# Patient Record
Sex: MALE | Race: WHITE | NOT HISPANIC OR LATINO | Employment: OTHER | URBAN - METROPOLITAN AREA
[De-identification: names, ages, dates, MRNs, and addresses within clinical notes are randomized per-mention and may not be internally consistent; named-entity substitution may affect disease eponyms.]

---

## 2017-01-02 ENCOUNTER — TELEPHONE (OUTPATIENT)
Dept: INTERNAL MEDICINE | Facility: CLINIC | Age: 62
End: 2017-01-02

## 2017-01-02 DIAGNOSIS — E78.5 HYPERLIPIDEMIA LDL GOAL <130: ICD-10-CM

## 2017-01-02 DIAGNOSIS — I10 BENIGN ESSENTIAL HYPERTENSION: Primary | ICD-10-CM

## 2017-01-02 DIAGNOSIS — R73.03 PREDIABETES: ICD-10-CM

## 2017-01-02 NOTE — TELEPHONE ENCOUNTER
Reason for Call: Request for an order or referral:    Order or referral being requested: labs    Date needed: as soon as possible    Has the patient been seen by the PCP for this problem? YES    Additional comments: Pt has an appt for a 3 mon follow up with Dr Payne 1/5/17.  Per office notes from Sept 2016, pt was to have labs done. There are no lab orders in pt's chart. Pt made an appt for labs 1/4/16.  Please order labs.    Phone number Patient can be reached at:  Home number on file [phone] (home)    Best Time:  anytime    Can we leave a detailed message on this number?      Call taken on 1/2/2017 at 12:17 PM by ALE RICE

## 2017-01-04 DIAGNOSIS — R73.03 PREDIABETES: ICD-10-CM

## 2017-01-04 DIAGNOSIS — E78.5 HYPERLIPIDEMIA LDL GOAL <130: ICD-10-CM

## 2017-01-04 DIAGNOSIS — I10 BENIGN ESSENTIAL HYPERTENSION: ICD-10-CM

## 2017-01-04 LAB
ALBUMIN SERPL-MCNC: 3.8 G/DL (ref 3.4–5)
ALP SERPL-CCNC: 84 U/L (ref 40–150)
ALT SERPL W P-5'-P-CCNC: 21 U/L (ref 0–70)
ANION GAP SERPL CALCULATED.3IONS-SCNC: 7 MMOL/L (ref 3–14)
AST SERPL W P-5'-P-CCNC: 22 U/L (ref 0–45)
BASOPHILS # BLD AUTO: 0 10E9/L (ref 0–0.2)
BASOPHILS NFR BLD AUTO: 0.4 %
BILIRUB SERPL-MCNC: 0.3 MG/DL (ref 0.2–1.3)
BUN SERPL-MCNC: 17 MG/DL (ref 7–30)
CALCIUM SERPL-MCNC: 9 MG/DL (ref 8.5–10.1)
CHLORIDE SERPL-SCNC: 100 MMOL/L (ref 94–109)
CHOLEST SERPL-MCNC: 190 MG/DL
CO2 SERPL-SCNC: 32 MMOL/L (ref 20–32)
CREAT SERPL-MCNC: 0.98 MG/DL (ref 0.66–1.25)
DIFFERENTIAL METHOD BLD: NORMAL
EOSINOPHIL # BLD AUTO: 0.4 10E9/L (ref 0–0.7)
EOSINOPHIL NFR BLD AUTO: 4 %
ERYTHROCYTE [DISTWIDTH] IN BLOOD BY AUTOMATED COUNT: 13.3 % (ref 10–15)
GFR SERPL CREATININE-BSD FRML MDRD: 78 ML/MIN/1.7M2
GLUCOSE SERPL-MCNC: 105 MG/DL (ref 70–99)
HCT VFR BLD AUTO: 48.8 % (ref 40–53)
HDLC SERPL-MCNC: 30 MG/DL
HGB BLD-MCNC: 16.1 G/DL (ref 13.3–17.7)
LDLC SERPL CALC-MCNC: 91 MG/DL
LYMPHOCYTES # BLD AUTO: 2.2 10E9/L (ref 0.8–5.3)
LYMPHOCYTES NFR BLD AUTO: 22.5 %
MCH RBC QN AUTO: 31.4 PG (ref 26.5–33)
MCHC RBC AUTO-ENTMCNC: 33 G/DL (ref 31.5–36.5)
MCV RBC AUTO: 95 FL (ref 78–100)
MONOCYTES # BLD AUTO: 0.9 10E9/L (ref 0–1.3)
MONOCYTES NFR BLD AUTO: 9.5 %
NEUTROPHILS # BLD AUTO: 6.3 10E9/L (ref 1.6–8.3)
NEUTROPHILS NFR BLD AUTO: 63.6 %
NONHDLC SERPL-MCNC: 160 MG/DL
PLATELET # BLD AUTO: 207 10E9/L (ref 150–450)
POTASSIUM SERPL-SCNC: 3.8 MMOL/L (ref 3.4–5.3)
PROT SERPL-MCNC: 7.3 G/DL (ref 6.8–8.8)
RBC # BLD AUTO: 5.13 10E12/L (ref 4.4–5.9)
SODIUM SERPL-SCNC: 139 MMOL/L (ref 133–144)
TRIGL SERPL-MCNC: 344 MG/DL
WBC # BLD AUTO: 9.9 10E9/L (ref 4–11)

## 2017-01-04 PROCEDURE — 80061 LIPID PANEL: CPT | Performed by: INTERNAL MEDICINE

## 2017-01-04 PROCEDURE — 80053 COMPREHEN METABOLIC PANEL: CPT | Performed by: INTERNAL MEDICINE

## 2017-01-04 PROCEDURE — 36415 COLL VENOUS BLD VENIPUNCTURE: CPT | Performed by: INTERNAL MEDICINE

## 2017-01-04 PROCEDURE — 85025 COMPLETE CBC W/AUTO DIFF WBC: CPT | Performed by: INTERNAL MEDICINE

## 2017-01-05 ENCOUNTER — OFFICE VISIT (OUTPATIENT)
Dept: INTERNAL MEDICINE | Facility: CLINIC | Age: 62
End: 2017-01-05
Payer: COMMERCIAL

## 2017-01-05 VITALS
DIASTOLIC BLOOD PRESSURE: 80 MMHG | HEART RATE: 86 BPM | TEMPERATURE: 98.1 F | BODY MASS INDEX: 28.58 KG/M2 | OXYGEN SATURATION: 91 % | WEIGHT: 199.6 LBS | SYSTOLIC BLOOD PRESSURE: 134 MMHG | HEIGHT: 70 IN

## 2017-01-05 DIAGNOSIS — J43.1 PANLOBULAR EMPHYSEMA (H): Primary | ICD-10-CM

## 2017-01-05 DIAGNOSIS — Z23 NEED FOR PROPHYLACTIC VACCINATION AGAINST STREPTOCOCCUS PNEUMONIAE (PNEUMOCOCCUS): ICD-10-CM

## 2017-01-05 DIAGNOSIS — E78.5 HYPERLIPIDEMIA LDL GOAL <130: ICD-10-CM

## 2017-01-05 DIAGNOSIS — I10 BENIGN ESSENTIAL HYPERTENSION: ICD-10-CM

## 2017-01-05 DIAGNOSIS — F17.200 TOBACCO USE DISORDER: ICD-10-CM

## 2017-01-05 PROCEDURE — 90471 IMMUNIZATION ADMIN: CPT | Performed by: INTERNAL MEDICINE

## 2017-01-05 PROCEDURE — 90670 PCV13 VACCINE IM: CPT | Performed by: INTERNAL MEDICINE

## 2017-01-05 PROCEDURE — 99214 OFFICE O/P EST MOD 30 MIN: CPT | Mod: 25 | Performed by: INTERNAL MEDICINE

## 2017-01-05 RX ORDER — ALBUTEROL SULFATE 90 UG/1
2 AEROSOL, METERED RESPIRATORY (INHALATION) EVERY 6 HOURS
Qty: 1 INHALER | Refills: 10 | Status: SHIPPED | OUTPATIENT
Start: 2017-01-05 | End: 2017-01-05

## 2017-01-05 RX ORDER — AMLODIPINE BESYLATE 10 MG/1
10 TABLET ORAL DAILY
Qty: 30 TABLET | Refills: 5 | Status: SHIPPED | OUTPATIENT
Start: 2017-01-05 | End: 2017-07-13

## 2017-01-05 RX ORDER — ALBUTEROL SULFATE 90 UG/1
2 AEROSOL, METERED RESPIRATORY (INHALATION) EVERY 6 HOURS
Qty: 1 INHALER | Refills: 10 | Status: SHIPPED | OUTPATIENT
Start: 2017-01-05 | End: 2018-01-04

## 2017-01-05 RX ORDER — LISINOPRIL AND HYDROCHLOROTHIAZIDE 12.5; 2 MG/1; MG/1
2 TABLET ORAL EVERY MORNING
Qty: 60 TABLET | Refills: 5 | Status: SHIPPED | OUTPATIENT
Start: 2017-01-05 | End: 2017-07-13

## 2017-01-05 RX ORDER — PRAVASTATIN SODIUM 40 MG
40 TABLET ORAL AT BEDTIME
Qty: 30 TABLET | Refills: 5 | Status: SHIPPED | OUTPATIENT
Start: 2017-01-05 | End: 2017-07-13

## 2017-01-05 NOTE — MR AVS SNAPSHOT
"              After Visit Summary   1/5/2017    Gabe Smith    MRN: [id number]           Patient Information     Date Of Birth          1955        Visit Information        Provider Department      1/5/2017 8:20 AM Donny Payne MD [facility]        Today's Diagnoses     COPD (HCC)    -  1     Benign essential hypertension         Hyperlipidemia LDL goal <130         Essential hypertension         Tobacco use disorder         Need for prophylactic vaccination against Streptococcus pneumoniae (pneumococcus)           Care Instructions    *  Continue all medications at the same doses.  Contact your usual pharmacy if you need refills.     *  Return to see me in approximately 6 months, sooner if needed.        *    5 GOALS TO PREVENT VASCULAR DISEASE:     1.  Aggressive blood pressure control, under 130/80 ideally.  Using medications if needed.    Your blood pressure is under good control    BP Readings from Last 4 Encounters:   01/05/17 134/80   09/22/16 152/90   04/07/16 134/84   11/12/15 120/80       2.  Aggressive LDL cholesterol (\"bad cholesterol\") lowering as indicated.    Your goal is an LDL under 130 for sure, preferably under 100.  (If you have diabetes or previous vascular disease, the the LDL goals would be under 100 for sure, preferably under 70.)    New guidelines identify four high-risk groups who could benefit from statins:   *people with pre-existing heart disease, such as those who have had a heart attack;   *people ages 40 to 75 who have diabetes of any type  *patients ages 40 to 75 with at least a 7.5% risk of developing cardiovascular disease over the next decade, according to a formula described in the guidelines  *patients with the sort of super-high cholesterol that sometimes runs in families, as evidenced by an LDL of 190 milligrams per deciliter or higher    Your cholesterol levels are well controlled.    Recent Labs   Lab Test  01/04/17   0814  " "09/20/16   0747   08/04/15   0757  02/03/15   0748   CHOL  190  203*   < >  210*  192   HDL  30*  26*   < >  27*  33*   LDL  91  Cannot estimate LDL when triglyceride exceeds 400 mg/dL  117*   < >  Cannot estimate LDL when triglyceride exceeds 400 mg/dL  134*  107   TRIG  344*  441*   < >  422*  262*   CHOLHDLRATIO   --    --    --   7.8*  5.8*    < > = values in this interval not displayed.       3.  Aggressive diabetic prevention, screening and/or management.      You do not have diabetes as of the most recent blood tests.     4.  No smoking    5.  Consider taking low dose aspirin (81 mg) tablet once per day over the age of 50, every day unless there is a specific reason that you cannot take aspirin (such as side effect, allergy, or you are on another \"blood thinner\").        --Based on your current cardiac risk factors, you should take Aspirin 81 mg once per day if you are over 50 years of age.         CHANTIX:     *  Start with the starting pack where you ramp up the dose for the first week, then you continue on the 1 mg twice daily dose for up to 9 months.      *  Stop smoking during the first couple of weeks while taking Chantix.      We hope for at least 3 months, but studies have shown that there is increased success in reminaing smoke free with use out as far as 9 months.  I personally think you probably only need to use it for between 3-6 months, but the choice is yours.     Main side effects of Chantix include (but are not limited to) nausea, constipation, gas, and changes in dreaming.  There is no reported seizure risk or insomnia as with Wellbutrin/Zyban.    *  You do not need to take nicotine replacement (i.e. Nicotine patches, nicotine gum/lozenges, etc.) when taking Chantix due to the way the medication works.     *  Be sure to take advantage of the \"Get Quit\" support plan from the Chantix  which can provide additional support.      *  Visit the Chantix web site for further details. " (www.chantix.[employer]) about the medication and side effects.      *  If you encounter side effects with Chantix,stop the medication immediately and be sure to let us know via the [facility] Nurse Line  [phone].    *  Consider visiting [facility] for more assistance and counseling for smoking cessation.      *  Remember to have somehting for your hands to play with (rubber band,  strength ball, etc.) and also something for your mouth as well (hard candy, gum, lozenges suckers, carrots, etc).    * Contact me via phone message or UNITY Mobilet after the first month of Chantix regardless of how you are feeling.  I need to know that the medication is both helping and not causing any side effects.  If everything is going as planned without side effects, then I will continue the medication.                    Follow-ups after your visit        Who to contact     If you have questions or need follow up information about today's clinic visit or your schedule please contact [facility] directly at [phone].  Normal or non-critical lab and imaging results will be communicated to you by Vettrohart, letter or phone within 4 business days after the clinic has received the results. If you do not hear from us within 7 days, please contact the clinic through UNITY Mobilet or phone. If you have a critical or abnormal lab result, we will notify you by phone as soon as possible.  Submit refill requests through [employer] or call your pharmacy and they will forward the refill request to us. Please allow 3 business days for your refill to be completed.          Additional Information About Your Visit        Vettrohart Information     [employer] gives you secure access to your electronic health record. If you see a primary care provider, you can also send messages to your care team and make appointments. If you have questions, please call your primary care clinic.  If you do not have a primary care provider,  "please call [phone] and they will assist you.        Care EveryWhere ID     This is your Care EveryWhere ID. This could be used by other organizations to access your [facility] medical records  [id number]        Your Vitals Were     Pulse Temperature Height BMI (Body Mass Index) Pulse Oximetry       86 98.1  F (36.7  C) (Oral) 5' 10\" (1.778 m) 28.64 kg/m2 91%        Blood Pressure from Last 3 Encounters:   01/05/17 134/80   09/22/16 152/90   04/07/16 134/84    Weight from Last 3 Encounters:   01/05/17 199 lb 9.6 oz (90.538 kg)   09/22/16 197 lb 1.6 oz (89.404 kg)   04/07/16 197 lb 3.2 oz (89.449 kg)              We Performed the Following     PNEUMOCOCCAL CONJ VACCINE 13 VALENT IM (PREVNAR 13)          Today's Medication Changes          These changes are accurate as of: 1/5/17  9:06 AM.  If you have any questions, ask your nurse or doctor.               Start taking these medicines.        Dose/Directions    varenicline 0.5 MG X 11 & 1 MG X 42 tablet   Commonly known as:  CHANTIX STARTING MONTH JUSTIN   Used for:  Tobacco use disorder   Started by:  Donny Payne MD        Take 0.5 mg tab daily for 3 days, then 0.5 mg tab twice daily for 4 days, then 1 mg twice daily.   Quantity:  53 tablet   Refills:  0         These medicines have changed or have updated prescriptions.        Dose/Directions    * albuterol 108 (90 BASE) MCG/ACT Inhaler   Commonly known as:  PROAIR HFA/PROVENTIL HFA/VENTOLIN HFA   This may have changed:  Another medication with the same name was added. Make sure you understand how and when to take each.   Used for:  COPD (chronic obstructive pulmonary disease) (H)   Changed by:  Donny Payne MD        Dose:  2 puff   Inhale 2 puffs into the lungs every 4 hours as needed for shortness of breath / dyspnea or wheezing   Quantity:  3 Inhaler   Refills:  5       * albuterol (2.5 MG/3ML) 0.083% neb solution   This may have changed:  Another medication with the same name was " added. Make sure you understand how and when to take each.   Used for:  Wheezing, Obstructive chronic bronchitis with exacerbation (H)   Changed by:  Elysia Pedroza PA-C        Dose:  1 vial   Take 1 vial (2.5 mg) by nebulization every 6 hours as needed for shortness of breath / dyspnea or wheezing   Quantity:  30 vial   Refills:  0       * albuterol 108 (90 BASE) MCG/ACT Inhaler   Commonly known as:  albuterol   This may have changed:  You were already taking a medication with the same name, and this prescription was added. Make sure you understand how and when to take each.   Used for:  Panlobular emphysema (H)   Changed by:  Donny Payne MD        Dose:  2 puff   Inhale 2 puffs into the lungs every 6 hours   Quantity:  1 Inhaler   Refills:  10       * Notice:  This list has 3 medication(s) that are the same as other medications prescribed for you. Read the directions carefully, and ask your doctor or other care provider to review them with you.         Where to get your medicines      These medications were sent to [facility] [address] [address]     Phone:  [phone]    - albuterol 108 (90 BASE) MCG/ACT Inhaler  - amLODIPine 10 MG tablet  - lisinopril-hydrochlorothiazide 20-12.5 MG per tablet  - mometasone-formoterol 200-5 MCG/ACT oral inhaler  - pravastatin 40 MG tablet  - varenicline 0.5 MG X 11 & 1 MG X 42 tablet             Primary Care Provider Office Phone # Fax #    Donny Payne [phone]       [address] [address]        Thank you!     Thank you for choosing [facility]  for your care. Our goal is always to provide you with excellent care. Hearing back from our patients is one way we can continue to improve our services. Please take a few minutes to complete the written survey that you may receive in the mail after  your visit with us. Thank you!             Your Updated Medication List - Protect others around you: Learn how to safely use, store and throw away your medicines at www.disposemymeds.org.          This list is accurate as of: 1/5/17  9:06 AM.  Always use your most recent med list.                   Brand Name Dispense Instructions for use    * albuterol 108 (90 BASE) MCG/ACT Inhaler    PROAIR HFA/PROVENTIL HFA/VENTOLIN HFA    3 Inhaler    Inhale 2 puffs into the lungs every 4 hours as needed for shortness of breath / dyspnea or wheezing       * albuterol (2.5 MG/3ML) 0.083% neb solution     30 vial    Take 1 vial (2.5 mg) by nebulization every 6 hours as needed for shortness of breath / dyspnea or wheezing       * albuterol 108 (90 BASE) MCG/ACT Inhaler    albuterol    1 Inhaler    Inhale 2 puffs into the lungs every 6 hours       amLODIPine 10 MG tablet    NORVASC    30 tablet    Take 1 tablet (10 mg) by mouth daily       aspirin 81 MG tablet      Take 1 tablet (81 mg) by mouth daily       lisinopril-hydrochlorothiazide 20-12.5 MG per tablet    PRINZIDE/ZESTORETIC    60 tablet    Take 2 tablets by mouth every morning       mometasone-formoterol 200-5 MCG/ACT oral inhaler    DULERA    1 Inhaler    Inhale 2 puffs into the lungs 2 times daily       pravastatin 40 MG tablet    PRAVACHOL    30 tablet    Take 1 tablet (40 mg) by mouth At Bedtime       varenicline 0.5 MG X 11 & 1 MG X 42 tablet    CHANTIX STARTING MONTH JUSTIN    53 tablet    Take 0.5 mg tab daily for 3 days, then 0.5 mg tab twice daily for 4 days, then 1 mg twice daily.       * Notice:  This list has 3 medication(s) that are the same as other medications prescribed for you. Read the directions carefully, and ask your doctor or other care provider to review them with you.

## 2017-01-05 NOTE — PROGRESS NOTES
SUBJECTIVE:                                                    Gabe Smith is a 61 year old male who presents to clinic today for the following health issues:      COPD remains stable.  Has not had any recent breathing troubles beyond usual baseline.  Has not any acute respiratory events.  Remains with intermittent cough, mild shortness of breath with overexertion as per usual.  Using medication as directed with reported side effects.       Hyperlipidemia Follow-Up      Rate your low fat/cholesterol diet?: good    Taking statin?  Yes, no muscle aches from statin    Other lipid medications/supplements?:  None    Has history of hyperlipidemia.  On statin for this, denies any significant side effects of this medication.      Latest labs reviewed:    Recent Labs   Lab Test  01/04/17   0814  09/20/16   0747   08/04/15   0757  02/03/15   0748   CHOL  190  203*   < >  210*  192   HDL  30*  26*   < >  27*  33*   LDL  91  Cannot estimate LDL when triglyceride exceeds 400 mg/dL  117*   < >  Cannot estimate LDL when triglyceride exceeds 400 mg/dL  134*  107   TRIG  344*  441*   < >  422*  262*   CHOLHDLRATIO   --    --    --   7.8*  5.8*    < > = values in this interval not displayed.        AST       22   1/4/2017       Hypertension Follow-up      Outpatient blood pressures are not being checked.    Low Salt Diet: no added salt    Blood presure remains well controlled at home  Readings outside clinic are within normal limits.  Reviewed last 6 BP readings in chart:  BP Readings from Last 6 Encounters:   01/05/17 134/80   09/22/16 152/90   04/07/16 134/84   11/12/15 120/80   10/19/15 122/89   09/02/15 122/78     He has not experienced any significant side effects from medicaiotns for hypertension.    NO active cardiac complaints or symptoms with exercise.        Amount of exercise or physical activity: None    Problems taking medications regularly: No    Medication side effects: none    Diet: regular (no  restrictions)        Problem list and histories reviewed & adjusted, as indicated.  Additional history: as documented        Past Medical History:  ---------------------------  Past Medical History   Diagnosis Date     Hypertension      Hyperlipidemia      Osteoarthrosis      Tobacco use disorder      Prediabetes 10/9/14     A1C 6.1     Dysmetabolic syndrome X      COPD (chronic obstructive pulmonary disease) (H)        Past Surgical History:  ---------------------------  Past Surgical History   Procedure Laterality Date     Surgical history of -   2000     Left MOISES     Surgical history of -        Unspecified knee surgeries as a child     Surgical history of -   10/2010     Right MOISES, with left knee arthroscopy, meniscectomy     Colonoscopy N/A 10/19/2015     Procedure: COMBINED COLONOSCOPY, SINGLE OR MULTIPLE BIOPSY/POLYPECTOMY BY BIOPSY;  Surgeon: Gume Vidal MD;  Location: [facility]       Current Medications:  ---------------------------  Current Outpatient Prescriptions   Medication Sig Dispense Refill     mometasone-formoterol (DULERA) 200-5 MCG/ACT oral inhaler Inhale 2 puffs into the lungs 2 times daily 1 Inhaler 5     lisinopril-hydrochlorothiazide (PRINZIDE/ZESTORETIC) 20-12.5 MG per tablet Take 2 tablets by mouth every morning 60 tablet 5     pravastatin (PRAVACHOL) 40 MG tablet Take 1 tablet (40 mg) by mouth At Bedtime 30 tablet 5     amLODIPine (NORVASC) 10 MG tablet Take 1 tablet (10 mg) by mouth daily 30 tablet 5     varenicline (CHANTIX STARTING MONTH PAK) 0.5 MG X 11 & 1 MG X 42 tablet Take 0.5 mg tab daily for 3 days, then 0.5 mg tab twice daily for 4 days, then 1 mg twice daily. 53 tablet 0     albuterol (ALBUTEROL) 108 (90 BASE) MCG/ACT Inhaler Inhale 2 puffs into the lungs every 6 hours 1 Inhaler 10     albuterol (PROAIR HFA, PROVENTIL HFA, VENTOLIN HFA) 108 (90 BASE) MCG/ACT inhaler Inhale 2 puffs into the lungs every 4 hours as needed for shortness of breath / dyspnea or wheezing 3 Inhaler 5  "    aspirin 81 MG tablet Take 1 tablet (81 mg) by mouth daily       albuterol (2.5 MG/3ML) 0.083% nebulizer solution Take 1 vial (2.5 mg) by nebulization every 6 hours as needed for shortness of breath / dyspnea or wheezing 30 vial 0       Allergies:  -------------  No Known Allergies    Social History:  -------------------  Social History     Social History     Marital Status:      Spouse Name: N/A     Number of Children: 2     Years of Education: N/A     Occupational History           Social History Main Topics     Smoking status: Current Every Day Smoker -- 1.50 packs/day     Types: Cigarettes     Smokeless tobacco: Never Used      Comment: 1 1/2 PPD     Alcohol Use: 0.0 oz/week     0 Standard drinks or equivalent per week      Comment: 8/7/14:  3-4 per day; 9/22/16:  3-6 per day most days     Drug Use: No     Sexual Activity: Not Currently     Other Topics Concern     Not on file     Social History Narrative       Family Medical History:  ------------------------------  Family History   Problem Relation Age of Onset     Family History Negative Mother      Family History Negative Brother      Family History Negative Sister          ROS:  REVIEW OF SYSTEMS:    RESP: positive for cough, dyspnea, wheezing; negative for hemoptysis   CV: negative for chest pain, palpitations, PND, YANCEY, orthopnea; reports no changes in their ability to perform physical activity (from cardiovascular standpoint)  GI: negative for dysphagia, N/V, pain, melena, diarrhea and constipation  NEURO: negative for numbness/tingling, paralysis, incoordination, or focal weakness     OBJECTIVE:                                                    /80 mmHg  Pulse 86  Temp(Src) 98.1  F (36.7  C) (Oral)  Ht 5' 10\" (1.778 m)  Wt 199 lb 9.6 oz (90.538 kg)  BMI 28.64 kg/m2  SpO2 91%     GENERAL alert and no distress  EYES:  Normal sclera,conjunctiva, EOMI  HENT: oral and posterior pharynx without lesions or erythema, facies " symmetric  NECK: Neck supple. No LAD, without thyroidmegaly or JVD., Carotids without bruits.  RESP: Clear to ausculation bilaterally without wheezes or crackles. Normal BS in all fields.  CV: RRR normal S1S2 without murmurs, rubs or gallops. PMI normal  LYMPH: no cervical lymph adenopathy appreciated  MS: extremities- no gross deformities of the visible extremities noted, no edema  PSYCH: Alert and oriented times 3; speech- coherent  SKIN:  No obvious significant skin lesions on visible portions of face          ASSESSMENT/PLAN:                                                      (J43.1) COPD (HCC)  (primary encounter diagnosis)  Comment: This condition is currently controlled on the current medical regimen.  Continue current therapy.   I really tried to conveince him that he really needs to quit smoking, but he feels that he is not ready at this time.   Discussed general issues of COPD, including pathophysiology, ways it will affect the pt., when to seek help, reviewed the typical medications (how they are taken, how they help)   Plan: mometasone-formoterol (DULERA) 200-5 MCG/ACT         oral inhaler, albuterol (ALBUTEROL) 108 (90         BASE) MCG/ACT Inhaler, DISCONTINUED: albuterol         (ALBUTEROL) 108 (90 BASE) MCG/ACT Inhaler            (I10) Benign essential hypertension  Comment: This condition is currently controlled on the current medical regimen.  Continue current therapy.   Discussed hypertension in detail including JNC VIII guidelines for blood pressure goals.  Discussed indication for treatment and treatment options.  Discussed the importance for aggressive management of HTN to prevent vascular complications later.  Recommended lower fat, lower carbohydrate, and lower sodium (<2000 mg)diet.  Discussed required intervals for follow up on HTN, lab studies, and the need to aggresive management of other cardiac disease risk factors.  Recommened pt. follow their blood pressures outside the clinic to  ensure that BPs are remaining within guidelines, and to contact me if the readings are not within guidelines so we can adjust treatment as needed.   Plan: lisinopril-hydrochlorothiazide         (PRINZIDE/ZESTORETIC) 20-12.5 MG per tablet            (E78.5) Hyperlipidemia LDL goal <130  Comment: Discussed current lipid results, previous results (if available) current guidelines (NCEP) for treatment and goals for lipids.  Discussed lifestyle modification, dietary changes (low fat, low simple carb) and regular aerobic exercise.  Discussed the link between dysmetabolic syndrome and impaired glucose tolerance seen in certain patterns of lipids.  Briefly discussed medication used for lipid lowering, including the statins are their possible side effects of myalgias, rhabdomyolysis, and liver toxicity.   Plan: pravastatin (PRAVACHOL) 40 MG tablet            (I10) Essential hypertension  Comment:   Plan: amLODIPine (NORVASC) 10 MG tablet            (F17.200) Tobacco use disorder  Comment: After further discussion of medication aids to help stop smoking, the patient has decided to take Chantix.  We discussed the medication in detail, including suspected mechanism of action, duration of treatment (minimum preferred 3 months up to max of 6-9 months).  We also discussed some of the potential side effects of the medication including, but not limited to, GI upset, nausea, headaches, nightmares, strange dreams, and possible effects on the mood, inclduing worsening of depression and/or anxiety.  Also mentioned about isolated incidences of suicide possibly related to Chantix use.  I told the patient to immediately stop the Chantix if they suspect any changes in the mood and to contact us immediately.    I also instructed them not to take Chantix until they had a chance to visit the product official web site to further educate themselves about the medication.   Plan: varenicline (CHANTIX STARTING MONTH JUSTIN) 0.5 MG        X 11 & 1 MG X  42 tablet            (Z23) Need for prophylactic vaccination against Streptococcus pneumoniae (pneumococcus)  Comment:   Plan: PNEUMOCOCCAL CONJ VACCINE 13 VALENT IM (PREVNAR        13)              See Patient Instructions    HUEY SHAW M.D., MD  [facility]

## 2017-01-05 NOTE — PATIENT INSTRUCTIONS
"*  Continue all medications at the same doses.  Contact your usual pharmacy if you need refills.     *  Return to see me in approximately 6 months, sooner if needed.        *    5 GOALS TO PREVENT VASCULAR DISEASE:     1.  Aggressive blood pressure control, under 130/80 ideally.  Using medications if needed.    Your blood pressure is under good control    BP Readings from Last 4 Encounters:   01/05/17 134/80   09/22/16 152/90   04/07/16 134/84   11/12/15 120/80       2.  Aggressive LDL cholesterol (\"bad cholesterol\") lowering as indicated.    Your goal is an LDL under 130 for sure, preferably under 100.  (If you have diabetes or previous vascular disease, the the LDL goals would be under 100 for sure, preferably under 70.)    New guidelines identify four high-risk groups who could benefit from statins:   *people with pre-existing heart disease, such as those who have had a heart attack;   *people ages 40 to 75 who have diabetes of any type  *patients ages 40 to 75 with at least a 7.5% risk of developing cardiovascular disease over the next decade, according to a formula described in the guidelines  *patients with the sort of super-high cholesterol that sometimes runs in families, as evidenced by an LDL of 190 milligrams per deciliter or higher    Your cholesterol levels are well controlled.    Recent Labs   Lab Test  01/04/17   0814  09/20/16   0747   08/04/15   0757  02/03/15   0748   CHOL  190  203*   < >  210*  192   HDL  30*  26*   < >  27*  33*   LDL  91  Cannot estimate LDL when triglyceride exceeds 400 mg/dL  117*   < >  Cannot estimate LDL when triglyceride exceeds 400 mg/dL  134*  107   TRIG  344*  441*   < >  422*  262*   CHOLHDLRATIO   --    --    --   7.8*  5.8*    < > = values in this interval not displayed.       3.  Aggressive diabetic prevention, screening and/or management.      You do not have diabetes as of the most recent blood tests.     4.  No smoking    5.  Consider taking low dose aspirin " "(81 mg) tablet once per day over the age of 50, every day unless there is a specific reason that you cannot take aspirin (such as side effect, allergy, or you are on another \"blood thinner\").        --Based on your current cardiac risk factors, you should take Aspirin 81 mg once per day if you are over 50 years of age.         CHANTIX:     *  Start with the starting pack where you ramp up the dose for the first week, then you continue on the 1 mg twice daily dose for up to 9 months.      *  Stop smoking during the first couple of weeks while taking Chantix.      We hope for at least 3 months, but studies have shown that there is increased success in reminaing smoke free with use out as far as 9 months.  I personally think you probably only need to use it for between 3-6 months, but the choice is yours.     Main side effects of Chantix include (but are not limited to) nausea, constipation, gas, and changes in dreaming.  There is no reported seizure risk or insomnia as with Wellbutrin/Zyban.    *  You do not need to take nicotine replacement (i.e. Nicotine patches, nicotine gum/lozenges, etc.) when taking Chantix due to the way the medication works.     *  Be sure to take advantage of the \"Get Quit\" support plan from the Chantix  which can provide additional support.      *  Visit the Chantix web site for further details. ([email or web address]) about the medication and side effects.      *  If you encounter side effects with Chantix,stop the medication immediately and be sure to let us know via the [facility] Internal Medicine Nurse Line  [phone].    *  Consider visiting [employer] for more assistance and counseling for smoking cessation.      *  Remember to have somehting for your hands to play with (rubber band,  strength ball, etc.) and also something for your mouth as well (hard candy, gum, lozenges suckers, carrots, etc).    * Contact me via phone message or [employer] after the first month of Chantix " regardless of how you are feeling.  I need to know that the medication is both helping and not causing any side effects.  If everything is going as planned without side effects, then I will continue the medication.

## 2017-01-05 NOTE — NURSING NOTE
"Chief Complaint   Patient presents with     Hypertension     review recent results     Lipids     review recent results       Initial /86 mmHg  Pulse 86  Temp(Src) 98.1  F (36.7  C) (Oral)  Ht 5' 10\" (1.778 m)  Wt 199 lb 9.6 oz (90.538 kg)  BMI 28.64 kg/m2  SpO2 91% Estimated body mass index is 28.64 kg/(m^2) as calculated from the following:    Height as of this encounter: 5' 10\" (1.778 m).    Weight as of this encounter: 199 lb 9.6 oz (90.538 kg).  BP completed using cuff size: ramakrishna Crain CMA      "

## 2017-03-31 ENCOUNTER — MYC MEDICAL ADVICE (OUTPATIENT)
Dept: INTERNAL MEDICINE | Facility: CLINIC | Age: 62
End: 2017-03-31

## 2017-03-31 DIAGNOSIS — F17.200 TOBACCO USE DISORDER: Primary | ICD-10-CM

## 2017-04-03 RX ORDER — VARENICLINE TARTRATE 1 MG/1
1 TABLET, FILM COATED ORAL 2 TIMES DAILY
Qty: 60 TABLET | Refills: 5 | Status: SHIPPED | OUTPATIENT
Start: 2017-04-03 | End: 2017-07-13

## 2017-04-12 ENCOUNTER — OFFICE VISIT (OUTPATIENT)
Dept: URGENT CARE | Facility: URGENT CARE | Age: 62
End: 2017-04-12

## 2017-04-12 ENCOUNTER — RADIANT APPOINTMENT (OUTPATIENT)
Dept: GENERAL RADIOLOGY | Facility: CLINIC | Age: 62
End: 2017-04-12
Attending: FAMILY MEDICINE
Payer: COMMERCIAL

## 2017-04-12 VITALS
OXYGEN SATURATION: 96 % | BODY MASS INDEX: 29.13 KG/M2 | HEART RATE: 81 BPM | TEMPERATURE: 98.3 F | WEIGHT: 203 LBS | DIASTOLIC BLOOD PRESSURE: 76 MMHG | SYSTOLIC BLOOD PRESSURE: 110 MMHG

## 2017-04-12 DIAGNOSIS — S09.90XA HEAD INJURY, INITIAL ENCOUNTER: ICD-10-CM

## 2017-04-12 DIAGNOSIS — M25.461 EFFUSION OF RIGHT KNEE: ICD-10-CM

## 2017-04-12 DIAGNOSIS — S89.91XA KNEE INJURY, RIGHT, INITIAL ENCOUNTER: Primary | ICD-10-CM

## 2017-04-12 DIAGNOSIS — S01.01XA LACERATION OF SCALP, INITIAL ENCOUNTER: ICD-10-CM

## 2017-04-12 DIAGNOSIS — S89.91XA KNEE INJURY, RIGHT, INITIAL ENCOUNTER: ICD-10-CM

## 2017-04-12 PROCEDURE — 73562 X-RAY EXAM OF KNEE 3: CPT | Mod: RT

## 2017-04-12 PROCEDURE — 99213 OFFICE O/P EST LOW 20 MIN: CPT | Mod: 25 | Performed by: FAMILY MEDICINE

## 2017-04-12 PROCEDURE — 12002 RPR S/N/AX/GEN/TRNK2.6-7.5CM: CPT | Performed by: FAMILY MEDICINE

## 2017-04-12 NOTE — MR AVS SNAPSHOT
After Visit Summary   4/12/2017    Gabe Smith    MRN: [id number]           Patient Information     Date Of Birth          1955        Visit Information        Provider Department      4/12/2017 1:15 PM Jose Johnson DO [facility]        Today's Diagnoses     Knee injury, right, initial encounter    -  1    Head injury, initial encounter        Laceration of scalp, initial encounter        Effusion of right knee           Follow-ups after your visit        Your next 10 appointments already scheduled     Jul 13, 2017  9:00 AM CDT   [facility]YS[facility] with Donny [facility] MD   [facility]    [address] [address] [phone]              Who to contact     If you have questions or need follow up information about today's clinic visit or your schedule please contact [facility] directly at [phone].  Normal or non-critical lab and imaging results will be communicated to you by MyChart, letter or phone within 4 business days after the clinic has received the results. If you do not hear from us within 7 days, please contact the clinic through [email or web address] or phone. If you have a critical or abnormal lab result, we will notify you by phone as soon as possible.  Submit refill requests through [employer] or call your pharmacy and they will forward the refill request to us. Please allow 3 business days for your refill to be completed.          Additional Information About Your Visit        MyChart Information     [employer] gives you secure access to your electronic health record. If you see a primary care provider, you can also send messages to your care team and make appointments. If you have questions, please call your primary care clinic.  If you do not have a primary care provider, please call [phone] and they will assist you.        Care EveryWhere ID      This is your Care EveryWhere ID. This could be used by other organizations to access your [place] medical records  [id number]        Your Vitals Were     Pulse Temperature Pulse Oximetry BMI (Body Mass Index)          81 98.3  F (36.8  C) (Oral) 96% 29.13 kg/m2         Blood Pressure from Last 3 Encounters:   04/12/17 110/76   01/05/17 134/80   09/22/16 152/90    Weight from Last 3 Encounters:   04/12/17 203 lb (92.1 kg)   01/05/17 199 lb 9.6 oz (90.5 kg)   09/22/16 197 lb 1.6 oz (89.4 kg)              We Performed the Following     REPAIR SUPERFICIAL, WOUND BODY 2.6-7.5 CM        Primary Care Provider Office Phone # Fax #    Donny Payne [phone]       [facility] [address] [facility] [address]        Thank you!     Thank you for choosing [facility]  for your care. Our goal is always to provide you with excellent care. Hearing back from our patients is one way we can continue to improve our services. Please take a few minutes to complete the written survey that you may receive in the mail after your visit with us. Thank you!             Your Updated Medication List - Protect others around you: Learn how to safely use, store and throw away your medicines at www.disposemymeds.org.          This list is accurate as of: 4/12/17 11:59 PM.  Always use your most recent med list.                   Brand Name Dispense Instructions for use    * albuterol 108 (90 BASE) MCG/ACT Inhaler    PROAIR HFA/PROVENTIL HFA/VENTOLIN HFA    3 Inhaler    Inhale 2 puffs into the lungs every 4 hours as needed for shortness of breath / dyspnea or wheezing       * albuterol (2.5 MG/3ML) 0.083% neb solution     30 vial    Take 1 vial (2.5 mg) by nebulization every 6 hours as needed for shortness of breath / dyspnea or wheezing       * albuterol 108 (90 BASE) MCG/ACT Inhaler    albuterol    1 Inhaler    Inhale 2 puffs into the lungs every 6 hours       amLODIPine  10 MG tablet    NORVASC    30 tablet    Take 1 tablet (10 mg) by mouth daily       aspirin 81 MG tablet      Take 1 tablet (81 mg) by mouth daily       lisinopril-hydrochlorothiazide 20-12.5 MG per tablet    PRINZIDE/ZESTORETIC    60 tablet    Take 2 tablets by mouth every morning       mometasone-formoterol 200-5 MCG/ACT oral inhaler    DULERA    1 Inhaler    Inhale 2 puffs into the lungs 2 times daily       pravastatin 40 MG tablet    PRAVACHOL    30 tablet    Take 1 tablet (40 mg) by mouth At Bedtime       varenicline 1 MG tablet    CHANTIX    60 tablet    Take 1 tablet (1 mg) by mouth 2 times daily       * Notice:  This list has 3 medication(s) that are the same as other medications prescribed for you. Read the directions carefully, and ask your doctor or other care provider to review them with you.

## 2017-04-12 NOTE — PROGRESS NOTES
"SUBJECTIVE:  Chief Complaint   Patient presents with     Knee Injury     rt knee injury yesterday at work. Last night at aprox 9pm states \"leg gave out\" and fell and hit head on a radiatior,has laceration posterior scalp. Denies LOC,denies headache or nausea.     Head Injury     Work Comp   .ident presents with a chief complaint of right knee.  And then later bc of knee pain fell and hit head, no loc, no ha  The injury occurred 1 day ago.   The injury happened while at work.   How: trauma: hit knee into metal object immediate pain  The patient complained of moderate pain and has had decreased ROM.    Pain exacerbated by weight-bearing and movement    He treated it initially with cructhes.   This is the first time this type of injury has occurred to this patient.     Past Medical History:   Diagnosis Date     COPD (chronic obstructive pulmonary disease) (H)      Dysmetabolic syndrome X      Hyperlipidemia      Hypertension      Osteoarthrosis      Prediabetes 10/9/14    A1C 6.1     Tobacco use disorder      No Known Allergies  Social History   Substance Use Topics     Smoking status: Current Every Day Smoker     Packs/day: 1.50     Types: Cigarettes     Smokeless tobacco: Never Used      Comment: 1 1/2 PPD     Alcohol use 0.0 oz/week     0 Standard drinks or equivalent per week      Comment: 8/7/14:  3-4 per day; 9/22/16:  3-6 per day most days       ROSINTEGUMENTARY/SKIN: NEGATIVE for open wound/bleeding and NEGATIVE for bruising  MUSCULOSKELETAL: NEGATIVE for joint swelling, paresthesias, radicular pain  NEURO: NEGATIVE for numbness, paresthesias, weakness    EXAM: /76 (BP Location: Right arm, Patient Position: Chair, Cuff Size: Adult Regular)  Pulse 81  Temp 98.3  F (36.8  C) (Oral)  Wt 203 lb (92.1 kg)  SpO2 96%  BMI 29.13 kg/m2  Gen: healthy,alert,no distress  Extremity: knee and scalp has pain with palpation  And rom.   There is not compromise to the distal circulation.  Pulses are +2 and CRT is " brisk.GENERAL APPEARANCE: healthy, alert and no distress  EXTREMITIES: peripheral pulses normal  SKIN: scalp laceration 4cm, cleaned, 1% lido 2cc and 5 staples applied  NEURO: Normal strength and tone, sensory exam grossly normal, mentation intact and speech normal    Xray without acute findings, read by Jose Johnson D.O.      ICD-10-CM    1. Knee injury, right, initial encounter S89.91XA XR Knee Right 3 Views   2. Head injury, initial encounter S09.90XA    3. Laceration of scalp, initial encounter S01.01XA REPAIR SUPERFICIAL, WOUND BODY 2.6-7.5 CM   4. Effusion of right knee M25.461      Pt has crutches  RICE  F/U Work Comp if cont

## 2017-04-12 NOTE — LETTER
[address] [address]  [phone]        2017    REPORT OF WORK ABILITY    PATIENT DATA  Employee Name: Gabe Smith        : 1955   [id number]  Work related injury: YES  Today's date: 2017  Date of injury: 2017     PROVIDER EVALUATION: Please fill in as needed.  Please give copy to employee for employer.  1. Diagnosis: Contusion/effusion knee and scalp laceration  2. Treatment: Pt has crutches and staples scalp  3. Medication: OTC  NOTE: When ordering a medication, [place] Rules require Work Comp or WC on prescriptions.  4. Return to work date: 17 with the following: * Other: activity as tolerated  DURATION OF LIMITATIONS: as needed      RESTRICTIONS: Unlimited unless listed.  Restrictions apply to home and leisure also.  If work within restrictions is not available, the employee is totally disabled.  Provider comments: none  Medical Examiner: Jose Johnson      License or registration: [id number]    Next appointment: As needed    CC: Employer, Managed Care Plan/Payor, Patient

## 2017-04-12 NOTE — NURSING NOTE
"Chief Complaint   Patient presents with     Knee Injury     rt knee injury yesterday at work. Last night at aprox 9pm states \"leg gave out\" and fell and hit head on a radiatior,has laceration posterior scalp. Denies LOC,denies headache or nausea.     Head Injury     Work Comp       Initial /76 (BP Location: Right arm, Patient Position: Chair, Cuff Size: Adult Regular)  Pulse 81  Temp 98.3  F (36.8  C) (Oral)  Wt 203 lb (92.1 kg)  SpO2 96%  BMI 29.13 kg/m2 Estimated body mass index is 29.13 kg/(m^2) as calculated from the following:    Height as of 1/5/17: 5' 10\" (1.778 m).    Weight as of this encounter: 203 lb (92.1 kg).  Medication Reconciliation: complete   Rao OSPINA      "

## 2017-04-21 ENCOUNTER — OFFICE VISIT (OUTPATIENT)
Dept: URGENT CARE | Facility: URGENT CARE | Age: 62
End: 2017-04-21
Payer: COMMERCIAL

## 2017-04-21 VITALS — TEMPERATURE: 98.8 F

## 2017-04-21 DIAGNOSIS — Z53.9 DIAGNOSIS NOT YET DEFINED: Primary | ICD-10-CM

## 2017-04-21 PROCEDURE — 99024 POSTOP FOLLOW-UP VISIT: CPT

## 2017-04-21 NOTE — NURSING NOTE
Gabe presents to the clinic today for  removal of sutures and staples.  The patient has had the sutures and staples in place for 9 days.    There has been no history of infection or drainage.    O: 5 sutures and staples are seen located on the back .  The wound is healing well with no signs of infection.    Tetanus status is up to date.    A: Suture and Staple removal.    P:  All sutures and staples were easily removed today.  Routine wound care discussed.  The patient will follow up as needed.

## 2017-04-21 NOTE — NURSING NOTE
"Chief Complaint   Patient presents with     Suture Removal     head suture removal, x 9 days        Initial Temp 98.8  F (37.1  C) (Oral) Estimated body mass index is 29.13 kg/(m^2) as calculated from the following:    Height as of 1/5/17: 5' 10\" (1.778 m).    Weight as of 4/12/17: 203 lb (92.1 kg).  Medication Reconciliation: complete    "

## 2017-04-21 NOTE — MR AVS SNAPSHOT
After Visit Summary   4/21/2017    Gabe Smith    MRN: [id number]           Patient Information     Date Of Birth          1955        Visit Information        Provider Department      4/21/2017 4:45 PM Provider, Mainor Stone MD [facility]        Today's Diagnoses     DIAGNOSIS NOT YET DEFINED    -  1       Follow-ups after your visit        Your next 10 appointments already scheduled     Jul 13, 2017  9:00 AM CDT   PHYSICAL with Donny Payne MD   [facility]    [address] [id number] [phone]              Who to contact     If you have questions or need follow up information about today's clinic visit or your schedule please contact [facility] directly at [phone].  Normal or non-critical lab and imaging results will be communicated to you by MyChart, letter or phone within 4 business days after the clinic has received the results. If you do not hear from us within 7 days, please contact the clinic through MyChart or phone. If you have a critical or abnormal lab result, we will notify you by phone as soon as possible.  Submit refill requests through [employer] or call your pharmacy and they will forward the refill request to us. Please allow 3 business days for your refill to be completed.          Additional Information About Your Visit        MyChart Information     [employer] gives you secure access to your electronic health record. If you see a primary care provider, you can also send messages to your care team and make appointments. If you have questions, please call your primary care clinic.  If you do not have a primary care provider, please call [phone] and they will assist you.        Care EveryWhere ID     This is your Care EveryWhere ID. This could be used by other organizations to access your [facility]  records  [id number]        Your Vitals Were     Temperature                   98.8  F (37.1  C) (Oral)            Blood Pressure from Last 3 Encounters:   04/12/17 110/76   01/05/17 134/80   09/22/16 152/90    Weight from Last 3 Encounters:   04/12/17 203 lb (92.1 kg)   01/05/17 199 lb 9.6 oz (90.5 kg)   09/22/16 197 lb 1.6 oz (89.4 kg)              Today, you had the following     No orders found for display       Primary Care Provider Office Phone # Fax #    Donny Albino Payne [phone]       [facility] [address]  [facility] [address]        Thank you!     Thank you for choosing [facility]  for your care. Our goal is always to provide you with excellent care. Hearing back from our patients is one way we can continue to improve our services. Please take a few minutes to complete the written survey that you may receive in the mail after your visit with us. Thank you!             Your Updated Medication List - Protect others around you: Learn how to safely use, store and throw away your medicines at www.disposemymeds.org.          This list is accurate as of: 4/21/17 11:59 PM.  Always use your most recent med list.                   Brand Name Dispense Instructions for use    * albuterol 108 (90 BASE) MCG/ACT Inhaler    PROAIR HFA/PROVENTIL HFA/VENTOLIN HFA    3 Inhaler    Inhale 2 puffs into the lungs every 4 hours as needed for shortness of breath / dyspnea or wheezing       * albuterol (2.5 MG/3ML) 0.083% neb solution     30 vial    Take 1 vial (2.5 mg) by nebulization every 6 hours as needed for shortness of breath / dyspnea or wheezing       * albuterol 108 (90 BASE) MCG/ACT Inhaler    albuterol    1 Inhaler    Inhale 2 puffs into the lungs every 6 hours       amLODIPine 10 MG tablet    NORVASC    30 tablet    Take 1 tablet (10 mg) by mouth daily       aspirin 81 MG tablet      Take 1 tablet (81 mg) by mouth daily        lisinopril-hydrochlorothiazide 20-12.5 MG per tablet    PRINZIDE/ZESTORETIC    60 tablet    Take 2 tablets by mouth every morning       mometasone-formoterol 200-5 MCG/ACT oral inhaler    DULERA    1 Inhaler    Inhale 2 puffs into the lungs 2 times daily       pravastatin 40 MG tablet    PRAVACHOL    30 tablet    Take 1 tablet (40 mg) by mouth At Bedtime       varenicline 1 MG tablet    CHANTIX    60 tablet    Take 1 tablet (1 mg) by mouth 2 times daily       * Notice:  This list has 3 medication(s) that are the same as other medications prescribed for you. Read the directions carefully, and ask your doctor or other care provider to review them with you.

## 2017-07-06 ENCOUNTER — MYC MEDICAL ADVICE (OUTPATIENT)
Dept: INTERNAL MEDICINE | Facility: CLINIC | Age: 62
End: 2017-07-06

## 2017-07-06 DIAGNOSIS — I10 BENIGN ESSENTIAL HYPERTENSION: ICD-10-CM

## 2017-07-06 DIAGNOSIS — Z12.5 SPECIAL SCREENING FOR MALIGNANT NEOPLASM OF PROSTATE: Primary | ICD-10-CM

## 2017-07-06 DIAGNOSIS — E78.5 HYPERLIPIDEMIA LDL GOAL <130: ICD-10-CM

## 2017-07-11 DIAGNOSIS — I10 BENIGN ESSENTIAL HYPERTENSION: ICD-10-CM

## 2017-07-11 DIAGNOSIS — E78.5 HYPERLIPIDEMIA LDL GOAL <130: ICD-10-CM

## 2017-07-11 DIAGNOSIS — Z12.5 SPECIAL SCREENING FOR MALIGNANT NEOPLASM OF PROSTATE: ICD-10-CM

## 2017-07-11 LAB
ANION GAP SERPL CALCULATED.3IONS-SCNC: 3 MMOL/L (ref 3–14)
BUN SERPL-MCNC: 18 MG/DL (ref 7–30)
CALCIUM SERPL-MCNC: 8.9 MG/DL (ref 8.5–10.1)
CHLORIDE SERPL-SCNC: 104 MMOL/L (ref 94–109)
CHOLEST SERPL-MCNC: 175 MG/DL
CO2 SERPL-SCNC: 32 MMOL/L (ref 20–32)
CREAT SERPL-MCNC: 0.85 MG/DL (ref 0.66–1.25)
GFR SERPL CREATININE-BSD FRML MDRD: ABNORMAL ML/MIN/1.7M2
GLUCOSE SERPL-MCNC: 107 MG/DL (ref 70–99)
HDLC SERPL-MCNC: 33 MG/DL
LDLC SERPL CALC-MCNC: 105 MG/DL
NONHDLC SERPL-MCNC: 142 MG/DL
POTASSIUM SERPL-SCNC: 4.5 MMOL/L (ref 3.4–5.3)
PSA SERPL-ACNC: 0.76 UG/L (ref 0–4)
SODIUM SERPL-SCNC: 139 MMOL/L (ref 133–144)
TRIGL SERPL-MCNC: 184 MG/DL

## 2017-07-11 PROCEDURE — 36415 COLL VENOUS BLD VENIPUNCTURE: CPT | Performed by: INTERNAL MEDICINE

## 2017-07-11 PROCEDURE — G0103 PSA SCREENING: HCPCS | Performed by: INTERNAL MEDICINE

## 2017-07-11 PROCEDURE — 80061 LIPID PANEL: CPT | Performed by: INTERNAL MEDICINE

## 2017-07-11 PROCEDURE — 80048 BASIC METABOLIC PNL TOTAL CA: CPT | Performed by: INTERNAL MEDICINE

## 2017-07-13 ENCOUNTER — OFFICE VISIT (OUTPATIENT)
Dept: INTERNAL MEDICINE | Facility: CLINIC | Age: 62
End: 2017-07-13
Payer: COMMERCIAL

## 2017-07-13 VITALS
OXYGEN SATURATION: 95 % | DIASTOLIC BLOOD PRESSURE: 80 MMHG | TEMPERATURE: 98.2 F | WEIGHT: 203.7 LBS | BODY MASS INDEX: 29.16 KG/M2 | HEART RATE: 73 BPM | SYSTOLIC BLOOD PRESSURE: 120 MMHG | HEIGHT: 70 IN

## 2017-07-13 DIAGNOSIS — I10 BENIGN ESSENTIAL HYPERTENSION: ICD-10-CM

## 2017-07-13 DIAGNOSIS — F17.200 TOBACCO USE DISORDER: ICD-10-CM

## 2017-07-13 DIAGNOSIS — Z00.00 ROUTINE GENERAL MEDICAL EXAMINATION AT A HEALTH CARE FACILITY: Primary | ICD-10-CM

## 2017-07-13 DIAGNOSIS — F10.20 ALCOHOL DEPENDENCE, EPISODIC DRINKING BEHAVIOR (H): ICD-10-CM

## 2017-07-13 DIAGNOSIS — E78.5 HYPERLIPIDEMIA LDL GOAL <130: ICD-10-CM

## 2017-07-13 DIAGNOSIS — J43.1 PANLOBULAR EMPHYSEMA (H): ICD-10-CM

## 2017-07-13 PROCEDURE — 99396 PREV VISIT EST AGE 40-64: CPT | Performed by: INTERNAL MEDICINE

## 2017-07-13 RX ORDER — AMLODIPINE BESYLATE 10 MG/1
10 TABLET ORAL DAILY
Qty: 30 TABLET | Refills: 5 | Status: SHIPPED | OUTPATIENT
Start: 2017-07-13 | End: 2018-01-04

## 2017-07-13 RX ORDER — VARENICLINE TARTRATE 1 MG/1
1 TABLET, FILM COATED ORAL 2 TIMES DAILY
Qty: 60 TABLET | Refills: 5 | Status: SHIPPED | OUTPATIENT
Start: 2017-07-13 | End: 2018-01-04

## 2017-07-13 RX ORDER — LISINOPRIL AND HYDROCHLOROTHIAZIDE 12.5; 2 MG/1; MG/1
2 TABLET ORAL EVERY MORNING
Qty: 60 TABLET | Refills: 5 | Status: SHIPPED | OUTPATIENT
Start: 2017-07-13 | End: 2018-01-04

## 2017-07-13 RX ORDER — PRAVASTATIN SODIUM 40 MG
40 TABLET ORAL AT BEDTIME
Qty: 30 TABLET | Refills: 5 | Status: SHIPPED | OUTPATIENT
Start: 2017-07-13 | End: 2018-01-04

## 2017-07-13 NOTE — MR AVS SNAPSHOT
"              After Visit Summary   7/13/2017    Gabe Smith    MRN: [id number]           Patient Information     Date Of Birth          1955        Visit Information        Provider Department      7/13/2017 9:00 AM Donny Payne MD [facility]        Today's Diagnoses     Routine general medical examination at a health care facility    -  1    COPD (HCC)        Alcohol dependence, episodic drinking behavior (H)        Benign essential hypertension        Hyperlipidemia LDL goal <130        Tobacco use disorder          Care Instructions      *  Continue all medications at the same doses.  Contact your usual pharmacy if you need refills.     *    5 GOALS TO PREVENT VASCULAR DISEASE:     1.  Aggressive blood pressure control, under 130/80 ideally.  Using medications if needed.    Your blood pressure is under good control    BP Readings from Last 4 Encounters:   07/13/17 120/80   04/12/17 110/76   01/05/17 134/80   09/22/16 152/90       2.  Aggressive LDL cholesterol (\"bad cholesterol\") lowering as indicated.    Your goal is an LDL under 130 for sure, preferably under 100.  (If you have diabetes or previous vascular disease, the the LDL goals would be under 100 for sure, preferably under 70.)    New guidelines identify four high-risk groups who could benefit from statins:   *people with pre-existing heart disease, such as those who have had a heart attack;   *people ages 40 to 75 who have diabetes of any type  *patients ages 40 to 75 with at least a 7.5% risk of developing cardiovascular disease over the next decade, according to a formula described in the guidelines  *patients with the sort of super-high cholesterol that sometimes runs in families, as evidenced by an LDL of 190 milligrams per deciliter or higher    Your cholesterol levels are well controlled.    Recent Labs   Lab Test  07/11/17   1012  01/04/17   0814   08/04/15   0757  02/03/15   0748   CHOL  175  190   < " ">  210*  192   HDL  33*  30*   < >  27*  33*   LDL  105*  91   < >  Cannot estimate LDL when triglyceride exceeds 400 mg/dL  134*  107   TRIG  184*  344*   < >  422*  262*   CHOLHDLRATIO   --    --    --   7.8*  5.8*    < > = values in this interval not displayed.       3.  Aggressive diabetic prevention, screening and/or management.      You do not have diabetes as of the most recent blood tests.     BUT YOU STILL NEED TO WATCH YOUR DIET!!!!  Reduce the intake of \"simple carbohydrates\" (e.g. White bread, white rice, pasta, noodles, potatoes, snack foods, regular soda, juices (except fresh squeezed), cakes, cookies, candy, etc.) as much as possible./     4.  No smoking    5.  Consider taking low dose aspirin (81 mg) tablet once per day over the age of 50, every day unless there is a specific reason that you cannot take aspirin (such as side effect, allergy, or you are on another \"blood thinner\").        --Based on your current cardiac risk factors, you should take Aspirin 81 mg once per day if you are over 50 years of age.           Preventive Health Recommendations  Male Ages 50 - 64    Yearly exam:             See your health care provider every year in order to  o   Review health changes.   o   Discuss preventive care.    o   Review your medicines if your doctor has prescribed any.     Have a cholesterol test every 5 years, or more frequently if you are at risk for high cholesterol/heart disease.     Have a diabetes test (fasting glucose) every three years. If you are at risk for diabetes, you should have this test more often.     Have a colonoscopy at age 50, or have a yearly FIT test (stool test). These exams will check for colon cancer.      Talk with your health care provider about whether or not a prostate cancer screening test (PSA) is right for you.    You should be tested each year for STDs (sexually transmitted diseases), if you re at risk.     Shots: Get a flu shot each year. Get a tetanus shot every " "10 years.     Nutrition:    Eat at least 5 servings of fruits and vegetables daily.     Eat whole-grain bread, whole-wheat pasta and brown rice instead of white grains and rice.     Talk to your provider about Calcium and Vitamin D.        --Good Grains:  Oats, brown rice, Quinoa (these do not raise the blood sugar as much)     --Bad grains:  Anything made from wheat or white rice     (because these raise the blood sugars significantly, and the possible gluten issue from wheat for some people).      --Proteins:  Aim for \"lean proteins\" including chicken, fish, seafood, pork, turkey, and eggs (in moderation); Eat red meat only occasionally        Lifestyle    Exercise for at least 150 minutes a week (30 minutes a day, 5 days a week). This will help you control your weight and prevent disease.     Limit alcohol to one drink per day.     No smoking.     Wear sunscreen to prevent skin cancer.     See your dentist every six months for an exam and cleaning.     See your eye doctor every 1 to 2 years.            Follow-ups after your visit        Who to contact     If you have questions or need follow up information about today's clinic visit or your schedule please contact [facility] directly at [phone].  Normal or non-critical lab and imaging results will be communicated to you by [employer]hart, letter or phone within 4 business days after the clinic has received the results. If you do not hear from us within 7 days, please contact the clinic through [employer] or phone. If you have a critical or abnormal lab result, we will notify you by phone as soon as possible.  Submit refill requests through [employer] or call your pharmacy and they will forward the refill request to us. Please allow 3 business days for your refill to be completed.          Additional Information About Your Visit        [employer] Information     [employer] gives you secure access to your electronic health record. If you see a primary " "care provider, you can also send messages to your care team and make appointments. If you have questions, please call your primary care clinic.  If you do not have a primary care provider, please call [phone] and they will assist you.        Care EveryWhere ID     This is your Care EveryWhere ID. This could be used by other organizations to access your [place] medical records  [id number]        Your Vitals Were     Pulse Temperature Height Pulse Oximetry BMI (Body Mass Index)       73 98.2  F (36.8  C) (Oral) 5' 10\" (1.778 m) 95% 29.23 kg/m2        Blood Pressure from Last 3 Encounters:   07/13/17 120/80   04/12/17 110/76   01/05/17 134/80    Weight from Last 3 Encounters:   07/13/17 203 lb 11.2 oz (92.4 kg)   04/12/17 203 lb (92.1 kg)   01/05/17 199 lb 9.6 oz (90.5 kg)              Today, you had the following     No orders found for display         Where to get your medicines      These medications were sent to [facility] [place] [address]     Phone:  [phone]     amLODIPine 10 MG tablet    lisinopril-hydrochlorothiazide 20-12.5 MG per tablet    mometasone-formoterol 200-5 MCG/ACT oral inhaler    pravastatin 40 MG tablet    varenicline 1 MG tablet          Primary Care Provider Office Phone # Fax #    Donny Payne [phone]       [address] [address]        Equal Access to Services     CECY ZHU AH: Haderika Guthrie, waaxda luqjorge, qaybta kaallaney mello. So [facility] [phone].    ATENCIÓN: Si habla español, tiene a spring disposición servicios gratuitos de asistencia lingüística. Llame al [phone].    We comply with applicable federal civil rights laws and [place] laws. We do not discriminate on the basis of race, color, national origin, age, disability sex, sexual orientation or gender " identity.            Thank you!     Thank you for choosing [facility]  for your care. Our goal is always to provide you with excellent care. Hearing back from our patients is one way we can continue to improve our services. Please take a few minutes to complete the written survey that you may receive in the mail after your visit with us. Thank you!             Your Updated Medication List - Protect others around you: Learn how to safely use, store and throw away your medicines at www.disposemymeds.org.          This list is accurate as of: 7/13/17 10:22 AM.  Always use your most recent med list.                   Brand Name Dispense Instructions for use Diagnosis    * albuterol 108 (90 BASE) MCG/ACT Inhaler    PROAIR HFA/PROVENTIL HFA/VENTOLIN HFA    3 Inhaler    Inhale 2 puffs into the lungs every 4 hours as needed for shortness of breath / dyspnea or wheezing    COPD (chronic obstructive pulmonary disease) (H)       * albuterol (2.5 MG/3ML) 0.083% neb solution     30 vial    Take 1 vial (2.5 mg) by nebulization every 6 hours as needed for shortness of breath / dyspnea or wheezing    Wheezing, Obstructive chronic bronchitis with exacerbation (H)       * albuterol 108 (90 BASE) MCG/ACT Inhaler    albuterol    1 Inhaler    Inhale 2 puffs into the lungs every 6 hours    Panlobular emphysema (H)       amLODIPine 10 MG tablet    NORVASC    30 tablet    Take 1 tablet (10 mg) by mouth daily    Benign essential hypertension       aspirin 81 MG tablet      Take 1 tablet (81 mg) by mouth daily        lisinopril-hydrochlorothiazide 20-12.5 MG per tablet    PRINZIDE/ZESTORETIC    60 tablet    Take 2 tablets by mouth every morning    Benign essential hypertension       mometasone-formoterol 200-5 MCG/ACT oral inhaler    DULERA    1 Inhaler    Inhale 2 puffs into the lungs 2 times daily    Panlobular emphysema (H)       pravastatin 40 MG tablet    PRAVACHOL    30 tablet    Take 1 tablet (40 mg) by mouth  At Bedtime    Hyperlipidemia LDL goal <130       varenicline 1 MG tablet    CHANTIX    60 tablet    Take 1 tablet (1 mg) by mouth 2 times daily    Tobacco use disorder       * Notice:  This list has 3 medication(s) that are the same as other medications prescribed for you. Read the directions carefully, and ask your doctor or other care provider to review them with you.

## 2017-07-13 NOTE — PATIENT INSTRUCTIONS
"  *  Continue all medications at the same doses.  Contact your usual pharmacy if you need refills.     *    5 GOALS TO PREVENT VASCULAR DISEASE:     1.  Aggressive blood pressure control, under 130/80 ideally.  Using medications if needed.    Your blood pressure is under good control    BP Readings from Last 4 Encounters:   07/13/17 120/80   04/12/17 110/76   01/05/17 134/80   09/22/16 152/90       2.  Aggressive LDL cholesterol (\"bad cholesterol\") lowering as indicated.    Your goal is an LDL under 130 for sure, preferably under 100.  (If you have diabetes or previous vascular disease, the the LDL goals would be under 100 for sure, preferably under 70.)    New guidelines identify four high-risk groups who could benefit from statins:   *people with pre-existing heart disease, such as those who have had a heart attack;   *people ages 40 to 75 who have diabetes of any type  *patients ages 40 to 75 with at least a 7.5% risk of developing cardiovascular disease over the next decade, according to a formula described in the guidelines  *patients with the sort of super-high cholesterol that sometimes runs in families, as evidenced by an LDL of 190 milligrams per deciliter or higher    Your cholesterol levels are well controlled.    Recent Labs   Lab Test  07/11/17   1012  01/04/17   0814   08/04/15   0757  02/03/15   0748   CHOL  175  190   < >  210*  192   HDL  33*  30*   < >  27*  33*   LDL  105*  91   < >  Cannot estimate LDL when triglyceride exceeds 400 mg/dL  134*  107   TRIG  184*  344*   < >  422*  262*   CHOLHDLRATIO   --    --    --   7.8*  5.8*    < > = values in this interval not displayed.       3.  Aggressive diabetic prevention, screening and/or management.      You do not have diabetes as of the most recent blood tests.     BUT YOU STILL NEED TO WATCH YOUR DIET!!!!  Reduce the intake of \"simple carbohydrates\" (e.g. White bread, white rice, pasta, noodles, potatoes, snack foods, regular soda, juices (except " "fresh squeezed), cakes, cookies, candy, etc.) as much as possible./     4.  No smoking    5.  Consider taking low dose aspirin (81 mg) tablet once per day over the age of 50, every day unless there is a specific reason that you cannot take aspirin (such as side effect, allergy, or you are on another \"blood thinner\").        --Based on your current cardiac risk factors, you should take Aspirin 81 mg once per day if you are over 50 years of age.           Preventive Health Recommendations  Male Ages 50 - 64    Yearly exam:             See your health care provider every year in order to  o   Review health changes.   o   Discuss preventive care.    o   Review your medicines if your doctor has prescribed any.     Have a cholesterol test every 5 years, or more frequently if you are at risk for high cholesterol/heart disease.     Have a diabetes test (fasting glucose) every three years. If you are at risk for diabetes, you should have this test more often.     Have a colonoscopy at age 50, or have a yearly FIT test (stool test). These exams will check for colon cancer.      Talk with your health care provider about whether or not a prostate cancer screening test (PSA) is right for you.    You should be tested each year for STDs (sexually transmitted diseases), if you re at risk.     Shots: Get a flu shot each year. Get a tetanus shot every 10 years.     Nutrition:    Eat at least 5 servings of fruits and vegetables daily.     Eat whole-grain bread, whole-wheat pasta and brown rice instead of white grains and rice.     Talk to your provider about Calcium and Vitamin D.        --Good Grains:  Oats, brown rice, Quinoa (these do not raise the blood sugar as much)     --Bad grains:  Anything made from wheat or white rice     (because these raise the blood sugars significantly, and the possible gluten issue from wheat for some people).      --Proteins:  Aim for \"lean proteins\" including chicken, fish, seafood, pork, turkey, and " eggs (in moderation); Eat red meat only occasionally        Lifestyle    Exercise for at least 150 minutes a week (30 minutes a day, 5 days a week). This will help you control your weight and prevent disease.     Limit alcohol to one drink per day.     No smoking.     Wear sunscreen to prevent skin cancer.     See your dentist every six months for an exam and cleaning.     See your eye doctor every 1 to 2 years.

## 2017-07-13 NOTE — NURSING NOTE
"Chief Complaint   Patient presents with     Physical     pt fasting       Initial /80  Pulse 73  Temp 98.2  F (36.8  C) (Oral)  Ht 5' 10\" (1.778 m)  Wt 203 lb 11.2 oz (92.4 kg)  SpO2 95%  BMI 29.23 kg/m2 Estimated body mass index is 29.23 kg/(m^2) as calculated from the following:    Height as of this encounter: 5' 10\" (1.778 m).    Weight as of this encounter: 203 lb 11.2 oz (92.4 kg).  Medication Reconciliation: complete   Diann Mi, KYLE      "

## 2017-07-13 NOTE — PROGRESS NOTES
"SUBJECTIVE:   CC: Gabe Smith is an 62 year old male who presents for preventative health visit.     Physical   Annual:     Getting at least 3 servings of Calcium per day::  NO    Bi-annual eye exam::  NO    Dental care twice a year::  NO    Sleep apnea or symptoms of sleep apnea::  None    Diet::  Low salt and Carbohydrate counting    Frequency of exercise::  None    Taking medications regularly::  Yes    Medication side effects::  None    Additional concerns today::  No      1.    Blood presure remains well controlled at home  Readings outside clinic are within normal limits.  Reviewed last 6 BP readings in chart:  BP Readings from Last 6 Encounters:   07/13/17 120/80   04/12/17 110/76   01/05/17 134/80   09/22/16 152/90   04/07/16 134/84   11/12/15 120/80     He has not experienced any significant side effects from medicaiotns for hypertension.    NO active cardiac complaints or symptoms with exercise.     2.  conitnues to work on his alcohol intake.   He has \"cut down\", drinkes 3-4 drinkes few times per week. (down from 5-6 drinks)    3.  Has history of hyperlipidemia.  On statin for this, denies any significant side effects of this medication.      Latest labs reviewed:    Recent Labs   Lab Test  07/11/17   1012  01/04/17   0814   08/04/15   0757  02/03/15   0748   CHOL  175  190   < >  210*  192   HDL  33*  30*   < >  27*  33*   LDL  105*  91   < >  Cannot estimate LDL when triglyceride exceeds 400 mg/dL  134*  107   TRIG  184*  344*   < >  422*  262*   CHOLHDLRATIO   --    --    --   7.8*  5.8*    < > = values in this interval not displayed.        Lab Results   Component Value Date    AST 22 01/04/2017               Today's PHQ-2 Score:   PHQ-2 ( 1999 [employer]) 7/6/2017   Q1: Little interest or pleasure in doing things 0   Q2: Feeling down, depressed or hopeless 0   PHQ-2 Score 0   Q1: Little interest or pleasure in doing things Not at all   Q2: Feeling down, depressed or hopeless Not at all   PHQ-2 Score " 0       Abuse: Current or Past(Physical, Sexual or Emotional)- No  Do you feel safe in your environment - Yes    Social History   Substance Use Topics     Smoking status: Current Every Day Smoker     Packs/day: 0.50     Types: Cigarettes     Smokeless tobacco: Never Used      Comment: 1 1/2 PPD     Alcohol use 0.0 oz/week     0 Standard drinks or equivalent per week      Comment: 8/7/14:  3-4 per day; 9/22/16:  3-6 per day most days          Standardized Alcohol Screening Questionnaire  AUDIT   Questions 0 1 2 3 4 Score   1. How often do you have a drink  containing alcohol? Never Monthly or less 2 to 4  times a  month 2 to 3  times a  week 4 or more  times a  week  4   2. How many drinks containing alcohol  do you have on a typical day when you are drinking? 1 or 2 3 or 4 5 or 6 7 to 9 10 or more  1   3. How often do you have more than five  or more drinks on one occasion? Never Less  than  monthly Monthly Weekly Daily or  almost  daily  0   4. How often during the last year have  you found that you were not able to stop drinking once you had started? Never Less  than  monthly Monthly Weekly Daily or  almost  daily  0   5. How often during the last year have  you failed to do what was normally expected of you because of drinking? Never Less  than  monthly Monthly Weekly Daily or  almost  daily  0   6. How often during the last year have  you needed a first drink in the morning to get yourself going after a heavy drinking session? Never Less  than  monthly Monthly Weekly Daily or  almost  daily  0   7. How often during the last year have you had a feeling of guilt or remorse after drinking? Never Less  than  monthly Monthly Weekly Daily or  almost  daily  0   8. How often during the last year have  you been unable to remember what happened the night before because of your drinking? Never Less  than  monthly Monthly Weekly Daily or  almost  daily  0   9. Have you or someone else been  injured because of your  drinking? No  Yes, but not in the last year  Yes,  during the  last year  0   10. Has a relative, friend, doctor or other health care worker been concerned about your drinking or suggested you cut down? No  Yes, but not in the last year  Yes,  during the  last year  0   Total  5   Scoring: A score of 7 for adult men is an indication of hazardous drinking (risk for physical or physiological harm); a score of 8 or more is an indication of an alcohol use disorder. A score of 5 or more for adult women  is an indication of hazardous drinking or an alcohol use disorder.       Last PSA:   PSA   Date Value Ref Range Status   07/11/2017 0.76 0 - 4 ug/L Final     Comment:     Assay Method:  Chemiluminescence using Siemens Vista analyzer       Reviewed orders with patient. Reviewed health maintenance and updated orders accordingly - Yes      Reviewed and updated as needed this visit by clinical staff  Tobacco  Allergies  Meds  Problems  Soc Hx        Reviewed and updated as needed this visit by Provider  Allergies  Meds  Problems        Past Medical History:  ---------------------------  Past Medical History:   Diagnosis Date     COPD (chronic obstructive pulmonary disease) (H)      Dysmetabolic syndrome X      Hyperlipidemia      Hypertension      Osteoarthrosis      Prediabetes 10/9/14    A1C 6.1     Tobacco use disorder        Past Surgical History:  ---------------------------  Past Surgical History:   Procedure Laterality Date     COLONOSCOPY N/A 10/19/2015    Procedure: COMBINED COLONOSCOPY, SINGLE OR MULTIPLE BIOPSY/POLYPECTOMY BY BIOPSY;  Surgeon: Gume Vidal MD;  Location:  GI     SURGICAL HISTORY OF -   2000    Left MOISES     SURGICAL HISTORY OF -       Unspecified knee surgeries as a child     SURGICAL HISTORY OF -   10/2010    Right MOISES, with left knee arthroscopy, meniscectomy       Current Medications:  ---------------------------  Current Outpatient Prescriptions   Medication Sig Dispense Refill      varenicline (CHANTIX) 1 MG tablet Take 1 tablet (1 mg) by mouth 2 times daily 60 tablet 5     mometasone-formoterol (DULERA) 200-5 MCG/ACT oral inhaler Inhale 2 puffs into the lungs 2 times daily 1 Inhaler 5     lisinopril-hydrochlorothiazide (PRINZIDE/ZESTORETIC) 20-12.5 MG per tablet Take 2 tablets by mouth every morning 60 tablet 5     pravastatin (PRAVACHOL) 40 MG tablet Take 1 tablet (40 mg) by mouth At Bedtime 30 tablet 5     amLODIPine (NORVASC) 10 MG tablet Take 1 tablet (10 mg) by mouth daily 30 tablet 5     albuterol (ALBUTEROL) 108 (90 BASE) MCG/ACT Inhaler Inhale 2 puffs into the lungs every 6 hours 1 Inhaler 10     albuterol (2.5 MG/3ML) 0.083% nebulizer solution Take 1 vial (2.5 mg) by nebulization every 6 hours as needed for shortness of breath / dyspnea or wheezing 30 vial 0     albuterol (PROAIR HFA, PROVENTIL HFA, VENTOLIN HFA) 108 (90 BASE) MCG/ACT inhaler Inhale 2 puffs into the lungs every 4 hours as needed for shortness of breath / dyspnea or wheezing 3 Inhaler 5     aspirin 81 MG tablet Take 1 tablet (81 mg) by mouth daily         Allergies:  -------------  No Known Allergies    Social History:  -------------------  Social History     Social History     Marital status:      Spouse name: N/A     Number of children: 2     Years of education: N/A     Occupational History      [employer]     Social History Main Topics     Smoking status: Current Every Day Smoker     Packs/day: 0.50     Types: Cigarettes     Smokeless tobacco: Never Used      Comment: 1 1/2 PPD     Alcohol use 0.0 oz/week     0 Standard drinks or equivalent per week      Comment: 8/7/14:  3-4 per day; 9/22/16:  3-6 per day most days     Drug use: No     Sexual activity: Not Currently     Other Topics Concern     Not on file     Social History Narrative       Family Medical History:  ------------------------------  Family History   Problem Relation Age of Onset     Family History Negative Mother      Family History Negative  "Brother      Family History Negative Sister         ROS:  C: NEGATIVE for fever, chills, change in weight  I: NEGATIVE for worrisome rashes, moles or lesions  E: NEGATIVE for vision changes or irritation  ENT: NEGATIVE for ear, mouth and throat problems  R: NEGATIVE for significant cough or SOB  CV: NEGATIVE for chest pain, palpitations or peripheral edema  GI: NEGATIVE for nausea, abdominal pain, heartburn, or change in bowel habits   male: negative for dysuria, hematuria, decreased urinary stream, erectile dysfunction, urethral discharge  M: NEGATIVE for significant arthralgias or myalgia  N: NEGATIVE for weakness, dizziness or paresthesias  P: NEGATIVE for changes in mood or affect    OBJECTIVE:   /80  Pulse 73  Temp 98.2  F (36.8  C) (Oral)  Ht 5' 10\" (1.778 m)  Wt 203 lb 11.2 oz (92.4 kg)  SpO2 95%  BMI 29.23 kg/m2    EXAM:  GENERAL alert and no distress.  EYES conjunctivae/corneas clear. PERRL, EOM's intact  HENT: NCAT,oral and posterior pharynx without lesions or erythema, facies symmetric  NECK: Neck supple. No LAD, without thyroidmegaly or JVD.  RESP: Clear to ausculation bilaterally without wheezes or crackles. Normal BS in all fields.  CV: RRR normal S1S2 without  murmurs, rubs or gallops. PMI normal  LYMPH: no cervical lymph adenopathy appreciated  GI: NTND, no organomegaly, normal BS in all quadrants, without rebound or guarding  MS: No cyanosis, clubbing or edema noted bilaterally in Upper and/or Lower Extremities  SKIN: no significant ulcers, lesions or rashes on the visualized portions of the skin  NEURO: Alert and Oriented x 3, Gait normal. Reflexes normal and symmetric. Sensation grossly WNL..  PSYCH: Alert and oriented times 3; speech- coherent , normal rate and volume; able to articulate logical thoughts, able to abstract reason, no tangential thoughts, no hallucinations or delusions, affect- normal     ASSESSMENT/PLAN:     (Z00.00) Routine general medical examination at [facility]" "care facility  (primary encounter diagnosis)  Comment: Discussed cardiac disease risk factor modification including screening for and treating HTN, lipids, DM, and smoking cessation.  Also discussed age appropriate cancer screening recommendations including testicular, prostate, colon and lung cancer as dictated by age group.  Recommended low fat, low salt diet and moderation in any alcohol intake.  Recommended always using seatbelts when in a car.  Recommended never driving after drinking or riding with someone who has been drinking as well.       Plan:     (J43.1) COPD (HCC)  Comment: This condition is currently controlled on the current medical regimen.  Continue current therapy.  Discussed general issues of COPD, including pathophysiology, ways it will affect the pt., when to seek help, reviewed the typical medications (how they are taken, how they help)   Plan: mometasone-formoterol (DULERA) 200-5 MCG/ACT         oral inhaler            (F10.20) Alcohol dependence, episodic drinking behavior (H)  Comment: workignon drinking a lot less.   He says that he has \"made progress\"  Discussed the many different ways excessive alcohol damages the body.    Discussed the other damaging non-medical side effects of excessive alcohol use as well.    Discussed the observed increases in all-cause mortality and morbidity when drinking above 2 drinks per day (defined as 12 oz beer, or 4 oz wine, or 1.5 oz spirits).  Offered help and support in finding help in quitting alcohol.  Will offer CD assessment at [facility] if pt desires.   Plan:     (I10) Benign essential hypertension  Comment: This condition is currently controlled on the current medical regimen.  Continue current therapy.   Discussed hypertension in detail including JNC VIII guidelines for blood pressure goals.  Discussed indication for treatment and treatment options.  Discussed the importance for aggressive management of HTN to prevent vascular complications " later.  Recommended lower fat, lower carbohydrate, and lower sodium (<2000 mg)diet.  Discussed required intervals for follow up on HTN, lab studies, and the need to aggresive management of other cardiac disease risk factors.  Recommened pt. follow their blood pressures outside the clinic to ensure that BPs are remaining within guidelines, and to contact me if the readings are not within guidelines so we can adjust treatment as needed.   Plan: lisinopril-hydrochlorothiazide         (PRINZIDE/ZESTORETIC) 20-12.5 MG per tablet,         amLODIPine (NORVASC) 10 MG tablet            (E78.5) Hyperlipidemia LDL goal <130  Comment: Discussed current lipid results, previous results (if available) current guidelines (NCEP) for treatment and goals for lipids.  Discussed lifestyle modification, dietary changes (low fat, low simple carb) and regular aerobic exercise.  Discussed the link between dysmetabolic syndrome and impaired glucose tolerance seen in certain patterns of lipids.  Briefly discussed medication used for lipid lowering, including the statins are their possible side effects of myalgias, rhabdomyolysis, and liver toxicity.   Plan: pravastatin (PRAVACHOL) 40 MG tablet            (F17.200) Tobacco use disorder  Comment: After further discussion of medication aids to help stop smoking, the patient has decided to take Chantix.  We discussed the medication in detail, including suspected mechanism of action, duration of treatment (minimum preferred 3 months up to max of 6-9 months).  We also discussed some of the potential side effects of the medication including, but not limited to, GI upset, nausea, headaches, nightmares, strange dreams, and possible effects on the mood, inclduing worsening of depression and/or anxiety.  Also mentioned about isolated incidences of suicide possibly related to Chantix use.  I told the patient to immediately stop the Chantix if they suspect any changes in the mood and to contact us  "immediately.    I also instructed them not to take Chantix until they had a chance to visit the product official web site to further educate themselves about the medication.   Plan: varenicline (CHANTIX) 1 MG tablet               COUNSELING:   Reviewed preventive health counseling, as reflected in patient instructions       Regular exercise       Healthy diet/nutrition       Vision screening       Hearing screening       Aspirin ProphylaxsisAlcohol Use       Colon cancer screening       Prostate cancer screening                    reports that he has been smoking Cigarettes.  He has been smoking about 0.50 packs per day. He has never used smokeless tobacco.      Estimated body mass index is 29.23 kg/(m^2) as calculated from the following:    Height as of this encounter: 5' 10\" (1.778 m).    Weight as of this encounter: 203 lb 11.2 oz (92.4 kg).         Counseling Resources:  ATP IV Guidelines  Pooled Cohorts Equation Calculator  FRAX Risk Assessment  ICSI Preventive Guidelines  Dietary Guidelines for Americans, 2010  USDA's MyPlate  ASA Prophylaxis  Lung CA Screening    Donny Payne MD  [facility] for HPI/ROS submitted by the patient on 7/6/2017   PHQ-2 Score: 0    "

## 2017-09-27 ENCOUNTER — TELEPHONE (OUTPATIENT)
Dept: INTERNAL MEDICINE | Facility: CLINIC | Age: 62
End: 2017-09-27

## 2017-09-27 NOTE — TELEPHONE ENCOUNTER
-PRIOR AUTHORIZATION REQUIRED-  This medication requires a Prior Authorization in order for the Insurance to cover.  Please contact the insurance to start the PA for this medication.  Please inform the pharmacy if the PA gets approved or denied.  Chantix  ID:[id number]  Insurance Company:[employer]  Phone #:[phone]    Patients current insurance is only allowing 168 tablets in a 365 day span. Insurance is going to require how many more tablets are going to be needed and the time-line of usage (I believe is what the insurance made it sound like when I spoke with them earlier this morning)     Thank you!  Iseal Vann,[facility]

## 2017-09-27 NOTE — TELEPHONE ENCOUNTER
Prior authorization    Medication name chantix  Insurance [facility]  Insurance ID number [id number]  Prior authorization faxed through cover my meds

## 2017-10-02 NOTE — TELEPHONE ENCOUNTER
"Please contact the patient.   His insurance does not want to pay for Chantix, despite our attempted PA.     The good news is that he can still take it, he just has to pay cash for it and it is a similar price per month as smoking 1 ppd.    Check the chantix web site for any discount coupons.      There is another medication indicated to help quit smoking:  Bupropion ( generic Wellbutrin or Zyban).    Start Wellbutrin  mg once per day  In the morning for 10 days, then increase to 300 mg ( 2 x 150 mg tablet) once per day in the morning.   Possible side effects of Bupropion (e.g. Wellbutrin, Zyban) including but not limited to insomnia (the reason for taking it in the morning), intestinal upset, nausea, decreased appetite.  There is also a rare seizure (1 out of 1000 patients on Zyban may experience a seizure).  If you experience side effects while taking Bupropion/Wellbutrin, then stop it and contact us immediately.  With bupropion, you need to take nicotine replacement starting the first day that you are not smoking.  (Chantix does not require nicotine replacement)  I usually recommend selecting a \"quit date\" 1-2 weeks after starting wellbutrin and then starting nicotine patches, 21 mg ocne per day for 2 weeks, then 14 mg per day for 2 weeks, then 7 mg once per day for 2 weeks, then stop nicotine patches.     Contact us after his first month of the medication with an update.  If it helps, then continue it for usually 4-6 months, then stop it.      Also be sure to contact [email or web address] for more information and assistance.     Let me know what he wants to do.      "

## 2017-10-02 NOTE — TELEPHONE ENCOUNTER
Pt notified. He's going to call his insurance company and find out why they stopped covering it for him. Said he's been taking it since April and he's been able to cut down from 2 ppd to ~ 1 ppd.   Then he'll go on-line and see if he qualifies for the Chantix savings card for up to $75.00 off per month.  He'll ask how much the Wellbutrin costs.  After all of that, he'll let Dr Payne know what he decides to do.  JASON Guillen LPN

## 2018-01-04 ENCOUNTER — OFFICE VISIT (OUTPATIENT)
Dept: INTERNAL MEDICINE | Facility: CLINIC | Age: 63
End: 2018-01-04
Payer: COMMERCIAL

## 2018-01-04 VITALS
HEART RATE: 87 BPM | DIASTOLIC BLOOD PRESSURE: 84 MMHG | SYSTOLIC BLOOD PRESSURE: 136 MMHG | BODY MASS INDEX: 29.78 KG/M2 | OXYGEN SATURATION: 93 % | HEIGHT: 70 IN | WEIGHT: 208 LBS | TEMPERATURE: 97.9 F

## 2018-01-04 DIAGNOSIS — E78.5 HYPERLIPIDEMIA LDL GOAL <130: ICD-10-CM

## 2018-01-04 DIAGNOSIS — Z23 NEED FOR PROPHYLACTIC VACCINATION AND INOCULATION AGAINST INFLUENZA: ICD-10-CM

## 2018-01-04 DIAGNOSIS — Z23 NEED FOR PROPHYLACTIC VACCINATION AGAINST STREPTOCOCCUS PNEUMONIAE (PNEUMOCOCCUS): ICD-10-CM

## 2018-01-04 DIAGNOSIS — I10 BENIGN ESSENTIAL HYPERTENSION: ICD-10-CM

## 2018-01-04 DIAGNOSIS — F17.200 TOBACCO USE DISORDER: ICD-10-CM

## 2018-01-04 DIAGNOSIS — J43.1 PANLOBULAR EMPHYSEMA (H): Primary | ICD-10-CM

## 2018-01-04 PROCEDURE — 90472 IMMUNIZATION ADMIN EACH ADD: CPT | Performed by: INTERNAL MEDICINE

## 2018-01-04 PROCEDURE — 90686 IIV4 VACC NO PRSV 0.5 ML IM: CPT | Performed by: INTERNAL MEDICINE

## 2018-01-04 PROCEDURE — 90732 PPSV23 VACC 2 YRS+ SUBQ/IM: CPT | Performed by: INTERNAL MEDICINE

## 2018-01-04 PROCEDURE — 90471 IMMUNIZATION ADMIN: CPT | Performed by: INTERNAL MEDICINE

## 2018-01-04 PROCEDURE — 99214 OFFICE O/P EST MOD 30 MIN: CPT | Mod: 25 | Performed by: INTERNAL MEDICINE

## 2018-01-04 RX ORDER — LISINOPRIL AND HYDROCHLOROTHIAZIDE 12.5; 2 MG/1; MG/1
2 TABLET ORAL EVERY MORNING
Qty: 60 TABLET | Refills: 5 | Status: SHIPPED | OUTPATIENT
Start: 2018-01-04 | End: 2018-07-13

## 2018-01-04 RX ORDER — VARENICLINE TARTRATE 1 MG/1
1 TABLET, FILM COATED ORAL 2 TIMES DAILY
Qty: 60 TABLET | Refills: 5 | Status: SHIPPED | OUTPATIENT
Start: 2018-01-04 | End: 2018-08-10

## 2018-01-04 RX ORDER — PRAVASTATIN SODIUM 40 MG
40 TABLET ORAL AT BEDTIME
Qty: 30 TABLET | Refills: 5 | Status: SHIPPED | OUTPATIENT
Start: 2018-01-04 | End: 2018-07-13

## 2018-01-04 RX ORDER — AMLODIPINE BESYLATE 10 MG/1
10 TABLET ORAL DAILY
Qty: 30 TABLET | Refills: 5 | Status: SHIPPED | OUTPATIENT
Start: 2018-01-04 | End: 2018-07-13

## 2018-01-04 RX ORDER — ALBUTEROL SULFATE 90 UG/1
2 AEROSOL, METERED RESPIRATORY (INHALATION) EVERY 6 HOURS
Qty: 1 INHALER | Refills: 10 | Status: SHIPPED | OUTPATIENT
Start: 2018-01-04 | End: 2019-07-19

## 2018-01-04 NOTE — PROGRESS NOTES
SUBJECTIVE:   Gabe Smith is a 62 year old male who presents to clinic today for the following health issues:      Hyperlipidemia Follow-Up      Rate your low fat/cholesterol diet?: fair    Taking statin?  Yes, no muscle aches from statin    Other lipid medications/supplements?:  none    Hypertension Follow-up      Outpatient blood pressures are not being checked.    Low Salt Diet: no added salt    COPD Follow-Up    Symptoms are currently: stable    Current fatigue or dyspnea with ambulation: none    Shortness of breath: stable    Increased or change in Cough/Sputum: No    Fever(s): No    Baseline ambulation without stopping to rest:  3 blocks. Able to walk up 3 flights of stairs without stopping to rest.    Any ER/UC or hospital admissions since your last visit? No     History   Smoking Status     Current Every Day Smoker     Packs/day: 0.50     Types: Cigarettes   Smokeless Tobacco     Never Used     Comment: 1 1/2 PPD     No results found for: FEV1, XPN7DMS  Amount of exercise or physical activity: active at work    Problems taking medications regularly: No    Medication side effects: none  Diet: low salt, low fat/cholesterol and diabetic          Problem list and histories reviewed & adjusted, as indicated.  Additional history: as documented        Reviewed and updated as needed this visit by clinical staffTobacco  Allergies       Reviewed and updated as needed this visit by Provider           Past Medical History:  ---------------------------  Past Medical History:   Diagnosis Date     COPD (chronic obstructive pulmonary disease) (H)      Dysmetabolic syndrome X      Hyperlipidemia      Hypertension      Osteoarthrosis      Prediabetes 10/9/14    A1C 6.1     Tobacco use disorder        Past Surgical History:  ---------------------------  Past Surgical History:   Procedure Laterality Date     COLONOSCOPY N/A 10/19/2015    Procedure: COMBINED COLONOSCOPY, SINGLE OR MULTIPLE BIOPSY/POLYPECTOMY BY BIOPSY;   Surgeon: Gume Vidal MD;  Location: [facility]     SURGICAL HISTORY OF -   2000    Left MOISES     SURGICAL HISTORY OF -       Unspecified knee surgeries as a child     SURGICAL HISTORY OF -   10/2010    Right MOISES, with left knee arthroscopy, meniscectomy       Current Medications:  ---------------------------  Current Outpatient Prescriptions   Medication Sig Dispense Refill     mometasone-formoterol (DULERA) 200-5 MCG/ACT oral inhaler Inhale 2 puffs into the lungs 2 times daily 1 Inhaler 5     lisinopril-hydrochlorothiazide (PRINZIDE/ZESTORETIC) 20-12.5 MG per tablet Take 2 tablets by mouth every morning 60 tablet 5     pravastatin (PRAVACHOL) 40 MG tablet Take 1 tablet (40 mg) by mouth At Bedtime 30 tablet 5     amLODIPine (NORVASC) 10 MG tablet Take 1 tablet (10 mg) by mouth daily 30 tablet 5     albuterol (ALBUTEROL) 108 (90 BASE) MCG/ACT Inhaler Inhale 2 puffs into the lungs every 6 hours 1 Inhaler 10     aspirin 81 MG tablet Take 1 tablet (81 mg) by mouth daily       varenicline (CHANTIX) 1 MG tablet Take 1 tablet (1 mg) by mouth 2 times daily (Patient not taking: Reported on 1/4/2018) 60 tablet 5     [DISCONTINUED] albuterol (2.5 MG/3ML) 0.083% nebulizer solution Take 1 vial (2.5 mg) by nebulization every 6 hours as needed for shortness of breath / dyspnea or wheezing 30 vial 0     [DISCONTINUED] albuterol (PROAIR HFA, PROVENTIL HFA, VENTOLIN HFA) 108 (90 BASE) MCG/ACT inhaler Inhale 2 puffs into the lungs every 4 hours as needed for shortness of breath / dyspnea or wheezing 3 Inhaler 5       Allergies:  -------------  No Known Allergies    Social History:  -------------------  Social History     Social History     Marital status:      Spouse name: N/A     Number of children: 2     Years of education: N/A     Occupational History      [employer]     Social History Main Topics     Smoking status: Current Every Day Smoker     Packs/day: 0.50     Types: Cigarettes     Smokeless tobacco: Never Used       "Comment: 1 1/2 PPD     Alcohol use 0.0 oz/week     0 Standard drinks or equivalent per week      Comment: 8/7/14:  3-4 per day; 9/22/16:  3-6 per day most days     Drug use: No     Sexual activity: Not Currently     Other Topics Concern     Not on file     Social History Narrative       Family Medical History:  ------------------------------  Family History   Problem Relation Age of Onset     Family History Negative Mother      Family History Negative Brother      Family History Negative Sister          ROS:  REVIEW OF SYSTEMS:    RESP: negative for cough, dyspnea, wheezing, hemoptysis  CV: negative for chest pain, palpitations, PND, YANCEY, orthopnea; reports no changes in their ability to perform physical activity (from cardiovascular standpoint)  GI: negative for dysphagia, N/V, pain, melena, diarrhea and constipation  NEURO: negative for numbness/tingling, paralysis, incoordination, or focal weakness     OBJECTIVE:                                                    /84  Pulse 87  Temp 97.9  F (36.6  C) (Oral)  Ht 5' 10\" (1.778 m)  Wt 208 lb (94.3 kg)  SpO2 93%  BMI 29.84 kg/m2     GENERAL alert and no distress  EYES:  Normal sclera,conjunctiva, EOMI  HENT: oral and posterior pharynx without lesions or erythema, facies symmetric  NECK: Neck supple. No LAD, without thyroidmegaly or JVD., Carotids without bruits.  RESP: Clear to ausculation bilaterally without wheezes or crackles. Normal BS in all fields.  CV: RRR normal S1S2 without murmurs, rubs or gallops. PMI normal  LYMPH: no cervical lymph adenopathy appreciated  MS: extremities- no gross deformities of the visible extremities noted, no edema  PSYCH: Alert and oriented times 3; speech- coherent  SKIN:  No obvious significant skin lesions on visible portions of face          ASSESSMENT/PLAN:                                                      (J43.1) COPD (HCC)  (primary encounter diagnosis)  Comment: This condition is currently controlled on the " current medical regimen.  Continue current therapy.   Discussed general issues of COPD, including pathophysiology, ways it will affect the pt., when to seek help, reviewed the typical medications (how they are taken, how they help)   Plan: mometasone-formoterol (DULERA) 200-5 MCG/ACT         oral inhaler, albuterol (PROAIR HFA) 108 (90         BASE) MCG/ACT Inhaler            (I10) Benign essential hypertension  Comment: This condition is currently controlled on the current medical regimen.  Continue current therapy.   Discussed hypertension in detail including JNC VIII guidelines for blood pressure goals.  Discussed indication for treatment and treatment options.  Discussed the importance for aggressive management of HTN to prevent vascular complications later.  Recommended lower fat, lower carbohydrate, and lower sodium (<2000 mg)diet.  Discussed required intervals for follow up on HTN, lab studies, and the need to aggresive management of other cardiac disease risk factors.  Recommened pt. follow their blood pressures outside the clinic to ensure that BPs are remaining within guidelines, and to contact me if the readings are not within guidelines so we can adjust treatment as needed.   Plan: lisinopril-hydrochlorothiazide         (PRINZIDE/ZESTORETIC) 20-12.5 MG per tablet,         amLODIPine (NORVASC) 10 MG tablet, **CBC with         platelets FUTURE 6mo, **Basic metabolic panel         FUTURE 6mo            (E78.5) Hyperlipidemia LDL goal <130  Comment: Discussed current lipid results, previous results (if available) current guidelines (NCEP) for treatment and goals for lipids.  Discussed lifestyle modification, dietary changes (low fat, low simple carb) and regular aerobic exercise.  Discussed the link between dysmetabolic syndrome and impaired glucose tolerance seen in certain patterns of lipids.  Briefly discussed medication used for lipid lowering, including the statins are their possible side effects of  myalgias, rhabdomyolysis, and liver toxicity.   Plan: pravastatin (PRAVACHOL) 40 MG tablet, **Basic         metabolic panel FUTURE 6mo, Lipid panel reflex         to direct LDL Fasting, AST, ALT            (Z23) Need for prophylactic vaccination and inoculation against influenza  Comment:   Plan: FLU VAC, SPLIT VIRUS IM > 3 YO (QUADRIVALENT)         [09464], Vaccine Administration, Initial         [25369]            (Z23) Need for prophylactic vaccination against Streptococcus pneumoniae (pneumococcus)  Comment:   Plan: Pneumococcal vaccine 23 valent PPSV23          (Pneumovax) [66184], ADMIN: Vaccine, Initial         (75049)            (F17.200) Tobacco use disorder  Comment: still trying to quit  After further discussion of medication aids to help stop smoking, the patient has decided to take Chantix.  We discussed the medication in detail, including suspected mechanism of action, duration of treatment (minimum preferred 3 months up to max of 6-9 months).  We also discussed some of the potential side effects of the medication including, but not limited to, GI upset, nausea, headaches, nightmares, strange dreams, and possible effects on the mood, inclduing worsening of depression and/or anxiety.  Also mentioned about isolated incidences of suicide possibly related to Chantix use.  I told the patient to immediately stop the Chantix if they suspect any changes in the mood and to contact us immediately.    I also instructed them not to take Chantix until they had a chance to visit the product official web site to further educate themselves about the medication.   Plan: varenicline (CHANTIX) 1 MG tablet               *  Continue all medications at the same doses.  Contact your usual pharmacy if you need refills.     * OK to return to Chantix, with the idea of ultimately getting completely off the cigarettes ONE AND FOR ALL!    *  Keep an eye out for changes in the formulary coverage for inhalers.      *  You may have  "to change from the Dulera to the Air Duo (\"generic Advair\")    *  Annual influenza vaccine today    *  Updated pneumonia vaccine ( you have now had both available pneumonia vaccines)    *  Return to see me in 6 months, sooner if needed.  Call [phone] to schedule this appointment.       See Patient Instructions    HUEY SHAW M.D., MD  [facility]       Injectable Influenza Immunization Documentation    1.  Is the person to be vaccinated sick today?   No    2. Does the person to be vaccinated have an allergy to a component   of the vaccine?   No  Egg Allergy Algorithm Link    3. Has the person to be vaccinated ever had a serious reaction   to influenza vaccine in the past?   No    4. Has the person to be vaccinated ever had Guillain-Barré syndrome?   No    Form completed by Naya Crain CMA           "

## 2018-01-04 NOTE — PATIENT INSTRUCTIONS
"*  Continue all medications at the same doses.  Contact your usual pharmacy if you need refills.     * OK to return to [facility], with the idea of ultimately getting completely off the cigarettes ONE AND FOR ALL!    *  Keep an eye out for changes in the formulary coverage for inhalers.      *  You may have to change from the Dulera to the Air Duo (\"generic Advair\")    *  Annual influenza vaccine today    *  Updated pneumonia vaccine ( you have now had both available pneumonia vaccines)    *  Return to see me in 6 months, sooner if needed.  Call [phone] to schedule this appointment.   "

## 2018-01-04 NOTE — MR AVS SNAPSHOT
"              After Visit Summary   1/4/2018    Gabe Smith    MRN: [id number]           Patient Information     Date Of Birth          1955        Visit Information        Provider Department      1/4/2018 8:00 AM Donny Payne MD [facility]        Today's Diagnoses     COPD (HCC)    -  1    Benign essential hypertension        Hyperlipidemia LDL goal <130        Need for prophylactic vaccination and inoculation against influenza        Need for prophylactic vaccination against Streptococcus pneumoniae (pneumococcus)        Tobacco use disorder          Care Instructions    *  Continue all medications at the same doses.  Contact your usual pharmacy if you need refills.     * OK to return to [facility], with the idea of ultimately getting completely off the cigarettes ONE AND FOR ALL!    *  Keep an eye out for changes in the formulary coverage for inhalers.      *  You may have to change from the Dulera to the Air Duo (\"generic Advair\")    *  Annual influenza vaccine today    *  Updated pneumonia vaccine ( you have now had both available pneumonia vaccines)    *  Return to see me in 6 months, sooner if needed.  Call [phone] to schedule this appointment.           Follow-ups after your visit        Who to contact     If you have questions or need follow up information about today's clinic visit or your schedule please contact [facility] directly at [phone].  Normal or non-critical lab and imaging results will be communicated to you by MyChart, letter or phone within 4 business days after the clinic has received the results. If you do not hear from us within 7 days, please contact the clinic through MyChart or phone. If you have a critical or abnormal lab result, we will notify you by phone as soon as possible.  Submit refill requests through [place] [facility] [place] or call your pharmacy and they will forward the refill request to us. Please allow 3 business " "days for your refill to be completed.          Additional Information About Your Visit        MyChart Information     [facility] gives you secure access to your electronic health record. If you see a primary care provider, you can also send messages to your care team and make appointments. If you have questions, please call your primary care clinic.  If you do not have a primary care provider, please call [phone] and they will assist you.        Care EveryWhere ID     This is your Care EveryWhere ID. This could be used by other organizations to access your [facility] medical records  [id number]        Your Vitals Were     Pulse Temperature Height Pulse Oximetry BMI (Body Mass Index)       87 97.9  F (36.6  C) (Oral) 5' 10\" (1.778 m) 93% 29.84 kg/m2        Blood Pressure from Last 3 Encounters:   01/04/18 136/84   07/13/17 120/80   04/12/17 110/76    Weight from Last 3 Encounters:   01/04/18 208 lb (94.3 kg)   07/13/17 203 lb 11.2 oz (92.4 kg)   04/12/17 203 lb (92.1 kg)              We Performed the Following     ADMIN: Vaccine, Initial (72108)     FLU VAC, SPLIT VIRUS IM > 3 YO (QUADRIVALENT) [83115]     Pneumococcal vaccine 23 valent PPSV23  (Pneumovax) [03996]     Vaccine Administration, Initial [64443]          Where to get your medicines      These medications were sent to [facility] [address] [address]     Phone:  [phone]     albuterol 108 (90 BASE) MCG/ACT Inhaler    amLODIPine 10 MG tablet    lisinopril-hydrochlorothiazide 20-12.5 MG per tablet    mometasone-formoterol 200-5 MCG/ACT oral inhaler    pravastatin 40 MG tablet    varenicline 1 MG tablet          Primary Care Provider Office Phone # Fax #    Donny Payne [phone]       [address] [address]        Equal Access to Services     CECY ZHU AH: Tr Guthrie, martin osuna, qayblaney hernandez" prisca askewaniket aguila'aan ah. So [facility] [phone].    ATENCIÓN: Si bibianala helen, tiene a spring disposición servicios gratuitos de asistencia lingüística. Devante morrow [phone].    We comply with applicable federal civil rights laws and [place] laws. We do not discriminate on the basis of race, color, national origin, age, disability, sex, sexual orientation, or gender identity.            Thank you!     Thank you for choosing [facility]  for your care. Our goal is always to provide you with excellent care. Hearing back from our patients is one way we can continue to improve our services. Please take a few minutes to complete the written survey that you may receive in the mail after your visit with us. Thank you!             Your Updated Medication List - Protect others around you: Learn how to safely use, store and throw away your medicines at www.disposemymeds.org.          This list is accurate as of: 1/4/18  8:36 AM.  Always use your most recent med list.                   Brand Name Dispense Instructions for use Diagnosis    albuterol 108 (90 BASE) MCG/ACT Inhaler    PROAIR HFA    1 Inhaler    Inhale 2 puffs into the lungs every 6 hours    Panlobular emphysema (H)       amLODIPine 10 MG tablet    NORVASC    30 tablet    Take 1 tablet (10 mg) by mouth daily    Benign essential hypertension       aspirin 81 MG tablet      Take 1 tablet (81 mg) by mouth daily        lisinopril-hydrochlorothiazide 20-12.5 MG per tablet    PRINZIDE/ZESTORETIC    60 tablet    Take 2 tablets by mouth every morning    Benign essential hypertension       mometasone-formoterol 200-5 MCG/ACT oral inhaler    DULERA    1 Inhaler    Inhale 2 puffs into the lungs 2 times daily    Panlobular emphysema (H)       pravastatin 40 MG tablet    PRAVACHOL    30 tablet    Take 1 tablet (40 mg) by mouth At Bedtime    Hyperlipidemia LDL goal <130       varenicline 1 MG tablet    CHANTIX    60 tablet    Take 1 tablet (1  mg) by mouth 2 times daily    Tobacco use disorder

## 2018-07-12 DIAGNOSIS — E78.5 HYPERLIPIDEMIA LDL GOAL <130: ICD-10-CM

## 2018-07-12 DIAGNOSIS — I10 BENIGN ESSENTIAL HYPERTENSION: ICD-10-CM

## 2018-07-12 LAB
ALT SERPL W P-5'-P-CCNC: 18 U/L (ref 0–70)
ANION GAP SERPL CALCULATED.3IONS-SCNC: 7 MMOL/L (ref 3–14)
AST SERPL W P-5'-P-CCNC: 18 U/L (ref 0–45)
BUN SERPL-MCNC: 28 MG/DL (ref 7–30)
CALCIUM SERPL-MCNC: 8.5 MG/DL (ref 8.5–10.1)
CHLORIDE SERPL-SCNC: 103 MMOL/L (ref 94–109)
CHOLEST SERPL-MCNC: 167 MG/DL
CO2 SERPL-SCNC: 25 MMOL/L (ref 20–32)
CREAT SERPL-MCNC: 1 MG/DL (ref 0.66–1.25)
ERYTHROCYTE [DISTWIDTH] IN BLOOD BY AUTOMATED COUNT: 13.3 % (ref 10–15)
GFR SERPL CREATININE-BSD FRML MDRD: 76 ML/MIN/1.7M2
GLUCOSE SERPL-MCNC: 112 MG/DL (ref 70–99)
HCT VFR BLD AUTO: 48.9 % (ref 40–53)
HDLC SERPL-MCNC: 27 MG/DL
HGB BLD-MCNC: 16.4 G/DL (ref 13.3–17.7)
LDLC SERPL CALC-MCNC: 91 MG/DL
MCH RBC QN AUTO: 31.3 PG (ref 26.5–33)
MCHC RBC AUTO-ENTMCNC: 33.5 G/DL (ref 31.5–36.5)
MCV RBC AUTO: 93 FL (ref 78–100)
NONHDLC SERPL-MCNC: 140 MG/DL
PLATELET # BLD AUTO: 197 10E9/L (ref 150–450)
POTASSIUM SERPL-SCNC: 3.9 MMOL/L (ref 3.4–5.3)
RBC # BLD AUTO: 5.24 10E12/L (ref 4.4–5.9)
SODIUM SERPL-SCNC: 135 MMOL/L (ref 133–144)
TRIGL SERPL-MCNC: 246 MG/DL
WBC # BLD AUTO: 9.9 10E9/L (ref 4–11)

## 2018-07-12 PROCEDURE — 80048 BASIC METABOLIC PNL TOTAL CA: CPT | Performed by: INTERNAL MEDICINE

## 2018-07-12 PROCEDURE — 36415 COLL VENOUS BLD VENIPUNCTURE: CPT | Performed by: INTERNAL MEDICINE

## 2018-07-12 PROCEDURE — 85027 COMPLETE CBC AUTOMATED: CPT | Performed by: INTERNAL MEDICINE

## 2018-07-12 PROCEDURE — 80061 LIPID PANEL: CPT | Performed by: INTERNAL MEDICINE

## 2018-07-12 PROCEDURE — 84460 ALANINE AMINO (ALT) (SGPT): CPT | Performed by: INTERNAL MEDICINE

## 2018-07-12 PROCEDURE — 84450 TRANSFERASE (AST) (SGOT): CPT | Performed by: INTERNAL MEDICINE

## 2018-07-13 ENCOUNTER — OFFICE VISIT (OUTPATIENT)
Dept: INTERNAL MEDICINE | Facility: CLINIC | Age: 63
End: 2018-07-13
Payer: COMMERCIAL

## 2018-07-13 VITALS
TEMPERATURE: 98.1 F | HEIGHT: 71 IN | DIASTOLIC BLOOD PRESSURE: 76 MMHG | RESPIRATION RATE: 14 BRPM | OXYGEN SATURATION: 93 % | BODY MASS INDEX: 27.15 KG/M2 | WEIGHT: 193.9 LBS | HEART RATE: 77 BPM | SYSTOLIC BLOOD PRESSURE: 134 MMHG

## 2018-07-13 DIAGNOSIS — J43.1 PANLOBULAR EMPHYSEMA (H): ICD-10-CM

## 2018-07-13 DIAGNOSIS — F10.20 ALCOHOL DEPENDENCE, EPISODIC DRINKING BEHAVIOR (H): ICD-10-CM

## 2018-07-13 DIAGNOSIS — I10 BENIGN ESSENTIAL HYPERTENSION: ICD-10-CM

## 2018-07-13 DIAGNOSIS — Z11.59 NEED FOR HEPATITIS C SCREENING TEST: ICD-10-CM

## 2018-07-13 DIAGNOSIS — R73.03 PREDIABETES: ICD-10-CM

## 2018-07-13 DIAGNOSIS — E78.5 HYPERLIPIDEMIA LDL GOAL <130: ICD-10-CM

## 2018-07-13 DIAGNOSIS — Z72.0 TOBACCO ABUSE: ICD-10-CM

## 2018-07-13 DIAGNOSIS — Z00.00 ROUTINE GENERAL MEDICAL EXAMINATION AT A HEALTH CARE FACILITY: Primary | ICD-10-CM

## 2018-07-13 DIAGNOSIS — M10.9 ACUTE GOUT OF FOOT, UNSPECIFIED CAUSE, UNSPECIFIED LATERALITY: ICD-10-CM

## 2018-07-13 DIAGNOSIS — Z23 NEED FOR SHINGLES VACCINE: ICD-10-CM

## 2018-07-13 DIAGNOSIS — F17.200 TOBACCO USE DISORDER: ICD-10-CM

## 2018-07-13 DIAGNOSIS — Z11.4 SCREENING FOR HIV (HUMAN IMMUNODEFICIENCY VIRUS): ICD-10-CM

## 2018-07-13 PROCEDURE — 99396 PREV VISIT EST AGE 40-64: CPT | Performed by: INTERNAL MEDICINE

## 2018-07-13 RX ORDER — ALLOPURINOL 100 MG/1
TABLET ORAL
Qty: 60 TABLET | Refills: 11 | Status: SHIPPED | OUTPATIENT
Start: 2018-07-13 | End: 2018-12-28

## 2018-07-13 RX ORDER — PRAVASTATIN SODIUM 40 MG
40 TABLET ORAL AT BEDTIME
Qty: 30 TABLET | Refills: 5 | Status: SHIPPED | OUTPATIENT
Start: 2018-07-13 | End: 2018-12-28

## 2018-07-13 RX ORDER — ALBUTEROL SULFATE 0.83 MG/ML
2.5 SOLUTION RESPIRATORY (INHALATION) EVERY 6 HOURS PRN
Qty: 1 BOX | Refills: 5 | Status: SHIPPED | OUTPATIENT
Start: 2018-07-13 | End: 2019-07-19

## 2018-07-13 RX ORDER — LISINOPRIL AND HYDROCHLOROTHIAZIDE 12.5; 2 MG/1; MG/1
2 TABLET ORAL EVERY MORNING
Qty: 60 TABLET | Refills: 5 | Status: SHIPPED | OUTPATIENT
Start: 2018-07-13 | End: 2018-12-28

## 2018-07-13 RX ORDER — AMLODIPINE BESYLATE 10 MG/1
10 TABLET ORAL DAILY
Qty: 30 TABLET | Refills: 5 | Status: SHIPPED | OUTPATIENT
Start: 2018-07-13 | End: 2018-12-28

## 2018-07-13 NOTE — MR AVS SNAPSHOT
After Visit Summary   7/13/2018    Gabe Smith    MRN: [id number]           Patient Information     Date Of Birth          1955        Visit Information        Provider Department      7/13/2018 10:00 AM Donny Payne MD [facility]        Today's Diagnoses     Routine general medical examination at a health care facility    -  1    COPD (HCC)        Benign essential hypertension        Alcohol dependence, episodic drinking behavior (H)        Prediabetes        Tobacco use disorder        Hyperlipidemia LDL goal <130        Tobacco abuse        Need for hepatitis C screening test        Screening for HIV (human immunodeficiency virus)        Acute gout of foot, unspecified cause, unspecified laterality        Need for shingles vaccine          Care Instructions    *  COPD:     --Add another inhaler to help prevent breathing issues:  Spiriva Respimat, 2 puffs once per day, every day, regardless of breathing    Spiriva Respimat inhaler,  2 puffs once per day, every day.  This medication helps control the inflammation and secretions from COPD (emphysema, chronic bronchitis)           --Use the Advair, 1 inhalaltion twice per day, every day     --Use the Albuterol inhaler as needed for coughing, wheezing, before activity.      --Use albuterol nebulizer for more serious coughing, wheezing.  Use 1 vial 4 times per day as needed for coughing, wheezing, etc.      --STOP SMOKING!!!!    *  GOUT:      --checking uric acdi levels.      --We will start a daily medicaiton to help prevent future attacks by lowering uric acid levels, but we cannot start this until one mothn after the most recent attack.      --In one month, start Allopurinol 100 mg once per day for one month, then go to 200 mg once per day.     *  Hypertension (Blood pressure)     --Continue all medications at the same doses.      --Your blood pressure will go down a lot once you stop drinking as much  "alcohol as you are drinking.       5 GOALS TO PREVENT VASCULAR DISEASE:     1.  Aggressive blood pressure control, under 130/80 ideally.  Using medications if needed.    Your blood pressure is under good control    BP Readings from Last 4 Encounters:   07/13/18 134/76   01/04/18 136/84   07/13/17 120/80   04/12/17 110/76       2.  Aggressive LDL cholesterol (\"bad cholesterol\") lowering as indicated.    Your goal is an LDL under 130 for sure, preferably under 100.  (If you have diabetes or previous vascular disease, the the LDL goals would be under 100 for sure, preferably under 70.)    New guidelines identify four high-risk groups who could benefit from statins:   *people with pre-existing heart disease, such as those who have had a heart attack;   *people ages 40 to 75 who have diabetes of any type  *patients ages 40 to 75 with at least a 7.5% risk of developing cardiovascular disease over the next decade, according to a formula described in the guidelines  *patients with the sort of super-high cholesterol that sometimes runs in families, as evidenced by an LDL of 190 milligrams per deciliter or higher    Your cholesterol levels are well controlled.    Recent Labs   Lab Test  07/12/18   0805  07/11/17   1012   08/04/15   0757  02/03/15   0748   CHOL  167  175   < >  210*  192   HDL  27*  33*   < >  27*  33*   LDL  91  105*   < >  Cannot estimate LDL when triglyceride exceeds 400 mg/dL  134*  107   TRIG  246*  184*   < >  422*  262*   CHOLHDLRATIO   --    --    --   7.8*  5.8*    < > = values in this interval not displayed.       3.  Aggressive diabetic prevention, screening and/or management.      You have pre-diabetes as of the most recent blood tests.   Work hard on lowering the intake of \"simple carbohydrates\" (e.g. White bread, white rice, pasta, noodles, potatoes, snack foods, regular soda, juices (except fresh squeezed), cakes, cookies, candy, etc.) as much as posisble to prevent this from getting worse. " "    4.  No smoking    5.  Consider taking low dose aspirin (81 mg) tablet once per day over the age of 50, every day unless there is a specific reason that you cannot take aspirin (such as side effect, allergy, or you are on another \"blood thinner\").        --Based on your current cardiac risk factors, you should take Aspirin 81 mg once per day if you are over 50 years of age.         Alcohol Problems  Most adults who drink alcohol drink in moderation (not a lot) are at low risk for developing problems related to their drinking. However, all drinkers, including low-risk drinkers, should know about the health risks connected with drinking alcohol.  RECOMMENDATIONS FOR LOW-RISK DRINKING:  Drink in moderation. Moderate drinking is defined as follows:     Men - no more than 2 drinks per day.    Nonpregnant women - no more than 1 drink per day.    Over age 65 - no more than 1 drink per day.  A standard drink is 12 grams of pure alcohol, which is equal to a 12 ounce bottle of beer or wine cooler, a 5 ounce glass of wine, or 1.5 ounces of distilled spirits (such as whiskey, ila, vodka, or rum).   ABSTAIN FROM (DO NOT DRINK) ALCOHOL:    When pregnant or considering pregnancy.    When taking a medication that interacts with alcohol.    If you are alcohol dependent.    A medical condition that prohibits drinking alcohol (such as ulcer, liver disease, or heart disease).         Preventive Health Recommendations  Male Ages 50 - 64    Yearly exam:             See your health care provider every year in order to  o   Review health changes.   o   Discuss preventive care.    o   Review your medicines if your doctor has prescribed any.     Have a cholesterol test every 5 years, or more frequently if you are at risk for high cholesterol/heart disease.     Have a diabetes test (fasting glucose) every three years. If you are at risk for diabetes, you should have this test more often.     Have a colonoscopy at age 50, or have a " "yearly FIT test (stool test). These exams will check for colon cancer.      Talk with your health care provider about whether or not a prostate cancer screening test (PSA) is right for you.    You should be tested each year for STDs (sexually transmitted diseases), if you re at risk.     Shots: Get a flu shot each year. Get a tetanus shot every 10 years.     Nutrition:    Eat at least 5 servings of fruits and vegetables daily.     Eat whole-grain bread, whole-wheat pasta and brown rice instead of white grains and rice.     Get adequate Calcium and Vitamin D.      --Good Grains:  Oats, brown rice, Quinoa (these do not raise the blood sugar as much)     --Bad grains:  Anything made from wheat or white rice     (because these raise the blood sugars significantly, and the possible gluten issue from wheat for some people).      --Proteins:  Aim for \"lean proteins\" including chicken, fish, seafood, pork, turkey, and eggs (in moderation); Eat red meat only occasionally        Lifestyle    Exercise for at least 150 minutes a week (30 minutes a day, 5 days a week). This will help you control your weight and prevent disease.     Limit alcohol to one drink per day.     No smoking.     Wear sunscreen to prevent skin cancer.     See your dentist every six months for an exam and cleaning.     See your eye doctor every 1 to 2 years.        SHINGLES VACCINE:     I would recommend that you consider getting a \"shingles vaccine\".  The shingles vaccine does not stop you from getting shingles, but it decreases the intensity of the event, the duration of the event, and decreases the painful nerve condition that results     There are two options available:     --Shingrix (available starting early 2018), 2 shots, 2-6 months apart  **recommended**   OR   --Zostavax, one shot    --Based on the available data, the Shingrix vaccine has superior benefit and should be considered even if you have had the Zostavax vaccine before.      --Contact " your insurance provider and ask them if either shingles vaccine is covered and is so, how much it will cost you.  Usually this will be cheaper to get in a pharmacy given by the pharmacist.    --Regardless of the coverage, I would recommend that you consider the vaccine since shingles is very painful, just ask anyone who has ever had it.                       Follow-ups after your visit        Future tests that were ordered for you today     Open Future Orders        Priority Expected Expires Ordered    Hepatitis C Screen Reflex to HCV RNA Quant and Genotype Routine 7/13/2018 8/13/2018 7/13/2018    HIV Screening Routine 7/13/2018 8/13/2018 7/13/2018    Hemoglobin A1c Routine 7/13/2018 8/13/2018 7/13/2018    Uric acid Routine 7/13/2018 8/13/2018 7/13/2018            Who to contact     If you have questions or need follow up information about today's clinic visit or your schedule please contact [facility] directly at [phone].  Normal or non-critical lab and imaging results will be communicated to you by [employer]hart, letter or phone within 4 business days after the clinic has received the results. If you do not hear from us within 7 days, please contact the clinic through [employer] or phone. If you have a critical or abnormal lab result, we will notify you by phone as soon as possible.  Submit refill requests through [employer] or call your pharmacy and they will forward the refill request to us. Please allow 3 business days for your refill to be completed.          Additional Information About Your Visit        [employer] Information     [employer] gives you secure access to your electronic health record. If you see a primary care provider, you can also send messages to your care team and make appointments. If you have questions, please call your primary care clinic.  If you do not have a primary care provider, please call [phone] and they will assist you.        Care EveryWhere ID     This is your  "Care EveryWhere ID. This could be used by other organizations to access your [facility] medical records  [id number]        Your Vitals Were     Pulse Temperature Respirations Height Pulse Oximetry BMI (Body Mass Index)    77 98.1  F (36.7  C) (Oral) 14 5' 10.5\" (1.791 m) 93% 27.43 kg/m2       Blood Pressure from Last 3 Encounters:   07/13/18 134/76   01/04/18 136/84   07/13/17 120/80    Weight from Last 3 Encounters:   07/13/18 193 lb 14.4 oz (88 kg)   01/04/18 208 lb (94.3 kg)   07/13/17 203 lb 11.2 oz (92.4 kg)                 Today's Medication Changes          These changes are accurate as of 7/13/18 10:50 AM.  If you have any questions, ask your nurse or doctor.               Start taking these medicines.        Dose/Directions    allopurinol 100 MG tablet   Commonly known as:  ZYLOPRIM   Used for:  Acute gout of foot, unspecified cause, unspecified laterality   Started by:  Donny Payne MD        1 tablet daily for one month, then 2 tablets daily   Quantity:  60 tablet   Refills:  11       tiotropium 2.5 MCG/ACT inhalation aerosol   Commonly known as:  SPIRIVA RESPIMAT   Used for:  Panlobular emphysema (H)   Started by:  Donny Payne MD        Dose:  2 puff   Inhale 2 puffs into the lungs daily   Quantity:  4 g   Refills:  1       zoster vaccine recombinant adjuvanted injection   Commonly known as:  SHINGRIX   Used for:  Need for shingles vaccine   Started by:  Donny Payne MD        Dose:  1 each   Inject 0.5 mLs into the muscle once for 1 dose REPEAT SECOND DOSE IN 2-6 MONTHS   Quantity:  0.5 mL   Refills:  1         These medicines have changed or have updated prescriptions.        Dose/Directions    * albuterol 108 (90 Base) MCG/ACT Inhaler   Commonly known as:  PROAIR HFA   This may have changed:  Another medication with the same name was added. Make sure you understand how and when to take each.   Used for:  Panlobular emphysema (H)   Changed by:  Donny Payne" MD [place]        Dose:  2 puff   Inhale 2 puffs into the lungs every 6 hours   Quantity:  1 Inhaler   Refills:  10       * albuterol (2.5 MG/3ML) 0.083% neb solution   This may have changed:  You were already taking a medication with the same name, and this prescription was added. Make sure you understand how and when to take each.   Used for:  Panlobular emphysema (H)   Changed by:  Donny Payne MD        Dose:  2.5 mg   Take 1 vial (2.5 mg) by nebulization every 6 hours as needed for shortness of breath / dyspnea or wheezing   Quantity:  1 Box   Refills:  5       * Notice:  This list has 2 medication(s) that are the same as other medications prescribed for you. Read the directions carefully, and ask your doctor or other care provider to review them with you.         Where to get your medicines      These medications were sent to [facility] [place] [address]     Phone:  [phone]     albuterol (2.5 MG/3ML) 0.083% neb solution    allopurinol 100 MG tablet    amLODIPine 10 MG tablet    lisinopril-hydrochlorothiazide 20-12.5 MG per tablet    pravastatin 40 MG tablet    tiotropium 2.5 MCG/ACT inhalation aerosol    zoster vaccine recombinant adjuvanted injection                Primary Care Provider Office Phone # Fax #    Donny Payne [phone]       [address] [place] [id number]        Equal Access to Services     [facility] : Hadii aad ku hadasho Soomaali, waaxda luqadaha, qaybta kaalmada adeegyada, waxay prisca kelly . So [facility] [phone].    ATENCIÓN: Si habla español, tiene a spring disposición servicios gratuitos de asistencia lingüística. Devante al [phone].    We comply with applicable federal civil rights laws and [place] laws. We do not discriminate on the basis of race, color, national origin, age, disability, sex, sexual orientation, or gender identity.             Thank you!     Thank you for choosing [facility]  for your care. Our goal is always to provide you with excellent care. Hearing back from our patients is one way we can continue to improve our services. Please take a few minutes to complete the written survey that you may receive in the mail after your visit with us. Thank you!             Your Updated Medication List - Protect others around you: Learn how to safely use, store and throw away your medicines at www.disposemymeds.org.          This list is accurate as of 7/13/18 10:50 AM.  Always use your most recent med list.                   Brand Name Dispense Instructions for use Diagnosis    * albuterol 108 (90 Base) MCG/ACT Inhaler    PROAIR HFA    1 Inhaler    Inhale 2 puffs into the lungs every 6 hours    Panlobular emphysema (H)       * albuterol (2.5 MG/3ML) 0.083% neb solution     1 Box    Take 1 vial (2.5 mg) by nebulization every 6 hours as needed for shortness of breath / dyspnea or wheezing    Panlobular emphysema (H)       allopurinol 100 MG tablet    ZYLOPRIM    60 tablet    1 tablet daily for one month, then 2 tablets daily    Acute gout of foot, unspecified cause, unspecified laterality       amLODIPine 10 MG tablet    NORVASC    30 tablet    Take 1 tablet (10 mg) by mouth daily    Benign essential hypertension       aspirin 81 MG tablet      Take 1 tablet (81 mg) by mouth daily        fluticasone-salmeterol 250-50 MCG/DOSE diskus inhaler    ADVAIR DISKUS    1 Inhaler    Inhale 1 puff into the lungs every 12 hours    Panlobular emphysema (H)       lisinopril-hydrochlorothiazide 20-12.5 MG per tablet    PRINZIDE/ZESTORETIC    60 tablet    Take 2 tablets by mouth every morning    Benign essential hypertension       pravastatin 40 MG tablet    PRAVACHOL    30 tablet    Take 1 tablet (40 mg) by mouth At Bedtime    Hyperlipidemia LDL goal <130       tiotropium 2.5 MCG/ACT inhalation aerosol    SPIRIVA RESPIMAT    4 g     Inhale 2 puffs into the lungs daily    Panlobular emphysema (H)       varenicline 1 MG tablet    CHANTIX    60 tablet    Take 1 tablet (1 mg) by mouth 2 times daily    Tobacco use disorder       zoster vaccine recombinant adjuvanted injection    SHINGRIX    0.5 mL    Inject 0.5 mLs into the muscle once for 1 dose REPEAT SECOND DOSE IN 2-6 MONTHS    Need for shingles vaccine       * Notice:  This list has 2 medication(s) that are the same as other medications prescribed for you. Read the directions carefully, and ask your doctor or other care provider to review them with you.

## 2018-07-13 NOTE — LETTER
[address] [address]  [phone]        August 18, 2018    Gabe Smith  [address] [facility] [address]              Dear Gabe Smith    This is to remind you that your non-fasting labs is due.    You may call our office at [phone] to schedule an appointment.    Please disregard this notice if you have already had your labs drawn or made an appointment.        Sincerely,        Donny Payne MD

## 2018-07-13 NOTE — PATIENT INSTRUCTIONS
"*  COPD:     --Add another inhaler to help prevent breathing issues:  Spiriva Respimat, 2 puffs once per day, every day, regardless of breathing    Spiriva Respimat inhaler,  2 puffs once per day, every day.  This medication helps control the inflammation and secretions from COPD (emphysema, chronic bronchitis)           --Use the Advair, 1 inhalaltion twice per day, every day     --Use the Albuterol inhaler as needed for coughing, wheezing, before activity.      --Use albuterol nebulizer for more serious coughing, wheezing.  Use 1 vial 4 times per day as needed for coughing, wheezing, etc.      --STOP SMOKING!!!!    *  GOUT:      --checking uric acdi levels.      --We will start a daily medicaiton to help prevent future attacks by lowering uric acid levels, but we cannot start this until one mothn after the most recent attack.      --In one month, start Allopurinol 100 mg once per day for one month, then go to 200 mg once per day.     *  Hypertension (Blood pressure)     --Continue all medications at the same doses.      --Your blood pressure will go down a lot once you stop drinking as much alcohol as you are drinking.       5 GOALS TO PREVENT VASCULAR DISEASE:     1.  Aggressive blood pressure control, under 130/80 ideally.  Using medications if needed.    Your blood pressure is under good control    BP Readings from Last 4 Encounters:   07/13/18 134/76   01/04/18 136/84   07/13/17 120/80   04/12/17 110/76       2.  Aggressive LDL cholesterol (\"bad cholesterol\") lowering as indicated.    Your goal is an LDL under 130 for sure, preferably under 100.  (If you have diabetes or previous vascular disease, the the LDL goals would be under 100 for sure, preferably under 70.)    New guidelines identify four high-risk groups who could benefit from statins:   *people with pre-existing heart disease, such as those who have had a heart attack;   *people ages 40 to 75 who have diabetes of any type  *patients ages 40 to 75 " "with at least a 7.5% risk of developing cardiovascular disease over the next decade, according to a formula described in the guidelines  *patients with the sort of super-high cholesterol that sometimes runs in families, as evidenced by an LDL of 190 milligrams per deciliter or higher    Your cholesterol levels are well controlled.    Recent Labs   Lab Test  07/12/18   0805  07/11/17   1012   08/04/15   0757  02/03/15   0748   CHOL  167  175   < >  210*  192   HDL  27*  33*   < >  27*  33*   LDL  91  105*   < >  Cannot estimate LDL when triglyceride exceeds 400 mg/dL  134*  107   TRIG  246*  184*   < >  422*  262*   CHOLHDLRATIO   --    --    --   7.8*  5.8*    < > = values in this interval not displayed.       3.  Aggressive diabetic prevention, screening and/or management.      You have pre-diabetes as of the most recent blood tests.   Work hard on lowering the intake of \"simple carbohydrates\" (e.g. White bread, white rice, pasta, noodles, potatoes, snack foods, regular soda, juices (except fresh squeezed), cakes, cookies, candy, etc.) as much as posisble to prevent this from getting worse.     4.  No smoking    5.  Consider taking low dose aspirin (81 mg) tablet once per day over the age of 50, every day unless there is a specific reason that you cannot take aspirin (such as side effect, allergy, or you are on another \"blood thinner\").        --Based on your current cardiac risk factors, you should take Aspirin 81 mg once per day if you are over 50 years of age.         Alcohol Problems  Most adults who drink alcohol drink in moderation (not a lot) are at low risk for developing problems related to their drinking. However, all drinkers, including low-risk drinkers, should know about the health risks connected with drinking alcohol.  RECOMMENDATIONS FOR LOW-RISK DRINKING:  Drink in moderation. Moderate drinking is defined as follows:     Men - no more than 2 drinks per day.    Nonpregnant women - no more than 1 " drink per day.    Over age 65 - no more than 1 drink per day.  A standard drink is 12 grams of pure alcohol, which is equal to a 12 ounce bottle of beer or wine cooler, a 5 ounce glass of wine, or 1.5 ounces of distilled spirits (such as whiskey, ila, vodka, or rum).   ABSTAIN FROM (DO NOT DRINK) ALCOHOL:    When pregnant or considering pregnancy.    When taking a medication that interacts with alcohol.    If you are alcohol dependent.    A medical condition that prohibits drinking alcohol (such as ulcer, liver disease, or heart disease).         Preventive Health Recommendations  Male Ages 50   64    Yearly exam:             See your health care provider every year in order to  o   Review health changes.   o   Discuss preventive care.    o   Review your medicines if your doctor has prescribed any.     Have a cholesterol test every 5 years, or more frequently if you are at risk for high cholesterol/heart disease.     Have a diabetes test (fasting glucose) every three years. If you are at risk for diabetes, you should have this test more often.     Have a colonoscopy at age 50, or have a yearly FIT test (stool test). These exams will check for colon cancer.      Talk with your health care provider about whether or not a prostate cancer screening test (PSA) is right for you.    You should be tested each year for STDs (sexually transmitted diseases), if you re at risk.     Shots: Get a flu shot each year. Get a tetanus shot every 10 years.     Nutrition:    Eat at least 5 servings of fruits and vegetables daily.     Eat whole-grain bread, whole-wheat pasta and brown rice instead of white grains and rice.     Get adequate Calcium and Vitamin D.      --Good Grains:  Oats, brown rice, Quinoa (these do not raise the blood sugar as much)     --Bad grains:  Anything made from wheat or white rice     (because these raise the blood sugars significantly, and the possible gluten issue from wheat for some people).  "     --Proteins:  Aim for \"lean proteins\" including chicken, fish, seafood, pork, turkey, and eggs (in moderation); Eat red meat only occasionally        Lifestyle    Exercise for at least 150 minutes a week (30 minutes a day, 5 days a week). This will help you control your weight and prevent disease.     Limit alcohol to one drink per day.     No smoking.     Wear sunscreen to prevent skin cancer.     See your dentist every six months for an exam and cleaning.     See your eye doctor every 1 to 2 years.        SHINGLES VACCINE:     I would recommend that you consider getting a \"shingles vaccine\".  The shingles vaccine does not stop you from getting shingles, but it decreases the intensity of the event, the duration of the event, and decreases the painful nerve condition that results     There are two options available:     --Shingrix (available starting early 2018), 2 shots, 2-6 months apart  **recommended**   OR   --Zostavax, one shot    --Based on the available data, the Shingrix vaccine has superior benefit and should be considered even if you have had the Zostavax vaccine before.      --Contact your insurance provider and ask them if either shingles vaccine is covered and is so, how much it will cost you.  Usually this will be cheaper to get in a pharmacy given by the pharmacist.    --Regardless of the coverage, I would recommend that you consider the vaccine since shingles is very painful, just ask anyone who has ever had it.               "

## 2018-07-13 NOTE — PROGRESS NOTES
SUBJECTIVE:   CC: Gabe Smith is an 63 year old male who presents for preventative health visit.     Physical   Annual:     Getting at least 3 servings of Calcium per day:  Yes    Bi-annual eye exam:  NO    Dental care twice a year:  Yes    Sleep apnea or symptoms of sleep apnea:  None    Diet:  Regular (no restrictions)    Frequency of exercise:  None    Taking medications regularly:  Yes    Medication side effects:  None    Additional concerns today:  YES (c/o gout sx in L big toe. Red, swollen, very painful to touch. states he has had this problem for 15-20 yrs. most recent episodes were 1 wk ago and 1 month ago, both lasting for 3-4 days. also lump on R arm, not painful. )    Today's PHQ-2 Score:   PHQ-2 ( 1999 [employer]) 7/13/2018   Q1: Little interest or pleasure in doing things 0   Q2: Feeling down, depressed or hopeless 0   PHQ-2 Score 0   Q1: Little interest or pleasure in doing things Not at all   Q2: Feeling down, depressed or hopeless Not at all   PHQ-2 Score 0       Abuse: Current or Past(Physical, Sexual or Emotional)- No  Do you feel safe in your environment - Yes    Social History   Substance Use Topics     Smoking status: Current Every Day Smoker     Packs/day: 0.50     Types: Cigarettes     Smokeless tobacco: Never Used      Comment: 1 1/2 PPD     Alcohol use 0.0 oz/week     0 Standard drinks or equivalent per week      Comment: 8/7/14:  3-4 per day; 9/22/16:  3-6 per day most days     Alcohol Use 7/13/2018   If you drink alcohol do you typically have greater than 3 drinks per day OR greater than 7 drinks per week? Yes   AUDIT SCORE  5     Last PSA:   PSA   Date Value Ref Range Status   07/11/2017 0.76 0 - 4 ug/L Final     Comment:     Assay Method:  Chemiluminescence using Siemens Vista analyzer       Reviewed orders with patient. Reviewed health maintenance and updated orders accordingly - Yes      Reviewed and updated as needed this visit by clinical staff  Tobacco  Allergies  Meds          Reviewed and updated as needed this visit by Provider  Tobacco          Past Medical History:  ---------------------------  Past Medical History:   Diagnosis Date     COPD (chronic obstructive pulmonary disease) (H)      Dysmetabolic syndrome X      Hyperlipidemia      Hypertension      Osteoarthrosis      Prediabetes 10/9/14    A1C 6.1     Tobacco use disorder        Past Surgical History:  ---------------------------  Past Surgical History:   Procedure Laterality Date     COLONOSCOPY N/A 10/19/2015    Procedure: COMBINED COLONOSCOPY, SINGLE OR MULTIPLE BIOPSY/POLYPECTOMY BY BIOPSY;  Surgeon: Gume Vidal MD;  Location:  GI     SURGICAL HISTORY OF -   2000    Left MOISES     SURGICAL HISTORY OF -       Unspecified knee surgeries as a child     SURGICAL HISTORY OF -   10/2010    Right MOISES, with left knee arthroscopy, meniscectomy       Current Medications:  ---------------------------  Current Outpatient Prescriptions   Medication Sig Dispense Refill     albuterol (2.5 MG/3ML) 0.083% neb solution Take 1 vial (2.5 mg) by nebulization every 6 hours as needed for shortness of breath / dyspnea or wheezing 1 Box 5     albuterol (PROAIR HFA) 108 (90 BASE) MCG/ACT Inhaler Inhale 2 puffs into the lungs every 6 hours 1 Inhaler 10     amLODIPine (NORVASC) 10 MG tablet Take 1 tablet (10 mg) by mouth daily 30 tablet 5     aspirin 81 MG tablet Take 1 tablet (81 mg) by mouth daily       fluticasone-salmeterol (ADVAIR DISKUS) 250-50 MCG/DOSE diskus inhaler Inhale 1 puff into the lungs every 12 hours 1 Inhaler 11     lisinopril-hydrochlorothiazide (PRINZIDE/ZESTORETIC) 20-12.5 MG per tablet Take 2 tablets by mouth every morning 60 tablet 5     pravastatin (PRAVACHOL) 40 MG tablet Take 1 tablet (40 mg) by mouth At Bedtime 30 tablet 5     tiotropium (SPIRIVA RESPIMAT) 2.5 MCG/ACT inhalation aerosol Inhale 2 puffs into the lungs daily 4 g 1     varenicline (CHANTIX) 1 MG tablet Take 1 tablet (1 mg) by mouth 2 times daily 60  tablet 5     zoster vaccine recombinant adjuvanted (SHINGRIX) injection Inject 0.5 mLs into the muscle once for 1 dose REPEAT SECOND DOSE IN 2-6 MONTHS 0.5 mL 1     [DISCONTINUED] amLODIPine (NORVASC) 10 MG tablet Take 1 tablet (10 mg) by mouth daily 30 tablet 5     [DISCONTINUED] lisinopril-hydrochlorothiazide (PRINZIDE/ZESTORETIC) 20-12.5 MG per tablet Take 2 tablets by mouth every morning 60 tablet 5     [DISCONTINUED] pravastatin (PRAVACHOL) 40 MG tablet Take 1 tablet (40 mg) by mouth At Bedtime 30 tablet 5       Allergies:  -------------  No Known Allergies    Social History:  -------------------  Social History     Social History     Marital status:      Spouse name: N/A     Number of children: 2     Years of education: N/A     Occupational History      [employer]     Social History Main Topics     Smoking status: Current Every Day Smoker     Packs/day: 0.50     Types: Cigarettes     Smokeless tobacco: Never Used      Comment: 1 1/2 PPD     Alcohol use 0.0 oz/week     0 Standard drinks or equivalent per week      Comment: 8/7/14:  3-4 per day; 9/22/16:  3-6 per day most days     Drug use: No     Sexual activity: Not Currently     Other Topics Concern     Not on file     Social History Narrative       Family Medical History:  ------------------------------  Family History   Problem Relation Age of Onset     Family History Negative Mother      Family History Negative Brother      Family History Negative Sister         Review of Systems  CONSTITUTIONAL: NEGATIVE for fever, chills, change in weight  INTEGUMENTARY/SKIN: NEGATIVE for worrisome rashes, moles or lesions  EYES: NEGATIVE for vision changes or irritation  ENT: NEGATIVE for ear, mouth and throat problems  RESP: NEGATIVE for significant cough or SOB  CV: NEGATIVE for chest pain, palpitations or peripheral edema  GI: NEGATIVE for nausea, abdominal pain, heartburn, or change in bowel habits   male: negative for dysuria, hematuria, decreased  "urinary stream, erectile dysfunction, urethral discharge  MUSCULOSKELETAL: NEGATIVE for significant arthralgias or myalgia  NEURO: NEGATIVE for weakness, dizziness or paresthesias  PSYCHIATRIC: NEGATIVE for changes in mood or affect    OBJECTIVE:   /76  Pulse 77  Temp 98.1  F (36.7  C) (Oral)  Resp 14  Ht 5' 10.5\" (1.791 m)  Wt 193 lb 14.4 oz (88 kg)  SpO2 93%  BMI 27.43 kg/m2    Physical Exam  GENERAL alert and no distress  EYES:  Normal sclera,conjunctiva, EOMI  HENT: oral and posterior pharynx without lesions or erythema, facies symmetric  NECK: Neck supple. No LAD, without thyroidmegaly or JVD., Carotids without bruits.  RESP: Clear to ausculation bilaterally without wheezes or crackles. Normal BS in all fields.  CV: RRR normal S1S2 without murmurs, rubs or gallops. PMI normal  LYMPH: no cervical lymph adenopathy appreciated  MS: extremities- no gross deformities of the visible extremities noted, no edema  PSYCH: Alert and oriented times 3; speech- coherent  SKIN:  No obvious significant skin lesions on visible portions of face         ASSESSMENT/PLAN:     (Z00.00) Routine general medical examination at a health care facility  (primary encounter diagnosis)  Comment: Discussed cardiac disease risk factor modification including screening for and treating HTN, lipids, DM, and smoking cessation.  Also discussed age appropriate cancer screening recommendations including testicular, prostate, colon and lung cancer as dictated by age group.  Recommended low fat, low salt diet and moderation in any alcohol intake.  Recommended always using seatbelts when in a car.  Recommended never driving after drinking or riding with someone who has been drinking as well.       Plan:     (J43.1) COPD (HCC)  Comment: This condition is currently controlled on the current medical regimen.  Continue current therapy.  Discussed general issues of COPD, including pathophysiology, ways it will affect the pt., when to seek help, " "reviewed the typical medications (how they are taken, how they help)   Plan: tiotropium (SPIRIVA RESPIMAT) 2.5 MCG/ACT         inhalation aerosol, albuterol (2.5 MG/3ML)         0.083% neb solution            (I10) Benign essential hypertension  Comment: This condition is currently controlled on the current medical regimen.  Continue current therapy.   BP would be much better if he did not drink as much.   Plan: lisinopril-hydrochlorothiazide         (PRINZIDE/ZESTORETIC) 20-12.5 MG per tablet,         amLODIPine (NORVASC) 10 MG tablet,         **Comprehensive metabolic panel FUTURE 6mo            (F10.20) Alcohol dependence, episodic drinking behavior (H)  Comment: still admist to drinking large amount (more than 4 drinks per day) on regular basis (almost daily)  He has cutr down soime from before, but cannot take the last few steps.   Declines referral fo CD evaluation and help, stating he would like to continue to try on his own    Discussed the many different ways excessive alcohol damages the body.    Discussed the other damaging non-medical side effects of excessive alcohol use as well.    Discussed the observed increases in all-cause mortality and morbidity when drinking above 2 drinks per day (defined as 12 oz beer, or 4 oz wine, or 1.5 oz spirits).  Offered help and support in finding help in quitting alcohol, as much as he will let me.     Plan:     (R73.03) Prediabetes  Comment: Reviewed the labs showing elevated glucose levels.    Discussed \"pre-diabetes\", impaired glucose tolerance, and its part in the dysmetabolic syndrome.    Discussed the inevitable progression of impaired glucose tolerance toward worsening diabetes mellitus and the need for agressive interventions now to delay and prevent this inevitable progression.  Discussed the overall risks that dysmetabolic syndromes/impaired glucose tolerance/syndrome X pose toward increased risks of vascular disease as the main reason for agressive " "intervention now.  Will add medications for glucose control (i.e. metformin, glitazones, etc), lipid (e.g. statins), and for blood pressure (preferably ARBs and ACE) as indicated.  Will start these as early as needed based other proven ability to delay and modify these risk factors.   Discussed the need for aggressive diet control as the cornerstone of pre-diabetes and diabetes management, emphasizing the reduction of \"simple carbohydrates\" (e.g. Any kind of wheat products (e.g. any bread, any pasta), white rice, noodles, potatoes, snack foods, regular soda, juices (except fresh squeezed), cakes, cookies, candy, etc.) along with regular exercise.       Plan: Hemoglobin A1c, **Comprehensive metabolic panel        FUTURE 6mo, **A1C FUTURE 6mo            (F17.200) Tobacco use disorder  Comment: Discussed the physical, psychological, and pharmacological aspects of nicotine addiction and smoking cessation.    Discussed 2 possible regimens.  Option #1:  Chantix alone (no nicotine replacement needed), starting month pack for first month, thencontinuing month pack for 3-6 additional months.  Reviewed the main side effects of the medication and directed him to the company web site for further information and gave pt information handouts (if available)  Option #2:  Recommended nicotine replacement with either gum or patches in a descending manner starting the first day of not smoking.  Also discussed the medication Zyban in the use use smoking cessation.  Recommended starting with 150 mg first thing in the morning for 4 days, then adding a second dose late in the afternoon or early evening.  Discussed the potential side effects including but not limited to seizure, GI upset, insomnia, headache, weight loss.    The patient will decide what they want and will let me know what they desire me to prescribe.    Patient prefers not to try and quit at this time.   Plan:     (E78.5) Hyperlipidemia LDL goal <130  Comment: Discussed " "current lipid results, previous results (if available) current guidelines (NCEP) for treatment and goals for lipids.  Discussed lifestyle modification, dietary changes (low fat, low simple carb) and regular aerobic exercise.  Discussed the link between dysmetabolic syndrome and impaired glucose tolerance seen in certain patterns of lipids.  Briefly discussed medication used for lipid lowering, including the statins are their possible side effects of myalgias, rhabdomyolysis, and liver toxicity.   Plan: pravastatin (PRAVACHOL) 40 MG tablet            (Z72.0) Tobacco abuse  Comment:   Plan:     (Z11.59) Need for hepatitis C screening test  Comment:   Plan: Hepatitis C Screen Reflex to HCV RNA Quant and         Genotype            (Z11.4) Screening for HIV (human immunodeficiency virus)  Comment:   Plan: HIV Screening            (M10.9) Acute gout of foot, unspecified cause, unspecified laterality  Comment: most liekly exacerbated/created by alcohgol  Plan: Uric acid, allopurinol (ZYLOPRIM) 100 MG         tablet, **Uric acid FUTURE 2mo, **Comprehensive        metabolic panel FUTURE 6mo            (Z23) Need for shingles vaccine  Comment:   Plan: zoster vaccine recombinant adjuvanted         (SHINGRIX) injection               COUNSELING:   Reviewed preventive health counseling, as reflected in patient instructions       Regular exercise       Healthy diet/nutrition       Vision screening       Hearing screening       Aspirin Prophylaxsis       Alcohol Use       Consider Hep C screening for patients born between 1945 and 1965       HIV screeninx in teen years, 1x in adult years, and at intervals if high risk       Colon cancer screening       Prostate cancer screening    BP Readings from Last 1 Encounters:   18 134/76     Estimated body mass index is 27.43 kg/(m^2) as calculated from the following:    Height as of this encounter: 5' 10.5\" (1.791 m).    Weight as of this encounter: 193 lb 14.4 oz (88 " kg).      Weight management plan: diet and exercise     reports that he has been smoking Cigarettes.  He has been smoking about 0.50 packs per day. He has never used smokeless tobacco.  Tobacco Cessation Action Plan: Information offered: Patient not interested at this time    Counseling Resources:  ATP IV Guidelines  Pooled Cohorts Equation Calculator  FRAX Risk Assessment  ICSI Preventive Guidelines  Dietary Guidelines for Americans, 2010  [employer]'s MyPlate  ASA Prophylaxis  Lung CA Screening    Donny Payne MD  [facility]  Answers for HPI/ROS submitted by the patient on 7/13/2018   PHQ-2 Score: 0

## 2018-08-10 DIAGNOSIS — F17.200 TOBACCO USE DISORDER: ICD-10-CM

## 2018-08-10 NOTE — TELEPHONE ENCOUNTER
"Requested Prescriptions   Pending Prescriptions Disp Refills     CHANTIX 1 MG tablet [Pharmacy Med Name: CHANTIX 1MG TABS] 56 tablet 5    Last Written Prescription Date:  1/4/2018  Last Fill Quantity: 60,  # refills: 5   Last office visit: 7/13/2018 with prescribing provider:  7/13/2018   Future Office Visit:     Sig: TAKE ONE TABLET BY MOUTH TWICE A DAY    Partial Cholinergic Nicotinic Agonist Agents Passed    8/10/2018 10:54 AM       Passed - Blood pressure under 140/90 in past 12 months    BP Readings from Last 3 Encounters:   07/13/18 134/76   01/04/18 136/84   07/13/17 120/80                Passed - Recent (12 mo) or future (30 days) visit within the authorizing provider's specialty    Patient had office visit in the last 12 months or has a visit in the next 30 days with authorizing provider or within the authorizing provider's specialty.  See \"Patient Info\" tab in inbasket, or \"Choose Columns\" in Meds & Orders section of the refill encounter.           Passed - Patient is 18 years of age or older          "

## 2018-08-14 RX ORDER — VARENICLINE TARTRATE 1 MG/1
TABLET, FILM COATED ORAL
Qty: 56 TABLET | Refills: 5 | Status: SHIPPED | OUTPATIENT
Start: 2018-08-14 | End: 2018-12-28

## 2018-08-14 NOTE — TELEPHONE ENCOUNTER
Routing refill request to provider for review/approval because:  Pt has had 6 months of refills per protocol

## 2018-12-26 DIAGNOSIS — R73.03 PREDIABETES: ICD-10-CM

## 2018-12-26 DIAGNOSIS — M10.9 ACUTE GOUT OF FOOT, UNSPECIFIED CAUSE, UNSPECIFIED LATERALITY: ICD-10-CM

## 2018-12-26 DIAGNOSIS — I10 BENIGN ESSENTIAL HYPERTENSION: ICD-10-CM

## 2018-12-26 LAB
ALBUMIN SERPL-MCNC: 3.3 G/DL (ref 3.4–5)
ALP SERPL-CCNC: 86 U/L (ref 40–150)
ALT SERPL W P-5'-P-CCNC: 18 U/L (ref 0–70)
ANION GAP SERPL CALCULATED.3IONS-SCNC: 7 MMOL/L (ref 3–14)
AST SERPL W P-5'-P-CCNC: 18 U/L (ref 0–45)
BILIRUB SERPL-MCNC: 0.4 MG/DL (ref 0.2–1.3)
BUN SERPL-MCNC: 18 MG/DL (ref 7–30)
CALCIUM SERPL-MCNC: 8.8 MG/DL (ref 8.5–10.1)
CHLORIDE SERPL-SCNC: 105 MMOL/L (ref 94–109)
CO2 SERPL-SCNC: 26 MMOL/L (ref 20–32)
CREAT SERPL-MCNC: 0.81 MG/DL (ref 0.66–1.25)
GFR SERPL CREATININE-BSD FRML MDRD: >90 ML/MIN/{1.73_M2}
GLUCOSE SERPL-MCNC: 145 MG/DL (ref 70–99)
HBA1C MFR BLD: 5.9 % (ref 0–5.6)
POTASSIUM SERPL-SCNC: 3.9 MMOL/L (ref 3.4–5.3)
PROT SERPL-MCNC: 6.8 G/DL (ref 6.8–8.8)
SODIUM SERPL-SCNC: 138 MMOL/L (ref 133–144)
URATE SERPL-MCNC: 5.7 MG/DL (ref 3.5–7.2)

## 2018-12-26 PROCEDURE — 36415 COLL VENOUS BLD VENIPUNCTURE: CPT | Performed by: INTERNAL MEDICINE

## 2018-12-26 PROCEDURE — 80053 COMPREHEN METABOLIC PANEL: CPT | Performed by: INTERNAL MEDICINE

## 2018-12-26 PROCEDURE — 84550 ASSAY OF BLOOD/URIC ACID: CPT | Performed by: INTERNAL MEDICINE

## 2018-12-26 PROCEDURE — 83036 HEMOGLOBIN GLYCOSYLATED A1C: CPT | Performed by: INTERNAL MEDICINE

## 2018-12-28 ENCOUNTER — OFFICE VISIT (OUTPATIENT)
Dept: INTERNAL MEDICINE | Facility: CLINIC | Age: 63
End: 2018-12-28
Payer: COMMERCIAL

## 2018-12-28 VITALS
HEART RATE: 80 BPM | TEMPERATURE: 97.7 F | SYSTOLIC BLOOD PRESSURE: 116 MMHG | OXYGEN SATURATION: 93 % | DIASTOLIC BLOOD PRESSURE: 80 MMHG | BODY MASS INDEX: 28.29 KG/M2 | WEIGHT: 200 LBS

## 2018-12-28 DIAGNOSIS — I10 BENIGN ESSENTIAL HYPERTENSION: ICD-10-CM

## 2018-12-28 DIAGNOSIS — J43.1 PANLOBULAR EMPHYSEMA (H): Primary | ICD-10-CM

## 2018-12-28 DIAGNOSIS — Z11.4 SCREENING FOR HIV (HUMAN IMMUNODEFICIENCY VIRUS): ICD-10-CM

## 2018-12-28 DIAGNOSIS — F10.20 ALCOHOL DEPENDENCE, EPISODIC DRINKING BEHAVIOR (H): ICD-10-CM

## 2018-12-28 DIAGNOSIS — R73.03 PREDIABETES: ICD-10-CM

## 2018-12-28 DIAGNOSIS — F17.200 TOBACCO USE DISORDER: ICD-10-CM

## 2018-12-28 DIAGNOSIS — E78.5 HYPERLIPIDEMIA LDL GOAL <130: ICD-10-CM

## 2018-12-28 DIAGNOSIS — M10.9 ACUTE GOUT OF FOOT, UNSPECIFIED CAUSE, UNSPECIFIED LATERALITY: ICD-10-CM

## 2018-12-28 DIAGNOSIS — Z72.0 TOBACCO ABUSE: ICD-10-CM

## 2018-12-28 DIAGNOSIS — Z11.59 NEED FOR HEPATITIS C SCREENING TEST: ICD-10-CM

## 2018-12-28 DIAGNOSIS — Z12.5 SCREENING FOR PROSTATE CANCER: ICD-10-CM

## 2018-12-28 PROCEDURE — 99214 OFFICE O/P EST MOD 30 MIN: CPT | Performed by: INTERNAL MEDICINE

## 2018-12-28 RX ORDER — LISINOPRIL AND HYDROCHLOROTHIAZIDE 12.5; 2 MG/1; MG/1
2 TABLET ORAL EVERY MORNING
Qty: 60 TABLET | Refills: 5 | Status: SHIPPED | OUTPATIENT
Start: 2018-12-28 | End: 2019-07-19

## 2018-12-28 RX ORDER — ALLOPURINOL 100 MG/1
200 TABLET ORAL DAILY
Qty: 60 TABLET | Refills: 5
Start: 2018-12-28 | End: 2019-07-19

## 2018-12-28 RX ORDER — VARENICLINE TARTRATE 1 MG/1
1 TABLET, FILM COATED ORAL 2 TIMES DAILY
Qty: 60 TABLET | Refills: 5 | Status: SHIPPED | OUTPATIENT
Start: 2018-12-28 | End: 2019-07-19

## 2018-12-28 RX ORDER — AMLODIPINE BESYLATE 10 MG/1
10 TABLET ORAL DAILY
Qty: 30 TABLET | Refills: 5 | Status: SHIPPED | OUTPATIENT
Start: 2018-12-28 | End: 2019-07-19

## 2018-12-28 RX ORDER — PRAVASTATIN SODIUM 40 MG
40 TABLET ORAL AT BEDTIME
Qty: 30 TABLET | Refills: 5 | Status: SHIPPED | OUTPATIENT
Start: 2018-12-28 | End: 2019-07-19

## 2019-07-15 DIAGNOSIS — E78.5 HYPERLIPIDEMIA LDL GOAL <130: ICD-10-CM

## 2019-07-15 DIAGNOSIS — Z12.5 SCREENING FOR PROSTATE CANCER: ICD-10-CM

## 2019-07-15 DIAGNOSIS — M10.9 ACUTE GOUT OF FOOT, UNSPECIFIED CAUSE, UNSPECIFIED LATERALITY: ICD-10-CM

## 2019-07-15 DIAGNOSIS — R73.03 PREDIABETES: ICD-10-CM

## 2019-07-15 DIAGNOSIS — Z11.59 NEED FOR HEPATITIS C SCREENING TEST: ICD-10-CM

## 2019-07-15 DIAGNOSIS — Z11.4 SCREENING FOR HIV (HUMAN IMMUNODEFICIENCY VIRUS): ICD-10-CM

## 2019-07-15 LAB
ALBUMIN SERPL-MCNC: 3.7 G/DL (ref 3.4–5)
ALP SERPL-CCNC: 92 U/L (ref 40–150)
ALT SERPL W P-5'-P-CCNC: 18 U/L (ref 0–70)
ANION GAP SERPL CALCULATED.3IONS-SCNC: 5 MMOL/L (ref 3–14)
AST SERPL W P-5'-P-CCNC: 18 U/L (ref 0–45)
BILIRUB SERPL-MCNC: 0.5 MG/DL (ref 0.2–1.3)
BUN SERPL-MCNC: 26 MG/DL (ref 7–30)
CALCIUM SERPL-MCNC: 8.8 MG/DL (ref 8.5–10.1)
CHLORIDE SERPL-SCNC: 104 MMOL/L (ref 94–109)
CHOLEST SERPL-MCNC: 154 MG/DL
CO2 SERPL-SCNC: 26 MMOL/L (ref 20–32)
CREAT SERPL-MCNC: 0.96 MG/DL (ref 0.66–1.25)
GFR SERPL CREATININE-BSD FRML MDRD: 84 ML/MIN/{1.73_M2}
GLUCOSE SERPL-MCNC: 127 MG/DL (ref 70–99)
HBA1C MFR BLD: 5.8 % (ref 0–5.6)
HDLC SERPL-MCNC: 29 MG/DL
LDLC SERPL CALC-MCNC: 89 MG/DL
NONHDLC SERPL-MCNC: 125 MG/DL
POTASSIUM SERPL-SCNC: 4.2 MMOL/L (ref 3.4–5.3)
PROT SERPL-MCNC: 7.1 G/DL (ref 6.8–8.8)
PSA SERPL-ACNC: 0.91 UG/L (ref 0–4)
SODIUM SERPL-SCNC: 135 MMOL/L (ref 133–144)
TRIGL SERPL-MCNC: 178 MG/DL
URATE SERPL-MCNC: 7 MG/DL (ref 3.5–7.2)

## 2019-07-15 PROCEDURE — 80053 COMPREHEN METABOLIC PANEL: CPT | Performed by: INTERNAL MEDICINE

## 2019-07-15 PROCEDURE — 87389 HIV-1 AG W/HIV-1&-2 AB AG IA: CPT | Performed by: INTERNAL MEDICINE

## 2019-07-15 PROCEDURE — 80061 LIPID PANEL: CPT | Performed by: INTERNAL MEDICINE

## 2019-07-15 PROCEDURE — 83036 HEMOGLOBIN GLYCOSYLATED A1C: CPT | Performed by: INTERNAL MEDICINE

## 2019-07-15 PROCEDURE — 84550 ASSAY OF BLOOD/URIC ACID: CPT | Performed by: INTERNAL MEDICINE

## 2019-07-15 PROCEDURE — 86803 HEPATITIS C AB TEST: CPT | Performed by: INTERNAL MEDICINE

## 2019-07-15 PROCEDURE — G0103 PSA SCREENING: HCPCS | Performed by: INTERNAL MEDICINE

## 2019-07-15 PROCEDURE — 36415 COLL VENOUS BLD VENIPUNCTURE: CPT | Performed by: INTERNAL MEDICINE

## 2019-07-16 LAB
HCV AB SERPL QL IA: NONREACTIVE
HIV 1+2 AB+HIV1 P24 AG SERPL QL IA: NONREACTIVE

## 2019-07-18 NOTE — PROGRESS NOTES
SUBJECTIVE:   CC: Gabe Smith is an 64 year old male who presents for preventative health visit.     Healthy Habits:     Getting at least 3 servings of Calcium per day:  Yes    Bi-annual eye exam:  NO    Dental care twice a year:  NO    Sleep apnea or symptoms of sleep apnea:  None    Diet:  Regular (no restrictions)    Frequency of exercise:  None    Taking medications regularly:  Yes    Barriers to taking medications:  None    Medication side effects:  None    PHQ-2 Total Score: 0    Additional concerns today:  No      1.    Blood presure remains well controlled at home  Readings outside clinic are within normal limits.  Reviewed last 6 BP readings in chart:  BP Readings from Last 6 Encounters:   07/19/19 124/76   12/28/18 116/80   07/13/18 134/76   01/04/18 136/84   07/13/17 120/80   04/12/17 110/76     He has not experienced any significant side effects from medicaiotns for hypertension.    NO active cardiac complaints or symptoms with exercise.     2.  COPD remains stable.  Has not had any recent breathing troubles beyond usual baseline.  Has not any acute respiratory events.  Remains with intermittent cough, mild shortness of breath with overexertion as per usual.  Using medication as directed with reported side effects.     3.  The patient has a history of impaired glucose tolerance with regularly elevated blood sugars.    They have not been diagnosed with type II DM or placed on medications for diabetes before.   They deny polyuria, polydipsia.     The patient is not obese with a BMI of Body mass index is 26.88 kg/m ..    Review of current labs show:    Lab Results   Component Value Date    A1C 5.8 07/15/2019    A1C 5.9 12/26/2018    A1C 5.7 09/20/2016    A1C 5.5 08/04/2015    A1C 5.9 02/03/2015    A1C 6.1 10/07/2014     @bgl@     4.  Has history of hyperlipidemia.  On statin for this, denies any significant side effects of this medication.      Latest labs reviewed:    Recent Labs   Lab Test  07/15/19  0751 07/12/18  0805  08/04/15  0757 02/03/15  0748   CHOL 154 167   < > 210* 192   HDL 29* 27*   < > 27* 33*   LDL 89 91   < > Cannot estimate LDL when triglyceride exceeds 400 mg/dL  134* 107   TRIG 178* 246*   < > 422* 262*   CHOLHDLRATIO  --   --   --  7.8* 5.8*    < > = values in this interval not displayed.        Lab Results   Component Value Date    AST 18 07/15/2019         5.  Still drinks alcohol on a regular basis at least 2-3 drinks every night.  This does represent a decrease from what he had been drinking before.    6.  History of gout, has not had repeat events since taking allopurinol.  I spoken at length with him about the role of increased alcohol intake and gout.        Today's PHQ-2 Score:   PHQ-2 ( 1999 [employer]) 7/16/2019   Q1: Little interest or pleasure in doing things 0   Q2: Feeling down, depressed or hopeless 0   PHQ-2 Score 0   Q1: Little interest or pleasure in doing things Not at all   Q2: Feeling down, depressed or hopeless Not at all   PHQ-2 Score 0       Abuse: Current or Past(Physical, Sexual or Emotional)- No  Do you feel safe in your environment? Yes    Social History     Tobacco Use     Smoking status: Current Every Day Smoker     Packs/day: 0.50     Types: Cigarettes     Smokeless tobacco: Never Used     Tobacco comment: 1.5 PPD-now down to .5 PPD   Substance Use Topics     Alcohol use: Yes     Alcohol/week: 0.0 oz     Comment: 2-3 drinks per night     If you drink alcohol do you typically have >3 drinks per day or >7 drinks per week? Yes        AUDIT - Alcohol Use Disorders Identification Test - Reproduced from the World Health Organization Audit 2001 (Second Edition) 7/16/2019   1.  How often do you have a drink containing alcohol? 4 or more times a week   2.  How many drinks containing alcohol do you have on a typical day when you are drinking? 3 or 4   3.  How often do you have five or more drinks on one occasion? Never   4.  How often during the last year have you  found that you were not able to stop drinking once you had started? Never   5.  How often during the last year have you failed to do what was normally expected of you because of drinking? Never   6.  How often during the last year have you needed a first drink in the morning to get yourself going after a heavy drinking session? Never   7.  How often during the last year have you had a feeling of guilt or remorse after drinking? Never   8.  How often during the last year have you been unable to remember what happened the night before because of your drinking? Never   9.  Have you or someone else been injured because of your drinking? No   10. Has a relative, friend, doctor or other health care worker been concerned about your drinking or suggested you cut down? No   TOTAL SCORE 5       Last PSA:   PSA   Date Value Ref Range Status   07/15/2019 0.91 0 - 4 ug/L Final     Comment:     Assay Method:  Chemiluminescence using Siemens Vista analyzer       Reviewed orders with patient. Reviewed health maintenance and updated orders accordingly - Yes      Reviewed and updated as needed this visit by clinical staff  Tobacco  Allergies  Meds  Med Hx  Surg Hx  Fam Hx  Soc Hx        Reviewed and updated as needed this visit by Provider          Past Medical History:  ---------------------------  Past Medical History:   Diagnosis Date     COPD (chronic obstructive pulmonary disease) (H)      Dysmetabolic syndrome X      Hyperlipidemia      Hypertension      Osteoarthrosis      Prediabetes 10/9/14    A1C 6.1     Tobacco use disorder        Past Surgical History:  ---------------------------  Past Surgical History:   Procedure Laterality Date     COLONOSCOPY N/A 10/19/2015    Procedure: COMBINED COLONOSCOPY, SINGLE OR MULTIPLE BIOPSY/POLYPECTOMY BY BIOPSY;  Surgeon: Gume Vidal MD;  Location:  GI     SURGICAL HISTORY OF -   2000    Left MOISES     SURGICAL HISTORY OF -       Unspecified knee surgeries as a child      SURGICAL HISTORY OF -   10/2010    Right MOISES, with left knee arthroscopy, meniscectomy       Current Medications:  ---------------------------  Current Outpatient Medications   Medication Sig Dispense Refill     albuterol (PROAIR HFA) 108 (90 Base) MCG/ACT inhaler Inhale 2 puffs into the lungs every 6 hours 1 Inhaler 10     albuterol (PROVENTIL) (2.5 MG/3ML) 0.083% neb solution Take 1 vial (2.5 mg) by nebulization every 6 hours as needed for shortness of breath / dyspnea or wheezing 1 Box 5     allopurinol (ZYLOPRIM) 100 MG tablet Take 2 tablets (200 mg) by mouth daily 1 tablet daily for one month, then 2 tablets daily 180 tablet 3     amLODIPine (NORVASC) 10 MG tablet Take 1 tablet (10 mg) by mouth daily 90 tablet 3     aspirin 81 MG tablet Take 1 tablet (81 mg) by mouth daily       fluticasone-salmeterol (ADVAIR DISKUS) 250-50 MCG/DOSE inhaler Inhale 1 puff into the lungs every 12 hours GENERIC ADVAIR IF POSSIBLE 3 Inhaler 3     lisinopril-hydrochlorothiazide (PRINZIDE/ZESTORETIC) 20-12.5 MG tablet Take 2 tablets by mouth every morning 180 tablet 3     pravastatin (PRAVACHOL) 40 MG tablet Take 1 tablet (40 mg) by mouth At Bedtime 90 tablet 3     tiotropium (SPIRIVA RESPIMAT) 2.5 MCG/ACT inhaler Inhale 2 puffs into the lungs daily 12 g 3     varenicline (CHANTIX) 1 MG tablet Take 1 tablet (1 mg) by mouth 2 times daily 60 tablet 5       Allergies:  -------------  No Known Allergies    Social History:  -------------------  Social History     Socioeconomic History     Marital status:      Spouse name: Not on file     Number of children: 2     Years of education: Not on file     Highest education level: Not on file   Occupational History     Employer: [employer]   Social Needs     Financial resource strain: Not on file     Food insecurity:     Worry: Not on file     Inability: Not on file     Transportation needs:     Medical: Not on file     Non-medical: Not on file   Tobacco Use     Smoking status: Current  Every Day Smoker     Packs/day: 0.50     Types: Cigarettes     Smokeless tobacco: Never Used     Tobacco comment: 1.5 PPD-now down to .5 PPD   Substance and Sexual Activity     Alcohol use: Yes     Alcohol/week: 0.0 oz     Comment: 2-3 drinks per night     Drug use: No     Sexual activity: Not Currently   Lifestyle     Physical activity:     Days per week: Not on file     Minutes per session: Not on file     Stress: Not on file   Relationships     Social connections:     Talks on phone: Not on file     Gets together: Not on file     Attends Holiness service: Not on file     Active member of club or organization: Not on file     Attends meetings of clubs or organizations: Not on file     Relationship status: Not on file     Intimate partner violence:     Fear of current or ex partner: Not on file     Emotionally abused: Not on file     Physically abused: Not on file     Forced sexual activity: Not on file   Other Topics Concern     Parent/sibling w/ CABG, MI or angioplasty before 65F 55M? Not Asked   Social History Narrative     Not on file       Family Medical History:  ------------------------------  Family History   Problem Relation Age of Onset     Family History Negative Mother      Family History Negative Brother      Family History Negative Sister         Review of Systems  CONSTITUTIONAL: NEGATIVE for fever, chills, change in weight  INTEGUMENTARY/SKIN: NEGATIVE for worrisome rashes, moles or lesions  EYES: NEGATIVE for vision changes or irritation  ENT: NEGATIVE for ear, mouth and throat problems  RESP: NEGATIVE for significant cough or SOB  CV: NEGATIVE for chest pain, palpitations or peripheral edema  GI: NEGATIVE for nausea, abdominal pain, heartburn, or change in bowel habits   male: negative for dysuria, hematuria, decreased urinary stream, erectile dysfunction, urethral discharge  MUSCULOSKELETAL: NEGATIVE for significant arthralgias or myalgia  NEURO: NEGATIVE for weakness, dizziness or  "paresthesias  PSYCHIATRIC: NEGATIVE for changes in mood or affect    OBJECTIVE:   /76   Pulse 68   Temp 98.2  F (36.8  C) (Oral)   Resp 16   Ht 1.791 m (5' 10.5\")   Wt 86.2 kg (190 lb)   SpO2 95%   BMI 26.88 kg/m      Physical Exam  GENERAL alert and no distress  EYES:  Normal sclera,conjunctiva, EOMI  HENT: oral and posterior pharynx without lesions or erythema, facies symmetric  NECK: Neck supple. No LAD, without thyroidmegaly.  RESP: Clear to ausculation bilaterally without wheezes or crackles. Normal BS in all fields.  CV: RRR normal S1S2 without murmurs, rubs or gallops.  LYMPH: no cervical lymph adenopathy appreciated  MS: extremities- no gross deformities of the visible extremities noted,   EXT:  no lower extremity edema  PSYCH: Alert and oriented times 3; speech- coherent  SKIN:  No obvious significant skin lesions on visible portions of face         ASSESSMENT/PLAN:     (Z00.00) Routine general medical examination at a health care facility  (primary encounter diagnosis)  Comment: Discussed cardiac disease risk factor modification including screening for and treating HTN, lipids, DM, and smoking cessation.  Also discussed age appropriate cancer screening recommendations including testicular, prostate, colon and lung cancer as dictated by age group.  Recommended low fat, low salt diet and moderation in any alcohol intake.  Recommended always using seatbelts when in a car.  Recommended never driving after drinking or riding with someone who has been drinking as well.       Plan:     (J43.1) COPD (HCC)  Comment: This condition is currently controlled on the current medical regimen.  Continue current therapy.   Discussed general issues of COPD, including pathophysiology, ways it will affect the pt., when to seek help, reviewed the typical medications (how they are taken, how they help)     Told him as strongly as I could possibly tell him about the harmful effects of smoking on his lungs and " cardiovascular system.  He still is not ready to quit at this time.  Plan: albuterol (PROAIR HFA) 108 (90 Base) MCG/ACT         inhaler, albuterol (PROVENTIL) (2.5 MG/3ML)         0.083% neb solution, tiotropium (SPIRIVA         RESPIMAT) 2.5 MCG/ACT inhaler,         fluticasone-salmeterol (ADVAIR DISKUS) 250-50         MCG/DOSE inhaler            (F10.20) Alcohol dependence, episodic drinking behavior (H)  Comment: Discussed the many different ways excessive alcohol damages the body.    Discussed the other damaging non-medical side effects of excessive alcohol use as well.    Discussed the observed increases in all-cause mortality and morbidity when drinking above 2 drinks per day (defined as 12 oz beer, or 4 oz wine, or 1.5 oz spirits).  Offered help and support in finding help in quitting alcohol.  Will offer [facility] assessment at [facility] if pt desires.   Plan:     (M10.9) Acute gout of foot, unspecified cause, unspecified laterality  Comment: This condition is currently controlled on the current medical regimen.  Continue current therapy.   This will likely improve tremendously A1c gets to the alcohol.  Plan: allopurinol (ZYLOPRIM) 100 MG tablet            (I10) Benign essential hypertension  Comment: This condition is currently controlled on the current medical regimen.  Continue current therapy.   Blood pressure will also get much better once he gets rid of regular alcohol abuse  Plan: amLODIPine (NORVASC) 10 MG tablet,         lisinopril-hydrochlorothiazide         (PRINZIDE/ZESTORETIC) 20-12.5 MG tablet            (E78.5) Hyperlipidemia LDL goal <130  Comment: This condition is currently controlled on the current medical regimen.  Continue current therapy.   Plan: pravastatin (PRAVACHOL) 40 MG tablet            (F17.200) Tobacco use disorder  Comment: Discussed the physical, psychological, and pharmacological aspects of nicotine addiction and smoking cessation.    Discussed 2 possible regimens.  Option #1:   "Chantix alone (no nicotine replacement needed), starting month pack for first month, thencontinuing month pack for 3-6 additional months.  Reviewed the main side effects of the medication and directed him to the company web site for further information and gave pt information handouts (if available)  Option #2:  Recommended nicotine replacement with either gum or patches in a descending manner starting the first day of not smoking.  Also discussed the medication Zyban in the use use smoking cessation.  Recommended starting with 150 mg first thing in the morning for 4 days, then adding a second dose late in the afternoon or early evening.  Discussed the potential side effects including but not limited to seizure, GI upset, insomnia, headache, weight loss.      After further discussion of medication aids to help stop smoking, the patient has decided to take Chantix.  We discussed the medication in detail, including suspected mechanism of action, duration of treatment (minimum preferred 3 months up to max of 6-9 months).  We also discussed some of the potential side effects of the medication including, but not limited to, GI upset, nausea, headaches, nightmares, strange dreams, and possible effects on the mood, inclduing worsening of depression and/or anxiety.  Also mentioned about isolated incidences of suicide possibly related to Chantix use.  I told the patient to immediately stop the Chantix if they suspect any changes in the mood and to contact us immediately.    I also instructed them not to take Chantix until they had a chance to visit the product official web site to further educate themselves about the medication.   Plan: varenicline (CHANTIX) 1 MG tablet            (R73.03) Prediabetes  Comment: Reviewed the labs showing elevated glucose levels.    Discussed \"pre-diabetes\", impaired glucose tolerance, and its part in the dysmetabolic syndrome.    Discussed the inevitable progression of impaired glucose " "tolerance toward worsening diabetes mellitus and the need for agressive interventions now to delay and prevent this inevitable progression.  Discussed the overall risks that dysmetabolic syndromes/impaired glucose tolerance/syndrome X pose toward increased risks of vascular disease as the main reason for agressive intervention now.  Will add medications for glucose control (i.e. metformin, glitazones, etc), lipid (e.g. statins), and for blood pressure (preferably ARBs and ACE) as indicated.  Will start these as early as needed based other proven ability to delay and modify these risk factors.   Discussed the need for aggressive diet control as the cornerstone of pre-diabetes and diabetes management, emphasizing the reduction of \"simple carbohydrates\" (e.g. Any kind of wheat products (e.g. any bread, any pasta), white rice, noodles, potatoes, snack foods, regular soda, juices (except fresh squeezed), cakes, cookies, candy, etc.) along with regular exercise.       Plan: Basic metabolic panel, Hemoglobin A1c               COUNSELING:   Reviewed preventive health counseling, as reflected in patient instructions       Regular exercise       Healthy diet/nutrition       Vision screening       Hearing screening    Estimated body mass index is 26.88 kg/m  as calculated from the following:    Height as of this encounter: 1.791 m (5' 10.5\").    Weight as of this encounter: 86.2 kg (190 lb).          reports that he has been smoking cigarettes.  He has been smoking about 0.50 packs per day. He has never used smokeless tobacco.      Counseling Resources:  ATP IV Guidelines  Pooled Cohorts Equation Calculator  FRAX Risk Assessment  ICSI Preventive Guidelines  Dietary Guidelines for Americans, 2010  USDA's MyPlate  ASA Prophylaxis  Lung CA Screening    Donny Payne MD  [facility]  "

## 2019-07-18 NOTE — PATIENT INSTRUCTIONS
"  *  Continue all medications at the same doses.  Contact your usual pharmacy if you need refills.     *  Return to see me in approximately 6 months, sooner if needed.  Please get nonfasting labs done in the [facility] or at any other [facility] lab 1-2 days before this appointment.  If you get the labs done at another clinic, make arrangements with that clinic directly.  The orders will be in place.  Use [employer] or Call [phone] to schedule the appointment with me and lab appointment.            5 GOALS TO PREVENT VASCULAR DISEASE:     1.  Aggressive blood pressure control, under 130/80 ideally.  Using medications if needed.    Your blood pressure is under good control    BP Readings from Last 4 Encounters:   07/19/19 124/76   12/28/18 116/80   07/13/18 134/76   01/04/18 136/84       2.  Aggressive LDL cholesterol (\"bad cholesterol\") lowering as indicated.    Your goal is an LDL under 130 for sure, preferably under 100.  (If you have diabetes or previous vascular disease, the the LDL goals would be under 100 for sure, preferably under 70.)    New guidelines identify four high-risk groups who could benefit from statins:   *people with pre-existing heart disease, such as those who have had a heart attack;   *people ages 40 to 75 who have diabetes of any type  *patients ages 40 to 75 with at least a 7.5% risk of developing cardiovascular disease over the next decade, according to a formula described in the guidelines  *patients with the sort of super-high cholesterol that sometimes runs in families, as evidenced by an LDL of 190 milligrams per deciliter or higher    Your cholesterol levels are well controlled.    Recent Labs   Lab Test 07/15/19  0751 07/12/18  0805  08/04/15  0757 02/03/15  0748   CHOL 154 167   < > 210* 192   HDL 29* 27*   < > 27* 33*   LDL 89 91   < > Cannot estimate LDL when triglyceride exceeds 400 mg/dL  134* 107   TRIG 178* 246*   < > 422* 262*   CHOLHDLRATIO  --   --   --  7.8* " "5.8*    < > = values in this interval not displayed.       3.  Aggressive diabetic prevention, screening and/or management.      You do not have diabetes as of the most recent blood tests.     4.  No smoking    5.  Consider Daily aspirin: Have a discussion about the relative benefits and risks of taking daily low dose aspirin (81 mg) tablet once per day over the age of 50, unless there is a specific reason that you cannot take aspirin (such as side effect, allergy, or you are on another \"blood thinner\").        --Based on your current cardiac risk factors, you should take regular daily Aspirin 81 mg once per day, unless you have any reasons (side effects, intolerance, etc.) that you cannot take aspirin.           Preventive Health Recommendations  Male Ages 50 - 64    Yearly exam:             See your health care provider every year in order to  o   Review health changes.   o   Discuss preventive care.    o   Review your medicines if your doctor has prescribed any.     Have a cholesterol test every 5 years, or more frequently if you are at risk for high cholesterol/heart disease.     Have a diabetes test (fasting glucose) every three years. If you are at risk for diabetes, you should have this test more often.     Have a colonoscopy at age 50, or have a yearly FIT test (stool test). These exams will check for colon cancer.      Talk with your health care provider about whether or not a prostate cancer screening test (PSA) is right for you.    You should be tested each year for STDs (sexually transmitted diseases), if you re at risk.     Shots: Get a flu shot each year. Get a tetanus shot every 10 years.     Nutrition:    Eat at least 5 servings of fruits and vegetables daily.     Eat whole-grain bread, whole-wheat pasta and brown rice instead of white grains and rice.     Get adequate Calcium and Vitamin D.        --Good Grains:  Oats, brown rice, Quinoa (these do not raise the blood sugar as much)     --Bad grains: " " Anything made from wheat or white rice     (because these raise the blood sugars significantly, and the possible gluten issue from wheat for some people).      --Proteins:  Aim for \"lean proteins\" including chicken, fish, seafood, pork, turkey, and eggs (in moderation); Eat red meat only occasionally        Lifestyle    Exercise for at least 150 minutes a week (30 minutes a day, 5 days a week). This will help you control your weight and prevent disease.     Limit alcohol to one drink per day.     No smoking.     Wear sunscreen to prevent skin cancer.     See your dentist every six months for an exam and cleaning.     See your eye doctor every 1 to 2 years.    "

## 2019-07-19 ENCOUNTER — OFFICE VISIT (OUTPATIENT)
Dept: INTERNAL MEDICINE | Facility: CLINIC | Age: 64
End: 2019-07-19
Payer: COMMERCIAL

## 2019-07-19 VITALS
DIASTOLIC BLOOD PRESSURE: 76 MMHG | OXYGEN SATURATION: 95 % | WEIGHT: 190 LBS | RESPIRATION RATE: 16 BRPM | HEIGHT: 71 IN | BODY MASS INDEX: 26.6 KG/M2 | TEMPERATURE: 98.2 F | SYSTOLIC BLOOD PRESSURE: 124 MMHG | HEART RATE: 68 BPM

## 2019-07-19 DIAGNOSIS — E78.5 HYPERLIPIDEMIA LDL GOAL <130: ICD-10-CM

## 2019-07-19 DIAGNOSIS — J43.1 PANLOBULAR EMPHYSEMA (H): ICD-10-CM

## 2019-07-19 DIAGNOSIS — Z00.00 ROUTINE GENERAL MEDICAL EXAMINATION AT A HEALTH CARE FACILITY: Primary | ICD-10-CM

## 2019-07-19 DIAGNOSIS — R73.03 PREDIABETES: ICD-10-CM

## 2019-07-19 DIAGNOSIS — F17.200 TOBACCO USE DISORDER: ICD-10-CM

## 2019-07-19 DIAGNOSIS — I10 BENIGN ESSENTIAL HYPERTENSION: ICD-10-CM

## 2019-07-19 DIAGNOSIS — F10.20 ALCOHOL DEPENDENCE, EPISODIC DRINKING BEHAVIOR (H): ICD-10-CM

## 2019-07-19 DIAGNOSIS — M10.9 ACUTE GOUT OF FOOT, UNSPECIFIED CAUSE, UNSPECIFIED LATERALITY: ICD-10-CM

## 2019-07-19 PROCEDURE — 99213 OFFICE O/P EST LOW 20 MIN: CPT | Mod: 25 | Performed by: INTERNAL MEDICINE

## 2019-07-19 PROCEDURE — 99396 PREV VISIT EST AGE 40-64: CPT | Performed by: INTERNAL MEDICINE

## 2019-07-19 RX ORDER — PRAVASTATIN SODIUM 40 MG
40 TABLET ORAL AT BEDTIME
Qty: 90 TABLET | Refills: 3 | Status: SHIPPED | OUTPATIENT
Start: 2019-07-19 | End: 2020-10-19

## 2019-07-19 RX ORDER — ALBUTEROL SULFATE 0.83 MG/ML
2.5 SOLUTION RESPIRATORY (INHALATION) EVERY 6 HOURS PRN
Qty: 1 BOX | Refills: 5 | Status: SHIPPED | OUTPATIENT
Start: 2019-07-19 | End: 2020-08-05

## 2019-07-19 RX ORDER — LISINOPRIL AND HYDROCHLOROTHIAZIDE 12.5; 2 MG/1; MG/1
2 TABLET ORAL EVERY MORNING
Qty: 180 TABLET | Refills: 3 | Status: SHIPPED | OUTPATIENT
Start: 2019-07-19 | End: 2020-08-05

## 2019-07-19 RX ORDER — VARENICLINE TARTRATE 1 MG/1
1 TABLET, FILM COATED ORAL 2 TIMES DAILY
Qty: 60 TABLET | Refills: 5 | Status: SHIPPED | OUTPATIENT
Start: 2019-07-19 | End: 2021-09-03

## 2019-07-19 RX ORDER — AMLODIPINE BESYLATE 10 MG/1
10 TABLET ORAL DAILY
Qty: 90 TABLET | Refills: 3 | Status: SHIPPED | OUTPATIENT
Start: 2019-07-19 | End: 2020-08-05

## 2019-07-19 RX ORDER — ALBUTEROL SULFATE 90 UG/1
2 AEROSOL, METERED RESPIRATORY (INHALATION) EVERY 6 HOURS
Qty: 1 INHALER | Refills: 10 | Status: SHIPPED | OUTPATIENT
Start: 2019-07-19 | End: 2020-01-23

## 2019-07-19 RX ORDER — ALLOPURINOL 100 MG/1
200 TABLET ORAL DAILY
Qty: 180 TABLET | Refills: 3 | Status: SHIPPED | OUTPATIENT
Start: 2019-07-19 | End: 2020-08-05

## 2019-07-19 ASSESSMENT — MIFFLIN-ST. JEOR: SCORE: 1666.02

## 2019-10-02 ENCOUNTER — HEALTH MAINTENANCE LETTER (OUTPATIENT)
Age: 64
End: 2019-10-02

## 2020-01-06 ENCOUNTER — OFFICE VISIT (OUTPATIENT)
Dept: URGENT CARE | Facility: URGENT CARE | Age: 65
End: 2020-01-06
Payer: COMMERCIAL

## 2020-01-06 VITALS
DIASTOLIC BLOOD PRESSURE: 70 MMHG | SYSTOLIC BLOOD PRESSURE: 113 MMHG | HEART RATE: 85 BPM | BODY MASS INDEX: 26.88 KG/M2 | WEIGHT: 190 LBS | RESPIRATION RATE: 18 BRPM | OXYGEN SATURATION: 93 % | TEMPERATURE: 97.9 F

## 2020-01-06 DIAGNOSIS — Z72.0 TOBACCO ABUSE: ICD-10-CM

## 2020-01-06 DIAGNOSIS — R06.2 WHEEZE: ICD-10-CM

## 2020-01-06 DIAGNOSIS — R05.8 PRODUCTIVE COUGH: Primary | ICD-10-CM

## 2020-01-06 DIAGNOSIS — H66.002 ACUTE SUPPURATIVE OTITIS MEDIA OF LEFT EAR WITHOUT SPONTANEOUS RUPTURE OF TYMPANIC MEMBRANE, RECURRENCE NOT SPECIFIED: ICD-10-CM

## 2020-01-06 PROCEDURE — 99214 OFFICE O/P EST MOD 30 MIN: CPT | Performed by: FAMILY MEDICINE

## 2020-01-06 RX ORDER — BENZONATATE 100 MG/1
100 CAPSULE ORAL 3 TIMES DAILY PRN
Qty: 21 CAPSULE | Refills: 0 | Status: SHIPPED | OUTPATIENT
Start: 2020-01-06 | End: 2020-01-23

## 2020-01-06 RX ORDER — AZITHROMYCIN 250 MG/1
TABLET, FILM COATED ORAL
Qty: 6 TABLET | Refills: 0 | Status: SHIPPED | OUTPATIENT
Start: 2020-01-06 | End: 2020-01-23

## 2020-01-06 RX ORDER — ALBUTEROL SULFATE 90 UG/1
2 AEROSOL, METERED RESPIRATORY (INHALATION) EVERY 6 HOURS
Qty: 1 INHALER | Refills: 0 | Status: SHIPPED | OUTPATIENT
Start: 2020-01-06 | End: 2020-08-05 | Stop reason: ALTCHOICE

## 2020-01-06 RX ORDER — FLUTICASONE PROPIONATE 50 MCG
2 SPRAY, SUSPENSION (ML) NASAL DAILY
Qty: 15.8 ML | Refills: 0 | Status: SHIPPED | OUTPATIENT
Start: 2020-01-06 | End: 2020-02-05

## 2020-01-06 NOTE — PROGRESS NOTES
SUBJECTIVE: Gabe Smith is a 64 year old male presenting with a chief complaint of nasal congestion, cough  and ear pain left.  Onset of symptoms was 5 day(s) ago.  Course of illness is worsening.    Severity moderate  Current and Associated symptoms: runny nose, stuffy nose and cough - non-productive  Treatment measures tried include Tylenol/Ibuprofen.  Predisposing factors include None.    Past Medical History:   Diagnosis Date     COPD (chronic obstructive pulmonary disease) (H)      Dysmetabolic syndrome X      Hyperlipidemia      Hypertension      Osteoarthrosis      Prediabetes 10/9/14    A1C 6.1     Tobacco use disorder      No Known Allergies  Social History     Tobacco Use     Smoking status: Current Every Day Smoker     Packs/day: 0.50     Types: Cigarettes     Smokeless tobacco: Never Used     Tobacco comment: 1.5 PPD-now down to .5 PPD   Substance Use Topics     Alcohol use: Yes     Alcohol/week: 0.0 standard drinks     Comment: 2-3 drinks per night       ROS:  SKIN: no rash  GI: no vomiting    OBJECTIVE:  /70   Pulse 85   Temp 97.9  F (36.6  C) (Oral)   Resp 18   Wt 86.2 kg (190 lb)   SpO2 93%   BMI 26.88 kg/m  GENERAL APPEARANCE: healthy, alert and no distress  EYES: EOMI,  PERRL, conjunctiva clear  HENT: TM erythematous left, rhinorrhea yellow and oral mucous membranes moist, no erythema noted  NECK: supple, nontender, no lymphadenopathy  RESP: lungs clear to auscultation - no rales, rhonchi BUT A SLIGHT DIFFUSE WHEEZE  SKIN: no suspicious lesions or rashes      ICD-10-CM    1. Productive cough R05 azithromycin (ZITHROMAX) 250 MG tablet     benzonatate (TESSALON) 100 MG capsule   2. Tobacco abuse Z72.0    3. Acute suppurative otitis media of left ear without spontaneous rupture of tympanic membrane, recurrence not specified H66.002 azithromycin (ZITHROMAX) 250 MG tablet     fluticasone (FLONASE) 50 MCG/ACT nasal spray   4. Wheeze R06.2 albuterol (PROAIR HFA) 108 (90 Base) MCG/ACT  inhaler     QUIT TOBACCO  Fluids/Rest, f/u if worse/not any better

## 2020-01-16 DIAGNOSIS — R73.03 PREDIABETES: ICD-10-CM

## 2020-01-16 LAB
ANION GAP SERPL CALCULATED.3IONS-SCNC: 5 MMOL/L (ref 3–14)
BUN SERPL-MCNC: 17 MG/DL (ref 7–30)
CALCIUM SERPL-MCNC: 8.8 MG/DL (ref 8.5–10.1)
CHLORIDE SERPL-SCNC: 103 MMOL/L (ref 94–109)
CO2 SERPL-SCNC: 30 MMOL/L (ref 20–32)
CREAT SERPL-MCNC: 0.86 MG/DL (ref 0.66–1.25)
GFR SERPL CREATININE-BSD FRML MDRD: >90 ML/MIN/{1.73_M2}
GLUCOSE SERPL-MCNC: 154 MG/DL (ref 70–99)
HBA1C MFR BLD: 5.7 % (ref 0–5.6)
POTASSIUM SERPL-SCNC: 3.8 MMOL/L (ref 3.4–5.3)
SODIUM SERPL-SCNC: 138 MMOL/L (ref 133–144)

## 2020-01-16 PROCEDURE — 36415 COLL VENOUS BLD VENIPUNCTURE: CPT | Performed by: INTERNAL MEDICINE

## 2020-01-16 PROCEDURE — 80048 BASIC METABOLIC PNL TOTAL CA: CPT | Performed by: INTERNAL MEDICINE

## 2020-01-16 PROCEDURE — 83036 HEMOGLOBIN GLYCOSYLATED A1C: CPT | Performed by: INTERNAL MEDICINE

## 2020-01-20 ENCOUNTER — HOSPITAL ENCOUNTER (EMERGENCY)
Facility: CLINIC | Age: 65
Discharge: HOME | End: 2020-01-20
Attending: EMERGENCY MEDICINE | Admitting: EMERGENCY MEDICINE
Payer: COMMERCIAL

## 2020-01-20 VITALS
DIASTOLIC BLOOD PRESSURE: 83 MMHG | SYSTOLIC BLOOD PRESSURE: 151 MMHG | HEIGHT: 70 IN | RESPIRATION RATE: 16 BRPM | TEMPERATURE: 97.6 F | BODY MASS INDEX: 27.2 KG/M2 | WEIGHT: 190 LBS | OXYGEN SATURATION: 96 %

## 2020-01-20 DIAGNOSIS — R42 VERTIGO: ICD-10-CM

## 2020-01-20 LAB — INTERPRETATION ECG - MUSE: NORMAL

## 2020-01-20 PROCEDURE — 93005 ELECTROCARDIOGRAM TRACING: CPT

## 2020-01-20 PROCEDURE — 99283 EMERGENCY DEPT VISIT LOW MDM: CPT

## 2020-01-20 RX ORDER — MECLIZINE HYDROCHLORIDE 25 MG/1
25 TABLET ORAL 3 TIMES DAILY PRN
Qty: 15 TABLET | Refills: 0 | Status: SHIPPED | OUTPATIENT
Start: 2020-01-20 | End: 2020-08-05

## 2020-01-20 ASSESSMENT — ENCOUNTER SYMPTOMS
VOMITING: 0
SPEECH DIFFICULTY: 0
NUMBNESS: 0
DIZZINESS: 1
HEADACHES: 1
WEAKNESS: 0
NAUSEA: 0

## 2020-01-20 ASSESSMENT — MIFFLIN-ST. JEOR: SCORE: 1658.08

## 2020-01-20 NOTE — ED TRIAGE NOTES
Pt was at work today when turned his head and felt like his head moved but took his eyes awhile to catch up. Pt reports that almost fell on his face from the extreme dizziness. Pt continues to experience dizziness when turning his head only. Pt reports he had his first pedicure yesterday and everything has been great.

## 2020-01-20 NOTE — DISCHARGE INSTRUCTIONS
I advise you quit smoking.     Discharge Instructions  Vertigo  You have been diagnosed with vertigo.  This is a dizzy feeling often described as spinning or that the room is moving around you. You will often have nausea (sick to your stomach), vomiting (throwing up), and balance problems with it.  Vertigo is usually caused by a problem in the inner ear which helps control your balance.  Many things can cause vertigo, including calcium collections in the inner ear, a virus infection of the inner ear, concussion, migraine, and some medicines.  Luckily, these causes are not life threatening and will eventually go away.  However, sometimes there is a serious problem that does not show up right away.  Generally, every Emergency Department visit should have a follow-up clinic visit with either a primary or a specialty clinic/provider. Please follow-up as instructed by your emergency provider today.  Return to the Emergency Department if you have:  New or severe headache.  Double vision (seeing two of things).  Trouble speaking or hearing.  Weakness or trouble moving/using one side of your body.  Passing out.  Numbness or tingling.  Chest pain.  Vomiting that will not stop.    Treatment:  There are several commonly prescribed medications:  Antihistamines such as meclizine (Antivert ), dimenhydrinate (Dramamine ), or diphenhydramine (Benadryl ).  Prescription anti-nausea medicines, such as promethazine (Phenergan ), metoclopramide (Reglan ), or ondansetron (Zofran ).  Prescription sedative medicines, such as diazepam (Valium ), lorazepam (Ativan ), or clonazepam (Klonopin ).  Most of these medicines make you sleepy, and you should not take them before you work or drive. You should only take prescription medicines to treat severe vertigo symptoms, and you should stop the medicine when your symptoms improve.    Follow Up:  If you have vertigo longer than three days, it is important that you follow up either with your primary  provider or an Ear, Nose, and Throat (ENT) specialist.  You may need further testing to evaluate your vertigo and you may also need  vestibular  therapy which is a special form of physical therapy to make the vertigo go away.    If you were given a prescription for medicine here today, be sure to read all of the information (including the package insert) that comes with your prescription.  This will include important information about the medicine, its side effects, and any warnings that you need to know about.  The pharmacist who fills the prescription can provide more information and answer questions you may have about the medicine.  If you have questions or concerns that the pharmacist cannot address, please call or return to the Emergency Department.     Remember that you can always come back to the Emergency Department if you are not able to see your regular provider in the amount of time listed above, if you get any new symptoms, or if there is anything that worries you.

## 2020-01-20 NOTE — ED NOTES
Bed: ED28  Expected date:   Expected time:   Means of arrival:   Comments:  naldo - 511 - 64 M near syncope eta 1023

## 2020-01-20 NOTE — ED PROVIDER NOTES
"  History     Chief Complaint:  Dizziness    HPI   Gabe Smith is a 64 year old male smoker with a history of COPD, hypertension, hyperlipidemia, and gout, who presents via EMS for evaluation of dizziness. The patient reports experiencing blurred vision and dizziness while turning his head when he was sitting in a chair at work today around 0900.  He notes he almost fell out of the chair as a result of his symptoms. The patient endorses eventual resolution of his symptoms, but then developed the blurred vision again when he tried to stand up. He called the nurses line, who suggested he visit the emergency department for evaluation. The patient describes his dizziness as a \"fogginess\" rather than \"room-spinning dizziness.\" Here, he is not having the symptoms. He denies headache, diplopia, numbness, weakness, speech difficulties, ear pain, hearing loss, nausea, or vomiting.  He does mention that about 2-3 years ago, he had an episode of blurred vision and unsteadiness. About 10 days ago, he was on a course of azithromycin for ear fullness, which has improved. No history of diabetes.     Allergies:  NKDA     Medications:    Albuterol   Lisinopril   Pravachol   Amlodipine   Spiriva respimat  Chantix  Aspirin 81  Allopurinol      Past Medical History:    COPD  Dysmetabolic syndrome  Hyperlipidemia  Hypertension  Osteoarthritis  Prediabetes  Gout  Tobacco use disorder  Alcohol dependence    Past Surgical History:    Colonoscopy  Bilateral MOISES  Left knee arthroscopy and meniscectomy     Family History:    No past pertinent family history.     Social History:  The patient was accompanied to the ED by his wife.  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    Eyes: Positive for visual disturbance (blurred vision).        Denies diplopia   Gastrointestinal: Negative for nausea and vomiting.   Neurological: Positive for dizziness and headaches. Negative for speech difficulty, weakness and numbness.   All other systems reviewed and are negative.        Physical Exam     Patient Vitals for the past 24 hrs:   BP Temp Temp src Heart Rate Resp SpO2 Height Weight   01/20/20 1034 (!) 151/83 97.6  F (36.4  C) Oral 70 16 96 % 1.778 m (5' 10\") 86.2 kg (190 lb)        Physical Exam  Constitutional:  Oriented to person, place, and time.      Appears well-developed and well-nourished.   HENT:   Head:    Normocephalic and atraumatic.   Right Ear:   Tympanic membrane and external ear normal.   Left Ear:   Left ear TM retracted, thickened. Red in superior quadrant.  Mouth/Throat:   Oropharynx is clear and moist and mucous membranes are normal.   Eyes:    Conjunctivae normal and EOM are normal.      Pupils are equal, round, and reactive to light.   Neck:    Normal range of motion. Neck supple.   Cardiovascular:  Normal rate, S1 normal, S2 normal and normal heart sounds.      No gallop. No murmur heard.  Pulmonary/Chest:  Decreased breath sounds bilaterally      No wheezes. No rhonchi. No rales.   Abdominal:   Soft. Normal appearance. No hepatosplenomegaly.      No tenderness. No rigidity, no rebound, no guarding.      No CVA tenderness.   Musculoskeletal:  Normal range of motion.   Neurological:   Alert and oriented to person, place, and time.      Normal strength and normal reflexes.      No cranial nerve deficit or sensory deficit.      GCS eye subscore is 4. GCS verbal subscore is 5.      GCS motor subscore is 6. Finger to nose intact bilaterally.    Emergency Department Course   ECG:  Time: 1030  Vent. Rate 65 bpm. AZ interval 182. QRS duration 84. QT/QTc 384/399. P-R-T axis 65 67 75.  Normal sinus rhythm   Normal EKG   No " significant change compared to EKG dated 10/26/10.   Read time: 1030    Emergency Department Course:   Past medical records, nursing notes, and vitals reviewed.  1130: I performed an exam of the patient and obtained history, as documented above.    EKG obtained in the ED, see results above.      1214 I rechecked and updated the patient. Patient is ambulating and turning head well independently. He feels 90% improved.     Findings and plan explained to the Patient. Patient discharged home with instructions regarding supportive care, medications, and reasons to return. The importance of close follow-up was reviewed. The patient was prescribed meclizine.     Impression & Plan      Medical Decision Making:  The patient is a 64-year-old male who turned his head suddenly and had a feeling of imbalance. He denies headache and or any neurologic symptoms.  He has had recent URI and has had some left ear pressure.  Here he feels significantly improved.  He did have one episode similar to this in the past.  He does have risk factors of hypertension high cholesterol and tobacco abuse for having a central event.  However he has no other neurologic symptoms and his symptoms are  largely resolved and came on after moving his head.  I did discuss with him I thought it was unlikely to be central cause but that we could consider an MRI.  We discussed the risks and benefits and he has decided at this point to watch conservatively.  He does feel 90% improved at time of discharge and is ambulating and turning well.  His left TM is somewhat thickened and red superiorly but will give this some time to improve.  He can see his doctor if this does not improve.  We did discuss quitting smoking.  I also strongly advised that he return immediately if he had any recurrent symptoms with any neurologic symptoms.      Diagnosis:    ICD-10-CM    1. Vertigo R42        Disposition:  discharged to home    Discharge Medications:  New Prescriptions     MECLIZINE (ANTIVERT) 25 MG TABLET    Take 1 tablet (25 mg) by mouth 3 times daily as needed for dizziness     I, Kathleen Evans, am serving as a scribe on 1/20/2020 at 12:01 PM to personally document services performed by Milli Sarkar MD based on my observations and the provider's statements to me.      Kathleen Evans  1/20/2020    EMERGENCY DEPARTMENT       Milli Sarkar MD  01/20/20 [id number]

## 2020-01-23 ENCOUNTER — OFFICE VISIT (OUTPATIENT)
Dept: INTERNAL MEDICINE | Facility: CLINIC | Age: 65
End: 2020-01-23
Payer: COMMERCIAL

## 2020-01-23 VITALS
TEMPERATURE: 97 F | OXYGEN SATURATION: 95 % | BODY MASS INDEX: 28.12 KG/M2 | DIASTOLIC BLOOD PRESSURE: 80 MMHG | HEIGHT: 70 IN | SYSTOLIC BLOOD PRESSURE: 120 MMHG | WEIGHT: 196.4 LBS | HEART RATE: 70 BPM

## 2020-01-23 DIAGNOSIS — I10 BENIGN ESSENTIAL HYPERTENSION: ICD-10-CM

## 2020-01-23 DIAGNOSIS — M10.9 ACUTE GOUT OF FOOT, UNSPECIFIED CAUSE, UNSPECIFIED LATERALITY: ICD-10-CM

## 2020-01-23 DIAGNOSIS — F10.20 ALCOHOL DEPENDENCE, EPISODIC DRINKING BEHAVIOR (H): ICD-10-CM

## 2020-01-23 DIAGNOSIS — E78.5 HYPERLIPIDEMIA LDL GOAL <130: ICD-10-CM

## 2020-01-23 DIAGNOSIS — Z12.5 SCREENING FOR PROSTATE CANCER: ICD-10-CM

## 2020-01-23 DIAGNOSIS — R73.03 PREDIABETES: ICD-10-CM

## 2020-01-23 DIAGNOSIS — J43.1 PANLOBULAR EMPHYSEMA (H): Primary | ICD-10-CM

## 2020-01-23 PROCEDURE — 99214 OFFICE O/P EST MOD 30 MIN: CPT | Performed by: INTERNAL MEDICINE

## 2020-01-23 ASSESSMENT — MIFFLIN-ST. JEOR: SCORE: 1687.11

## 2020-01-23 NOTE — NURSING NOTE
"Chief Complaint   Patient presents with     Hyperlipidemia     Hypertension     COPD     ER F/U     /80   Pulse 70   Temp 97  F (36.1  C) (Temporal)   Ht 1.778 m (5' 10\")   Wt 89.1 kg (196 lb 6.4 oz)   SpO2 95%   BMI 28.18 kg/m   Estimated body mass index is 28.18 kg/m  as calculated from the following:    Height as of this encounter: 1.778 m (5' 10\").    Weight as of this encounter: 89.1 kg (196 lb 6.4 oz).        Health Maintenance that is potentially due pending provider review:  NONE    n/a    SERG Posada  "

## 2020-01-23 NOTE — PROGRESS NOTES
Subjective     Gabe Smith is a 64 year old male who presents to clinic today for the following health issues:    HPI   Hyperlipidemia Follow-Up      Are you regularly taking any medication or supplement to lower your cholesterol?   Yes- Pravachol 40mg    Are you having muscle aches or other side effects that you think could be caused by your cholesterol lowering medication?  No    Has history of hyperlipidemia.  On statin for this, denies any significant side effects of this medication.      Latest labs reviewed:    Recent Labs   Lab Test 07/15/19  0751 07/12/18  0805  08/04/15  0757 02/03/15  0748   CHOL 154 167   < > 210* 192   HDL 29* 27*   < > 27* 33*   LDL 89 91   < > Cannot estimate LDL when triglyceride exceeds 400 mg/dL  134* 107   TRIG 178* 246*   < > 422* 262*   CHOLHDLRATIO  --   --   --  7.8* 5.8*    < > = values in this interval not displayed.        Lab Results   Component Value Date    AST 18 07/15/2019         Hypertension Follow-up      Do you check your blood pressure regularly outside of the clinic? No     Are you following a low salt diet? Yes, no added salt     Are your blood pressures ever more than 140 on the top number (systolic) OR more   than 90 on the bottom number (diastolic), for example 140/90? n/a    Blood presure remains well controlled at home  Readings outside clinic are within normal limits.  Reviewed last 6 BP readings in chart:  BP Readings from Last 6 Encounters:   01/23/20 120/80   01/20/20 (!) 151/83   01/06/20 113/70   07/19/19 124/76   12/28/18 116/80   07/13/18 134/76     He has not experienced any significant side effects from medicaiotns for hypertension.    NO active cardiac complaints or symptoms with exercise.     COPD Follow-Up    Overall, how are your COPD symptoms since your last clinic visit?  No change    How much fatigue or shortness of breath do you have when you are walking?  Same as usual    How much shortness of breath do you have when you are resting?   "None    How often do you cough? Sometimes    Have you noticed any change in your sputum/phlegm?  No    Have you experienced a recent fever? No    Please describe how far you can walk without stopping to rest:  2-5 blocks    How many flights of stairs are you able to walk up without stopping?  3 or more    Have you had any Emergency Room Visits, Urgent Care Visits, or Hospital Admissions because of your COPD since your last office visit?  No    History   Smoking Status     Current Every Day Smoker     Packs/day: 0.50     Types: Cigarettes   Smokeless Tobacco     Never Used     Comment: 1.5 PPD-now down to .5 PPD     No results found for: FEV1, DXN5HTT      How many servings of fruits and vegetables do you eat daily?  2-3    On average, how many sweetened beverages do you drink each day (Examples: soda, juice, sweet tea, etc.  Do NOT count diet or artificially sweetened beverages)?   0    How many days per week do you miss taking your medication? 0    ED/UC Followup:    Facility:  [facility] [place] [facility]  Date of visit: 1/20/2020  Reason for visit: Vertigo   Current Status: Improved     Dizziness and room spining for a day  Has URI 1-2 weeks ago.   No focal waekness.   Sx now completely gone.          The patient has a history of impaired glucose tolerance with regularly elevated blood sugars.    They have not been diagnosed with type II DM or placed on medications for diabetes before.   They deny polyuria, polydipsia.     The patient is obese with a BMI of Body mass index is 28.18 kg/m ..    Review of current labs show:    Lab Results   Component Value Date    A1C 5.7 01/16/2020    A1C 5.8 07/15/2019    A1C 5.9 12/26/2018    A1C 5.7 09/20/2016    A1C 5.5 08/04/2015    A1C 5.9 02/03/2015    A1C 6.1 10/07/2014     @bgl@         Reviewed and updated as needed this visit by Provider         Review of Systems         Objective    /80   Pulse 70   Temp 97  F (36.1  C) (Temporal)   Ht 1.778 m (5' 10\")   Wt 89.1 kg " (196 lb 6.4 oz)   SpO2 95%   BMI 28.18 kg/m    Body mass index is 28.18 kg/m .  Physical Exam                 Past Medical History:  ---------------------------  Past Medical History:   Diagnosis Date     COPD (chronic obstructive pulmonary disease) (H)      Dysmetabolic syndrome X      Hyperlipidemia      Hypertension      Osteoarthrosis      Prediabetes 10/9/14    A1C 6.1     Tobacco use disorder        Past Surgical History:  ---------------------------  Past Surgical History:   Procedure Laterality Date     COLONOSCOPY N/A 10/19/2015    Procedure: COMBINED COLONOSCOPY, SINGLE OR MULTIPLE BIOPSY/POLYPECTOMY BY BIOPSY;  Surgeon: Gume Vidal MD;  Location:  GI     SURGICAL HISTORY OF -   2000    Left MOISES     SURGICAL HISTORY OF -       Unspecified knee surgeries as a child     SURGICAL HISTORY OF -   10/2010    Right MOISES, with left knee arthroscopy, meniscectomy       Current Medications:  ---------------------------  Current Outpatient Medications   Medication Sig Dispense Refill     albuterol (PROAIR HFA) 108 (90 Base) MCG/ACT inhaler Inhale 2 puffs into the lungs every 6 hours 1 Inhaler 0     albuterol (PROVENTIL) (2.5 MG/3ML) 0.083% neb solution Take 1 vial (2.5 mg) by nebulization every 6 hours as needed for shortness of breath / dyspnea or wheezing 1 Box 5     allopurinol (ZYLOPRIM) 100 MG tablet Take 2 tablets (200 mg) by mouth daily 1 tablet daily for one month, then 2 tablets daily 180 tablet 3     amLODIPine (NORVASC) 10 MG tablet Take 1 tablet (10 mg) by mouth daily 90 tablet 3     aspirin 81 MG tablet Take 1 tablet (81 mg) by mouth daily       fluticasone-salmeterol (ADVAIR DISKUS) 250-50 MCG/DOSE inhaler Inhale 1 puff into the lungs every 12 hours GENERIC ADVAIR IF POSSIBLE 3 Inhaler 3     lisinopril-hydrochlorothiazide (PRINZIDE/ZESTORETIC) 20-12.5 MG tablet Take 2 tablets by mouth every morning 180 tablet 3     meclizine (ANTIVERT) 25 MG tablet Take 1 tablet (25 mg) by mouth 3 times daily as  needed for dizziness 15 tablet 0     pravastatin (PRAVACHOL) 40 MG tablet Take 1 tablet (40 mg) by mouth At Bedtime 90 tablet 3     tiotropium (SPIRIVA RESPIMAT) 2.5 MCG/ACT inhaler Inhale 2 puffs into the lungs daily 12 g 3     varenicline (CHANTIX) 1 MG tablet Take 1 tablet (1 mg) by mouth 2 times daily 60 tablet 5     fluticasone (FLONASE) 50 MCG/ACT nasal spray Spray 2 sprays into both nostrils daily 15.8 mL 0       Allergies:  -------------  No Known Allergies    Social History:  -------------------  Social History     Socioeconomic History     Marital status:      Spouse name: Not on file     Number of children: 2     Years of education: Not on file     Highest education level: Not on file   Occupational History     Employer: [employer]   Social Needs     Financial resource strain: Not on file     Food insecurity:     Worry: Not on file     Inability: Not on file     Transportation needs:     Medical: Not on file     Non-medical: Not on file   Tobacco Use     Smoking status: Current Every Day Smoker     Packs/day: 0.50     Types: Cigarettes     Smokeless tobacco: Never Used     Tobacco comment: 1.5 PPD-now down to .5 PPD   Substance and Sexual Activity     Alcohol use: Yes     Alcohol/week: 0.0 standard drinks     Comment: 2-3 drinks per night     Drug use: No     Sexual activity: Not Currently   Lifestyle     Physical activity:     Days per week: Not on file     Minutes per session: Not on file     Stress: Not on file   Relationships     Social connections:     Talks on phone: Not on file     Gets together: Not on file     Attends [employer] service: Not on file     Active member of club or organization: Not on file     Attends meetings of clubs or organizations: Not on file     Relationship status: Not on file     Intimate partner violence:     Fear of current or ex partner: Not on file     Emotionally abused: Not on file     Physically abused: Not on file     Forced sexual activity: Not on file  "  Other Topics Concern     Parent/sibling w/ CABG, MI or angioplasty before 65F 55M? Not Asked   Social History Narrative     Not on file       Family Medical History:  ------------------------------  Family History   Problem Relation Age of Onset     Family History Negative Mother      Family History Negative Brother      Family History Negative Sister          ROS:  REVIEW OF SYSTEMS:    RESP: negative for cough, dyspnea, wheezing, hemoptysis  CV: negative for chest pain, palpitations, PND, YANCEY, orthopnea; reports no significant changes in their ability to perform physical activity (from cardiovascular standpoint)  GI: negative for dysphagia, N/V, pain, melena, diarrhea and constipation  NEURO: negative for new numbness/tingling, paralysis, incoordination, or focal weakness     OBJECTIVE:                                                    /80   Pulse 70   Temp 97  F (36.1  C) (Temporal)   Ht 1.778 m (5' 10\")   Wt 89.1 kg (196 lb 6.4 oz)   SpO2 95%   BMI 28.18 kg/m       GENERAL alert and no distress  EYES:  Normal sclera,conjunctiva, EOMI  HENT: oral and posterior pharynx without lesions or erythema, facies symmetric  NECK: Neck supple. No LAD, without thyroidmegaly.  RESP: Clear to ausculation bilaterally without wheezes or crackles. Normal BS in all fields.  CV: RRR normal S1S2 without murmurs, rubs or gallops.  LYMPH: no cervical lymph adenopathy appreciated  MS: extremities- no gross deformities of the visible extremities noted,   EXT:  no lower extremity edema  PSYCH: Alert and oriented times 3; speech- coherent  SKIN:  No obvious significant skin lesions on visible portions of face          ASSESSMENT/PLAN:                                                      (J43.1) COPD (HCC)  (primary encounter diagnosis)  Comment: This condition is currently controlled on the current medical regimen.  Continue current therapy.   Discussed general issues of COPD, including pathophysiology, ways it will affect " "the pt., when to seek help, reviewed the typical medications (how they are taken, how they help)     Told him he needs to quit any and all smoking. He was not ready to commit at this time.   Plan:     (I10) Benign essential hypertension  Comment: This condition is currently controlled on the current medical regimen.  Continue current therapy.   Discussed current hypertension treatment guidelines, including indications for treatment and treatment options.  Discussed the importance for aggressive management of HTN to prevent vascular complications later.  Recommended lower fat, lower carbohydrate, and lower sodium (<2000 mg)diet.  Discussed required intervals for follow up on HTN, lab studies.  Recommened pt. follow their blood pressures outside the clinic to ensure that BPs are remaining within guidelines, and to contact me if the readings are not within guidelines on a regular basis so we can adjust treatment as needed.   Plan: Comprehensive metabolic panel, CBC with         platelets            (E78.5) Hyperlipidemia LDL goal <130  Comment: Discussed current lipid results, previous results (if available) current guidelines (NCEP) for treatment and goals for lipids.  Discussed lifestyle modification, dietary changes (low fat, low simple carb) and regular aerobic exercise.  Discussed the link between dysmetabolic syndrome and impaired glucose tolerance seen in certain patterns of lipids.  Briefly discussed medication used for lipid lowering, including the statins are their possible side effects of myalgias, rhabdomyolysis, and liver toxicity.   Plan: Lipid panel reflex to direct LDL Fasting,         Comprehensive metabolic panel            (R73.03) Prediabetes  Comment: Reviewed the labs showing elevated glucose levels.    Discussed \"pre-diabetes\", impaired glucose tolerance, and its part in the dysmetabolic syndrome.    Discussed the inevitable progression of impaired glucose tolerance toward worsening diabetes " "mellitus and the need for agressive interventions now to delay and prevent this inevitable progression.  Discussed the overall risks that dysmetabolic syndromes/impaired glucose tolerance/syndrome X pose toward increased risks of vascular disease as the main reason for agressive intervention now.  Will add medications for glucose control (i.e. metformin, glitazones, etc), lipid (e.g. statins), and for blood pressure (preferably ARBs and ACE) as indicated.  Will start these as early as needed based other proven ability to delay and modify these risk factors.   Discussed the need for aggressive diet control as the cornerstone of pre-diabetes and diabetes management, emphasizing the reduction of \"simple carbohydrates\" (e.g. Any kind of wheat products (e.g. any bread, any pasta), white rice, noodles, potatoes, snack foods, regular soda, juices (except fresh squeezed), cakes, cookies, candy, etc.) along with regular exercise.       Plan: Hemoglobin A1c            (M10.9) Acute gout of foot, unspecified cause, unspecified laterality  Comment: This condition is currently controlled on the current medical regimen.  Continue current therapy.   Told him this would get markedly better isf he stopped drinking.   Plan: Uric acid            (Z12.5) Screening for prostate cancer  Comment: Discussed prostate cancer screening and PSA blood test.  Discussed that an elevated PSA blood test can be caused by things other than prostate cancer, including infection/inflammation, BPH, and recent sexual activity; and that elevated PSA blood test may require further investigation with a urologist   Plan: Prostate spec antigen screen            (F10.20) Alcohol dependence, episodic drinking behavior (H)  Comment: states he has piter drinking \"less\", but still more than 2 drinks per day.   Discussed the many different ways excessive alcohol damages the body.    Discussed the other damaging non-medical side effects of excessive alcohol use as " well.    Discussed the observed increases in all-cause mortality and morbidity when drinking above 2 drinks per day (defined as 12 oz beer, or 4 oz wine, or 1.5 oz spirits).  Offered help and support in finding help in quitting alcohol.  He declined CD referral  Plan:              See Patient Instructions    HUEY SHAW M.D., MD  [facility]    (Chart documentation may have been completed, in part, with [employer] voice-recognition software. Even though reviewed, some grammatical, spelling, and word errors may remain.)

## 2020-01-23 NOTE — PATIENT INSTRUCTIONS
"*  Continue all medications at the same doses.  Contact your usual pharmacy if you need refills.     *  Continue to work on quitting smoking.     *  Return to see me in 6 months, sooner if needed.  Please get fasting labs done at the [facility] or any other [facility] lab 1-2 days before this appointment (schedule a \"lab appointment\").  If you get the labs done at another clinic, make arrangements with them directly.  The orders will be in place.  Eat nothing for at least 8 hours prior to having these labs drawn.  Use [employer] or Call [phone] to schedule the appointment with me and lab appointment.           5 GOALS IN MANAGING DIABETES (i.e. to give the best chance to prevent diabetic complications and vascular disease):     1.  Aggressive diabetic management.  The target for A1C (3 months average blood sugar) is ideally below 6.5, preferably below 7.5    Your diabetes is under good control.      Lab Results   Component Value Date    A1C 5.7 01/16/2020    A1C 5.8 07/15/2019    A1C 5.9 12/26/2018    A1C 5.7 09/20/2016    A1C 5.5 08/04/2015    A1C 5.9 02/03/2015    A1C 6.1 10/07/2014       2.  Aggressive blood pressure control, under 130/80 ideally.  Using medications if needed.    Your blood pressure is under good control    BP Readings from Last 4 Encounters:   01/23/20 120/80   01/20/20 (!) 151/83   01/06/20 113/70   07/19/19 124/76       3.  Aggressive LDL cholesterol (bad cholesterol) lowering as needed.  Your goal is an LDL under 100 for sure, preferably under 70.     *  All patients with diabetes are recommended to be on a \"statin\" cholesterol lowering medication regardless of the cholesterol levels to give the best chance at avoiding vascular disease.      New guidelines identify four high-risk groups who could benefit from statins:   *people with pre-existing heart disease, such as those who have had a heart attack;   *people ages 40 to 75 who have diabetes of any type  *patients " "ages 40 to 75 with at least a 7.5% risk of developing cardiovascular disease over the next decade, according to a formula described in the guidelines  *patients with the sort of super-high cholesterol that sometimes runs in families, as evidenced by an LDL of 190 milligrams per deciliter or higher    *  Your cholesterol levels are well controlled.    Recent Labs   Lab Test 07/15/19  0751 07/12/18  0805  08/04/15  0757 02/03/15  0748   CHOL 154 167   < > 210* 192   HDL 29* 27*   < > 27* 33*   LDL 89 91   < > Cannot estimate LDL when triglyceride exceeds 400 mg/dL  134* 107   TRIG 178* 246*   < > 422* 262*   CHOLHDLRATIO  --   --   --  7.8* 5.8*    < > = values in this interval not displayed.       4.  No smoking    5.  Aspirin 81 mg tablet once per day, every day unless there is a specific reason that you cannot take aspirin.       *You should take Aspirin 81 mg once per day, unless you have a reason NOT to take aspirin (i.e. side effect, intolerance, taking another \"blood tinning\" medication)       DIABETES REMINDERS:     1.  Check your blood sugar at least once per day, more often if you take insulin or your diabetes has not been under good control.  1) Check your blood sugar at least once per day, more often if you take insulin or have not been under good control.  Always bring your blood sugar log and meter to your diabetes-related appointments.  2) Your blood sugar goals:   before eating and  two hours after eating.  3) Always be prepared to treat low blood sugar symptoms should it happen. Keep a sugar-containing beverage or food nearby.  4) When to call your clinic:     Blood sugar over 400     If you have 2 to 3 low blood sugars (under 70) in a row    Low readings the same time of day several days in a row  5) When to call 911: If your low blood glucose symptoms do not get better with treatment, or if you/someone else is unable to give you treatment.  6) Work with a Certified Diabetes Educator to " "assist you with your diabetes management  7) Contact us if you have ANY questions about your medications or instructions, or have problems with getting prescriptions filled.          --Good Grains:  Oats, brown rice, Quinoa (these do not raise the blood sugar as much)     --Bad grains:  Anything made from wheat or white rice     (because these raise the blood sugars significantly, and the possible gluten issue from wheat for some people).      --Proteins:  Aim for \"lean proteins\" including chicken, fish, seafood, pork, turkey, and eggs (in moderation); Eat red meat only occasionally      "

## 2020-07-28 DIAGNOSIS — M10.9 ACUTE GOUT OF FOOT, UNSPECIFIED CAUSE, UNSPECIFIED LATERALITY: ICD-10-CM

## 2020-07-28 DIAGNOSIS — Z12.5 SCREENING FOR PROSTATE CANCER: ICD-10-CM

## 2020-07-28 DIAGNOSIS — R73.03 PREDIABETES: ICD-10-CM

## 2020-07-28 DIAGNOSIS — I10 BENIGN ESSENTIAL HYPERTENSION: ICD-10-CM

## 2020-07-28 DIAGNOSIS — E78.5 HYPERLIPIDEMIA LDL GOAL <130: ICD-10-CM

## 2020-07-28 LAB
ALBUMIN SERPL-MCNC: 3.6 G/DL (ref 3.4–5)
ALP SERPL-CCNC: 83 U/L (ref 40–150)
ALT SERPL W P-5'-P-CCNC: 20 U/L (ref 0–70)
ANION GAP SERPL CALCULATED.3IONS-SCNC: 4 MMOL/L (ref 3–14)
AST SERPL W P-5'-P-CCNC: 23 U/L (ref 0–45)
BILIRUB SERPL-MCNC: 0.7 MG/DL (ref 0.2–1.3)
BUN SERPL-MCNC: 15 MG/DL (ref 7–30)
CALCIUM SERPL-MCNC: 8.8 MG/DL (ref 8.5–10.1)
CHLORIDE SERPL-SCNC: 105 MMOL/L (ref 94–109)
CHOLEST SERPL-MCNC: 189 MG/DL
CO2 SERPL-SCNC: 27 MMOL/L (ref 20–32)
CREAT SERPL-MCNC: 0.82 MG/DL (ref 0.66–1.25)
ERYTHROCYTE [DISTWIDTH] IN BLOOD BY AUTOMATED COUNT: 13.7 % (ref 10–15)
GFR SERPL CREATININE-BSD FRML MDRD: >90 ML/MIN/{1.73_M2}
GLUCOSE SERPL-MCNC: 105 MG/DL (ref 70–99)
HBA1C MFR BLD: 6.2 % (ref 0–5.6)
HCT VFR BLD AUTO: 51.7 % (ref 40–53)
HDLC SERPL-MCNC: 35 MG/DL
HGB BLD-MCNC: 17.1 G/DL (ref 13.3–17.7)
LDLC SERPL CALC-MCNC: 120 MG/DL
MCH RBC QN AUTO: 30.8 PG (ref 26.5–33)
MCHC RBC AUTO-ENTMCNC: 33.1 G/DL (ref 31.5–36.5)
MCV RBC AUTO: 93 FL (ref 78–100)
NONHDLC SERPL-MCNC: 154 MG/DL
PLATELET # BLD AUTO: 179 10E9/L (ref 150–450)
POTASSIUM SERPL-SCNC: 4.2 MMOL/L (ref 3.4–5.3)
PROT SERPL-MCNC: 7.5 G/DL (ref 6.8–8.8)
PSA SERPL-ACNC: 1.05 UG/L (ref 0–4)
RBC # BLD AUTO: 5.55 10E12/L (ref 4.4–5.9)
SODIUM SERPL-SCNC: 136 MMOL/L (ref 133–144)
TRIGL SERPL-MCNC: 172 MG/DL
URATE SERPL-MCNC: 6.4 MG/DL (ref 3.5–7.2)
WBC # BLD AUTO: 9.4 10E9/L (ref 4–11)

## 2020-07-28 PROCEDURE — 80053 COMPREHEN METABOLIC PANEL: CPT | Performed by: INTERNAL MEDICINE

## 2020-07-28 PROCEDURE — 80061 LIPID PANEL: CPT | Performed by: INTERNAL MEDICINE

## 2020-07-28 PROCEDURE — 84550 ASSAY OF BLOOD/URIC ACID: CPT | Performed by: INTERNAL MEDICINE

## 2020-07-28 PROCEDURE — 85027 COMPLETE CBC AUTOMATED: CPT | Performed by: INTERNAL MEDICINE

## 2020-07-28 PROCEDURE — 36415 COLL VENOUS BLD VENIPUNCTURE: CPT | Performed by: INTERNAL MEDICINE

## 2020-07-28 PROCEDURE — 83036 HEMOGLOBIN GLYCOSYLATED A1C: CPT | Performed by: INTERNAL MEDICINE

## 2020-07-28 PROCEDURE — G0103 PSA SCREENING: HCPCS | Performed by: INTERNAL MEDICINE

## 2020-08-05 ENCOUNTER — OFFICE VISIT (OUTPATIENT)
Dept: INTERNAL MEDICINE | Facility: CLINIC | Age: 65
End: 2020-08-05
Payer: COMMERCIAL

## 2020-08-05 VITALS
HEIGHT: 70 IN | OXYGEN SATURATION: 96 % | BODY MASS INDEX: 28.59 KG/M2 | TEMPERATURE: 97.3 F | WEIGHT: 199.7 LBS | HEART RATE: 70 BPM | SYSTOLIC BLOOD PRESSURE: 134 MMHG | DIASTOLIC BLOOD PRESSURE: 76 MMHG

## 2020-08-05 DIAGNOSIS — Z87.891 PERSONAL HISTORY OF TOBACCO USE: ICD-10-CM

## 2020-08-05 DIAGNOSIS — Z12.5 SCREENING FOR PROSTATE CANCER: ICD-10-CM

## 2020-08-05 DIAGNOSIS — E78.5 HYPERLIPIDEMIA LDL GOAL <130: ICD-10-CM

## 2020-08-05 DIAGNOSIS — J43.1 PANLOBULAR EMPHYSEMA (H): ICD-10-CM

## 2020-08-05 DIAGNOSIS — I10 BENIGN ESSENTIAL HYPERTENSION: ICD-10-CM

## 2020-08-05 DIAGNOSIS — R73.03 PREDIABETES: ICD-10-CM

## 2020-08-05 DIAGNOSIS — Z00.00 ENCOUNTER FOR MEDICARE ANNUAL WELLNESS EXAM: Primary | ICD-10-CM

## 2020-08-05 DIAGNOSIS — R06.2 WHEEZE: ICD-10-CM

## 2020-08-05 DIAGNOSIS — F10.20 ALCOHOL DEPENDENCE, EPISODIC DRINKING BEHAVIOR (H): ICD-10-CM

## 2020-08-05 DIAGNOSIS — M10.9 ACUTE GOUT OF FOOT, UNSPECIFIED CAUSE, UNSPECIFIED LATERALITY: ICD-10-CM

## 2020-08-05 PROCEDURE — G0438 PPPS, INITIAL VISIT: HCPCS | Performed by: INTERNAL MEDICINE

## 2020-08-05 PROCEDURE — 99213 OFFICE O/P EST LOW 20 MIN: CPT | Mod: 25 | Performed by: INTERNAL MEDICINE

## 2020-08-05 RX ORDER — AMLODIPINE BESYLATE 10 MG/1
10 TABLET ORAL DAILY
Qty: 90 TABLET | Refills: 3 | Status: SHIPPED | OUTPATIENT
Start: 2020-08-05 | End: 2021-09-03

## 2020-08-05 RX ORDER — ALBUTEROL SULFATE 90 UG/1
2 AEROSOL, METERED RESPIRATORY (INHALATION) EVERY 4 HOURS PRN
Qty: 18 G | Refills: 11 | Status: SHIPPED | OUTPATIENT
Start: 2020-08-05 | End: 2021-09-03

## 2020-08-05 RX ORDER — ALBUTEROL SULFATE 0.83 MG/ML
2.5 SOLUTION RESPIRATORY (INHALATION) EVERY 6 HOURS PRN
Qty: 1 BOX | Refills: 5 | Status: SHIPPED | OUTPATIENT
Start: 2020-08-05 | End: 2021-02-05

## 2020-08-05 RX ORDER — LISINOPRIL AND HYDROCHLOROTHIAZIDE 12.5; 2 MG/1; MG/1
2 TABLET ORAL EVERY MORNING
Qty: 180 TABLET | Refills: 3 | Status: SHIPPED | OUTPATIENT
Start: 2020-08-05 | End: 2021-09-03

## 2020-08-05 RX ORDER — ALLOPURINOL 100 MG/1
200 TABLET ORAL DAILY
Qty: 180 TABLET | Refills: 3 | Status: SHIPPED | OUTPATIENT
Start: 2020-08-05 | End: 2021-09-03

## 2020-08-05 ASSESSMENT — MIFFLIN-ST. JEOR: SCORE: 1697.08

## 2020-08-05 NOTE — PROGRESS NOTES
The patient reports that he drinks more than one alcoholic drink per day and sometimes engages in binge or excessive drinking. He was counseled and given information about possible harmful effects of excessive alcohol intake as well as where to get help for alcohol problems.

## 2020-08-05 NOTE — PROGRESS NOTES
Subjective     Gabe Smith is a 65 year old male who presents to clinic today for the following health issues:    HPI       Hyperlipidemia Follow-Up      Are you regularly taking any medication or supplement to lower your cholesterol?   Yes- Pravastatin 40mg    Are you having muscle aches or other side effects that you think could be caused by your cholesterol lowering medication?  No    Hyperlipidemia:  Has history of hyperlipidemia.    The patient is taking a medication for this.  Denies any significant side effects from his medication.      Latest labs reviewed:    Recent Labs   Lab Test 07/28/20  1148 07/15/19  0751  08/04/15  0757 02/03/15  0748   CHOL 189 154   < > 210* 192   HDL 35* 29*   < > 27* 33*   * 89   < > Cannot estimate LDL when triglyceride exceeds 400 mg/dL  134* 107   TRIG 172* 178*   < > 422* 262*   CHOLHDLRATIO  --   --   --  7.8* 5.8*    < > = values in this interval not displayed.        Lab Results   Component Value Date    AST 23 07/28/2020       Hypertension Follow-up      Do you check your blood pressure regularly outside of the clinic? No     Are you following a low salt diet? Yes    Are your blood pressures ever more than 140 on the top number (systolic) OR more   than 90 on the bottom number (diastolic), for example 140/90? n/a    COPD Follow-Up    Overall, how are your COPD symptoms since your last clinic visit?  No change    How much fatigue or shortness of breath do you have when you are walking?  Same as usual    How much shortness of breath do you have when you are resting?  None    How often do you cough? Sometimes    Have you noticed any change in your sputum/phlegm?  No    Have you experienced a recent fever? No    Please describe how far you can walk without stopping to rest:  2-5 blocks    How many flights of stairs are you able to walk up without stopping?  3 or more    Have you had any Emergency Room Visits, Urgent Care Visits, or Hospital Admissions because of  your COPD since your last office visit?  No    History   Smoking Status     Current Every Day Smoker     Packs/day: 0.50     Types: Cigarettes   Smokeless Tobacco     Never Used     Comment: 1.5 PPD-now down to .5 PPD     No results found for: FEV1, [id number]      How many servings of fruits and vegetables do you eat daily?  2-3    On average, how many sweetened beverages do you drink each day (Examples: soda, juice, sweet tea, etc.  Do NOT count diet or artificially sweetened beverages)?   0    How many days per week do you miss taking your medication? 0    5.  Continues to drink alcohol on a regular basis at least 3-5 drinks per day.  He has been forthcoming about his alcohol use and I think that is accurate.  We have spoken numerous times about ways to quit alcohol, I recommended many times for formal referral for chemical dependency evaluation, he is continued to decline.    6.  Long history of smoking, Continues to smoke, has been a long-term smoker.  More than a pack a day for 40 years.  We discussed smoking cessation numerous times and he agrees that it is a good idea concept, however he is not willing to quit at this time.        Annual Wellness Visit    Are you in the first 12 months of your Medicare Part B coverage?  Yes,  Visual Acuity:  Right Eye: 20/16   Left Eye: 20/16  Both Eyes: 20/16    Physical Health:    In general, how would you rate your overall physical health? good    Outside of work, how many days during the week do you exercise?none    Outside of work, approximately how many minutes a day do you exercise?not applicable  If you drink alcohol do you typically have >3 drinks per day or >7 drinks per week? Yes - AUDIT SCORE:  9  AUDIT - Alcohol Use Disorders Identification Test - Reproduced from the World Health Organization Audit 2001 (Second Edition) 8/5/2020   1.  How often do you have a drink containing alcohol? 4 or more times a week   2.  How many drinks containing alcohol do you have on a  "typical day when you are drinking? 3 or 4   3.  How often do you have five or more drinks on one occasion? Never   4.  How often during the last year have you found that you were not able to stop drinking once you had started? Never   5.  How often during the last year have you failed to do what was normally expected of you because of drinking? Never   6.  How often during the last year have you needed a first drink in the morning to get yourself going after a heavy drinking session? Never   7.  How often during the last year have you had a feeling of guilt or remorse after drinking? Never   8.  How often during the last year have you been unable to remember what happened the night before because of your drinking? Never   9.  Have you or someone else been injured because of your drinking? No   10. Has a relative, friend, doctor or other health care worker been concerned about your drinking or suggested you cut down? Yes, during the last year   TOTAL SCORE 9       Do you usually eat at least 4 servings of fruit and vegetables a day, include whole grains & fiber and avoid regularly eating high fat or \"junk\" foods? NO    Do you have any problems taking medications regularly? No    Do you have any side effects from medications? none    Needs assistance for the following daily activities: no assistance needed    Which of the following safety concerns are present in your home?  none identified     Hearing impairment: No    In the past 6 months, have you been bothered by leaking of urine? no    Mental Health:    In general, how would you rate your overall mental or emotional health? excellent  PHQ-2 Score:      Do you feel safe in your environment? Yes    Have you ever done Advance Care Planning? (For example, a Health Directive, POLST, or a discussion with a medical provider or your loved ones about your wishes)? No, advance care planning information given to patient to review.  Patient plans to discuss their wishes with " loved ones or provider.      Fall risk:  Fallen 2 or more times in the past year?: No  Any fall with injury in the past year?: No    Cognitive Screenin) Repeat 3 items (Leader, Season, Table)    2) Clock draw: NORMAL  3) 3 item recall: Recalls 3 objects  Results: 3 items recalled: COGNITIVE IMPAIRMENT LESS LIKELY    Mini-CogTM Copyright SERA Aj. Licensed by the author for use in [facility]; reprinted with permission (brian@[email or web address]). All rights reserved.      Do you have sleep apnea, excessive snoring or daytime drowsiness?: no    Current providers sharing in care for this patient include:   Patient Care Team:  Donny Payne MD as PCP - General (Internal Medicine)  Donny Payne MD as Assigned PCP            Past Medical History:  ---------------------------  Past Medical History:   Diagnosis Date     COPD (chronic obstructive pulmonary disease) (H)      Dysmetabolic syndrome X      Hyperlipidemia      Hypertension      Osteoarthrosis      Prediabetes 10/9/14    A1C 6.1     Tobacco use disorder        Past Surgical History:  ---------------------------  Past Surgical History:   Procedure Laterality Date     COLONOSCOPY N/A 10/19/2015    Procedure: COMBINED COLONOSCOPY, SINGLE OR MULTIPLE BIOPSY/POLYPECTOMY BY BIOPSY;  Surgeon: Gume Vidal MD;  Location:  GI     SURGICAL HISTORY OF -       Left MOISES     SURGICAL HISTORY OF -       Unspecified knee surgeries as a child     SURGICAL HISTORY OF -   10/2010    Right MOISES, with left knee arthroscopy, meniscectomy       Current Medications:  ---------------------------  Current Outpatient Medications   Medication Sig Dispense Refill     albuterol (PROAIR HFA) 108 (90 Base) MCG/ACT inhaler Inhale 2 puffs into the lungs every 6 hours 1 Inhaler 0     allopurinol (ZYLOPRIM) 100 MG tablet Take 2 tablets (200 mg) by mouth daily 1 tablet daily for one month, then 2 tablets daily 180 tablet 3     amLODIPine (NORVASC) 10 MG tablet  Take 1 tablet (10 mg) by mouth daily 90 tablet 3     aspirin 81 MG tablet Take 1 tablet (81 mg) by mouth daily       fluticasone-salmeterol (ADVAIR DISKUS) 250-50 MCG/DOSE inhaler Inhale 1 puff into the lungs every 12 hours GENERIC ADVAIR IF POSSIBLE 3 Inhaler 3     lisinopril-hydrochlorothiazide (PRINZIDE/ZESTORETIC) 20-12.5 MG tablet Take 2 tablets by mouth every morning 180 tablet 3     pravastatin (PRAVACHOL) 40 MG tablet Take 1 tablet (40 mg) by mouth At Bedtime 90 tablet 3     varenicline (CHANTIX) 1 MG tablet Take 1 tablet (1 mg) by mouth 2 times daily 60 tablet 5     albuterol (PROVENTIL) (2.5 MG/3ML) 0.083% neb solution Take 1 vial (2.5 mg) by nebulization every 6 hours as needed for shortness of breath / dyspnea or wheezing 1 Box 5     tiotropium (SPIRIVA RESPIMAT) 2.5 MCG/ACT inhaler Inhale 2 puffs into the lungs daily 12 g 3       Allergies:  -------------  No Known Allergies    Social History:  -------------------  Social History     Socioeconomic History     Marital status:      Spouse name: Not on file     Number of children: 2     Years of education: Not on file     Highest education level: Not on file   Occupational History     Employer: [employer]   Social Needs     Financial resource strain: Not on file     Food insecurity     Worry: Not on file     Inability: Not on file     Transportation needs     Medical: Not on file     Non-medical: Not on file   Tobacco Use     Smoking status: Current Every Day Smoker     Packs/day: 0.50     Types: Cigarettes     Smokeless tobacco: Never Used     Tobacco comment: 1.5 PPD-now down to .5 PPD   Substance and Sexual Activity     Alcohol use: Yes     Alcohol/week: 0.0 standard drinks     Comment: 2-3 drinks per night     Drug use: No     Sexual activity: Not Currently   Lifestyle     Physical activity     Days per week: Not on file     Minutes per session: Not on file     Stress: Not on file   Relationships     Social connections     Talks on phone: Not  "on file     Gets together: Not on file     Attends Church service: Not on file     Active member of club or organization: Not on file     Attends meetings of clubs or organizations: Not on file     Relationship status: Not on file     Intimate partner violence     Fear of current or ex partner: Not on file     Emotionally abused: Not on file     Physically abused: Not on file     Forced sexual activity: Not on file   Other Topics Concern     Parent/sibling w/ CABG, MI or angioplasty before 65F 55M? Not Asked   Social History Narrative     Not on file       Family Medical History:  ------------------------------  Family History   Problem Relation Age of Onset     Family History Negative Mother      Family History Negative Brother      Family History Negative Sister         Reviewed and updated as needed this visit by Provider  Tobacco  Allergies  Meds  Problems  Med Hx  Surg Hx  Fam Hx         Review of Systems   Constitutional, HEENT, cardiovascular, pulmonary, gi and gu systems are negative, except as otherwise noted.      Objective    /76   Pulse 70   Temp 97.3  F (36.3  C) (Temporal)   Ht 1.778 m (5' 10\")   Wt 90.6 kg (199 lb 11.2 oz)   SpO2 96%   BMI 28.65 kg/m    Body mass index is 28.65 kg/m .  Physical Exam   GENERAL alert and no distress  EYES:  Normal sclera,conjunctiva, EOMI  HENT: oral and posterior pharynx without lesions or erythema, facies symmetric  NECK: Neck supple. No LAD, without thyroidmegaly.  RESP: Clear to ausculation bilaterally without wheezes or crackles. Normal BS in all fields.  CV: RRR normal S1S2 without murmurs, rubs or gallops.  LYMPH: no cervical lymph adenopathy appreciated  MS: extremities- no gross deformities of the visible extremities noted,   EXT:  no lower extremity edema  PSYCH: Alert and oriented times 3; speech- coherent  SKIN:  No obvious significant skin lesions on visible portions of face     Diagnostic Test Results:  Labs reviewed in Epic    "       (Z00.00) Encounter for Medicare annual wellness exam  (primary encounter diagnosis)  Comment: Discussed cardiac disease risk factors and cardiac disease risk factor modification, including diabetes screening, blood pressure screening (and management if indicated), and cholesterol screening.   Reviewed immunzation guidelines, including pneumococcal vaccines, annual influenza, and shingles vaccines.   Discussed routine cancer screenings, including skin cancer, colon cancer screening for everyone until age 80, prostate cancer screening in men until age 75, mammogram and PAP/pelvic for women until age 75.   Recommended regular dentist visits to care for remaining teeth.   Recommended regular screening for vision and glaucoma.   Recommended safe driving and accident avoidance.   Plan:     (M10.9) Acute gout of foot, unspecified cause, unspecified laterality  Comment: This condition is currently controlled on the current medical regimen.  Continue current therapy.   Recheck labs  His uric acid levels are much lower if he did not receive much alcohol, remind him to really decrease, if not stop clearly, all alcohol use.  Plan: allopurinol (ZYLOPRIM) 100 MG tablet,         Comprehensive metabolic panel, CBC with         platelets, Uric acid            (I10) Benign essential hypertension  Comment: This condition is currently controlled on the current medical regimen.  Continue current therapy.   Discussed current hypertension treatment guidelines, including indications for treatment and treatment options.  Discussed the importance for aggressive management of HTN to prevent vascular complications later.  Recommended lower fat, lower carbohydrate, and lower sodium (<2000 mg)diet.  Discussed required intervals for follow up on HTN, lab studies.  Recommened pt. follow their blood pressures outside the clinic to ensure that BPs are remaining within guidelines, and to contact me if the readings are not within guidelines on a  "regular basis so we can adjust treatment as needed.   Reminded him that his blood pressure would be significantly better if he were to discontinue or at least greatly decreased all alcohol use.  Monitor blood pressure closely if he ever does decide to discontinue or reduce alcohol use.  Plan: amLODIPine (NORVASC) 10 MG tablet,         lisinopril-hydrochlorothiazide (ZESTORETIC)         20-12.5 MG tablet, Comprehensive metabolic         panel, CBC with platelets            (J43.1) COPD (HCC)  Comment: This condition is currently controlled on the current medical regimen.  Continue current therapy.   Discussed general issues of COPD, including pathophysiology, ways it will affect the pt., when to seek help, reviewed the typical medications (how they are taken, how they help)   Plan: fluticasone-salmeterol (ADVAIR DISKUS) 250-50         MCG/DOSE inhaler, tiotropium (SPIRIVA RESPIMAT)        2.5 MCG/ACT inhaler, albuterol (PROVENTIL) (2.5        MG/3ML) 0.083% neb solution, albuterol (PROAIR         HFA/PROVENTIL HFA/VENTOLIN HFA) 108 (90 Base)         MCG/ACT inhaler            (E78.5) Hyperlipidemia LDL goal <130  Comment: Discussed guidelines recommending a statin cholesterol lowering medication for any patient with either diabetes and/or vascular disease, aiming for a LDL goal under 100 for sure, ideally under 70.    Reviewed statins and their side effects including muscle pain, muscle inflammation, GI upset.  Told the patient to stop the medication in question and to call if any side effects develop.   Recommended CoQ10 200-300 mg at the same time as taking the statin medication to help reduce the chance of muscle side effects from the statin.    Plan: Lipid panel reflex to direct LDL Fasting,         Comprehensive metabolic panel            (R06.2) Wheeze  Comment:   Plan:     (R73.03) Prediabetes  Comment: Reviewed the labs showing elevated glucose levels.    Discussed \"pre-diabetes\", impaired glucose tolerance, " "and its part in the dysmetabolic syndrome.    Discussed the inevitable progression of impaired glucose tolerance toward worsening diabetes mellitus and the need for agressive interventions now to delay and prevent this inevitable progression.  Discussed the overall risks that dysmetabolic syndromes/impaired glucose tolerance/syndrome X pose toward increased risks of vascular disease as the main reason for agressive intervention now.  Will add medications for glucose control (i.e. metformin, glitazones, etc), lipid (e.g. statins), and for blood pressure (preferably ARBs and ACE) as indicated.  Will start these as early as needed based other proven ability to delay and modify these risk factors.   Discussed the need for aggressive diet control as the cornerstone of pre-diabetes and diabetes management, emphasizing the reduction of \"simple carbohydrates\" (e.g. Any kind of wheat products (e.g. any bread, any pasta), white rice, noodles, potatoes, snack foods, regular soda, juices (except fresh squeezed), cakes, cookies, candy, etc.) along with regular exercise.       Plan: Hemoglobin A1c, Lipid panel reflex to direct         LDL Fasting, Comprehensive metabolic panel, CBC        with platelets            (F10.20) Alcohol dependence, episodic drinking behavior (H)  Comment: Continues to drink on a regular basis.  We discussed this at multiple visits.  Acknowledges all medication that I provided to him but does not wish referrals for evaluation.  Discussed the many different ways excessive alcohol damages the body.    Discussed the other damaging non-medical side effects of excessive alcohol use as well.    Discussed the observed increases in all-cause mortality and morbidity when drinking above 2 drinks per day (defined as 12 oz beer, or 4 oz wine, or 1.5 oz spirits).  Offered help and support in finding help in quitting alcohol.  Will offer CD assessment at [facility] if pt desires, but he declines at this time.. "       Plan:     (Z12.5) Screening for prostate cancer  Comment: Discussed prostate cancer screening and PSA blood test.  Discussed that an elevated PSA blood test can be caused by things other than prostate cancer, including infection/inflammation, BPH, and recent sexual activity; and that elevated PSA blood test may require further investigation with a urologist   Plan: Prostate spec antigen screen            (Z87.891) Personal history of tobacco use  Comment: Long history of smoking, greater than 30 pack years.  Discussed lung cancer screening via low-dose chest CT recommendations.  Patient is interested and would like to proceed.  The order was placed.  Strongly recommended smoking cessation and ways in which I can help (counseling, medications, etc.) as well as I could, he stated he would take this under advisement and is not ready to commit to quitting at this time.  Plan: Prof fee: Shared Decisionmaking for Lung Cancer        Screening, CT Chest Lung Cancer Scrn Low Dose         wo                 Lung Cancer Screening Shared Decision Making Visit     Gabe Smith is eligible for lung cancer screening on the basis of the information provided in my signed lung cancer screening order.     I have discussed with patient the risks and benefits of screening for lung cancer with low-dose CT.     The risks include:  radiation exposure: one low dose chest CT has as much ionizing radiation as about 15 chest x-rays or 6 months of background radiation living in [place]    false positives: 96% of positive findings/nodules are NOT cancer, but some might still require additional diagnostic evaluation, including biopsy  over-diagnosis: some slow growing cancers that might never have been clinically significant will be detected and treated unnecessarily     The benefit of early detection of lung cancer is contingent upon adherence to annual screening or more frequent follow up if indicated.     Furthermore, reaping the  benefits of screening requires Gabe Smith to be willing and physically able to undergo diagnostic procedures, if indicated. Although no specific guide is available for determining severity of comorbidities, it is reasonable to withhold screening in patients who have greater mortality risk from other diseases.     We did discuss that the only way to prevent lung cancer is to not smoke. Smoking cessation assistance was offered.    I did not offer risk estimation using a calculator such as this one:    ShouldIScreen

## 2020-08-05 NOTE — PATIENT INSTRUCTIONS
"  *  Continue all medications at the same doses.  Contact your usual pharmacy if you need refills.     *  See information below about routine lung cancer screening.     *  Return to see me in 6 months, sooner if needed.  Use [employer] or Call [phone] to schedule the appointment with me.       5 GOALS TO PREVENT VASCULAR DISEASE:     1.  Aggressive blood pressure control, under 130/80 ideally.  Using medications if needed.    Your blood pressure is under good control    BP Readings from Last 4 Encounters:   08/05/20 134/76   01/23/20 120/80   01/20/20 (!) 151/83   01/06/20 113/70       2.  Aggressive LDL cholesterol (\"bad cholesterol\") lowering as indicated.    Your goal is an LDL under 130 for sure, preferably under 100.  (If you have diabetes or previous vascular disease, the the LDL goals would be under 100 for sure, preferably under 70.)    New guidelines identify four high-risk groups who could benefit from statins:   *people with pre-existing heart disease, such as those who have had a heart attack;   *people ages 40 to 75 who have diabetes of any type  *patients ages 40 to 75 with at least a 7.5% risk of developing cardiovascular disease over the next decade, according to a formula described in the guidelines  *patients with the sort of super-high cholesterol that sometimes runs in families, as evidenced by an LDL of 190 milligrams per deciliter or higher    Your cholesterol levels are well controlled.    Recent Labs   Lab Test 07/28/20  1148 07/15/19  0751  08/04/15  0757 02/03/15  0748   CHOL 189 154   < > 210* 192   HDL 35* 29*   < > 27* 33*   * 89   < > Cannot estimate LDL when triglyceride exceeds 400 mg/dL  134* 107   TRIG 172* 178*   < > 422* 262*   CHOLHDLRATIO  --   --   --  7.8* 5.8*    < > = values in this interval not displayed.       3.  Aggressive diabetic prevention, screening and/or management.      You do not have diabetes as of the most recent blood tests.     4.  No smoking    5.  " Consider daily preventative aspirin over age 50 if you have enough cardiac risk factors to place you at higher risk for the presence of vascular disease.    If you have any reason not to take aspirin such easy bruising or bleeding, stomach problems, other anticoagulant medications, or any other side effects, then you should not take Aspirin.      --Based on your current cardiac risk factor profile, you should take regular daily Aspirin 81 mg once per day.           Preventive Health Recommendations:   Male Ages 65 and over    Yearly exam:             See your health care provider every year in order to  o   Review health changes.   o   Discuss preventive care.    o   Review your medicines if your doctor has prescribed any.    Talk with your health care provider about whether you should have a test to screen for prostate cancer (PSA).    Every 3 years, have a diabetes test (fasting glucose). If you are at risk for diabetes, you should have this test more often.    Every 5 years, have a cholesterol test. Have this test more often if you are at risk for high cholesterol or heart disease.     Every 10 years, have a colonoscopy. Or, have a yearly FIT test (stool test). These exams will check for colon cancer.    Talk to with your health care provider about screening for Abdominal Aortic Aneurysm if you have a family history of AAA or have a history of smoking.    Shots:     Get a flu shot each year.     Get a tetanus shot every 10 years.     Talk to your doctor about your pneumonia vaccines. There are now two you should receive - Pneumovax (PPSV 23) and Prevnar (PCV 13).     Talk to your doctor about a shingles vaccine.     Talk to your doctor about the hepatitis B vaccine.  Nutrition:     Eat at least 5 servings of fruits and vegetables each day.     Eat whole-grain bread, whole-wheat pasta and brown rice instead of white grains and rice.     Talk to your provider about Calcium and Vitamin D.      --Good Grains:  Oats,  "brown rice, Quinoa (these do not raise the blood sugar as much)     --Bad grains:  Anything made from wheat or white rice     (because these raise the blood sugars significantly, and the possible gluten issue from wheat for some people).      --Proteins:  Aim for \"lean proteins\" including chicken, fish, seafood, pork, turkey, and eggs (in moderation); Eat red meat only occasionally      Lifestyle    Exercise for at least 150 minutes a week (30 minutes a day, 5 days a week). This will help you control your weight and prevent disease.     Limit alcohol to one drink per day.     No smoking.     Wear sunscreen to prevent skin cancer.     See your dentist every six months for an exam and cleaning.     See your eye doctor every 1 to 2 years to screen for conditions such as glaucoma, macular degeneration, cataracts, etc                 Patient Education   Personalized Prevention Plan  You are due for the preventive services outlined below.  Your care team is available to assist you in scheduling these services.  If you have already completed any of these items, please share that information with your care team to update in your medical record.  Health Maintenance Due   Topic Date Due     COPD Action Plan  1955     Discuss Advance Care Planning  07/29/2019     FALL RISK ASSESSMENT  03/22/2020     AORTIC ANEURYSM SCREENING (SYSTEM ASSIGNED)  03/22/2020     Annual Wellness Visit  07/19/2020     Diptheria Tetanus Pertussis (DTAP/TDAP/TD) Vaccine (3 - Td) 08/07/2020      Patient Education   Alcohol Use     Many people can enjoy a glass of wine or beer without any negative consequences to their health. According to the Centers for Disease Control and Prevention (CDC), having one or fewer drinks per day for women and two or fewer per day for men is considered moderate drinking.     When people drink more than moderately, it can become concerning. Excessive drinking is defined as consuming 15 drinks or more per week for men " and 8 drinks or more per week for women. There are various health problems associated with excessive drinking, which include:    Damage to vital organs like the heart, brain, liver and pancreas    Harm to the digestive tract    Weaken the immune system    Higher risk for heart disease and cancer    There are many resources available to people who are addicted to alcohol. A counselor or other health care provider can give you support. So can a , , or rabbi who is trained in substance abuse counseling. Friends and family may also help once you are connected with professionals.            Lung Cancer Screening   Frequently Asked Questions  If you are at high-risk for lung cancer, getting screened with low-dose computed tomography (LDCT) every year can help save your life. This handout offers answers to some of the most common questions about lung cancer screening. If you have other questions, please call [phone] ([phone]).     What is it?  Lung cancer screening uses special X-ray technology to create an image of your lung tissue. The exam is quick and easy and takes less than 10 seconds. We don t give you any medicine or use any needles. You can eat before and after the exam. You don t need to change your clothes as long as the clothing on your chest doesn t contain metal. But, you do need to be able to hold your breath for at least 6 seconds during the exam.    What is the goal of lung cancer screening?  The goal of lung cancer screening is to save lives. Many times, lung cancer is not found until a person starts having physical symptoms. Lung cancer screening can help detect lung cancer in the earliest stages when it may be easier to treat.    Who should be screened for lung cancer?  We suggest lung cancer screening for anyone who is at high-risk for lung cancer. You are in the high-risk group if you:      are between the ages of 55 and 79, and    have smoked at least 1 pack of  cigarettes a day for 30 or more years, and    still smoke or have quit within the past 15 years.    However, if you have a new cough or shortness of breath, you should talk to your doctor before being screened.    Some national lung health advocacy groups also recommend screening for people ages 50 to 79 who have smoked an average of 1 pack of cigarettes a day for 20 years. They must also have at least 1 other risk factor for lung cancer, not including exposure to secondhand smoke. Other risk factors are having had cancer in the past, emphysema, pulmonary fibrosis, COPD, a family history of lung cancer, or exposure to certain materials such as arsenic, asbestos, beryllium, cadmium, chromium, diesel fumes, nickel, radon or silica. Your care team can help you know if you have one of these risk factors.     Why does it matter if I have symptoms?  Certain symptoms can be a sign that you have a condition in your lungs that should be checked and treated by your doctor. These symptoms include fever, chest pain, a new or changing cough, shortness of breath that you have never felt before, coughing up blood or unexplained weight loss. Having any of these symptoms can greatly affect the results of lung cancer screening.       Should all smokers get an LDCT lung cancer screening exam?  It depends. Lung cancer screening is for a very specific group of men and women who have a history of heavy smoking over a long period of time (see  Who should be screened for lung cancer  above).  I am in the high-risk group, but have been diagnosed with cancer in the past. Is LDCT lung cancer screening right for me?  In some cases, you should not have LDCT lung screening, such as when your doctor is already following your cancer with CT scan studies. Your doctor will help you decide if LDCT lung screening is right for you.  Do I need to have a screening exam every year?  Yes. If you are in the high-risk group described earlier, you should get  an LDCT lung cancer screening exam every year until you are 79, or are no longer willing or able to undergo screening and possible procedures to diagnose and treat lung cancer.  How effective is LDCT at preventing death from lung cancer?  Studies have shown that LDCT lung cancer screening can lower the risk of death from lung cancer by 20 percent in people who are at high-risk.  What are the risks?  There are some risks and limitations of LDCT lung cancer screening. We want to make sure you understand the risks and benefits, so please let us know if you have any questions. Your doctor may want to talk with you more about these risks.    Radiation exposure: As with any exam that uses radiation, there is a very small increased risk of cancer. The amount of radiation in LDCT is small--about the same amount a person would get from a mammogram. Your doctor orders the exam when he or she feels the potential benefits outweigh the risks.    False negatives: No test is perfect, including LDCT. It is possible that you may have a medical condition, including lung cancer, that is not found during your exam. This is called a false negative result.    False positives and more testing: LDCT very often finds something in the lung that could be cancer, but in fact is not. This is called a false positive result. False positive tests often cause anxiety. To make sure these findings are not cancer, you may need to have more tests. These tests will be done only if you give us permission. Sometimes patients need a treatment that can have side effects, such as a biopsy. For more information on false positives, see  What can I expect from the results?     Findings not related to lung cancer: Your LDCT exam also takes pictures of areas of your body next to your lungs. In a very small number of cases, the CT scan will show an abnormal finding in one of these areas, such as your kidneys, adrenal glands, liver or thyroid. This finding may not  be serious, but you may need more tests. Your doctor can help you decide what other tests you may need, if any.  What can I expect from the results?  About 1 out of 4 LDCT exams will find something that may need more tests. Most of the time, these findings are lung nodules. Lung nodules are very small collections of tissue in the lung. These nodules are very common, and the vast majority--more than 97 percent--are not cancer (benign). Most are normal lymph nodes or small areas of scarring from past infections.  But, if a small lung nodule is found to be cancer, the cancer can be cured more than 90 percent of the time. To know if the nodule is cancer, we may need to get more images before your next yearly screening exam. If the nodule has suspicious features (for example, it is large, has an odd shape or grows over time), we will refer you to a specialist for further testing.  Will my doctor also get the results?  Yes. Your doctor will get a copy of your results.  Is it okay to keep smoking now that there s a cancer screening exam?  No. Tobacco is one of the strongest cancer-causing agents. It causes not only lung cancer, but other cancers and cardiovascular (heart) diseases as well. The damage caused by smoking builds over time. This means that the longer you smoke, the higher your risk of disease. While it is never too late to quit, the sooner you quit, the better.  Where can I find help to quit smoking?  The best way to prevent lung cancer is to stop smoking. If you have already quit smoking, congratulations and keep it up! For help on quitting smoking, please call [employer] at 6-821-306-FLDD ([phone]) or the American [employer] Society at 1-329.793.7683 to find local resources near you.  One-on-one health coaching:  If you d prefer to work individually with a health care provider on tobacco cessation, we offer:      Medication Therapy Management:  Our specially trained pharmacists work closely with you and your doctor to  help you quit smoking.  Call [phone] or [phone] (toll free).     Can Do: Health coaching offered by [facility].  [email or web address]

## 2020-08-20 ENCOUNTER — HOSPITAL ENCOUNTER (OUTPATIENT)
Dept: CT IMAGING | Facility: CLINIC | Age: 65
Discharge: HOME | End: 2020-08-20
Attending: INTERNAL MEDICINE | Admitting: INTERNAL MEDICINE
Payer: COMMERCIAL

## 2020-08-20 DIAGNOSIS — Z87.891 PERSONAL HISTORY OF TOBACCO USE: ICD-10-CM

## 2020-08-20 PROCEDURE — G0297 LDCT FOR LUNG CA SCREEN: HCPCS

## 2020-08-21 ENCOUNTER — TELEPHONE (OUTPATIENT)
Dept: INTERNAL MEDICINE | Facility: CLINIC | Age: 65
End: 2020-08-21

## 2020-08-21 NOTE — TELEPHONE ENCOUNTER
Please call the patient.     The recent lung cancer screening Chest CT showed no clear evidence for lung cancer, but did show some bengin nodules for which they recommended another CHest CT in 6 months.   We can discuss and order this scan at his follow up appointment with me in February 2021.

## 2020-08-22 ENCOUNTER — TELEPHONE (OUTPATIENT)
Dept: INTERNAL MEDICINE | Facility: CLINIC | Age: 65
End: 2020-08-22

## 2020-08-22 DIAGNOSIS — R91.8 ABNORMAL CT LUNG SCREENING: ICD-10-CM

## 2020-08-22 NOTE — TELEPHONE ENCOUNTER
Erroneous encounter    Tamika Baltazar RN  [employer] RN  Lung Nodule and ED Lab Result RN  Epic pool (ED late result f/u RN): [id number]  [facility] INCIDENTAL RADIOLOGY F/U NURSES: [id number]  # [phone]

## 2020-08-24 PROBLEM — R91.8 ABNORMAL CT LUNG SCREENING: Status: ACTIVE | Noted: 2020-08-24

## 2020-08-24 NOTE — TELEPHONE ENCOUNTER
"[facility] calling to inform PCP on chest CT scan results for \"incidental finding.\"    \"IMPRESSION:   1. ACR Assessment Category:  Lung-RADS Category 3. Probably benign  finding(s)- short term follow up suggested with 6 month low dose CT  (please order exam code IMG 2163). .   2. Significant Incidental Finding(s):  Category S: Yes. Borderline  aneurysmal descending thoracic aorta measuring 4.0 cm. \"  "

## 2020-08-24 NOTE — TELEPHONE ENCOUNTER
[facility] Radiology Lung Cancer Screening CT result notification:     LDCT/Lung Cancer Screening CT Exam date: 8/20/20  Radiologist Impression AND Recommendations:   ACR Assessment Category:  Lung-RADS Category 3. Probably benign  finding(s)- short term follow up suggested with 6 month low dose CT  (please order exam code IMG 2163). .    Pertinent Findings:  Lungs: Multiple bilateral solid and groundglass pulmonary nodules. The  largest noncalcified solid nodule in the left upper lobe measures 6  mm. The largest mixed density nodule in the right upper lobe measures  5 mm. Few punctate calcified granulomas. No pulmonary infiltrates or  pleural effusion.     Ordering Provider: Donny Payne MD Scott C Sarah did receive the remaining radiology results from her provider.     Lung Nodule Program Protocol recommendation [Pertaining to lung nodules]:    Lung RADS 3 Protocol: Results RN to notify Patient of results/recommendations and place order for 6 month CT ([id number]) - MD hernandez   CT Chest order placed to be completed within 6 months (Yes/No):  Thi    RN communicated the lung nodule finding to the patient (Yes/No):  Yes  The patient had the following questions: what are lung nodules  Correct letter sent as per Lung nodule protocol (Yes/No):  Yes    If scheduling LDCT only, RN will contact Image Scheduling Team  Hours available (all sites below):  Mon-Fri 7A to 8P; Sat 7A to 3P.  No schedulers available on Sunday.    [facility] ([place] and [place]): [phone]    [place] ([place], [place]): [phone]    [place] ([facility] the [facility]): [phone]      Burak Beaver RN  [facility] Result RN  Lung Nodule and Emergency Dept Lab Result F/u RN  Ph# [phone]

## 2020-08-24 NOTE — TELEPHONE ENCOUNTER
Informed patient of message below. He verbalized an understanding and agrees with plan.    Gwen [place] MADELYN

## 2020-10-19 DIAGNOSIS — E78.5 HYPERLIPIDEMIA LDL GOAL <130: ICD-10-CM

## 2020-10-19 RX ORDER — PRAVASTATIN SODIUM 40 MG
TABLET ORAL
Qty: 90 TABLET | Refills: 3 | Status: SHIPPED | OUTPATIENT
Start: 2020-10-19 | End: 2021-09-03

## 2021-02-05 ENCOUNTER — OFFICE VISIT (OUTPATIENT)
Dept: INTERNAL MEDICINE | Facility: CLINIC | Age: 66
End: 2021-02-05
Payer: COMMERCIAL

## 2021-02-05 VITALS
OXYGEN SATURATION: 93 % | WEIGHT: 206.6 LBS | BODY MASS INDEX: 29.64 KG/M2 | DIASTOLIC BLOOD PRESSURE: 68 MMHG | SYSTOLIC BLOOD PRESSURE: 128 MMHG | HEART RATE: 73 BPM | TEMPERATURE: 97.6 F

## 2021-02-05 DIAGNOSIS — R73.03 PREDIABETES: ICD-10-CM

## 2021-02-05 DIAGNOSIS — M10.9 ACUTE GOUT OF FOOT, UNSPECIFIED CAUSE, UNSPECIFIED LATERALITY: ICD-10-CM

## 2021-02-05 DIAGNOSIS — J43.1 PANLOBULAR EMPHYSEMA (H): Primary | ICD-10-CM

## 2021-02-05 DIAGNOSIS — I77.810 DILATATION OF THORACIC AORTA (H): ICD-10-CM

## 2021-02-05 DIAGNOSIS — I10 BENIGN ESSENTIAL HYPERTENSION: ICD-10-CM

## 2021-02-05 DIAGNOSIS — F10.20 ALCOHOL DEPENDENCE, EPISODIC DRINKING BEHAVIOR (H): ICD-10-CM

## 2021-02-05 DIAGNOSIS — Z13.6 SCREENING FOR AAA (ABDOMINAL AORTIC ANEURYSM): ICD-10-CM

## 2021-02-05 DIAGNOSIS — E78.5 HYPERLIPIDEMIA LDL GOAL <130: ICD-10-CM

## 2021-02-05 LAB
ANION GAP SERPL CALCULATED.3IONS-SCNC: 2 MMOL/L (ref 3–14)
BUN SERPL-MCNC: 21 MG/DL (ref 7–30)
CALCIUM SERPL-MCNC: 9.4 MG/DL (ref 8.5–10.1)
CHLORIDE SERPL-SCNC: 104 MMOL/L (ref 94–109)
CO2 SERPL-SCNC: 31 MMOL/L (ref 20–32)
CREAT SERPL-MCNC: 0.82 MG/DL (ref 0.66–1.25)
GFR SERPL CREATININE-BSD FRML MDRD: >90 ML/MIN/{1.73_M2}
GLUCOSE SERPL-MCNC: 125 MG/DL (ref 70–99)
HBA1C MFR BLD: 6 % (ref 0–5.6)
POTASSIUM SERPL-SCNC: 4.4 MMOL/L (ref 3.4–5.3)
SODIUM SERPL-SCNC: 137 MMOL/L (ref 133–144)

## 2021-02-05 PROCEDURE — 80048 BASIC METABOLIC PNL TOTAL CA: CPT | Performed by: INTERNAL MEDICINE

## 2021-02-05 PROCEDURE — 83036 HEMOGLOBIN GLYCOSYLATED A1C: CPT | Performed by: INTERNAL MEDICINE

## 2021-02-05 PROCEDURE — 99214 OFFICE O/P EST MOD 30 MIN: CPT | Performed by: INTERNAL MEDICINE

## 2021-02-05 PROCEDURE — 36415 COLL VENOUS BLD VENIPUNCTURE: CPT | Performed by: INTERNAL MEDICINE

## 2021-02-05 NOTE — PATIENT INSTRUCTIONS
"*  Rechecking the diabetes labs today    *  Always work to reduce the intake of \"simple carbohydrates\" (e.g. White bread, white rice, pasta, noodles, potatoes, snack foods, regular soda, juices (except fresh squeezed), cakes, cookies, candy, etc.) as best possible.     *  Continue all medications at the same doses.  Contact your usual pharmacy if you need refills.     *  Repeat Chest CT for lung cancer screenign as planned later in feb    *  Screening for abdominal aortic aneurysm with ultrasound at [facility], you will be contacted to arrange this    Return to see me in 6 months, sooner if needed.  Please get fasting labs done at the [facility] ([facility]) or any other [facility] lab 1-2 days before this appointment (schedule a \"lab appointment\").  If you get the labs done at another clinic, make arrangements with them directly.  The orders will be in place.  Eat nothing for at least 8 hours prior to having these labs drawn.  Use [employer]eebel or Call [phone] to schedule the appointment with me and lab appointment.      COVID VACCINE:     Get the Covid 19 vaccine whenever and wherever you see it available.    This is a fluid situation.    For the most up-to-date information regarding Covid vaccination, check with the [facility] website or [place] Department of Health website.      [facility] is beginning to dispense the Covid Vaccine at the following vaccine clinic locations according to the current guidelines and priority groups established by the [place] Dept of Health.  (I cannot override any of these criteria and get you a vaccine earlier than you are due to receive it)    --[facility] Location ([address] [place] [address]) at the [facility] at [address] and [facility] - NOT at the [facility]  --[place] Location (at [facility], NOT at the clinic)  --[place] Location ([facility] the [facility])              --[place] " Location - [facility] (NOT the [facility]):  --[facility] Location ([address] [place] [address], [place], [place])  --[place] Location    As of 1/28/21, [facility] is vaccinating individual over age 75 and health care workers.      As we continue to move through priority groups and more individuals are eligible for vaccination, we will circulate messaging to the general public through email, social media, and [place] [employer].    Individuals who are not in the priority groups (of healthcare personnel and individuals 75 and older) should go into [place] [employer] and update your account information. Make sure to opt in to email and text message communication.    Patients may try to schedule Covid vaccine appointments through [place] [employer], or central scheduling [phone] or a dedicated number ([phone]).   Schedules may fill up quickly depending on vaccine availability, but newer spots will continually open, so check every day.        SMOKING CESSATION:  ===========================================    *  Consider visiting [email or web address].[email or web address] for options for support in quitting smoking.     * Set a quit date when you will no longer smoke.    *  Get something for your hands to do when you are relaxing.  Examples may be a rubber band around your wrist, pen to click, poker chip, silver dollar, or  strength ball.  This will keep your hands occupied when you would have normally been holding a cigarette.    *  Have something to put in your mouth ( preferably something healthy).  The oral fixation in smokers can be a difficult thing to get over.  Use gum, Kangley Ranchers, Dum Dum suckers, carrots, celery sticks.  try to avoid the urge to snack or est junk food.  This will help prevent the weight gain that many people who quit smoking can experience.    2 medication options for quitting smoking aids:    ------------------------------------------------------------------    Option #1:  Consider Chantix.  Visit [employer] web site  ([email or web address]) for more details about the medicatino and how it works, and even for coupons.  The nicotine withdrawal is managed via the mechanism by which Chantix works, you do not need nicotine replacement while taking this.   The main side effects include weird or strange dreams, stomach upset, and potential adverse changes in the mood, including worsening of depression or anxiety.  There have even been reports of suicides caused by the worsening in the depression.  If you take Chantix and develop any adverse changes in your mood or become more depressed or more anxious, then you should stop the medicatioon immediately and contact us.  Any side effects from Chantix usually resolve very quickly.      *  If you start Chantix, I will prescribe the first month, then ask that you contact me with an update after the first month, regardless of how you feel to see how the medication is working for you and to make sure no side effects.  If the medication is working well and there are no side effects, then I will continue the medication.     Option #2:   *  Start Nicotine replacement with either gum or lozenges starting the first day of not smoking.  Slowly decrease the amount of nicotine replacement over a few weeks until you no longer need it.  your body will adjust gradually to requiring no nicotine at all.  The first 1-2 weeks are the toughest as your body gets used to not having nicotine around.    *  Start Zyban (or Buproprion/Wellbutrin) 150 mg once daily first thing in the morning 1 week before your quit date.  If no side effects, then add second dose of 150 mg late in the afternoon or early evening.    *Potential side effects including but not limited to seizure (1 out of 1000 patients on Zyban may experience a seizure), GI upset, insomnia, headache, weight loss.  If your experience side effects while ion Zyban/Wellbutrin, then call [phone] ([facility] Internal Medicine Nurse Line) and let me know.  You only  need to stay on the zyban for about 2-3 months before you can stop it.  When you have decided to longer take Zyban/Wellbutrin, then go down to 1 tablet per day again for 1 week, then stop completely.      Alcohol Problems  Most adults who drink alcohol drink in moderation (not a lot) are at low risk for developing problems related to their drinking. However, all drinkers, including low-risk drinkers, should know about the health risks connected with drinking alcohol.  RECOMMENDATIONS FOR LOW-RISK DRINKING:  Drink in moderation. Moderate drinking is defined as follows:     Men - no more than 2 drinks per day.    Nonpregnant women - no more than 1 drink per day.    Over age 65 - no more than 1 drink per day.  A standard drink is 12 grams of pure alcohol, which is equal to a 12 ounce bottle of beer or wine cooler, a 5 ounce glass of wine, or 1.5 ounces of distilled spirits (such as whiskey, ila, vodka, or rum).   ABSTAIN FROM (DO NOT DRINK) ALCOHOL:    When pregnant or considering pregnancy.    When taking a medication that interacts with alcohol.    If you are alcohol dependent.    A medical condition that prohibits drinking alcohol (such as ulcer, liver disease, or heart disease).

## 2021-02-05 NOTE — PROGRESS NOTES
Assessment & Plan     COPD (HCC)  This condition is currently controlled on the current medical regimen.  Continue current therapy.   Continue same inhalers.   Strongly recommended discontinuing any and all smoking as firmly as I could without being obnoxious.   He was not ready to fully commit to smoking cessation at this time.     Benign essential hypertension  This condition is currently controlled on the current medical regimen.  Continue current therapy.   Discussed current hypertension treatment guidelines, including indications for treatment and treatment options.  Discussed the importance for aggressive management of HTN to prevent vascular complications later.  Recommended lower fat, lower carbohydrate, and lower sodium (<2000 mg)diet.  Discussed required intervals for follow up on HTN, lab studies.  Recommened pt. follow their blood pressures outside the clinic to ensure that BPs are remaining within guidelines, and to contact me if the readings are not within guidelines on a regular basis so we can adjust treatment as needed.   - Basic metabolic panel    Hyperlipidemia LDL goal <130  This condition is currently controlled on the current medical regimen.  Continue current therapy.     Acute gout of foot, unspecified cause, unspecified laterality  No recent attacks  Discussed that alcohol abstinence would greatly reduce chance of future gout attacks.     Alcohol dependence, episodic drinking behavior (H)  Discussed the many different ways excessive alcohol damages the body.   Discussed the other damaging non-medical side effects of excessive alcohol use as well.    Discussed the observed increases in all-cause mortality and morbidity when drinking above 2 drinks per day (defined as 12 oz beer, or 4 oz wine, or 1.5 oz spirits).  Offered help and support in finding help in quitting alcohol.  Will offer CD assessment at [facility] if pt desires.     Dilatation of thoracic aorta (H)  Reviewed Chest CT scan  "reports.   Discussed secondary risk factor modification and recommended continuing aggressive management of these items, including smoking cessation  - US Aorta Medicare AAA Screening; Future    Prediabetes  Reviewed the labs showing mildly elevated glucose levels consistent with prediabetes.     Discussed \"pre-diabetes\", impaired glucose tolerance, and its part in the dysmetabolic syndrome.   No medications needed at this time.     Discussed the inevitable progression of impaired glucose tolerance toward worsening diabetes mellitus if nothing is changed in the diet, and the need for agressive interventions now to delay and prevent this inevitable progression.    Recommended lower carb diet.  Recommended regular physical activity.   We will continue to monitor this and nuñez additional recommendations and treatments as indicated based on the labs.       - Hemoglobin A1c  - Basic metabolic panel    Screening for AAA (abdominal aortic aneurysm)  Strongly recommeneded routein AAA screening  - [place] Aorta Medicare AAA Screening; Future                       Tobacco Cessation:   reports that he has been smoking cigarettes. He has been smoking about 0.50 packs per day. He has never used smokeless tobacco.  Tobacco Cessation Action Plan: Information offered: Patient not interested at this time    BMI:   Estimated body mass index is 29.64 kg/m  as calculated from the following:    Height as of 8/5/20: 1.778 m (5' 10\").    Weight as of this encounter: 93.7 kg (206 lb 9.6 oz).   GENERAL alert and no distress  EYES:  Normal sclera,conjunctiva, EOMI  HENT: oral and posterior pharynx without lesions or erythema, facies symmetric  NECK: Neck supple. No LAD, without thyroidmegaly.  RESP: Clear to ausculation bilaterally without wheezes or crackles. Normal BS in all fields.  CV: RRR normal S1S2 without murmurs, rubs or gallops.  LYMPH: no cervical lymph adenopathy appreciated  MS: extremities- no gross deformities of the visible " extremities noted,   EXT:  no lower extremity edema  PSYCH: Alert and oriented times 3; speech- coherent  SKIN:  No obvious significant skin lesions on visible portions of face           Return in about 6 months (around 8/5/2021) for Medicare Annual Wellness Exam, Blood pressure, Diabetes, with pre-visit fasting labs.    Donny Payne MD  [facility] DOV Bernal is a 65 year old who presents to clinic today for the following health issues     HPI       Hypertension Follow-up      Do you check your blood pressure regularly outside of the clinic? No     Are you following a low salt diet? Yes    Are your blood pressures ever more than 140 on the top number (systolic) OR more   than 90 on the bottom number (diastolic), for example 140/90? No      How many servings of fruits and vegetables do you eat daily?  2-3    On average, how many sweetened beverages do you drink each day (Examples: soda, juice, sweet tea, etc.  Do NOT count diet or artificially sweetened beverages)?   0    How many days per week do you exercise enough to make your heart beat faster? None outside of work     How many minutes a day do you exercise enough to make your heart beat faster?none outside of work     How many days per week do you miss taking your medication? 0    Hyperlipidemia Follow-Up      Are you regularly taking any medication or supplement to lower your cholesterol?   Yes- pravastatin    Are you having muscle aches or other side effects that you think could be caused by your cholesterol lowering medication?  No      3.  COPD remains stable.  Has not had any recent breathing troubles beyond usual baseline.  Has not any acute respiratory events.  Remains with intermittent cough, mild shortness of breath with overexertion as per usual.  Using medication as directed with reported side effects.   Still smoking but trying to cut donw, not ready to fully commit to smokign cessation at this  time    4.  stil drinks alaocohl on regular basis.   Reviewed labs, reviewed current drinking habits, reviewed detrimenal effects of continued alcohol overuse.   Not ready to commit to complete alcohol cessation.     5. The patient has a history of impaired glucose tolerance with regularly elevated blood sugars.    They have not been diagnosed with type II DM or placed on medications for diabetes before.   They deny polyuria, polydipsia.     The patient is not obese with a BMI of Body mass index is 29.64 kg/m ..    Review of current labs show:    Lab Results   Component Value Date    A1C 6.0 02/05/2021    A1C 6.2 07/28/2020    A1C 5.7 01/16/2020    A1C 5.8 07/15/2019    A1C 5.9 12/26/2018    A1C 5.7 09/20/2016    A1C 5.5 08/04/2015    A1C 5.9 02/03/2015    A1C 6.1 10/07/2014     Lab Results   Component Value Date     02/05/2021     07/28/2020     01/16/2020     07/15/2019     12/26/2018     07/12/2018     07/11/2017     01/04/2017     04/04/2016     08/04/2015     02/03/2015     10/07/2014     08/07/2014     10/27/2010     07/15/2010          Review of Systems   Constitutional, HEENT, cardiovascular, pulmonary, gi and gu systems are negative, except as otherwise noted.      Objective    /68   Pulse 73   Temp 97.6  F (36.4  C) (Temporal)   Wt 93.7 kg (206 lb 9.6 oz)   SpO2 93%   BMI 29.64 kg/m    Body mass index is 29.64 kg/m .  Physical Exam   GENERAL alert and no distress  EYES:  Normal sclera,conjunctiva, EOMI  HENT: oral and posterior pharynx without lesions or erythema, facies symmetric  NECK: Neck supple. No LAD, without thyroidmegaly.  RESP: breath sounds quiet but clear, diminished respiratory excursion, prolonged expiratory phase, few scattered faint rhonci, few scattered faint end expiratory wheezes, no rales   CV: RRR normal S1S2 without murmurs, rubs or gallops.  LYMPH: no cervical lymph  adenopathy appreciated  MS: extremities- no gross deformities of the visible extremities noted,   EXT:  no lower extremity edema  PSYCH: Alert and oriented times 3; speech- coherent  SKIN:  No obvious significant skin lesions on visible portions of face

## 2021-02-24 ENCOUNTER — HOSPITAL ENCOUNTER (OUTPATIENT)
Dept: CT IMAGING | Facility: CLINIC | Age: 66
Discharge: HOME | End: 2021-02-24
Attending: INTERNAL MEDICINE | Admitting: INTERNAL MEDICINE
Payer: COMMERCIAL

## 2021-02-24 DIAGNOSIS — R91.8 ABNORMAL CT LUNG SCREENING: ICD-10-CM

## 2021-02-24 PROCEDURE — 71250 CT THORAX DX C-: CPT

## 2021-02-25 ENCOUNTER — TELEPHONE (OUTPATIENT)
Dept: INTERNAL MEDICINE | Facility: CLINIC | Age: 66
End: 2021-02-25

## 2021-02-25 ENCOUNTER — HOSPITAL ENCOUNTER (OUTPATIENT)
Dept: ULTRASOUND IMAGING | Facility: CLINIC | Age: 66
Discharge: HOME | End: 2021-02-25
Attending: INTERNAL MEDICINE | Admitting: INTERNAL MEDICINE
Payer: COMMERCIAL

## 2021-02-25 DIAGNOSIS — R91.8 ABNORMAL CT LUNG SCREENING: ICD-10-CM

## 2021-02-25 DIAGNOSIS — I77.810 DILATATION OF THORACIC AORTA (H): ICD-10-CM

## 2021-02-25 DIAGNOSIS — Z13.6 SCREENING FOR AAA (ABDOMINAL AORTIC ANEURYSM): ICD-10-CM

## 2021-02-25 PROCEDURE — 76706 US ABDL AORTA SCREEN AAA: CPT

## 2021-02-25 NOTE — LETTER
2021        Gabe Smith MRN: [id number]   [address] [facility] [address] : 1955         Dear Mr. Smith,   You recently had a CT scan of your chest to monitor a previously seen spot on your lung, called a lung nodule. We are pleased to report to you that the nodule we were watching has not grown from before. This is reassuring that it is not likely to be cancer. However, compared to your last scan, there is a new spot on your lung.  To make sure the new spot is not cancer, we will be contacting you to discuss scheduling a follow up diagnostic test. If you haven t yet scheduled this exam, please do so by calling a location convenient to you:    [facility] ([facility]): [phone]    North region ([place], [place]): [phone]    South region ([place] includes the [facility]): [phone]    To speak with a nurse about your lung nodule, please call [phone]. Or, you may call your clinic.   Your imaging study has been sent to your healthcare provider and will be kept on file for your continuing care. Other findings from your imaging study besides this nodule should be provided from your clinic. Please inform any new doctors of your medical record with us.  Sincerely,   [facility] [place] [facility]

## 2021-02-25 NOTE — TELEPHONE ENCOUNTER
[facility] Radiology Lung Cancer Screening CT result notification:     LDCT/Lung Cancer Screening CT Exam date: 8/24/21  Radiologist Impression AND Recommendations:   ACR Assessment Category:  Lung-RADS Category 3. Probably benign  finding(s)- short term follow up suggested with 6 month low dose CT  (please order exam code IMG 2163). .   Pertinent Findings:  Lungs: Again seen are multiple solid and groundglass nodules in the  lungs. There are a few new and enlarging nodules. For example, a 3 mm  right lower lobe nodule (series 5, image 184), medial right lower lobe  4 mm nodule (series 5, image 115) and a small cluster of nodules in  the left upper lobe measuring about 4 mm are new (series 5, image  49-55). A 6 mm right upper lobe part solid nodule (series 5, image 65)  has minimally increased in size, previously measuring 5 mm and a 5 mm  solid left lower lobe nodule along the major fissure (series 5, image  220) previously measured 3 mm. Few other nodules are stable. For  example, 4 mm right apical nodule (series 5, image 49), previously  measured 4 mm. No significant emphysema, pulmonary fibrosis,  infiltrate or pleural effusion.     Ordering Provider: Donny Payne MD Scott C Sarah did not receive the remaining radiology results from her provider.      Lung Nodule Program Protocol recommendation [Pertaining to lung nodules]:    Lung RADS 3 Protocol: Results RN to notify Patient of results/recommendations and place order for 6 month CT ([id number]) - MD hernandez   CT Chest order placed to be completed within 6 months (Yes/No):  Yes and due on or after 8/25/21;  Image schedulers will contact Patient 1 month prior to schedule    RN communicated the lung nodule finding to the patient (Yes/No):  Yes  The patient had the following questions: No  Correct letter sent as per Lung nodule protocol (Yes/No):  Sent letter out  Did Patient have any CT's of chest previous? (inquire only if no CT's were used for  comparison) (Yes/No/NA) NA    If scheduling LDCT only, RN will contact Image Scheduling Team  Hours available (all sites below):  Mon-Fri 7A to 8P; Sat 7A to 3P.  No schedulers available on Sunday.    [facility] ([place] and [place]): [phone]    North region ([place], [place]): [phone]    South region ([facility]): [phone]    Burak Beaver RN  [facility] Service Center Result RN  Lung Nodule and Emergency Dept Lab Result F/u RN  Ph# [phone]

## 2021-02-25 NOTE — TELEPHONE ENCOUNTER
Call the patient and spoke with him about his CT scan and ultrasound.    Short-term repeat chest CT recommended in 6 months, we can order this when he is here for his follow-up visit in August.  Recommend he discontinue any and all smoking.  He is not ready to commit to a smoking cessation plan.    Reviewed the ultrasound showing borderline dilatation of the abdominal aorta, possibly early abdominal aortic aneurysm.  Reviewed the recommendation of a repeat ultrasound in 1 year to recheck.    Continue all current medicines at the same doses.    Return to see me in August as planned

## 2021-09-03 ENCOUNTER — OFFICE VISIT (OUTPATIENT)
Dept: INTERNAL MEDICINE | Facility: CLINIC | Age: 66
End: 2021-09-03
Payer: MEDICARE

## 2021-09-03 VITALS
HEART RATE: 76 BPM | TEMPERATURE: 97.3 F | HEIGHT: 71 IN | BODY MASS INDEX: 29.26 KG/M2 | OXYGEN SATURATION: 95 % | WEIGHT: 209 LBS | SYSTOLIC BLOOD PRESSURE: 126 MMHG | DIASTOLIC BLOOD PRESSURE: 80 MMHG

## 2021-09-03 DIAGNOSIS — Z00.00 ENCOUNTER FOR MEDICARE ANNUAL WELLNESS EXAM: Primary | ICD-10-CM

## 2021-09-03 DIAGNOSIS — J43.1 PANLOBULAR EMPHYSEMA (H): ICD-10-CM

## 2021-09-03 DIAGNOSIS — F17.200 TOBACCO USE DISORDER: ICD-10-CM

## 2021-09-03 DIAGNOSIS — M10.9 ACUTE GOUT OF FOOT, UNSPECIFIED CAUSE, UNSPECIFIED LATERALITY: ICD-10-CM

## 2021-09-03 DIAGNOSIS — I10 BENIGN ESSENTIAL HYPERTENSION: ICD-10-CM

## 2021-09-03 DIAGNOSIS — R91.8 PULMONARY NODULES: ICD-10-CM

## 2021-09-03 DIAGNOSIS — F10.20 ALCOHOL DEPENDENCE, EPISODIC DRINKING BEHAVIOR (H): ICD-10-CM

## 2021-09-03 DIAGNOSIS — E78.5 HYPERLIPIDEMIA LDL GOAL <130: ICD-10-CM

## 2021-09-03 DIAGNOSIS — R73.03 PREDIABETES: ICD-10-CM

## 2021-09-03 DIAGNOSIS — I71.40 ABDOMINAL AORTIC ANEURYSM (AAA) WITHOUT RUPTURE (H): ICD-10-CM

## 2021-09-03 DIAGNOSIS — R91.8 ABNORMAL CT LUNG SCREENING: ICD-10-CM

## 2021-09-03 DIAGNOSIS — Z12.5 SCREENING FOR PROSTATE CANCER: ICD-10-CM

## 2021-09-03 LAB
ALT SERPL W P-5'-P-CCNC: 22 U/L (ref 0–70)
ANION GAP SERPL CALCULATED.3IONS-SCNC: 7 MMOL/L (ref 3–14)
AST SERPL W P-5'-P-CCNC: 20 U/L (ref 0–45)
BUN SERPL-MCNC: 15 MG/DL (ref 7–30)
CALCIUM SERPL-MCNC: 9.5 MG/DL (ref 8.5–10.1)
CHLORIDE BLD-SCNC: 101 MMOL/L (ref 94–109)
CHOLEST SERPL-MCNC: 216 MG/DL
CO2 SERPL-SCNC: 28 MMOL/L (ref 20–32)
CREAT SERPL-MCNC: 1 MG/DL (ref 0.66–1.25)
FASTING STATUS PATIENT QL REPORTED: YES
GFR SERPL CREATININE-BSD FRML MDRD: 78 ML/MIN/1.73M2
GLUCOSE BLD-MCNC: 126 MG/DL (ref 70–99)
HBA1C MFR BLD: 6.2 % (ref 0–5.6)
HDLC SERPL-MCNC: 29 MG/DL
HGB BLD-MCNC: 18.5 G/DL (ref 13.3–17.7)
LDLC SERPL CALC-MCNC: 147 MG/DL
NONHDLC SERPL-MCNC: 187 MG/DL
POTASSIUM BLD-SCNC: 4 MMOL/L (ref 3.4–5.3)
PSA SERPL-MCNC: 0.77 UG/L (ref 0–4)
SODIUM SERPL-SCNC: 136 MMOL/L (ref 133–144)
TRIGL SERPL-MCNC: 198 MG/DL
URATE SERPL-MCNC: 7.2 MG/DL (ref 3.5–7.2)

## 2021-09-03 PROCEDURE — 84450 TRANSFERASE (AST) (SGOT): CPT | Performed by: INTERNAL MEDICINE

## 2021-09-03 PROCEDURE — 36415 COLL VENOUS BLD VENIPUNCTURE: CPT | Performed by: INTERNAL MEDICINE

## 2021-09-03 PROCEDURE — 83036 HEMOGLOBIN GLYCOSYLATED A1C: CPT | Performed by: INTERNAL MEDICINE

## 2021-09-03 PROCEDURE — 80061 LIPID PANEL: CPT | Performed by: INTERNAL MEDICINE

## 2021-09-03 PROCEDURE — 80048 BASIC METABOLIC PNL TOTAL CA: CPT | Performed by: INTERNAL MEDICINE

## 2021-09-03 PROCEDURE — G0103 PSA SCREENING: HCPCS | Performed by: INTERNAL MEDICINE

## 2021-09-03 PROCEDURE — 99214 OFFICE O/P EST MOD 30 MIN: CPT | Mod: 25 | Performed by: INTERNAL MEDICINE

## 2021-09-03 PROCEDURE — 84550 ASSAY OF BLOOD/URIC ACID: CPT | Performed by: INTERNAL MEDICINE

## 2021-09-03 PROCEDURE — 84460 ALANINE AMINO (ALT) (SGPT): CPT | Performed by: INTERNAL MEDICINE

## 2021-09-03 PROCEDURE — 85018 HEMOGLOBIN: CPT | Performed by: INTERNAL MEDICINE

## 2021-09-03 PROCEDURE — G0439 PPPS, SUBSEQ VISIT: HCPCS | Performed by: INTERNAL MEDICINE

## 2021-09-03 RX ORDER — FLUTICASONE PROPIONATE AND SALMETEROL 113; 14 UG/1; UG/1
1 POWDER, METERED RESPIRATORY (INHALATION) 2 TIMES DAILY
Qty: 3 EACH | Refills: 3 | Status: SHIPPED | OUTPATIENT
Start: 2021-09-03 | End: 2022-02-11

## 2021-09-03 RX ORDER — VARENICLINE TARTRATE 1 MG/1
1 TABLET, FILM COATED ORAL 2 TIMES DAILY
Qty: 60 TABLET | Refills: 5 | Status: SHIPPED | OUTPATIENT
Start: 2021-09-03 | End: 2022-02-11

## 2021-09-03 RX ORDER — ALBUTEROL SULFATE 90 UG/1
2 AEROSOL, METERED RESPIRATORY (INHALATION) EVERY 4 HOURS PRN
Qty: 18 G | Refills: 11 | Status: SHIPPED | OUTPATIENT
Start: 2021-09-03 | End: 2022-02-11

## 2021-09-03 RX ORDER — LISINOPRIL AND HYDROCHLOROTHIAZIDE 12.5; 2 MG/1; MG/1
2 TABLET ORAL EVERY MORNING
Qty: 180 TABLET | Refills: 3 | Status: SHIPPED | OUTPATIENT
Start: 2021-09-03 | End: 2022-05-31

## 2021-09-03 RX ORDER — ALLOPURINOL 100 MG/1
200 TABLET ORAL DAILY
Qty: 180 TABLET | Refills: 3 | Status: SHIPPED | OUTPATIENT
Start: 2021-09-03 | End: 2022-10-21

## 2021-09-03 RX ORDER — AMLODIPINE BESYLATE 10 MG/1
10 TABLET ORAL DAILY
Qty: 90 TABLET | Refills: 3 | Status: SHIPPED | OUTPATIENT
Start: 2021-09-03 | End: 2022-10-21

## 2021-09-03 RX ORDER — PRAVASTATIN SODIUM 40 MG
TABLET ORAL
Qty: 90 TABLET | Refills: 3 | Status: SHIPPED | OUTPATIENT
Start: 2021-09-03 | End: 2022-05-31

## 2021-09-03 ASSESSMENT — ACTIVITIES OF DAILY LIVING (ADL): CURRENT_FUNCTION: NO ASSISTANCE NEEDED

## 2021-09-03 ASSESSMENT — ENCOUNTER SYMPTOMS
ARTHRALGIAS: 0
HEMATURIA: 0
COUGH: 0
JOINT SWELLING: 0
ABDOMINAL PAIN: 0
CHILLS: 0
EYE PAIN: 0
DIARRHEA: 0
HEMATOCHEZIA: 0
NERVOUS/ANXIOUS: 0
SORE THROAT: 0
PALPITATIONS: 0
SHORTNESS OF BREATH: 0
WEAKNESS: 0
FREQUENCY: 0
DIZZINESS: 0
HEARTBURN: 0
CONSTIPATION: 0
DYSURIA: 0
NAUSEA: 0
PARESTHESIAS: 0
FEVER: 0
HEADACHES: 0
MYALGIAS: 0

## 2021-09-03 ASSESSMENT — MIFFLIN-ST. JEOR: SCORE: 1742.21

## 2021-09-03 NOTE — PROGRESS NOTES
"SUBJECTIVE:   Gabe Smith is a 66 year old male who presents for Preventive Visit.      Patient has been advised of split billing requirements and indicates understanding: Yes   Are you in the first 12 months of your Medicare coverage?  No    Healthy Habits:     In general, how would you rate your overall health?  Excellent    Frequency of exercise:  None    Do you usually eat at least 4 servings of fruit and vegetables a day, include whole grains    & fiber and avoid regularly eating high fat or \"junk\" foods?  Yes    Taking medications regularly:  Yes    Barriers to taking medications:  None    Medication side effects:  None    Ability to successfully perform activities of daily living:  No assistance needed    Home Safety:  No safety concerns identified    Hearing Impairment:  Difficulty understanding soft or whispered speech    In the past 6 months, have you been bothered by leaking of urine?  No    In general, how would you rate your overall mental or emotional health?  Excellent      PHQ-2 Total Score: 0    Additional concerns today:  No       Do you feel safe in your environment? Yes    Have you ever done Advance Care Planning? (For example, a Health Directive, POLST, or a discussion with a medical provider or your loved ones about your wishes): No, advance care planning information given to patient to review.  Patient declined advance care planning discussion at this time.       Fall risk  Fallen 2 or more times in the past year?: No  Any fall with injury in the past year?: No    Cognitive Screening   1) Repeat 3 items (Leader, Season, Table)    2) Clock draw: NORMAL  3) 3 item recall: Recalls 3 objects  Results: 3 items recalled: COGNITIVE IMPAIRMENT LESS LIKELY    Mini-CogTM Copyright SERA Aj. Licensed by the author for use in [facility]; reprinted with permission (brian@.[facility]). All rights reserved.      Do you have sleep apnea, excessive snoring or daytime drowsiness?: no    Reviewed and " updated as needed this visit by clinical staff  Tobacco  Allergies  Meds  Problems  Med Hx  Surg Hx  Fam Hx  Soc Hx          Reviewed and updated as needed this visit by Provider  Tobacco  Allergies  Meds  Problems            Social History     Tobacco Use     Smoking status: Current Every Day Smoker     Packs/day: 0.50     Types: Cigarettes     Smokeless tobacco: Never Used     Tobacco comment: 1.5 PPD-now down to .5 PPD   Substance Use Topics     Alcohol use: Yes     Alcohol/week: 0.0 standard drinks     Comment: 2-3 drinks per night     If you drink alcohol do you typically have >3 drinks per day or >7 drinks per week? Yes      Alcohol Use 9/3/2021   Prescreen: >3 drinks/day or >7 drinks/week? Yes   Prescreen: >3 drinks/day or >7 drinks/week? -   AUDIT SCORE  5     AUDIT - Alcohol Use Disorders Identification Test - Reproduced from the World Health Organization Audit 2001 (Second Edition) 9/3/2021   1.  How often do you have a drink containing alcohol? 4 or more times a week   2.  How many drinks containing alcohol do you have on a typical day when you are drinking? 1 or 2   3.  How often do you have five or more drinks on one occasion? Less than monthly   4.  How often during the last year have you found that you were not able to stop drinking once you had started? Never   5.  How often during the last year have you failed to do what was normally expected of you because of drinking? Never   6.  How often during the last year have you needed a first drink in the morning to get yourself going after a heavy drinking session? Never   7.  How often during the last year have you had a feeling of guilt or remorse after drinking? Never   8.  How often during the last year have you been unable to remember what happened the night before because of your drinking? Never   9.  Have you or someone else been injured because of your drinking? No   10. Has a relative, friend, doctor or other health care worker been  "concerned about your drinking or suggested you cut down? No   TOTAL SCORE 5       1.    Hypertension:  History of hypertension, on medication.  No reported side effects from medications.    Reviewed last 6 BP readings in chart:  BP Readings from Last 6 Encounters:   09/03/21 126/80   02/05/21 128/68   08/05/20 134/76   01/23/20 120/80   01/20/20 (!) 151/83   01/06/20 113/70     No active cardiac complaints or symptoms with exercise.     2.  COPD remains stable.  Has not had any recent breathing troubles beyond usual baseline.  Has not any acute respiratory events.  Remains with intermittent cough, mild shortness of breath with overexertion as per usual.  Using medication as directed with reported side effects.     3.  history of gout, on allopurinol for uric acid lowering.  Denies gout attacks since taking this medicine.  Denies side effects with medication.    4.  History of heavy alcohol use, still drinks \"2 or 3 drinks\" per night.    5./  The patient has a history of impaired glucose tolerance with regularly elevated blood sugars.    They have not been diagnosed with type II DM or placed on medications for diabetes before.   They deny polyuria, polydipsia.     The patient is obese with a BMI of Body mass index is 29.56 kg/m ..    Review of current labs show:    Lab Results   Component Value Date    A1C 6.0 02/05/2021    A1C 6.2 07/28/2020    A1C 5.7 01/16/2020    A1C 5.8 07/15/2019    A1C 5.9 12/26/2018    A1C 5.7 09/20/2016    A1C 5.5 08/04/2015    A1C 5.9 02/03/2015    A1C 6.1 10/07/2014     @bgl@     6.    The patient has had a history of ongoing obesity.  Reviewed the weigth curves.   Their current BMI is:  Body mass index is 29.56 kg/m .  Reviewed previous attempts at weight loss which have not been successful in producing prolonged weigth loss.   Discussed current eating and exercise habits.     Reviewed weights in chart:  Wt Readings from Last 10 Encounters:   09/03/21 94.8 kg (209 lb)   02/05/21 93.7 kg " (206 lb 9.6 oz)   08/05/20 90.6 kg (199 lb 11.2 oz)   01/23/20 89.1 kg (196 lb 6.4 oz)   01/20/20 86.2 kg (190 lb)   01/06/20 86.2 kg (190 lb)   07/19/19 86.2 kg (190 lb)   12/28/18 90.7 kg (200 lb)   07/13/18 88 kg (193 lb 14.4 oz)   01/04/18 94.3 kg (208 lb)            Current providers sharing in care for this patient include:   Patient Care Team:  Donny Payne MD as PCP - General (Internal Medicine)  Donny Payne MD as Assigned PCP    The following health maintenance items are reviewed in Epic and correct as of today:  Health Maintenance Due   Topic Date Due     DTAP/TDAP/TD IMMUNIZATION (3 - Td or Tdap) 08/07/2020     ALT  07/28/2021     PSA  07/28/2021     URIC ACID  07/28/2021     A1C  08/05/2021     FALL RISK ASSESSMENT  08/05/2021     INFLUENZA VACCINE (1) 09/01/2021       **I reviewed the information recorded in the patient's EPIC chart (including but not limited to medical history, surgical history, family history, problem list, medication list, and allergy list) and updated the information as indicated based on the patients reported information.             Review of Systems   Constitutional: Negative for chills and fever.   HENT: Negative for congestion, ear pain, hearing loss and sore throat.    Eyes: Negative for pain and visual disturbance.   Respiratory: Negative for cough and shortness of breath.    Cardiovascular: Negative for chest pain and palpitations.   Gastrointestinal: Negative for abdominal pain, constipation, diarrhea, heartburn, hematochezia and nausea.   Genitourinary: Negative for discharge, dysuria, frequency, genital sores, hematuria, impotence and urgency.   Musculoskeletal: Negative for arthralgias, joint swelling and myalgias.   Skin: Negative for rash.   Neurological: Negative for dizziness, weakness, headaches and paresthesias.   Psychiatric/Behavioral: Negative for mood changes. The patient is not nervous/anxious.      Constitutional, HEENT,  "cardiovascular, pulmonary, gi and gu systems are negative, except as otherwise noted.    OBJECTIVE:   /80   Pulse 76   Temp 97.3  F (36.3  C) (Tympanic)   Ht 1.791 m (5' 10.5\")   Wt 94.8 kg (209 lb)   SpO2 95%   BMI 29.56 kg/m   Estimated body mass index is 29.56 kg/m  as calculated from the following:    Height as of this encounter: 1.791 m (5' 10.5\").    Weight as of this encounter: 94.8 kg (209 lb).  Physical Exam  GENERAL alert and no distress  EYES:  Normal sclera,conjunctiva, EOMI  HENT: oral and posterior pharynx without lesions or erythema, facies symmetric; generalized mild erythematous, \"rutty\", mildly plethoric appearance to his face of his skin  NECK: Neck supple. No LAD, without thyroidmegaly.  RESP: breath sounds quiet but clear, diminished respiratory excursion, prolonged expiratory phase,  no rhonci, no wheezes, no rales   CV: RRR normal S1S2 without murmurs, rubs or gallops.  LYMPH: no cervical lymph adenopathy appreciated  MS: extremities- no gross deformities of the visible extremities noted,   EXT:  no lower extremity edema  PSYCH: Alert and oriented times 3; speech- coherent  SKIN:  No obvious significant skin lesions on visible portions of face     Diagnostic Test Results:  Labs reviewed in Epic    ASSESSMENT / PLAN:     (Z00.00) Encounter for Medicare annual wellness exam  (primary encounter diagnosis)  Comment: Discussed cardiac disease risk factors and cardiac disease risk factor modification, including diabetes screening, blood pressure screening (and management if indicated), and cholesterol screening.   Reviewed immunzation guidelines, including pneumococcal vaccines, annual influenza, and shingles vaccines.   Discussed routine cancer screenings, including skin cancer, colon cancer screening for everyone until age 80, prostate cancer screening in men until age 75, mammogram and PAP/pelvic for women until age 75.   Recommended regular dentist visits to care for remaining teeth. "   Recommended regular screening for vision and glaucoma.   Recommended safe driving and accident avoidance.   Plan: REVIEW OF HEALTH MAINTENANCE PROTOCOL ORDERS            (J43.1) COPD (HCC)  Comment: Breathing is much better when he uses inhalers regularly, however unfortunately he continues to smoke cigarettes.  Discussed the supreme importance of smoking cessation is the most important part of his COPD management plan.  Discussed AirDuo as a potentially cheaper option for Advair.  Continue Spiriva once daily.  Use rescue inhaler as needed.  Discussed general issues of COPD, including pathophysiology, ways it will affect the pt., when to seek help, reviewed the typical medications (how they are taken, how they help)   Plan: tiotropium (SPIRIVA RESPIMAT) 2.5 MCG/ACT         inhaler, albuterol (PROAIR HFA/PROVENTIL         HFA/VENTOLIN HFA) 108 (90 Base) MCG/ACT         inhaler, fluticasone-salmeterol (AIRDUO         RESPICLICK) 113-14 MCG/ACT inhaler, REVIEW OF         HEALTH MAINTENANCE PROTOCOL ORDERS            (I71.4) Abdominal aortic aneurysm (AAA) without rupture (H)  Comment: Reviewed most recent abdominal aortic aneurysm screening from February showing a small aortic aneurysm.   Based on size, has no treatment is needed other than secondary risk factor modification.  Continue to monitor annually.  If any growth or change or worsening, refer to vascular surgery.  Discussed secondary risk factor modification and recommended continuing aggressive management of these items.   Plan:     (F10.20) Alcohol dependence, episodic drinking behavior (H)  Comment: Drinking slightly less than before but still drinking more than he should.  He is aware of this and has been trying to cut down.  He is declined referrals for formal CD evaluation.  Plan: REVIEW OF HEALTH MAINTENANCE PROTOCOL ORDERS            (E78.5) Hyperlipidemia LDL goal <130  Comment: Discussed guidelines recommending a statin cholesterol lowering  "medication for any patient with either diabetes and/or vascular disease, aiming for a LDL goal under 100 for sure, ideally under 70, using whatever dose of statin tolerated.    Plan: pravastatin (PRAVACHOL) 40 MG tablet, Lipid         panel reflex to direct LDL Fasting, AST, ALT,         REVIEW OF HEALTH MAINTENANCE PROTOCOL ORDERS            (I10) Benign essential hypertension  Comment: This condition is currently controlled on the current medical regimen.  Continue current therapy.   Plan: lisinopril-hydrochlorothiazide (ZESTORETIC)         20-12.5 MG tablet, amLODIPine (NORVASC) 10 MG         tablet, Basic metabolic panel, AST, ALT,         Hemoglobin, REVIEW OF HEALTH MAINTENANCE         PROTOCOL ORDERS            (R73.03) Prediabetes  Comment: Reviewed the labs showing mildly elevated glucose levels consistent with prediabetes.     Discussed \"pre-diabetes\", impaired glucose tolerance, and its part in the dysmetabolic syndrome.   No medications needed at this time.     Discussed the inevitable progression of impaired glucose tolerance toward worsening diabetes mellitus if nothing is changed in the diet, and the need for agressive interventions now to delay and prevent this inevitable progression.    Recommended lower carb diet.  Recommended regular physical activity.   We will continue to monitor this and nuñez additional recommendations and treatments as indicated based on the labs.       Plan: Hemoglobin A1c, Lipid panel reflex to direct         LDL Fasting, Basic metabolic panel, AST, ALT,         REVIEW OF HEALTH MAINTENANCE PROTOCOL ORDERS            (M10.9) Acute gout of foot, unspecified cause, unspecified laterality  Comment: This condition is currently controlled on the current medical regimen.  Continue current therapy.   Plan: allopurinol (ZYLOPRIM) 100 MG tablet, Basic         metabolic panel, AST, ALT, Hemoglobin, Uric         acid, REVIEW OF HEALTH MAINTENANCE PROTOCOL         ORDERS        "     (F17.200) Tobacco use disorder  Comment: Discussed the physical, psychological, and pharmacological aspects of nicotine addiction and smoking cessation.    Discussed 2 possible regimens.  Option #1:  Chantix alone (no nicotine replacement needed), starting month pack for first month, thencontinuing month pack for 3-6 additional months.  Reviewed the main side effects of the medication and directed him to the company web site for further information and gave pt information handouts (if available)  Option #2:  Recommended nicotine replacement with either gum or patches in a descending manner starting the first day of not smoking.  Also discussed the medication Zyban in the use use smoking cessation.  Recommended starting with 150 mg first thing in the morning for 4 days, then adding a second dose late in the afternoon or early evening.  Discussed the potential side effects including but not limited to seizure, GI upset, insomnia, headache, weight loss.    After further discussion of medication aids to help stop smoking, the patient has decided to take Chantix.  We discussed the medication in detail, including suspected mechanism of action, duration of treatment (minimum preferred 3 months up to max of 6-9 months).  We also discussed some of the potential side effects of the medication including, but not limited to, GI upset, nausea, headaches, nightmares, strange dreams, and possible effects on the mood, inclduing worsening of depression and/or anxiety.  Also mentioned about isolated incidences of suicide possibly related to Chantix use.  I told the patient to immediately stop the Chantix if they suspect any changes in the mood and to contact us immediately.    I also instructed them not to take Chantix until they had a chance to visit the product official web site to further educate themselves about the medication.    Plan: varenicline (CHANTIX) 1 MG tablet, REVIEW OF         HEALTH MAINTENANCE PROTOCOL ORDERS     "        (Z12.5) Screening for prostate cancer  Comment:   Plan: PSA, screen, REVIEW OF HEALTH MAINTENANCE         PROTOCOL ORDERS            (R91.8) Pulmonary nodules  Comment: Most recent lung cancer screening via low-dose CT showed multiple pulmonary nodules, a few which had slightly increased in size from February 2021 from August 2020.  Recommendation was for a 6-month follow-up CT, this was ordered today.  Plan: CT Chest Low Dose Non Contrast, REVIEW OF         HEALTH MAINTENANCE PROTOCOL ORDERS            (R91.8) Abnormal CT lung screening  Comment: As above  Plan:         Patient has been advised of split billing requirements and indicates understanding: Yes  COUNSELING:  Reviewed preventive health counseling, as reflected in patient instructions       Regular exercise       Healthy diet/nutrition       Vision screening       Hearing screening       Dental care       Bladder control       Immunizations    up to date including Covid    Get Covid booster 8 months after the last shot according current guidelines, this may change.           Aspirin prophylaxis        Alcohol Use        Colon cancer screening       Prostate cancer screening   AAA screening   Lung cancer screening    Estimated body mass index is 29.56 kg/m  as calculated from the following:    Height as of this encounter: 1.791 m (5' 10.5\").    Weight as of this encounter: 94.8 kg (209 lb).        He reports that he has been smoking cigarettes. He has been smoking about 0.50 packs per day. He has never used smokeless tobacco.  Tobacco Cessation Action Plan:   Pharmacotherapies : Chantix  Shared Medical Visit / Group Education  Self help information given to patient  He has used Chantix before in the past with good success.  Would like to try it again if it is available.    Appropriate preventive services were discussed with this patient, including applicable screening as appropriate for cardiovascular disease, diabetes, osteopenia/osteoporosis, and " glaucoma.  As appropriate for age/gender, discussed screening for colorectal cancer, prostate cancer, breast cancer, and cervical cancer. Checklist reviewing preventive services available has been given to the patient.    Reviewed patients plan of care and provided an AVS. The Intermediate Care Plan ( asthma action plan, low back pain action plan, and migraine action plan) for Gabe meets the Care Plan requirement. This Care Plan has been established and reviewed with the Patient.    Counseling Resources:  ATP IV Guidelines  Pooled Cohorts Equation Calculator  Breast Cancer Risk Calculator  Breast Cancer: Medication to Reduce Risk  FRAX Risk Assessment  ICSI Preventive Guidelines  Dietary Guidelines for Americans, 2010  USDA's MyPlate  ASA Prophylaxis  Lung CA Screening    Donny Payne MD  [facility]    Identified Health Risks:

## 2021-09-03 NOTE — PATIENT INSTRUCTIONS
"  *  Continue all medications at the same doses.  Contact your usual pharmacy if you need refills.     *  Look into the cheaper version of Advair (generic AirDuo)    *  Checking blood tests to make sure things are OK    *  repeat CT scan again to make sure the prior lung nodules seen in Feb 2021 are not changing.  Subsequent CT scans to be determined based on the results.     *  Return to see me in 6 months, sooner if needed.  Use [employer] or Call [phone] to schedule the appointment with me.       5 GOALS TO PREVENT VASCULAR DISEASE:     1.  Aggressive blood pressure control, under 130/80 ideally.  Using medications if needed.    Your blood pressure is under good control    BP Readings from Last 4 Encounters:   09/03/21 126/80   02/05/21 128/68   08/05/20 134/76   01/23/20 120/80       2.  Aggressive LDL cholesterol (\"bad cholesterol\") lowering as indicated.    Your goal is an LDL under 130 for sure, preferably under 100.  (If you have diabetes or previous vascular disease, the the LDL goals would be under 100 for sure, preferably under 70.)    New guidelines identify four high-risk groups who could benefit from statins:   *people with pre-existing heart disease, such as those who have had a heart attack;   *people ages 40 to 75 who have diabetes of any type  *patients ages 40 to 75 with at least a 7.5% risk of developing cardiovascular disease over the next decade, according to a formula described in the guidelines  *patients with the sort of super-high cholesterol that sometimes runs in families, as evidenced by an LDL of 190 milligrams per deciliter or higher    Your cholesterol levels are well controlled.    Recent Labs   Lab Test 07/28/20  1148 07/15/19  0751 08/04/15  0757 02/03/15  0748 02/03/15  0748   CHOL 189 154 210*  --  192   HDL 35* 29* 27*  --  33*   * 89 Cannot estimate LDL when triglyceride exceeds 400 mg/dL  134*   < > 107   TRIG 172* 178* 422*  --  262*   CHOLHDLRATIO  --   --  7.8*  " --  5.8*    < > = values in this interval not displayed.       3.  Aggressive diabetic prevention, screening and/or management.      You do not have diabetes as of the most recent blood tests.     4.  No smoking    5.  Consider daily preventative aspirin over age 50 if you have enough cardiac risk factors to place you at higher risk for the presence of vascular disease.    If you have any reason not to take aspirin such easy bruising or bleeding, stomach problems, other anticoagulant medications, or any other side effects, then you should not take Aspirin.      --Based on your current cardiac risk factor profile, you should take regular daily Aspirin 81 mg once per day.           Preventive Health Recommendations:   Male Ages 65 and over    Yearly exam:             See your health care provider every year in order to  o   Review health changes.   o   Discuss preventive care.    o   Review your medicines if your doctor has prescribed any.    Talk with your health care provider about whether you should have a test to screen for prostate cancer (PSA).    Every 3 years, have a diabetes test (fasting glucose). If you are at risk for diabetes, you should have this test more often.    Every 5 years, have a cholesterol test. Have this test more often if you are at risk for high cholesterol or heart disease.     Every 10 years, have a colonoscopy. Or, have a yearly FIT test (stool test). These exams will check for colon cancer.    Talk to with your health care provider about screening for Abdominal Aortic Aneurysm if you have a family history of AAA or have a history of smoking.    Shots:     Get a flu shot each year.     Get a tetanus shot every 10 years.     Talk to your doctor about your pneumonia vaccines. There are now two you should receive - Pneumovax (PPSV 23) and Prevnar (PCV 13).     Talk to your doctor about a shingles vaccine.     Talk to your doctor about the hepatitis B vaccine.  Nutrition:     Eat at least 5  "servings of fruits and vegetables each day.     Eat whole-grain bread, whole-wheat pasta and brown rice instead of white grains and rice.     Talk to your provider about Calcium and Vitamin D.      --Good Grains:  Oats, brown rice, Quinoa (these do not raise the blood sugar as much)     --Bad grains:  Anything made from wheat or white rice     (because these raise the blood sugars significantly, and the possible gluten issue from wheat for some people).      --Proteins:  Aim for \"lean proteins\" including chicken, fish, seafood, pork, turkey, and eggs (in moderation); Eat red meat only occasionally    Lifestyle    Exercise for at least 150 minutes a week (30 minutes a day, 5 days a week). This will help you control your weight and prevent disease.     Limit alcohol to one drink per day.     No smoking.     Wear sunscreen to prevent skin cancer.     See your dentist every six months for an exam and cleaning.     See your eye doctor every 1 to 2 years to screen for conditions such as glaucoma, macular degeneration, cataracts, etc           Patient Education   Personalized Prevention Plan  You are due for the preventive services outlined below.  Your care team is available to assist you in scheduling these services.  If you have already completed any of these items, please share that information with your care team to update in your medical record.  Health Maintenance Due   Topic Date Due     ANNUAL REVIEW OF HM ORDERS  Never done     COPD Action Plan  Never done     Diptheria Tetanus Pertussis (DTAP/TDAP/TD) Vaccine (3 - Td or Tdap) 08/07/2020     FALL RISK ASSESSMENT  08/05/2021     Flu Vaccine (1) 09/01/2021       Exercise for a Healthier Heart  You may wonder how you can improve the health of your heart. If you re thinking about exercise, you re on the right track. You don t need to become an athlete. But you do need a certain amount of brisk exercise to help strengthen your heart. If you have been diagnosed with a " heart condition, your healthcare provider may advise exercise to help stabilize your condition. To help make exercise a habit, choose safe, fun activities.      Exercise with a friend. When activity is fun, you're more likely to stick with it.   Before you start  Check with your healthcare provider before starting an exercise program. This is especially important if you have not been active for a while. It's also important if you have a long-term (chronic) health problem such as heart disease, diabetes, or obesity. Or if you are at high risk for having these problems.   Why exercise?  Exercising regularly offers many healthy rewards. It can help you do all of the following:     Improve your blood cholesterol level to help prevent further heart trouble    Lower your blood pressure to help prevent a stroke or heart attack    Control diabetes, or reduce your risk of getting this disease    Improve your heart and lung function    Reach and stay at a healthy weight    Make your muscles stronger so you can stay active    Prevent falls and fractures by slowing the loss of bone mass (osteoporosis)    Manage stress better    Reduce your blood pressure    Improve your sense of self and your body image  Exercise tips      Ease into your routine. Set small goals. Then build on them. If you are not sure what your activity level should be, talk with your healthcare provider first before starting an exercise routine.    Exercise on most days. Aim for a total of 150 minutes (2 hours and 30 minutes) or more of moderate-intensity aerobic activity each week. Or 75 minutes (1 hour and 15 minutes) or more of vigorous-intensity aerobic activity each week. Or try for a combination of both. Moderate activity means that you breathe heavier and your heart rate increases but you can still talk. Think about doing 40 minutes of moderate exercise, 3 to 4 times a week. For best results, activity should last for about 40 minutes to lower blood  pressure and cholesterol. It's OK to work up to the 40-minute period over time. Examples of moderate-intensity activity are walking 1 mile in 15 minutes. Or doing 30 to 45 minutes of yard work.    Step up your daily activity level.  Along with your exercise program, try being more active the whole day. Walk instead of drive. Or park further away so that you take more steps each day. Do more household tasks or yard work. You may not be able to meet the advised mount of physical activity. But doing some moderate- or vigorous-intensity aerobic activity can help reduce your risk for heart disease. Your healthcare provider can help you figure out what is best for you.    Choose 1 or more activities you enjoy.  Walking is one of the easiest things you can do. You can also try swimming, riding a bike, dancing, or taking an exercise class.    When to call your healthcare provider  Call your healthcare provider if you have any of these:     Chest pain or feel dizzy or lightheaded    Burning, tightness, pressure, or heaviness in your chest, neck, shoulders, back, or arms    Abnormal shortness of breath    More joint or muscle pain    A very fast or irregular heartbeat (palpitations)  [employer] last reviewed this educational content on 7/1/2019 2000-2021 [employer]. All rights reserved. This information is not intended as a substitute for professional medical care. Always follow your healthcare professional's instructions.          Signs of Hearing Loss      Hearing much better with one ear can be a sign of hearing loss.   Hearing loss is a problem shared by many people. In fact, it is one of the most common health problems, particularly as people age. Most people age 65 and older have some hearing loss. By age 80, almost everyone does. Hearing loss often occurs slowly over the years. So you may not realize your hearing has gotten worse.  Have your hearing checked  Call your healthcare provider if you:    Have  to strain to hear normal conversation    Have to watch other people s faces very carefully to follow what they re saying    Need to ask people to repeat what they ve said    Often misunderstand what people are saying    Turn the volume of the television or radio up so high that others complain    Feel that people are mumbling when they re talking to you    Find that the effort to hear leaves you feeling tired and irritated    Notice, when using the phone, that you hear better with one ear than the other  [employer] last reviewed this educational content on 1/1/2020 2000-2021 [employer]. All rights reserved. This information is not intended as a substitute for professional medical care. Always follow your healthcare professional's instructions.

## 2021-09-03 NOTE — PROGRESS NOTES
"    He is at risk for lack of exercise and has been provided with information to increase physical activity for the benefit of his well-being.  The patient was provided with written information regarding signs of hearing loss.  Answers for HPI/ROS submitted by the patient on 9/3/2021  In general, how would you rate your overall physical health?: excellent  Frequency of exercise:: None  Do you usually eat at least 4 servings of fruit and vegetables a day, include whole grains & fiber, and avoid regularly eating high fat or \"junk\" foods? : Yes  Taking medications regularly:: Yes  Medication side effects:: None  Activities of Daily Living: no assistance needed  Home safety: no safety concerns identified  Hearing Impairment:: difficulty understanding soft or whispered speech  In the past 6 months, have you been bothered by leaking of urine?: No  abdominal pain: No  Blood in stool: No  Blood in urine: No  chest pain: No  chills: No  congestion: No  constipation: No  cough: No  diarrhea: No  dizziness: No  ear pain: No  eye pain: No  nervous/anxious: No  fever: No  frequency: No  genital sores: No  headaches: No  hearing loss: No  heartburn: No  arthralgias: No  joint swelling: No  mood changes: No  myalgias: No  nausea: No  dysuria: No  palpitations: No  Skin sensation changes: No  sore throat: No  urgency: No  rash: No  shortness of breath: No  visual disturbance: No  weakness: No  impotence: No  penile discharge: No  In general, how would you rate your overall mental or emotional health?: excellent  Additional concerns today:: No      "

## 2021-09-04 ENCOUNTER — HEALTH MAINTENANCE LETTER (OUTPATIENT)
Age: 66
End: 2021-09-04

## 2021-09-08 ENCOUNTER — HOSPITAL ENCOUNTER (OUTPATIENT)
Dept: CT IMAGING | Facility: CLINIC | Age: 66
Discharge: HOME | End: 2021-09-08
Attending: INTERNAL MEDICINE | Admitting: INTERNAL MEDICINE
Payer: MEDICARE

## 2021-09-08 DIAGNOSIS — R91.8 PULMONARY NODULES: ICD-10-CM

## 2021-09-08 PROCEDURE — 71250 CT THORAX DX C-: CPT | Mod: ME

## 2021-12-03 ENCOUNTER — TELEPHONE (OUTPATIENT)
Dept: INTERNAL MEDICINE | Facility: CLINIC | Age: 66
End: 2021-12-03
Payer: MEDICARE

## 2021-12-03 NOTE — TELEPHONE ENCOUNTER
Patient calling to ask what insurances can see Dr. Payne under Medicare Advantage Plans.     Can we leave a detailed message on this number? YES  Phone number patient can be reached at: Cell number on file:    Telephone Information:   Mobile [phone]       Elysia Lambert, RN  [facility] Triage

## 2021-12-03 NOTE — TELEPHONE ENCOUNTER
I do not know the exact answer to his question, and can only give generla advice:     [facility] accepts most insurances, but not all.     His clinic options will be clearly listed on his insurnace choices.     If [facility] is listed as an option then I can see him.     If [facility] is not listed, then I can still see him, but it would be out of network and very expensive.     Also make sure that he considers medication coverage as well since he will be taking these meds for a while.     Check with his  for further details.

## 2021-12-09 NOTE — TELEPHONE ENCOUNTER
Patient Contact    Attempt # 3    Was call answered?  No.  Unable to leave message.    Encounter closed

## 2022-02-08 ASSESSMENT — ENCOUNTER SYMPTOMS
SHORTNESS OF BREATH: 1
CONSTIPATION: 0
HEMATOCHEZIA: 0
ABDOMINAL PAIN: 0
MYALGIAS: 0
EYE PAIN: 0
DYSURIA: 0
NAUSEA: 0
HEARTBURN: 0
COUGH: 0
DIARRHEA: 0
PARESTHESIAS: 0
JOINT SWELLING: 0
CHILLS: 0
HEADACHES: 0
SORE THROAT: 0
FREQUENCY: 0
PALPITATIONS: 0
WEAKNESS: 0
FEVER: 0
ARTHRALGIAS: 0
HEMATURIA: 0
NERVOUS/ANXIOUS: 0
DIZZINESS: 0

## 2022-02-08 ASSESSMENT — ACTIVITIES OF DAILY LIVING (ADL): CURRENT_FUNCTION: NO ASSISTANCE NEEDED

## 2022-02-11 ENCOUNTER — OFFICE VISIT (OUTPATIENT)
Dept: INTERNAL MEDICINE | Facility: CLINIC | Age: 67
End: 2022-02-11
Payer: COMMERCIAL

## 2022-02-11 VITALS
SYSTOLIC BLOOD PRESSURE: 122 MMHG | HEART RATE: 73 BPM | BODY MASS INDEX: 29.66 KG/M2 | HEIGHT: 70 IN | TEMPERATURE: 97.4 F | WEIGHT: 207.2 LBS | OXYGEN SATURATION: 94 % | DIASTOLIC BLOOD PRESSURE: 78 MMHG

## 2022-02-11 DIAGNOSIS — R73.03 PREDIABETES: ICD-10-CM

## 2022-02-11 DIAGNOSIS — J43.1 PANLOBULAR EMPHYSEMA (H): ICD-10-CM

## 2022-02-11 DIAGNOSIS — E78.5 HYPERLIPIDEMIA LDL GOAL <130: ICD-10-CM

## 2022-02-11 DIAGNOSIS — Z13.6 CARDIOVASCULAR SCREENING; LDL GOAL LESS THAN 100: ICD-10-CM

## 2022-02-11 DIAGNOSIS — M10.9 ACUTE GOUT OF FOOT, UNSPECIFIED CAUSE, UNSPECIFIED LATERALITY: ICD-10-CM

## 2022-02-11 DIAGNOSIS — I10 BENIGN ESSENTIAL HYPERTENSION: ICD-10-CM

## 2022-02-11 DIAGNOSIS — Z13.6 SCREENING FOR AAA (AORTIC ABDOMINAL ANEURYSM): ICD-10-CM

## 2022-02-11 DIAGNOSIS — Z00.00 MEDICARE ANNUAL WELLNESS VISIT, SUBSEQUENT: Primary | ICD-10-CM

## 2022-02-11 DIAGNOSIS — F10.20 ALCOHOL DEPENDENCE, EPISODIC DRINKING BEHAVIOR (H): ICD-10-CM

## 2022-02-11 DIAGNOSIS — I71.40 ABDOMINAL AORTIC ANEURYSM (AAA) WITHOUT RUPTURE (H): ICD-10-CM

## 2022-02-11 LAB
ALT SERPL W P-5'-P-CCNC: 28 U/L (ref 0–70)
ANION GAP SERPL CALCULATED.3IONS-SCNC: 2 MMOL/L (ref 3–14)
AST SERPL W P-5'-P-CCNC: 19 U/L (ref 0–45)
BUN SERPL-MCNC: 19 MG/DL (ref 7–30)
CALCIUM SERPL-MCNC: 9.2 MG/DL (ref 8.5–10.1)
CHLORIDE BLD-SCNC: 102 MMOL/L (ref 94–109)
CO2 SERPL-SCNC: 32 MMOL/L (ref 20–32)
CREAT SERPL-MCNC: 0.92 MG/DL (ref 0.66–1.25)
GFR SERPL CREATININE-BSD FRML MDRD: >90 ML/MIN/1.73M2
GLUCOSE BLD-MCNC: 130 MG/DL (ref 70–99)
HBA1C MFR BLD: 6.6 % (ref 0–5.6)
HGB BLD-MCNC: 17.8 G/DL (ref 13.3–17.7)
POTASSIUM BLD-SCNC: 4.1 MMOL/L (ref 3.4–5.3)
SODIUM SERPL-SCNC: 136 MMOL/L (ref 133–144)
URATE SERPL-MCNC: 6.6 MG/DL (ref 3.5–7.2)

## 2022-02-11 PROCEDURE — 85018 HEMOGLOBIN: CPT | Performed by: INTERNAL MEDICINE

## 2022-02-11 PROCEDURE — 83036 HEMOGLOBIN GLYCOSYLATED A1C: CPT | Performed by: INTERNAL MEDICINE

## 2022-02-11 PROCEDURE — 84550 ASSAY OF BLOOD/URIC ACID: CPT | Performed by: INTERNAL MEDICINE

## 2022-02-11 PROCEDURE — 36415 COLL VENOUS BLD VENIPUNCTURE: CPT | Performed by: INTERNAL MEDICINE

## 2022-02-11 PROCEDURE — 99214 OFFICE O/P EST MOD 30 MIN: CPT | Mod: 25 | Performed by: INTERNAL MEDICINE

## 2022-02-11 PROCEDURE — G0439 PPPS, SUBSEQ VISIT: HCPCS | Performed by: INTERNAL MEDICINE

## 2022-02-11 PROCEDURE — 84460 ALANINE AMINO (ALT) (SGPT): CPT | Performed by: INTERNAL MEDICINE

## 2022-02-11 PROCEDURE — 84450 TRANSFERASE (AST) (SGOT): CPT | Performed by: INTERNAL MEDICINE

## 2022-02-11 PROCEDURE — 80048 BASIC METABOLIC PNL TOTAL CA: CPT | Performed by: INTERNAL MEDICINE

## 2022-02-11 RX ORDER — ALBUTEROL SULFATE 90 UG/1
2 AEROSOL, METERED RESPIRATORY (INHALATION) EVERY 4 HOURS PRN
Qty: 18 G | Refills: 11 | Status: SHIPPED | OUTPATIENT
Start: 2022-02-11 | End: 2022-10-27

## 2022-02-11 RX ORDER — FLUTICASONE PROPIONATE AND SALMETEROL 113; 14 UG/1; UG/1
1 POWDER, METERED RESPIRATORY (INHALATION) 2 TIMES DAILY
Qty: 1 EACH | Refills: 11 | Status: SHIPPED | OUTPATIENT
Start: 2022-02-11 | End: 2022-10-21

## 2022-02-11 RX ORDER — TIOTROPIUM BROMIDE 18 UG/1
18 CAPSULE ORAL; RESPIRATORY (INHALATION) DAILY
Qty: 30 CAPSULE | Refills: 11 | Status: SHIPPED | OUTPATIENT
Start: 2022-02-11 | End: 2022-05-25

## 2022-02-11 RX ORDER — ALBUTEROL SULFATE 0.83 MG/ML
2.5 SOLUTION RESPIRATORY (INHALATION) EVERY 6 HOURS PRN
Qty: 90 ML | Refills: 11 | Status: SHIPPED | OUTPATIENT
Start: 2022-02-11 | End: 2023-04-03

## 2022-02-11 ASSESSMENT — ENCOUNTER SYMPTOMS
FREQUENCY: 0
HEADACHES: 0
EYE PAIN: 0
MYALGIAS: 0
PALPITATIONS: 0
DYSURIA: 0
SHORTNESS OF BREATH: 1
SORE THROAT: 0
NERVOUS/ANXIOUS: 0
WEAKNESS: 0
HEARTBURN: 0
DIARRHEA: 0
CHILLS: 0
CONSTIPATION: 0
PARESTHESIAS: 0
HEMATURIA: 0
ARTHRALGIAS: 0
JOINT SWELLING: 0
NAUSEA: 0
DIZZINESS: 0
ABDOMINAL PAIN: 0
COUGH: 0
FEVER: 0
HEMATOCHEZIA: 0

## 2022-02-11 ASSESSMENT — ACTIVITIES OF DAILY LIVING (ADL): CURRENT_FUNCTION: NO ASSISTANCE NEEDED

## 2022-02-11 ASSESSMENT — MIFFLIN-ST. JEOR: SCORE: 1730.07

## 2022-02-11 NOTE — PROGRESS NOTES
"SUBJECTIVE:   Gabe Smith is a 66 year old male who presents for Preventive Visit.      Patient has been advised of split billing requirements and indicates understanding: Yes  Are you in the first 12 months of your Medicare coverage?  No    Healthy Habits:     In general, how would you rate your overall health?  Good    Frequency of exercise:  None    Do you usually eat at least 4 servings of fruit and vegetables a day, include whole grains    & fiber and avoid regularly eating high fat or \"junk\" foods?  Yes    Taking medications regularly:  Yes    Medication side effects:  None    Ability to successfully perform activities of daily living:  No assistance needed    Home Safety:  No safety concerns identified    Hearing Impairment:  Difficulty following a conversation in a noisy restaurant or crowded room, feel that people are mumbling or not speaking clearly, need to ask people to speak up or repeat themselves and difficulty understanding soft or whispered speech    In the past 6 months, have you been bothered by leaking of urine?  No    In general, how would you rate your overall mental or emotional health?  Excellent      PHQ-2 Total Score: 0    Additional concerns today:  No    Do you feel safe in your environment? Yes    Have you ever done Advance Care Planning? (For example, a Health Directive, POLST, or a discussion with a medical provider or your loved ones about your wishes): No, advance care planning information given to patient to review.  Patient plans to discuss their wishes with loved ones or provider.        Fall risk  Fallen 2 or more times in the past year?: No  Any fall with injury in the past year?: No    Cognitive Screening   1) Repeat 3 items (Leader, Season, Table)    2) Clock draw: NORMAL  3) 3 item recall: Recalls 3 objects  Results: 3 items recalled: COGNITIVE IMPAIRMENT LESS LIKELY    Mini-CogTM Copyright SERA Aj. Licensed by the author for use in [facility]; reprinted " with permission ([email or web address]). All rights reserved.      Do you have sleep apnea, excessive snoring or daytime drowsiness?: no    Reviewed and updated as needed this visit by clinical staff  Tobacco  Allergies  Meds  Problems            Reviewed and updated as needed this visit by Provider   Allergies  Meds  Problems           Social History     Tobacco Use     Smoking status: Current Every Day Smoker     Packs/day: 1.00     Types: Cigarettes     Smokeless tobacco: Never Used     Tobacco comment: 1.5 PPD-now down to .5 PPD   Substance Use Topics     Alcohol use: Yes     Comment: 2-3 drinks per night (double)     If you drink alcohol do you typically have >3 drinks per day or >7 drinks per week? No    Alcohol Use 2/11/2022   Prescreen: >3 drinks/day or >7 drinks/week? -   Prescreen: >3 drinks/day or >7 drinks/week? No   AUDIT SCORE  -       1.  COPD: Longstanding history of COPD due to smoking.  He is still actively smoking.  He had to discontinue the Spiriva and Advair inhalers due to expense, and has not used them for at least a few months.  He reports his breathing is noticeably worse since he stopped both of these medications.  Use the albuterol as best that he can, uses mother's albuterol nebulizer.  Breathing worsened cold weather and hot and humid conditions.    2.    Hypertension:  History of hypertension, on medication.  No reported side effects from medications.    Reviewed last 6 BP readings in chart:  BP Readings from Last 6 Encounters:   02/11/22 122/78   09/03/21 126/80   02/05/21 128/68   08/05/20 134/76   01/23/20 120/80   01/20/20 (!) 151/83     No active cardiac complaints or symptoms with exercise.      3.  The patient has a history of impaired glucose tolerance with regularly elevated blood sugars.    They have not been diagnosed with type II DM or placed on medications for diabetes before.   They deny polyuria, polydipsia.     The patient is obese with a BMI of Body mass index is 29.52  "kg/m ..    Review of current labs show:    Lab Results   Component Value Date    A1C 6.6 02/11/2022    A1C 6.2 09/03/2021    A1C 6.0 02/05/2021    A1C 6.2 07/28/2020    A1C 5.7 01/16/2020    A1C 5.8 07/15/2019    A1C 5.9 12/26/2018    A1C 5.7 09/20/2016    A1C 5.5 08/04/2015    A1C 5.9 02/03/2015    A1C 6.1 10/07/2014     4.  The patient has had a history of ongoing obesity.  Reviewed the weigth curves.   Their current BMI is:  Body mass index is 29.52 kg/m .  Reviewed previous attempts at weight loss which have not been successful in producing prolonged weigth loss.   Discussed current eating and exercise habits.     Reviewed weights in chart:  Wt Readings from Last 10 Encounters:   02/11/22 94 kg (207 lb 3.2 oz)   09/03/21 94.8 kg (209 lb)   02/05/21 93.7 kg (206 lb 9.6 oz)   08/05/20 90.6 kg (199 lb 11.2 oz)   01/23/20 89.1 kg (196 lb 6.4 oz)   01/20/20 86.2 kg (190 lb)   01/06/20 86.2 kg (190 lb)   07/19/19 86.2 kg (190 lb)   12/28/18 90.7 kg (200 lb)   07/13/18 88 kg (193 lb 14.4 oz)      5.  History of previous gout attacks with elevated uric acid levels.  Start allopurinol.  Denies side effects.    6.  Patient still drinks alcohol excessively, usually at least \"2-3 drinks per night\".  Each drink is a \"double highball\".  He knows he should quit drinking and feels better when he decreases but has not yet done so.  He states he is not interested in referral for treatment program at this time.    7.  History of abdominal aortic aneurysm seen in abdominal ultrasound last year.  I reviewed the results.  Recommendations were for an annual scan.    8.  Long history of smoking, has tried numerous times to quit.  Chantix has worked, but is too expensive for him. (Yet he can still afford cigarettes).  Low-dose CT scan for lung cancer screening September showed benign nodules with no evidence for lung cancer.    Current providers sharing in care for this patient include:   Patient Care Team:  Donny Payen MD " "as PCP - General (Internal Medicine)  Donny Payne MD as Assigned PCP    The following health maintenance items are reviewed in Epic and correct as of today:  Health Maintenance Due   Topic Date Due     DTAP/TDAP/TD IMMUNIZATION (3 - Td or Tdap) 08/07/2020       **I reviewed the information recorded in the patient's EPIC chart (including but not limited to medical history, surgical history, family history, problem list, medication list, and allergy list) and updated the information as indicated based on the patients reported information.             Review of Systems   Constitutional: Negative for chills and fever.   HENT: Negative for congestion, ear pain, hearing loss and sore throat.    Eyes: Negative for pain and visual disturbance.   Respiratory: Positive for shortness of breath. Negative for cough.    Cardiovascular: Negative for chest pain, palpitations and peripheral edema.   Gastrointestinal: Negative for abdominal pain, constipation, diarrhea, heartburn, hematochezia and nausea.   Genitourinary: Negative for dysuria, frequency, genital sores, hematuria, impotence, penile discharge and urgency.   Musculoskeletal: Negative for arthralgias, joint swelling and myalgias.   Skin: Negative for rash.   Neurological: Negative for dizziness, weakness, headaches and paresthesias.   Psychiatric/Behavioral: Negative for mood changes. The patient is not nervous/anxious.      Constitutional, HEENT, cardiovascular, pulmonary, gi and gu systems are negative, except as otherwise noted.    OBJECTIVE:   /78   Pulse 73   Temp 97.4  F (36.3  C) (Tympanic)   Ht 1.784 m (5' 10.25\")   Wt 94 kg (207 lb 3.2 oz)   SpO2 94%   BMI 29.52 kg/m   Estimated body mass index is 29.52 kg/m  as calculated from the following:    Height as of this encounter: 1.784 m (5' 10.25\").    Weight as of this encounter: 94 kg (207 lb 3.2 oz).  Physical Exam  GENERAL alert and no distress  EYES:  Normal sclera,conjunctiva, " EOMI  HENT: oral and posterior pharynx without lesions or erythema, facies symmetric  NECK: Neck supple. No LAD, without thyroidmegaly.  RESP: Clear to ausculation bilaterally without wheezes or crackles. Normal BS in all fields.  CV: RRR normal S1S2 without murmurs, rubs or gallops.  LYMPH: no cervical lymph adenopathy appreciated  MS: extremities- no gross deformities of the visible extremities noted,   EXT:  no lower extremity edema  PSYCH: Alert and oriented times 3; speech- coherent  SKIN:  No obvious significant skin lesions on visible portions of face     Diagnostic Test Results:  Labs reviewed in Epic    ASSESSMENT / PLAN:     (Z00.00) Medicare annual wellness visit, subsequent  (primary encounter diagnosis)  Comment: Discussed cardiac disease risk factors and cardiac disease risk factor modification, including diabetes screening, blood pressure screening (and management if indicated), and cholesterol screening.   Reviewed immunzation guidelines, including pneumococcal vaccines, annual influenza, and shingles vaccines.   Discussed routine cancer screenings, including skin cancer, colon cancer screening for everyone until age 80, prostate cancer screening in men until age 75, mammogram and PAP/pelvic for women until age 75.   Recommended regular dentist visits to care for remaining teeth.   Recommended regular screening for vision and glaucoma.   Recommended safe driving and accident avoidance.   Plan:     (M10.9) Acute gout of foot, unspecified cause, unspecified laterality  Comment: Continue allopurinol, reduce alcohol intake, remain well-hydrated.  Plan: Uric acid, AST, ALT, Basic metabolic panel            (I10) Benign essential hypertension  Comment: This condition is currently controlled on the current medical regimen.  Continue current therapy.   Plan: AST, ALT, Basic metabolic panel, Hemoglobin         A1c, Hemoglobin            (E78.5) Hyperlipidemia LDL goal <130  Comment: This condition is  "currently controlled on the current medical regimen.  Continue current therapy.   Plan: AST, ALT, Basic metabolic panel, Hemoglobin         A1c, Hemoglobin            (J43.1) COPD (HCC)  Comment: Definitely is get back on regular controller inhalers, unfortunately his insurance coverage is poor and they have been expensive in the past.  Fortunately there are now generic medications available in this class.  I reviewed how to search for cheaper prices using \"GoodRx\", potentially getting medications from a [place] pharmacy.  Discussed general issues of COPD, including pathophysiology, ways it will affect the pt., when to seek help, reviewed the typical medications (how they are taken, how they help)   Plan: albuterol (PROAIR HFA/PROVENTIL HFA/VENTOLIN         HFA) 108 (90 Base) MCG/ACT inhaler,         fluticasone-salmeterol (AIRDUO RESPICLICK)         113-14 MCG/ACT inhaler, tiotropium (SPIRIVA) 18        MCG inhaled capsule, albuterol (PROVENTIL) (2.5        MG/3ML) 0.083% neb solution            (R73.03) Prediabetes  Comment: Reviewed the labs showing mildly elevated glucose levels consistent with prediabetes.     Discussed \"pre-diabetes\", impaired glucose tolerance, and its part in the dysmetabolic syndrome.   No medications needed at this time.     Discussed the inevitable progression of impaired glucose tolerance toward worsening diabetes mellitus if nothing is changed in the diet, and the need for agressive interventions now to delay and prevent this inevitable progression.    Recommended lower carb diet.  Recommended regular physical activity.   We will continue to monitor this and nuñez additional recommendations and treatments as indicated based on the labs.       Plan: AST, ALT, Basic metabolic panel, Hemoglobin         A1c, Hemoglobin            (F10.20) Alcohol dependence, episodic drinking behavior (H)  Comment: Discussed the many different ways excessive alcohol damages the body.    Discussed the other " "damaging non-medical side effects of excessive alcohol use as well.    Discussed the observed increases in all-cause mortality and morbidity when drinking above 2 drinks per day (defined as 12 oz beer, or 4 oz wine, or 1.5 oz spirits).  Offered help and support in finding help in quitting alcohol.  Will offer CD assessment at [facility] if pt desires.   Plan:     (Z13.6) CARDIOVASCULAR SCREENING; LDL GOAL LESS THAN 100  Comment: Discussed cardiac disease risk factors and cardiac disease risk factor modification.   Plan: AST, ALT, Basic metabolic panel, Hemoglobin         A1c, Hemoglobin            (I71.4) Abdominal aortic aneurysm (AAA) without rupture (H)  Comment: Abdominal aortic aneurysm seen on ultrasound, reviewed results.  Recommend annual scanning to ensure stability.  Plan:     (Z13.6) Screening for AAA (aortic abdominal aneurysm)  Comment:   Plan: [place] Aorta Medicare AAA Screening               Patient has been advised of split billing requirements and indicates understanding: Yes    COUNSELING:  Reviewed preventive health counseling, as reflected in patient instructions       Consider AAA screening for ages 65-75 and smoking history       Regular exercise       Healthy diet/nutrition       Vision screening       Hearing screening       Dental care       Bladder control       Fall risk prevention       Immunizations    up to date              Aspirin prophylaxis        Alcohol Use        Colon cancer screening       Prostate cancer screening    Estimated body mass index is 29.52 kg/m  as calculated from the following:    Height as of this encounter: 1.784 m (5' 10.25\").    Weight as of this encounter: 94 kg (207 lb 3.2 oz).        He reports that he has been smoking cigarettes. He has been smoking about 1.00 pack per day. He has never used smokeless tobacco.  Tobacco Cessation Action Plan:   Information offered: Patient not interested at this time      Appropriate preventive services were discussed with " this patient, including applicable screening as appropriate for cardiovascular disease, diabetes, osteopenia/osteoporosis, and glaucoma.  As appropriate for age/gender, discussed screening for colorectal cancer, prostate cancer, breast cancer, and cervical cancer. Checklist reviewing preventive services available has been given to the patient.    Reviewed patients plan of care and provided an AVS. The Basic Care Plan (routine screening as documented in Health Maintenance) for Gabe meets the Care Plan requirement. This Care Plan has been established and reviewed with the Patient.    Counseling Resources:  ATP IV Guidelines  Pooled Cohorts Equation Calculator  Breast Cancer Risk Calculator  Breast Cancer: Medication to Reduce Risk  FRAX Risk Assessment  ICSI Preventive Guidelines  Dietary Guidelines for Americans, 2010  USDA's MyPlate  ASA Prophylaxis  Lung CA Screening    Donny Payne MD  [facility]    Identified Health Risks:

## 2022-02-11 NOTE — PATIENT INSTRUCTIONS
"*  abdominal aortic aneurysm with ultrasound to confirm stable size, you will be contacted to arrange this.     *  [employer]  [phone]  [email or web address]      AIR DUO INHALER:    1 inhalation twice per day, every day to help control and prevent the inflammation in the airways.  AirDuo is not a \"rescue inhaler\", you should not use this inhaler for urgent breathing problem, use the Albuterol inhaler.          *  Continue to use the Spiriva once per day, every day.  Either use the capsule form once daily, or the inhaler respimat form  This medication helps control the inflammation and secretions from COPD (emphysema, chronic bronchitis)    **look into getting this from a [facility] pharmacy:    Some examples:     [facility]  [email or web address]  [phone] (phone)  [phone] (fax)     [facility]  [email or web address]  [phone] (phone)  [phone] (fax)      5 GOALS TO PREVENT VASCULAR DISEASE:     1.  Aggressive blood pressure control, under 130/80 ideally.  Using medications if needed.    Your blood pressure is under good control    BP Readings from Last 4 Encounters:   02/11/22 122/78   09/03/21 126/80   02/05/21 128/68   08/05/20 134/76       2.  Aggressive LDL cholesterol (\"bad cholesterol\") lowering as indicated.    Your goal is an LDL under 130 for sure, preferably under 100.  (If you have diabetes or previous vascular disease, the the LDL goals would be under 100 for sure, preferably under 70.)    New guidelines identify four high-risk groups who could benefit from statins:   *people with pre-existing heart disease, such as those who have had a heart attack;   *people ages 40 to 75 who have diabetes of any type  *patients ages 40 to 75 with at least a 7.5% risk of developing cardiovascular disease over the next decade, according to a formula described in the guidelines  *patients with the sort of super-high cholesterol that sometimes runs in families, as evidenced by " an LDL of 190 milligrams per deciliter or higher    Your cholesterol levels are well controlled.    Recent Labs   Lab Test 09/03/21  1211 07/28/20  1148 04/04/16  0748 08/04/15  0757 02/03/15  0748   CHOL 216* 189   < > 210* 192   HDL 29* 35*   < > 27* 33*   * 120*   < > Cannot estimate LDL when triglyceride exceeds 400 mg/dL  134* 107   TRIG 198* 172*   < > 422* 262*   CHOLHDLRATIO  --   --   --  7.8* 5.8*    < > = values in this interval not displayed.       3.  Aggressive diabetic prevention, screening and/or management.      You do not have diabetes as of the most recent blood tests.     4.  No smoking    5.  Consider daily preventative aspirin over age 50 if you have enough cardiac risk factors to place you at higher risk for the presence of vascular disease.    If you have any reason not to take aspirin such easy bruising or bleeding, stomach problems, other anticoagulant medications, or any other side effects, then you should not take Aspirin.     --Based on your current cardiac risk factor profile, you should take regular daily Aspirin 81 mg once per day (as long as you do not have any side effects from taking aspirin).             Preventive Health Recommendations:   Male Ages 65 and over    Yearly exam:             See your health care provider every year in order to  o   Review health changes.   o   Discuss preventive care.    o   Review your medicines if your doctor has prescribed any.  Talk with your health care provider about whether you should have a test to screen for prostate cancer (PSA).  Every 3 years, have a diabetes test (fasting glucose). If you are at risk for diabetes, you should have this test more often.  Every 5 years, have a cholesterol test. Have this test more often if you are at risk for high cholesterol or heart disease.   Every 10 years, have a colonoscopy. Or, have a yearly FIT test (stool test). These exams will check for colon cancer.  Talk to with your health care  "provider about screening for Abdominal Aortic Aneurysm if you have a family history of AAA or have a history of smoking.    Shots:   Get a flu shot each year.   Get a tetanus shot every 10 years.   Talk to your doctor about your pneumonia vaccines. There are now two you should receive - Pneumovax (PPSV 23) and Prevnar (PCV 13).   Talk to your doctor about a shingles vaccine.   Talk to your doctor about the hepatitis B vaccine.  Nutrition:   Eat at least 5 servings of fruits and vegetables each day.   Eat whole-grain bread, whole-wheat pasta and brown rice instead of white grains and rice.   Talk to your provider about Calcium and Vitamin D.      --Good Grains:  Oats, brown rice, Quinoa (these do not raise the blood sugar as much)     --Bad grains:  Anything made from wheat or white rice     (because these raise the blood sugars significantly, and the possible gluten issue from wheat for some people).      --Proteins:  Aim for \"lean proteins\" including chicken, fish, seafood, pork, turkey, and eggs (in moderation); Eat red meat only occasionally    Lifestyle  Exercise for at least 150 minutes a week (30 minutes a day, 5 days a week). This will help you control your weight and prevent disease.   Limit alcohol to one drink per day.   No smoking.   Wear sunscreen to prevent skin cancer.   See your dentist every six months for an exam and cleaning.   See your eye doctor every 1 to 2 years to screen for conditions such as glaucoma, macular degeneration, cataracts, etc        "

## 2022-02-15 ENCOUNTER — HOSPITAL ENCOUNTER (OUTPATIENT)
Dept: ULTRASOUND IMAGING | Facility: CLINIC | Age: 67
Discharge: HOME | End: 2022-02-15
Attending: INTERNAL MEDICINE | Admitting: INTERNAL MEDICINE
Payer: COMMERCIAL

## 2022-02-15 DIAGNOSIS — Z13.6 SCREENING FOR AAA (AORTIC ABDOMINAL ANEURYSM): ICD-10-CM

## 2022-02-15 PROCEDURE — 76706 US ABDL AORTA SCREEN AAA: CPT

## 2022-05-25 ENCOUNTER — HOSPITAL ENCOUNTER (INPATIENT)
Facility: CLINIC | Age: 67
LOS: 7 days | Discharge: HOME | DRG: 442 | End: 2022-06-01
Attending: EMERGENCY MEDICINE | Admitting: HOSPITALIST
Payer: COMMERCIAL

## 2022-05-25 ENCOUNTER — APPOINTMENT (OUTPATIENT)
Dept: CT IMAGING | Facility: CLINIC | Age: 67
DRG: 442 | End: 2022-05-25
Attending: EMERGENCY MEDICINE
Payer: COMMERCIAL

## 2022-05-25 ENCOUNTER — APPOINTMENT (OUTPATIENT)
Dept: ULTRASOUND IMAGING | Facility: CLINIC | Age: 67
DRG: 442 | End: 2022-05-25
Attending: EMERGENCY MEDICINE
Payer: COMMERCIAL

## 2022-05-25 ENCOUNTER — OFFICE VISIT (OUTPATIENT)
Dept: URGENT CARE | Facility: URGENT CARE | Age: 67
End: 2022-05-25
Payer: COMMERCIAL

## 2022-05-25 VITALS
BODY MASS INDEX: 28.78 KG/M2 | DIASTOLIC BLOOD PRESSURE: 66 MMHG | TEMPERATURE: 100.1 F | WEIGHT: 202 LBS | HEART RATE: 94 BPM | SYSTOLIC BLOOD PRESSURE: 106 MMHG | OXYGEN SATURATION: 95 % | RESPIRATION RATE: 18 BRPM

## 2022-05-25 DIAGNOSIS — R50.9 FEVER, UNSPECIFIED FEVER CAUSE: ICD-10-CM

## 2022-05-25 DIAGNOSIS — R52 BODY ACHES: ICD-10-CM

## 2022-05-25 DIAGNOSIS — E87.1 HYPONATREMIA: ICD-10-CM

## 2022-05-25 DIAGNOSIS — R68.89 FLU-LIKE SYMPTOMS: ICD-10-CM

## 2022-05-25 DIAGNOSIS — N17.9 ACUTE KIDNEY INJURY (H): Primary | ICD-10-CM

## 2022-05-25 DIAGNOSIS — R10.13 EPIGASTRIC PAIN: ICD-10-CM

## 2022-05-25 DIAGNOSIS — K75.0 HEPATIC ABSCESS: ICD-10-CM

## 2022-05-25 DIAGNOSIS — R53.1 WEAK: ICD-10-CM

## 2022-05-25 PROBLEM — E11.9 DIABETES MELLITUS, TYPE 2 (H): Status: ACTIVE | Noted: 2022-05-25

## 2022-05-25 LAB
ALBUMIN SERPL-MCNC: 3.2 G/DL (ref 3.4–5)
ALBUMIN UR-MCNC: 30 MG/DL
ALP SERPL-CCNC: 73 U/L (ref 40–150)
ALT SERPL W P-5'-P-CCNC: 47 U/L (ref 0–70)
ANION GAP SERPL CALCULATED.3IONS-SCNC: 5 MMOL/L (ref 3–14)
APPEARANCE UR: CLEAR
AST SERPL W P-5'-P-CCNC: 56 U/L (ref 0–45)
BASOPHILS # BLD AUTO: 0 10E3/UL (ref 0–0.2)
BASOPHILS NFR BLD AUTO: 0 %
BILIRUB DIRECT SERPL-MCNC: 0.4 MG/DL (ref 0–0.2)
BILIRUB SERPL-MCNC: 1.6 MG/DL (ref 0.2–1.3)
BILIRUB UR QL STRIP: ABNORMAL
BUN SERPL-MCNC: 37 MG/DL (ref 7–30)
CALCIUM SERPL-MCNC: 9.2 MG/DL (ref 8.5–10.1)
CHLORIDE BLD-SCNC: 94 MMOL/L (ref 94–109)
CO2 SERPL-SCNC: 31 MMOL/L (ref 20–32)
COLOR UR AUTO: ABNORMAL
CREAT SERPL-MCNC: 1.43 MG/DL (ref 0.66–1.25)
EOSINOPHIL # BLD AUTO: 0 10E3/UL (ref 0–0.7)
EOSINOPHIL NFR BLD AUTO: 0 %
ERYTHROCYTE [DISTWIDTH] IN BLOOD BY AUTOMATED COUNT: 14 % (ref 10–15)
FLUAV AG SPEC QL IA: NEGATIVE
FLUBV AG SPEC QL IA: NEGATIVE
GFR SERPL CREATININE-BSD FRML MDRD: 54 ML/MIN/1.73M2
GLUCOSE BLD-MCNC: 136 MG/DL (ref 70–99)
GLUCOSE UR STRIP-MCNC: NEGATIVE MG/DL
HCT VFR BLD AUTO: 50.8 % (ref 40–53)
HGB BLD-MCNC: 16.6 G/DL (ref 13.3–17.7)
HGB UR QL STRIP: NEGATIVE
HYALINE CASTS: 28 /LPF
KETONES UR STRIP-MCNC: NEGATIVE MG/DL
LEUKOCYTE ESTERASE UR QL STRIP: NEGATIVE
LIPASE SERPL-CCNC: 33 U/L (ref 73–393)
LYMPHOCYTES # BLD AUTO: 1.1 10E3/UL (ref 0.8–5.3)
LYMPHOCYTES NFR BLD AUTO: 6 %
MCH RBC QN AUTO: 30.2 PG (ref 26.5–33)
MCHC RBC AUTO-ENTMCNC: 32.7 G/DL (ref 31.5–36.5)
MCV RBC AUTO: 93 FL (ref 78–100)
MONOCYTES # BLD AUTO: 2.4 10E3/UL (ref 0–1.3)
MONOCYTES NFR BLD AUTO: 13 %
MUCOUS THREADS #/AREA URNS LPF: PRESENT /LPF
NEUTROPHILS # BLD AUTO: 14.4 10E3/UL (ref 1.6–8.3)
NEUTROPHILS NFR BLD AUTO: 81 %
NITRATE UR QL: NEGATIVE
PH UR STRIP: 5 [PH] (ref 5–7)
PLATELET # BLD AUTO: 135 10E3/UL (ref 150–450)
POTASSIUM BLD-SCNC: 4.1 MMOL/L (ref 3.4–5.3)
PROT SERPL-MCNC: 7.2 G/DL (ref 6.8–8.8)
RBC # BLD AUTO: 5.49 10E6/UL (ref 4.4–5.9)
RBC URINE: 3 /HPF
SARS-COV-2 RNA RESP QL NAA+PROBE: NEGATIVE
SARS-COV-2 RNA RESP QL NAA+PROBE: NEGATIVE
SODIUM SERPL-SCNC: 130 MMOL/L (ref 133–144)
SP GR UR STRIP: 1.03 (ref 1–1.03)
SQUAMOUS EPITHELIAL: <1 /HPF
TROPONIN I SERPL HS-MCNC: 15 NG/L
UROBILINOGEN UR STRIP-MCNC: 2 MG/DL
WBC # BLD AUTO: 17.8 10E3/UL (ref 4–11)
WBC URINE: 2 /HPF

## 2022-05-25 PROCEDURE — 82248 BILIRUBIN DIRECT: CPT | Performed by: EMERGENCY MEDICINE

## 2022-05-25 PROCEDURE — 36415 COLL VENOUS BLD VENIPUNCTURE: CPT | Performed by: EMERGENCY MEDICINE

## 2022-05-25 PROCEDURE — 36415 COLL VENOUS BLD VENIPUNCTURE: CPT

## 2022-05-25 PROCEDURE — 96366 THER/PROPH/DIAG IV INF ADDON: CPT

## 2022-05-25 PROCEDURE — 74177 CT ABD & PELVIS W/CONTRAST: CPT

## 2022-05-25 PROCEDURE — 87149 DNA/RNA DIRECT PROBE: CPT | Performed by: EMERGENCY MEDICINE

## 2022-05-25 PROCEDURE — 84484 ASSAY OF TROPONIN QUANT: CPT | Performed by: EMERGENCY MEDICINE

## 2022-05-25 PROCEDURE — 76705 ECHO EXAM OF ABDOMEN: CPT

## 2022-05-25 PROCEDURE — 250N000013 HC RX MED GY IP 250 OP 250 PS 637: Performed by: EMERGENCY MEDICINE

## 2022-05-25 PROCEDURE — U0005 INFEC AGEN DETEC AMPLI PROBE: HCPCS | Performed by: PHYSICIAN ASSISTANT

## 2022-05-25 PROCEDURE — 87804 INFLUENZA ASSAY W/OPTIC: CPT

## 2022-05-25 PROCEDURE — C9803 HOPD COVID-19 SPEC COLLECT: HCPCS

## 2022-05-25 PROCEDURE — 87086 URINE CULTURE/COLONY COUNT: CPT | Performed by: EMERGENCY MEDICINE

## 2022-05-25 PROCEDURE — 99223 1ST HOSP IP/OBS HIGH 75: CPT | Mod: AI | Performed by: HOSPITALIST

## 2022-05-25 PROCEDURE — 85025 COMPLETE CBC W/AUTO DIFF WBC: CPT

## 2022-05-25 PROCEDURE — 250N000009 HC RX 250: Performed by: EMERGENCY MEDICINE

## 2022-05-25 PROCEDURE — 99285 EMERGENCY DEPT VISIT HI MDM: CPT | Mod: 25,CS

## 2022-05-25 PROCEDURE — 96361 HYDRATE IV INFUSION ADD-ON: CPT

## 2022-05-25 PROCEDURE — 250N000011 HC RX IP 250 OP 636: Performed by: EMERGENCY MEDICINE

## 2022-05-25 PROCEDURE — 99207 PR INPT ADMISSION FROM CLINIC: CPT | Mod: CS | Performed by: PHYSICIAN ASSISTANT

## 2022-05-25 PROCEDURE — 87077 CULTURE AEROBIC IDENTIFY: CPT | Performed by: EMERGENCY MEDICINE

## 2022-05-25 PROCEDURE — U0003 INFECTIOUS AGENT DETECTION BY NUCLEIC ACID (DNA OR RNA); SEVERE ACUTE RESPIRATORY SYNDROME CORONAVIRUS 2 (SARS-COV-2) (CORONAVIRUS DISEASE [COVID-19]), AMPLIFIED PROBE TECHNIQUE, MAKING USE OF HIGH THROUGHPUT TECHNOLOGIES AS DESCRIBED BY CMS-2020-01-R: HCPCS | Performed by: PHYSICIAN ASSISTANT

## 2022-05-25 PROCEDURE — 80053 COMPREHEN METABOLIC PANEL: CPT

## 2022-05-25 PROCEDURE — 96365 THER/PROPH/DIAG IV INF INIT: CPT | Mod: 59

## 2022-05-25 PROCEDURE — 120N000001 HC R&B MED SURG/OB

## 2022-05-25 PROCEDURE — 258N000003 HC RX IP 258 OP 636: Performed by: EMERGENCY MEDICINE

## 2022-05-25 PROCEDURE — U0003 INFECTIOUS AGENT DETECTION BY NUCLEIC ACID (DNA OR RNA); SEVERE ACUTE RESPIRATORY SYNDROME CORONAVIRUS 2 (SARS-COV-2) (CORONAVIRUS DISEASE [COVID-19]), AMPLIFIED PROBE TECHNIQUE, MAKING USE OF HIGH THROUGHPUT TECHNOLOGIES AS DESCRIBED BY CMS-2020-01-R: HCPCS | Performed by: EMERGENCY MEDICINE

## 2022-05-25 PROCEDURE — 81001 URINALYSIS AUTO W/SCOPE: CPT | Performed by: EMERGENCY MEDICINE

## 2022-05-25 PROCEDURE — 83690 ASSAY OF LIPASE: CPT | Performed by: EMERGENCY MEDICINE

## 2022-05-25 RX ORDER — IOPAMIDOL 755 MG/ML
102 INJECTION, SOLUTION INTRAVASCULAR ONCE
Status: COMPLETED | OUTPATIENT
Start: 2022-05-25 | End: 2022-05-25

## 2022-05-25 RX ORDER — ACETAMINOPHEN 500 MG
500 TABLET ORAL ONCE
Status: COMPLETED | OUTPATIENT
Start: 2022-05-25 | End: 2022-05-25

## 2022-05-25 RX ORDER — PIPERACILLIN SODIUM, TAZOBACTAM SODIUM 3; .375 G/15ML; G/15ML
3.38 INJECTION, POWDER, LYOPHILIZED, FOR SOLUTION INTRAVENOUS ONCE
Status: COMPLETED | OUTPATIENT
Start: 2022-05-25 | End: 2022-05-25

## 2022-05-25 RX ADMIN — IOPAMIDOL 102 ML: 755 INJECTION, SOLUTION INTRAVENOUS at 20:02

## 2022-05-25 RX ADMIN — SODIUM CHLORIDE 1000 ML: 9 INJECTION, SOLUTION INTRAVENOUS at 17:55

## 2022-05-25 RX ADMIN — PIPERACILLIN SODIUM,TAZOBACTAM SODIUM 3.38 G: 3; .375 INJECTION, POWDER, FOR SOLUTION INTRAVENOUS at 22:14

## 2022-05-25 RX ADMIN — SODIUM CHLORIDE 96 ML: 900 INJECTION INTRAVENOUS at 20:02

## 2022-05-25 RX ADMIN — ACETAMINOPHEN 500 MG: 500 TABLET, FILM COATED ORAL at 18:03

## 2022-05-25 ASSESSMENT — ACTIVITIES OF DAILY LIVING (ADL): ADLS_ACUITY_SCORE: 35

## 2022-05-25 NOTE — PROGRESS NOTES
SUBJECTIVE:   Gabe Smith is a 67 year old male presenting with a chief complaint of epigastric pain, weakness, fever, pain.  Onset of symptoms was 3 day(s) ago.  Course of illness is worsening.    Severity moderately severe  Current and Associated symptoms: weak      Past Medical History:   Diagnosis Date     Abdominal aortic aneurysm (AAA) without rupture (H) 02/25/2021    AAA 3.6 x 3.6 cm per ultrasound     COPD (chronic obstructive pulmonary disease) (H)      Dysmetabolic syndrome X      Hyperlipidemia      Hypertension      Osteoarthrosis      Prediabetes 10/09/2014    A1C 6.1     Tobacco use disorder      Current Outpatient Medications   Medication Sig Dispense Refill     albuterol (PROAIR HFA/PROVENTIL HFA/VENTOLIN HFA) 108 (90 Base) MCG/ACT inhaler Inhale 2 puffs into the lungs every 4 hours as needed for shortness of breath / dyspnea or wheezing 18 g 11     albuterol (PROVENTIL) (2.5 MG/3ML) 0.083% neb solution Take 1 vial (2.5 mg) by nebulization every 6 hours as needed for shortness of breath / dyspnea or wheezing 90 mL 11     allopurinol (ZYLOPRIM) 100 MG tablet Take 2 tablets (200 mg) by mouth daily 180 tablet 3     amLODIPine (NORVASC) 10 MG tablet Take 1 tablet (10 mg) by mouth daily 90 tablet 3     aspirin 81 MG tablet Take 1 tablet (81 mg) by mouth daily       fluticasone-salmeterol (ADVAIR DISKUS) 250-50 MCG/DOSE inhaler Inhale 1 puff into the lungs every 12 hours GENERIC ADVAIR IF POSSIBLE 3 Inhaler 3     fluticasone-salmeterol (AIRDUO RESPICLICK) 113-14 MCG/ACT inhaler Inhale 1 puff into the lungs 2 times daily 1 each 11     lisinopril-hydrochlorothiazide (ZESTORETIC) 20-12.5 MG tablet Take 2 tablets by mouth every morning 180 tablet 3     pravastatin (PRAVACHOL) 40 MG tablet TAKE ONE TABLET BY MOUTH EVERY NIGHT AT BEDTIME 90 tablet 3     tiotropium (SPIRIVA) 18 MCG inhaled capsule Inhale 1 capsule (18 mcg) into the lungs daily 30 capsule 11     Social History     Tobacco Use     Smoking  status: Current Every Day Smoker     Packs/day: 1.00     Types: Cigarettes     Smokeless tobacco: Never Used     Tobacco comment: 1.5 PPD-now down to .5 PPD   Substance Use Topics     Alcohol use: Yes     Comment: 2-3 drinks per night (double)       ROS:  Review of systems negative except as stated above.    OBJECTIVE:  /66   Pulse 94   Temp 100.1  F (37.8  C)   Resp 18   Wt 91.6 kg (202 lb)   SpO2 95%   BMI 28.78 kg/m    GENERAL APPEARANCE: ill, nontoxic,alert and mild distress  EYES:  conjunctiva clear  NECK: supple, nontender, no lymphadenopathy  RESP: lungs tight - no rales, rhonchi   CV: regular rates and rhythm  NEURO: guarded gait, sensory exam grossly normal,  normal speech and mentation  SKIN: no suspicious lesions or rashes      Results for orders placed or performed in visit on 05/25/22   XR Chest 2 Views     Status: None (Preliminary result)    Narrative    CHEST TWO VIEWS 5/25/2022 3:07 PM     HISTORY: Flu-like symptoms.    COMPARISON: October 10, 2011       Impression    IMPRESSION: There are no acute infiltrates. The cardiac silhouette is  not enlarged. Pulmonary vasculature is unremarkable.   Results for orders placed or performed in visit on 05/25/22   Basic metabolic panel  (Ca, Cl, CO2, Creat, Gluc, K, Na, BUN)     Status: Abnormal   Result Value Ref Range    Sodium 130 (L) 133 - 144 mmol/L    Potassium 4.1 3.4 - 5.3 mmol/L    Chloride 94 94 - 109 mmol/L    Carbon Dioxide (CO2) 31 20 - 32 mmol/L    Anion Gap 5 3 - 14 mmol/L    Urea Nitrogen 37 (H) 7 - 30 mg/dL    Creatinine 1.43 (H) 0.66 - 1.25 mg/dL    Calcium 9.2 8.5 - 10.1 mg/dL    Glucose 136 (H) 70 - 99 mg/dL    GFR Estimate 54 (L) >60 mL/min/1.73m2   CBC with platelets and differential     Status: Abnormal   Result Value Ref Range    WBC Count 17.8 (H) 4.0 - 11.0 10e3/uL    RBC Count 5.49 4.40 - 5.90 10e6/uL    Hemoglobin 16.6 13.3 - 17.7 g/dL    Hematocrit 50.8 40.0 - 53.0 %    MCV 93 78 - 100 fL    MCH 30.2 26.5 - 33.0 pg     MCHC 32.7 31.5 - 36.5 g/dL    RDW 14.0 10.0 - 15.0 %    Platelet Count 135 (L) 150 - 450 10e3/uL    % Neutrophils 81 %    % Lymphocytes 6 %    % Monocytes 13 %    % Eosinophils 0 %    % Basophils 0 %    Absolute Neutrophils 14.4 (H) 1.6 - 8.3 10e3/uL    Absolute Lymphocytes 1.1 0.8 - 5.3 10e3/uL    Absolute Monocytes 2.4 (H) 0.0 - 1.3 10e3/uL    Absolute Eosinophils 0.0 0.0 - 0.7 10e3/uL    Absolute Basophils 0.0 0.0 - 0.2 10e3/uL   CBC with platelets and differential     Status: Abnormal    Narrative    The following orders were created for panel order CBC with platelets and differential.  Procedure                               Abnormality         Status                     ---------                               -----------         ------                     CBC with platelets and d...[[id number]]  Abnormal            Final result                 Please view results for these tests on the individual orders.   Results for orders placed or performed in visit on 05/25/22   Influenza A/B antigen     Status: Normal    Specimen: Nose; Swab   Result Value Ref Range    Influenza A antigen Negative Negative    Influenza B antigen Negative Negative    Narrative    Test results must be correlated with clinical data. If necessary, results should be confirmed by a molecular assay or viral culture.       ASSESSMENT:      (R68.89) Flu-like symptoms  Plan: Influenza A/B antigen, Symptomatic; Yes;         5/22/2022 COVID-19 Virus (Coronavirus) by PCR         Nose, XR Chest 2 Views, CBC with platelets and         differential, Basic metabolic panel  (Ca, Cl,         CO2, Creat, Gluc, K, Na, BUN)    (N17.9) Acute kidney injury (H)  (primary encounter diagnosis)  (R50.9) Fever, unspecified fever cause  (R52) Body aches  (R53.1) Weak  E87.1) Hyponatremia  (R10.13) Epigastric Pain  Plan: CBC with platelets and differential

## 2022-05-25 NOTE — ED NOTES
Pt was very out of breath when walking to room from ED lobby. Stopped intermediate through and writer got a wheelchair for the rest of the way. O2 sats were 85 when pt was in room, and is 98 on 2 LPM.

## 2022-05-25 NOTE — PATIENT INSTRUCTIONS
Follow up immediately with severe headache, chest pain, or shortness of breath    Rest, isolate for results, hydrate, follow up if worsening or new symptoms  Unvaccinated household members to isolate until test results, if positive isolate for 2 weeks and follow up for testing if symptoms occur         Patient Education     Coronavirus Disease 2019 (COVID-19): Caring for Yourself or Others   If you or a household member have symptoms of COVID-19, follow the guidelines below. This will help you manage symptoms and keep the virus from spreading.  If you have symptoms of COVID-19  Stay home and contact your care team. They will tell you what to do.  Don t panic. Keep in mind that other illnesses can cause similar symptoms.  Stay away from work, school, and public places.  Limit physical contact with others in your home. Limit visitors. No kissing.  Clean surfaces you touch with disinfectant.  If you need to cough or sneeze, do it into a tissue. Then throw the tissue into the trash. If you don't have tissues, cough or sneeze into the bend of your elbow.  Don t share food or personal items with people in your household. This includes items like eating and drinking utensils, towels, and bedding.  Wear a cloth face mask around other people. During a public health emergency, medical face masks may be reserved for healthcare workers. You may need to make a cloth face mask of your own. You can do this using a bandana, T-shirt, or other cloth. The CDC has instructions on how to make a face mask. Wear the mask so that it covers both your nose and mouth.  If you need to go to a hospital or clinic, call ahead to let them know. Expect the care team to wear masks, gowns, gloves, and eye protection. You may need to come to a different entrance or wait in a separate room.  Follow all instructions from your care team.    If you ve been diagnosed with COVID-19  Stay home and away from others, including other people in your home. (This  is called self-isolation.) Don t leave home unless you need to get medical care. Don t go to work, school, or public places. Don t use buses, taxis, or other public transportation.  Follow all instructions from your care team.  If you need to go to a hospital or clinic, call ahead to let them know. Expect the care team to wear masks, gowns, gloves, and eye protection. You may need to come to a different entrance or wait in a separate room.  Wear a face mask over your nose and mouth. This is to protect others from your germs. If you can t wear a mask, your caregivers should wear one. You may need to make your own mask using a bandana, T-shirt, or other cloth. See the CDC s instructions on how to make a face mask.  Have no contact with pets and other animals.  Don t share food or personal items with people in your household. This includes items like eating and drinking utensils, towels, and bedding.  If you need to cough or sneeze, do it into a tissue. Then throw the tissue into the trash. If you don't have tissues, cough or sneeze into the bend of your elbow.  Wash your hands often.    Self-care at home   At this time, there is no medicine approved to prevent or treat COVID-19. The FDA has approved an antiviral medicine (called remdesivir) for people being treated in the hospital. This is for people 12 years and older who weigh more than about 88 pounds (40 kgs). In certain cases, it may also be used for people younger than 12 years or who weigh less than about 88 pounds (40 kgs)..  Currently, treatment is mostly aimed at helping your body while it fights the virus.  Getting extra rest.  Drink extra fluids 6 to 8 glasses of liquids each day), unless a doctor has told you not to. Ask your care team which fluids are best for you. Avoid fluids that contain caffeine or alcohol.  Taking over-the-counter (OTC) medicine to reduce pain and fever. Your care team will tell you which medicine to use.  If you ve been in the  hospital for COVID-19, follow your care team s instructions. They will tell you when to stop self-isolation. They may also have you change positions to help your breathing (such as lying on your belly).  If a test showed that you have COVID-19, you may be asked to donate plasma after you ve recovered. (This is called COVID-19 convalescent plasma donation.) The plasma may contain antibodies to help fight the virus in others. Experts don't know if the plasma will work well as a treatment. Research continues, and the FDA has approved it for emergency use in certain people with serious or life-threatening COVID-19. For more information, talk to your care team.  Caring for a sick person   Follow all instructions from the care team.  Wash your hands often.  Wear protective clothing as advised.  Make sure the sick person wears a mask. If they can't wear a mask, don t stay in the same room with them. If you must be in the same room, wear a face mask. Make sure the mask covers both the nose and mouth.  Keep track of the sick person s symptoms.  Clean surfaces often with disinfectant. This includes phones, kitchen counters, fridge door handles, bathroom surfaces, and others.  Don t let anyone share household items with the sick person. This includes eating and drinking tools, towels, sheets, or blankets.  Clean fabrics and laundry well.  Keep other people and pets away from the sick person.    When you can stop self-isolation  When you are sick with COVID-19, you should stay away from other people. This is called self-isolation. The rules for ending self-isolation depend on your health in general.  If you are normally healthy:  You can stop self-isolation when all 3 of these are true:  You ve had no fever--and no medicine that reduces fever--for at least 24 hours. And   Your symptoms are better, such as cough or trouble breathing. And   At least 10 days have passed since your symptoms first started.  Talk with your care team  before you leave home. They may tell you it s okay to leave, or they may give you different advice. You do not need to be re-tested.  If you have a weak immune system, or you ve had severe COVID-19:  Follow your care team s instructions. You may be asked to self-isolate for 10 days to 20 days after your symptoms first started. Your care team may want to re-test you for COVID-19.  Note: If you re being treated for cancer, have an immune disorder (such as HIV), or have had a transplant (organ or bone marrow), you may have a weak immune system.  If you've already had COVID-19 once:  If you had the virus over 3 months ago, and you ve been exposed again, treat it like you've never had COVID-19. Stay home, limit your contact with others, call your care team, and watch for symptoms.  If it s been less than 3 months since you had the virus, you re symptom-free, and you ve been exposed again: You don t need to self-isolate or be re-tested. But if you develop new symptoms that can t be linked to another illness, please self-isolate and contact your care team.  When you return to public settings  When you are well enough to go outside your home, follow the CDC s guidance on cloth face masks.  Anyone over age 2 should wear a face mask in public, especially when it's hard to socially distance. This includes public transit, public protests and marches, and crowded stores, bars, and restaurants.  Face masks are most likely to reduce the spread of COVID-19 when they are widely used by people who are out in the public.  Certain people should not wear a face covering. These include:  Children under 2 years old  Anyone with a health, developmental, or mental health condition that can be made worse by wearing a mask  Anyone who is unconscious or unable to remove the face covering without help. See the CDC's guidance on who should not wear a face mask.  When to call your care team  Call your care team right away if a sick person has any  of these:  Trouble breathing  Pain or pressure in chest  If a sick person has any of these, call 911:  Trouble breathing that gets worse  Pain or pressure in chest that gets worse  Blue tint to lips or face  Fast or irregular heartbeat  Confusion or trouble waking  Fainting or loss of consciousness  Coughing up blood  Going home from the hospital   If you have COVID-19 and were recently in the hospital:  Follow the instructions above for self-care and isolation.  Follow the hospital care team s instructions.  Ask questions if anything is unclear to you. Write down answers so you remember them.  Date last modified: 11/23/2020  [employer] last reviewed this educational content on 4/1/2020  This information has been modified by your health care provider with permission from the publisher.    1656-5870 [employer]. [address] [place] [address], [place] [address]. All rights reserved. This information is not intended as a substitute for professional medical care. Always follow your healthcare professional's instructions.

## 2022-05-25 NOTE — ED TRIAGE NOTES
Pt was at  today for fatigue, not sleeping or eating for 3 days. Pt. Now has low back, shoulder pain - muscle aches and pains.  labs show elevated WC, hyponatremia and kidney function is off.     Triage Assessment     Row Name 05/25/22 1618       Respiratory WDL    Respiratory WDL X;expansion/retractions;cough    Cough Frequency frequent    Cough Type productive       Skin Circulation/Temperature WDL    Skin Circulation/Temperature WDL WDL       Cardiac WDL    Cardiac WDL WDL       Peripheral/Neurovascular WDL    Peripheral Neurovascular WDL WDL       Cognitive/Neuro/Behavioral WDL    Cognitive/Neuro/Behavioral WDL WDL    Row Name 05/25/22 1617       Triage Assessment (Adult)    Airway WDL WDL

## 2022-05-25 NOTE — ED PROVIDER NOTES
"  History   Chief Complaint:  Abnormal Labs       HPI   Gabe Smith is a 67 year old male who presents with 3 days of fatigue, weakness, and general ill feeling.  Patient states that Sunday evening he had some upper epigastric pain that kept him awake for a large portion of the night.  This pain has since resolved and not recurred.  Since then, however, he has felt shaky, weak, and has not been able to eat much.  He has tried to stay on top of drinking water, but has had minimal food intake.  He states he has also noticed some shortness of breath, no chest pain.  He went to urgent care today, and lab work there showed elevated white blood cells, elevated creatinine, and hyponatremia.  Chest x-ray was obtained and was negative, flu was negative and COVID was pending at the time he came to the emergency department.  He was sent to the emergency department from urgent care.  He has not had any sick contacts recently, he is COVID vaccinated and boosted, and denies any nausea, vomiting, diarrhea, or constipation. No other complaints.    Review of Systems  10 point review of systems performed and is negative except as above and in HPI.    Allergies:  The patient has no known allergies.    Medications:  Albuterol  Zyloprim  Norvasc  Advair  Zestoteric  Pravachol  Spiriva  Aspirin    Past Medical History:     AAA without rupture  COPD  Dysmetabolic syndrome X  Hyperlipidemia  Hypertension  Osteoarthrosis  Prediabetes  Tobacco use disorder    Past Surgical History:    Left MOISES  Right MOISES  Colonoscopy with polypectomy     Family History:    The patient denies past family history.    Social History:  The patient presents with his wife. Current every day smoker.    Physical Exam     Patient Vitals for the past 24 hrs:   BP Temp Temp src Pulse Resp SpO2 Height Weight   05/25/22 1616 113/68 98.5  F (36.9  C) Temporal 84 20 95 % 1.778 m (5' 10\") 91.6 kg (202 lb)       Physical Exam  General: Resting on the gurney, appears " comfortable  Head:  The scalp, face, and head appear normal  Mouth/Throat: Mucus membranes are moist  CV:  Regular rate    Normal S1 and S2  No pathological murmur   Resp:  Breath sounds with diffuse expiratory wheeze, but equal bilaterally    Non-labored, no retractions or accessory muscle use  GI:  Abdomen is soft, no rigidity    No tenderness to palpation  MS:  Normal motor assessment of all extremities.    Good capillary refill noted.  Skin:  No rash or lesions noted.  Neuro: Speech is normal and fluent. No apparent deficit.  Psych: Awake. Alert.  Normal affect.      Appropriate interactions.      Emergency Department Course     Imaging:  CT Chest (PE) Abdomen Pelvis w Contrast   Final Result   IMPRESSION:   1.  Cluster of peripherally enhancing hypodense lesions in the right hepatic lobe adjacent to the gallbladder. These are most suspicious for hepatic abscesses/phlegmon with the given clinical history. Malignant etiologies such as metastatic disease are    also possible. These do not have the typical appearance for focal fat sparing and were not well seen on the ultrasound earlier today, likely due to hepatic steatosis. Consider a liver MRI for better evaluation.   2.  Cholelithiasis and nonspecific trace pericholecystic fluid. Acute cholecystitis cannot be excluded and HIDA scan can be considered for complete characterization if clinically indicated.   3.  No pulmonary emboli. No acute findings in the chest.   4.  Mildly aneurysmal descending thoracic aorta measuring 4.1 cm and abdominal aorta measuring 4.2 cm.      Abdomen US, limited (RUQ only)   Final Result   IMPRESSION:   1.  Cholelithiasis. No sonographic evidence of cholecystitis.   2.  Enlarged fatty liver.              Report per radiology    Laboratory:  Labs Ordered and Resulted from Time of ED Arrival to Time of ED Departure   LIPASE - Abnormal       Result Value    Lipase 33 (*)    ROUTINE UA WITH MICROSCOPIC REFLEX TO CULTURE - Abnormal     Color Urine Orange (*)     Appearance Urine Clear      Glucose Urine Negative      Bilirubin Urine Small (*)     Ketones Urine Negative      Specific Gravity Urine 1.026      Blood Urine Negative      pH Urine 5.0      Protein Albumin Urine 30  (*)     Urobilinogen Urine 2.0      Nitrite Urine Negative      Leukocyte Esterase Urine Negative      Mucus Urine Present (*)     RBC Urine 3 (*)     WBC Urine 2      Squamous Epithelials Urine <1      Hyaline Casts Urine 28 (*)    HEPATIC FUNCTION PANEL - Abnormal    Bilirubin Total 1.6 (*)     Bilirubin Direct 0.4 (*)     Protein Total 7.2      Albumin 3.2 (*)     Alkaline Phosphatase 73      AST 56 (*)     ALT 47     COVID-19 VIRUS (CORONAVIRUS) BY PCR - Normal    SARS CoV2 PCR Negative     TROPONIN I - Normal    Troponin I High Sensitivity 15     URINE CULTURE   BLOOD CULTURE   BLOOD CULTURE        Emergency Department Course:  Reviewed:  I reviewed nursing notes, vitals, past medical history and Care Everywhere    Assessments:   I obtained history and examined the patient as noted above.    I rechecked the patient and explained findings.    Interventions:  Medications   acetaminophen (TYLENOL) tablet 500 mg (500 mg Oral Given 5/25/22 1803)   0.9% sodium chloride BOLUS (0 mLs Intravenous Stopped 5/25/22 2008)   Saline Flush - CT (96 mLs Intravenous Given 5/25/22 2002)   iopamidol (ISOVUE-370) solution 102 mL (102 mLs Intravenous Given 5/25/22 2002)   piperacillin-tazobactam (ZOSYN) 3.375 g vial to attach to  mL bag (0 g Intravenous Stopped 5/25/22 1637)     Disposition:  The patient was admitted to the hospital under the care of Dr. Garcia.     Impression & Plan         Medical Decision Making:  Gabe Smith is a 67 year old male who presents with multiple days of fatigue, weakness, fevers, and feeling generally unwell.  He also complained of severe epigastric pain starting on Sunday evening, which has since considerably improved.  He initially presented to  urgent care where their initial evaluation and work-up was concerning for leukocytosis to 17.8, hyponatremia at 130, as well as evidence of acute kidney injury with creatinine 1.43 above baseline of 0.8.  Chest x-ray was completed with no acute infiltrates or other findings.  He was then sent to the ED from urgent care.     On presentation to the emergency department, he had diffuse expiratory wheezes and required 2 L of oxygen via nasal cannula.  Given no findings on chest x-ray completed earlier, and uncertain source of his fevers and leukocytosis in the setting of recent severe abdominal pain with respiratory symptoms as well, a CT of the chest, abd, pelvis was obtained.  CT was remarkable for nodules on the right hepatic lobe, consistent in this clinical setting with hepatic abscesses.  He was started on IV Zosyn, and admitted to the hospitalist team for further management and IV antibiotics.    Diagnosis:    ICD-10-CM    1. Hepatic abscess  K75.0              Marie Lyons MD  05/26/22 0014

## 2022-05-26 ENCOUNTER — APPOINTMENT (OUTPATIENT)
Dept: MRI IMAGING | Facility: CLINIC | Age: 67
DRG: 442 | End: 2022-05-26
Attending: INTERNAL MEDICINE
Payer: COMMERCIAL

## 2022-05-26 LAB
ACINETOBACTER SPECIES: NOT DETECTED
ALBUMIN SERPL-MCNC: 2.9 G/DL (ref 3.4–5)
ALP SERPL-CCNC: 76 U/L (ref 40–150)
ALT SERPL W P-5'-P-CCNC: 45 U/L (ref 0–70)
ANION GAP SERPL CALCULATED.3IONS-SCNC: 7 MMOL/L (ref 3–14)
AST SERPL W P-5'-P-CCNC: 53 U/L (ref 0–45)
BILIRUB DIRECT SERPL-MCNC: 0.4 MG/DL (ref 0–0.2)
BILIRUB SERPL-MCNC: 1.3 MG/DL (ref 0.2–1.3)
BUN SERPL-MCNC: 44 MG/DL (ref 7–30)
CALCIUM SERPL-MCNC: 8.3 MG/DL (ref 8.5–10.1)
CHLORIDE BLD-SCNC: 99 MMOL/L (ref 94–109)
CITROBACTER SPECIES: NOT DETECTED
CO2 SERPL-SCNC: 25 MMOL/L (ref 20–32)
CREAT SERPL-MCNC: 1.22 MG/DL (ref 0.66–1.25)
CTX-M: NORMAL
ENTEROBACTER SPECIES: NOT DETECTED
ERYTHROCYTE [DISTWIDTH] IN BLOOD BY AUTOMATED COUNT: 13.4 % (ref 10–15)
ESCHERICHIA COLI: NOT DETECTED
GFR SERPL CREATININE-BSD FRML MDRD: 65 ML/MIN/1.73M2
GLUCOSE BLD-MCNC: 119 MG/DL (ref 70–99)
HCT VFR BLD AUTO: 45.9 % (ref 40–53)
HGB BLD-MCNC: 15.3 G/DL (ref 13.3–17.7)
IMP: NORMAL
KLEBSIELLA OXYTOCA: NOT DETECTED
KLEBSIELLA PNEUMONIAE: NOT DETECTED
KPC: NORMAL
LACTATE SERPL-SCNC: 1.2 MMOL/L (ref 0.7–2)
MCH RBC QN AUTO: 30.9 PG (ref 26.5–33)
MCHC RBC AUTO-ENTMCNC: 33.3 G/DL (ref 31.5–36.5)
MCV RBC AUTO: 93 FL (ref 78–100)
NDM: NORMAL
OXA (DETECTED/NOT DETECTED): NORMAL
PLATELET # BLD AUTO: 127 10E3/UL (ref 150–450)
POTASSIUM BLD-SCNC: 4 MMOL/L (ref 3.4–5.3)
PROT SERPL-MCNC: 6.5 G/DL (ref 6.8–8.8)
PROTEUS SPECIES: NOT DETECTED
PSEUDOMONAS AERUGINOSA: NOT DETECTED
RBC # BLD AUTO: 4.95 10E6/UL (ref 4.4–5.9)
SODIUM SERPL-SCNC: 131 MMOL/L (ref 133–144)
VIM: NORMAL
WBC # BLD AUTO: 14.6 10E3/UL (ref 4–11)

## 2022-05-26 PROCEDURE — 87040 BLOOD CULTURE FOR BACTERIA: CPT | Performed by: HOSPITALIST

## 2022-05-26 PROCEDURE — 250N000013 HC RX MED GY IP 250 OP 250 PS 637: Performed by: HOSPITALIST

## 2022-05-26 PROCEDURE — 83605 ASSAY OF LACTIC ACID: CPT | Performed by: HOSPITALIST

## 2022-05-26 PROCEDURE — 74183 MRI ABD W/O CNTR FLWD CNTR: CPT

## 2022-05-26 PROCEDURE — 36415 COLL VENOUS BLD VENIPUNCTURE: CPT | Performed by: HOSPITALIST

## 2022-05-26 PROCEDURE — 85027 COMPLETE CBC AUTOMATED: CPT | Performed by: HOSPITALIST

## 2022-05-26 PROCEDURE — 82310 ASSAY OF CALCIUM: CPT | Performed by: HOSPITALIST

## 2022-05-26 PROCEDURE — 99233 SBSQ HOSP IP/OBS HIGH 50: CPT | Performed by: HOSPITALIST

## 2022-05-26 PROCEDURE — 258N000003 HC RX IP 258 OP 636: Performed by: HOSPITALIST

## 2022-05-26 PROCEDURE — 250N000011 HC RX IP 250 OP 636: Performed by: HOSPITALIST

## 2022-05-26 PROCEDURE — 120N000001 HC R&B MED SURG/OB

## 2022-05-26 PROCEDURE — 82248 BILIRUBIN DIRECT: CPT | Performed by: HOSPITALIST

## 2022-05-26 RX ORDER — AMOXICILLIN 250 MG
1 CAPSULE ORAL 2 TIMES DAILY PRN
Status: DISCONTINUED | OUTPATIENT
Start: 2022-05-26 | End: 2022-06-01 | Stop reason: HOSPADM

## 2022-05-26 RX ORDER — SODIUM CHLORIDE 9 MG/ML
INJECTION, SOLUTION INTRAVENOUS CONTINUOUS
Status: DISCONTINUED | OUTPATIENT
Start: 2022-05-26 | End: 2022-05-29

## 2022-05-26 RX ORDER — ONDANSETRON 2 MG/ML
4 INJECTION INTRAMUSCULAR; INTRAVENOUS EVERY 6 HOURS PRN
Status: DISCONTINUED | OUTPATIENT
Start: 2022-05-26 | End: 2022-06-01 | Stop reason: HOSPADM

## 2022-05-26 RX ORDER — HYDROMORPHONE HCL IN WATER/PF 6 MG/30 ML
0.2 PATIENT CONTROLLED ANALGESIA SYRINGE INTRAVENOUS
Status: DISCONTINUED | OUTPATIENT
Start: 2022-05-26 | End: 2022-06-01 | Stop reason: HOSPADM

## 2022-05-26 RX ORDER — LIDOCAINE 40 MG/G
CREAM TOPICAL
Status: DISCONTINUED | OUTPATIENT
Start: 2022-05-26 | End: 2022-06-01 | Stop reason: HOSPADM

## 2022-05-26 RX ORDER — POLYETHYLENE GLYCOL 3350 17 G
2 POWDER IN PACKET (EA) ORAL
Status: DISCONTINUED | OUTPATIENT
Start: 2022-05-26 | End: 2022-06-01 | Stop reason: HOSPADM

## 2022-05-26 RX ORDER — ACETAMINOPHEN 650 MG/1
650 SUPPOSITORY RECTAL EVERY 6 HOURS PRN
Status: DISCONTINUED | OUTPATIENT
Start: 2022-05-26 | End: 2022-06-01 | Stop reason: HOSPADM

## 2022-05-26 RX ORDER — AMLODIPINE BESYLATE 5 MG/1
10 TABLET ORAL DAILY
Status: DISCONTINUED | OUTPATIENT
Start: 2022-05-26 | End: 2022-06-01 | Stop reason: HOSPADM

## 2022-05-26 RX ORDER — PIPERACILLIN SODIUM, TAZOBACTAM SODIUM 3; .375 G/15ML; G/15ML
3.38 INJECTION, POWDER, LYOPHILIZED, FOR SOLUTION INTRAVENOUS EVERY 6 HOURS
Status: DISCONTINUED | OUTPATIENT
Start: 2022-05-26 | End: 2022-06-01

## 2022-05-26 RX ORDER — ONDANSETRON 4 MG/1
4 TABLET, ORALLY DISINTEGRATING ORAL EVERY 6 HOURS PRN
Status: DISCONTINUED | OUTPATIENT
Start: 2022-05-26 | End: 2022-06-01 | Stop reason: HOSPADM

## 2022-05-26 RX ORDER — ALBUTEROL SULFATE 0.83 MG/ML
2.5 SOLUTION RESPIRATORY (INHALATION)
Status: DISCONTINUED | OUTPATIENT
Start: 2022-05-26 | End: 2022-06-01 | Stop reason: HOSPADM

## 2022-05-26 RX ORDER — ALLOPURINOL 100 MG/1
200 TABLET ORAL DAILY
Status: DISCONTINUED | OUTPATIENT
Start: 2022-05-26 | End: 2022-06-01 | Stop reason: HOSPADM

## 2022-05-26 RX ORDER — ACETAMINOPHEN 325 MG/1
650 TABLET ORAL EVERY 6 HOURS PRN
Status: DISCONTINUED | OUTPATIENT
Start: 2022-05-26 | End: 2022-06-01 | Stop reason: HOSPADM

## 2022-05-26 RX ORDER — AMOXICILLIN 250 MG
2 CAPSULE ORAL 2 TIMES DAILY PRN
Status: DISCONTINUED | OUTPATIENT
Start: 2022-05-26 | End: 2022-06-01 | Stop reason: HOSPADM

## 2022-05-26 RX ORDER — GADOBUTROL 604.72 MG/ML
9 INJECTION INTRAVENOUS ONCE
Status: COMPLETED | OUTPATIENT
Start: 2022-05-27 | End: 2022-05-27

## 2022-05-26 RX ADMIN — SODIUM CHLORIDE: 9 INJECTION, SOLUTION INTRAVENOUS at 22:27

## 2022-05-26 RX ADMIN — PIPERACILLIN SODIUM AND TAZOBACTAM SODIUM 3.38 G: 3; .375 INJECTION, POWDER, LYOPHILIZED, FOR SOLUTION INTRAVENOUS at 18:06

## 2022-05-26 RX ADMIN — SODIUM CHLORIDE: 9 INJECTION, SOLUTION INTRAVENOUS at 00:44

## 2022-05-26 RX ADMIN — SODIUM CHLORIDE: 9 INJECTION, SOLUTION INTRAVENOUS at 12:36

## 2022-05-26 RX ADMIN — PIPERACILLIN SODIUM AND TAZOBACTAM SODIUM 3.38 G: 3; .375 INJECTION, POWDER, LYOPHILIZED, FOR SOLUTION INTRAVENOUS at 12:36

## 2022-05-26 RX ADMIN — PIPERACILLIN SODIUM AND TAZOBACTAM SODIUM 3.38 G: 3; .375 INJECTION, POWDER, LYOPHILIZED, FOR SOLUTION INTRAVENOUS at 06:34

## 2022-05-26 RX ADMIN — AMLODIPINE BESYLATE 10 MG: 5 TABLET ORAL at 09:43

## 2022-05-26 RX ADMIN — ALLOPURINOL 200 MG: 100 TABLET ORAL at 09:44

## 2022-05-26 ASSESSMENT — ACTIVITIES OF DAILY LIVING (ADL)
ADLS_ACUITY_SCORE: 18
ADLS_ACUITY_SCORE: 35
DIFFICULTY_EATING/SWALLOWING: NO
ADLS_ACUITY_SCORE: 24
ADLS_ACUITY_SCORE: 18
ADLS_ACUITY_SCORE: 24
ADLS_ACUITY_SCORE: 24
ADLS_ACUITY_SCORE: 35
TOILETING_ISSUES: NO
FALL_HISTORY_WITHIN_LAST_SIX_MONTHS: NO
CHANGE_IN_FUNCTIONAL_STATUS_SINCE_ONSET_OF_CURRENT_ILLNESS/INJURY: NO
ADLS_ACUITY_SCORE: 18
ADLS_ACUITY_SCORE: 18
WALKING_OR_CLIMBING_STAIRS_DIFFICULTY: NO
WEAR_GLASSES_OR_BLIND: NO
ADLS_ACUITY_SCORE: 18
ADLS_ACUITY_SCORE: 18
DRESSING/BATHING_DIFFICULTY: NO
CONCENTRATING,_REMEMBERING_OR_MAKING_DECISIONS_DIFFICULTY: NO
DOING_ERRANDS_INDEPENDENTLY_DIFFICULTY: NO
ADLS_ACUITY_SCORE: 35

## 2022-05-26 NOTE — PROGRESS NOTES
[facility]    Hospitalist Progress Note      Assessment & Plan   Gabe Smith is a 67 year old male with a past medical history of COPD, hypertension, hyperlipidemia admitted to hospital for liver abscess.    Liver abscesses   Patient presenting with abdominal pain, and leucocytosis found to have liver abscesses on imaging around gallbladder.  GI consult whether abscess needs drainage and best approach to drain abscesses.  Given proximity to the patient's gallbladder and his episode of right upper quadrant pain suspect that the patient might have had an episode of cholecystitis which caused the patient's abscess.  -c/w zosyn  -f/u blood cultures; 1/2 + GNB;   -2nd set BC, LA WNL; monitor temp profile, WBC  -GI consult: Advised MRI; pending; ID consult  -IV hydration, no intervention today, GI advance to clear liquid diet.  -ID consult.     Hyponatremia  Mild  -monitor  -IV fluids as mentioned above     FRANCIE  crt was previously 0.8-1  -IV hydration  -After IVF now normal.  Monitor BMP  -avoid nephrotoxic medications     AAA  Known. Infrarenal measuring 4.2cm on ct. 4cm on US from 2/2022.   -surveillance imaging as outpatient.      COPD  Not in acute exacerbation  -c/w pta albuterol as needed,     hld  htn  Holding diuretics and statin; soft BP.  Holding aspirin for possible liver abscess drainage  -Continue amlodipine    DVT Prophylaxis: Pneumatic Compression Devices and Ambulate every shift  Code Status: Full Code  Expected Discharge: 05/27/2022     Anticipated discharge location: 2 + days pending clinical improvement on IV antibiotics, bacteremia, liver abscess, GI and ID plan established.    Sadia Mathis MD  Text Page (7am - 6pm, M-F)    Total unit/floor time 35 minutes:  time consisted of the following assuming Patient care in complex Patient with bacteremia, liver abscess, examination of patient, review of records including labs, imaging results, medications, interdisciplinary  notes and completing documentation; > 50% Counseling/discussion with Patient regarding current condition including liver abscess and Coordination of Care time with Nursing  and Specialists, GI regarding hepatic abscess care plan, management and surveillance, as above.     Interval History   Sleepy on morning encounters yet arousable, denies pain.  Afebrile.      -Data reviewed today: I reviewed all new labs and imaging results over the last 24 hours.    Physical Exam   Temp: 98.4  F (36.9  C) Temp src: Oral BP: 116/71 Pulse: 86   Resp: 18 SpO2: 95 % O2 Device: Nasal cannula Oxygen Delivery: 4 LPM  Vitals:    05/25/22 1616   Weight: 91.6 kg (202 lb)     Vital Signs with Ranges  Temp:  [98.2  F (36.8  C)-100  F (37.8  C)] 98.4  F (36.9  C)  Pulse:  [] 86  Resp:  [18-26] 18  BP: (106-127)/(68-81) 116/71  SpO2:  [92 %-98 %] 95 %  No intake/output data recorded.    General/Constitutional:   NAD, alert, calm, cooperative    HEENT/Head Exam:  atraumatic  Eyes:  PERRL, no conjunctivits  Mouth/Oral Pharynx:  Buccal mucosa WNL  Chest/Respiratory:  Air exchange bilateral lung fields; no rales or wheeze. Respiration nonlabored.  Cardiovascular:  no murmur appreciated.  LE edema none  Gastrointestinal/Abdomen:  soft, nontender, no rebound, guarding or other peritoneal signs.  Musculoskeletal:  extremities warm, dry, noncyanotic; no acitve synovitis.  Neuro.  Gross motor tested, nonfocal, sensory intact  Psych oriented, affect calm     Medications     sodium chloride 100 mL/hr at 05/26/22 1236       allopurinol  200 mg Oral Daily     amLODIPine  10 mg Oral Daily     fluticasone-vilanterol  1 puff Inhalation Daily     piperacillin-tazobactam  3.375 g Intravenous Q6H     sodium chloride (PF)  3 mL Intracatheter Q8H       Data   Recent Labs   Lab 05/26/22  0638 05/25/22  1755 05/25/22  1449   WBC 14.6*  --  17.8*   HGB 15.3  --  16.6   MCV 93  --  93   *  --  135*   *  --  130*   POTASSIUM 4.0  --  4.1   CHLORIDE  99  --  94   CO2 25  --  31   BUN 44*  --  37*   CR 1.22  --  1.43*   ANIONGAP 7  --  5   DES 8.3*  --  9.2   *  --  136*   ALBUMIN 2.9* 3.2*  --    PROTTOTAL 6.5* 7.2  --    BILITOTAL 1.3 1.6*  --    ALKPHOS 76 73  --    ALT 45 47  --    AST 53* 56*  --    LIPASE  --  33*  --        Recent Results (from the past 24 hour(s))   XR Chest 2 Views    Narrative    CHEST TWO VIEWS 5/25/2022 3:07 PM     HISTORY: Flu-like symptoms.    COMPARISON: October 10, 2011       Impression    IMPRESSION: There are no acute infiltrates. The cardiac silhouette is  not enlarged. Pulmonary vasculature is unremarkable.    YANELIS PACE MD         SYSTEM ID:  [id number]   Abdomen US, limited (RUQ only)    Narrative    EXAM: US ABDOMEN LIMITED  LOCATION: [facility]  DATE/TIME: 5/25/2022 6:30 PM    INDICATION: same  COMPARISON: None.  TECHNIQUE: Limited abdominal ultrasound.    FINDINGS:    GALLBLADDER: Distended gallbladder containing multiple stones. Gallbladder wall measures up to 2 mm by radiologist measurement.. No pericholecystic fluid. Negative sonographic Garcia sign.    BILE DUCTS: No biliary dilatation. The common duct measures 2.5 mm.    LIVER: Enlarged and fatty. No focal mass.    RIGHT KIDNEY: No hydronephrosis.    PANCREAS: The visualized portions are normal.    No ascites.      Impression    IMPRESSION:  1.  Cholelithiasis. No sonographic evidence of cholecystitis.  2.  Enlarged fatty liver.       CT Chest (PE) Abdomen Pelvis w Contrast    Narrative    EXAM: CT CHEST PE ABDOMEN PELVIS W CONTRAST  LOCATION: [facility]  DATE/TIME: 5/25/2022 7:54 PM    INDICATION: Fever, leukocytosis, shortness of breath and abdominal pain.  COMPARISON: Ultrasound earlier today and chest CT on 09/08/2021  TECHNIQUE: CT chest pulmonary angiogram and routine CT abdomen pelvis with IV contrast. Arterial phase through the chest and venous phase through the abdomen and pelvis. Multiplanar  reformats and MIP reconstructions were performed. Dose reduction   techniques were used.   CONTRAST: 102 mL Isovue 370    FINDINGS:  ANGIOGRAM CHEST: Pulmonary arteries are normal caliber and negative for pulmonary emboli. Timing of the contrast bolus is not optimal for evaluation of the thoracic aortic lumen. No CT evidence of right heart strain.     LUNGS AND PLEURA: Mild centrilobular emphysema. Mild secretions and bronchial mucus plugging in the right lower lobe bronchi. No infiltrate or pleural effusion. Few punctate pulmonary nodules are stable. For example, left upper lobe 2 mm nodule (series   8, image 131) and left upper lobe 2 mm nodule (series 8, image 120) are unchanged.    MEDIASTINUM/AXILLAE: No lymphadenopathy. Stable mildly aneurysmal descending thoracic aorta measuring up to 4.1 cm (series 6, image 230). Stable calcified and noncalcified atheromatous plaque in the thoracic aorta. Small hiatal hernia. No pericardial   effusion.    CORONARY ARTERY CALCIFICATION: Mild.    HEPATOBILIARY: Hepatic steatosis. Cluster of lobulated hypodense lesions in the right hepatic lobe adjacent to the gallbladder with thin rind of peripheral enhancement, measuring 2.3 x 1.8 cm (series 12, image 92), 2.5 x 3.3 cm (series 12, image 102) and   2.4 x 2.0 cm (series 12, image 111) are indeterminate. These may be hepatic masses or small abscesses. These were not well seen on the ultrasound earlier today due to hepatic steatosis. Cholelithiasis. Trace pericholecystic fluid and fat stranding. No   biliary ductal dilatation.    PANCREAS: Normal.    SPLEEN: Normal.    ADRENAL GLANDS: Normal.    KIDNEYS/BLADDER: Normal.    BOWEL: Diverticulosis of the colon. No acute inflammatory change. No obstruction. Normal appendix. Duodenal diverticuli.    LYMPH NODES: No lymphadenopathy.    VASCULATURE: Aneurysmal infrarenal abdominal aorta measuring 4.2 cm. Moderate amount of calcified and noncalcified plaque in the abdominal  aorta.    PELVIC ORGANS: No pelvic masses. No free fluid, fluid collections or extraluminal air.    MUSCULOSKELETAL: Bilateral total hip arthroplasties. No suspicious lesions in the bones.      Impression    IMPRESSION:  1.  Cluster of peripherally enhancing hypodense lesions in the right hepatic lobe adjacent to the gallbladder. These are most suspicious for hepatic abscesses/phlegmon with the given clinical history. Malignant etiologies such as metastatic disease are   also possible. These do not have the typical appearance for focal fat sparing and were not well seen on the ultrasound earlier today, likely due to hepatic steatosis. Consider a liver MRI for better evaluation.  2.  Cholelithiasis and nonspecific trace pericholecystic fluid. Acute cholecystitis cannot be excluded and HIDA scan can be considered for complete characterization if clinically indicated.  3.  No pulmonary emboli. No acute findings in the chest.  4.  Mildly aneurysmal descending thoracic aorta measuring 4.1 cm and abdominal aorta measuring 4.2 cm.

## 2022-05-26 NOTE — PHARMACY-ADMISSION MEDICATION HISTORY
Pharmacy Medication History  Admission medication history interview status for the 5/25/2022  admission is complete. See EPIC admission navigator for prior to admission medications     Location of Interview: Patient room  Medication history sources: Patient, Surescripts and Care Everywhere    Significant changes made to the medication list:  Removed: Advair, Spiriva    In the past week, patient estimated taking medication this percent of the time: greater than 90%      Time spent in this activity: 20 minutes    Prior to Admission medications    Medication Sig Last Dose Taking? Auth Provider   albuterol (PROAIR HFA/PROVENTIL HFA/VENTOLIN HFA) 108 (90 Base) MCG/ACT inhaler Inhale 2 puffs into the lungs every 4 hours as needed for shortness of breath / dyspnea or wheezing 5/24/2022 at Unknown time Yes Donny Payne MD   albuterol (PROVENTIL) (2.5 MG/3ML) 0.083% neb solution Take 1 vial (2.5 mg) by nebulization every 6 hours as needed for shortness of breath / dyspnea or wheezing 5/24/2022 at Unknown time Yes Donny Payne MD   allopurinol (ZYLOPRIM) 100 MG tablet Take 2 tablets (200 mg) by mouth daily 5/24/2022 at AM Yes Donny Payne MD   amLODIPine (NORVASC) 10 MG tablet Take 1 tablet (10 mg) by mouth daily 5/24/2022 at AM Yes Donny Payne MD   aspirin 81 MG tablet Take 1 tablet (81 mg) by mouth daily 5/24/2022 at AM Yes Donny Payne MD   fluticasone-salmeterol (AIRDUO RESPICLICK) 113-14 MCG/ACT inhaler Inhale 1 puff into the lungs 2 times daily 5/24/2022 at AM Yes Donny Payne MD   lisinopril-hydrochlorothiazide (ZESTORETIC) 20-12.5 MG tablet Take 2 tablets by mouth every morning 5/24/2022 at AM Yes Donny Payne MD   pravastatin (PRAVACHOL) 40 MG tablet TAKE ONE TABLET BY MOUTH EVERY NIGHT AT BEDTIME 5/24/2022 at HS Yes Donny Payne MD       The information provided in this note is only as accurate as the sources available  at the time of update(s)

## 2022-05-26 NOTE — ED NOTES
"[facility]  ED Nurse Handoff Report    ED Chief complaint: Abnormal Labs      ED Diagnosis:   Final diagnoses:   Hepatic abscess       Code Status: not addressed at this time    Allergies: No Known Allergies    Patient Story: Pt was at  today for fatigue, not sleeping or eating for 3 days. Pt. Now has low back, shoulder pain - muscle aches and pains.  labs show elevated WC, hyponatremia and kidney function is off.    Focused Assessment:  YANCEY, patient oxygen 92% on RA, patient tachypneic,     Treatments and/or interventions provided: patient given oxygen for comfort, CT scan, blood culture, zosyn, abd ultrasound  Patient's response to treatments and/or interventions: decreased work of breathing,     To be done/followed up on inpatient unit:  continue to monitor     Does this patient have any cognitive concerns?: A/Ox4    Activity level - Baseline/Home:  Independent  Activity Level - Current:   Stand with Assist    Patient's Preferred language: English   Needed?: No    Isolation: None  Infection: Not Applicable  Patient tested for COVID 19 prior to admission: YES  Bariatric?: No    Vital Signs:   Vitals:    05/25/22 1616 05/25/22 1713   BP: 113/68 125/81   Pulse: 84 85   Resp: 20 22   Temp: 98.5  F (36.9  C)    TempSrc: Temporal    SpO2: 95% 98%   Weight: 91.6 kg (202 lb)    Height: 1.778 m (5' 10\")        Cardiac Rhythm:     Was the PSS-3 completed:   Yes  What interventions are required if any?               Family Comments: NA  OBS brochure/video discussed/provided to patient/family: N/A              Name of person given brochure if not patient: NA              Relationship to patient: NA    For the majority of the shift this patient's behavior was Green.   Behavioral interventions performed were None.    ED NURSE PHONE NUMBER: [phone]         "

## 2022-05-26 NOTE — CONSULTS
[facility]    Infectious Disease Consultation     Date of Admission:  5/25/2022  Date of Consult (When I saw the patient): 05/26/22    Assessment & Plan   Gabe Smith is a 67 year old male who was admitted on 5/25/2022.     Impression:  1. 67 y.o with abdominal pain.   2. HTN, HLP  3. COPD   4. Blood cultures positive for GNR   5. CT: Cluster of peripherally enhancing hypodense lesions in the right hepatic lobe adjacent to the gallbladder. These are most suspicious for hepatic abscesses/phlegmon with the given clinical history. Malignant etiologies such as metastatic disease are   also possible  6. Currently on zosyn   7. MRI planned for further description on the liver lesions.     Recommendations:  Continue on zosyn   Will follow up on the pending imaging       Everett Ospina MD    Reason for Consult   Reason for consult: I was asked to evaluate this patient for abdominal pain     Primary Care Physician   Donny Payne    Chief Complaint   Abdominal pain     History is obtained from the patient and medical records    History of Present Illness   Gabe Smith is a 67 year old male who presents with  abdominal pain on May 22.  He had sharp pain in the mid abdomen and just under his right ribs most of the night.  He felt a little better the following day but hasn't been able to eat much.  Pain has been coming and going.     Past Medical History   I have reviewed this patient's medical history and updated it with pertinent information if needed.   Past Medical History:   Diagnosis Date     Abdominal aortic aneurysm (AAA) without rupture (H) 02/25/2021    AAA 3.6 x 3.6 cm per ultrasound     COPD (chronic obstructive pulmonary disease) (H)      Dysmetabolic syndrome X      Hyperlipidemia      Hypertension      Osteoarthrosis      Prediabetes 10/09/2014    A1C 6.1     Tobacco use disorder        Past Surgical History   I have reviewed this patient's surgical history and updated it  with pertinent information if needed.  Past Surgical History:   Procedure Laterality Date     COLONOSCOPY N/A 10/19/2015    Procedure: COMBINED COLONOSCOPY, SINGLE OR MULTIPLE BIOPSY/POLYPECTOMY BY BIOPSY;  Surgeon: Gume Vidal MD;  Location:  GI     SURGICAL HISTORY OF -   2000    Left MOISES     SURGICAL HISTORY OF -       Unspecified knee surgeries as a child     SURGICAL HISTORY OF -   10/2010    Right MOISES, with left knee arthroscopy, meniscectomy       Prior to Admission Medications   Prior to Admission Medications   Prescriptions Last Dose Informant Patient Reported? Taking?   albuterol (PROAIR HFA/PROVENTIL HFA/VENTOLIN HFA) 108 (90 Base) MCG/ACT inhaler 5/24/2022 at Unknown time  No Yes   Sig: Inhale 2 puffs into the lungs every 4 hours as needed for shortness of breath / dyspnea or wheezing   albuterol (PROVENTIL) (2.5 MG/3ML) 0.083% neb solution 5/24/2022 at Unknown time  No Yes   Sig: Take 1 vial (2.5 mg) by nebulization every 6 hours as needed for shortness of breath / dyspnea or wheezing   allopurinol (ZYLOPRIM) 100 MG tablet 5/24/2022 at AM  No Yes   Sig: Take 2 tablets (200 mg) by mouth daily   amLODIPine (NORVASC) 10 MG tablet 5/24/2022 at AM  No Yes   Sig: Take 1 tablet (10 mg) by mouth daily   aspirin 81 MG tablet 5/24/2022 at AM  Yes Yes   Sig: Take 1 tablet (81 mg) by mouth daily   fluticasone-salmeterol (AIRDUO RESPICLICK) 113-14 MCG/ACT inhaler 5/24/2022 at AM  No Yes   Sig: Inhale 1 puff into the lungs 2 times daily   lisinopril-hydrochlorothiazide (ZESTORETIC) 20-12.5 MG tablet 5/24/2022 at AM  No Yes   Sig: Take 2 tablets by mouth every morning   pravastatin (PRAVACHOL) 40 MG tablet 5/24/2022 at HS  No Yes   Sig: TAKE ONE TABLET BY MOUTH EVERY NIGHT AT BEDTIME      Facility-Administered Medications: None     Allergies   No Known Allergies    Immunization History   Immunization History   Administered Date(s) Administered     COVID-19,PF,Pfizer (12+ Yrs) 03/12/2021, 04/02/2021, 12/07/2021      Influenza Quad, Recombinant, pf(RIV4) (Flublok) 11/08/2018, 09/30/2019     Influenza Vaccine IM > 6 months Valent IIV4 (Alfuria,Fluzone) 10/09/2014, 11/12/2015, 01/04/2018     Influenza Vaccine, 6+MO IM (QUADRIVALENT W/PRESERVATIVES) 09/22/2016     Influenza, Quad, High Dose, Pf, 65yr+ (Fluzone HD) 10/22/2020, 12/07/2021     Pneumo Conj 13-V (2010&after) 01/05/2017     Pneumococcal 23 valent 01/04/2018     TD (ADULT, 7+) 03/21/1996     Tdap (Adacel,Boostrix) 08/07/2010     Zoster vaccine recombinant adjuvanted (SHINGRIX) 07/13/2018, 11/08/2018       Social History   I have reviewed this patient's social history and updated it with pertinent information if needed. Gabe JENNIFER Smith  reports that he has been smoking cigarettes. He has been smoking about 1.00 pack per day. He has never used smokeless tobacco. He reports current alcohol use. He reports that he does not use drugs.    Family History   I have reviewed this patient's family history and updated it with pertinent information if needed.   Family History   Problem Relation Age of Onset     Family History Negative Mother      Family History Negative Brother      Family History Negative Sister        Review of Systems   The 10 point Review of Systems is negative other than noted in the HPI or here.     Physical Exam   Temp: 98.4  F (36.9  C) Temp src: Oral BP: 116/71 Pulse: 86   Resp: 18 SpO2: 95 % O2 Device: Nasal cannula Oxygen Delivery: 4 LPM  Vital Signs with Ranges  Temp:  [98.2  F (36.8  C)-100.1  F (37.8  C)] 98.4  F (36.9  C)  Pulse:  [] 86  Resp:  [18-26] 18  BP: (106-127)/(66-81) 116/71  SpO2:  [92 %-98 %] 95 %  202 lbs 0 oz  Body mass index is 28.98 kg/m .    GENERAL APPEARANCE:  awake  EYES: Eyes grossly normal to inspection  NECK: no adenopathy  RESP: lungs clear   CV: regular rates and rhythm  LYMPHATICS: normal ant/post cervical and supraclavicular nodes  ABDOMEN: soft, nontender  MS: extremities normal  SKIN: no suspicious lesions or  kj        Data   Lab Results   Component Value Date    WBC 14.6 (H) 05/26/2022    HGB 15.3 05/26/2022    HCT 45.9 05/26/2022     (L) 05/26/2022     (L) 05/26/2022    POTASSIUM 4.0 05/26/2022    CHLORIDE 99 05/26/2022    CO2 25 05/26/2022    BUN 44 (H) 05/26/2022    CR 1.22 05/26/2022     (H) 05/26/2022    AST 53 (H) 05/26/2022    ALT 45 05/26/2022    ALKPHOS 76 05/26/2022    BILITOTAL 1.3 05/26/2022    INR 2.41 (H) 10/31/2010     No results for input(s): CULT in the last 168 hours.  No lab results found.    Invalid input(s): UC

## 2022-05-26 NOTE — CONSULTS
[facility] GASTROENTEROLOGY CONSULTATION     Gabe Smith  [address] [facility] [id number]  67 year old male    Admission Date/Time: 5/25/2022  Primary Care Provider: Donny Payne    We were asked to see the patient in consultation by Dr. Garcia for evaluation of possible hepatic abscess.        HPI:  Gabe Smith is a 67 year old male who was in his normal state of health until he developed acute onset of abdominal pain on May 22.  He had sharp pain in the mid abdomen and just under his right ribs most of the night.  He felt a little better the following day but hasn't been able to eat much.  Pain has been coming and going.     Presented to the ER May 25.  Cr was elevated at 1.4, Na 130, AST 56, ALT 47, bili total 1.6.   WBC 17.8.   Imaging with possible liver abscess v. Mass near the gallbladder fossa.  WBC has improved to 14.6 today on antibiotics, patient resorts slight improvement in the pain.    No known family history of gallbladder problems. Patient smokes and does regularly drink alcohol.     ROS: A comprehensive ten point review of systems was negative aside from those in mentioned in the HPI.      MEDICATIONS: No current facility-administered medications on file prior to encounter.  albuterol (PROAIR HFA/PROVENTIL HFA/VENTOLIN HFA) 108 (90 Base) MCG/ACT inhaler, Inhale 2 puffs into the lungs every 4 hours as needed for shortness of breath / dyspnea or wheezing  albuterol (PROVENTIL) (2.5 MG/3ML) 0.083% neb solution, Take 1 vial (2.5 mg) by nebulization every 6 hours as needed for shortness of breath / dyspnea or wheezing  allopurinol (ZYLOPRIM) 100 MG tablet, Take 2 tablets (200 mg) by mouth daily  amLODIPine (NORVASC) 10 MG tablet, Take 1 tablet (10 mg) by mouth daily  aspirin 81 MG tablet, Take 1 tablet (81 mg) by mouth daily  fluticasone-salmeterol (AIRDUO RESPICLICK) 113-14 MCG/ACT inhaler, Inhale 1 puff into the lungs 2 times daily  lisinopril-hydrochlorothiazide (ZESTORETIC)  "20-12.5 MG tablet, Take 2 tablets by mouth every morning  pravastatin (PRAVACHOL) 40 MG tablet, TAKE ONE TABLET BY MOUTH EVERY NIGHT AT BEDTIME        ALLERGIES: No Known Allergies    Past Medical History:   Diagnosis Date     Abdominal aortic aneurysm (AAA) without rupture (H) 02/25/2021    AAA 3.6 x 3.6 cm per ultrasound     COPD (chronic obstructive pulmonary disease) (H)      Dysmetabolic syndrome X      Hyperlipidemia      Hypertension      Osteoarthrosis      Prediabetes 10/09/2014    A1C 6.1     Tobacco use disorder        Past Surgical History:   Procedure Laterality Date     COLONOSCOPY N/A 10/19/2015    Procedure: COMBINED COLONOSCOPY, SINGLE OR MULTIPLE BIOPSY/POLYPECTOMY BY BIOPSY;  Surgeon: uGme Vidal MD;  Location:  GI     SURGICAL HISTORY OF -   2000    Left MOISES     SURGICAL HISTORY OF -       Unspecified knee surgeries as a child     SURGICAL HISTORY OF -   10/2010    Right MOISES, with left knee arthroscopy, meniscectomy         SOCIAL HISTORY:  Social History     Tobacco Use     Smoking status: Current Every Day Smoker     Packs/day: 1.00     Types: Cigarettes     Smokeless tobacco: Never Used     Tobacco comment: 1.5 PPD-now down to .5 PPD   Substance Use Topics     Alcohol use: Yes     Comment: 2-3 drinks per night (double)     Drug use: No       FAMILY HISTORY:  No known family history of GI disease    PHYSICAL EXAM:   /71 (BP Location: Right arm)   Pulse 86   Temp 98.4  F (36.9  C) (Oral)   Resp 18   Ht 1.778 m (5' 10\")   Wt 91.6 kg (202 lb)   SpO2 95%   BMI 28.98 kg/m      Constitutional: NAD, comfortable  Cardiovascular: RRR  Respiratory: CTAB  Psychiatric: mentation appears normal and affect normal/bright  Head: Normocephalic. Atraumatic.    Neck: normal size, trachea midline  Eyes:  no icterus  ENT: hearing adequate, dry mouth  Abdomen:   Auscultation: bowel sounds heard  Appearance: protuberant, does not appear distended  Palpation: no tenderness on palpation  NEURO: " grossly negative  SKIN: no suspicious lesions or rashes            ADDITIONAL COMMENTS:   I reviewed the patient's new clinical lab test results.   Recent Labs   Lab Test 05/26/22  0638 05/25/22  1449 02/11/22  1111 09/03/21  1211 07/28/20  1148   WBC 14.6* 17.8*  --   --  9.4   HGB 15.3 16.6 17.8*   < > 17.1   MCV 93 93  --   --  93   * 135*  --   --  179    < > = values in this interval not displayed.     Recent Labs   Lab Test 05/26/22  0638 05/25/22  1449 02/11/22  1111   * 130* 136   POTASSIUM 4.0 4.1 4.1   CHLORIDE 99 94 102   CO2 25 31 32   BUN 44* 37* 19   CR 1.22 1.43* 0.92   ANIONGAP 7 5 2*   DES 8.3* 9.2 9.2   * 136* 130*     Recent Labs   Lab Test 05/26/22  0638 05/25/22  1800 05/25/22  1755 02/11/22  1111 09/03/21  1211 07/28/20  1148   ALBUMIN 2.9*  --  3.2*  --   --  3.6   BILITOTAL 1.3  --  1.6*  --   --  0.7   ALT 45  --  47 28   < > 20   AST 53*  --  56* 19   < > 23   ALKPHOS 76  --  73  --   --  83   PROTEIN  --  30 *  --   --   --   --    LIPASE  --   --  33*  --   --   --     < > = values in this interval not displayed.     5/25/22 CT Chest:  1.  Cluster of peripherally enhancing hypodense lesions in the right hepatic lobe adjacent to the gallbladder. These are most suspicious for hepatic abscesses/phlegmon with the given clinical history. Malignant etiologies such as metastatic disease are   also possible. These do not have the typical appearance for focal fat sparing and were not well seen on the ultrasound earlier today, likely due to hepatic steatosis. Consider a liver MRI for better evaluation.  2.  Cholelithiasis and nonspecific trace pericholecystic fluid. Acute cholecystitis cannot be excluded and HIDA scan can be considered for complete characterization if clinically indicated.  3.  No pulmonary emboli. No acute findings in the chest.  4.  Mildly aneurysmal descending thoracic aorta measuring 4.1 cm and abdominal aorta measuring 4.2 cm.    RUQ US  1.  Cholelithiasis.  No sonographic evidence of cholecystitis.  2.  Enlarged fatty liver.    CONSULTATION ASSESSMENT AND PLAN:    Active Problems:    Hepatic lesions    Assessment: Given the acute onset of symptoms several days ago, the malaise and the elevated WBC, I suspect that he may have liver abscess related to his gallbladder.  Will get MRI of the liver to better define the liver lesions.  I would also recommend an Infectious Disease consult for treatment recommendations.  Patient will probably eventually need a cholecystectomy, but we can gather more information first.      Plan:   - MRI of the liver  - Recommend infectious disease consultation for likely liver abscess      Patricia Horton MD  [facility]  Office:  [phone]      Approximately 45 minutes of total time was spent providing patient care, including patient evaluation, reviewing documentation/test results, and .

## 2022-05-26 NOTE — PROGRESS NOTES
RECEIVING UNIT ED HANDOFF REVIEW    ED Nurse Handoff Report was reviewed by: Mallorie Cheek RN on May 26, 2022 at 12:16 AM

## 2022-05-26 NOTE — PROGRESS NOTES
Pt up to floor around 0030-    A/Ox4, VSS on 4L, SOB with activity, denies pain, IVF running & intermittent IV Abx, NPO since being on the floor, SBA.

## 2022-05-26 NOTE — CONSULTS
"BRIEF NUTRITION ASSESSMENT      REASON FOR ASSESSMENT:  Gabe Smith is a 67 year old male seen by Registered Dietitian for Admission Nutrition Risk Screen for Have you recently lost weight without trying? - Yes, 2-13#  Have you eaten poorly because of a decreased appetite? - Yes       NUTRITION HISTORY:  Patient states that he ate well up until last Sunday (5/22) when he started having decreased appetite and abdominal pain -- otherwise intake and appetite are good at baseline.     CURRENT DIET AND INTAKE:  Diet:  Clear Liquid               Patient states that the nurse got him some broth and jello and he consumed 100% -- \"I was so hungry it was great.  [place] like eating [place] dinner\".  He is open to trying an oral nutritional supplement.     ANTHROPOMETRICS:  Height: 5' 10\"  Weight:  91.6 kg (202#)(5/25)  Body mass index is 28.98 kg/m    Weight Status: Overweight BMI 25-29.9  IBW:  75.5 kg   %IBW: 121%  Weight History:   Wt Readings from Last 10 Encounters:   05/25/22 91.6 kg (202 lb)   05/25/22 91.6 kg (202 lb)   02/11/22 94 kg (207 lb 3.2 oz)   09/03/21 94.8 kg (209 lb)   02/05/21 93.7 kg (206 lb 9.6 oz)   08/05/20 90.6 kg (199 lb 11.2 oz)   01/23/20 89.1 kg (196 lb 6.4 oz)   01/20/20 86.2 kg (190 lb)   01/06/20 86.2 kg (190 lb)   07/19/19 86.2 kg (190 lb)     Patient appears to run in the upper 190 to lower 200 range for weight   He notes that he has been close to 210# recently but not really sure of the timeline     LABS:  Labs noted    MALNUTRITION:  Visual Nutrition Focused Physical Assessment (NFPA completed - no muscle/fat losses noted.  Patient does not meet two of the following criteria necessary for diagnosing malnutrition.     % Weight Loss:  Weight loss does not meet criteria for malnutrition  % Intake:  </= 50% for >/= 5 days (severe malnutrition)  Subcutaneous Fat Loss:  None observed  Muscle Loss:  None observed  Fluid Retention:  None noted    NUTRITION INTERVENTION:  Nutrition Diagnosis:  No " nutrition diagnosis at this time.    Implementation:  Nutrition Education:  Per Provider order if indicated.  Medical food supplement therapy:  Ensure Clear daily at 2 pm.    FOLLOW UP/MONITORING:   Will re-evaluate in 7 - 10 days, or sooner, if re-consulted.    Kandi De Santiago RD, LD, [facility]   Clinical Dietitian - [facility]

## 2022-05-26 NOTE — PROVIDER NOTIFICATION
MD Notification    Notified Person: MD    Notified Person Name: Dr. Mathis    Notification Date/Time: 5/26 @3573    Notification Interaction: page    Purpose of Notification: Lab called with positive blood culture results

## 2022-05-26 NOTE — H&P
[facility]    History and Physical  Hospitalist     Date of Admission:  5/25/2022    Assessment & Plan   Gabe Smith is a 67 year old male with a past medical history of COPD, hypertension, hyperlipidemia admitted to hospital for liver abscess.    Liver abscesses   Patient presenting with abdominal pain, and leucocytosis found to have liver abscesses on imaging around gallbladder.  Will request GI input regarding whether abscess needs drainage and best approach to drain abscesses.  Given proximity to the patient's gallbladder and his episode of right upper quadrant pain on Sunday I suspect that the patient might have had an episode of cholecystitis which caused the patient's abscess.  -c/w zosyn  -f/u blood cultures  -f/u gi consult  -IV hydration  -N.p.o.    Hyponatremia  Mild  -monitor  -IV fluids as mentioned above    FRANCIE  crt was previously 0.8-1  -IV hydration  -monitor renal function  -avoid nephrotoxic medications    AAA  Known. Infrarenal measuring 4.2cm on ct. 4cm on US from 2/2022.   -surveillance imaging as outpatient.     COPD  Not in acute exacerbation  -c/w pta albuterol as needed,    hld  htn  Holding diuretics and statin while NPO.  Holding aspirin for possible liver abscess drainage  -Continue amlodipine      Code Status   Full Code  DVT ppx: SCDs  Expected length of stay greater than 2 days      Primary Care Physician   Donny Payne    Chief Complaint   Epigastric pain    History obtained from the patient    History of Present Illness   Gabe Smith is a 67 year old male with a past medical history of COPD, hypertension, hyperlipidemia presents to hospital with abdominal pain.  The patient reports that he developed right upper quadrant abdominal pain on Sunday.  He reports that the pain was severe at its worst an 8 out of 10.  Patient reports associated chills, weakness, shakes, subjective fevers.  The patient reports decreased p.o. intake.  The pain kept  him up throughout the night however it resolved on Monday.  The patient continued to have generalized malaise, decreased p.o. intake and fatigue over the following 2 days prompting him to go to the urgent care center.  In urgent care center the patient was found to have a temperature of 100.1  F and found to have a leukocytosis so he was sent to the emergency room for further care.  In the emergency room the patient underwent a CT scan which revealed liver abscesses.    During my interview patient reports that his abdominal pain has completely resolved.  He reports that his appetite has not returned but denies any vomiting.  Patient reports generalized body aches especially in the back and he rates them as a 2 out of 10.  Patient provides no other complaints.    Past Medical History    I have reviewed this patient's medical history and updated it with pertinent information if needed.   Past Medical History:   Diagnosis Date     Abdominal aortic aneurysm (AAA) without rupture (H) 02/25/2021    AAA 3.6 x 3.6 cm per ultrasound     COPD (chronic obstructive pulmonary disease) (H)      Dysmetabolic syndrome X      Hyperlipidemia      Hypertension      Osteoarthrosis      Prediabetes 10/09/2014    A1C 6.1     Tobacco use disorder        Past Surgical History   I have reviewed this patient's surgical history and updated it with pertinent information if needed.  Past Surgical History:   Procedure Laterality Date     COLONOSCOPY N/A 10/19/2015    Procedure: COMBINED COLONOSCOPY, SINGLE OR MULTIPLE BIOPSY/POLYPECTOMY BY BIOPSY;  Surgeon: Gume Vidal MD;  Location:  GI     SURGICAL HISTORY OF -   2000    Left MOISES     SURGICAL HISTORY OF -       Unspecified knee surgeries as a child     SURGICAL HISTORY OF -   10/2010    Right MOISES, with left knee arthroscopy, meniscectomy       Prior to Admission Medications   Prior to Admission Medications   Prescriptions Last Dose Informant Patient Reported? Taking?   albuterol (PROAIR  HFA/PROVENTIL HFA/VENTOLIN HFA) 108 (90 Base) MCG/ACT inhaler   No No   Sig: Inhale 2 puffs into the lungs every 4 hours as needed for shortness of breath / dyspnea or wheezing   albuterol (PROVENTIL) (2.5 MG/3ML) 0.083% neb solution   No No   Sig: Take 1 vial (2.5 mg) by nebulization every 6 hours as needed for shortness of breath / dyspnea or wheezing   allopurinol (ZYLOPRIM) 100 MG tablet   No No   Sig: Take 2 tablets (200 mg) by mouth daily   amLODIPine (NORVASC) 10 MG tablet   No No   Sig: Take 1 tablet (10 mg) by mouth daily   aspirin 81 MG tablet   Yes No   Sig: Take 1 tablet (81 mg) by mouth daily   fluticasone-salmeterol (ADVAIR DISKUS) 250-50 MCG/DOSE inhaler   No No   Sig: Inhale 1 puff into the lungs every 12 hours GENERIC ADVAIR IF POSSIBLE   fluticasone-salmeterol (AIRDUO RESPICLICK) 113-14 MCG/ACT inhaler   No No   Sig: Inhale 1 puff into the lungs 2 times daily   lisinopril-hydrochlorothiazide (ZESTORETIC) 20-12.5 MG tablet   No No   Sig: Take 2 tablets by mouth every morning   pravastatin (PRAVACHOL) 40 MG tablet   No No   Sig: TAKE ONE TABLET BY MOUTH EVERY NIGHT AT BEDTIME   tiotropium (SPIRIVA) 18 MCG inhaled capsule   No No   Sig: Inhale 1 capsule (18 mcg) into the lungs daily      Facility-Administered Medications: None     Allergies   No Known Allergies    Social History   I have reviewed this patient's social history and updated it with pertinent information if needed. Gabe Smith  reports that he has been smoking cigarettes. He has been smoking about 1.00 pack per day. He has never used smokeless tobacco. He reports current alcohol use. He reports that he does not use drugs.    Family History   I have reviewed this patient's family history and updated it with pertinent information if needed.   Family History   Problem Relation Age of Onset     Family History Negative Mother      Family History Negative Brother      Family History Negative Sister        Review of Systems   The 10 point  Review of Systems is negative other than noted in the HPI or here.     Physical Exam   Temp: 98.5  F (36.9  C) Temp src: Temporal BP: 125/81 Pulse: 85   Resp: 22 SpO2: 98 % O2 Device: Nasal cannula Oxygen Delivery: 2 LPM  Vital Signs with Ranges  Temp:  [98.5  F (36.9  C)-100.1  F (37.8  C)] 98.5  F (36.9  C)  Pulse:  [84-94] 85  Resp:  [18-22] 22  BP: (106-125)/(66-81) 125/81  SpO2:  [95 %-98 %] 98 %  202 lbs 0 oz  Physical Exam  Vitals reviewed.   Constitutional:       Appearance: Normal appearance.      Comments: Very pleasant man seen resting in bed comfortably no apparent distress.   HENT:      Head: Normocephalic and atraumatic.      Mouth/Throat:      Mouth: Mucous membranes are moist.      Pharynx: Oropharynx is clear.   Eyes:      Extraocular Movements: Extraocular movements intact.      Conjunctiva/sclera: Conjunctivae normal.      Pupils: Pupils are equal, round, and reactive to light.   Cardiovascular:      Rate and Rhythm: Normal rate and regular rhythm.      Pulses: Normal pulses.      Heart sounds: Normal heart sounds. No murmur heard.  Pulmonary:      Effort: Pulmonary effort is normal.      Breath sounds: Wheezing present. No rhonchi or rales.      Comments: Mild wheezing noted  Abdominal:      General: Abdomen is flat. Bowel sounds are normal.      Palpations: Abdomen is soft. There is no mass.      Tenderness: There is no abdominal tenderness. There is no guarding.   Musculoskeletal:         General: No swelling. Normal range of motion.      Cervical back: Normal range of motion and neck supple.   Skin:     General: Skin is warm and dry.   Neurological:      General: No focal deficit present.      Mental Status: He is alert and oriented to person, place, and time. Mental status is at baseline.      Cranial Nerves: No cranial nerve deficit.

## 2022-05-26 NOTE — PROGRESS NOTES
Temp 99.6. Denies pain-some tenderness in abdomen. No nausea. Is having loose stools. Voiding without difficulty. Clear liquids taken-tolerating well. Up with SBA. No dizziness when up. To have MRI.

## 2022-05-27 ENCOUNTER — APPOINTMENT (OUTPATIENT)
Dept: CT IMAGING | Facility: CLINIC | Age: 67
DRG: 442 | End: 2022-05-27
Attending: HOSPITALIST
Payer: COMMERCIAL

## 2022-05-27 LAB
ALBUMIN SERPL-MCNC: 2.5 G/DL (ref 3.4–5)
ALP SERPL-CCNC: 85 U/L (ref 40–150)
ALT SERPL W P-5'-P-CCNC: 48 U/L (ref 0–70)
ANION GAP SERPL CALCULATED.3IONS-SCNC: 4 MMOL/L (ref 3–14)
AST SERPL W P-5'-P-CCNC: 60 U/L (ref 0–45)
BACTERIA UR CULT: NO GROWTH
BILIRUB DIRECT SERPL-MCNC: 0.5 MG/DL (ref 0–0.2)
BILIRUB SERPL-MCNC: 1.5 MG/DL (ref 0.2–1.3)
BUN SERPL-MCNC: 28 MG/DL (ref 7–30)
CALCIUM SERPL-MCNC: 7.4 MG/DL (ref 8.5–10.1)
CHLORIDE BLD-SCNC: 103 MMOL/L (ref 94–109)
CO2 SERPL-SCNC: 28 MMOL/L (ref 20–32)
CREAT SERPL-MCNC: 0.93 MG/DL (ref 0.66–1.25)
ERYTHROCYTE [DISTWIDTH] IN BLOOD BY AUTOMATED COUNT: 13.4 % (ref 10–15)
GFR SERPL CREATININE-BSD FRML MDRD: 90 ML/MIN/1.73M2
GLUCOSE BLD-MCNC: 107 MG/DL (ref 70–99)
GRAM STAIN RESULT: NORMAL
GRAM STAIN RESULT: NORMAL
HCT VFR BLD AUTO: 42.1 % (ref 40–53)
HGB BLD-MCNC: 13.7 G/DL (ref 13.3–17.7)
INR PPP: 1.33 (ref 0.85–1.15)
MCH RBC QN AUTO: 30 PG (ref 26.5–33)
MCHC RBC AUTO-ENTMCNC: 32.5 G/DL (ref 31.5–36.5)
MCV RBC AUTO: 92 FL (ref 78–100)
PLATELET # BLD AUTO: 119 10E3/UL (ref 150–450)
POTASSIUM BLD-SCNC: 3.8 MMOL/L (ref 3.4–5.3)
PROT SERPL-MCNC: 5.9 G/DL (ref 6.8–8.8)
RBC # BLD AUTO: 4.56 10E6/UL (ref 4.4–5.9)
SODIUM SERPL-SCNC: 135 MMOL/L (ref 133–144)
WBC # BLD AUTO: 7 10E3/UL (ref 4–11)

## 2022-05-27 PROCEDURE — 99233 SBSQ HOSP IP/OBS HIGH 50: CPT | Performed by: HOSPITALIST

## 2022-05-27 PROCEDURE — A9585 GADOBUTROL INJECTION: HCPCS | Performed by: HOSPITALIST

## 2022-05-27 PROCEDURE — 255N000002 HC RX 255 OP 636: Performed by: HOSPITALIST

## 2022-05-27 PROCEDURE — 36415 COLL VENOUS BLD VENIPUNCTURE: CPT | Performed by: NURSE PRACTITIONER

## 2022-05-27 PROCEDURE — 999N000154 HC STATISTIC RADIOLOGY XRAY, US, CT, MAR, NM

## 2022-05-27 PROCEDURE — 120N000001 HC R&B MED SURG/OB

## 2022-05-27 PROCEDURE — 87205 SMEAR GRAM STAIN: CPT | Performed by: HOSPITALIST

## 2022-05-27 PROCEDURE — 85610 PROTHROMBIN TIME: CPT | Performed by: NURSE PRACTITIONER

## 2022-05-27 PROCEDURE — 87075 CULTR BACTERIA EXCEPT BLOOD: CPT | Performed by: HOSPITALIST

## 2022-05-27 PROCEDURE — 258N000003 HC RX IP 258 OP 636: Performed by: HOSPITALIST

## 2022-05-27 PROCEDURE — 99152 MOD SED SAME PHYS/QHP 5/>YRS: CPT

## 2022-05-27 PROCEDURE — 82310 ASSAY OF CALCIUM: CPT | Performed by: HOSPITALIST

## 2022-05-27 PROCEDURE — 250N000013 HC RX MED GY IP 250 OP 250 PS 637: Performed by: HOSPITALIST

## 2022-05-27 PROCEDURE — 250N000011 HC RX IP 250 OP 636: Performed by: NURSE PRACTITIONER

## 2022-05-27 PROCEDURE — 250N000011 HC RX IP 250 OP 636: Performed by: HOSPITALIST

## 2022-05-27 PROCEDURE — 36415 COLL VENOUS BLD VENIPUNCTURE: CPT | Performed by: HOSPITALIST

## 2022-05-27 PROCEDURE — 85027 COMPLETE CBC AUTOMATED: CPT | Performed by: HOSPITALIST

## 2022-05-27 PROCEDURE — 272N000431 CT LIVER ABSCESS DRAIN W CATH PLACE

## 2022-05-27 PROCEDURE — 87077 CULTURE AEROBIC IDENTIFY: CPT | Performed by: HOSPITALIST

## 2022-05-27 PROCEDURE — 82248 BILIRUBIN DIRECT: CPT | Performed by: HOSPITALIST

## 2022-05-27 PROCEDURE — 0F903ZZ DRAINAGE OF LIVER, PERCUTANEOUS APPROACH: ICD-10-PCS | Performed by: RADIOLOGY

## 2022-05-27 RX ORDER — NALOXONE HYDROCHLORIDE 0.4 MG/ML
0.2 INJECTION, SOLUTION INTRAMUSCULAR; INTRAVENOUS; SUBCUTANEOUS
Status: DISCONTINUED | OUTPATIENT
Start: 2022-05-27 | End: 2022-06-01 | Stop reason: HOSPADM

## 2022-05-27 RX ORDER — NALOXONE HYDROCHLORIDE 0.4 MG/ML
0.4 INJECTION, SOLUTION INTRAMUSCULAR; INTRAVENOUS; SUBCUTANEOUS
Status: DISCONTINUED | OUTPATIENT
Start: 2022-05-27 | End: 2022-06-01 | Stop reason: HOSPADM

## 2022-05-27 RX ORDER — NALOXONE HYDROCHLORIDE 0.4 MG/ML
0.2 INJECTION, SOLUTION INTRAMUSCULAR; INTRAVENOUS; SUBCUTANEOUS
Status: DISCONTINUED | OUTPATIENT
Start: 2022-05-27 | End: 2022-05-27

## 2022-05-27 RX ORDER — NALOXONE HYDROCHLORIDE 0.4 MG/ML
0.4 INJECTION, SOLUTION INTRAMUSCULAR; INTRAVENOUS; SUBCUTANEOUS
Status: DISCONTINUED | OUTPATIENT
Start: 2022-05-27 | End: 2022-05-27

## 2022-05-27 RX ORDER — FLUTICASONE PROPIONATE AND SALMETEROL 113; 14 UG/1; UG/1
1 POWDER, METERED RESPIRATORY (INHALATION) 2 TIMES DAILY
Status: DISCONTINUED | OUTPATIENT
Start: 2022-05-27 | End: 2022-06-01 | Stop reason: HOSPADM

## 2022-05-27 RX ORDER — FLUMAZENIL 0.1 MG/ML
0.2 INJECTION, SOLUTION INTRAVENOUS
Status: DISCONTINUED | OUTPATIENT
Start: 2022-05-27 | End: 2022-06-01 | Stop reason: HOSPADM

## 2022-05-27 RX ORDER — FENTANYL CITRATE 50 UG/ML
25-50 INJECTION, SOLUTION INTRAMUSCULAR; INTRAVENOUS EVERY 5 MIN PRN
Status: DISCONTINUED | OUTPATIENT
Start: 2022-05-27 | End: 2022-06-01 | Stop reason: HOSPADM

## 2022-05-27 RX ADMIN — ALLOPURINOL 200 MG: 100 TABLET ORAL at 08:57

## 2022-05-27 RX ADMIN — FLUTICASONE PROPIONATE AND SALMETEROL 1 PUFF: 113; 14 POWDER, METERED RESPIRATORY (INHALATION) at 21:36

## 2022-05-27 RX ADMIN — AMLODIPINE BESYLATE 10 MG: 5 TABLET ORAL at 08:56

## 2022-05-27 RX ADMIN — FLUTICASONE PROPIONATE AND SALMETEROL 1 PUFF: 113; 14 POWDER, METERED RESPIRATORY (INHALATION) at 14:34

## 2022-05-27 RX ADMIN — SODIUM CHLORIDE: 9 INJECTION, SOLUTION INTRAVENOUS at 14:34

## 2022-05-27 RX ADMIN — MIDAZOLAM HYDROCHLORIDE 1 MG: 1 INJECTION, SOLUTION INTRAMUSCULAR; INTRAVENOUS at 16:12

## 2022-05-27 RX ADMIN — GADOBUTROL 9 ML: 604.72 INJECTION INTRAVENOUS at 00:15

## 2022-05-27 RX ADMIN — PIPERACILLIN SODIUM AND TAZOBACTAM SODIUM 3.38 G: 3; .375 INJECTION, POWDER, LYOPHILIZED, FOR SOLUTION INTRAVENOUS at 18:42

## 2022-05-27 RX ADMIN — FENTANYL CITRATE 50 MCG: 50 INJECTION, SOLUTION INTRAMUSCULAR; INTRAVENOUS at 16:13

## 2022-05-27 RX ADMIN — PIPERACILLIN SODIUM AND TAZOBACTAM SODIUM 3.38 G: 3; .375 INJECTION, POWDER, LYOPHILIZED, FOR SOLUTION INTRAVENOUS at 12:33

## 2022-05-27 RX ADMIN — PIPERACILLIN SODIUM AND TAZOBACTAM SODIUM 3.38 G: 3; .375 INJECTION, POWDER, LYOPHILIZED, FOR SOLUTION INTRAVENOUS at 00:46

## 2022-05-27 RX ADMIN — PIPERACILLIN SODIUM AND TAZOBACTAM SODIUM 3.38 G: 3; .375 INJECTION, POWDER, LYOPHILIZED, FOR SOLUTION INTRAVENOUS at 06:15

## 2022-05-27 ASSESSMENT — ACTIVITIES OF DAILY LIVING (ADL)
ADLS_ACUITY_SCORE: 18

## 2022-05-27 NOTE — PLAN OF CARE
Goal Outcome Evaluation:    Pt is alert and oriented x4, VSS on 4L O2 via nasal canula. Denied pain, IV infusing as ordered, up with stand by assist. Will continue to monitor.

## 2022-05-27 NOTE — PROGRESS NOTES
"[facility] Progress Note     Interval History:  Feeling OK. Discussed MRI results. Plan is for IR drainage of liver abscess.     Physical Exam:    /69 (BP Location: Right arm)   Pulse 75   Temp 98.9  F (37.2  C) (Oral)   Resp 16   Ht 1.778 m (5' 10\")   Wt 91.6 kg (202 lb)   SpO2 95%   BMI 28.98 kg/m    Temp (24hrs), Av.3  F (37.4  C), Min:98.9  F (37.2  C), Max:99.9  F (37.7  C)    Patient Vitals for the past 72 hrs:   Weight   22 1616 91.6 kg (202 lb)     No intake or output data in the 24 hours ending 22 1143    Constitutional: No acute distress  Cardiovascular: RRR, normal S1/S2  Respiratory: Effort normal, CTA bilaterally  Abdomen: Soft, nondistended, nontender    Laboratory Data  Recent Labs   Lab Test 22  0656 22  0638 22  1449   WBC 7.0 14.6* 17.8*   HGB 13.7 15.3 16.6   MCV 92 93 93   * 127* 135*     Recent Labs   Lab Test 22  0656 22  0638 22  1449    131* 130*   POTASSIUM 3.8 4.0 4.1   CHLORIDE 103 99 94   CO2 28 25 31   BUN 28 44* 37*   CR 0.93 1.22 1.43*   ANIONGAP 4 7 5   DES 7.4* 8.3* 9.2     Recent Labs   Lab Test 22  0656 22  0638 22  1800 22  1755   ALBUMIN 2.5* 2.9*  --  3.2*   BILITOTAL 1.5* 1.3  --  1.6*   DBIL 0.5* 0.4*  --  0.4*   ALT 48 45  --  47   AST 60* 53*  --  56*   ALKPHOS 85 76  --  73   PROTEIN  --   --  30 *  --    LIPASE  --   --   --  33*     Colonoscopy 10/2015  Findings:        The perianal and digital rectal examinations were normal. Pertinent        negatives include normal sphincter tone and no palpable rectal lesions.        A pedunculated polyp was found at the ileocecal valve. The polyp was 30        mm in size. The polyp was removed with a hot snare. Resection and        retrieval were complete. To prevent bleeding after the polypectomy, two        hemostatic clips were successfully placed. There was no bleeding at the        end of the procedure.        Patchy mild inflammation " characterized by erythema was found in the        distal sigmoid colon. Biopsies were taken with a cold forceps for        histology.        The exam was otherwise without abnormality on direct and retroflexion        views.                                                                                     Impression:          - One 30 mm polyp at the ileocecal valve. Resected and                        retrieved. Clips were placed.                        - Patchy mild inflammation was found in the distal                        sigmoid colon. Biopsied.                        - The examination was otherwise normal on direct and                        retroflexion views.       Imaging  5/25/22 CT Chest:  1.  Cluster of peripherally enhancing hypodense lesions in the right hepatic lobe adjacent to the gallbladder. These are most suspicious for hepatic abscesses/phlegmon with the given clinical history. Malignant etiologies such as metastatic disease are   also possible. These do not have the typical appearance for focal fat sparing and were not well seen on the ultrasound earlier today, likely due to hepatic steatosis. Consider a liver MRI for better evaluation.  2.  Cholelithiasis and nonspecific trace pericholecystic fluid. Acute cholecystitis cannot be excluded and HIDA scan can be considered for complete characterization if clinically indicated.  3.  No pulmonary emboli. No acute findings in the chest.  4.  Mildly aneurysmal descending thoracic aorta measuring 4.1 cm and abdominal aorta measuring 4.2 cm.     RUQ US  1.  Cholelithiasis. No sonographic evidence of cholecystitis.  2.  Enlarged fatty liver.    EXAM: MR ABDOMEN W/O and W CONTRAST  LOCATION: [facility]  DATE/TIME: 5/26/2022 11:39 PM  FINDINGS: There is a complex multicystic mass in the inferior right lobe of the liver adjacent to the gallbladder fossa. This demonstrates early and increased enhancement following contrast  "administration when compared to adjacent liver. This abnormality   measures approximately 4.9 cm AP by 3.6 cm transverse by 5.8 cm craniocaudal. The liver otherwise is normal in appearance.    There are stones in the gallbladder. Small amount of pericholecystic fluid. No biliary dilatation.     The pancreas, spleen, adrenal glands and kidneys are normal.     Small cortical cyst at the lateral aspect of the left kidney. No abdominal lymph node enlargement. 4.2 cm abdominal aortic aneurysm.       IMPRESSION:     1.  Complex mass in the inferior right lobe of the liver is most likely hepatic abscess.  2.  Cholelithiasis. There is a small amount of pericholecystic fluid. If cholecystitis is a clinical concern, hepatobiliary scan is suggested.  3.  No biliary dilatation.  4.  4.2 cm abdominal aortic aneurysm.    Assessment & Plan:   Hepatic lesions    Assessment: Presented with acute onset of symptoms several days ago, including malaise, elevated WBC, +BC for gram negative bacilli, found to have a liver abscess. It is suspected that he may have a liver abscess related to his gallbladder. Colonic source is another consideration. Liver MRI showed a complex mass 4.9x3.6x5.8cm in the inferior right lobe most likely a hepatic abscess, cholelithiasis, a small amount of pericholecystitis fluid, no biliary dilation.       Plan:   - ID to manage antibiotic therapy. Planned IR drainage of abscess today  - Colonoscopy by Dr. Vidal 10/2015 showed 1 30mm submucosal lipoma at the ileocecal valve, patchy mild inflammation in the distal sigmoid colon -- sigmoid colon biopsies showed \"Colonic mucosa with focal hemorrhage in the lamina propria without   active colitis or other pathologic abnormalities.\" May need outpatient colonoscopy once abscess has been treated -- did not discuss with patient today. If colonoscopy normal, may need eventual cholecystectomy if the gallbladder is untimately thought to be the cause.   - Pt discussed with " Dr. Sol Kendrick, [facility]  Cell: [phone] until 12PM  Office: [phone]

## 2022-05-27 NOTE — PRE-PROCEDURE
GENERAL PRE-PROCEDURE:   Procedure:  Image guided liver abscess drain placement with IV moderate sedation   Date/Time:  5/27/2022 12:10 PM    Written consent obtained?: Yes    Risks and benefits: Risks, benefits and alternatives were discussed    Consent given by:  Patient  Patient states understanding of procedure being performed: Yes    Patient's understanding of procedure matches consent: Yes    Procedure consent matches procedure scheduled: Yes    Expected level of sedation:  Moderate  Appropriately NPO:  Yes  ASA Class:  3  Mallampati  :  Grade 2- soft palate, base of uvula, tonsillar pillars, and portion of posterior pharyngeal wall visible  Lungs:  Other (comment)  Lung exam comment:  Lungs clear bilaterally and very decreased, occasional exp wheeze  Heart:  Normal heart sounds and rate  History & Physical reviewed:  History and physical reviewed and no updates needed  Statement of review:  I have reviewed the lab findings, diagnostic data, medications, and the plan for sedation

## 2022-05-27 NOTE — PROVIDER NOTIFICATION
MD Notification    Notified Person: MD    Notified Person Name: Mlili Ortez    Notification Date/Time: 5/27/22 @ 8116    Notification Interaction: phone     Purpose of Notification: Change of diet order  Orders Received:    Comments: back on Clear liquid diet.

## 2022-05-27 NOTE — PROGRESS NOTES
[facility]    Infectious Disease Progress Note    Date of Service (when I saw the patient): 05/27/2022     Assessment & Plan   Gabe Smith is a 67 year old male who was admitted on 5/25/2022.     Impression:  1. 67 y.o with abdominal pain.   2. HTN, HLP  3. COPD   4. Blood cultures positive for GNR   5. CT: Cluster of peripherally enhancing hypodense lesions in the right hepatic lobe adjacent to the gallbladder. These are most suspicious for hepatic abscesses/phlegmon with the given clinical history. Malignant etiologies such as metastatic disease are   also possible  6. Currently on zosyn   7. MRI noted  Complex mass in the inferior right lobe of the liver is most likely hepatic abscess     Recommendations:  Continue on zosyn   IR consult for drain placement in the liver abscesses along with cultures and path   Follow up on the pending cultures ( serratia)     Studies from the abscess fluid ordered       Everett Ospina MD    Interval History   Tolerating antibiotics ok   No new rashes or issues with antibiotics   Labs reviewed   Afebrile     Physical Exam   Temp: 98.9  F (37.2  C) Temp src: Oral BP: 111/69 Pulse: 75   Resp: 16 SpO2: 95 % O2 Device: None (Room air) Oxygen Delivery: 2 LPM  Vitals:    05/25/22 1616   Weight: 91.6 kg (202 lb)     Vital Signs with Ranges  Temp:  [98.9  F (37.2  C)-99.9  F (37.7  C)] 98.9  F (37.2  C)  Pulse:  [75-82] 75  Resp:  [16-18] 16  BP: (111-121)/(69-77) 111/69  SpO2:  [94 %-96 %] 95 %    Constitutional: Awake, alert, cooperative, no apparent distress  Lungs: Clear to auscultation bilaterally, no crackles or wheezing  Cardiovascular: Regular rate and rhythm, normal S1 and S2, and no murmur noted  Abdomen: Normal bowel sounds, soft, non-distended, non-tender  Skin: No rashes, no cyanosis, no edema  Other:    Medications     sodium chloride 100 mL/hr at 05/26/22 2227       allopurinol  200 mg Oral Daily     amLODIPine  10 mg Oral Daily      fluticasone-vilanterol  1 puff Inhalation Daily     piperacillin-tazobactam  3.375 g Intravenous Q6H     sodium chloride (PF)  3 mL Intracatheter Q8H       Data   All microbiology laboratory data reviewed.  Recent Labs   Lab Test 05/27/22  0656 05/26/22  0638 05/25/22  1449   WBC 7.0 14.6* 17.8*   HGB 13.7 15.3 16.6   HCT 42.1 45.9 50.8   MCV 92 93 93   * 127* 135*     Recent Labs   Lab Test 05/27/22  0656 05/26/22  0638 05/25/22  1449   CR 0.93 1.22 1.43*     No lab results found.  No lab results found.    Invalid input(s): UC

## 2022-05-27 NOTE — PROGRESS NOTES
Care Suites Procedure Nursing Note    Patient Information  Name: Gabe Smith  Age: 67 year old    Procedure  Procedure: CT guided liver abscess drain  Procedure start time: 1610   Procedure complete time: 1622  Concerns/abnormal assessment: No immediate  If abnormal assessment, provider notified: N/A  Plan/Other: Proceed as planned.    Saira Carrillo RN MD Sedation Exam completed at this time. Consent signed.    1612 Time Out done.    CT guided Abscess Drain: Pt tolerated well. VSS. Total sedation given - 50 mcg Fentanyl and 1 mg Versed. Drain placed w/o difficulty.  3 cc bloody  fluid removed & specimen sent to lab. Drain irrigated with NS. Connected to LUOISA bulb - full suction. Stay Fix drsg applied & tube secured.    ~1640 Pt back to rm 2425 per cart & transport. Detailed report called to bedside RN.

## 2022-05-27 NOTE — PROGRESS NOTES
[facility]    Hospitalist Progress Note      Assessment & Plan   Gabe Smith is a 67 year old male with a past medical history of COPD, hypertension, hyperlipidemia admitted to hospital for liver abscess.    Liver abscesses   Patient presenting with abdominal pain, and leucocytosis found to have liver abscesses on imaging around gallbladder.  GI consult whether abscess needs drainage and best approach to drain abscesses.  Given proximity to the patient's gallbladder and his episode of right upper quadrant pain suspect that the patient might have had an episode of cholecystitis which caused the patient's abscess.  -c/w zosyn  -f/u blood cultures; 2/2 serratia marcescens, sensitivities pending.  -2nd set BC, LA WNL; monitor temp profile, WBC  -GI consult: Advised MRI: 5 x 4 x 6 cm hepatic lesion consistent with hepatic abscess.  Cholelithiasis small amount of pericholecystic fluid.  -NPO.  5/27/2022: IR consult for hepatic abscess drain placement.  -ID consult: Continue Zosyn.    -Today white count 7.0, admission 17.8.     Hyponatremia  On admission 130.  -Now normal on IVF, continue IVF with n.p.o. status.     FRANCIE  crt was previously 0.8-1  -IV hydration  -After IVF now normal.  Monitor BMP  -avoid nephrotoxic medications     AAA  Known. Infrarenal measuring 4.2cm on ct. 4cm on US from 2/2022.   -surveillance imaging as outpatient.      COPD  Not in acute exacerbation  -On supplemental O2 since admission at 4 L NC, today 2 L.  Continue PTA inhalers and DuoNeb as needed.,     hld  htn  Holding diuretics and statin; soft BP.  Holding aspirin for possible liver abscess drainage  -Continue amlodipine    DVT Prophylaxis: Pneumatic Compression Devices and Ambulate every shift  Code Status: Full Code  Expected Discharge: 05/28/2022     Anticipated discharge location: 2 + days pending clinical improvement on IV antibiotics, bacteremia, liver abscess drain placement.    Sadia Mathis,  MD  [employer] (7am - 6pm, M-F)    Total unit/floor time 35 minutes:  time consisted of the following, examination of patient, review of records including labs, imaging results, medications, interdisciplinary notes and completing documentation; > 50% Counseling/discussion with Patient regarding current condition including liver abscess and plans for IR drain placement and Coordination of Care time with Nursing  and Specialists, ID regarding abx care plan, management and surveillance.      Interval History   Continues sleepy on morning encounters yet arousable, conversive.  Denies pain.  Afebrile.  N.p.o. due to planned IR drain placement today.      -Data reviewed today: I reviewed all new labs and imaging results over the last 24 hours.    Physical Exam   Temp: 98.9  F (37.2  C) Temp src: Oral BP: 111/69 Pulse: 75   Resp: 16 SpO2: 95 % O2 Device: None (Room air) Oxygen Delivery: 2 LPM  Vitals:    05/25/22 1616   Weight: 91.6 kg (202 lb)     Vital Signs with Ranges  Temp:  [98.9  F (37.2  C)-99.9  F (37.7  C)] 98.9  F (37.2  C)  Pulse:  [75-82] 75  Resp:  [16-18] 16  BP: (111-121)/(69-77) 111/69  SpO2:  [94 %-96 %] 95 %  No intake/output data recorded.    General/Constitutional:   NAD, alert, calm, cooperative    HEENT/Head Exam:  atraumatic  Eyes:  PERRL, no conjunctivits  Mouth/Oral Pharynx:  Buccal mucosa WNL  Chest/Respiratory:  Air exchange bilateral lung fields; no rales or wheeze. Respiration nonlabored.  Cardiovascular:  no murmur appreciated.  LE edema none  Gastrointestinal/Abdomen: Soft, nontender.  No rebound, guarding or other peritoneal signs.  Musculoskeletal:  extremities warm, dry, noncyanotic; no acitve synovitis.  Neuro.  Gross motor tested, nonfocal, sensory intact  Psych oriented, affect calm    Medications     sodium chloride 100 mL/hr at 05/26/22 2223       allopurinol  200 mg Oral Daily     amLODIPine  10 mg Oral Daily     fluticasone-salmeterol  1 puff Inhalation BID      piperacillin-tazobactam  3.375 g Intravenous Q6H     sodium chloride (PF)  3 mL Intracatheter Q8H       Data   Recent Labs   Lab 05/27/22  1157 05/27/22  0656 05/26/22  0638 05/25/22  1755 05/25/22  1755 05/25/22  1449   WBC  --  7.0 14.6*  --   --  17.8*   HGB  --  13.7 15.3  --   --  16.6   MCV  --  92 93  --   --  93   PLT  --  119* 127*  --   --  135*   INR 1.33*  --   --   --   --   --    NA  --  135 131*  --   --  130*   POTASSIUM  --  3.8 4.0  --   --  4.1   CHLORIDE  --  103 99  --   --  94   CO2  --  28 25  --   --  31   BUN  --  28 44*  --   --  37*   CR  --  0.93 1.22  --   --  1.43*   ANIONGAP  --  4 7  --   --  5   DES  --  7.4* 8.3*  --   --  9.2   GLC  --  107* 119*  --   --  136*   ALBUMIN  --  2.5* 2.9*   < > 3.2*  --    PROTTOTAL  --  5.9* 6.5*   < > 7.2  --    BILITOTAL  --  1.5* 1.3   < > 1.6*  --    ALKPHOS  --  85 76   < > 73  --    ALT  --  48 45   < > 47  --    AST  --  60* 53*   < > 56*  --    LIPASE  --   --   --   --  33*  --     < > = values in this interval not displayed.       Recent Results (from the past 24 hour(s))   MR Abdomen w/o & w Contrast    Narrative    EXAM: MR ABDOMEN W/O and W CONTRAST  LOCATION: [facility]  DATE/TIME: 5/26/2022 11:39 PM    INDICATION: Acute onset abdominal pain. Indeterminate liver lesion on CT and ultrasound. Possible liver abscess.  COMPARISON: CT and ultrasound 5/25/2022.  TECHNIQUE: Routine MRI abdomen protocol including T1 in/out phase, diffusion, multiplane T2, and dynamic T1 without and with IV contrast.   CONTRAST: 9mL Gadavist    FINDINGS: There is a complex multicystic mass in the inferior right lobe of the liver adjacent to the gallbladder fossa. This demonstrates early and increased enhancement following contrast administration when compared to adjacent liver. This abnormality   measures approximately 4.9 cm AP by 3.6 cm transverse by 5.8 cm craniocaudal. The liver otherwise is normal in appearance.    There are stones  in the gallbladder. Small amount of pericholecystic fluid. No biliary dilatation.    The pancreas, spleen, adrenal glands and kidneys are normal.    Small cortical cyst at the lateral aspect of the left kidney. No abdominal lymph node enlargement. 4.2 cm abdominal aortic aneurysm.      Impression    IMPRESSION:  1.  Complex mass in the inferior right lobe of the liver is most likely hepatic abscess.  2.  Cholelithiasis. There is a small amount of pericholecystic fluid. If cholecystitis is a clinical concern, hepatobiliary scan is suggested.  3.  No biliary dilatation.  4.  4.2 cm abdominal aortic aneurysm.

## 2022-05-27 NOTE — PROGRESS NOTES
Pt is AOx4. Clear liquid diet. VSS on 2L. Up with SBA. PIV infusing with NS @100 ml/hr. Voiding adequately in BR. Denied pain during the shift. Continue to monitor as per the care plan.

## 2022-05-28 LAB
ANION GAP SERPL CALCULATED.3IONS-SCNC: 5 MMOL/L (ref 3–14)
BACTERIA BLD CULT: ABNORMAL
BUN SERPL-MCNC: 17 MG/DL (ref 7–30)
CALCIUM SERPL-MCNC: 8 MG/DL (ref 8.5–10.1)
CHLORIDE BLD-SCNC: 102 MMOL/L (ref 94–109)
CO2 SERPL-SCNC: 25 MMOL/L (ref 20–32)
CREAT SERPL-MCNC: 0.73 MG/DL (ref 0.66–1.25)
ERYTHROCYTE [DISTWIDTH] IN BLOOD BY AUTOMATED COUNT: 13.1 % (ref 10–15)
GFR SERPL CREATININE-BSD FRML MDRD: >90 ML/MIN/1.73M2
GLUCOSE BLD-MCNC: 114 MG/DL (ref 70–99)
HCT VFR BLD AUTO: 41.2 % (ref 40–53)
HGB BLD-MCNC: 13.8 G/DL (ref 13.3–17.7)
MCH RBC QN AUTO: 30.3 PG (ref 26.5–33)
MCHC RBC AUTO-ENTMCNC: 33.5 G/DL (ref 31.5–36.5)
MCV RBC AUTO: 90 FL (ref 78–100)
PLATELET # BLD AUTO: 150 10E3/UL (ref 150–450)
POTASSIUM BLD-SCNC: 3.6 MMOL/L (ref 3.4–5.3)
RBC # BLD AUTO: 4.56 10E6/UL (ref 4.4–5.9)
SODIUM SERPL-SCNC: 132 MMOL/L (ref 133–144)
WBC # BLD AUTO: 8.2 10E3/UL (ref 4–11)

## 2022-05-28 PROCEDURE — 85027 COMPLETE CBC AUTOMATED: CPT | Performed by: HOSPITALIST

## 2022-05-28 PROCEDURE — 250N000013 HC RX MED GY IP 250 OP 250 PS 637: Performed by: HOSPITALIST

## 2022-05-28 PROCEDURE — 250N000011 HC RX IP 250 OP 636: Performed by: HOSPITALIST

## 2022-05-28 PROCEDURE — 120N000001 HC R&B MED SURG/OB

## 2022-05-28 PROCEDURE — 36415 COLL VENOUS BLD VENIPUNCTURE: CPT | Performed by: HOSPITALIST

## 2022-05-28 PROCEDURE — 99233 SBSQ HOSP IP/OBS HIGH 50: CPT | Performed by: HOSPITALIST

## 2022-05-28 PROCEDURE — 258N000003 HC RX IP 258 OP 636: Performed by: HOSPITALIST

## 2022-05-28 PROCEDURE — 80048 BASIC METABOLIC PNL TOTAL CA: CPT | Performed by: HOSPITALIST

## 2022-05-28 RX ADMIN — Medication 1 MG: at 22:53

## 2022-05-28 RX ADMIN — SODIUM CHLORIDE: 9 INJECTION, SOLUTION INTRAVENOUS at 12:25

## 2022-05-28 RX ADMIN — ACETAMINOPHEN 650 MG: 325 TABLET ORAL at 01:36

## 2022-05-28 RX ADMIN — PIPERACILLIN SODIUM AND TAZOBACTAM SODIUM 3.38 G: 3; .375 INJECTION, POWDER, LYOPHILIZED, FOR SOLUTION INTRAVENOUS at 23:48

## 2022-05-28 RX ADMIN — Medication 1 MG: at 01:36

## 2022-05-28 RX ADMIN — SODIUM CHLORIDE: 9 INJECTION, SOLUTION INTRAVENOUS at 01:38

## 2022-05-28 RX ADMIN — ACETAMINOPHEN 650 MG: 325 TABLET ORAL at 22:53

## 2022-05-28 RX ADMIN — PIPERACILLIN SODIUM AND TAZOBACTAM SODIUM 3.38 G: 3; .375 INJECTION, POWDER, LYOPHILIZED, FOR SOLUTION INTRAVENOUS at 05:58

## 2022-05-28 RX ADMIN — AMLODIPINE BESYLATE 10 MG: 5 TABLET ORAL at 09:19

## 2022-05-28 RX ADMIN — FLUTICASONE PROPIONATE AND SALMETEROL 1 PUFF: 113; 14 POWDER, METERED RESPIRATORY (INHALATION) at 20:19

## 2022-05-28 RX ADMIN — ALLOPURINOL 200 MG: 100 TABLET ORAL at 09:19

## 2022-05-28 RX ADMIN — FLUTICASONE PROPIONATE AND SALMETEROL 1 PUFF: 113; 14 POWDER, METERED RESPIRATORY (INHALATION) at 09:21

## 2022-05-28 RX ADMIN — PIPERACILLIN SODIUM AND TAZOBACTAM SODIUM 3.38 G: 3; .375 INJECTION, POWDER, LYOPHILIZED, FOR SOLUTION INTRAVENOUS at 12:20

## 2022-05-28 RX ADMIN — PIPERACILLIN SODIUM AND TAZOBACTAM SODIUM 3.38 G: 3; .375 INJECTION, POWDER, LYOPHILIZED, FOR SOLUTION INTRAVENOUS at 17:39

## 2022-05-28 RX ADMIN — PIPERACILLIN SODIUM AND TAZOBACTAM SODIUM 3.38 G: 3; .375 INJECTION, POWDER, LYOPHILIZED, FOR SOLUTION INTRAVENOUS at 01:25

## 2022-05-28 ASSESSMENT — ACTIVITIES OF DAILY LIVING (ADL)
ADLS_ACUITY_SCORE: 19
ADLS_ACUITY_SCORE: 19
ADLS_ACUITY_SCORE: 18
ADLS_ACUITY_SCORE: 19
ADLS_ACUITY_SCORE: 19
ADLS_ACUITY_SCORE: 18
ADLS_ACUITY_SCORE: 19
ADLS_ACUITY_SCORE: 18
ADLS_ACUITY_SCORE: 19
ADLS_ACUITY_SCORE: 18
ADLS_ACUITY_SCORE: 19
ADLS_ACUITY_SCORE: 19

## 2022-05-28 NOTE — PROGRESS NOTES
[facility]    Hospitalist Progress Note      Assessment & Plan   Gabe Smith is a 67 year old male with a past medical history of COPD, hypertension, hyperlipidemia admitted to hospital for liver abscess.    Liver abscesses   Patient presenting with abdominal pain, and leucocytosis found to have liver abscesses on imaging around gallbladder.  GI consult whether abscess needs drainage and best approach to drain abscesses.  Given proximity to the patient's gallbladder and his episode of right upper quadrant pain suspect that the patient might have had an episode of cholecystitis which caused the patient's abscess.  -c/w zosyn  -f/u blood cultures; 2/2 serratia marcescens, sensitivities pending.  -2nd set BC, LA WNL; monitor temp profile, WBC  -GI consult: Advised MRI: 5 x 4 x 6 cm hepatic lesion consistent with hepatic abscess.  Cholelithiasis small amount of pericholecystic fluid.  - 5/27/2022: IR hepatic abscess drain placement; mildly cloudy serous drainage.  -ID consult: Continue Zosyn.    -Today white count 8.2  admission 17.8.  -Tolerating full liquid diet, advance to low fiber, low-fat.  -Continue to monitor temp profile, WBC, drain output, culture/sensitivities.     Hyponatremia  On admission 130.  -Now normal on IVF, continue IVF with n.p.o. status.     FRANCIE  crt was previously 0.8-1  -IV hydration  -After IVF now normal.  Monitor BMP  -avoid nephrotoxic medications     AAA  Known. Infrarenal measuring 4.2cm on ct. 4cm on US from 2/2022.   -surveillance imaging as outpatient.      COPD  Not in acute exacerbation  -On supplemental O2 since admission at 1-2 L NC,.  Continue PTA inhalers and DuoNeb as needed.,  -Now ambulatory, wean as able.     hld  htn  Holding diuretics and statin; soft BP.  Holding aspirin for possible liver abscess drainage  -Continue amlodipine    DVT Prophylaxis: Pneumatic Compression Devices and Ambulate every shift  Code Status: Full Code  Expected discharge  2 + days pending clinical improvement on IV antibiotics, bacteremia, liver abscess drain placement.    Sadia Mathis MD  [employer] (7am - 6pm, M-F)    Total unit/floor time 35 minutes:  time consisted of the following, examination of patient, review of records including labs, imaging results, medications, interdisciplinary notes and completing documentation; > 50% Counseling/discussion with Patient regarding current condition including hepatic abscess, diet, antibiotics and Coordination of Care time with Nursing  and Specialists IR and ID regarding hepatic abscess care plan, management and surveillance.    Interval History   Up and ambulatory today, feels improved, hungry, less dyspnea, O2 decreased to 1-2 L.  Abscess drain output somewhat cloudy, serous drainage.  Minimal abdominal pain.  Afebrile.      -Data reviewed today: I reviewed all new labs and imaging results over the last 24 hours.    Physical Exam   Temp: 97.7  F (36.5  C) Temp src: Oral BP: 122/71 Pulse: 69   Resp: 16 SpO2: 94 % O2 Device: Nasal cannula Oxygen Delivery: 2 LPM  Vitals:    05/25/22 1616   Weight: 91.6 kg (202 lb)     Vital Signs with Ranges  Temp:  [97.7  F (36.5  C)-98.2  F (36.8  C)] 97.7  F (36.5  C)  Pulse:  [64-92] 69  Resp:  [16-18] 16  BP: (102-122)/(62-82) 122/71  SpO2:  [94 %-98 %] 94 %  I/O last 3 completed shifts:  In: -   Out: 1210 [Urine:1150; Drains:60]    General/Constitutional:    NAD, alert, calm, cooperative.  Appears stronger.  HEENT/Head Exam:  atraumatic  Eyes:  PERRL, no conjunctivits  Mouth/Oral Pharynx:  Buccal mucosa WNL  Chest/Respiratory:  Air exchange bilateral lung fields; no rales or wheeze. Respiration nonlabored.  Cardiovascular:  no murmur appreciated.  LE edema none  Gastrointestinal/Abdomen: Soft, nontender.  No rebound, guarding or other peritoneal signs.  Abscess drain in place, mildly cloudy serous drainage.  Musculoskeletal:  extremities warm, dry, noncyanotic; no acitve synovitis.  Neuro.   Gross motor tested, nonfocal, sensory intact  Psych oriented, affect calm.    Medications     sodium chloride 100 mL/hr at 05/28/22 1225       allopurinol  200 mg Oral Daily     amLODIPine  10 mg Oral Daily     fluticasone-salmeterol  1 puff Inhalation BID     piperacillin-tazobactam  3.375 g Intravenous Q6H     sodium chloride (PF)  10 mL Irrigation Q8H     sodium chloride (PF)  3 mL Intracatheter Q8H       Data   Recent Labs   Lab 05/28/22  1211 05/27/22  1157 05/27/22  0656 05/26/22  0638 05/25/22  1755   WBC 8.2  --  7.0 14.6*  --    HGB 13.8  --  13.7 15.3  --    MCV 90  --  92 93  --      --  119* 127*  --    INR  --  1.33*  --   --   --    *  --  135 131*  --    POTASSIUM 3.6  --  3.8 4.0  --    CHLORIDE 102  --  103 99  --    CO2 25  --  28 25  --    BUN 17  --  28 44*  --    CR 0.73  --  0.93 1.22  --    ANIONGAP 5  --  4 7  --    DES 8.0*  --  7.4* 8.3*  --    *  --  107* 119*  --    ALBUMIN  --   --  2.5* 2.9* 3.2*   PROTTOTAL  --   --  5.9* 6.5* 7.2   BILITOTAL  --   --  1.5* 1.3 1.6*   ALKPHOS  --   --  85 76 73   ALT  --   --  48 45 47   AST  --   --  60* 53* 56*   LIPASE  --   --   --   --  33*       No results found for this or any previous visit (from the past 24 hour(s)).

## 2022-05-28 NOTE — PLAN OF CARE
Admitting Diagnosis: Hepatic abscess     Vitals- WNL via 2 Lit NC  Pain - 8/10, discomfort from bed and position   A&Ox4  Voiding- Bedside urinal WNL  Mobility- Indep SBA  Tele- N/A  CMS- Intact  Lung - Diminished Bi lat   GI- WNL  Dressing- CDI     Shubham drain RUQ- 10ml flush Q8    Orders Placed This Encounter      Clear Liquid Diet     Personal inhalers- ok/d by MD in med drawers    Plan:

## 2022-05-28 NOTE — PROGRESS NOTES
A/O x4. VSS on 2L NC. Went to CT for drainage of the hepatic abscess. Bedrest for an hour. LOUISA placed on RUQ. Irrigation of LOUISA with 10ml NS q8hr done. Up SBA to BR. IV NS infusing @ 100 ml. Denies pain. Will cont to monitor.

## 2022-05-28 NOTE — PLAN OF CARE
Pt A&Ox4. VSS. Weaned to 1L O2 via NC; tolerating well. Denies pain. LOUISA drain to RUQ; pink-tinged drainage, 50 mL OP during shift. Patent; irrigated w/ 10 mL NS. Pt tolerated full liquids; new diet orders for low fiber, low fat. SBA in Rm. PIV to R arm running  mL/hr w/ intermittent IV abx.

## 2022-05-29 LAB
ANION GAP SERPL CALCULATED.3IONS-SCNC: 5 MMOL/L (ref 3–14)
BACTERIA BLD CULT: ABNORMAL
BUN SERPL-MCNC: 16 MG/DL (ref 7–30)
CALCIUM SERPL-MCNC: 8.1 MG/DL (ref 8.5–10.1)
CHLORIDE BLD-SCNC: 104 MMOL/L (ref 94–109)
CO2 SERPL-SCNC: 28 MMOL/L (ref 20–32)
CREAT SERPL-MCNC: 0.77 MG/DL (ref 0.66–1.25)
ERYTHROCYTE [DISTWIDTH] IN BLOOD BY AUTOMATED COUNT: 12.8 % (ref 10–15)
GFR SERPL CREATININE-BSD FRML MDRD: >90 ML/MIN/1.73M2
GLUCOSE BLD-MCNC: 108 MG/DL (ref 70–99)
HCT VFR BLD AUTO: 43.2 % (ref 40–53)
HGB BLD-MCNC: 14 G/DL (ref 13.3–17.7)
MCH RBC QN AUTO: 30.2 PG (ref 26.5–33)
MCHC RBC AUTO-ENTMCNC: 32.4 G/DL (ref 31.5–36.5)
MCV RBC AUTO: 93 FL (ref 78–100)
PLATELET # BLD AUTO: 168 10E3/UL (ref 150–450)
POTASSIUM BLD-SCNC: 3.9 MMOL/L (ref 3.4–5.3)
RBC # BLD AUTO: 4.63 10E6/UL (ref 4.4–5.9)
SODIUM SERPL-SCNC: 137 MMOL/L (ref 133–144)
WBC # BLD AUTO: 8.4 10E3/UL (ref 4–11)

## 2022-05-29 PROCEDURE — 999N000157 HC STATISTIC RCP TIME EA 10 MIN

## 2022-05-29 PROCEDURE — 94640 AIRWAY INHALATION TREATMENT: CPT

## 2022-05-29 PROCEDURE — 250N000013 HC RX MED GY IP 250 OP 250 PS 637: Performed by: HOSPITALIST

## 2022-05-29 PROCEDURE — 94640 AIRWAY INHALATION TREATMENT: CPT | Mod: 76

## 2022-05-29 PROCEDURE — 120N000001 HC R&B MED SURG/OB

## 2022-05-29 PROCEDURE — 250N000011 HC RX IP 250 OP 636: Performed by: HOSPITALIST

## 2022-05-29 PROCEDURE — 80048 BASIC METABOLIC PNL TOTAL CA: CPT | Performed by: HOSPITALIST

## 2022-05-29 PROCEDURE — 85027 COMPLETE CBC AUTOMATED: CPT | Performed by: HOSPITALIST

## 2022-05-29 PROCEDURE — 250N000009 HC RX 250: Performed by: HOSPITALIST

## 2022-05-29 PROCEDURE — 99233 SBSQ HOSP IP/OBS HIGH 50: CPT | Performed by: HOSPITALIST

## 2022-05-29 PROCEDURE — 36415 COLL VENOUS BLD VENIPUNCTURE: CPT | Performed by: HOSPITALIST

## 2022-05-29 RX ORDER — ASPIRIN 81 MG/1
81 TABLET ORAL DAILY
Status: DISCONTINUED | OUTPATIENT
Start: 2022-05-29 | End: 2022-06-01 | Stop reason: HOSPADM

## 2022-05-29 RX ORDER — IPRATROPIUM BROMIDE AND ALBUTEROL SULFATE 2.5; .5 MG/3ML; MG/3ML
3 SOLUTION RESPIRATORY (INHALATION)
Status: DISCONTINUED | OUTPATIENT
Start: 2022-05-29 | End: 2022-06-01 | Stop reason: HOSPADM

## 2022-05-29 RX ADMIN — IPRATROPIUM BROMIDE AND ALBUTEROL SULFATE 3 ML: .5; 3 SOLUTION RESPIRATORY (INHALATION) at 12:01

## 2022-05-29 RX ADMIN — IPRATROPIUM BROMIDE AND ALBUTEROL SULFATE 3 ML: .5; 3 SOLUTION RESPIRATORY (INHALATION) at 19:19

## 2022-05-29 RX ADMIN — ACETAMINOPHEN 650 MG: 325 TABLET ORAL at 21:24

## 2022-05-29 RX ADMIN — ALLOPURINOL 200 MG: 100 TABLET ORAL at 10:03

## 2022-05-29 RX ADMIN — FLUTICASONE PROPIONATE AND SALMETEROL 1 PUFF: 113; 14 POWDER, METERED RESPIRATORY (INHALATION) at 10:04

## 2022-05-29 RX ADMIN — PIPERACILLIN SODIUM AND TAZOBACTAM SODIUM 3.38 G: 3; .375 INJECTION, POWDER, LYOPHILIZED, FOR SOLUTION INTRAVENOUS at 05:18

## 2022-05-29 RX ADMIN — PIPERACILLIN SODIUM AND TAZOBACTAM SODIUM 3.38 G: 3; .375 INJECTION, POWDER, LYOPHILIZED, FOR SOLUTION INTRAVENOUS at 17:34

## 2022-05-29 RX ADMIN — ASPIRIN 81 MG: 81 TABLET, COATED ORAL at 17:33

## 2022-05-29 RX ADMIN — AMLODIPINE BESYLATE 10 MG: 5 TABLET ORAL at 10:02

## 2022-05-29 RX ADMIN — IPRATROPIUM BROMIDE AND ALBUTEROL SULFATE 3 ML: .5; 3 SOLUTION RESPIRATORY (INHALATION) at 15:15

## 2022-05-29 RX ADMIN — FLUTICASONE PROPIONATE AND SALMETEROL 1 PUFF: 113; 14 POWDER, METERED RESPIRATORY (INHALATION) at 21:24

## 2022-05-29 RX ADMIN — PIPERACILLIN SODIUM AND TAZOBACTAM SODIUM 3.38 G: 3; .375 INJECTION, POWDER, LYOPHILIZED, FOR SOLUTION INTRAVENOUS at 12:51

## 2022-05-29 ASSESSMENT — ACTIVITIES OF DAILY LIVING (ADL)
ADLS_ACUITY_SCORE: 19

## 2022-05-29 NOTE — PLAN OF CARE
Goal Outcome Evaluation:  Patient is alert and oriented X4. VSS, oxygen wean to  1LPM via NC with sat of 95%. SBA.  Patient advance to low fiber and low fat diet, patient tolerated well.  Patient denies nausea, vomiting and abdominal discomfort. Patient requested for  acetaminophen and melatonin and was given. IV NS infusing 100ml/hr.  LOUISA incision site is dry and intact.LOUISA irrigation done with 10ML flush with minimum output. Continent of bowel and bladder. We continue to monitor.

## 2022-05-29 NOTE — PROGRESS NOTES
[facility]    Hospitalist Progress Note      Assessment & Plan   Gabe Smith is a 67 year old male with a past medical history of COPD, hypertension, hyperlipidemia admitted to hospital for liver abscess.    Liver abscesses   Patient presenting with abdominal pain, and leucocytosis found to have liver abscesses on imaging around gallbladder.  GI consult whether abscess needs drainage and best approach to drain abscesses.  Given proximity to the patient's gallbladder and his episode of right upper quadrant pain suspect that the patient might have had an episode of cholecystitis which caused the patient's abscess.  -c/w zosyn  -f/u blood cultures; 2/2 serratia marcescens, sensitivities pending.  -2nd set BC, LA WNL; monitor temp profile, WBC  -GI consult: Advised MRI: 5 x 4 x 6 cm hepatic lesion consistent with hepatic abscess.  Cholelithiasis small amount of pericholecystic fluid.  - 5/27/2022: IR hepatic abscess drain placement; less purulent, serosanguinous  -ID consult: Continue Zosyn.  Drain fluid: grams NEG organisms, NGTD; as above 2/2 BC serratia marcescens, sensitivities pending.  -Today white count 8.4;  admission 17.8.  -Tolerating low fiber, low-fat; + BM.  -Continue to monitor temp profile, WBC, drain output, culture/sensitivities.     Hyponatremia  On admission 130.  -Now normal on IVF, discontinue IVF adequate POs     FRANCIE  crt was previously 0.8-1  -IV hydration  -After IVF now normal.  Monitor BMP  -avoid nephrotoxic medications     AAA  Known. Infrarenal measuring 4.2cm on ct. 4cm on US from 2/2022.   -surveillance imaging as outpatient.      COPD  Not in acute exacerbation  -On supplemental O2 since admission at 1-2 L NC,.  Continue PTA inhalers   -Now ambulatory,Persistent supp O2 add scheduled duoneb, wean O2 as above.      hld  htn  Holding diuretics on admission soft BP.  Holding aspirin/statin on admission: liver abscess drainage  -Continue amlodipine; bp WNL,  monitior  -restart PTA ASA     DVT Prophylaxis: Pneumatic Compression Devices and Ambulate every shift  Code Status: Full Code  Expected discharge 2 + days pending clinical improvement on IV antibiotics, bacteremia, liver abscess drain placement; wean O2; IR and ID plan established. .    Sadia Mathis MD  [employer] (7am - 6pm, M-F)    Total unit/floor time 35 minutes:  time consisted of the following, examination of patient, review of records including labs, imaging results, medications, interdisciplinary notes and completing documentation; > 50% Counseling/discussion with Patient regarding current condition including liver abscess with drain placement; bacteremia on IV abx; wean o2 and Coordination of Care time with Nursing re wean O2  and Specialists, IR and ID regarding hepatic abscess care plan, management and surveillance.        Interval History   Up and ambulatory, feels improved, denies abdominal pain; tolerating low-fat regular diet.  Continues on supplemental O2, denies cough, sputum, using home inhalers.  Abscess drain output less cloudy, continues serosanguineous, nursing reports no wound issues, afebrile.       -Data reviewed today: I reviewed all new labs and imaging results over the last 24 hours.    Physical Exam   Temp: 98.3  F (36.8  C) Temp src: Axillary BP: (!) 140/76 Pulse: 82   Resp: 16 SpO2: 92 % O2 Device: Nasal cannula Oxygen Delivery: 1 LPM  Vitals:    05/25/22 1616   Weight: 91.6 kg (202 lb)     Vital Signs with Ranges  Temp:  [97.7  F (36.5  C)-98.4  F (36.9  C)] 98.3  F (36.8  C)  Pulse:  [70-82] 82  Resp:  [16] 16  BP: (117-140)/(71-76) 140/76  SpO2:  [92 %-94 %] 92 %  I/O last 3 completed shifts:  In: 240 [P.O.:240]  Out: 820 [Urine:600; Drains:220]    General/Constitutional:    NAD, alert, calm, cooperative   Appears stronger.  HEENT/Head Exam:  atraumatic  Eyes:  PERRL, no conjunctivits  Mouth/Oral Pharynx:  Buccal mucosa WNL  Chest/Respiratory:  Air exchange bilateral lung  fields; no rales or wheeze. Respiration nonlabored, remains on O2 per NC.,.  Cardiovascular:  no murmur appreciated.  LE edema none  Gastrointestinal/Abdomen: Soft, nontender.  No rebound, guarding or other peritoneal signs.  Abscess drain in place, less cloudy, continues serosanguineous.  Musculoskeletal:  extremities warm, dry, noncyanotic; no acitve synovitis.  Neuro.  Gross motor tested, nonfocal, sensory intact  Psych oriented, affect calm     Medications       allopurinol  200 mg Oral Daily     amLODIPine  10 mg Oral Daily     fluticasone-salmeterol  1 puff Inhalation BID     ipratropium - albuterol 0.5 mg/2.5 mg/3 mL  3 mL Nebulization 4x daily     piperacillin-tazobactam  3.375 g Intravenous Q6H     sodium chloride (PF)  10 mL Irrigation Q8H     sodium chloride (PF)  3 mL Intracatheter Q8H       Data   Recent Labs   Lab 05/29/22  0729 05/28/22  1211 05/27/22  1157 05/27/22  0656 05/26/22  0638 05/25/22  1755   WBC 8.4 8.2  --  7.0 14.6*  --    HGB 14.0 13.8  --  13.7 15.3  --    MCV 93 90  --  92 93  --     150  --  119* 127*  --    INR  --   --  1.33*  --   --   --     132*  --  135 131*  --    POTASSIUM 3.9 3.6  --  3.8 4.0  --    CHLORIDE 104 102  --  103 99  --    CO2 28 25  --  28 25  --    BUN 16 17  --  28 44*  --    CR 0.77 0.73  --  0.93 1.22  --    ANIONGAP 5 5  --  4 7  --    DES 8.1* 8.0*  --  7.4* 8.3*  --    * 114*  --  107* 119*  --    ALBUMIN  --   --   --  2.5* 2.9* 3.2*   PROTTOTAL  --   --   --  5.9* 6.5* 7.2   BILITOTAL  --   --   --  1.5* 1.3 1.6*   ALKPHOS  --   --   --  85 76 73   ALT  --   --   --  48 45 47   AST  --   --   --  60* 53* 56*   LIPASE  --   --   --   --   --  33*       No results found for this or any previous visit (from the past 24 hour(s)).

## 2022-05-29 NOTE — PROGRESS NOTES
[facility]    Infectious Disease Progress Note    Date of Service (when I saw the patient): 05/29/2022     Assessment & Plan   Gabe Smith is a 67 year old male who was admitted on 5/25/2022.     Impression:  1. 67 y.o with abdominal pain.   2. HTN, HLP  3. COPD   4. Blood cultures positive for GNR   5. CT: Cluster of peripherally enhancing hypodense lesions in the right hepatic lobe adjacent to the gallbladder. These are most suspicious for hepatic abscesses/phlegmon with the given clinical history. Malignant etiologies such as metastatic disease are   also possible  6. Currently on zosyn   7. MRI noted  Complex mass in the inferior right lobe of the liver is most likely hepatic abscess     Recommendations:  Continue on zosyn   IR consult for drain placement in the liver abscesses along with cultures and path   Follow up on the pending cultures ( serratia)     Studies from the abscess fluid ordered     Anticipate discharge early next week on weeks of IV antibiotics   Will repeat CT scan early next week or per IR     Everett Ospina MD    Interval History   Tolerating antibiotics ok   No new rashes or issues with antibiotics   Labs reviewed   Afebrile     Physical Exam   Temp: 98.3  F (36.8  C) Temp src: Axillary BP: (!) 140/76 Pulse: 82   Resp: 16 SpO2: 96 % O2 Device: None (Room air) Oxygen Delivery: 1 LPM  Vitals:    05/25/22 1616   Weight: 91.6 kg (202 lb)     Vital Signs with Ranges  Temp:  [97.7  F (36.5  C)-98.4  F (36.9  C)] 98.3  F (36.8  C)  Pulse:  [70-82] 82  Resp:  [16] 16  BP: (117-140)/(71-76) 140/76  SpO2:  [92 %-96 %] 96 %    Constitutional: Awake, alert, cooperative, no apparent distress  Lungs: Clear to auscultation bilaterally, no crackles or wheezing  Cardiovascular: Regular rate and rhythm, normal S1 and S2, and no murmur noted  Abdomen: Normal bowel sounds, soft, non-distended, non-tender  Skin: No rashes, no cyanosis, no edema  Other:    Medications        allopurinol  200 mg Oral Daily     amLODIPine  10 mg Oral Daily     aspirin  81 mg Oral Daily     fluticasone-salmeterol  1 puff Inhalation BID     ipratropium - albuterol 0.5 mg/2.5 mg/3 mL  3 mL Nebulization 4x daily     piperacillin-tazobactam  3.375 g Intravenous Q6H     sodium chloride (PF)  10 mL Irrigation Q8H     sodium chloride (PF)  3 mL Intracatheter Q8H       Data   All microbiology laboratory data reviewed.  Recent Labs   Lab Test 05/29/22  0729 05/28/22  1211 05/27/22  0656   WBC 8.4 8.2 7.0   HGB 14.0 13.8 13.7   HCT 43.2 41.2 42.1   MCV 93 90 92    150 119*     Recent Labs   Lab Test 05/29/22  0729 05/28/22  1211 05/27/22  0656   CR 0.77 0.73 0.93     No lab results found.  No lab results found.    Invalid input(s): UC

## 2022-05-29 NOTE — PROGRESS NOTES
"  Interventional Radiology - Progress Note  Inpatient [facility].    05/29/2022     S:  Pt with RUQ pain was found found to her perihepatic abscess. Pt underwent CT guided drain placement on 5/27. Pt reports symptomatic improvements. ID is following. Pt is receiving IV antibiotics. WBC is trending down. Pt denies abdominal pain, fevers/chills, n/v. He is tolerating PO. Cultures returned positive.     Culture:  2+ Serratia marcescens Abnormal   Antibiotic: Zosyn 3.375 q6H  Flushing:  10 ml of NS q8H    O:  BP (!) 140/76 (BP Location: Right arm)   Pulse 82   Temp 98.3  F (36.8  C) (Axillary)   Resp 16   Ht 1.778 m (5' 10\")   Wt 91.6 kg (202 lb)   SpO2 96%   BMI 28.98 kg/m    General:  Stable.  In no acute distress.    Neuro:  A&O x 3. Moves all extremities equally.  Resp:  Breathing is not labored.   Cardio:  S1S2 and reg, without murmur, clicks or rubs  Abdomen:  Soft, non-distended, non-tender, positive bowel sounds. RUQ drain is intact with pink tinged OP to LOUISA bulb; no TTP to RUQ; dressing C/D/I.  Skin:  Without excoriations, ecchymosis, erythema, lesions or open sores.  MSK:  No gross motor weakness.  Sensation intact.  Proprioception intact.    IMAGING:  CT liver abscess drain 05/27/22    LABS:  Recent Labs   Lab 05/29/22  0729 05/28/22  1211 05/27/22  1157 05/27/22  0656 05/26/22  0638   WBC 8.4 8.2  --  7.0 14.6*   HGB 14.0 13.8  --  13.7 15.3    150  --  119* 127*   INR  --   --  1.33*  --   --    CR 0.77 0.73  --  0.93 1.22     Drain Outputs (in mL):  5/29 220   5/28 60       A:  67 year old yo male s/p perihepatic drain placement. WBC is WNL. Pt is not toxic on exam. Vital signs are reassuring. Pt denies any abdominal pain, fevers/chills, n/v. The drain appears intact and functions well.     P:      - Continue present drain cares. Continue drain to LOUISA drainage.  - Antibiotics per treatment team/ID.   - Drain care education provided to patient. All questions answered.  - Drain discharge " instructions entered into D/C navigator.  - Patient to flush drain with 10 mL NS daily upon discharge. NS flushes ordered in D/C navigator.  - Anticipating follow up CT and abscessogram approximately 7-10 days from drain placement date pending drain OPs and patient's clinical status. May consider sooner follow up imaging if there are changes in drain function or in patient's clinical course. Discussed plan for follow up with patient who expressed his understanding.   - IR will continue to follow loosely. Please contact IR with drain related questions or concerns.  -Follow up with surgery on outpatient basis regarding chronic cholecystitis.       Total time spent on the date of the encounter is 20 minutes, including time spent counseling the patient, performing a medically appropriate evaluation, reviewing prior medical history, ordering medications and tests, documenting clinical information in the medical record, and communication of results.    Helder Landrum PA-C  Interventional Radiology  [phone]    E/M codes for reference only:  95440

## 2022-05-29 NOTE — PROGRESS NOTES
Patient A/O X4, up and ambulating in room with standby assist. Presently on room air, no SOB, oxygen saturation within therapeutic range. Patient continues to have moist productive but denies chest discomfort and pain. Reports feeling good, had both meals and tolerated.

## 2022-05-29 NOTE — PLAN OF CARE
Admitting Diagnosis: Hepatic abscess [K75.0]     Vitals WNL on 1 Lit oxygen via NC  Pain- Denies  A&Ox4  Voiding- WNL  Mobility- Stand by  CMS- Intact   Lung Sounds Diminished   GI- WNL    Low fiber diet initiated 5/28       Plan: Ween on oxygen and continue ABX at home. Available SW coordination.

## 2022-05-30 LAB
ANION GAP SERPL CALCULATED.3IONS-SCNC: 8 MMOL/L (ref 3–14)
BACTERIA ASPIRATE CULT: ABNORMAL
BACTERIA ASPIRATE CULT: ABNORMAL
BUN SERPL-MCNC: 11 MG/DL (ref 7–30)
CALCIUM SERPL-MCNC: 8.5 MG/DL (ref 8.5–10.1)
CHLORIDE BLD-SCNC: 103 MMOL/L (ref 94–109)
CO2 SERPL-SCNC: 25 MMOL/L (ref 20–32)
CREAT SERPL-MCNC: 0.74 MG/DL (ref 0.66–1.25)
ERYTHROCYTE [DISTWIDTH] IN BLOOD BY AUTOMATED COUNT: 13 % (ref 10–15)
GFR SERPL CREATININE-BSD FRML MDRD: >90 ML/MIN/1.73M2
GLUCOSE BLD-MCNC: 244 MG/DL (ref 70–99)
GLUCOSE BLDC GLUCOMTR-MCNC: 124 MG/DL (ref 70–99)
GLUCOSE BLDC GLUCOMTR-MCNC: 187 MG/DL (ref 70–99)
HBA1C MFR BLD: 6.3 % (ref 0–5.6)
HCT VFR BLD AUTO: 40.7 % (ref 40–53)
HGB BLD-MCNC: 13.7 G/DL (ref 13.3–17.7)
MCH RBC QN AUTO: 30.4 PG (ref 26.5–33)
MCHC RBC AUTO-ENTMCNC: 33.7 G/DL (ref 31.5–36.5)
MCV RBC AUTO: 90 FL (ref 78–100)
PLATELET # BLD AUTO: 206 10E3/UL (ref 150–450)
POTASSIUM BLD-SCNC: 3.5 MMOL/L (ref 3.4–5.3)
RBC # BLD AUTO: 4.5 10E6/UL (ref 4.4–5.9)
SODIUM SERPL-SCNC: 136 MMOL/L (ref 133–144)
WBC # BLD AUTO: 9 10E3/UL (ref 4–11)

## 2022-05-30 PROCEDURE — 250N000011 HC RX IP 250 OP 636: Performed by: HOSPITALIST

## 2022-05-30 PROCEDURE — 94640 AIRWAY INHALATION TREATMENT: CPT | Mod: 76

## 2022-05-30 PROCEDURE — 120N000001 HC R&B MED SURG/OB

## 2022-05-30 PROCEDURE — 36415 COLL VENOUS BLD VENIPUNCTURE: CPT | Performed by: HOSPITALIST

## 2022-05-30 PROCEDURE — 250N000013 HC RX MED GY IP 250 OP 250 PS 637: Performed by: INTERNAL MEDICINE

## 2022-05-30 PROCEDURE — 250N000009 HC RX 250: Performed by: HOSPITALIST

## 2022-05-30 PROCEDURE — 94640 AIRWAY INHALATION TREATMENT: CPT

## 2022-05-30 PROCEDURE — 80048 BASIC METABOLIC PNL TOTAL CA: CPT | Performed by: HOSPITALIST

## 2022-05-30 PROCEDURE — 85027 COMPLETE CBC AUTOMATED: CPT | Performed by: HOSPITALIST

## 2022-05-30 PROCEDURE — 83036 HEMOGLOBIN GLYCOSYLATED A1C: CPT | Performed by: INTERNAL MEDICINE

## 2022-05-30 PROCEDURE — 99233 SBSQ HOSP IP/OBS HIGH 50: CPT | Performed by: INTERNAL MEDICINE

## 2022-05-30 PROCEDURE — 999N000157 HC STATISTIC RCP TIME EA 10 MIN

## 2022-05-30 PROCEDURE — 250N000013 HC RX MED GY IP 250 OP 250 PS 637: Performed by: HOSPITALIST

## 2022-05-30 RX ORDER — POTASSIUM CHLORIDE 1500 MG/1
20 TABLET, EXTENDED RELEASE ORAL ONCE
Status: COMPLETED | OUTPATIENT
Start: 2022-05-30 | End: 2022-05-30

## 2022-05-30 RX ADMIN — AMLODIPINE BESYLATE 10 MG: 5 TABLET ORAL at 08:36

## 2022-05-30 RX ADMIN — PIPERACILLIN SODIUM AND TAZOBACTAM SODIUM 3.38 G: 3; .375 INJECTION, POWDER, LYOPHILIZED, FOR SOLUTION INTRAVENOUS at 06:20

## 2022-05-30 RX ADMIN — ASPIRIN 81 MG: 81 TABLET, COATED ORAL at 08:36

## 2022-05-30 RX ADMIN — PIPERACILLIN SODIUM AND TAZOBACTAM SODIUM 3.38 G: 3; .375 INJECTION, POWDER, LYOPHILIZED, FOR SOLUTION INTRAVENOUS at 12:04

## 2022-05-30 RX ADMIN — PIPERACILLIN SODIUM AND TAZOBACTAM SODIUM 3.38 G: 3; .375 INJECTION, POWDER, LYOPHILIZED, FOR SOLUTION INTRAVENOUS at 17:23

## 2022-05-30 RX ADMIN — IPRATROPIUM BROMIDE AND ALBUTEROL SULFATE 3 ML: .5; 3 SOLUTION RESPIRATORY (INHALATION) at 07:28

## 2022-05-30 RX ADMIN — POTASSIUM CHLORIDE 20 MEQ: 1500 TABLET, EXTENDED RELEASE ORAL at 17:20

## 2022-05-30 RX ADMIN — PIPERACILLIN SODIUM AND TAZOBACTAM SODIUM 3.38 G: 3; .375 INJECTION, POWDER, LYOPHILIZED, FOR SOLUTION INTRAVENOUS at 00:21

## 2022-05-30 RX ADMIN — IPRATROPIUM BROMIDE AND ALBUTEROL SULFATE 3 ML: .5; 3 SOLUTION RESPIRATORY (INHALATION) at 19:25

## 2022-05-30 RX ADMIN — Medication 1 MG: at 23:15

## 2022-05-30 RX ADMIN — FLUTICASONE PROPIONATE AND SALMETEROL 1 PUFF: 113; 14 POWDER, METERED RESPIRATORY (INHALATION) at 10:48

## 2022-05-30 RX ADMIN — IPRATROPIUM BROMIDE AND ALBUTEROL SULFATE 3 ML: .5; 3 SOLUTION RESPIRATORY (INHALATION) at 15:56

## 2022-05-30 RX ADMIN — ACETAMINOPHEN 650 MG: 325 TABLET ORAL at 20:26

## 2022-05-30 RX ADMIN — ALLOPURINOL 200 MG: 100 TABLET ORAL at 08:36

## 2022-05-30 RX ADMIN — FLUTICASONE PROPIONATE AND SALMETEROL 1 PUFF: 113; 14 POWDER, METERED RESPIRATORY (INHALATION) at 20:24

## 2022-05-30 RX ADMIN — IPRATROPIUM BROMIDE AND ALBUTEROL SULFATE 3 ML: .5; 3 SOLUTION RESPIRATORY (INHALATION) at 12:15

## 2022-05-30 ASSESSMENT — ACTIVITIES OF DAILY LIVING (ADL)
ADLS_ACUITY_SCORE: 19
ADLS_ACUITY_SCORE: 18
ADLS_ACUITY_SCORE: 19
ADLS_ACUITY_SCORE: 18
ADLS_ACUITY_SCORE: 19
ADLS_ACUITY_SCORE: 18
ADLS_ACUITY_SCORE: 19
ADLS_ACUITY_SCORE: 18

## 2022-05-30 NOTE — PROVIDER NOTIFICATION
MD Notification    Notified Person: MD    Notified Person Name: Dr. Abraham     Notification Date/Time: 5/30 @ 0642    Notification Interaction: phone call    Purpose of Notification: Drain started leaking at insertion site when irrigated, decreased output     Orders Received: Order to hold flushes and order entered for IR drain check tomorrow     Hospitalist also paged with update.

## 2022-05-30 NOTE — PROGRESS NOTES
[facility]    Hospitalist Progress Note    Assessment & Plan   Gabe Smith is a 67 year old male with a past medical history of COPD, hypertension, hyperlipidemia admitted to hospital for liver abscess.     Liver abscesses   -Patient presenting with abdominal pain, and leukocytosis found to have liver abscesses on imaging around gallbladder.   - GI consult whether abscess needs drainage and best approach to drain abscesses.  Given proximity to the patient's gallbladder and his episode of right upper quadrant pain suspect that the patient might have had an episode of cholecystitis which caused the patient's abscess.  -c/w zosyn  -f/u blood cultures; 2/2 serratia marcescens, Resistant to ampicillin /Unasyn  -2nd set BC, LA WNL; monitor temp profile, WBC  -GI consult: Advised MRI: 5 x 4 x 6 cm hepatic lesion consistent with hepatic abscess.  Cholelithiasis small amount of pericholecystic fluid.  - 5/27/2022: IR hepatic abscess drain placement; less purulent, serosanguinous  -ID consult: Continue Zosyn.  Drain fluid: grams NEG organisms, NGTD; as above 2/2 BC serratia marcescens, sensitivities pending.  -Today white count 8.4;  admission 17.8.  -Tolerating low fiber, low-fat; + BM.  -Continue to monitor temp profile, WBC, drain output, culture/sensitivities.  - drain output: 165 ml yesterday, 150 on 5/28.   -feeling better. Up walking in [place].   No new issues.       Plan; ID following  Zosyn per ID.   IR drain in place.   Drain care education provided to patient. All questions answered.  - Drain discharge instructions entered into D/C navigator.  - Patient to flush drain with 10 mL NS daily upon discharge. NS flushes ordered in D/C navigator.  - Anticipating follow up CT and abscessogram approximately 7-10 days from drain placement date pending drain OPs and patient's clinical status. May consider sooner follow up imaging if there are changes in drain function or in patient's clinical  course. Discussed plan for follow up with patient who expressed his understanding.   - IR will continue to follow loosely. Please contact IR with drain related questions or concerns.  -Follow up with surgery on outpatient basis regarding chronic cholecystitis.         Hyponatremia-resolved.   On admission 130.  -Now normal on IVF, discontinued IVF adequate POs  -nl bmp today     FRANCIE-resolved.   crt was previously 0.8-1  -IV hydration  -normalized post IVFS. Off ivfs.    Monitor BMP-nl bmp today  -avoid nephrotoxic medications    Elevated blood sugar  244 today. Has been in low 100 range. ? Eating better  Check HbA1C.   Increase check to tid with meals.      AAA  Known. Infrarenal measuring 4.2cm on ct. 4cm on US from 2/2022.   -surveillance imaging as outpatient. PCP follow up      COPD  Not in acute exacerbation  -On supplemental O2 since admission at 1-2 L NC,.  Continue PTA inhalers   -Now ambulatory,Persistent supp O2 add scheduled duoneb, wean O2 as above.   -off oxygen, lungs clear     hld  htn  Holding diuretics on admission soft BP.  Holding aspirin/statin on admission: liver abscess drainage  -normotensive.     Plan;   -Continue amlodipine; bp WNL, monitior  -restarted PTA ASA      DVT Prophylaxis: Pneumatic Compression Devices and Ambulate every shift    Code Status: Full Code  Expected discharge 2 + days pending clinical improvement on IV antibiotics, bacteremia, liver abscess drain placement; wean O2; IR and ID plan established. .  Possibly tomorrow with IR, pcp,surgery and possible ID follow up. Talk with id if picc line needed    Discussed care plan with pt, rn         Hossein Landrum MD, MD  Text Page  (7am to 6pm)  Interval History   Less abd pain   has [facility] pcp  No n/v/d/abd pan  .   Taking po.       -Data reviewed today: I reviewed all new labs and imaging results over the last 24 hours. I personally reviewed     Physical Exam   Temp: 98.3  F (36.8  C) Temp src: Oral BP: 137/75 Pulse:  80   Resp: 16 SpO2: 90 % O2 Device: None (Room air)    Vitals:    05/25/22 1616   Weight: 91.6 kg (202 lb)     Vital Signs with Ranges  Temp:  [97.6  F (36.4  C)-98.3  F (36.8  C)] 98.3  F (36.8  C)  Pulse:  [76-83] 80  Resp:  [16-20] 16  BP: (122-139)/(69-75) 137/75  SpO2:  [88 %-93 %] 90 %  I/O last 3 completed shifts:  In: 240 [P.O.:240]  Out: 695 [Urine:600; Drains:95]    Constitutional: Up walking in room, nad  Respiratory: cTAB  Cardiovascular: RRR no r/g/m  GI: soft, nt, mnd  Skin/Integumen: no rash or ede3ma  Neuro- nl gait, nl speech and mentation  Psych: nl affect      Medications       allopurinol  200 mg Oral Daily     amLODIPine  10 mg Oral Daily     aspirin  81 mg Oral Daily     fluticasone-salmeterol  1 puff Inhalation BID     ipratropium - albuterol 0.5 mg/2.5 mg/3 mL  3 mL Nebulization 4x daily     piperacillin-tazobactam  3.375 g Intravenous Q6H     sodium chloride (PF)  10 mL Irrigation Q8H     sodium chloride (PF)  3 mL Intracatheter Q8H       Data   Recent Labs   Lab 05/30/22  0833 05/29/22  0729 05/28/22  1211 05/27/22  1157 05/27/22  0656 05/26/22  0638 05/25/22  1755   WBC 9.0 8.4 8.2  --  7.0 14.6*  --    HGB 13.7 14.0 13.8  --  13.7 15.3  --    MCV 90 93 90  --  92 93  --     168 150  --  119* 127*  --    INR  --   --   --  1.33*  --   --   --     137 132*  --  135 131*  --    POTASSIUM 3.5 3.9 3.6  --  3.8 4.0  --    CHLORIDE 103 104 102  --  103 99  --    CO2 25 28 25  --  28 25  --    BUN 11 16 17  --  28 44*  --    CR 0.74 0.77 0.73  --  0.93 1.22  --    ANIONGAP 8 5 5  --  4 7  --    DES 8.5 8.1* 8.0*  --  7.4* 8.3*  --    * 108* 114*  --  107* 119*  --    ALBUMIN  --   --   --   --  2.5* 2.9* 3.2*   PROTTOTAL  --   --   --   --  5.9* 6.5* 7.2   BILITOTAL  --   --   --   --  1.5* 1.3 1.6*   ALKPHOS  --   --   --   --  85 76 73   ALT  --   --   --   --  48 45 47   AST  --   --   --   --  60* 53* 56*   LIPASE  --   --   --   --   --   --  33*       Imaging:   No  results found for this or any previous visit (from the past 24 hour(s)).

## 2022-05-30 NOTE — PLAN OF CARE
Goal Outcome Evaluation:    A&Ox4. On RA. VSS. Independent to bathroom. Incision to R lower flank CDI. LOUISA drain intact. PIV SL. Denied pain. Will continue to monitor.

## 2022-05-30 NOTE — PLAN OF CARE
Goal Outcome Evaluation:    Patient up IND in room. Adequate I/O. Denies pain. See provider notification regarding drain.

## 2022-05-31 ENCOUNTER — HOME INFUSION (PRE-WILLOW HOME INFUSION) (OUTPATIENT)
Dept: PHARMACY | Facility: CLINIC | Age: 67
End: 2022-05-31
Payer: COMMERCIAL

## 2022-05-31 ENCOUNTER — APPOINTMENT (OUTPATIENT)
Dept: CT IMAGING | Facility: CLINIC | Age: 67
DRG: 442 | End: 2022-05-31
Attending: NURSE PRACTITIONER
Payer: COMMERCIAL

## 2022-05-31 LAB
BACTERIA BLD CULT: NO GROWTH
BACTERIA BLD CULT: NO GROWTH
GLUCOSE BLDC GLUCOMTR-MCNC: 111 MG/DL (ref 70–99)
GLUCOSE BLDC GLUCOMTR-MCNC: 161 MG/DL (ref 70–99)
GLUCOSE BLDC GLUCOMTR-MCNC: 99 MG/DL (ref 70–99)

## 2022-05-31 PROCEDURE — 250N000009 HC RX 250: Performed by: HOSPITALIST

## 2022-05-31 PROCEDURE — 999N000157 HC STATISTIC RCP TIME EA 10 MIN

## 2022-05-31 PROCEDURE — 120N000001 HC R&B MED SURG/OB

## 2022-05-31 PROCEDURE — 250N000009 HC RX 250: Performed by: INTERNAL MEDICINE

## 2022-05-31 PROCEDURE — 250N000011 HC RX IP 250 OP 636: Performed by: INTERNAL MEDICINE

## 2022-05-31 PROCEDURE — 250N000013 HC RX MED GY IP 250 OP 250 PS 637: Performed by: HOSPITALIST

## 2022-05-31 PROCEDURE — 94640 AIRWAY INHALATION TREATMENT: CPT

## 2022-05-31 PROCEDURE — 74160 CT ABDOMEN W/CONTRAST: CPT

## 2022-05-31 PROCEDURE — 94640 AIRWAY INHALATION TREATMENT: CPT | Mod: 76

## 2022-05-31 PROCEDURE — 99233 SBSQ HOSP IP/OBS HIGH 50: CPT | Performed by: INTERNAL MEDICINE

## 2022-05-31 PROCEDURE — 250N000011 HC RX IP 250 OP 636: Performed by: HOSPITALIST

## 2022-05-31 RX ORDER — ACETAMINOPHEN 325 MG/1
650 TABLET ORAL EVERY 6 HOURS PRN
Qty: 50 TABLET | Refills: 0 | Status: SHIPPED | OUTPATIENT
Start: 2022-05-31 | End: 2023-11-03

## 2022-05-31 RX ORDER — IOPAMIDOL 755 MG/ML
102 INJECTION, SOLUTION INTRAVASCULAR ONCE
Status: COMPLETED | OUTPATIENT
Start: 2022-05-31 | End: 2022-05-31

## 2022-05-31 RX ADMIN — AMLODIPINE BESYLATE 10 MG: 5 TABLET ORAL at 09:43

## 2022-05-31 RX ADMIN — IPRATROPIUM BROMIDE AND ALBUTEROL SULFATE 3 ML: .5; 3 SOLUTION RESPIRATORY (INHALATION) at 07:43

## 2022-05-31 RX ADMIN — IPRATROPIUM BROMIDE AND ALBUTEROL SULFATE 3 ML: .5; 3 SOLUTION RESPIRATORY (INHALATION) at 20:07

## 2022-05-31 RX ADMIN — PIPERACILLIN SODIUM AND TAZOBACTAM SODIUM 3.38 G: 3; .375 INJECTION, POWDER, LYOPHILIZED, FOR SOLUTION INTRAVENOUS at 00:10

## 2022-05-31 RX ADMIN — PIPERACILLIN SODIUM AND TAZOBACTAM SODIUM 3.38 G: 3; .375 INJECTION, POWDER, LYOPHILIZED, FOR SOLUTION INTRAVENOUS at 18:30

## 2022-05-31 RX ADMIN — IPRATROPIUM BROMIDE AND ALBUTEROL SULFATE 3 ML: .5; 3 SOLUTION RESPIRATORY (INHALATION) at 11:01

## 2022-05-31 RX ADMIN — Medication 1 MG: at 21:58

## 2022-05-31 RX ADMIN — SODIUM CHLORIDE 69 ML: 9 INJECTION, SOLUTION INTRAVENOUS at 11:48

## 2022-05-31 RX ADMIN — FLUTICASONE PROPIONATE AND SALMETEROL 1 PUFF: 113; 14 POWDER, METERED RESPIRATORY (INHALATION) at 20:52

## 2022-05-31 RX ADMIN — ACETAMINOPHEN 650 MG: 325 TABLET ORAL at 21:58

## 2022-05-31 RX ADMIN — ASPIRIN 81 MG: 81 TABLET, COATED ORAL at 09:40

## 2022-05-31 RX ADMIN — ALLOPURINOL 200 MG: 100 TABLET ORAL at 09:43

## 2022-05-31 RX ADMIN — FLUTICASONE PROPIONATE AND SALMETEROL 1 PUFF: 113; 14 POWDER, METERED RESPIRATORY (INHALATION) at 09:43

## 2022-05-31 RX ADMIN — PIPERACILLIN SODIUM AND TAZOBACTAM SODIUM 3.38 G: 3; .375 INJECTION, POWDER, LYOPHILIZED, FOR SOLUTION INTRAVENOUS at 12:30

## 2022-05-31 RX ADMIN — IOPAMIDOL 102 ML: 755 INJECTION, SOLUTION INTRAVENOUS at 11:47

## 2022-05-31 RX ADMIN — PIPERACILLIN SODIUM AND TAZOBACTAM SODIUM 3.38 G: 3; .375 INJECTION, POWDER, LYOPHILIZED, FOR SOLUTION INTRAVENOUS at 06:05

## 2022-05-31 RX ADMIN — IPRATROPIUM BROMIDE AND ALBUTEROL SULFATE 3 ML: .5; 3 SOLUTION RESPIRATORY (INHALATION) at 15:27

## 2022-05-31 ASSESSMENT — ACTIVITIES OF DAILY LIVING (ADL)
ADLS_ACUITY_SCORE: 18
ADLS_ACUITY_SCORE: 19
ADLS_ACUITY_SCORE: 18
DEPENDENT_IADLS:: INDEPENDENT
ADLS_ACUITY_SCORE: 19
ADLS_ACUITY_SCORE: 18

## 2022-05-31 NOTE — PLAN OF CARE
Goal Outcome Evaluation:    A&Ox4. On RA. VSS. Independent to bathroom. Incision to R lower flank CDI. LOUISA drain irrigation on hold per order. IR will reassess LOUISA today. PIV SL. Denied pain. Pending discharge placement.

## 2022-05-31 NOTE — PROGRESS NOTES
Interventional Radiology Progress Note:  Inpatient at [facility]  Date: May 31, 2022     History: Gabe Smith is a 67 year old male with a history of COPD admitted on 5/26 with acute onset of symptoms several days ago, including malaise, elevated WBC, +BC for gram negative bacilli, and found to have a liver abscess. It is suspected that he may have a liver abscess related to his gallbladder. Colonic source is another consideration. A CT guided drain was placed on 5/27/22 with outputs of 40 mL 5/27; 150 mL 5/28; 165 mL 5/29; 25 mL 5/30, minimal today. Since outputs had dropped off since yesterday and there was leaking at the tube site with flushing a CT scan was done today which showed the drain out ~ 4 inches from the abscess fluid.     Gabe is being seen in IR follow up today    Interval History: Doing well. Just had a real good, sleep. Pain was better with drain.     Physical Exam:   Vitals: Temp:  [98.4  F (36.9  C)-99.2  F (37.3  C)] 98.4  F (36.9  C)  Pulse:  [72-78] 72  Resp:  [16-18] 18  BP: (120-130)/(60-78) 130/68  SpO2:  [88 %-93 %] 92 %    Genera/Neurol Alert, oriented, pleasant, cooperative male in no acute distress.    Neuro:  A&O x 3. Moves all extremities equally.  Resp:  Normal respirations on room air. Non labored breathing. Equal air entry B/L     RUQ liver abscess drain:   Dressing removed. After procedure explained to patient and questions answered the drain was removed intact with mild pain. Gauze and tape placed over site.      Labs:  Recent Labs   Lab 05/30/22  0833 05/29/22  0729 05/28/22  1211   HGB 13.7 14.0 13.8   WBC 9.0 8.4 8.2     Recent Labs   Lab 05/30/22  0833 05/29/22  0729 05/28/22  1211   CR 0.74 0.77 0.73      Recent Labs   Lab 05/27/22  0656 05/26/22  0638 05/25/22  1755   PROTTOTAL 5.9* 6.5* 7.2   ALBUMIN 2.5* 2.9* 3.2*   BILITOTAL 1.5* 1.3 1.6*   ALKPHOS 85 76 73   AST 60* 53* 56*   ALT 48 45 47     Cultures:  No results for input(s): CULT in the last 168  hours.    Assessment: Gabe had improvement of leukocytosis and fevers s/p drain placement and antibiotics with a large amount of output but then the drain pulled out of the abscess pocket.     Reviewed with IR Dr Howard and Rad Dr Aviles who feel the fluid is less but could try to place another drain versus an aspiration. All of this was explained to the patient who agreed with the plan. Drain was removed as it was not in the abscess cavity anymore.     Plan: CT guided liver abscess fluid aspiration versus drain placement at ~ 9am.     Total time spent on the date of the encounter is 20 minutes, including time spent counseling the patient, performing a medically appropriate evaluation, reviewing prior medical history, ordering medications and tests, documenting clinical information in the medical record, and communication of results.    Thanks [facility] Interventional Radiology CNP ([phone]) (phone [phone])

## 2022-05-31 NOTE — CONSULTS
Care Management Initial Consult    General Information  Assessment completed with: Patient,    Type of CM/SW Visit: Initial Assessment    Primary Care Provider verified and updated as needed: Yes   Readmission within the last 30 days: no previous admission in last 30 days      Reason for Consult: discharge planning  Advance Care Planning:            Communication Assessment  Patient's communication style: spoken language (English or Bilingual)    Hearing Difficulty or Deaf: no   Wear Glasses or Blind: no    Cognitive  Cognitive/Neuro/Behavioral: WDL                      Living Environment:   People in home: child(don), adult, grandchild(don), spouse     Current living Arrangements: house      Able to return to prior arrangements: yes       Family/Social Support:  Care provided by: self  Provides care for: no one  Marital Status:   Wife          Description of Support System: Supportive, Involved         Current Resources:   Patient receiving home care services: No     Community Resources: None  Equipment currently used at home: none  Supplies currently used at home:      Employment/Financial:  Employment Status: employed part-time        Financial Concerns: No concerns identified           Lifestyle & Psychosocial Needs:  Social Determinants of Health     Tobacco Use: High Risk     Smoking Tobacco Use: Current Every Day Smoker     Smokeless Tobacco Use: Never Used   Alcohol Use: Not on file   Financial Resource Strain: Not on file   Food Insecurity: Not on file   Transportation Needs: Not on file   Physical Activity: Not on file   Stress: Not on file   Social Connections: Not on file   Intimate Partner Violence: Not on file   Depression: Not at risk     PHQ-2 Score: 0   Housing Stability: Not on file       Functional Status:  Prior to admission patient needed assistance:   Dependent ADLs:: Independent  Dependent IADLs:: Independent       Mental Health Status:  Mental Health Status: No Current Concerns        Chemical Dependency Status:  Chemical Dependency Status: No Current Concerns             Values/Beliefs:  Spiritual, Cultural Beliefs, Taoist Practices, Values that affect care: no               Additional Information:  Initial consult completed. Patient agreeable to discussing home infusion benefits with liaision. Referral sent to [facility] via standard process.      Carlie Mclaughlin RN   [facility]   Phone [phone]

## 2022-05-31 NOTE — PLAN OF CARE
Goal Outcome Evaluation:                    Patient A&0x4. Up ad carlos. Low fiber and fat diet.  Full code. Here with abdominal pain. Found Liver abcess. Drain placed by IR. Drain was found to be leaking today. Received order to hold irrigation of drain and IR will reassess in the morning. Abdomin is rounded.He c/o pain Around drain with touch.  Denies nausea,  States last BM was 2 days ago. Declined stool softener. Lungs clear. On RA. No edema noted. Plan for patient to have long term ABX therapy. Will need PICC placement before discharge.

## 2022-05-31 NOTE — PROGRESS NOTES
[facility]    Infectious Disease Progress Note    Date of Service (when I saw the patient): 05/31/2022     Assessment & Plan   Gabe Smith is a 67 year old male who was admitted on 5/25/2022.     Impression:  1. 67 y.o with abdominal pain.   2. HTN, HLP  3. COPD   4. Blood cultures positive for GNR   5. CT: Cluster of peripherally enhancing hypodense lesions in the right hepatic lobe adjacent to the gallbladder. These are most suspicious for hepatic abscesses/phlegmon with the given clinical history. Malignant etiologies such as metastatic disease are   also possible  6. Currently on zosyn   7. MRI noted  Complex mass in the inferior right lobe of the liver is most likely hepatic abscess  8. S/P drain placement     Recommendations:  Based on cultures and need for long term antibiotics switch to ertapenem for 4-6 weeks   FOLLOW UP CT scan planned for today will follow   POIV antibiotics orders are in the chart        Everett Ospina MD    Interval History   Tolerating antibiotics ok   No new rashes or issues with antibiotics   Labs reviewed   Afebrile     Physical Exam   Temp: 98.4  F (36.9  C) Temp src: Oral BP: 130/68 Pulse: 72   Resp: 18 SpO2: 92 % O2 Device: None (Room air) Oxygen Delivery: 2 LPM  Vitals:    05/25/22 1616   Weight: 91.6 kg (202 lb)     Vital Signs with Ranges  Temp:  [98.4  F (36.9  C)-99.2  F (37.3  C)] 98.4  F (36.9  C)  Pulse:  [72-78] 72  Resp:  [16-18] 18  BP: (120-130)/(60-78) 130/68  SpO2:  [88 %-93 %] 92 %    Constitutional: Awake, alert, cooperative, no apparent distress  Lungs: Clear to auscultation bilaterally, no crackles or wheezing  Cardiovascular: Regular rate and rhythm, normal S1 and S2, and no murmur noted  Abdomen: Normal bowel sounds, soft, non-distended, non-tender  Skin: No rashes, no cyanosis, no edema  Other:    Medications       allopurinol  200 mg Oral Daily     amLODIPine  10 mg Oral Daily     aspirin  81 mg Oral Daily      fluticasone-salmeterol  1 puff Inhalation BID     ipratropium - albuterol 0.5 mg/2.5 mg/3 mL  3 mL Nebulization 4x daily     piperacillin-tazobactam  3.375 g Intravenous Q6H     [Held by provider] sodium chloride (PF)  10 mL Irrigation Q8H     sodium chloride (PF)  3 mL Intracatheter Q8H       Data   All microbiology laboratory data reviewed.  Recent Labs   Lab Test 05/30/22  0833 05/29/22  0729 05/28/22  1211   WBC 9.0 8.4 8.2   HGB 13.7 14.0 13.8   HCT 40.7 43.2 41.2   MCV 90 93 90    168 150     Recent Labs   Lab Test 05/30/22  0833 05/29/22  0729 05/28/22  1211   CR 0.74 0.77 0.73     No lab results found.  No lab results found.    Invalid input(s):

## 2022-05-31 NOTE — PROGRESS NOTES
[facility]    Received request for benefit check should Gabe require home IV abx. Gabe has coverage for IV abx through their [employer] plan, however the drug must be on a CADD pump for coverage. If not on a CADD pump, then the patient would be self-pay due to no coverage. Patient has an 80/20 coverage until they have met their out of pocket of $5500 (patient has met $13.48 at this time). Pt may also have a copay per dispense.      Addendum @ hospitals: I spoke with Gabe to review benefits, introduce home infusion, and offer choice of agency. Gabe would like to proceed with [facility] for home IV abx needs. I will follow-up with Gabe closer to discharge to coordinate IV education.     Thank you for the referral    Kimberly Hunt RN  [facility] Liaison  [phone] (Mon thru Fri 8am - 5pm)  [phone] Office

## 2022-05-31 NOTE — PROGRESS NOTES
Patient has coverage through their [employer] plan, however the drug must be on a CADD pump for coverage. Based on Zosyn 3.375g q6h, it can go on a CADD so there is coverage at this time. If not on a CADD pump, then the patient would be self-pay due to no coverage. Patient has an 80/20 coverage until they have met their out of pocket of $5500 (patient has met $13.48 at this time). Pt may also have a copay per dispense.           Please contact Intake with any questions, [phone] or In Basket pool, [facility] ([phone]).

## 2022-05-31 NOTE — PROGRESS NOTES
Patient is on CT schedule Wednesday 6/1/22 for a Liver abscess drain placement versus aspiration.     -Labs WNL for procedure.    -Orders for NPO have been entered.   -Consent was obtained from patient after explaining the procedure, risks and benefits and consent is in IR.     Please contact the CT department at [phone] for procedural related questions.     Discussed with Dr Landrum today.    Thanks [facility] Interventional Radiology CNP ([phone]) (phone [phone])

## 2022-05-31 NOTE — PROGRESS NOTES
"[facility]  [facility]  Gastroenterology Progress note    Interval History:      Patient reports pain is improved.  Denies nausea/vomiting.  Plan for CT today to check drain placement as no further drainage over last day.      Vital Signs:      /68   Pulse 72   Temp 98.4  F (36.9  C) (Oral)   Resp 18   Ht 1.778 m (5' 10\")   Wt 91.6 kg (202 lb)   SpO2 91%   BMI 28.98 kg/m    Temp (24hrs), Av.9  F (37.2  C), Min:98.4  F (36.9  C), Max:99.2  F (37.3  C)    No data found.    Intake/Output Summary (Last 24 hours) at 2022 0946  Last data filed at 2022 0600  Gross per 24 hour   Intake 240 ml   Output 1500 ml   Net -1260 ml         Constitutional: NAD, comfortable  Cardiovascular: RRR, normal S1, S2   Respiratory: CTAB  Abdomen: soft, non-tender, nondistended    Additional Comments:  ROS, FH, SH: See initial GI consult for details.    Laboratory Data:  Recent Labs   Lab Test 22  0833 22  0729 22  1211 22  1157   WBC 9.0 8.4 8.2  --    HGB 13.7 14.0 13.8  --    MCV 90 93 90  --     168 150  --    INR  --   --   --  1.33*     Recent Labs   Lab Test 22  0833 22  0729 22  1211    137 132*   POTASSIUM 3.5 3.9 3.6   CHLORIDE 103 104 102   CO2 25 28 25   BUN 11 16 17   CR 0.74 0.77 0.73   ANIONGAP 8 5 5   DES 8.5 8.1* 8.0*     Recent Labs   Lab Test 22  0656 22  0638 22  1800 22  1755   ALBUMIN 2.5* 2.9*  --  3.2*   BILITOTAL 1.5* 1.3  --  1.6*   DBIL 0.5* 0.4*  --  0.4*   ALT 48 45  --  47   AST 60* 53*  --  56*   ALKPHOS 85 76  --  73   PROTEIN  --   --  30 *  --    LIPASE  --   --   --  33*         Assessment:  66 yo male who presented with malaise, elevated WBC, + blood culture with serratia marcescens and found to have a liver abscess.  Liver abscess suspected to be related to his gallbladder.  Colonic source is another consideration.    Liver MRI with complex mass 4.9 x 3.6 x 5.8 cm in " the inferior right lobe most likely a hepatic abscess, cholelithiasis, small amount of pericholecystic fluid, no biliary dilatation.  Colonoscopy in 10/2015 with 1 30 mm submucosal lipoma at the ileocecal valve, patchy mild inflammation in the distal sigmoid colon.  Biopsies with colonic mucosa with focal hemorrhage in the lamina propria without active colitis or other pathologic abnormalities.  Patient underwent IR hepatic abscess drain placement on 5/27/22.  Drain fluid with 2+ serratia marcescens resistant to ampicillin and Unasyn.  ID consulted and recommend continuing Zosyn.   Afebrile; WBC 9.0.  Total bilirubin 1.5.  AST 60.  Plan:  -  ID following.  Appreciate their input.  -  Drain management per IR.  -  Patient may benefit from cholecystectomy when resolved due to likely source of infection.  Recommend outpatient follow up with surgery.  -  Monitor labs, vitals.  -  CT today.  -  Diet as tolerated.  -  May need colonoscopy as outpatient to evaluate for potential source of infection if surgery does not think related to gallbladder.    -  Will sign off; please call with questions.      ROWDY Malin  [facility]  Office:  [phone] call if needed after 12PM  Cell:  [phone], not available after 12PM at this number

## 2022-05-31 NOTE — PROGRESS NOTES
Pt currently on room air sating in the mid 90's. Nebs given as ordered. BS are diminished bilateral.   Will continue to follow.   Jaylyn Brannon, RT

## 2022-05-31 NOTE — PROGRESS NOTES
[facility]    Hospitalist Progress Note    Assessment & Plan   Gabe Smith is a 67 year old male with a past medical history of COPD, hypertension, hyperlipidemia admitted to hospital for liver abscess.     Liver abscesses   -Patient presenting with abdominal pain, and leukocytosis found to have liver abscesses on imaging around gallbladder.   - GI consult whether abscess needs drainage and best approach to drain abscesses.  Given proximity to the patient's gallbladder and his episode of right upper quadrant pain suspect that the patient might have had an episode of cholecystitis which caused the patient's abscess.  -c/w zosyn  -f/u blood cultures; 2/2 serratia marcescens, Resistant to ampicillin /Unasyn  -2nd set BC, LA WNL; monitor temp profile, WBC  -GI consult: Advised MRI: 5 x 4 x 6 cm hepatic lesion consistent with hepatic abscess.  Cholelithiasis small amount of pericholecystic fluid.  - 5/27/2022: IR hepatic abscess drain placement; less purulent, serosanguinous  -ID consult: Continue Zosyn.  Drain fluid: grams NEG organisms, NGTD; as above 2/2 BC serratia marcescens, sensitivities pending.  -Today white count 8.4;  admission 17.8.  -Tolerating low fiber, low-fat; + BM.  -Continue to monitor temp profile, WBC, drain output, culture/sensitivities.  - drain output: 165 ml yesterday, 150 on 5/28.   -feeling well  Drain leaking on 5/30. Much reduced output.         Plan;  - ID following  Zosyn per ID.   IR getting cT abd to assess drain position to begin eval for drain leakage. Discussed with IR  Drain care education provided to patient. All questions answered.  - Drain discharge instructions entered into D/C navigator.  - Patient to flush drain with 10 mL NS daily upon discharge. NS flushes ordered in D/C navigator.  - Anticipating follow up CT and abscessogram approximately 7-10 days from drain placement date pending drain OPs and patient's clinical status. May consider sooner  follow up imaging if there are changes in drain function or in patient's clinical course. Discussed plan for follow up with patient who expressed his understanding.   - IR will continue to follow loosely. Please contact IR with drain related questions or concerns.  -Follow up with surgery on outpatient basis regarding chronic cholecystitis.   - May need colonoscopy as outpatient to evaluate for potential source of infection if surgery does not think related to gallbladder  -pcp follow up   - follow up with ID. Dr. Ospina 4 weeks. talke with care coordinator  - follow up CT abd pelvis 2 weeks  - follow up with IR.   -will have picc line placed tomorrow for home iv abx.ordered.   -will discharge on 4-6 weeks of ertapenem. Discussed with ID        Hyponatremia-resolved.   On admission 130.  -Now normal on IVF, discontinued IVF adequate POs  -nl bmp today     FRANCIE-resolved.   crt was previously 0.8-1  -IV hydration  -normalized post IVFS. Off ivfs.    Monitor BMP-nl bmp today  -avoid nephrotoxic medications    Elevated blood sugar  244 today. Has been in low 100 range. ? Eating better  Check HbA1C.   Increase check to tid with meals.      AAA  Known. Infrarenal measuring 4.2cm on ct. 4cm on US from 2/2022.   -surveillance imaging as outpatient. PCP follow up      COPD  Not in acute exacerbation  -On supplemental O2 since admission at 1-2 L NC,.  Continue PTA inhalers   -Now ambulatory,Persistent supp O2 add scheduled duoneb, wean O2 as above.   -off oxygen, lungs clear     hld  htn  Holding diuretics on admission soft BP.  Holding aspirin/statin on admission: liver abscess drainage  -normotensive.     Plan;   -Continue amlodipine; bp WNL, monitior  -restarted PTA ASA     PreDM  HbA1C 6.3%.   BS low 100  pcp follow up, will updated pt     DVT Prophylaxis: Pneumatic Compression Devices and Ambulate every shift    Code Status: Full Code  Expected discharge 1-2 days pending clinical improvement on IV antibiotics, bacteremia,  liver abscess drain placement; wean O2; IR and ID plan established. .  Possibly tomorrow with IR, pcp,surgery and possible ID follow up. Talk with id if picc line needed    Discussed care plan with pt, rn, IR, GI, ID         Hossein Landrum MD, MD  Text Page  (7am to 6pm)  Interval History    range  Leakage around drain. CT to assess drain placement per IR.   IR evaluating pt today  No new sxs.       -Data reviewed today: I reviewed all new labs and imaging results over the last 24 hours. I personally reviewed     Physical Exam   Temp: 98.4  F (36.9  C) Temp src: Oral BP: 130/68 Pulse: 72   Resp: 18 SpO2: 92 % O2 Device: None (Room air) Oxygen Delivery: 2 LPM  Vitals:    05/25/22 1616   Weight: 91.6 kg (202 lb)     Vital Signs with Ranges  Temp:  [98.4  F (36.9  C)-99.2  F (37.3  C)] 98.4  F (36.9  C)  Pulse:  [72-78] 72  Resp:  [16-18] 18  BP: (120-130)/(60-78) 130/68  SpO2:  [88 %-93 %] 92 %  I/O last 3 completed shifts:  In: 540 [P.O.:540]  Out: 1825 [Urine:1825; Drains:5]    Constitutional: Up in chair  Respiratory: cTAB  Cardiovascular: RRR no r/g/m  GI: soft, nt, mnd  Skin/Integumen: no rash or ede3ma  Neuro- nl gait, nl speech and mentation  Psych: nl affect      Medications       allopurinol  200 mg Oral Daily     amLODIPine  10 mg Oral Daily     aspirin  81 mg Oral Daily     fluticasone-salmeterol  1 puff Inhalation BID     ipratropium - albuterol 0.5 mg/2.5 mg/3 mL  3 mL Nebulization 4x daily     piperacillin-tazobactam  3.375 g Intravenous Q6H     [Held by provider] sodium chloride (PF)  10 mL Irrigation Q8H     sodium chloride (PF)  3 mL Intracatheter Q8H       Data   Recent Labs   Lab 05/31/22  1208 05/30/22  2105 05/30/22  1640 05/30/22  0833 05/29/22  0729 05/28/22  1211 05/27/22  1157 05/27/22  0656 05/26/22  0638 05/25/22  1755   WBC  --   --   --  9.0 8.4 8.2  --  7.0 14.6*  --    HGB  --   --   --  13.7 14.0 13.8  --  13.7 15.3  --    MCV  --   --   --  90 93 90  --  92 93  --    PLT  --    --   --  206 168 150  --  119* 127*  --    INR  --   --   --   --   --   --  1.33*  --   --   --    NA  --   --   --  136 137 132*  --  135 131*  --    POTASSIUM  --   --   --  3.5 3.9 3.6  --  3.8 4.0  --    CHLORIDE  --   --   --  103 104 102  --  103 99  --    CO2  --   --   --  25 28 25  --  28 25  --    BUN  --   --   --  11 16 17  --  28 44*  --    CR  --   --   --  0.74 0.77 0.73  --  0.93 1.22  --    ANIONGAP  --   --   --  8 5 5  --  4 7  --    DES  --   --   --  8.5 8.1* 8.0*  --  7.4* 8.3*  --    * 187* 124* 244* 108* 114*  --  107* 119*  --    ALBUMIN  --   --   --   --   --   --   --  2.5* 2.9* 3.2*   PROTTOTAL  --   --   --   --   --   --   --  5.9* 6.5* 7.2   BILITOTAL  --   --   --   --   --   --   --  1.5* 1.3 1.6*   ALKPHOS  --   --   --   --   --   --   --  85 76 73   ALT  --   --   --   --   --   --   --  48 45 47   AST  --   --   --   --   --   --   --  60* 53* 56*   LIPASE  --   --   --   --   --   --   --   --   --  33*       Imaging:   No results found for this or any previous visit (from the past 24 hour(s)).

## 2022-05-31 NOTE — PLAN OF CARE
Problem: Plan of Care - These are the overarching goals to be used throughout the patient stay.    Goal: Plan of Care Review/Shift Note  Description: The Plan of Care Review/Shift note should be completed every shift.  The Outcome Evaluation is a brief statement about your assessment that the patient is improving, declining, or no change.  This information will be displayed automatically on your shift note.  Outcome: Ongoing, Progressing     Problem: Plan of Care - These are the overarching goals to be used throughout the patient stay.    Goal: Optimal Comfort and Wellbeing  Outcome: Ongoing, Progressing   Goal Outcome Evaluation:      Denying pain, drain to bulb suction. CT abdomen completed to assess drain. IV zosyn being given, will need to discharge to home with antibiotic therapy. SW consulted.

## 2022-06-01 ENCOUNTER — HOME INFUSION (PRE-WILLOW HOME INFUSION) (OUTPATIENT)
Dept: PHARMACY | Facility: CLINIC | Age: 67
End: 2022-06-01
Payer: COMMERCIAL

## 2022-06-01 ENCOUNTER — APPOINTMENT (OUTPATIENT)
Dept: CT IMAGING | Facility: CLINIC | Age: 67
DRG: 442 | End: 2022-06-01
Attending: RADIOLOGY
Payer: COMMERCIAL

## 2022-06-01 VITALS
DIASTOLIC BLOOD PRESSURE: 70 MMHG | HEART RATE: 71 BPM | SYSTOLIC BLOOD PRESSURE: 121 MMHG | TEMPERATURE: 98.2 F | RESPIRATION RATE: 18 BRPM | OXYGEN SATURATION: 91 % | WEIGHT: 202 LBS | BODY MASS INDEX: 28.92 KG/M2 | HEIGHT: 70 IN

## 2022-06-01 LAB — GLUCOSE BLDC GLUCOMTR-MCNC: 95 MG/DL (ref 70–99)

## 2022-06-01 PROCEDURE — 250N000011 HC RX IP 250 OP 636: Performed by: NURSE PRACTITIONER

## 2022-06-01 PROCEDURE — 99239 HOSP IP/OBS DSCHRG MGMT >30: CPT | Performed by: INTERNAL MEDICINE

## 2022-06-01 PROCEDURE — 99152 MOD SED SAME PHYS/QHP 5/>YRS: CPT

## 2022-06-01 PROCEDURE — 36569 INSJ PICC 5 YR+ W/O IMAGING: CPT

## 2022-06-01 PROCEDURE — 272N000450 HC KIT 4FR POWER PICC SINGLE LUMEN

## 2022-06-01 PROCEDURE — 250N000009 HC RX 250: Performed by: HOSPITALIST

## 2022-06-01 PROCEDURE — 272N000046 CT ABDOMEN PERITONEUM ABSCESS ASPIRATION

## 2022-06-01 PROCEDURE — 999N000154 HC STATISTIC RADIOLOGY XRAY, US, CT, MAR, NM

## 2022-06-01 PROCEDURE — 94640 AIRWAY INHALATION TREATMENT: CPT

## 2022-06-01 PROCEDURE — 250N000011 HC RX IP 250 OP 636: Performed by: HOSPITALIST

## 2022-06-01 PROCEDURE — 250N000013 HC RX MED GY IP 250 OP 250 PS 637: Performed by: HOSPITALIST

## 2022-06-01 PROCEDURE — 999N000157 HC STATISTIC RCP TIME EA 10 MIN

## 2022-06-01 PROCEDURE — 77012 CT SCAN FOR NEEDLE BIOPSY: CPT

## 2022-06-01 PROCEDURE — 250N000011 HC RX IP 250 OP 636: Performed by: INTERNAL MEDICINE

## 2022-06-01 PROCEDURE — 999N000040 HC STATISTIC CONSULT NO CHARGE VASC ACCESS

## 2022-06-01 PROCEDURE — 250N000009 HC RX 250: Performed by: INTERNAL MEDICINE

## 2022-06-01 PROCEDURE — 94640 AIRWAY INHALATION TREATMENT: CPT | Mod: 76

## 2022-06-01 PROCEDURE — 0FD03ZX EXTRACTION OF LIVER, PERCUTANEOUS APPROACH, DIAGNOSTIC: ICD-10-PCS | Performed by: RADIOLOGY

## 2022-06-01 RX ORDER — ERTAPENEM 1 G/1
1 INJECTION, POWDER, LYOPHILIZED, FOR SOLUTION INTRAMUSCULAR; INTRAVENOUS EVERY 24 HOURS
Status: DISCONTINUED | OUTPATIENT
Start: 2022-06-01 | End: 2022-06-01 | Stop reason: HOSPADM

## 2022-06-01 RX ORDER — FENTANYL CITRATE 50 UG/ML
25-50 INJECTION, SOLUTION INTRAMUSCULAR; INTRAVENOUS EVERY 5 MIN PRN
Status: DISCONTINUED | OUTPATIENT
Start: 2022-06-01 | End: 2022-06-01 | Stop reason: CLARIF

## 2022-06-01 RX ORDER — NALOXONE HYDROCHLORIDE 0.4 MG/ML
0.4 INJECTION, SOLUTION INTRAMUSCULAR; INTRAVENOUS; SUBCUTANEOUS
Status: DISCONTINUED | OUTPATIENT
Start: 2022-06-01 | End: 2022-06-01 | Stop reason: CLARIF

## 2022-06-01 RX ORDER — FLUMAZENIL 0.1 MG/ML
0.2 INJECTION, SOLUTION INTRAVENOUS
Status: DISCONTINUED | OUTPATIENT
Start: 2022-06-01 | End: 2022-06-01 | Stop reason: CLARIF

## 2022-06-01 RX ORDER — NALOXONE HYDROCHLORIDE 0.4 MG/ML
0.2 INJECTION, SOLUTION INTRAMUSCULAR; INTRAVENOUS; SUBCUTANEOUS
Status: DISCONTINUED | OUTPATIENT
Start: 2022-06-01 | End: 2022-06-01 | Stop reason: CLARIF

## 2022-06-01 RX ADMIN — PIPERACILLIN SODIUM AND TAZOBACTAM SODIUM 3.38 G: 3; .375 INJECTION, POWDER, LYOPHILIZED, FOR SOLUTION INTRAVENOUS at 11:31

## 2022-06-01 RX ADMIN — ERTAPENEM SODIUM 1 G: 1 INJECTION, POWDER, LYOPHILIZED, FOR SOLUTION INTRAMUSCULAR; INTRAVENOUS at 15:05

## 2022-06-01 RX ADMIN — PIPERACILLIN SODIUM AND TAZOBACTAM SODIUM 3.38 G: 3; .375 INJECTION, POWDER, LYOPHILIZED, FOR SOLUTION INTRAVENOUS at 00:43

## 2022-06-01 RX ADMIN — MIDAZOLAM HYDROCHLORIDE 1 MG: 1 INJECTION, SOLUTION INTRAMUSCULAR; INTRAVENOUS at 09:51

## 2022-06-01 RX ADMIN — IPRATROPIUM BROMIDE AND ALBUTEROL SULFATE 3 ML: .5; 3 SOLUTION RESPIRATORY (INHALATION) at 07:36

## 2022-06-01 RX ADMIN — LIDOCAINE HYDROCHLORIDE 1 ML: 10 INJECTION, SOLUTION INFILTRATION; PERINEURAL at 14:40

## 2022-06-01 RX ADMIN — ALLOPURINOL 200 MG: 100 TABLET ORAL at 08:33

## 2022-06-01 RX ADMIN — FENTANYL CITRATE 50 MCG: 50 INJECTION, SOLUTION INTRAMUSCULAR; INTRAVENOUS at 09:52

## 2022-06-01 RX ADMIN — IPRATROPIUM BROMIDE AND ALBUTEROL SULFATE 3 ML: .5; 3 SOLUTION RESPIRATORY (INHALATION) at 15:13

## 2022-06-01 RX ADMIN — AMLODIPINE BESYLATE 10 MG: 5 TABLET ORAL at 08:33

## 2022-06-01 RX ADMIN — IPRATROPIUM BROMIDE AND ALBUTEROL SULFATE 3 ML: .5; 3 SOLUTION RESPIRATORY (INHALATION) at 11:06

## 2022-06-01 RX ADMIN — FLUTICASONE PROPIONATE AND SALMETEROL 1 PUFF: 113; 14 POWDER, METERED RESPIRATORY (INHALATION) at 08:38

## 2022-06-01 RX ADMIN — PIPERACILLIN SODIUM AND TAZOBACTAM SODIUM 3.38 G: 3; .375 INJECTION, POWDER, LYOPHILIZED, FOR SOLUTION INTRAVENOUS at 06:21

## 2022-06-01 ASSESSMENT — ACTIVITIES OF DAILY LIVING (ADL)
ADLS_ACUITY_SCORE: 19

## 2022-06-01 NOTE — PROGRESS NOTES
[facility]    Gabe will discharge today on home IV abx. Pt will have a nurse visit in the home tomorrow for initial IV education and first home dose. His medication and supplies will be delivered to pt's home tonight. He was instructed to refrigerate his IV abx upon arrival and he verbalized understanding. Demographics and allergies were verified with pt. Questions encouraged and answered.     Thank you for the referral.    Kimberly Hunt RN  [facility] Liaison  [phone] (Mon thru Fri 8am - 5pm)  [phone] Office

## 2022-06-01 NOTE — PROGRESS NOTES
Recommendation to Hold ASA for 3-5 days.  Pt took ASA 5/31.  Dr. Aviles ok with proceeding to do the procedure today.

## 2022-06-01 NOTE — PLAN OF CARE
Patient is alert and oriented X4. VSS. SBA. Patient denies pain. PICC in place with blood return noted. Peripheral IV removed. On low fiber and low saturated  fat diet, patient tolerated well.  AVS reviewed with patient and wife and all questions were answered. Both verbalized understanding. Education provided for albuterol inhaler and neb solution . Patient educated important of low fiber diet and low  saturated fat diet . The writer advised the patient to follow up PCP and infections disease . patient advised to contact  the hospital for questions or concerned. Belonging returned( including home inhaler) and discharge home  .

## 2022-06-01 NOTE — PROGRESS NOTES
Shift Summary [id number]    Admitting Diagnosis: Hepatic abscess [K75.0]   Vitals WDL  Pain denies  A&Ox4  Voiding without issue  Mobility up ad carlos  CMS intact  Lung Sounds clear on room air  GI - no issues. NPO since MN for IR drain placement today  Dressing to RUQ CDI.     Plan: Drain placement today

## 2022-06-01 NOTE — PROCEDURES
[facility]    Single Lumen PICC Placement    Date/Time: 6/1/2022 3:22 PM  Performed by: Kimberly Chris RN  Authorized by: Hossein Landrum MD   Indications: vascular access      UNIVERSAL PROTOCOL   Site Marked: Yes  Prior Images Obtained and Reviewed:  Yes  Required items: Required blood products, implants, devices and special equipment available    Patient identity confirmed:  Verbally with patient, arm band and hospital-assigned identification number  NA - No sedation, light sedation, or local anesthesia  Confirmation Checklist:  Patient's identity using two indicators, relevant allergies, procedure was appropriate and matched the consent or emergent situation and correct equipment/implants were available  Time out: Immediately prior to the procedure a time out was called    Universal Protocol: the Joint Commission Universal Protocol was followed    Preparation: Patient was prepped and draped in usual sterile fashion    ESBL (mL):  0.5     ANESTHESIA    Anesthesia: Local infiltration  Local Anesthetic:  Lidocaine 1% without epinephrine  Anesthetic Total (mL):  1      SEDATION    Patient Sedated: No        Preparation: skin prepped with ChloraPrep  Skin prep agent: skin prep agent completely dried prior to procedure  Sterile barriers: maximum sterile barriers were used: cap, mask, sterile gown, sterile gloves, and large sterile sheet  Hand hygiene: hand hygiene performed prior to central venous catheter insertion  Type of line used: Power PICC  Catheter type: single lumen  Lumen type: valved  Catheter size: 4 Fr  Brand: Bard  Lot number: [id number]  Placement method: MST, ultrasound and tip navigation system  Number of attempts: 1  Difficulty threading catheter: no  Successful placement: yes  Orientation: right    Location: basilic vein  Arm circumference: adults 10 cm  Extremity circumference: 29  Visible catheter length: 2  Internal length: 46 cm  Total catheter length:  48  Dressing and securement: chlorhexidine disc applied, occlusive dressing applied, securement device, site cleansed, sterile dressing applied, subcutaneous anchor securement system and transparent dressing  Post procedure assessment: blood return through all ports and placement verified by 3CG technology  PROCEDURE   Patient Tolerance:  Patient tolerated the procedure well with no immediate complicationsDescribe Procedure: Successful placement of single lumen PICC. Placement verified by 3CG and PICC is ready for immediate use. Bedside RN notified.

## 2022-06-01 NOTE — INTERVAL H&P NOTE
I have reviewed the surgical (or preoperative) H&P that is linked to this encounter, and examined the patient. There are no significant changes

## 2022-06-01 NOTE — DISCHARGE SUMMARY
[facility]    Discharge Summary  Hospitalist    Date of Admission:  5/25/2022  Date of Discharge:  6/1/2022  Discharging Provider: Hossein Landrum MD, MD    Discharge Diagnoses   Liver abscesses     History of Present Illness     Gabe Smith is a 67 year old male with a past medical history of COPD, hypertension, hyperlipidemia presents to hospital with abdominal pain.  The patient reports that he developed right upper quadrant abdominal pain on Sunday.  He reports that the pain was severe at its worst an 8 out of 10.  Patient reports associated chills, weakness, shakes, subjective fevers.  The patient reports decreased p.o. intake.  The pain kept him up throughout the night however it resolved on Monday.  The patient continued to have generalized malaise, decreased p.o. intake and fatigue over the following 2 days prompting him to go to the urgent care center.  In urgent care center the patient was found to have a temperature of 100.1  F and found to have a leukocytosis so he was sent to the emergency room for further care.  In the emergency room the patient underwent a CT scan which revealed liver abscesses.     During my interview patient reports that his abdominal pain has completely resolved.  He reports that his appetite has not returned but denies any vomiting.  Patient reports generalized body aches especially in the back and he rates them as a 2 out of 10.  Patient provides no other complaints.     Hospital Course   Gabe Smith was admitted on 5/25/2022.  The following problems were addressed during his hospitalization:    Active Problems:    Hepatic abscess  Gabe Smith is a 67 year old male with a past medical history of COPD, hypertension, hyperlipidemia admitted to hospital for liver abscess.     Liver abscesses   -Patient presenting with abdominal pain, and leukocytosis found to have liver abscesses on imaging around gallbladder.   - GI consult whether abscess  needs drainage and best approach to drain abscesses.  Given proximity to the patient's gallbladder and his episode of right upper quadrant pain suspect that the patient might have had an episode of cholecystitis which caused the patient's abscess.  -c/w zosyn  -f/u blood cultures; 2/2 serratia marcescens, Resistant to ampicillin /Unasyn  -2nd set BC, LA WNL; monitor temp profile, WBC  -GI consult: Advised MRI: 5 x 4 x 6 cm hepatic lesion consistent with hepatic abscess.  Cholelithiasis small amount of pericholecystic fluid.  - 2022: IR hepatic abscess drain placement; less purulent, serosanguinous  -ID consult: Continue Zosyn.  Drain fluid: grams NEG organisms, NGTD; as above 2/2 BC serratia marcescens, sensitivities pending.  -WBC admission 17.8---> 8.4  -Tolerating low fiber, low-fat; + BM.  -pt had good drain output but then noted to be leaking with decreased output. Pt seen by IR, drain removed, follow up CT done showin. Hepatic drainage catheters on the external surface of the liver,  this will require placement of a new drain with removal of existing  drain. Abscess drainage catheter in the liver is relatively unchanged  in size and appearance compared to May 25, 2022.  2. Trace left pleural effusion, new from previous exam.  3. Abdominal aortic aneurysm with irregular mural plaque and thrombus is unchanged,     Pt reeval by IR on , attempted fluid aspiration of abscess but no fluid collected. No drain placed.   -pt followed by ID. He will discharge on 4-6 weeks of Ertepenem.   -pt ambulating, taking po and feeling well prior to discharge. No abd pain.   -picc line placed day of discharge, case discussed with ID. Care plan discussed with pt in detail.     Plan at discharge:   - Ertepenem IV , picc line, 4-6 weeks, per ID  - follow up wt Dr. Ospina 1 month, she will order f/u abd CT   - no need for IR follow up   - PCP follow up with labs  - Home Infusion follow up with labs to be called to  ID  -Gen Surgery referral as to whether gall bladder may be potential source for hepatic abscess, 4 weeks. If not ,then pt should follow up with [facility] for colonoscopy for possibe colonic source.         Hyponatremia-resolved.   On admission 130.  -Now normal on IVF, discontinued IVF adequate POs  -nl follow up bmp     FRANCIE-resolved.   crt was previously 0.8-1  -IV hydration  -normalized post IVFS. Off ivfs.    Monitor BMP-nl bmp before discharge  -avoid nephrotoxic medications        AAA  Known. Infrarenal measuring 4.2cm on ct. 4cm on US from 2/2022.   -surveillance imaging as outpatient. PCP follow up   Has regular follow up of this with Pcp     COPD  Not in acute exacerbation  -On supplemental O2 since admission at 1-2 L NC,.  Continue PTA inhalers   -Now ambulatory,Persistent supp O2 add scheduled duoneb, wean O2 as above.   -off oxygen, lungs clear  -no cardiopulmonary sxs.   pcp follow up      hld  htn  Holding diuretics on admission soft BP.  Holding aspirin/statin for now: liver abscess drainage  -normotensive.      Plan;   -Continue amlodipine; bp WNL, monitior  -restarted PTA ASA   - holding zesteritic (lisinopril/HCTZ) for now as normotensive on amlodipine. Follow up with pcp  -holding statin for now given liver abscess     PreDM  HbA1C 6.3%.   BS low 100  pcp follow up,     DVT Prophylaxis: Pneumatic Compression Devices and Ambulate every shift     Code Status: Full Code  Dispo- discharge home with home infusion cares    Hossein Landrum MD, MD    Significant Results and Procedures   See hospital course    Pending Results   These results will be followed up by ID  Unresulted Labs Ordered in the Past 30 Days of this Admission     Date and Time Order Name Status Description    5/27/2022  4:38 PM Anaerobic Bacterial Culture Routine Preliminary           Code Status   Full Code       Primary Care Physician   Donny Payne    Physical Exam   Temp: 98.2  F (36.8  C) Temp src: Oral BP: 121/70 Pulse: 71    Resp: 18 SpO2: 91 % O2 Device: None (Room air) Oxygen Delivery: 2 LPM  Vitals:    05/25/22 1616   Weight: 91.6 kg (202 lb)     Vital Signs with Ranges  Temp:  [98.2  F (36.8  C)-98.3  F (36.8  C)] 98.2  F (36.8  C)  Pulse:  [63-75] 71  Resp:  [12-18] 18  BP: (104-148)/(60-80) 121/70  SpO2:  [90 %-97 %] 91 %  I/O last 3 completed shifts:  In: -   Out: 850 [Urine:850]    Constitutional: Up in chair  Respiratory:     cTAB  Cardiovascular: RRR no r/g/m  picc line placed on day of discharge.   GI: soft, nt,ND, no ruq pain  Skin/Integumen: no rash or ede3ma  Neuro-  nl speech and mentation  Psych: nl affect    Discharge Disposition   Discharged to home  Condition at discharge: Good    Consultations This Hospital Stay   GASTROENTEROLOGY IP CONSULT  INFECTIOUS DISEASES IP CONSULT  CARE MANAGEMENT / SOCIAL WORK IP CONSULT  VASCULAR ACCESS ADULT IP CONSULT  CARE MANAGEMENT / SOCIAL WORK IP CONSULT  VASCULAR ACCESS ADULT IP CONSULT  VASCULAR ACCESS ADULT IP CONSULT    Time Spent on this Encounter   I, Hossein Landrum MD, personally saw the patient today and spent greater than 30 minutes discharging this patient.    Discharge Orders      IR Abscess Tube Check    S/p drain placement 5/27     CT Abdomen Pelvis w Contrast     Home Infusion Referral      Adult General Surg Referral      Reason for your hospital stay    Liver abscesses     Activity    Your activity upon discharge: activity as tolerated     Follow-up and recommended labs and tests     1. You have appointment scheduled Wed. 6/15 with Dr. Payne at the [facility] at 1:15 pm with BMP, Liver function test and CBC with platelet count, follow liver infection, high blood pressure and temporary medication changes.     2. You have a follow up appointment scheduled with Dr. Ospina (Infectious Disease) on Wed. 6/29 at 9:30 AM.   [id number] [facility] [facility]  [address] [place] [id number]    3. Dr. Everett Ospina of [facility] Infectious disease with order a CT scan of the  abdomen in several weeks to follow up liver abscess.  4. Follow up with General Surgery to follow up gallbladder as potential source of your liver infection, 2-3 weeks     Full Code     Diet    Follow this diet upon discharge: Orders Placed This Encounter      Combination Diet Regular Diet; Low Fiber Diet; Low Saturated Fat Diet      NPO per Anesthesia Guidelines for Procedure/Surgery Except for: Meds, Ice Chips     Discharge Medications   Current Discharge Medication List      START taking these medications    Details   acetaminophen (TYLENOL) 325 MG tablet Take 2 tablets (650 mg) by mouth every 6 hours as needed for mild pain or other (and adjunct with moderate or severe pain or per patient request)  Qty: 50 tablet, Refills: 0    Comments: Future refills by PCP Dr. Donny Payne with phone number [phone].  Associated Diagnoses: Hepatic abscess      ertapenem (INVANZ) 1 GM injection Inject 1 g into the vein every 24 hours CBC with differential, creatinine, SGOT weekly while on this medication to be faxed to Dr. Mcfadden office.  Qty: 420 mL, Refills: 0    Associated Diagnoses: Hepatic abscess      sodium chloride, PF, 0.9% PF flush Irrigate with 10 mLs as directed daily  Qty: 100 mL, Refills: 1    Comments: Dispense 10 syringes  Associated Diagnoses: Hepatic abscess         CONTINUE these medications which have NOT CHANGED    Details   albuterol (PROAIR HFA/PROVENTIL HFA/VENTOLIN HFA) 108 (90 Base) MCG/ACT inhaler Inhale 2 puffs into the lungs every 4 hours as needed for shortness of breath / dyspnea or wheezing  Qty: 18 g, Refills: 11    Comments: Pharmacy may dispense brand covered by insurance (Proair, or proventil or ventolin or generic albuterol inhaler)  Associated Diagnoses: Panlobular emphysema (H)      albuterol (PROVENTIL) (2.5 MG/3ML) 0.083% neb solution Take 1 vial (2.5 mg) by nebulization every 6 hours as needed for shortness of breath / dyspnea or wheezing  Qty: 90 mL, Refills: 11     Associated Diagnoses: Panlobular emphysema (H)      allopurinol (ZYLOPRIM) 100 MG tablet Take 2 tablets (200 mg) by mouth daily  Qty: 180 tablet, Refills: 3    Associated Diagnoses: Acute gout of foot, unspecified cause, unspecified laterality      amLODIPine (NORVASC) 10 MG tablet Take 1 tablet (10 mg) by mouth daily  Qty: 90 tablet, Refills: 3    Associated Diagnoses: Benign essential hypertension      aspirin 81 MG tablet Take 1 tablet (81 mg) by mouth daily      fluticasone-salmeterol (AIRDUO RESPICLICK) 113-14 MCG/ACT inhaler Inhale 1 puff into the lungs 2 times daily  Qty: 1 each, Refills: 11    Comments: **DISPENSE GENERIC AIRDUO  Associated Diagnoses: Panlobular emphysema (H)         STOP taking these medications       lisinopril-hydrochlorothiazide (ZESTORETIC) 20-12.5 MG tablet Comments:   Reason for Stopping:         pravastatin (PRAVACHOL) 40 MG tablet Comments:   Reason for Stopping:             Allergies   No Known Allergies  Data   Most Recent 3 CBC's:Recent Labs   Lab Test 05/30/22  0833 05/29/22  0729 05/28/22  1211   WBC 9.0 8.4 8.2   HGB 13.7 14.0 13.8   MCV 90 93 90    168 150      Most Recent 3 BMP's:  Recent Labs   Lab Test 05/31/22  2146 05/31/22  1755 05/31/22  1208 05/30/22  1640 05/30/22  0833 05/29/22  0729 05/28/22  1211   NA  --   --   --   --  136 137 132*   POTASSIUM  --   --   --   --  3.5 3.9 3.6   CHLORIDE  --   --   --   --  103 104 102   CO2  --   --   --   --  25 28 25   BUN  --   --   --   --  11 16 17   CR  --   --   --   --  0.74 0.77 0.73   ANIONGAP  --   --   --   --  8 5 5   DES  --   --   --   --  8.5 8.1* 8.0*   * 99 111*   < > 244* 108* 114*    < > = values in this interval not displayed.     Most Recent 2 LFT's:  Recent Labs   Lab Test 05/27/22  0656 05/26/22  0638   AST 60* 53*   ALT 48 45   ALKPHOS 85 76   BILITOTAL 1.5* 1.3     Most Recent INR's and Anticoagulation Dosing History:  Anticoagulation Dose History     Recent Dosing and Labs Latest Ref Rng &  Units 10/26/2010 10/30/2010 10/31/2010 5/27/2022    INR 0.85 - 1.15 1.03 1.40(H) 2.41(H) 1.33(H)        Most Recent 3 Troponin's:No lab results found.  Most Recent Cholesterol Panel:  Recent Labs   Lab Test 09/03/21  1211   CHOL 216*   *   HDL 29*   TRIG 198*     Most Recent 6 Bacteria Isolates From Any Culture (See EPIC Reports for Culture Details):No lab results found.  Most Recent TSH, T4 and A1c Labs:  Recent Labs   Lab Test 05/30/22  0833   A1C 6.3*     Results for orders placed or performed during the hospital encounter of 05/25/22   Abdomen US, limited (RUQ only)    Narrative    EXAM: US ABDOMEN LIMITED  LOCATION: [facility]  DATE/TIME: 5/25/2022 6:30 PM    INDICATION: same  COMPARISON: None.  TECHNIQUE: Limited abdominal ultrasound.    FINDINGS:    GALLBLADDER: Distended gallbladder containing multiple stones. Gallbladder wall measures up to 2 mm by radiologist measurement.. No pericholecystic fluid. Negative sonographic Garcia sign.    BILE DUCTS: No biliary dilatation. The common duct measures 2.5 mm.    LIVER: Enlarged and fatty. No focal mass.    RIGHT KIDNEY: No hydronephrosis.    PANCREAS: The visualized portions are normal.    No ascites.      Impression    IMPRESSION:  1.  Cholelithiasis. No sonographic evidence of cholecystitis.  2.  Enlarged fatty liver.       CT Chest (PE) Abdomen Pelvis w Contrast    Narrative    EXAM: CT CHEST PE ABDOMEN PELVIS W CONTRAST  LOCATION: [facility]  DATE/TIME: 5/25/2022 7:54 PM    INDICATION: Fever, leukocytosis, shortness of breath and abdominal pain.  COMPARISON: Ultrasound earlier today and chest CT on 09/08/2021  TECHNIQUE: CT chest pulmonary angiogram and routine CT abdomen pelvis with IV contrast. Arterial phase through the chest and venous phase through the abdomen and pelvis. Multiplanar reformats and MIP reconstructions were performed. Dose reduction   techniques were used.   CONTRAST: 102 mL Isovue  370    FINDINGS:  ANGIOGRAM CHEST: Pulmonary arteries are normal caliber and negative for pulmonary emboli. Timing of the contrast bolus is not optimal for evaluation of the thoracic aortic lumen. No CT evidence of right heart strain.     LUNGS AND PLEURA: Mild centrilobular emphysema. Mild secretions and bronchial mucus plugging in the right lower lobe bronchi. No infiltrate or pleural effusion. Few punctate pulmonary nodules are stable. For example, left upper lobe 2 mm nodule (series   8, image 131) and left upper lobe 2 mm nodule (series 8, image 120) are unchanged.    MEDIASTINUM/AXILLAE: No lymphadenopathy. Stable mildly aneurysmal descending thoracic aorta measuring up to 4.1 cm (series 6, image 230). Stable calcified and noncalcified atheromatous plaque in the thoracic aorta. Small hiatal hernia. No pericardial   effusion.    CORONARY ARTERY CALCIFICATION: Mild.    HEPATOBILIARY: Hepatic steatosis. Cluster of lobulated hypodense lesions in the right hepatic lobe adjacent to the gallbladder with thin rind of peripheral enhancement, measuring 2.3 x 1.8 cm (series 12, image 92), 2.5 x 3.3 cm (series 12, image 102) and   2.4 x 2.0 cm (series 12, image 111) are indeterminate. These may be hepatic masses or small abscesses. These were not well seen on the ultrasound earlier today due to hepatic steatosis. Cholelithiasis. Trace pericholecystic fluid and fat stranding. No   biliary ductal dilatation.    PANCREAS: Normal.    SPLEEN: Normal.    ADRENAL GLANDS: Normal.    KIDNEYS/BLADDER: Normal.    BOWEL: Diverticulosis of the colon. No acute inflammatory change. No obstruction. Normal appendix. Duodenal diverticuli.    LYMPH NODES: No lymphadenopathy.    VASCULATURE: Aneurysmal infrarenal abdominal aorta measuring 4.2 cm. Moderate amount of calcified and noncalcified plaque in the abdominal aorta.    PELVIC ORGANS: No pelvic masses. No free fluid, fluid collections or extraluminal air.    MUSCULOSKELETAL: Bilateral  total hip arthroplasties. No suspicious lesions in the bones.      Impression    IMPRESSION:  1.  Cluster of peripherally enhancing hypodense lesions in the right hepatic lobe adjacent to the gallbladder. These are most suspicious for hepatic abscesses/phlegmon with the given clinical history. Malignant etiologies such as metastatic disease are   also possible. These do not have the typical appearance for focal fat sparing and were not well seen on the ultrasound earlier today, likely due to hepatic steatosis. Consider a liver MRI for better evaluation.  2.  Cholelithiasis and nonspecific trace pericholecystic fluid. Acute cholecystitis cannot be excluded and HIDA scan can be considered for complete characterization if clinically indicated.  3.  No pulmonary emboli. No acute findings in the chest.  4.  Mildly aneurysmal descending thoracic aorta measuring 4.1 cm and abdominal aorta measuring 4.2 cm.   MR Abdomen w/o & w Contrast    Narrative    EXAM: MR ABDOMEN W/O and W CONTRAST  LOCATION: [facility]  DATE/TIME: 5/26/2022 11:39 PM    INDICATION: Acute onset abdominal pain. Indeterminate liver lesion on CT and ultrasound. Possible liver abscess.  COMPARISON: CT and ultrasound 5/25/2022.  TECHNIQUE: Routine MRI abdomen protocol including T1 in/out phase, diffusion, multiplane T2, and dynamic T1 without and with IV contrast.   CONTRAST: 9mL Gadavist    FINDINGS: There is a complex multicystic mass in the inferior right lobe of the liver adjacent to the gallbladder fossa. This demonstrates early and increased enhancement following contrast administration when compared to adjacent liver. This abnormality   measures approximately 4.9 cm AP by 3.6 cm transverse by 5.8 cm craniocaudal. The liver otherwise is normal in appearance.    There are stones in the gallbladder. Small amount of pericholecystic fluid. No biliary dilatation.    The pancreas, spleen, adrenal glands and kidneys are  normal.    Small cortical cyst at the lateral aspect of the left kidney. No abdominal lymph node enlargement. 4.2 cm abdominal aortic aneurysm.      Impression    IMPRESSION:  1.  Complex mass in the inferior right lobe of the liver is most likely hepatic abscess.  2.  Cholelithiasis. There is a small amount of pericholecystic fluid. If cholecystitis is a clinical concern, hepatobiliary scan is suggested.  3.  No biliary dilatation.  4.  4.2 cm abdominal aortic aneurysm.   CT Liver Abscess Drainage    Narrative    CT LIVER ABSCESS DRAIN WITH CATHETER PLACEMENT May 27, 2022 4:37 PM     HISTORY: Liver abscess. Percutaneous drainage requested by primary  service.    COMPARISON: None.     TECHNIQUE: Volumetric helical acquisition of CT images were acquired  without contrast. Radiation dose for this scan was reduced using  automated exposure control, adjustment of the mA and/or kV according  to patient size, or iterative reconstruction technique.    MEDICATIONS: 8mL Lidocaine 1%, 1mg Versed, 50mcg Fentanyl given over  10minutes. RN: Parker Carrillo    FINDINGS: After written and oral informed consent was obtained, and  pause for the cause correctly identified the patient and procedure,  the patient was scanned in supine position for procedural planning.  The subcutaneous tissues overlying the fluid collection in the pelvis  was identified, marked, prepped and draped in the usual sterile  fashion, and anesthetized with 8 mL of 1% subcutaneous lidocaine. The  fluid collection was accessed with a hollow bore needle, and a few  milliliters of bloody turbid material was aspirated. An 0.035 floppy  tipped wire was advanced into the fluid collection and the needle was  removed over the wire. The tract was serially dilated.  A 10 Chinese  locking pigtail catheter was advanced into the abscess and its  position was verified with postprocedural imaging. The catheter was  fixed to the overlying skin using an adhesive bandage. There were  no  immediate complications and patient left the CT suite in satisfactory  condition.     CONSCIOUS SEDATION NOTE: After obtaining consent for sedation,  conscious sedation was induced using IV Versed and IV fentanyl.  Patient was monitored by nurse under my direct supervision throughout  the exam. There were no complications of the sedation. 15 minutes  face-to-face time.      Impression    IMPRESSION:   1.Technically successful CT guided abscess drain placement.   2. Conscious sedation.    YANELIS PACE MD         SYSTEM ID:  [id number]   CT Abdomen w Contrast    Narrative    CT ABDOMEN WITH CONTRAST May 31, 2022 at 1210 hours    HISTORY: 67-year-old patient with hepatic abscess. Patient had a drain  placed May 27, 2022 with good output, now with significantly  diminished output, request made for evaluation of drain location.    COMPARISON: May 27, 2022 date of drain placement, MRI and May 26, 2022  and CT exam on May 25, 2022.    TECHNIQUE: Multiplanar multiformatted CT images obtained through the  abdomen after the uneventful administration of Isovue 370 intravenous  contrast given for a total of 102 mL. Radiation dose for this scan was  reduced using automated exposure control, adjustment of the mA and/or  kV according to patient size, or iterative reconstruction technique.  No 3-D images obtained.    FINDINGS: Heart size is normal. Small left pleural effusion. Lung  bases are otherwise relatively unchanged. Hypodensity in the inferior  aspect of the right hepatic lobe is again noted, somewhat  multiloculated. An area of biggest size is up to 3.7 x 4 cm on today's  exam, previously 4 x 3.6 cm. The drainage catheters on the external  surface of the liver, no longer draining the abscess cavity. No  dilated loops of bowel. Both kidneys, spleen, adrenal glands, and  pancreas are unchanged. Cholelithiasis with small gallstones filling  the majority of the gallbladder lumen again identified. Aneurysmal  dilatation with  mural thrombus in the abdominal aorta again noted.      Impression    IMPRESSION:  1. Hepatic drainage catheters on the external surface of the liver,  this will require placement of a new drain with removal of existing  drain. Abscess drainage catheter in the liver is relatively unchanged  in size and appearance compared to May 25, 2022.  2. Trace left pleural effusion, new from previous exam.  3. Abdominal aortic aneurysm with irregular mural plaque and thrombus  is unchanged.    DALAI MATHUR MD         SYSTEM ID:  [id number]   CT Abdomen Peritoneum Abscess Aspiration    Narrative    EXAM:  1. PERCUTANEOUS ASPIRATION LIVER  2. CT GUIDANCE  3. CONSCIOUS SEDATION  DATE/TIME: 6/1/2022 10:10 AM    INDICATION: Hepatic abscess with displaced drain  TECHNIQUE: Dose reduction techniques were used.    PROCEDURE: Informed consent obtained. Site marked. Prior images  reviewed. Required items made available. Patient identity confirmed  verbally and with arm band. Patient reevaluated immediately before  administering sedation. Universal protocol was followed. Time out  performed. The site was prepped and draped in sterile fashion. 10 mL  of 1% lidocaine was infused into the local soft tissues. Using  standard technique and under direct CT guidance, a 5 Hebrew catheter  was inserted into the inferior right hepatic lobe. No fluid could be  aspirated despite needle placement within the area of concern.    BLOOD LOSS: Minimal.    The patient tolerated the procedure well. No immediate complications.    RADIOLOGIC SUPERVISION AND INTERPRETATION:   CT GUIDANCE: Images demonstrate the needle to be in good position.    SEDATION: Versed 1 mg. Fentanyl 50 mcg. The procedure was performed  with administration intravenous conscious sedation with appropriate  preoperative, intraoperative, and postoperative evaluation.    10 minutes of supervised face to face conscious sedation time was  provided by a radiology nurse under my direct  supervision.      Impression    IMPRESSION:  1.  CT-guided aspiration of a hepatic phlegmon. There is no residual  fluid. Drain therefore not replaced.    EDMOND CLAUDIO MD         SYSTEM ID:  [id number]

## 2022-06-01 NOTE — CARE PLAN
Liver Abscess Drain: Pt tolerated well. VSS. Total sedation given - 50 mcg Fentanyl and 1 mg Versed. No fluid returned, no need for drain placement. Dry gauze and tegaderm applied to puncture site, no drainage noted.    1020 Pt alert, able to transfer from procedure table back to cart with SBA. Denies nausea, lightheadedness, back to rm 2425 per cart & transport. Detailed report called to Fang GARCIA RN.

## 2022-06-01 NOTE — PLAN OF CARE
Goal Outcome Evaluation                  A/OX4. VSS on RA. Denies pain. Independent in room.  RUQ incision site CDI. Awaiting PICC line placement. Possible discharge today. Will continue to monitor.

## 2022-06-01 NOTE — CONSULTS
Appointments coordinated, see AVS.     Carlie Mclaughlin, RN   [facility]   Phone [phone]

## 2022-06-01 NOTE — PROGRESS NOTES
A/O x4. PIV SL VSS on RA. Denies SOB. Pain managed with prn tylenol. PRN Melatonin given @ bedtime. RUQ dressing CDI.  and 161. Nurse to notify MD if BS is <200. NPO by midnight for placement of liver abscess drain on 6/1/22. Posible discharge tomorrow 6/1/22.

## 2022-06-01 NOTE — PROGRESS NOTES
[facility]    Infectious Disease Progress Note    Date of Service (when I saw the patient): 06/01/2022     Assessment & Plan   Gabe Smith is a 67 year old male who was admitted on 5/25/2022.     Impression:  1. 67 y.o with abdominal pain.   2. HTN, HLP  3. COPD   4. Blood cultures positive for GNR   5. CT: Cluster of peripherally enhancing hypodense lesions in the right hepatic lobe adjacent to the gallbladder. These are most suspicious for hepatic abscesses/phlegmon with the given clinical history. Malignant etiologies such as metastatic disease are   also possible  6. Currently on zosyn   7. MRI noted  Complex mass in the inferior right lobe of the liver is most likely hepatic abscess  8. S/P drain placement     Recommendations:  Based on cultures and need for long term antibiotics switched to ertapenem for 4-6 weeks   OPIV antibiotics orders are in the chart    PICC   Discussed IM     Everett Ospina MD    Interval History   Physical Exam   Temp: 98.3  F (36.8  C) Temp src: Oral BP: 135/80 Pulse: 68   Resp: 14 SpO2: 91 % O2 Device: None (Room air) Oxygen Delivery: 2 LPM  Vitals:    05/25/22 1616   Weight: 91.6 kg (202 lb)     Vital Signs with Ranges  Temp:  [97.6  F (36.4  C)-98.3  F (36.8  C)] 98.3  F (36.8  C)  Pulse:  [63-75] 68  Resp:  [12-18] 14  BP: (104-148)/(60-80) 135/80  SpO2:  [90 %-97 %] 91 %      Medications       allopurinol  200 mg Oral Daily     amLODIPine  10 mg Oral Daily     aspirin  81 mg Oral Daily     fluticasone-salmeterol  1 puff Inhalation BID     ipratropium - albuterol 0.5 mg/2.5 mg/3 mL  3 mL Nebulization 4x daily     piperacillin-tazobactam  3.375 g Intravenous Q6H     sodium chloride (PF)  3 mL Intracatheter Q8H       Data   All microbiology laboratory data reviewed.  Recent Labs   Lab Test 05/30/22  0833 05/29/22  0729 05/28/22  1211   WBC 9.0 8.4 8.2   HGB 13.7 14.0 13.8   HCT 40.7 43.2 41.2   MCV 90 93 90    168 150     Recent Labs   Lab Test  05/30/22  0833 05/29/22  0729 05/28/22  1211   CR 0.74 0.77 0.73     No lab results found.  No lab results found.    Invalid input(s): UC

## 2022-06-02 ENCOUNTER — HOME INFUSION (PRE-WILLOW HOME INFUSION) (OUTPATIENT)
Dept: PHARMACY | Facility: CLINIC | Age: 67
End: 2022-06-02
Payer: COMMERCIAL

## 2022-06-02 NOTE — PROGRESS NOTES
This is a recent snapshot of the patient's [facility] medical record.  For current drug dose and complete information and questions, call [phone] or In Basket pool, fv home infusion ([id number])  CSN Number:  [id number]

## 2022-06-03 LAB — BACTERIA ASPIRATE CULT: NORMAL

## 2022-06-03 NOTE — PROGRESS NOTES
This is a recent snapshot of the patient's [facility] medical record.  For current drug dose and complete information and questions, call [phone] or In [facility] pool, fv home infusion ([id number])  CSN Number:  [id number]

## 2022-06-06 ENCOUNTER — LAB REQUISITION (OUTPATIENT)
Dept: LAB | Facility: CLINIC | Age: 67
End: 2022-06-06
Payer: COMMERCIAL

## 2022-06-06 ENCOUNTER — PATIENT OUTREACH (OUTPATIENT)
Dept: INTERNAL MEDICINE | Facility: CLINIC | Age: 67
End: 2022-06-06

## 2022-06-06 ENCOUNTER — HOME INFUSION (PRE-WILLOW HOME INFUSION) (OUTPATIENT)
Dept: PHARMACY | Facility: CLINIC | Age: 67
End: 2022-06-06

## 2022-06-06 DIAGNOSIS — R78.81 BACTEREMIA: ICD-10-CM

## 2022-06-06 LAB
AST SERPL W P-5'-P-CCNC: 18 U/L (ref 0–45)
BASOPHILS # BLD AUTO: 0.1 10E3/UL (ref 0–0.2)
BASOPHILS NFR BLD AUTO: 1 %
BUN SERPL-MCNC: 18 MG/DL (ref 7–30)
CREAT SERPL-MCNC: 0.73 MG/DL (ref 0.66–1.25)
CRP SERPL-MCNC: 24.1 MG/L (ref 0–8)
EOSINOPHIL # BLD AUTO: 0.2 10E3/UL (ref 0–0.7)
EOSINOPHIL NFR BLD AUTO: 2 %
ERYTHROCYTE [DISTWIDTH] IN BLOOD BY AUTOMATED COUNT: 12.8 % (ref 10–15)
ERYTHROCYTE [SEDIMENTATION RATE] IN BLOOD BY WESTERGREN METHOD: 16 MM/HR (ref 0–20)
GFR SERPL CREATININE-BSD FRML MDRD: >90 ML/MIN/1.73M2
HCT VFR BLD AUTO: 43.4 % (ref 40–53)
HGB BLD-MCNC: 14.1 G/DL (ref 13.3–17.7)
IMM GRANULOCYTES # BLD: 0.1 10E3/UL
IMM GRANULOCYTES NFR BLD: 1 %
LYMPHOCYTES # BLD AUTO: 1.5 10E3/UL (ref 0.8–5.3)
LYMPHOCYTES NFR BLD AUTO: 13 %
MCH RBC QN AUTO: 29.9 PG (ref 26.5–33)
MCHC RBC AUTO-ENTMCNC: 32.5 G/DL (ref 31.5–36.5)
MCV RBC AUTO: 92 FL (ref 78–100)
MONOCYTES # BLD AUTO: 0.9 10E3/UL (ref 0–1.3)
MONOCYTES NFR BLD AUTO: 7 %
NEUTROPHILS # BLD AUTO: 9 10E3/UL (ref 1.6–8.3)
NEUTROPHILS NFR BLD AUTO: 76 %
NRBC # BLD AUTO: 0 10E3/UL
NRBC BLD AUTO-RTO: 0 /100
PLATELET # BLD AUTO: 343 10E3/UL (ref 150–450)
RBC # BLD AUTO: 4.72 10E6/UL (ref 4.4–5.9)
WBC # BLD AUTO: 11.7 10E3/UL (ref 4–11)

## 2022-06-06 PROCEDURE — 36592 COLLECT BLOOD FROM PICC: CPT | Performed by: PHYSICIAN ASSISTANT

## 2022-06-06 PROCEDURE — 84450 TRANSFERASE (AST) (SGOT): CPT | Performed by: PHYSICIAN ASSISTANT

## 2022-06-06 PROCEDURE — 86140 C-REACTIVE PROTEIN: CPT | Performed by: PHYSICIAN ASSISTANT

## 2022-06-06 PROCEDURE — 82565 ASSAY OF CREATININE: CPT | Performed by: PHYSICIAN ASSISTANT

## 2022-06-06 PROCEDURE — 85025 COMPLETE CBC W/AUTO DIFF WBC: CPT | Performed by: PHYSICIAN ASSISTANT

## 2022-06-06 PROCEDURE — 84520 ASSAY OF UREA NITROGEN: CPT | Performed by: PHYSICIAN ASSISTANT

## 2022-06-06 PROCEDURE — 85652 RBC SED RATE AUTOMATED: CPT | Performed by: PHYSICIAN ASSISTANT

## 2022-06-06 NOTE — TELEPHONE ENCOUNTER
What type of discharge? Inpatient  Risk of Hospital admission or ED visit: 14%  Is a TCM episode required? No  When should the patient follow up with PCP? 14 days of discharge.    Octavia Scott RN  [facility]

## 2022-06-06 NOTE — TELEPHONE ENCOUNTER
"ED/Discharge Protocol    \"Hi, my name is Estephania Sorensen RN, a registered nurse, and I am calling on behalf of Dr. Payne's office at [facility].  I am calling to follow up and see how things are going for you after your recent visit.\"    Patient is doing much better since being home. Doing IV abx at home daily. Home health nurse comes once a week for PICC dressing and blood work.     Is patient experiencing symptoms that may require a hospital visit?  No symptoms currently.     Discharge Instructions    \"Let's review your discharge instructions.  What is/are the follow-up recommendations?  Pt. Response:  IV abx daily, doing well. No issues with home infusions right now.     \"Were you instructed to make a follow-up appointment?\"  Pt. Response: Yes.  Has appointment been made?   Yes      \"When you see the provider, I would recommend that you bring your discharge instructions with you.    Medications    \"How many new medications are you on since your hospitalization/ED visit?\"    0-1  \"How many of your current medicines changed (dose, timing, name, etc.) while you were in the hospital/ED visit?\"   0-1  \"Do you have questions about your medications?\"   No  \"Were you newly diagnosed with heart failure, COPD, diabetes or did you have a heart attack?\"   No  For patients on insulin: \"Did you start on insulin in the hospital or did you have your insulin dose changed?\"   No  Post Discharge Medication     Was MTM referral placed (*Make sure to put transitions as reason for referral)?   No    Call Summary    \"Do you have any questions or concerns about your condition or care plan at the moment?\"    No  Triage nurse advice given: Follow up with any additional questions or concerns,.         \"If you have questions or things don't continue to improve, we encourage you contact us through the main clinic number,  [phone].  Even if the clinic is not open, triage nurses are available 24/7 to help you.     We would like you to know " "that our clinic has extended hours (provide information).  We also have urgent care (provide details on closest location and hours/contact info)\"      \"Thank you for your time and take care!\"        "

## 2022-06-07 NOTE — PROGRESS NOTES
This is a recent snapshot of the patient's [facility] medical record.  For current drug dose and complete information and questions, call [phone] or In Basket pool, fv home infusion ([id number])  CSN Number:  [id number]

## 2022-06-13 ENCOUNTER — LAB REQUISITION (OUTPATIENT)
Dept: LAB | Facility: CLINIC | Age: 67
End: 2022-06-13
Payer: COMMERCIAL

## 2022-06-13 ENCOUNTER — HOME INFUSION (PRE-WILLOW HOME INFUSION) (OUTPATIENT)
Dept: PHARMACY | Facility: CLINIC | Age: 67
End: 2022-06-13

## 2022-06-13 DIAGNOSIS — R78.81 BACTEREMIA: ICD-10-CM

## 2022-06-13 LAB
AST SERPL W P-5'-P-CCNC: 19 U/L (ref 0–45)
BASOPHILS # BLD AUTO: 0.1 10E3/UL (ref 0–0.2)
BASOPHILS NFR BLD AUTO: 1 %
BUN SERPL-MCNC: 17 MG/DL (ref 7–30)
CREAT SERPL-MCNC: 0.74 MG/DL (ref 0.66–1.25)
CRP SERPL-MCNC: 19.3 MG/L (ref 0–8)
EOSINOPHIL # BLD AUTO: 0.2 10E3/UL (ref 0–0.7)
EOSINOPHIL NFR BLD AUTO: 2 %
ERYTHROCYTE [DISTWIDTH] IN BLOOD BY AUTOMATED COUNT: 13 % (ref 10–15)
ERYTHROCYTE [SEDIMENTATION RATE] IN BLOOD BY WESTERGREN METHOD: 9 MM/HR (ref 0–20)
GFR SERPL CREATININE-BSD FRML MDRD: >90 ML/MIN/1.73M2
HCT VFR BLD AUTO: 45.2 % (ref 40–53)
HGB BLD-MCNC: 14.6 G/DL (ref 13.3–17.7)
IMM GRANULOCYTES # BLD: 0.1 10E3/UL
IMM GRANULOCYTES NFR BLD: 1 %
LYMPHOCYTES # BLD AUTO: 2.1 10E3/UL (ref 0.8–5.3)
LYMPHOCYTES NFR BLD AUTO: 17 %
MCH RBC QN AUTO: 29.9 PG (ref 26.5–33)
MCHC RBC AUTO-ENTMCNC: 32.3 G/DL (ref 31.5–36.5)
MCV RBC AUTO: 92 FL (ref 78–100)
MONOCYTES # BLD AUTO: 0.7 10E3/UL (ref 0–1.3)
MONOCYTES NFR BLD AUTO: 6 %
NEUTROPHILS # BLD AUTO: 8.7 10E3/UL (ref 1.6–8.3)
NEUTROPHILS NFR BLD AUTO: 73 %
NRBC # BLD AUTO: 0 10E3/UL
NRBC BLD AUTO-RTO: 0 /100
PLATELET # BLD AUTO: 284 10E3/UL (ref 150–450)
RBC # BLD AUTO: 4.89 10E6/UL (ref 4.4–5.9)
WBC # BLD AUTO: 11.9 10E3/UL (ref 4–11)

## 2022-06-13 PROCEDURE — 82565 ASSAY OF CREATININE: CPT | Performed by: PHYSICIAN ASSISTANT

## 2022-06-13 PROCEDURE — 36592 COLLECT BLOOD FROM PICC: CPT | Performed by: PHYSICIAN ASSISTANT

## 2022-06-13 PROCEDURE — 84450 TRANSFERASE (AST) (SGOT): CPT | Performed by: PHYSICIAN ASSISTANT

## 2022-06-13 PROCEDURE — 86140 C-REACTIVE PROTEIN: CPT | Performed by: PHYSICIAN ASSISTANT

## 2022-06-13 PROCEDURE — 85025 COMPLETE CBC W/AUTO DIFF WBC: CPT | Performed by: PHYSICIAN ASSISTANT

## 2022-06-13 PROCEDURE — 85652 RBC SED RATE AUTOMATED: CPT | Performed by: PHYSICIAN ASSISTANT

## 2022-06-13 PROCEDURE — 84520 ASSAY OF UREA NITROGEN: CPT | Performed by: PHYSICIAN ASSISTANT

## 2022-06-14 ENCOUNTER — HOME INFUSION (PRE-WILLOW HOME INFUSION) (OUTPATIENT)
Dept: PHARMACY | Facility: CLINIC | Age: 67
End: 2022-06-14
Payer: COMMERCIAL

## 2022-06-14 NOTE — PROGRESS NOTES
This is a recent snapshot of the patient's [facility] medical record.  For current drug dose and complete information and questions, call [phone] or In Basket pool, fv home infusion ([id number])  CSN Number:  [id number]

## 2022-06-15 ENCOUNTER — OFFICE VISIT (OUTPATIENT)
Dept: INTERNAL MEDICINE | Facility: CLINIC | Age: 67
End: 2022-06-15
Payer: COMMERCIAL

## 2022-06-15 VITALS
BODY MASS INDEX: 28.57 KG/M2 | HEART RATE: 75 BPM | TEMPERATURE: 98.2 F | DIASTOLIC BLOOD PRESSURE: 72 MMHG | OXYGEN SATURATION: 99 % | SYSTOLIC BLOOD PRESSURE: 116 MMHG | WEIGHT: 199.1 LBS | RESPIRATION RATE: 16 BRPM

## 2022-06-15 DIAGNOSIS — K80.20 CALCULUS OF GALLBLADDER WITHOUT CHOLECYSTITIS WITHOUT OBSTRUCTION: ICD-10-CM

## 2022-06-15 DIAGNOSIS — I10 BENIGN ESSENTIAL HYPERTENSION: ICD-10-CM

## 2022-06-15 DIAGNOSIS — K75.0 HEPATIC ABSCESS: Primary | ICD-10-CM

## 2022-06-15 DIAGNOSIS — F10.20 ALCOHOL DEPENDENCE, EPISODIC DRINKING BEHAVIOR (H): ICD-10-CM

## 2022-06-15 DIAGNOSIS — E78.5 HYPERLIPIDEMIA LDL GOAL <130: ICD-10-CM

## 2022-06-15 DIAGNOSIS — M10.9 ACUTE GOUT OF FOOT, UNSPECIFIED CAUSE, UNSPECIFIED LATERALITY: ICD-10-CM

## 2022-06-15 DIAGNOSIS — R73.03 PREDIABETES: ICD-10-CM

## 2022-06-15 DIAGNOSIS — J43.1 PANLOBULAR EMPHYSEMA (H): ICD-10-CM

## 2022-06-15 PROCEDURE — 91305 COVID-19,PF,PFIZER (12+ YRS): CPT | Performed by: INTERNAL MEDICINE

## 2022-06-15 PROCEDURE — 90471 IMMUNIZATION ADMIN: CPT | Performed by: INTERNAL MEDICINE

## 2022-06-15 PROCEDURE — 0054A COVID-19,PF,PFIZER (12+ YRS): CPT | Performed by: INTERNAL MEDICINE

## 2022-06-15 PROCEDURE — 99495 TRANSJ CARE MGMT MOD F2F 14D: CPT | Performed by: INTERNAL MEDICINE

## 2022-06-15 PROCEDURE — 90715 TDAP VACCINE 7 YRS/> IM: CPT | Performed by: INTERNAL MEDICINE

## 2022-06-15 NOTE — PATIENT INSTRUCTIONS
Continue all medications at the same doses.  Contact your usual pharmacy if you need refills.      Continue off lisinopril and Pravastatin for now.  We may use these again in the future.      Continue IV antibiotic therapy under the direction of the [facility].       Meet with the surgeon as planned.     I will see you for any pre=op exam once you know the plans for the surgery.  Contact me via alaTestt to help you get a pre-op appointment.      *

## 2022-06-15 NOTE — PROGRESS NOTES
"  Assessment & Plan     (K75.0) Hepatic abscess  (primary encounter diagnosis)  Comment: fortunately has improved  Continue abx as ordered by ID.   Plan:     (I10) Benign essential hypertension  Comment: lisinopril on hold since discharge, continue just metoprolol for now.   montiro BP, resume lisinopril as BP indicated   Plan:     (F10.20) Alcohol dependence, episodic drinking behavior (H)  Comment: unfortunately has returned to drinking again  Reviewed the many horrible things alcohol does, including, but not limited to, liver damage, which is not the best thing given his recent   Plan:     (J43.1) COPD (HCC)  Comment: This condition is currently controlled on the current medical regimen.  Continue current therapy.   Needs to stop smoking  Respiration currently stable.   Plan:     (M10.9) Acute gout of foot, unspecified cause, unspecified laterality  Comment: This condition is currently controlled on the current medical regimen.  Continue current therapy.   Plan:     (R73.03) Prediabetes  Comment: stbale, continue lower cabr diet.   Plan:     (E78.5) Hyperlipidemia LDL goal <130  Comment: pravastatin on hold until liver healed.   Should be on statin eventually for vascular risk modification  Plan:       (K80.20) Calculus of gallbladder without cholecystitis without obstruction  Comment: gallstones and GB thoguht to be possible source of infection for the hepatic abscess.   Will be seeing surgery in near future to discuss if he needs cholecystectomy once he has recovered suffciently from the hepatic infection  Has not had biliary colic.   conitnue low fat diet.   Plan: return for preop exam one week before surgery.            BMI:   Estimated body mass index is 28.57 kg/m  as calculated from the following:    Height as of 5/25/22: 1.778 m (5' 10\").    Weight as of this encounter: 90.3 kg (199 lb 1.6 oz).           Return in about 7 weeks (around 8/3/2022) for Pre-Op exam.    Donny Payne MD   [facility] is a 67 year old who presents for the following health issues     HPI       Hospital Follow-up Visit:    Hospital/Nursing Home/IP Rehab Facility: [facility]  Date of Admission: 05/25/2022  Date of Discharge: 06/01/2022  Reason(s) for Admission: Hepatic Abcess    Was your hospitalization related to COVID-19? No   Problems taking medications regularly:  None  Medication changes since discharge: None  Problems adhering to non-medication therapy:  None    Summary of hospitalization:  [facility] discharge summary reviewed  Diagnostic Tests/Treatments reviewed.  Follow up needed: none  Other Healthcare Providers Involved in Patient s Care:         None  Update since discharge: improved. Post Discharge Medication Reconciliation: discharge medications reconciled, continue medications without change.  Plan of care communicated with patient     Since home from hospital, they report that they are feeling better.   Energy level and stamina are slowly improving.    Appetite and intake are improving.   No fevers, no chills  No shortness of breath.   No furhter abdominal pain.   BMs are normal.   They have not returned to prior routine and activities.  Still with a lot of fatigue.           **I reviewed the information recorded in the patient's EPIC chart (including but not limited to medical history, surgical history, family history, problem list, medication list, and allergy list) and updated the information as indicated based on the patients reported information.         Review of Systems   Constitutional, HEENT, cardiovascular, pulmonary, gi and gu systems are negative, except as otherwise noted.      Objective    /72   Pulse 75   Temp 98.2  F (36.8  C) (Tympanic)   Resp 16   Wt 90.3 kg (199 lb 1.6 oz)   SpO2 99%   BMI 28.57 kg/m    Body mass index is 28.57 kg/m .  Physical Exam   GENERAL alert and no distress  EYES:   "Normal sclera,conjunctiva, EOMI  HENT: oral and posterior pharynx without lesions or erythema, facies symmetric  NECK: Neck supple. No LAD, without thyroidmegaly.  RESP: Clear to ausculation bilaterally without wheezes or crackles. Normal BS in all fields.  CV: RRR normal S1S2 without murmurs, rubs or gallops.  LYMPH: no cervical lymph adenopathy appreciated  MS: extremities- no gross deformities of the visible extremities noted,   EXT:  no lower extremity edema  PSYCH: Alert and oriented times 3; speech- coherent  SKIN:  No obvious significant skin lesions on visible portions of face   ABD:  Soft, nontender, nondistended, normal BS    revieed labs in EPIC    2.  Long history of smoking, was smoke free for the time in [facility], but unfortunately has started up again.   COPD stblae at this time, taking all inhaled meds as ordered.     3.  Long history of alcohol abuse, drinks 3-4 drinks per night usually.  No alcohol in the hospital.  Has begun drinking again, but \"not as much\" as pre-hospital.       4. The patient has a history of impaired glucose tolerance with regularly elevated blood sugars.    They have not been diagnosed with type II DM or placed on medications for diabetes before.   They deny polyuria, polydipsia.     The patient is obese with a BMI of Body mass index is 28.57 kg/m ..    Review of current labs show:    Lab Results   Component Value Date    A1C 6.3 05/30/2022    A1C 6.6 02/11/2022    A1C 6.2 09/03/2021    A1C 6.0 02/05/2021    A1C 6.2 07/28/2020    A1C 5.7 01/16/2020    A1C 5.8 07/15/2019    A1C 5.9 12/26/2018    A1C 5.7 09/20/2016    A1C 5.5 08/04/2015    A1C 5.9 02/03/2015    A1C 6.1 10/07/2014     @bgl@   "

## 2022-06-17 NOTE — PROGRESS NOTES
This is a recent snapshot of the patient's [facility] medical record.  For current drug dose and complete information and questions, call [phone] or In Basket pool, fv home infusion ([id number])  CSN Number:  [id number]

## 2022-06-20 ENCOUNTER — LAB REQUISITION (OUTPATIENT)
Dept: LAB | Facility: CLINIC | Age: 67
End: 2022-06-20
Payer: COMMERCIAL

## 2022-06-20 ENCOUNTER — HOME INFUSION (PRE-WILLOW HOME INFUSION) (OUTPATIENT)
Dept: PHARMACY | Facility: CLINIC | Age: 67
End: 2022-06-20

## 2022-06-20 DIAGNOSIS — R78.81 BACTEREMIA: ICD-10-CM

## 2022-06-20 LAB
AST SERPL W P-5'-P-CCNC: 25 U/L (ref 0–45)
BASOPHILS # BLD AUTO: 0 10E3/UL (ref 0–0.2)
BASOPHILS NFR BLD AUTO: 0 %
BUN SERPL-MCNC: 15 MG/DL (ref 7–30)
CREAT SERPL-MCNC: 0.69 MG/DL (ref 0.66–1.25)
CRP SERPL-MCNC: 18 MG/L (ref 0–8)
EOSINOPHIL # BLD AUTO: 0.3 10E3/UL (ref 0–0.7)
EOSINOPHIL NFR BLD AUTO: 3 %
ERYTHROCYTE [DISTWIDTH] IN BLOOD BY AUTOMATED COUNT: 13.5 % (ref 10–15)
ERYTHROCYTE [SEDIMENTATION RATE] IN BLOOD BY WESTERGREN METHOD: 10 MM/HR (ref 0–20)
GFR SERPL CREATININE-BSD FRML MDRD: >90 ML/MIN/1.73M2
HCT VFR BLD AUTO: 44 % (ref 40–53)
HGB BLD-MCNC: 14.6 G/DL (ref 13.3–17.7)
IMM GRANULOCYTES # BLD: 0.1 10E3/UL
IMM GRANULOCYTES NFR BLD: 1 %
LYMPHOCYTES # BLD AUTO: 1.9 10E3/UL (ref 0.8–5.3)
LYMPHOCYTES NFR BLD AUTO: 20 %
MCH RBC QN AUTO: 29.9 PG (ref 26.5–33)
MCHC RBC AUTO-ENTMCNC: 33.2 G/DL (ref 31.5–36.5)
MCV RBC AUTO: 90 FL (ref 78–100)
MONOCYTES # BLD AUTO: 0.8 10E3/UL (ref 0–1.3)
MONOCYTES NFR BLD AUTO: 9 %
NEUTROPHILS # BLD AUTO: 6.4 10E3/UL (ref 1.6–8.3)
NEUTROPHILS NFR BLD AUTO: 67 %
NRBC # BLD AUTO: 0 10E3/UL
NRBC BLD AUTO-RTO: 0 /100
PLATELET # BLD AUTO: 185 10E3/UL (ref 150–450)
RBC # BLD AUTO: 4.88 10E6/UL (ref 4.4–5.9)
WBC # BLD AUTO: 9.5 10E3/UL (ref 4–11)

## 2022-06-20 PROCEDURE — 86140 C-REACTIVE PROTEIN: CPT | Performed by: PHYSICIAN ASSISTANT

## 2022-06-20 PROCEDURE — 85025 COMPLETE CBC W/AUTO DIFF WBC: CPT | Performed by: PHYSICIAN ASSISTANT

## 2022-06-20 PROCEDURE — 84520 ASSAY OF UREA NITROGEN: CPT | Performed by: PHYSICIAN ASSISTANT

## 2022-06-20 PROCEDURE — 82565 ASSAY OF CREATININE: CPT | Performed by: PHYSICIAN ASSISTANT

## 2022-06-20 PROCEDURE — 85652 RBC SED RATE AUTOMATED: CPT | Performed by: PHYSICIAN ASSISTANT

## 2022-06-20 PROCEDURE — 84450 TRANSFERASE (AST) (SGOT): CPT | Performed by: PHYSICIAN ASSISTANT

## 2022-06-21 ENCOUNTER — HOME INFUSION (PRE-WILLOW HOME INFUSION) (OUTPATIENT)
Dept: PHARMACY | Facility: CLINIC | Age: 67
End: 2022-06-21

## 2022-06-22 ENCOUNTER — HOSPITAL ENCOUNTER (OUTPATIENT)
Dept: CT IMAGING | Facility: CLINIC | Age: 67
Discharge: HOME | End: 2022-06-22
Attending: INTERNAL MEDICINE | Admitting: INTERNAL MEDICINE
Payer: COMMERCIAL

## 2022-06-22 DIAGNOSIS — K75.0 HEPATIC ABSCESS: ICD-10-CM

## 2022-06-22 PROCEDURE — 250N000009 HC RX 250: Performed by: INTERNAL MEDICINE

## 2022-06-22 PROCEDURE — 250N000011 HC RX IP 250 OP 636: Performed by: INTERNAL MEDICINE

## 2022-06-22 PROCEDURE — 74177 CT ABD & PELVIS W/CONTRAST: CPT

## 2022-06-22 RX ORDER — IOPAMIDOL 755 MG/ML
100 INJECTION, SOLUTION INTRAVASCULAR ONCE
Status: COMPLETED | OUTPATIENT
Start: 2022-06-22 | End: 2022-06-22

## 2022-06-22 RX ADMIN — IOPAMIDOL 100 ML: 755 INJECTION, SOLUTION INTRAVENOUS at 11:47

## 2022-06-22 RX ADMIN — SODIUM CHLORIDE 69 ML: 9 INJECTION, SOLUTION INTRAVENOUS at 11:48

## 2022-06-27 ENCOUNTER — HOME INFUSION (PRE-WILLOW HOME INFUSION) (OUTPATIENT)
Dept: PHARMACY | Facility: CLINIC | Age: 67
End: 2022-06-27

## 2022-06-27 ENCOUNTER — LAB REQUISITION (OUTPATIENT)
Dept: LAB | Facility: CLINIC | Age: 67
End: 2022-06-27
Payer: COMMERCIAL

## 2022-06-27 DIAGNOSIS — R78.81 BACTEREMIA: ICD-10-CM

## 2022-06-27 LAB
AST SERPL W P-5'-P-CCNC: 24 U/L (ref 0–45)
BASOPHILS # BLD AUTO: 0.1 10E3/UL (ref 0–0.2)
BASOPHILS NFR BLD AUTO: 1 %
BUN SERPL-MCNC: 11 MG/DL (ref 7–30)
CREAT SERPL-MCNC: 0.66 MG/DL (ref 0.66–1.25)
CRP SERPL-MCNC: 10.7 MG/L (ref 0–8)
EOSINOPHIL # BLD AUTO: 0.3 10E3/UL (ref 0–0.7)
EOSINOPHIL NFR BLD AUTO: 3 %
ERYTHROCYTE [DISTWIDTH] IN BLOOD BY AUTOMATED COUNT: 14 % (ref 10–15)
ERYTHROCYTE [SEDIMENTATION RATE] IN BLOOD BY WESTERGREN METHOD: 7 MM/HR (ref 0–20)
GFR SERPL CREATININE-BSD FRML MDRD: >90 ML/MIN/1.73M2
HCT VFR BLD AUTO: 49.2 % (ref 40–53)
HGB BLD-MCNC: 16 G/DL (ref 13.3–17.7)
IMM GRANULOCYTES # BLD: 0.1 10E3/UL
IMM GRANULOCYTES NFR BLD: 1 %
LYMPHOCYTES # BLD AUTO: 1.9 10E3/UL (ref 0.8–5.3)
LYMPHOCYTES NFR BLD AUTO: 19 %
MCH RBC QN AUTO: 30.2 PG (ref 26.5–33)
MCHC RBC AUTO-ENTMCNC: 32.5 G/DL (ref 31.5–36.5)
MCV RBC AUTO: 93 FL (ref 78–100)
MONOCYTES # BLD AUTO: 0.7 10E3/UL (ref 0–1.3)
MONOCYTES NFR BLD AUTO: 7 %
NEUTROPHILS # BLD AUTO: 7 10E3/UL (ref 1.6–8.3)
NEUTROPHILS NFR BLD AUTO: 69 %
NRBC # BLD AUTO: 0 10E3/UL
NRBC BLD AUTO-RTO: 0 /100
PLATELET # BLD AUTO: 209 10E3/UL (ref 150–450)
RBC # BLD AUTO: 5.29 10E6/UL (ref 4.4–5.9)
WBC # BLD AUTO: 10.1 10E3/UL (ref 4–11)

## 2022-06-27 PROCEDURE — 85025 COMPLETE CBC W/AUTO DIFF WBC: CPT | Performed by: PHYSICIAN ASSISTANT

## 2022-06-27 PROCEDURE — 86140 C-REACTIVE PROTEIN: CPT | Performed by: PHYSICIAN ASSISTANT

## 2022-06-27 PROCEDURE — 84450 TRANSFERASE (AST) (SGOT): CPT | Performed by: PHYSICIAN ASSISTANT

## 2022-06-27 PROCEDURE — 85652 RBC SED RATE AUTOMATED: CPT | Performed by: PHYSICIAN ASSISTANT

## 2022-06-27 PROCEDURE — 36592 COLLECT BLOOD FROM PICC: CPT | Performed by: PHYSICIAN ASSISTANT

## 2022-06-27 PROCEDURE — 82565 ASSAY OF CREATININE: CPT | Performed by: PHYSICIAN ASSISTANT

## 2022-06-27 PROCEDURE — 84520 ASSAY OF UREA NITROGEN: CPT | Performed by: PHYSICIAN ASSISTANT

## 2022-06-28 NOTE — PROGRESS NOTES
[facility] Consultation    CHIEF COMPLAINT:  Chief Complaint   Patient presents with     Consult     New gall bladder, imaging in Epic       HISTORY OF PRESENT ILLNESS:  Gabe Smith is a 67 year old male who is seen in consultation at the request of Dr. Landrum for evaluation of cholecystitis.  In late May 2022, the patient presented with several days of right upper quadrant pain, fever/chills, decreased p.o. intake, and fatigue.  He was found to have evidence of multifocal liver abscesses surrounding the gallbladder on imaging.  He was admitted to the hospital on 5/25/2022.  IR drain was placed on 5/27/2022.  Blood cultures were positive for Serratia marcescens.  The patient has been managed with IV antibiotics, per infectious disease recommendations.  He was discharged home on 6/1/2022.  His IR drain has been removed.  In retrospect, the patient reports that he had been having intermittent discomfort in the midepigastric area for 3 to 4 weeks prior to his admission.  He was not having any associated nausea or vomiting.  His symptoms seem to be relieved with Tums.  No significant family history of gallbladder disease.  The patient has not undergone any previous abdominal surgical procedures.  Currently, the patient reports that he is doing well.  He does have decreased energy but his symptoms are otherwise improving.  He denies abdominal pain.  He reports that he will continue on IV antibiotics until mid July.  Repeat CT scan has shown significant improvement in his liver abscesses.    REVIEW OF SYSTEMS:  Constitutional: No fevers or chills  Eyes: No blurred or double vision  HENT: Denies headaches, No rhinorrhea, No sore throat  Respiratory: No cough or shortness of breath  Cardiovascular: Denies chest pain or palpitations  Gastrointestinal: No abdominal pain or nausea/vomiting  Genitourinary: No hematuria or dysuria  Musculoskeletal: Denies arthralgias or myalgias  Neurologic: No  numbness or tingling  Integumentary: No skin rashes    Past Medical History:   Diagnosis Date     Abdominal aortic aneurysm (AAA) without rupture (H) 02/25/2021    AAA 3.6 x 3.6 cm per ultrasound     COPD (chronic obstructive pulmonary disease) (H)      Dysmetabolic syndrome X      Hyperlipidemia      Hypertension      Osteoarthrosis      Prediabetes 10/09/2014    A1C 6.1     Tobacco use disorder        Past Surgical History:   Procedure Laterality Date     COLONOSCOPY N/A 10/19/2015    Procedure: COMBINED COLONOSCOPY, SINGLE OR MULTIPLE BIOPSY/POLYPECTOMY BY BIOPSY;  Surgeon: Gume Vidal MD;  Location:  GI     SURGICAL HISTORY OF -   2000    Left MOISES     SURGICAL HISTORY OF -       Unspecified knee surgeries as a child     SURGICAL HISTORY OF -   10/2010    Right MOISES, with left knee arthroscopy, meniscectomy       Family History   Problem Relation Age of Onset     Family History Negative Mother      Family History Negative Brother      Family History Negative Sister        Social History     Tobacco Use     Smoking status: Current Every Day Smoker     Packs/day: 1.00     Types: Cigarettes     Smokeless tobacco: Never Used     Tobacco comment: 1.5 PPD-now down to .5 PPD   Substance Use Topics     Alcohol use: Yes     Comment: 2-3 drinks per night (double)       Patient Active Problem List   Diagnosis     Benign essential hypertension     Osteoarthrosis     HYPERLIPIDEMIA LDL GOAL <130     Tobacco abuse     COPD (HCC)     Advanced directives, counseling/discussion     Prediabetes     Dysmetabolic syndrome X     Alcohol dependence, episodic drinking behavior (H)     Gout attack     Abnormal CT lung screening     Abdominal aortic aneurysm (AAA) without rupture (H)     Diabetes mellitus, type 2 (H)     Hepatic abscess       No Known Allergies    Current Outpatient Medications   Medication Sig Dispense Refill     albuterol (PROAIR HFA/PROVENTIL HFA/VENTOLIN HFA) 108 (90 Base) MCG/ACT inhaler Inhale 2 puffs into  "the lungs every 4 hours as needed for shortness of breath / dyspnea or wheezing 18 g 11     albuterol (PROVENTIL) (2.5 MG/3ML) 0.083% neb solution Take 1 vial (2.5 mg) by nebulization every 6 hours as needed for shortness of breath / dyspnea or wheezing 90 mL 11     allopurinol (ZYLOPRIM) 100 MG tablet Take 2 tablets (200 mg) by mouth daily 180 tablet 3     amLODIPine (NORVASC) 10 MG tablet Take 1 tablet (10 mg) by mouth daily 90 tablet 3     aspirin 81 MG tablet Take 1 tablet (81 mg) by mouth daily       ertapenem (INVANZ) 1 GM injection Inject 1 g into the vein every 24 hours CBC with differential, creatinine, SGOT weekly while on this medication to be faxed to Dr. Mcfadden office. 420 mL 0     fluticasone-salmeterol (AIRDUO RESPICLICK) 113-14 MCG/ACT inhaler Inhale 1 puff into the lungs 2 times daily 1 each 11     sodium chloride, PF, 0.9% PF flush Irrigate with 10 mLs as directed daily 100 mL 1     acetaminophen (TYLENOL) 325 MG tablet Take 2 tablets (650 mg) by mouth every 6 hours as needed for mild pain or other (and adjunct with moderate or severe pain or per patient request) (Patient not taking: Reported on 6/29/2022) 50 tablet 0       Vitals: /82   Pulse 70   Ht 1.778 m (5' 10\")   Wt 90.3 kg (199 lb)   SpO2 95%   BMI 28.55 kg/m    BMI= Body mass index is 28.55 kg/m .    EXAM:  General: Vital signs reviewed, in no apparent distress  Eyes: Anicteric  HENT: Normocephalic, atraumatic, trachea midline   Respiratory: Breathing nonlabored  Cardiovascular: Regular rate and rhythm  GI: Abdomen soft, nondistended, nontender, no organomegaly  Musculoskeletal: No gross deformities  Neurologic: Grossly nonfocal exam  Psychiatric: Normal mood, affect and insight  Integumentary: Warm and dry    All labs and imaging personally reviewed and significant for:   CT CHEST PE ABDOMEN PELVIS W CONTRAST  LOCATION: [facility]  DATE/TIME: 5/25/2022 7:54 PM     FINDINGS:  ANGIOGRAM CHEST: Pulmonary " arteries are normal caliber and negative for pulmonary emboli. Timing of the contrast bolus is not optimal for evaluation of the thoracic aortic lumen. No CT evidence of right heart strain.      LUNGS AND PLEURA: Mild centrilobular emphysema. Mild secretions and bronchial mucus plugging in the right lower lobe bronchi. No infiltrate or pleural effusion. Few punctate pulmonary nodules are stable. For example, left upper lobe 2 mm nodule (series   8, image 131) and left upper lobe 2 mm nodule (series 8, image 120) are unchanged.     MEDIASTINUM/AXILLAE: No lymphadenopathy. Stable mildly aneurysmal descending thoracic aorta measuring up to 4.1 cm (series 6, image 230). Stable calcified and noncalcified atheromatous plaque in the thoracic aorta. Small hiatal hernia. No pericardial   effusion.     CORONARY ARTERY CALCIFICATION: Mild.     HEPATOBILIARY: Hepatic steatosis. Cluster of lobulated hypodense lesions in the right hepatic lobe adjacent to the gallbladder with thin rind of peripheral enhancement, measuring 2.3 x 1.8 cm (series 12, image 92), 2.5 x 3.3 cm (series 12, image 102) and   2.4 x 2.0 cm (series 12, image 111) are indeterminate. These may be hepatic masses or small abscesses. These were not well seen on the ultrasound earlier today due to hepatic steatosis. Cholelithiasis. Trace pericholecystic fluid and fat stranding. No   biliary ductal dilatation.     PANCREAS: Normal.     SPLEEN: Normal.     ADRENAL GLANDS: Normal.     KIDNEYS/BLADDER: Normal.     BOWEL: Diverticulosis of the colon. No acute inflammatory change. No obstruction. Normal appendix. Duodenal diverticuli.     LYMPH NODES: No lymphadenopathy.     VASCULATURE: Aneurysmal infrarenal abdominal aorta measuring 4.2 cm. Moderate amount of calcified and noncalcified plaque in the abdominal aorta.     PELVIC ORGANS: No pelvic masses. No free fluid, fluid collections or extraluminal air.     MUSCULOSKELETAL: Bilateral total hip arthroplasties. No  suspicious lesions in the bones.                                                                      IMPRESSION:  1.  Cluster of peripherally enhancing hypodense lesions in the right hepatic lobe adjacent to the gallbladder. These are most suspicious for hepatic abscesses/phlegmon with the given clinical history. Malignant etiologies such as metastatic disease are   also possible. These do not have the typical appearance for focal fat sparing and were not well seen on the ultrasound earlier today, likely due to hepatic steatosis. Consider a liver MRI for better evaluation.  2.  Cholelithiasis and nonspecific trace pericholecystic fluid. Acute cholecystitis cannot be excluded and HIDA scan can be considered for complete characterization if clinically indicated.  3.  No pulmonary emboli. No acute findings in the chest.  4.  Mildly aneurysmal descending thoracic aorta measuring 4.1 cm and abdominal aorta measuring 4.2 cm.    US ABDOMEN LIMITED  LOCATION: [facility]  DATE/TIME: 5/25/2022 6:30 PM     FINDINGS:     GALLBLADDER: Distended gallbladder containing multiple stones. Gallbladder wall measures up to 2 mm by radiologist measurement.. No pericholecystic fluid. Negative sonographic Garcia sign.     BILE DUCTS: No biliary dilatation. The common duct measures 2.5 mm.     LIVER: Enlarged and fatty. No focal mass.     RIGHT KIDNEY: No hydronephrosis.     PANCREAS: The visualized portions are normal.     No ascites.                                                                      IMPRESSION:  1.  Cholelithiasis. No sonographic evidence of cholecystitis.  2.  Enlarged fatty liver.    Lab Results   Component Value Date    AST 24 06/27/2022    AST 23 07/28/2020     Lab Results   Component Value Date    ALT 48 05/27/2022    ALT 20 07/28/2020     No results found for: BILICONJ   Lab Results   Component Value Date    BILITOTAL 1.5 05/27/2022    BILITOTAL 0.7 07/28/2020     Lab Results   Component  Value Date    ALBUMIN 2.5 05/27/2022    ALBUMIN 3.6 07/28/2020     Lab Results   Component Value Date    PROTTOTAL 5.9 05/27/2022    PROTTOTAL 7.5 07/28/2020      Lab Results   Component Value Date    ALKPHOS 85 05/27/2022    ALKPHOS 83 07/28/2020     CT ABDOMEN PELVIS W CONTRAST 6/22/2022 11:52 AM     COMPARISON: 6/1/2021     FINDINGS:   LOWER CHEST: Small hiatal hernia. Visualized lung bases are clear.     HEPATOBILIARY: Right hepatic abscess drainage catheter has been  removed. Near complete resolution of the abscess in the right lobe  adjacent to the gallbladder with small residual hypodense area  measuring about 2.4 x 2.3 cm (series 3, image 83), likely resolving  inflammation/phlegmon. Otherwise, normal liver. Cholelithiasis.  Decompressed gallbladder. No biliary ductal dilatation.     PANCREAS: Normal.     SPLEEN: Normal.     ADRENAL GLANDS: Normal.     KIDNEYS/BLADDER: Normal kidneys. Urinary bladder obscured by streak  artifact.     BOWEL: Diverticulosis in the colon. No acute inflammatory change. No  obstruction.      PELVIC ORGANS: Evaluation limited by streak artifact from bilateral  total hip arthroplasties.     ADDITIONAL FINDINGS: No lymphadenopathy. Stable aneurysmal infrarenal  abdominal aorta measuring 4.1 cm. Aortobiiliac atherosclerotic  calcifications. No free fluid or fluid collections.     MUSCULOSKELETAL: Bilateral total hip arthroplasty. No suspicious  lesions in the bones.                                                                      IMPRESSION:   1.  Right hepatic abscess has resolved. Small residual area of  hypodensity in the right lobe along the gallbladder, related to  resolving inflammation.  2.  Stable abdominal aortic aneurysm measuring 4.1 cm.    ASSESSMENT:  Gbae Smith is a 67 year old who presents with likely cholecystitis resulting in multifocal liver abscesses.  Significant pertinent co-morbidities include: Obesity.       PLAN:  Following a thorough discussion with  the patient, the decision was made to proceed to the operating room for robotic cholecystectomy with intraoperative cholangiograms.  The risks and benefits of the procedure were discussed with the patient.  I have recommended the patient continue his course of IV antibiotics, as directed by infectious disease.  We will then plan to proceed with surgical intervention sometime in mid August.  Patient is in agreement with this plan.    It was my pleasure to participate in the care of Gabe Smith in clinic today. Thank you for this consultation.         Carlota Leavitt MD    Please route or send letter to:  Primary Care Provider (PCP) and Referring Provider

## 2022-06-29 ENCOUNTER — OFFICE VISIT (OUTPATIENT)
Dept: SURGERY | Facility: CLINIC | Age: 67
End: 2022-06-29
Payer: COMMERCIAL

## 2022-06-29 VITALS
HEIGHT: 70 IN | HEART RATE: 70 BPM | SYSTOLIC BLOOD PRESSURE: 126 MMHG | BODY MASS INDEX: 28.49 KG/M2 | WEIGHT: 199 LBS | OXYGEN SATURATION: 95 % | DIASTOLIC BLOOD PRESSURE: 82 MMHG

## 2022-06-29 DIAGNOSIS — K81.9 CHOLECYSTITIS: Primary | ICD-10-CM

## 2022-06-29 PROCEDURE — 99204 OFFICE O/P NEW MOD 45 MIN: CPT | Performed by: SURGERY

## 2022-06-29 RX ORDER — INDOCYANINE GREEN AND WATER 25 MG
1.25 KIT INJECTION ONCE
Status: CANCELLED | OUTPATIENT
Start: 2022-06-29 | End: 2022-06-29

## 2022-06-29 NOTE — LETTER
July 5, 2022          Hossein Landrum MD  [address] [place] [address]      RE:   Gabe Smith 1955      Dear Colleague,    Thank you for referring your patient, Gabe Smith, to [facility] at [facility]. Please see a copy of my visit note below.     Gabe Smith is a 67 year old male who is seen in consultation at the request of Dr. Landrum for evaluation of cholecystitis.  In late May 2022, the patient presented with several days of right upper quadrant pain, fever/chills, decreased p.o. intake, and fatigue.  He was found to have evidence of multifocal liver abscesses surrounding the gallbladder on imaging.  He was admitted to the hospital on 5/25/2022.  IR drain was placed on 5/27/2022.  Blood cultures were positive for Serratia marcescens.  The patient has been managed with IV antibiotics, per infectious disease recommendations.  He was discharged home on 6/1/2022.  His IR drain has been removed.  In retrospect, the patient reports that he had been having intermittent discomfort in the midepigastric area for 3 to 4 weeks prior to his admission.  He was not having any associated nausea or vomiting.  His symptoms seem to be relieved with Tums.  No significant family history of gallbladder disease.  The patient has not undergone any previous abdominal surgical procedures.  Currently, the patient reports that he is doing well.  He does have decreased energy but his symptoms are otherwise improving.  He denies abdominal pain.  He reports that he will continue on IV antibiotics until mid July.  Repeat CT scan has shown significant improvement in his liver abscesses.     REVIEW OF SYSTEMS:  Constitutional: No fevers or chills  Eyes: No blurred or double vision  HENT: Denies headaches, No rhinorrhea, No sore throat  Respiratory: No cough or shortness of breath  Cardiovascular: Denies chest pain or palpitations  Gastrointestinal: No abdominal pain or  nausea/vomiting  Genitourinary: No hematuria or dysuria  Musculoskeletal: Denies arthralgias or myalgias  Neurologic: No numbness or tingling  Integumentary: No skin rashes     Past Medical History        Past Medical History:   Diagnosis Date     Abdominal aortic aneurysm (AAA) without rupture (H) 02/25/2021     AAA 3.6 x 3.6 cm per ultrasound     COPD (chronic obstructive pulmonary disease) (H)       Dysmetabolic syndrome X       Hyperlipidemia       Hypertension       Osteoarthrosis       Prediabetes 10/09/2014     A1C 6.1     Tobacco use disorder              Past Surgical History         Past Surgical History:   Procedure Laterality Date     COLONOSCOPY N/A 10/19/2015     Procedure: COMBINED COLONOSCOPY, SINGLE OR MULTIPLE BIOPSY/POLYPECTOMY BY BIOPSY;  Surgeon: Gume Vidal MD;  Location:  GI     SURGICAL HISTORY OF -    2000     Left MOISES     SURGICAL HISTORY OF -          Unspecified knee surgeries as a child     SURGICAL HISTORY OF -    10/2010     Right MOISES, with left knee arthroscopy, meniscectomy            Family History         Family History   Problem Relation Age of Onset     Family History Negative Mother       Family History Negative Brother       Family History Negative Sister              Social History            Tobacco Use     Smoking status: Current Every Day Smoker       Packs/day: 1.00       Types: Cigarettes     Smokeless tobacco: Never Used     Tobacco comment: 1.5 PPD-now down to .5 PPD   Substance Use Topics     Alcohol use: Yes       Comment: 2-3 drinks per night (double)         Patient Active Problem List   Diagnosis     Benign essential hypertension     Osteoarthrosis     HYPERLIPIDEMIA LDL GOAL <130     Tobacco abuse     COPD (HCC)     Advanced directives, counseling/discussion     Prediabetes     Dysmetabolic syndrome X     Alcohol dependence, episodic drinking behavior (H)     Gout attack     Abnormal CT lung screening     Abdominal aortic aneurysm (AAA) without rupture (H)  "    Diabetes mellitus, type 2 (H)     Hepatic abscess         No Known Allergies     Current Outpatient Prescriptions          Current Outpatient Medications   Medication Sig Dispense Refill     albuterol (PROAIR HFA/PROVENTIL HFA/VENTOLIN HFA) 108 (90 Base) MCG/ACT inhaler Inhale 2 puffs into the lungs every 4 hours as needed for shortness of breath / dyspnea or wheezing 18 g 11     albuterol (PROVENTIL) (2.5 MG/3ML) 0.083% neb solution Take 1 vial (2.5 mg) by nebulization every 6 hours as needed for shortness of breath / dyspnea or wheezing 90 mL 11     allopurinol (ZYLOPRIM) 100 MG tablet Take 2 tablets (200 mg) by mouth daily 180 tablet 3     amLODIPine (NORVASC) 10 MG tablet Take 1 tablet (10 mg) by mouth daily 90 tablet 3     aspirin 81 MG tablet Take 1 tablet (81 mg) by mouth daily         ertapenem (INVANZ) 1 GM injection Inject 1 g into the vein every 24 hours CBC with differential, creatinine, SGOT weekly while on this medication to be faxed to Dr. Mcfadden office. 420 mL 0     fluticasone-salmeterol (AIRDUO RESPICLICK) 113-14 MCG/ACT inhaler Inhale 1 puff into the lungs 2 times daily 1 each 11     sodium chloride, PF, 0.9% PF flush Irrigate with 10 mLs as directed daily 100 mL 1     acetaminophen (TYLENOL) 325 MG tablet Take 2 tablets (650 mg) by mouth every 6 hours as needed for mild pain or other (and adjunct with moderate or severe pain or per patient request) (Patient not taking: Reported on 6/29/2022) 50 tablet 0            Vitals: /82   Pulse 70   Ht 1.778 m (5' 10\")   Wt 90.3 kg (199 lb)   SpO2 95%   BMI 28.55 kg/m    BMI= Body mass index is 28.55 kg/m .     EXAM:  General: Vital signs reviewed, in no apparent distress  Eyes: Anicteric  HENT: Normocephalic, atraumatic, trachea midline   Respiratory: Breathing nonlabored  Cardiovascular: Regular rate and rhythm  GI: Abdomen soft, nondistended, nontender, no organomegaly  Musculoskeletal: No gross deformities  Neurologic: Grossly nonfocal " exam  Psychiatric: Normal mood, affect and insight  Integumentary: Warm and dry     All labs and imaging personally reviewed and significant for:   CT CHEST PE ABDOMEN PELVIS W CONTRAST  LOCATION: [facility]  DATE/TIME: 5/25/2022 7:54 PM     FINDINGS:  ANGIOGRAM CHEST: Pulmonary arteries are normal caliber and negative for pulmonary emboli. Timing of the contrast bolus is not optimal for evaluation of the thoracic aortic lumen. No CT evidence of right heart strain.      LUNGS AND PLEURA: Mild centrilobular emphysema. Mild secretions and bronchial mucus plugging in the right lower lobe bronchi. No infiltrate or pleural effusion. Few punctate pulmonary nodules are stable. For example, left upper lobe 2 mm nodule (series   8, image 131) and left upper lobe 2 mm nodule (series 8, image 120) are unchanged.     MEDIASTINUM/AXILLAE: No lymphadenopathy. Stable mildly aneurysmal descending thoracic aorta measuring up to 4.1 cm (series 6, image 230). Stable calcified and noncalcified atheromatous plaque in the thoracic aorta. Small hiatal hernia. No pericardial   effusion.     CORONARY ARTERY CALCIFICATION: Mild.     HEPATOBILIARY: Hepatic steatosis. Cluster of lobulated hypodense lesions in the right hepatic lobe adjacent to the gallbladder with thin rind of peripheral enhancement, measuring 2.3 x 1.8 cm (series 12, image 92), 2.5 x 3.3 cm (series 12, image 102) and   2.4 x 2.0 cm (series 12, image 111) are indeterminate. These may be hepatic masses or small abscesses. These were not well seen on the ultrasound earlier today due to hepatic steatosis. Cholelithiasis. Trace pericholecystic fluid and fat stranding. No   biliary ductal dilatation.     PANCREAS: Normal.     SPLEEN: Normal.     ADRENAL GLANDS: Normal.     KIDNEYS/BLADDER: Normal.     BOWEL: Diverticulosis of the colon. No acute inflammatory change. No obstruction. Normal appendix. Duodenal diverticuli.     LYMPH NODES: No  lymphadenopathy.     VASCULATURE: Aneurysmal infrarenal abdominal aorta measuring 4.2 cm. Moderate amount of calcified and noncalcified plaque in the abdominal aorta.     PELVIC ORGANS: No pelvic masses. No free fluid, fluid collections or extraluminal air.     MUSCULOSKELETAL: Bilateral total hip arthroplasties. No suspicious lesions in the bones.                                                                      IMPRESSION:  1.  Cluster of peripherally enhancing hypodense lesions in the right hepatic lobe adjacent to the gallbladder. These are most suspicious for hepatic abscesses/phlegmon with the given clinical history. Malignant etiologies such as metastatic disease are   also possible. These do not have the typical appearance for focal fat sparing and were not well seen on the ultrasound earlier today, likely due to hepatic steatosis. Consider a liver MRI for better evaluation.  2.  Cholelithiasis and nonspecific trace pericholecystic fluid. Acute cholecystitis cannot be excluded and HIDA scan can be considered for complete characterization if clinically indicated.  3.  No pulmonary emboli. No acute findings in the chest.  4.  Mildly aneurysmal descending thoracic aorta measuring 4.1 cm and abdominal aorta measuring 4.2 cm.     US ABDOMEN LIMITED  LOCATION: [facility]  DATE/TIME: 5/25/2022 6:30 PM     FINDINGS:     GALLBLADDER: Distended gallbladder containing multiple stones. Gallbladder wall measures up to 2 mm by radiologist measurement.. No pericholecystic fluid. Negative sonographic Garcia sign.     BILE DUCTS: No biliary dilatation. The common duct measures 2.5 mm.     LIVER: Enlarged and fatty. No focal mass.     RIGHT KIDNEY: No hydronephrosis.     PANCREAS: The visualized portions are normal.     No ascites.                                                                      IMPRESSION:  1.  Cholelithiasis. No sonographic evidence of cholecystitis.  2.  Enlarged fatty  liver.           Lab Results   Component Value Date     AST 24 06/27/2022     AST 23 07/28/2020            Lab Results   Component Value Date     ALT 48 05/27/2022     ALT 20 07/28/2020      No results found for: BILICONJ         Lab Results   Component Value Date     BILITOTAL 1.5 05/27/2022     BILITOTAL 0.7 07/28/2020            Lab Results   Component Value Date     ALBUMIN 2.5 05/27/2022     ALBUMIN 3.6 07/28/2020            Lab Results   Component Value Date     PROTTOTAL 5.9 05/27/2022     PROTTOTAL 7.5 07/28/2020      Lab Results   Component Value Date     ALKPHOS 85 05/27/2022     ALKPHOS 83 07/28/2020      CT ABDOMEN PELVIS W CONTRAST 6/22/2022 11:52 AM     COMPARISON: 6/1/2021     FINDINGS:   LOWER CHEST: Small hiatal hernia. Visualized lung bases are clear.     HEPATOBILIARY: Right hepatic abscess drainage catheter has been  removed. Near complete resolution of the abscess in the right lobe  adjacent to the gallbladder with small residual hypodense area  measuring about 2.4 x 2.3 cm (series 3, image 83), likely resolving  inflammation/phlegmon. Otherwise, normal liver. Cholelithiasis.  Decompressed gallbladder. No biliary ductal dilatation.     PANCREAS: Normal.     SPLEEN: Normal.     ADRENAL GLANDS: Normal.     KIDNEYS/BLADDER: Normal kidneys. Urinary bladder obscured by streak  artifact.     BOWEL: Diverticulosis in the colon. No acute inflammatory change. No  obstruction.      PELVIC ORGANS: Evaluation limited by streak artifact from bilateral  total hip arthroplasties.     ADDITIONAL FINDINGS: No lymphadenopathy. Stable aneurysmal infrarenal  abdominal aorta measuring 4.1 cm. Aortobiiliac atherosclerotic  calcifications. No free fluid or fluid collections.     MUSCULOSKELETAL: Bilateral total hip arthroplasty. No suspicious  lesions in the bones.                                                                      IMPRESSION:   1.  Right hepatic abscess has resolved. Small residual area  of  hypodensity in the right lobe along the gallbladder, related to  resolving inflammation.  2.  Stable abdominal aortic aneurysm measuring 4.1 cm.     ASSESSMENT:  Gabe Smith is a 67 year old who presents with likely cholecystitis resulting in multifocal liver abscesses.  Significant pertinent co-morbidities include: Obesity.         PLAN:  Following a thorough discussion with the patient, the decision was made to proceed to the operating room for robotic cholecystectomy with intraoperative cholangiograms.  The risks and benefits of the procedure were discussed with the patient.  I have recommended the patient continue his course of IV antibiotics, as directed by infectious disease.  We will then plan to proceed with surgical intervention sometime in mid August.  Patient is in agreement with this plan.        Again, thank you for allowing me to participate in the care of your patient.      Sincerely,      Carlota Leavitt MD

## 2022-06-29 NOTE — PROGRESS NOTES
This is a recent snapshot of the patient's [facility] medical record.  For current drug dose and complete information and questions, call [phone] or In Basket pool, [facility] home infusion ([id number])  CSN Number:  [id number]

## 2022-07-01 ENCOUNTER — TELEPHONE (OUTPATIENT)
Dept: SURGERY | Facility: CLINIC | Age: 67
End: 2022-07-01

## 2022-07-01 NOTE — TELEPHONE ENCOUNTER
Orders received for robot assisted lap elodia with cholangiograms with Dr Leavitt.  6/29/22 I left a message for the patient to call back to schedule surgery

## 2022-07-04 ENCOUNTER — LAB REQUISITION (OUTPATIENT)
Dept: LAB | Facility: CLINIC | Age: 67
End: 2022-07-04
Payer: COMMERCIAL

## 2022-07-04 ENCOUNTER — HOME INFUSION (PRE-WILLOW HOME INFUSION) (OUTPATIENT)
Dept: PHARMACY | Facility: CLINIC | Age: 67
End: 2022-07-04

## 2022-07-04 DIAGNOSIS — R78.81 BACTEREMIA: ICD-10-CM

## 2022-07-04 LAB
AST SERPL W P-5'-P-CCNC: 23 U/L (ref 0–45)
BASOPHILS # BLD AUTO: 0.1 10E3/UL (ref 0–0.2)
BASOPHILS NFR BLD AUTO: 1 %
BUN SERPL-MCNC: 15 MG/DL (ref 7–30)
CREAT SERPL-MCNC: 0.79 MG/DL (ref 0.66–1.25)
CRP SERPL-MCNC: 11.4 MG/L (ref 0–8)
EOSINOPHIL # BLD AUTO: 0.3 10E3/UL (ref 0–0.7)
EOSINOPHIL NFR BLD AUTO: 3 %
ERYTHROCYTE [DISTWIDTH] IN BLOOD BY AUTOMATED COUNT: 14.2 % (ref 10–15)
ERYTHROCYTE [SEDIMENTATION RATE] IN BLOOD BY WESTERGREN METHOD: 6 MM/HR (ref 0–20)
GFR SERPL CREATININE-BSD FRML MDRD: >90 ML/MIN/1.73M2
HCT VFR BLD AUTO: 48.4 % (ref 40–53)
HGB BLD-MCNC: 15.8 G/DL (ref 13.3–17.7)
IMM GRANULOCYTES # BLD: 0.1 10E3/UL
IMM GRANULOCYTES NFR BLD: 1 %
LYMPHOCYTES # BLD AUTO: 1.9 10E3/UL (ref 0.8–5.3)
LYMPHOCYTES NFR BLD AUTO: 19 %
MCH RBC QN AUTO: 30.5 PG (ref 26.5–33)
MCHC RBC AUTO-ENTMCNC: 32.6 G/DL (ref 31.5–36.5)
MCV RBC AUTO: 93 FL (ref 78–100)
MONOCYTES # BLD AUTO: 0.8 10E3/UL (ref 0–1.3)
MONOCYTES NFR BLD AUTO: 8 %
NEUTROPHILS # BLD AUTO: 7 10E3/UL (ref 1.6–8.3)
NEUTROPHILS NFR BLD AUTO: 68 %
NRBC # BLD AUTO: 0 10E3/UL
NRBC BLD AUTO-RTO: 0 /100
PLATELET # BLD AUTO: 208 10E3/UL (ref 150–450)
RBC # BLD AUTO: 5.18 10E6/UL (ref 4.4–5.9)
WBC # BLD AUTO: 10.2 10E3/UL (ref 4–11)

## 2022-07-04 PROCEDURE — 85025 COMPLETE CBC W/AUTO DIFF WBC: CPT | Performed by: PHYSICIAN ASSISTANT

## 2022-07-04 PROCEDURE — 82565 ASSAY OF CREATININE: CPT | Performed by: PHYSICIAN ASSISTANT

## 2022-07-04 PROCEDURE — 86140 C-REACTIVE PROTEIN: CPT | Performed by: PHYSICIAN ASSISTANT

## 2022-07-04 PROCEDURE — 84450 TRANSFERASE (AST) (SGOT): CPT | Performed by: PHYSICIAN ASSISTANT

## 2022-07-04 PROCEDURE — 84520 ASSAY OF UREA NITROGEN: CPT | Performed by: PHYSICIAN ASSISTANT

## 2022-07-04 PROCEDURE — 85652 RBC SED RATE AUTOMATED: CPT | Performed by: PHYSICIAN ASSISTANT

## 2022-07-05 ENCOUNTER — HOME INFUSION (PRE-WILLOW HOME INFUSION) (OUTPATIENT)
Dept: PHARMACY | Facility: CLINIC | Age: 67
End: 2022-07-05

## 2022-07-06 NOTE — PROGRESS NOTES
This is a recent snapshot of the patient's [facility] medical record.  For current drug dose and complete information and questions, call [phone] or In Basket pool, fv home infusion ([id number])  CSN Number:  [id number]

## 2022-07-07 NOTE — PROGRESS NOTES
This is a recent snapshot of the patient's [facility] medical record.  For current drug dose and complete information and questions, call [phone] or In Basket pool, fv home infusion ([id number])  CSN Number:  [id number]

## 2022-07-08 ENCOUNTER — TELEPHONE (OUTPATIENT)
Dept: SURGERY | Facility: CLINIC | Age: 67
End: 2022-07-08

## 2022-07-08 NOTE — TELEPHONE ENCOUNTER
Type of surgery: robot assisted laparoscopic cholecystectomy with cholangiograms  Location of surgery: [facility]  Date and time of surgery: 8/16/22 11:30am  Surgeon: Dr Leavitt  Pre-Op Appt Date: pt to schedule  Post-Op Appt Date: pt to schedule   Packet sent out: Yes  Pre-cert/Authorization completed:  Not Applicable  Date: 6/30/22

## 2022-07-11 ENCOUNTER — LAB REQUISITION (OUTPATIENT)
Dept: LAB | Facility: CLINIC | Age: 67
End: 2022-07-11
Payer: COMMERCIAL

## 2022-07-11 ENCOUNTER — HOME INFUSION (PRE-WILLOW HOME INFUSION) (OUTPATIENT)
Dept: PHARMACY | Facility: CLINIC | Age: 67
End: 2022-07-11

## 2022-07-11 DIAGNOSIS — R78.81 BACTEREMIA: ICD-10-CM

## 2022-07-11 LAB
AST SERPL W P-5'-P-CCNC: 19 U/L (ref 0–45)
BASOPHILS # BLD AUTO: 0.1 10E3/UL (ref 0–0.2)
BASOPHILS NFR BLD AUTO: 1 %
BUN SERPL-MCNC: 17 MG/DL (ref 7–30)
CREAT SERPL-MCNC: 0.66 MG/DL (ref 0.66–1.25)
CRP SERPL-MCNC: 11.5 MG/L (ref 0–8)
EOSINOPHIL # BLD AUTO: 0.3 10E3/UL (ref 0–0.7)
EOSINOPHIL NFR BLD AUTO: 3 %
ERYTHROCYTE [DISTWIDTH] IN BLOOD BY AUTOMATED COUNT: 14.1 % (ref 10–15)
ERYTHROCYTE [SEDIMENTATION RATE] IN BLOOD BY WESTERGREN METHOD: 5 MM/HR (ref 0–20)
GFR SERPL CREATININE-BSD FRML MDRD: >90 ML/MIN/1.73M2
HCT VFR BLD AUTO: 50 % (ref 40–53)
HGB BLD-MCNC: 16 G/DL (ref 13.3–17.7)
IMM GRANULOCYTES # BLD: 0.1 10E3/UL
IMM GRANULOCYTES NFR BLD: 1 %
LYMPHOCYTES # BLD AUTO: 2.2 10E3/UL (ref 0.8–5.3)
LYMPHOCYTES NFR BLD AUTO: 23 %
MCH RBC QN AUTO: 29.8 PG (ref 26.5–33)
MCHC RBC AUTO-ENTMCNC: 32 G/DL (ref 31.5–36.5)
MCV RBC AUTO: 93 FL (ref 78–100)
MONOCYTES # BLD AUTO: 0.9 10E3/UL (ref 0–1.3)
MONOCYTES NFR BLD AUTO: 9 %
NEUTROPHILS # BLD AUTO: 6.1 10E3/UL (ref 1.6–8.3)
NEUTROPHILS NFR BLD AUTO: 63 %
NRBC # BLD AUTO: 0 10E3/UL
NRBC BLD AUTO-RTO: 0 /100
PLATELET # BLD AUTO: 190 10E3/UL (ref 150–450)
RBC # BLD AUTO: 5.37 10E6/UL (ref 4.4–5.9)
WBC # BLD AUTO: 9.5 10E3/UL (ref 4–11)

## 2022-07-11 PROCEDURE — 86140 C-REACTIVE PROTEIN: CPT | Performed by: PHYSICIAN ASSISTANT

## 2022-07-11 PROCEDURE — 84520 ASSAY OF UREA NITROGEN: CPT | Performed by: PHYSICIAN ASSISTANT

## 2022-07-11 PROCEDURE — 85652 RBC SED RATE AUTOMATED: CPT | Performed by: PHYSICIAN ASSISTANT

## 2022-07-11 PROCEDURE — 84450 TRANSFERASE (AST) (SGOT): CPT | Performed by: PHYSICIAN ASSISTANT

## 2022-07-11 PROCEDURE — 85025 COMPLETE CBC W/AUTO DIFF WBC: CPT | Performed by: PHYSICIAN ASSISTANT

## 2022-07-11 PROCEDURE — 82565 ASSAY OF CREATININE: CPT | Performed by: PHYSICIAN ASSISTANT

## 2022-07-12 NOTE — PROGRESS NOTES
This is a recent snapshot of the patient's [facility] medical record.  For current drug dose and complete information and questions, call [phone] or In Basket pool, fv home infusion ([id number])  CSN Number:  [id number]

## 2022-07-13 ENCOUNTER — HOME INFUSION (PRE-WILLOW HOME INFUSION) (OUTPATIENT)
Dept: PHARMACY | Facility: CLINIC | Age: 67
End: 2022-07-13

## 2022-07-13 NOTE — PROGRESS NOTES
This is a recent snapshot of the patient's [facility] medical record.  For current drug dose and complete information and questions, call [phone] or In Basket pool, fv home infusion ([id number])  CSN Number:  [id number]

## 2022-07-21 ENCOUNTER — OFFICE VISIT (OUTPATIENT)
Dept: INTERNAL MEDICINE | Facility: CLINIC | Age: 67
End: 2022-07-21
Payer: COMMERCIAL

## 2022-07-21 VITALS
DIASTOLIC BLOOD PRESSURE: 85 MMHG | OXYGEN SATURATION: 94 % | HEIGHT: 70 IN | HEART RATE: 80 BPM | WEIGHT: 205.7 LBS | BODY MASS INDEX: 29.45 KG/M2 | TEMPERATURE: 98.3 F | SYSTOLIC BLOOD PRESSURE: 149 MMHG

## 2022-07-21 DIAGNOSIS — E78.5 HYPERLIPIDEMIA LDL GOAL <130: ICD-10-CM

## 2022-07-21 DIAGNOSIS — Z01.818 PREOP GENERAL PHYSICAL EXAM: Primary | ICD-10-CM

## 2022-07-21 DIAGNOSIS — I10 BENIGN ESSENTIAL HYPERTENSION: ICD-10-CM

## 2022-07-21 DIAGNOSIS — K75.0 HEPATIC ABSCESS: ICD-10-CM

## 2022-07-21 DIAGNOSIS — M10.9 ACUTE GOUT OF FOOT, UNSPECIFIED CAUSE, UNSPECIFIED LATERALITY: ICD-10-CM

## 2022-07-21 DIAGNOSIS — K81.9 CHOLECYSTITIS: ICD-10-CM

## 2022-07-21 DIAGNOSIS — J43.1 PANLOBULAR EMPHYSEMA (H): ICD-10-CM

## 2022-07-21 DIAGNOSIS — I71.40 ABDOMINAL AORTIC ANEURYSM (AAA) WITHOUT RUPTURE (H): ICD-10-CM

## 2022-07-21 DIAGNOSIS — R73.03 PREDIABETES: ICD-10-CM

## 2022-07-21 DIAGNOSIS — F10.20 ALCOHOL DEPENDENCE, EPISODIC DRINKING BEHAVIOR (H): ICD-10-CM

## 2022-07-21 PROCEDURE — 93000 ELECTROCARDIOGRAM COMPLETE: CPT | Performed by: INTERNAL MEDICINE

## 2022-07-21 PROCEDURE — 99214 OFFICE O/P EST MOD 30 MIN: CPT | Performed by: INTERNAL MEDICINE

## 2022-07-21 RX ORDER — LISINOPRIL AND HYDROCHLOROTHIAZIDE 12.5; 2 MG/1; MG/1
1 TABLET ORAL EVERY MORNING
Qty: 90 TABLET | Refills: 1
Start: 2022-07-21 | End: 2022-10-21

## 2022-07-21 RX ORDER — PRAVASTATIN SODIUM 40 MG
40 TABLET ORAL AT BEDTIME
Qty: 90 TABLET | Refills: 1
Start: 2022-07-21 | End: 2022-10-21

## 2022-07-21 NOTE — H&P (VIEW-ONLY)
[address] [address]  Phone: [phone]  Primary Provider: Donny Payne  Pre-op Performing Provider: DONNY PAYNE      PREOPERATIVE EVALUATION:  Today's date: 7/21/2022    Gabe Smith is a 67 year old male who presents for a preoperative evaluation.    Surgical Information:  Surgery/Procedure: ROBOT-ASSISTED, LAPAROSCOPIC CHOLECYSTECTOMY WITH CHOLANGIOGRAM  Surgery Location: [facility]  Surgeon: Carlota Leavitt MD  Surgery Date: 08/16/2022  Time of Surgery: 11:30am  Where patient plans to recover: At home with family  Fax number for surgical facility: Note does not need to be faxed, will be available electronically in Epic.    Type of Anesthesia Anticipated: General    Assessment & Plan     The proposed surgical procedure is considered INTERMEDIATE risk.    1. Preop general physical exam    2. Cholecystitis    3. Hepatic abscess    4. COPD (HCC)    5. Alcohol dependence, episodic drinking behavior (H)    6. Benign essential hypertension    7. Acute gout of foot, unspecified cause, unspecified laterality    8. Hyperlipidemia LDL goal <130    9. Prediabetes    10. Abdominal aortic aneurysm (AAA) without rupture (H)           Risks and Recommendations:  The patient has the following additional risks and recommendations for perioperative complications:  Cardiovascular:  --no active cardiac symptoms at this time.     Pulmonary:   --history of COOPD, smoker still smoking  --currently stable on current inhalers.   --observe for post op bronchospasm, treat with nebulizers as needed.     Social and Substance:    - Alcohol abuse and risk of withdrawal  --has been drinking far less since hospital stay according to his report.     Medication Instructions:   - ACE/ARB: HOLD due to exceptional risk of hypotension during surgery.    - LABA, inhaled corticosteroid, long-acting anticholinergics: Continue  without modification.   - rescue Inhaler: Continue PRN. Bring to hospital on the day of surgery.  - No ASA or NSAIDs within 7 days of surgery.  No fish oil or Vitamin E within 7 days of surgery.       RECOMMENDATION:  APPROVAL GIVEN to proceed with proposed procedure, without further diagnostic evaluation.                      Subjective     HPI related to upcoming procedure: recent hepatic abscess, cholecystitis          Preop Questions 7/21/2022   1. Have you ever had a heart attack or stroke? No   2. Have you ever had surgery on your heart or blood vessels, such as a stent placement, a coronary artery bypass, or surgery on an artery in your head, neck, heart, or legs? No   3. Do you have chest pain with activity? No   4. Do you have a history of  heart failure? No   5. Do you currently have a cold, bronchitis or symptoms of other infection? No   6. Do you have a cough, shortness of breath, or wheezing? No   7. Do you or anyone in your family have previous history of blood clots? No   8. Do you or does anyone in your family have a serious bleeding problem such as prolonged bleeding following surgeries or cuts? No   9. Have you ever had problems with anemia or been told to take iron pills? No   10. Have you had any abnormal blood loss such as black, tarry or bloody stools? No   11. Have you ever had a blood transfusion? No   12. Are you willing to have a blood transfusion if it is medically needed before, during, or after your surgery? Yes   13. Have you or any of your relatives ever had problems with anesthesia? No   14. Do you have sleep apnea, excessive snoring or daytime drowsiness? No   15. Do you have any artifical heart valves or other implanted medical devices like a pacemaker, defibrillator, or continuous glucose monitor? No   16. Do you have artificial joints? YES; left total hip arthroplasty 2000, right total hip arthroplasty 2010   17. Are you allergic to latex? No       Health Care Directive:  Patient  does not have a Health Care Directive or Living Will:     Preoperative Review of :   reviewed - no record of controlled substances prescribed.        *  Recent hepatic abscess, completed many weeks of IV antibiotics under direction of ID.     *  Finished IV treatments week ago, felgin well.  No further fevers, chills.     *  Appetite and intake have been steadily improving, normal BMs now.      *  No recent cardiac or pulmonary issues or symptoms.    *  No problems performing vigorous physical activity, no changes in exercise tolerance.    *  No personal or family history of anesthesia complications.    *  No personal or family history of bleeding or clotting disorders.       Since home from hospital, they report that they are feeling better.   Energy level and stamina are slowly improving.    Appetite and intake are improving.   No fevers, no chills  No shortness of breath.   No abdominal pain.   They have mostly returned to prior routine and activities.       Hypertension:  History of hypertension, on medication.  No reported side effects from medications.      Had been taken off his pre-hospital BP meds while ill.      Reviewed last 6 BP readings in chart:  BP Readings from Last 6 Encounters:   07/21/22 (!) 149/85   06/29/22 126/82   06/15/22 116/72   06/01/22 121/70   05/25/22 106/66   02/11/22 122/78     No active cardiac complaints or symptoms with exercise.       COPD remains stable.  Has not had any recent breathing troubles beyond usual baseline.  Has not any acute respiratory events.  Remains with intermittent cough, mild shortness of breath with overexertion as per usual.  Using medication as directed with reported side effects.     Diabetes:  In regards specifically to the patient's diabetes, they reports no unusual symptoms.  Medication compliance: good  Diabetic diet compliance: good    Patient reports no significant episodes of hypo- or hyperglycemia    Diabetic complications: none     Most   recent labs:    Lab Results   Component Value Date    A1C 6.3 05/30/2022    A1C 6.6 02/11/2022    A1C 6.2 09/03/2021    A1C 6.0 02/05/2021    A1C 6.2 07/28/2020    A1C 5.7 01/16/2020    A1C 5.8 07/15/2019    A1C 5.9 12/26/2018    A1C 5.7 09/20/2016    A1C 5.5 08/04/2015    A1C 5.9 02/03/2015    A1C 6.1 10/07/2014            Review of Systems  Constitutional, neuro, ENT, endocrine, pulmonary, cardiac, gastrointestinal, genitourinary, musculoskeletal, integument and psychiatric systems are negative, except as otherwise noted.    Patient Active Problem List    Diagnosis Date Noted     Hepatic abscess 05/25/2022     Priority: Medium     Abdominal aortic aneurysm (AAA) without rupture (H) 02/25/2021     Priority: Medium     AAA 3.6 x 3.6 cm per ultrasound       Abnormal CT lung screening 08/24/2020     Priority: Medium     Currently managed with follow up imaging by [facility] Results [facility].         Alcohol dependence, episodic drinking behavior (H) 10/02/2016     Priority: Medium     Dysmetabolic syndrome X      Priority: Medium     Prediabetes 10/09/2014     Priority: Medium     A1C 6.1       Advanced directives, counseling/discussion 07/29/2014     Priority: Medium     Advance Care Planning:   ACP Review and Resources Provided:  Reviewed chart for advance care plan.  Gabe Smith has no plan or code status on file. Discussed available resources and provided with information. Confirmed code status reflects current choices pending further ACP discussions.  Confirmed/documented designated decision maker(s). See permanent comments section of demographics in clinical tab.   Added by Tiffany Silver on 7/29/2014             Tobacco abuse 10/10/2011     Priority: Medium     COPD (HCC) 10/10/2011     Priority: Medium     HYPERLIPIDEMIA LDL GOAL <130 10/31/2010     Priority: Medium     Benign essential hypertension      Priority: Medium     Osteoarthrosis      Priority: Medium     Gout attack 01/01/2003      Priority: Medium     1-2 times per year        Past Medical History:   Diagnosis Date     Abdominal aortic aneurysm (AAA) without rupture (H) 02/25/2021    AAA 3.6 x 3.6 cm per ultrasound     COPD (chronic obstructive pulmonary disease) (H)      Dysmetabolic syndrome X      Hyperlipidemia      Hypertension      Osteoarthrosis      Prediabetes 10/09/2014    A1C 6.1     Tobacco use disorder      Past Surgical History:   Procedure Laterality Date     COLONOSCOPY N/A 10/19/2015    Procedure: COMBINED COLONOSCOPY, SINGLE OR MULTIPLE BIOPSY/POLYPECTOMY BY BIOPSY;  Surgeon: Gume Vidal MD;  Location:  GI     SURGICAL HISTORY OF -   2000    Left MOISES     SURGICAL HISTORY OF -       Unspecified knee surgeries as a child     SURGICAL HISTORY OF -   10/2010    Right MOISES, with left knee arthroscopy, meniscectomy     Current Outpatient Medications   Medication Sig Dispense Refill     acetaminophen (TYLENOL) 325 MG tablet Take 2 tablets (650 mg) by mouth every 6 hours as needed for mild pain or other (and adjunct with moderate or severe pain or per patient request) 50 tablet 0     albuterol (PROAIR HFA/PROVENTIL HFA/VENTOLIN HFA) 108 (90 Base) MCG/ACT inhaler Inhale 2 puffs into the lungs every 4 hours as needed for shortness of breath / dyspnea or wheezing 18 g 11     albuterol (PROVENTIL) (2.5 MG/3ML) 0.083% neb solution Take 1 vial (2.5 mg) by nebulization every 6 hours as needed for shortness of breath / dyspnea or wheezing 90 mL 11     allopurinol (ZYLOPRIM) 100 MG tablet Take 2 tablets (200 mg) by mouth daily 180 tablet 3     amLODIPine (NORVASC) 10 MG tablet Take 1 tablet (10 mg) by mouth daily 90 tablet 3     aspirin 81 MG tablet Take 1 tablet (81 mg) by mouth daily       fluticasone-salmeterol (AIRDUO RESPICLICK) 113-14 MCG/ACT inhaler Inhale 1 puff into the lungs 2 times daily 1 each 11     lisinopril-hydrochlorothiazide (ZESTORETIC) 20-12.5 MG tablet Take 1 tablet by mouth every morning 90 tablet 1      "pravastatin (PRAVACHOL) 40 MG tablet Take 1 tablet (40 mg) by mouth At Bedtime 90 tablet 1     sodium chloride, PF, 0.9% PF flush Irrigate with 10 mLs as directed daily 100 mL 1       No Known Allergies     Social History     Tobacco Use     Smoking status: Current Every Day Smoker     Packs/day: 1.00     Types: Cigarettes     Smokeless tobacco: Never Used     Tobacco comment: 1.5 PPD-now down to .5 PPD   Substance Use Topics     Alcohol use: Yes     Comment: 2-3 drinks per night (double)     Family History   Problem Relation Age of Onset     Family History Negative Mother      Family History Negative Brother      Family History Negative Sister      History   Drug Use No         Objective     BP (!) 149/85 (BP Location: Left arm, Cuff Size: Adult Regular)   Pulse 80   Temp 98.3  F (36.8  C) (Oral)   Ht 1.778 m (5' 10\")   Wt 93.3 kg (205 lb 11.2 oz)   SpO2 94%   BMI 29.51 kg/m      Physical Exam  GENERAL alert and no distress  EYES:  Normal sclera,conjunctiva, EOMI  HENT: oral and posterior pharynx without lesions or erythema, facies symmetric  NECK: Neck supple. No LAD, without thyroidmegaly.  RESP: Clear to ausculation bilaterally without wheezes or crackles. Normal BS in all fields.  CV: RRR normal S1S2 without murmurs, rubs or gallops.  LYMPH: no cervical lymph adenopathy appreciated  MS: extremities- no gross deformities of the visible extremities noted,   EXT:  no lower extremity edema  PSYCH: Alert and oriented times 3; speech- coherent  SKIN:  No obvious significant skin lesions on visible portions of face     Recent Labs   Lab Test 07/11/22  1030 07/04/22  0842 06/06/22  0930 05/30/22  0833 05/29/22  0729 05/28/22  1211 05/27/22  1157 05/25/22  1449 02/11/22  1111   HGB 16.0 15.8   < > 13.7 14.0   < >  --    < > 17.8*    208   < > 206 168   < >  --    < >  --    INR  --   --   --   --   --   --  1.33*  --   --    NA  --   --   --  136 137   < >  --    < > 136   POTASSIUM  --   --   --  3.5 3.9   " < >  --    < > 4.1   CR 0.66 0.79   < > 0.74 0.77   < >  --    < > 0.92   A1C  --   --   --  6.3*  --   --   --   --  6.6*    < > = values in this interval not displayed.        Diagnostics:  Labs pending at this time.  Results will be reviewed when available.   EKG (7/21/22): appears normal, NSR, normal axis, normal intervals, no acute ST/T changes c/w ischemia, no LVH by voltage criteria, unchanged from previous tracings    Revised Cardiac Risk Index (RCRI):  The patient has the following serious cardiovascular risks for perioperative complications:   - No serious cardiac risks = 0 points     RCRI Interpretation: 0 points: Class I (very low risk - 0.4% complication rate)           Signed Electronically by: Donny Payne MD  Copy of this evaluation report is provided to requesting physician.      Answers for HPI/ROS submitted by the patient on 7/14/2022  What is the reason for your visit today? : Pre op physical  How many servings of fruits and vegetables do you eat daily?: 0-1  On average, how many sweetened beverages do you drink each day (Examples: soda, juice, sweet tea, etc.  Do NOT count diet or artificially sweetened beverages)?: 1  How many minutes a day do you exercise enough to make your heart beat faster?: 9 or less  How many days a week do you exercise enough to make your heart beat faster?: 3 or less  How many days per week do you miss taking your medication?: 1  What makes it hard for you to take your medication every day?: remembering to take

## 2022-07-21 NOTE — PROGRESS NOTES
[address] [address]  Phone: [phone]  Primary Provider: Donny Payne  Pre-op Performing Provider: DONNY PAYNE      PREOPERATIVE EVALUATION:  Today's date: 7/21/2022    Gabe Smith is a 67 year old male who presents for a preoperative evaluation.    Surgical Information:  Surgery/Procedure: ROBOT-ASSISTED, LAPAROSCOPIC CHOLECYSTECTOMY WITH CHOLANGIOGRAM  Surgery Location: [facility]  Surgeon: Carlota Leavitt MD  Surgery Date: 08/16/2022  Time of Surgery: 11:30am  Where patient plans to recover: At home with family  Fax number for surgical facility: Note does not need to be faxed, will be available electronically in Epic.    Type of Anesthesia Anticipated: General    Assessment & Plan     The proposed surgical procedure is considered INTERMEDIATE risk.    1. Preop general physical exam    2. Cholecystitis    3. Hepatic abscess    4. COPD (HCC)    5. Alcohol dependence, episodic drinking behavior (H)    6. Benign essential hypertension    7. Acute gout of foot, unspecified cause, unspecified laterality    8. Hyperlipidemia LDL goal <130    9. Prediabetes    10. Abdominal aortic aneurysm (AAA) without rupture (H)           Risks and Recommendations:  The patient has the following additional risks and recommendations for perioperative complications:  Cardiovascular:  --no active cardiac symptoms at this time.     Pulmonary:   --history of COOPD, smoker still smoking  --currently stable on current inhalers.   --observe for post op bronchospasm, treat with nebulizers as needed.     Social and Substance:    - Alcohol abuse and risk of withdrawal  --has been drinking far less since hospital stay according to his report.     Medication Instructions:   - ACE/ARB: HOLD due to exceptional risk of hypotension during surgery.    - LABA, inhaled corticosteroid, long-acting anticholinergics: Continue  without modification.   - rescue Inhaler: Continue PRN. Bring to hospital on the day of surgery.  - No ASA or NSAIDs within 7 days of surgery.  No fish oil or Vitamin E within 7 days of surgery.       RECOMMENDATION:  APPROVAL GIVEN to proceed with proposed procedure, without further diagnostic evaluation.                      Subjective     HPI related to upcoming procedure: recent hepatic abscess, cholecystitis          Preop Questions 7/21/2022   1. Have you ever had a heart attack or stroke? No   2. Have you ever had surgery on your heart or blood vessels, such as a stent placement, a coronary artery bypass, or surgery on an artery in your head, neck, heart, or legs? No   3. Do you have chest pain with activity? No   4. Do you have a history of  heart failure? No   5. Do you currently have a cold, bronchitis or symptoms of other infection? No   6. Do you have a cough, shortness of breath, or wheezing? No   7. Do you or anyone in your family have previous history of blood clots? No   8. Do you or does anyone in your family have a serious bleeding problem such as prolonged bleeding following surgeries or cuts? No   9. Have you ever had problems with anemia or been told to take iron pills? No   10. Have you had any abnormal blood loss such as black, tarry or bloody stools? No   11. Have you ever had a blood transfusion? No   12. Are you willing to have a blood transfusion if it is medically needed before, during, or after your surgery? Yes   13. Have you or any of your relatives ever had problems with anesthesia? No   14. Do you have sleep apnea, excessive snoring or daytime drowsiness? No   15. Do you have any artifical heart valves or other implanted medical devices like a pacemaker, defibrillator, or continuous glucose monitor? No   16. Do you have artificial joints? YES; left total hip arthroplasty 2000, right total hip arthroplasty 2010   17. Are you allergic to latex? No       Health Care Directive:  Patient  does not have a Health Care Directive or Living Will:     Preoperative Review of :   reviewed - no record of controlled substances prescribed.        *  Recent hepatic abscess, completed many weeks of IV antibiotics under direction of ID.     *  Finished IV treatments week ago, felgin well.  No further fevers, chills.     *  Appetite and intake have been steadily improving, normal BMs now.      *  No recent cardiac or pulmonary issues or symptoms.    *  No problems performing vigorous physical activity, no changes in exercise tolerance.    *  No personal or family history of anesthesia complications.    *  No personal or family history of bleeding or clotting disorders.       Since home from hospital, they report that they are feeling better.   Energy level and stamina are slowly improving.    Appetite and intake are improving.   No fevers, no chills  No shortness of breath.   No abdominal pain.   They have mostly returned to prior routine and activities.       Hypertension:  History of hypertension, on medication.  No reported side effects from medications.      Had been taken off his pre-hospital BP meds while ill.      Reviewed last 6 BP readings in chart:  BP Readings from Last 6 Encounters:   07/21/22 (!) 149/85   06/29/22 126/82   06/15/22 116/72   06/01/22 121/70   05/25/22 106/66   02/11/22 122/78     No active cardiac complaints or symptoms with exercise.       COPD remains stable.  Has not had any recent breathing troubles beyond usual baseline.  Has not any acute respiratory events.  Remains with intermittent cough, mild shortness of breath with overexertion as per usual.  Using medication as directed with reported side effects.     Diabetes:  In regards specifically to the patient's diabetes, they reports no unusual symptoms.  Medication compliance: good  Diabetic diet compliance: good    Patient reports no significant episodes of hypo- or hyperglycemia    Diabetic complications: none     Most   recent labs:    Lab Results   Component Value Date    A1C 6.3 05/30/2022    A1C 6.6 02/11/2022    A1C 6.2 09/03/2021    A1C 6.0 02/05/2021    A1C 6.2 07/28/2020    A1C 5.7 01/16/2020    A1C 5.8 07/15/2019    A1C 5.9 12/26/2018    A1C 5.7 09/20/2016    A1C 5.5 08/04/2015    A1C 5.9 02/03/2015    A1C 6.1 10/07/2014            Review of Systems  Constitutional, neuro, ENT, endocrine, pulmonary, cardiac, gastrointestinal, genitourinary, musculoskeletal, integument and psychiatric systems are negative, except as otherwise noted.    Patient Active Problem List    Diagnosis Date Noted     Hepatic abscess 05/25/2022     Priority: Medium     Abdominal aortic aneurysm (AAA) without rupture (H) 02/25/2021     Priority: Medium     AAA 3.6 x 3.6 cm per ultrasound       Abnormal CT lung screening 08/24/2020     Priority: Medium     Currently managed with follow up imaging by [facility] Results Team.         Alcohol dependence, episodic drinking behavior (H) 10/02/2016     Priority: Medium     Dysmetabolic syndrome X      Priority: Medium     Prediabetes 10/09/2014     Priority: Medium     A1C 6.1       Advanced directives, counseling/discussion 07/29/2014     Priority: Medium     Advance Care Planning:   ACP Review and Resources Provided:  Reviewed chart for advance care plan.  Gabe Smith has no plan or code status on file. Discussed available resources and provided with information. Confirmed code status reflects current choices pending further ACP discussions.  Confirmed/documented designated decision maker(s). See permanent comments section of demographics in clinical tab.   Added by Tiffany Silver on 7/29/2014             Tobacco abuse 10/10/2011     Priority: Medium     COPD (HCC) 10/10/2011     Priority: Medium     HYPERLIPIDEMIA LDL GOAL <130 10/31/2010     Priority: Medium     Benign essential hypertension      Priority: Medium     Osteoarthrosis      Priority: Medium     Gout attack 01/01/2003      Priority: Medium     1-2 times per year        Past Medical History:   Diagnosis Date     Abdominal aortic aneurysm (AAA) without rupture (H) 02/25/2021    AAA 3.6 x 3.6 cm per ultrasound     COPD (chronic obstructive pulmonary disease) (H)      Dysmetabolic syndrome X      Hyperlipidemia      Hypertension      Osteoarthrosis      Prediabetes 10/09/2014    A1C 6.1     Tobacco use disorder      Past Surgical History:   Procedure Laterality Date     COLONOSCOPY N/A 10/19/2015    Procedure: COMBINED COLONOSCOPY, SINGLE OR MULTIPLE BIOPSY/POLYPECTOMY BY BIOPSY;  Surgeon: Gume Vidal MD;  Location:  GI     SURGICAL HISTORY OF -   2000    Left MOISES     SURGICAL HISTORY OF -       Unspecified knee surgeries as a child     SURGICAL HISTORY OF -   10/2010    Right MOISES, with left knee arthroscopy, meniscectomy     Current Outpatient Medications   Medication Sig Dispense Refill     acetaminophen (TYLENOL) 325 MG tablet Take 2 tablets (650 mg) by mouth every 6 hours as needed for mild pain or other (and adjunct with moderate or severe pain or per patient request) 50 tablet 0     albuterol (PROAIR HFA/PROVENTIL HFA/VENTOLIN HFA) 108 (90 Base) MCG/ACT inhaler Inhale 2 puffs into the lungs every 4 hours as needed for shortness of breath / dyspnea or wheezing 18 g 11     albuterol (PROVENTIL) (2.5 MG/3ML) 0.083% neb solution Take 1 vial (2.5 mg) by nebulization every 6 hours as needed for shortness of breath / dyspnea or wheezing 90 mL 11     allopurinol (ZYLOPRIM) 100 MG tablet Take 2 tablets (200 mg) by mouth daily 180 tablet 3     amLODIPine (NORVASC) 10 MG tablet Take 1 tablet (10 mg) by mouth daily 90 tablet 3     aspirin 81 MG tablet Take 1 tablet (81 mg) by mouth daily       fluticasone-salmeterol (AIRDUO RESPICLICK) 113-14 MCG/ACT inhaler Inhale 1 puff into the lungs 2 times daily 1 each 11     lisinopril-hydrochlorothiazide (ZESTORETIC) 20-12.5 MG tablet Take 1 tablet by mouth every morning 90 tablet 1      "pravastatin (PRAVACHOL) 40 MG tablet Take 1 tablet (40 mg) by mouth At Bedtime 90 tablet 1     sodium chloride, PF, 0.9% PF flush Irrigate with 10 mLs as directed daily 100 mL 1       No Known Allergies     Social History     Tobacco Use     Smoking status: Current Every Day Smoker     Packs/day: 1.00     Types: Cigarettes     Smokeless tobacco: Never Used     Tobacco comment: 1.5 PPD-now down to .5 PPD   Substance Use Topics     Alcohol use: Yes     Comment: 2-3 drinks per night (double)     Family History   Problem Relation Age of Onset     Family History Negative Mother      Family History Negative Brother      Family History Negative Sister      History   Drug Use No         Objective     BP (!) 149/85 (BP Location: Left arm, Cuff Size: Adult Regular)   Pulse 80   Temp 98.3  F (36.8  C) (Oral)   Ht 1.778 m (5' 10\")   Wt 93.3 kg (205 lb 11.2 oz)   SpO2 94%   BMI 29.51 kg/m      Physical Exam  GENERAL alert and no distress  EYES:  Normal sclera,conjunctiva, EOMI  HENT: oral and posterior pharynx without lesions or erythema, facies symmetric  NECK: Neck supple. No LAD, without thyroidmegaly.  RESP: Clear to ausculation bilaterally without wheezes or crackles. Normal BS in all fields.  CV: RRR normal S1S2 without murmurs, rubs or gallops.  LYMPH: no cervical lymph adenopathy appreciated  MS: extremities- no gross deformities of the visible extremities noted,   EXT:  no lower extremity edema  PSYCH: Alert and oriented times 3; speech- coherent  SKIN:  No obvious significant skin lesions on visible portions of face     Recent Labs   Lab Test 07/11/22  1030 07/04/22  0842 06/06/22  0930 05/30/22  0833 05/29/22  0729 05/28/22  1211 05/27/22  1157 05/25/22  1449 02/11/22  1111   HGB 16.0 15.8   < > 13.7 14.0   < >  --    < > 17.8*    208   < > 206 168   < >  --    < >  --    INR  --   --   --   --   --   --  1.33*  --   --    NA  --   --   --  136 137   < >  --    < > 136   POTASSIUM  --   --   --  3.5 3.9   " < >  --    < > 4.1   CR 0.66 0.79   < > 0.74 0.77   < >  --    < > 0.92   A1C  --   --   --  6.3*  --   --   --   --  6.6*    < > = values in this interval not displayed.        Diagnostics:  Labs pending at this time.  Results will be reviewed when available.   EKG (7/21/22): appears normal, NSR, normal axis, normal intervals, no acute ST/T changes c/w ischemia, no LVH by voltage criteria, unchanged from previous tracings    Revised Cardiac Risk Index (RCRI):  The patient has the following serious cardiovascular risks for perioperative complications:   - No serious cardiac risks = 0 points     RCRI Interpretation: 0 points: Class I (very low risk - 0.4% complication rate)           Signed Electronically by: Donyn Payne MD  Copy of this evaluation report is provided to requesting physician.      Answers for HPI/ROS submitted by the patient on 7/14/2022  What is the reason for your visit today? : Pre op physical  How many servings of fruits and vegetables do you eat daily?: 0-1  On average, how many sweetened beverages do you drink each day (Examples: soda, juice, sweet tea, etc.  Do NOT count diet or artificially sweetened beverages)?: 1  How many minutes a day do you exercise enough to make your heart beat faster?: 9 or less  How many days a week do you exercise enough to make your heart beat faster?: 3 or less  How many days per week do you miss taking your medication?: 1  What makes it hard for you to take your medication every day?: remembering to take

## 2022-07-21 NOTE — PATIENT INSTRUCTIONS
" Blood pressure startign to go higher since you are back healing and back home and further away from the acute illness.     Resume Lisinopril HCTZ one tablet daily in the morning.      Goal blood pressure under 140/90 more often than not.       Resume Pravastatin 40 mg once per day after the gall bladder surgery.       Continue all other medications at the same doses.  Contact your usual pharmacy if you need refills.      Return to see me in 3-4 months (Oct/Nov 2022), sooner if needed.  Please get fasting labs done at the [facility] or any other [facility] lab 1-2 days before this appointment (schedule a \"lab appointment\").  If you get the labs done at another clinic, make arrangements with them directly.  The orders will be in place.  Eat nothing for at least 8 hours prior to having these labs drawn.  Use [employer] or Call [phone] to schedule the appointment with me and lab appointment.              PRE-OPERATIVE INSTRUCTIONS:     *  Contact your surgeon if there is any change in your health. This includes signs of new infection, such as cold or flu (such as a sore throat, runny nose, cough, rash or fever).  Elective surgeries may need to be postponed if there is significant illness present.    *  Confirm any Covid testing requirements before your procedure.   Be aware that each surgery facility has their specific Covid testing requirements.  Many surgery facilities require Covid testing within 2-4 days of the surgery.  Typically the surgeon's office is responsible for arranging and completing this testing before surgery.    Follow any instructions you have been given.  Contact the surgery office or surgery center for questions about Covid testing requirements.      *  Covid testing may be performed at most [facility] if needed.     --[facility] Covid Scheduling number: [phone]   (to arrange Covid testing and/or Covid vaccine appointments within the [facility] [place] system)      *  Stop aspirin of any kind for 7 days before procedure.   (even low dose daily aspirin).    *  No NSAIDs (Motrin, Advil, ibuprofen, Aleve, etc) within 5-7 days of surgery.    *  Stop any Fish Oil or multi vitamin (and specifically anything containing vitamin E) supplements for 7 days prior to surgery because these can affect platelet function.      *  Tylenol (acetaminophen) is OK to take if needed, this medication has no effect on platelet function.  Follow instructions on the bottle    *  Prepare your body as instructed by the surgery clinic.  If instructed, Take a shower or bath the night before surgery. Use any special soaps or cleaning instructions according to instructions from your surgeon.  If you do not have soap from your surgeon, use your regular soap. Do not shave or scrub the surgery site.  Wear clean pajamas and have clean sheets on your bed     *  ON THE MORNING OF SURGERY:     --Be sure to use your inhalers.    --Do NOT take Lisinopril HCTZ on the morning of surgery    --Take all other medications.      *  Resume all medications at the same doses after surgery, unless instructed otherwise by the medical staff.     *  Attend all follow up appointments with the surgeons (and/or therapists if applicable) as instructed.     *  Contact the surgeon's office for any specific questions about after-surgery cares and follow up instructions.      *  You do not need to necessarily return to see us after your surgery unless instructed to do so.        IF YOU REQUIRE NARCOTIC PAIN MEDICATION AFTER YOUR SURGERY:    --Take the narcotic pain medication exactly as prescribed, and use the absolute lowest dose needed for pain control.   The goal of pain medication is not complete pain relief, aim to just make it manageable.    --Beware of drowsiness, nausea, vomiting, when taking this medication.  Do not drive, or operate dangerous equipment after taking this.    --The main side effects from  narcotic pain medication can also include intestinal side effects including nausea, vomiting, constipation, or diarrhea.    --If your pain is not able to be controlled with the pain medication supplied to you, contact the surgeons because uncontrolled pain can be a sign of possible surgical complications.    --In case of constipation from pain medications, take over the counter Miralax powder or stool softner Senokot.  If you have a history of constipation with narcotic pain medication, consider taking Miralax powder on any day that you take a pain tablet.

## 2022-08-16 ENCOUNTER — ANESTHESIA EVENT (OUTPATIENT)
Dept: SURGERY | Facility: CLINIC | Age: 67
DRG: 417 | End: 2022-08-16
Payer: COMMERCIAL

## 2022-08-16 ENCOUNTER — ANESTHESIA (OUTPATIENT)
Dept: SURGERY | Facility: CLINIC | Age: 67
DRG: 417 | End: 2022-08-16
Payer: COMMERCIAL

## 2022-08-16 ENCOUNTER — HOSPITAL ENCOUNTER (INPATIENT)
Facility: CLINIC | Age: 67
LOS: 2 days | Discharge: HOME | DRG: 417 | End: 2022-08-19
Attending: SURGERY | Admitting: SURGERY
Payer: COMMERCIAL

## 2022-08-16 ENCOUNTER — APPOINTMENT (OUTPATIENT)
Dept: GENERAL RADIOLOGY | Facility: CLINIC | Age: 67
DRG: 417 | End: 2022-08-16
Attending: SURGERY
Payer: COMMERCIAL

## 2022-08-16 DIAGNOSIS — R93.2 ABNORMAL CHOLANGIOGRAM: ICD-10-CM

## 2022-08-16 DIAGNOSIS — K81.9 CHOLECYSTITIS: ICD-10-CM

## 2022-08-16 DIAGNOSIS — G89.18 POSTOPERATIVE PAIN: Primary | ICD-10-CM

## 2022-08-16 LAB
ALBUMIN SERPL-MCNC: 3.5 G/DL (ref 3.4–5)
ALP SERPL-CCNC: 100 U/L (ref 40–150)
ALT SERPL W P-5'-P-CCNC: 146 U/L (ref 0–70)
AST SERPL W P-5'-P-CCNC: 297 U/L (ref 0–45)
BILIRUB DIRECT SERPL-MCNC: 0.8 MG/DL (ref 0–0.2)
BILIRUB SERPL-MCNC: 1.8 MG/DL (ref 0.2–1.3)
HOLD SPECIMEN: NORMAL
POTASSIUM BLD-SCNC: 4.1 MMOL/L (ref 3.4–5.3)
PROT SERPL-MCNC: 6.9 G/DL (ref 6.8–8.8)

## 2022-08-16 PROCEDURE — 47563 LAPARO CHOLECYSTECTOMY/GRAPH: CPT | Performed by: SURGERY

## 2022-08-16 PROCEDURE — 710N000009 HC RECOVERY PHASE 1, LEVEL 1, PER MIN: Performed by: SURGERY

## 2022-08-16 PROCEDURE — 250N000011 HC RX IP 250 OP 636: Performed by: SURGERY

## 2022-08-16 PROCEDURE — 250N000013 HC RX MED GY IP 250 OP 250 PS 637: Performed by: SURGERY

## 2022-08-16 PROCEDURE — 360N000087 HC SURGERY LEVEL 7 W/ FLUORO, PER MIN: Performed by: SURGERY

## 2022-08-16 PROCEDURE — 84132 ASSAY OF SERUM POTASSIUM: CPT | Performed by: ANESTHESIOLOGY

## 2022-08-16 PROCEDURE — 250N000025 HC SEVOFLURANE, PER MIN: Performed by: SURGERY

## 2022-08-16 PROCEDURE — 36415 COLL VENOUS BLD VENIPUNCTURE: CPT | Performed by: SURGERY

## 2022-08-16 PROCEDURE — 250N000009 HC RX 250: Performed by: ANESTHESIOLOGY

## 2022-08-16 PROCEDURE — 80076 HEPATIC FUNCTION PANEL: CPT | Performed by: SURGERY

## 2022-08-16 PROCEDURE — 272N000001 HC OR GENERAL SUPPLY STERILE: Performed by: SURGERY

## 2022-08-16 PROCEDURE — S2900 ROBOTIC SURGICAL SYSTEM: HCPCS | Performed by: SURGERY

## 2022-08-16 PROCEDURE — BF101ZZ FLUOROSCOPY OF BILE DUCTS USING LOW OSMOLAR CONTRAST: ICD-10-PCS | Performed by: SURGERY

## 2022-08-16 PROCEDURE — 47563 LAPARO CHOLECYSTECTOMY/GRAPH: CPT | Mod: AS | Performed by: PHYSICIAN ASSISTANT

## 2022-08-16 PROCEDURE — 258N000003 HC RX IP 258 OP 636: Performed by: ANESTHESIOLOGY

## 2022-08-16 PROCEDURE — 0FT44ZZ RESECTION OF GALLBLADDER, PERCUTANEOUS ENDOSCOPIC APPROACH: ICD-10-PCS | Performed by: SURGERY

## 2022-08-16 PROCEDURE — 36415 COLL VENOUS BLD VENIPUNCTURE: CPT | Performed by: ANESTHESIOLOGY

## 2022-08-16 PROCEDURE — 250N000013 HC RX MED GY IP 250 OP 250 PS 637: Performed by: PHYSICIAN ASSISTANT

## 2022-08-16 PROCEDURE — 370N000017 HC ANESTHESIA TECHNICAL FEE, PER MIN: Performed by: SURGERY

## 2022-08-16 PROCEDURE — 999N000179 XR SURGERY CARM FLUORO LESS THAN 5 MIN W STILLS

## 2022-08-16 PROCEDURE — 250N000011 HC RX IP 250 OP 636: Performed by: ANESTHESIOLOGY

## 2022-08-16 PROCEDURE — 999N000141 HC STATISTIC PRE-PROCEDURE NURSING ASSESSMENT: Performed by: SURGERY

## 2022-08-16 PROCEDURE — 8E0W4CZ ROBOTIC ASSISTED PROCEDURE OF TRUNK REGION, PERCUTANEOUS ENDOSCOPIC APPROACH: ICD-10-PCS | Performed by: SURGERY

## 2022-08-16 PROCEDURE — 88304 TISSUE EXAM BY PATHOLOGIST: CPT | Mod: TC | Performed by: SURGERY

## 2022-08-16 PROCEDURE — 250N000009 HC RX 250: Performed by: SURGERY

## 2022-08-16 PROCEDURE — 255N000002 HC RX 255 OP 636: Performed by: SURGERY

## 2022-08-16 RX ORDER — ONDANSETRON 2 MG/ML
4 INJECTION INTRAMUSCULAR; INTRAVENOUS EVERY 30 MIN PRN
Status: DISCONTINUED | OUTPATIENT
Start: 2022-08-16 | End: 2022-08-16 | Stop reason: HOSPADM

## 2022-08-16 RX ORDER — IPRATROPIUM BROMIDE AND ALBUTEROL SULFATE 2.5; .5 MG/3ML; MG/3ML
3 SOLUTION RESPIRATORY (INHALATION)
Status: DISCONTINUED | OUTPATIENT
Start: 2022-08-16 | End: 2022-08-16 | Stop reason: HOSPADM

## 2022-08-16 RX ORDER — NALOXONE HYDROCHLORIDE 0.4 MG/ML
0.2 INJECTION, SOLUTION INTRAMUSCULAR; INTRAVENOUS; SUBCUTANEOUS
Status: DISCONTINUED | OUTPATIENT
Start: 2022-08-16 | End: 2022-08-19 | Stop reason: HOSPADM

## 2022-08-16 RX ORDER — NALOXONE HYDROCHLORIDE 0.4 MG/ML
0.4 INJECTION, SOLUTION INTRAMUSCULAR; INTRAVENOUS; SUBCUTANEOUS
Status: DISCONTINUED | OUTPATIENT
Start: 2022-08-16 | End: 2022-08-19 | Stop reason: HOSPADM

## 2022-08-16 RX ORDER — AMOXICILLIN 250 MG
1-2 CAPSULE ORAL 2 TIMES DAILY
Qty: 30 TABLET | Refills: 0 | Status: SHIPPED | OUTPATIENT
Start: 2022-08-16 | End: 2022-10-21

## 2022-08-16 RX ORDER — ONDANSETRON 2 MG/ML
INJECTION INTRAMUSCULAR; INTRAVENOUS PRN
Status: DISCONTINUED | OUTPATIENT
Start: 2022-08-16 | End: 2022-08-16

## 2022-08-16 RX ORDER — IOPAMIDOL 612 MG/ML
INJECTION, SOLUTION INTRAVASCULAR PRN
Status: DISCONTINUED | OUTPATIENT
Start: 2022-08-16 | End: 2022-08-16 | Stop reason: HOSPADM

## 2022-08-16 RX ORDER — PRAVASTATIN SODIUM 40 MG
40 TABLET ORAL AT BEDTIME
Status: DISCONTINUED | OUTPATIENT
Start: 2022-08-16 | End: 2022-08-19 | Stop reason: HOSPADM

## 2022-08-16 RX ORDER — CEFAZOLIN SODIUM/WATER 2 G/20 ML
2 SYRINGE (ML) INTRAVENOUS SEE ADMIN INSTRUCTIONS
Status: DISCONTINUED | OUTPATIENT
Start: 2022-08-16 | End: 2022-08-16 | Stop reason: HOSPADM

## 2022-08-16 RX ORDER — OXYCODONE HYDROCHLORIDE 5 MG/1
5 TABLET ORAL EVERY 4 HOURS PRN
Qty: 10 TABLET | Refills: 0 | Status: SHIPPED | OUTPATIENT
Start: 2022-08-16 | End: 2022-10-21

## 2022-08-16 RX ORDER — SODIUM CHLORIDE, SODIUM LACTATE, POTASSIUM CHLORIDE, CALCIUM CHLORIDE 600; 310; 30; 20 MG/100ML; MG/100ML; MG/100ML; MG/100ML
INJECTION, SOLUTION INTRAVENOUS CONTINUOUS
Status: DISCONTINUED | OUTPATIENT
Start: 2022-08-16 | End: 2022-08-16 | Stop reason: HOSPADM

## 2022-08-16 RX ORDER — ONDANSETRON 4 MG/1
4 TABLET, ORALLY DISINTEGRATING ORAL EVERY 6 HOURS PRN
Status: DISCONTINUED | OUTPATIENT
Start: 2022-08-16 | End: 2022-08-19 | Stop reason: HOSPADM

## 2022-08-16 RX ORDER — ALBUTEROL SULFATE 90 UG/1
2 AEROSOL, METERED RESPIRATORY (INHALATION) EVERY 4 HOURS PRN
Status: DISCONTINUED | OUTPATIENT
Start: 2022-08-16 | End: 2022-08-19 | Stop reason: HOSPADM

## 2022-08-16 RX ORDER — ONDANSETRON 2 MG/ML
4 INJECTION INTRAMUSCULAR; INTRAVENOUS EVERY 6 HOURS PRN
Status: DISCONTINUED | OUTPATIENT
Start: 2022-08-16 | End: 2022-08-19 | Stop reason: HOSPADM

## 2022-08-16 RX ORDER — LISINOPRIL AND HYDROCHLOROTHIAZIDE 12.5; 2 MG/1; MG/1
1 TABLET ORAL EVERY MORNING
Status: DISCONTINUED | OUTPATIENT
Start: 2022-08-17 | End: 2022-08-19 | Stop reason: HOSPADM

## 2022-08-16 RX ORDER — HYDROXYZINE HYDROCHLORIDE 50 MG/ML
25 INJECTION, SOLUTION INTRAMUSCULAR ONCE
Status: COMPLETED | OUTPATIENT
Start: 2022-08-16 | End: 2022-08-16

## 2022-08-16 RX ORDER — INDOCYANINE GREEN AND WATER 25 MG
1.25 KIT INJECTION ONCE
Status: COMPLETED | OUTPATIENT
Start: 2022-08-16 | End: 2022-08-16

## 2022-08-16 RX ORDER — PROPOFOL 10 MG/ML
INJECTION, EMULSION INTRAVENOUS PRN
Status: DISCONTINUED | OUTPATIENT
Start: 2022-08-16 | End: 2022-08-16

## 2022-08-16 RX ORDER — HYDROMORPHONE HCL IN WATER/PF 6 MG/30 ML
0.4 PATIENT CONTROLLED ANALGESIA SYRINGE INTRAVENOUS
Status: DISCONTINUED | OUTPATIENT
Start: 2022-08-16 | End: 2022-08-19 | Stop reason: HOSPADM

## 2022-08-16 RX ORDER — OXYCODONE HYDROCHLORIDE 5 MG/1
10 TABLET ORAL EVERY 4 HOURS PRN
Status: DISCONTINUED | OUTPATIENT
Start: 2022-08-16 | End: 2022-08-19 | Stop reason: HOSPADM

## 2022-08-16 RX ORDER — PROPOFOL 10 MG/ML
INJECTION, EMULSION INTRAVENOUS CONTINUOUS PRN
Status: DISCONTINUED | OUTPATIENT
Start: 2022-08-16 | End: 2022-08-16

## 2022-08-16 RX ORDER — BUPIVACAINE HYDROCHLORIDE 5 MG/ML
INJECTION, SOLUTION PERINEURAL PRN
Status: DISCONTINUED | OUTPATIENT
Start: 2022-08-16 | End: 2022-08-16 | Stop reason: HOSPADM

## 2022-08-16 RX ORDER — LIDOCAINE HYDROCHLORIDE 20 MG/ML
INJECTION, SOLUTION INFILTRATION; PERINEURAL PRN
Status: DISCONTINUED | OUTPATIENT
Start: 2022-08-16 | End: 2022-08-16

## 2022-08-16 RX ORDER — OXYCODONE HYDROCHLORIDE 5 MG/1
5 TABLET ORAL EVERY 4 HOURS PRN
Status: DISCONTINUED | OUTPATIENT
Start: 2022-08-16 | End: 2022-08-19 | Stop reason: HOSPADM

## 2022-08-16 RX ORDER — OXYCODONE HYDROCHLORIDE 5 MG/1
5 TABLET ORAL EVERY 4 HOURS PRN
Status: DISCONTINUED | OUTPATIENT
Start: 2022-08-16 | End: 2022-08-16 | Stop reason: HOSPADM

## 2022-08-16 RX ORDER — HYDROMORPHONE HCL IN WATER/PF 6 MG/30 ML
0.2 PATIENT CONTROLLED ANALGESIA SYRINGE INTRAVENOUS EVERY 5 MIN PRN
Status: DISCONTINUED | OUTPATIENT
Start: 2022-08-16 | End: 2022-08-16 | Stop reason: HOSPADM

## 2022-08-16 RX ORDER — ONDANSETRON 4 MG/1
4 TABLET, ORALLY DISINTEGRATING ORAL EVERY 30 MIN PRN
Status: DISCONTINUED | OUTPATIENT
Start: 2022-08-16 | End: 2022-08-16 | Stop reason: HOSPADM

## 2022-08-16 RX ORDER — AMLODIPINE BESYLATE 10 MG/1
10 TABLET ORAL DAILY
Status: DISCONTINUED | OUTPATIENT
Start: 2022-08-17 | End: 2022-08-19 | Stop reason: HOSPADM

## 2022-08-16 RX ORDER — OXYCODONE HYDROCHLORIDE 5 MG/1
5 TABLET ORAL
Status: DISCONTINUED | OUTPATIENT
Start: 2022-08-16 | End: 2022-08-16

## 2022-08-16 RX ORDER — ACETAMINOPHEN 325 MG/1
650 TABLET ORAL
Status: DISCONTINUED | OUTPATIENT
Start: 2022-08-16 | End: 2022-08-16

## 2022-08-16 RX ORDER — FENTANYL CITRATE 50 UG/ML
INJECTION, SOLUTION INTRAMUSCULAR; INTRAVENOUS PRN
Status: DISCONTINUED | OUTPATIENT
Start: 2022-08-16 | End: 2022-08-16

## 2022-08-16 RX ORDER — CEFAZOLIN SODIUM/WATER 2 G/20 ML
2 SYRINGE (ML) INTRAVENOUS
Status: DISCONTINUED | OUTPATIENT
Start: 2022-08-16 | End: 2022-08-16 | Stop reason: HOSPADM

## 2022-08-16 RX ORDER — LABETALOL HYDROCHLORIDE 5 MG/ML
INJECTION, SOLUTION INTRAVENOUS PRN
Status: DISCONTINUED | OUTPATIENT
Start: 2022-08-16 | End: 2022-08-16

## 2022-08-16 RX ORDER — MAGNESIUM HYDROXIDE 1200 MG/15ML
LIQUID ORAL PRN
Status: DISCONTINUED | OUTPATIENT
Start: 2022-08-16 | End: 2022-08-16 | Stop reason: HOSPADM

## 2022-08-16 RX ORDER — LIDOCAINE 40 MG/G
CREAM TOPICAL
Status: DISCONTINUED | OUTPATIENT
Start: 2022-08-16 | End: 2022-08-19 | Stop reason: HOSPADM

## 2022-08-16 RX ORDER — ALLOPURINOL 100 MG/1
200 TABLET ORAL DAILY
Status: DISCONTINUED | OUTPATIENT
Start: 2022-08-17 | End: 2022-08-19 | Stop reason: HOSPADM

## 2022-08-16 RX ORDER — LIDOCAINE 40 MG/G
CREAM TOPICAL
Status: DISCONTINUED | OUTPATIENT
Start: 2022-08-16 | End: 2022-08-16 | Stop reason: HOSPADM

## 2022-08-16 RX ORDER — IBUPROFEN 600 MG/1
600 TABLET, FILM COATED ORAL EVERY 6 HOURS PRN
Status: DISCONTINUED | OUTPATIENT
Start: 2022-08-16 | End: 2022-08-19 | Stop reason: HOSPADM

## 2022-08-16 RX ORDER — HYDROMORPHONE HCL IN WATER/PF 6 MG/30 ML
0.2 PATIENT CONTROLLED ANALGESIA SYRINGE INTRAVENOUS
Status: DISCONTINUED | OUTPATIENT
Start: 2022-08-16 | End: 2022-08-19 | Stop reason: HOSPADM

## 2022-08-16 RX ORDER — DEXAMETHASONE SODIUM PHOSPHATE 4 MG/ML
INJECTION, SOLUTION INTRA-ARTICULAR; INTRALESIONAL; INTRAMUSCULAR; INTRAVENOUS; SOFT TISSUE PRN
Status: DISCONTINUED | OUTPATIENT
Start: 2022-08-16 | End: 2022-08-16

## 2022-08-16 RX ORDER — FENTANYL CITRATE 0.05 MG/ML
25 INJECTION, SOLUTION INTRAMUSCULAR; INTRAVENOUS EVERY 5 MIN PRN
Status: DISCONTINUED | OUTPATIENT
Start: 2022-08-16 | End: 2022-08-16 | Stop reason: HOSPADM

## 2022-08-16 RX ORDER — KETOROLAC TROMETHAMINE 30 MG/ML
30 INJECTION, SOLUTION INTRAMUSCULAR; INTRAVENOUS ONCE
Status: COMPLETED | OUTPATIENT
Start: 2022-08-16 | End: 2022-08-16

## 2022-08-16 RX ORDER — ACETAMINOPHEN 325 MG/1
650 TABLET ORAL EVERY 6 HOURS PRN
Status: DISCONTINUED | OUTPATIENT
Start: 2022-08-16 | End: 2022-08-19 | Stop reason: HOSPADM

## 2022-08-16 RX ADMIN — ROCURONIUM BROMIDE 10 MG: 50 INJECTION, SOLUTION INTRAVENOUS at 13:33

## 2022-08-16 RX ADMIN — PRAVASTATIN SODIUM 40 MG: 40 TABLET ORAL at 21:15

## 2022-08-16 RX ADMIN — ACETAMINOPHEN 650 MG: 325 TABLET ORAL at 17:43

## 2022-08-16 RX ADMIN — ROCURONIUM BROMIDE 10 MG: 50 INJECTION, SOLUTION INTRAVENOUS at 14:30

## 2022-08-16 RX ADMIN — ROCURONIUM BROMIDE 50 MG: 50 INJECTION, SOLUTION INTRAVENOUS at 13:11

## 2022-08-16 RX ADMIN — ONDANSETRON 4 MG: 2 INJECTION INTRAMUSCULAR; INTRAVENOUS at 14:29

## 2022-08-16 RX ADMIN — IPRATROPIUM BROMIDE AND ALBUTEROL SULFATE 3 ML: .5; 3 SOLUTION RESPIRATORY (INHALATION) at 16:39

## 2022-08-16 RX ADMIN — MIDAZOLAM 1 MG: 1 INJECTION INTRAMUSCULAR; INTRAVENOUS at 13:07

## 2022-08-16 RX ADMIN — FENTANYL CITRATE 50 MCG: 50 INJECTION, SOLUTION INTRAMUSCULAR; INTRAVENOUS at 13:11

## 2022-08-16 RX ADMIN — LABETALOL HYDROCHLORIDE 10 MG: 5 INJECTION INTRAVENOUS at 13:56

## 2022-08-16 RX ADMIN — FENTANYL CITRATE 25 MCG: 50 INJECTION, SOLUTION INTRAMUSCULAR; INTRAVENOUS at 14:00

## 2022-08-16 RX ADMIN — Medication 2 G: at 13:08

## 2022-08-16 RX ADMIN — SODIUM CHLORIDE, POTASSIUM CHLORIDE, SODIUM LACTATE AND CALCIUM CHLORIDE: 600; 310; 30; 20 INJECTION, SOLUTION INTRAVENOUS at 16:01

## 2022-08-16 RX ADMIN — PROPOFOL 50 MCG/KG/MIN: 10 INJECTION, EMULSION INTRAVENOUS at 13:18

## 2022-08-16 RX ADMIN — SODIUM CHLORIDE, POTASSIUM CHLORIDE, SODIUM LACTATE AND CALCIUM CHLORIDE: 600; 310; 30; 20 INJECTION, SOLUTION INTRAVENOUS at 13:07

## 2022-08-16 RX ADMIN — INDOCYANINE GREEN AND WATER 1.25 MG: KIT at 11:12

## 2022-08-16 RX ADMIN — HYDROXYZINE HYDROCHLORIDE 25 MG: 50 INJECTION, SOLUTION INTRAMUSCULAR at 18:04

## 2022-08-16 RX ADMIN — LIDOCAINE HYDROCHLORIDE 100 MG: 20 INJECTION, SOLUTION INFILTRATION; PERINEURAL at 13:11

## 2022-08-16 RX ADMIN — PHENYLEPHRINE HYDROCHLORIDE 50 MCG: 10 INJECTION INTRAVENOUS at 14:54

## 2022-08-16 RX ADMIN — SUGAMMADEX 200 MG: 100 INJECTION, SOLUTION INTRAVENOUS at 15:15

## 2022-08-16 RX ADMIN — DEXAMETHASONE SODIUM PHOSPHATE 4 MG: 4 INJECTION, SOLUTION INTRA-ARTICULAR; INTRALESIONAL; INTRAMUSCULAR; INTRAVENOUS; SOFT TISSUE at 13:25

## 2022-08-16 RX ADMIN — ROCURONIUM BROMIDE 10 MG: 50 INJECTION, SOLUTION INTRAVENOUS at 14:46

## 2022-08-16 RX ADMIN — FENTANYL CITRATE 25 MCG: 50 INJECTION, SOLUTION INTRAMUSCULAR; INTRAVENOUS at 16:10

## 2022-08-16 RX ADMIN — PHENYLEPHRINE HYDROCHLORIDE 50 MCG: 10 INJECTION INTRAVENOUS at 14:44

## 2022-08-16 RX ADMIN — PHENYLEPHRINE HYDROCHLORIDE 100 MCG: 10 INJECTION INTRAVENOUS at 14:57

## 2022-08-16 RX ADMIN — ROCURONIUM BROMIDE 20 MG: 50 INJECTION, SOLUTION INTRAVENOUS at 14:00

## 2022-08-16 RX ADMIN — KETOROLAC TROMETHAMINE 30 MG: 30 INJECTION, SOLUTION INTRAMUSCULAR; INTRAVENOUS at 17:02

## 2022-08-16 RX ADMIN — PROPOFOL 200 MG: 10 INJECTION, EMULSION INTRAVENOUS at 13:11

## 2022-08-16 ASSESSMENT — COPD QUESTIONNAIRES
CAT_SEVERITY: MODERATE
COPD: 1

## 2022-08-16 ASSESSMENT — ACTIVITIES OF DAILY LIVING (ADL)
ADLS_ACUITY_SCORE: 35

## 2022-08-16 ASSESSMENT — LIFESTYLE VARIABLES: TOBACCO_USE: 1

## 2022-08-16 ASSESSMENT — ENCOUNTER SYMPTOMS
DYSRHYTHMIAS: 0
SEIZURES: 0

## 2022-08-16 NOTE — OR NURSING
Dr. Shah at bedside. Patient 02 sats 91-93% on oxymask 5L. OK for patient to transfer to general surgery.

## 2022-08-16 NOTE — OR NURSING
Dr. Shah notified of continued low oxygen levels. Currently 87% on 4L NC. Patient using IS, awake and answering questions appropriately. States he feels tense due to pain, but not requiring narcotics. Usha acevedo.

## 2022-08-16 NOTE — ANESTHESIA PREPROCEDURE EVALUATION
Anesthesia Pre-Procedure Evaluation    Patient: Gabe Smith   MRN: [id number] : 1955        Procedure : Procedure(s):  ROBOT-ASSISTED, LAPAROSCOPIC CHOLECYSTECTOMY WITH CHOLANGIOGRAM          Past Medical History:   Diagnosis Date     Abdominal aortic aneurysm (AAA) without rupture (H) 2021    AAA 3.6 x 3.6 cm per ultrasound     COPD (chronic obstructive pulmonary disease) (H)      Dysmetabolic syndrome X      Hyperlipidemia      Hypertension      Osteoarthrosis      Prediabetes 10/09/2014    A1C 6.1     Tobacco use disorder       Past Surgical History:   Procedure Laterality Date     COLONOSCOPY N/A 10/19/2015    Procedure: COMBINED COLONOSCOPY, SINGLE OR MULTIPLE BIOPSY/POLYPECTOMY BY BIOPSY;  Surgeon: Gume Vidal MD;  Location:  GI     SURGICAL HISTORY OF -       Left MOISES     SURGICAL HISTORY OF -       Unspecified knee surgeries as a child     SURGICAL HISTORY OF -   10/2010    Right MOISES, with left knee arthroscopy, meniscectomy      No Known Allergies   Social History     Tobacco Use     Smoking status: Current Every Day Smoker     Packs/day: 1.00     Types: Cigarettes     Smokeless tobacco: Never Used     Tobacco comment: 1.5 PPD-now down to .5 PPD   Substance Use Topics     Alcohol use: Yes     Comment: 2-3 drinks per night (double)      Wt Readings from Last 1 Encounters:   22 93.3 kg (205 lb 11.2 oz)        Prior to Admission medications    Medication Sig Start Date End Date Taking? Authorizing Provider   acetaminophen (TYLENOL) 325 MG tablet Take 2 tablets (650 mg) by mouth every 6 hours as needed for mild pain or other (and adjunct with moderate or severe pain or per patient request) 22   Hossein Landrum MD   albuterol (PROAIR HFA/PROVENTIL HFA/VENTOLIN HFA) 108 (90 Base) MCG/ACT inhaler Inhale 2 puffs into the lungs every 4 hours as needed for shortness of breath / dyspnea or wheezing 22   Donny Payne MD   albuterol (PROVENTIL) (2.5 MG/3ML)  0.083% neb solution Take 1 vial (2.5 mg) by nebulization every 6 hours as needed for shortness of breath / dyspnea or wheezing 2/11/22   Donny Payne MD   allopurinol (ZYLOPRIM) 100 MG tablet Take 2 tablets (200 mg) by mouth daily 9/3/21   Donny Payne MD   amLODIPine (NORVASC) 10 MG tablet Take 1 tablet (10 mg) by mouth daily 9/3/21   Donny Payne MD   aspirin 81 MG tablet Take 1 tablet (81 mg) by mouth daily 7/7/14   Donny Payne MD   fluticasone-salmeterol (AIRDUO RESPICLICK) 113-14 MCG/ACT inhaler Inhale 1 puff into the lungs 2 times daily 2/11/22   Donny Payne MD   lisinopril-hydrochlorothiazide (ZESTORETIC) 20-12.5 MG tablet Take 1 tablet by mouth every morning 7/21/22   Donny Payne MD   pravastatin (PRAVACHOL) 40 MG tablet Take 1 tablet (40 mg) by mouth At Bedtime 7/21/22   Donny Payne MD   sodium chloride, PF, 0.9% PF flush Irrigate with 10 mLs as directed daily 5/29/22   Helder Landrum PA-C     ECG 7/26/22: Sinus  Rhythm  - occasional ectopic ventricular beat      Rightward P/QRS axis and rotation -possible pulmonary disease.     Anesthesia Evaluation   Pt has had prior anesthetic. Type: General.    No history of anesthetic complications       ROS/MED HX  ENT/Pulmonary:     (+) tobacco use, moderate,  COPD,  (-) asthma and sleep apnea   Neurologic:    (-) no seizures, no CVA and migraines   Cardiovascular:     (+) Dyslipidemia hypertension-Peripheral Vascular Disease (AAA - 4.0cm)---- (-) CAD, YANCEY, arrhythmias and valvular problems/murmurs   METS/Exercise Tolerance:     Hematologic:    (-) history of blood clots and anemia   Musculoskeletal: Comment: gout  (+) arthritis,     GI/Hepatic:     (+) cholecystitis/cholelithiasis,  (-) GERD and liver disease   Renal/Genitourinary:    (-) renal disease and nephrolithiasis   Endo:     (+) type II DM,  (-) Type I DM, thyroid disease and obesity   Psychiatric/Substance Use:    (-)  psychiatric history   Infectious Disease:    (-) Recent Fever   Malignancy:       Other:            Physical Exam    Airway        Mallampati: III   TM distance: > 3 FB   Neck ROM: full   Mouth opening: > 3 cm    Respiratory Devices and Support         Dental       (+) chipped      Cardiovascular   cardiovascular exam normal       Rhythm and rate: normal     Pulmonary   pulmonary exam normal        breath sounds clear to auscultation           OUTSIDE LABS:  CBC:   Lab Results   Component Value Date    WBC 9.5 07/11/2022    WBC 10.2 07/04/2022    HGB 16.0 07/11/2022    HGB 15.8 07/04/2022    HCT 50.0 07/11/2022    HCT 48.4 07/04/2022     07/11/2022     07/04/2022     BMP:   Lab Results   Component Value Date     05/30/2022     05/29/2022    POTASSIUM 3.5 05/30/2022    POTASSIUM 3.9 05/29/2022    CHLORIDE 103 05/30/2022    CHLORIDE 104 05/29/2022    CO2 25 05/30/2022    CO2 28 05/29/2022    BUN 17 07/11/2022    BUN 15 07/04/2022    CR 0.66 07/11/2022    CR 0.79 07/04/2022    GLC 95 06/01/2022     (H) 05/31/2022     COAGS:   Lab Results   Component Value Date    PTT 29 10/29/2010    INR 1.33 (H) 05/27/2022     POC: No results found for: BGM, HCG, HCGS  HEPATIC:   Lab Results   Component Value Date    ALBUMIN 2.5 (L) 05/27/2022    PROTTOTAL 5.9 (L) 05/27/2022    ALT 48 05/27/2022    AST 19 07/11/2022    ALKPHOS 85 05/27/2022    BILITOTAL 1.5 (H) 05/27/2022     OTHER:   Lab Results   Component Value Date    LACT 1.2 05/26/2022    A1C 6.3 (H) 05/30/2022    DES 8.5 05/30/2022    LIPASE 33 (L) 05/25/2022    CRP 11.5 (H) 07/11/2022    SED 5 07/11/2022       Anesthesia Plan    ASA Status:  3   NPO Status:  NPO Appropriate    Anesthesia Type: General.     - Airway: ETT   Induction: Propofol.   Maintenance: Balanced.        Consents    Anesthesia Plan(s) and associated risks, benefits, and realistic alternatives discussed. Questions answered and patient/representative(s) expressed  understanding.    - Discussed:     - Discussed with:  Patient         Postoperative Care    Pain management: Multi-modal analgesia.   PONV prophylaxis: Ondansetron (or other 5HT-3), Dexamethasone or Solumedrol, Background Propofol Infusion     Comments:                Carmita Shah MD

## 2022-08-16 NOTE — OR NURSING
Call received from Dr. Leavitt regarding pt's post procedure hepatic lab values. Updated on pt status.  Writer informed MD that pt will be receiving a neb treatment shortly due to higher than normal oxygen requirements.  Plan to callback MD in 30-45min with update on plan for pt (ie. Admission vs discharge).

## 2022-08-16 NOTE — INTERVAL H&P NOTE
"I have reviewed the surgical (or preoperative) H&P that is linked to this encounter, and examined the patient. There are no significant changes    Clinical Conditions Present on Arrival:  Clinically Significant Risk Factors Present on Admission                   # Overweight: Estimated body mass index is 28.79 kg/m  as calculated from the following:    Height as of this encounter: 1.803 m (5' 11\").    Weight as of this encounter: 93.6 kg (206 lb 6.4 oz).       "

## 2022-08-16 NOTE — ANESTHESIA PROCEDURE NOTES
Airway       Patient location during procedure: OR       Procedure Start/Stop Times: 8/16/2022 1:19 PM  Staff -        Anesthesiologist:  Carmita Shah MD       CRNA: Lb Tavera APRN CRNA       Performed By: CRNA  Consent for Airway        Urgency: elective  Indications and Patient Condition       Indications for airway management: nayeli-procedural       Induction type:intravenous       Mask difficulty assessment: 2 - vent by mask + OA or adjuvant +/- NMBA    Final Airway Details       Final airway type: endotracheal airway       Successful airway: ETT - single  Endotracheal Airway Details        ETT size (mm): 8.0       Cuffed: yes       Successful intubation technique: direct laryngoscopy       DL Blade Type: MAC 3       Grade View of Cords: 2       Adjucts: stylet       Position: Right       Measured from: lips       Secured at (cm): 23       Bite block used: None    Post intubation assessment        Placement verified by: capnometry, equal breath sounds and chest rise        Number of attempts at approach: 1       Number of other approaches attempted: 0       Secured with: silk tape       Ease of procedure: easy       Dentition: Intact and Unchanged    Medication(s) Administered   Medication Administration Time: 8/16/2022 1:19 PM

## 2022-08-16 NOTE — ANESTHESIA POSTPROCEDURE EVALUATION
Patient: Gabe Smith    Procedure: Procedure(s):  ROBOT-ASSISTED, LAPAROSCOPIC CHOLECYSTECTOMY WITH CHOLANGIOGRAM       Anesthesia Type:  General    Note:  Disposition: Admission   Postop Pain Control: Uneventful            Sign Out: Well controlled pain   PONV: No   Neuro/Psych: Uneventful            Sign Out: Acceptable/Baseline neuro status   Airway/Respiratory: Uneventful            Sign Out: Acceptable/Baseline resp. status   CV/Hemodynamics: Uneventful            Sign Out: Acceptable CV status; No obvious hypovolemia; No obvious fluid overload   Other NRE: NONE   DID A NON-ROUTINE EVENT OCCUR? No           Last vitals:  Vitals Value Taken Time   /75 08/16/22 1631   Temp     Pulse 60 08/16/22 1637   Resp 18 08/16/22 1637   SpO2 94 % 08/16/22 1637   Vitals shown include unvalidated device data.    Electronically Signed By: Carmita Shah MD  August 16, 2022  4:39 PM

## 2022-08-16 NOTE — OR NURSING
Report called to [facility]. Patient transferred to 2206-1 on cart with belongings and filled discharge prescriptions (the discharge pharmacy is currently closed) via [facility]. Instructed Noem[facility] to give the prescriptions directly to [facility] or [facility]. Usha wife updated on transfer.

## 2022-08-16 NOTE — DISCHARGE INSTRUCTIONS
**You may take your aspirin tomorrow 8/17**    Same Day Surgery Discharge Instructions for  Sedation and General Anesthesia     It's not unusual to feel dizzy, light-headed or faint for up to 24 hours after surgery or while taking pain medication.  If you have these symptoms: sit for a few minutes before standing and have someone assist you when you get up to walk or use the bathroom.    You should rest and relax for the next 24 hours. We recommend you make arrangements to have an adult stay with you for at least 24 hours after your discharge.  Avoid hazardous and strenuous activity.    DO NOT DRIVE any vehicle or operate mechanical equipment for 24 hours following the end of your surgery.  Even though you may feel normal, your reactions may be affected by the medication you have received.    Do not drink alcoholic beverages for 24 hours following surgery.     Slowly progress to your regular diet as you feel able. It's not unusual to feel nauseated and/or vomit after receiving anesthesia.  If you develop these symptoms, drink clear liquids (apple juice, ginger ale, broth, 7-up, etc. ) until you feel better.  If your nausea and vomiting persists for 24 hours, please notify your surgeon.      All narcotic pain medications, along with inactivity and anesthesia, can cause constipation. Drinking plenty of liquids and increasing fiber intake will help.    For any questions of a medical nature, call your surgeon.    Do not make important decisions for 24 hours.    If you had general anesthesia, you may have a sore throat for a couple of days related to the breathing tube used during surgery.  You may use Cepacol lozenges to help with this discomfort.  If it worsens or if you develop a fever, contact your surgeon.     If you feel your pain is not well managed with the pain medications prescribed by your surgeon, please contact your surgeon's office to let them know so they can address your concerns.                  [facility]  Discharge Instructions: Post-Operative Robotic Cholecystectomy    ACTIVITY  Expect to feel tired after your surgery.  This will gradually resolve.    Take frequent, short walks and increase your activity gradually.    Avoid strenuous physical activity or heavy lifting greater than 15-20 lbs. for 2-3 weeks.  You may climb stairs.  You may drive without restrictions when you are not using any prescription pain medication and feel comfortable in a car.  You may return to work/school when you are comfortable without any prescription pain medication.    WOUND CARE  You may remove your outer dressing or Band-Aids and shower 48 hours after the surgery.  Pat your incisions dry and leave them open to air.  Re-apply dressing (Band-Aids or gauze/tape) as needed for comfort or drainage.  You have surgical glue on your incisions. Let this peel up and fall off on its own.  Do not soak your incisions in a tub or pool for 2 weeks.   Do not apply any lotions, creams, or ointments to your incisions.  A ridge under your incisions is normal and will gradually resolve.    DIET  Start with liquids, then gradually resume your regular diet as tolerated.  Avoid heavy, spicy, and greasy meals for 2-3 days.  Drink plenty of fluids to stay hydrated.  It is not uncommon to experience some loose stools or diarrhea after surgery.  This is your body's way of adapting to the bile which will slowly drain into your intestine.  A low fat diet may help with this.  This should improve over 1-2 months.    PAIN  Expect some tenderness and discomfort at the incision sites.  Use the prescribed pain medication at your discretion.  Expect gradual resolution of your pain over several days.  You may take ibuprofen with food (unless you have been told not to) or acetaminophen/Tylenol instead of or in addition to your prescribed pain medication.  You may take Tylenol 500 - 1000 mg every 6 hours as needed for pain - do not  exceed 4,000 mg of tylenol (acetaminophen) from all sources in 24 hours.  You may take Ibuprofen 400 - 600 mg every 6 hours as needed for pain - do not exceed 2,400 mg of ibuprofen from all sources in 24 hours. Do not use Ibuprofen for any longer than necessary or take if you have been told not to by another medical provider.  You may alternate Ibuprofen and Tylenol every 3 hours as needed for pain. Reserve the narcotic prescription for refractory pain.  Do not drink alcohol or drive while you are taking pain medications.  You may apply ice to your incisions in 20 minute intervals as needed for the next 48 hours.  After that time, consider switching to heat if you prefer.    EXPECTATIONS  Pain medications can cause constipation.  Limit use when possible.  Take over the counter stool softener/stimulant, such as Colace or Senna, 1-2 times a day with plenty of water.  You may take a mild over the counter laxative, such as Miralax or a suppository, as needed.  You may discontinue these medications once you are having regular bowel movements and/or are no longer taking your narcotic pain medication.    You may have shoulder or upper back discomfort due to the gas used in surgery.  This is temporary and should resolve in 48-72 hours.  Short, frequent walks may help with this.  If you are unable to urinate, or feel as though you are not emptying your bladder adequately, we recommend you call our office and/or seek care at an ER or Urgent Care facility if after hours.    FOLLOW UP  Our office will contact you in approximately 2 weeks to check on your progress and answer any questions you may have.  If you are doing well, you will not need to return for a follow up appointment.  If any concerns are identified over the phone, we will help you make an appointment to see a provider.   If you have not received a phone call, have any questions or concerns, or would like to be seen, please call us at [phone] and ask to speak  with our nurse.  We are located at [address] [place] [address]  [place] [id number].    CALL OUR OFFICE -[phone] IF YOU HAVE:   Chills or fever above 101 F.  Increased redness, warmth, or drainage at your incisions.  Significant bleeding.  Pain not relieved by your pain medication or rest.  Increasing pain after the first 48 hours.  Any other concerns or questions.                          **If you have concerns or questions about your procedure,    please contact Dr. Leavitt at  [phone]**

## 2022-08-16 NOTE — PROGRESS NOTES
[facility]    General Surgery  Short Progress Note    Gabe Smith underwent robotic cholecystectomy without complications. Patient needs STAT LFTs in PACU. If these are minimally elevated, will need repeat LFTs in 1 week. If these are wnl, patient may discharge home if pain is controlled with oral analgesia, tolerating diet, voiding well, and vital signs stable. Surgical discharge orders in place. Oxycodone and senokot sent to pharmacy.    Radha Shepherd PA-C

## 2022-08-16 NOTE — OP NOTE
Procedure Date: 08/16/22      STAFF SURGEON:  Carlota Leavitt MD      ASSISTANT:  JOSE Duong     PREOPERATIVE DIAGNOSIS:   History of multifocal liver abscesses with possible cholecystitis     POSTOPERATIVE DIAGNOSIS:   Cholecystitis, possible partial bile duct obstruction     PROCEDURES PERFORMED:  Robotic cholecystectomy with intraoperative cholangiograms     INDICATIONS:  Gabe is a 67-year-old male who was admitted to the hospital with multifocal liver abscesses.  The etiology was thought to be secondary to related cholecystitis.  The patient has been fully treated for his liver abscesses and has completed antibiotic therapy.  The decision was made for the patient to proceed to the operating room for robotic cholecystectomy with intraoperative cholangiograms.  The risks and benefits of the procedure were discussed with the patient and consent for the procedure was obtained.      ANESTHESIA:  General.      DESCRIPTION OF PROCEDURE:  The patient was brought to the preoperative area and preoperative antibiotics were administered.  The patient also received preoperative indocyanine green injection.  The patient was then taken back to the operating room and placed in the supine position on the operating table.  A timeout was completed.  Anesthesia was induced and the patient was intubated.  The patient was secured to the operating table and her pressure points were padded.  The patient's abdomen was prepped and draped in a sterile fashion.        Following infiltration with local anesthetic, the 11 blade scalpel was used to make a small left upper quadrant incision.  Entrance into the abdomen was then gained under direct visualization using the 5 mm Visiport and the 5 mm laparoscope.  When the patient's abdomen had been safely entered, it was fully insufflated.  The laparoscope was then reinserted into the abdominal cavity and initial inspection revealed no evidence of injury at the port entry site.  Under  direct visualization, 3 additional 8 mm robotic ports were placed extending in a line towards the patient's right mid abdomen.  The patient's left upper quadrant 5 mm port was then upsized to an 8 mm robotic port.  The patient was then placed into the reverse Trendelenburg position with a slight left tilt.  The robot was then brought onto the field and docked.  The robotic fenestrated bipolar grasper, robotic tip up grasper, and the robotic scissors were introduced into the abdominal cavity under direct visualization.  The tip up grasper was used to grasp the dome of the gallbladder, which was then reflected cephalad.  The fenestrated grasper was used to grasp the infundibulum of the gallbladder.  The gallbladder was found to have evidence of wall thickening, consistent with cholecystitis. The robotic scissors was then used to carefully dissect out the patient's cystic duct and cystic artery.  Firefly visualization was utilized throughout the dissection to confirm our anatomy.  When the patient's cystic duct and cystic artery had been completely dissected out and the critical view had been obtained the Pearson clamp was brought in through one of the robotic ports.  The cholangiogram catheter was advanced into the cystic duct.  We then obtained intraoperative cholangiograms.  There was no evidence of obvious choledocholithiasis.  However, there was only intermittent passage of contrast into the duodenum, possibly due to partial common duct obstruction.  The cholangiogram catheter and Pearson clamp were then removed from the abdominal cavity.  The cystic duct was doubly clipped proximally, singly clipped distally and divided.  The cystic artery was clipped proximally and distally and then divided utilizing the electrocautery on the robotic scissors.  The gallbladder was then carefully  free from its attachments to the liver bed utilizing the robotic scissors and electrocautery.  When the gallbladder had been  completely dissected free, it was placed into an Endocatch bag and removed through the patient's left upper quadrant port site. The patient's right upper quadrant was again surveyed.  The cystic duct and cystic artery clips were found to be intact.  There was no significant bleeding from the liver bed.  Hemostasis was ensured.  The right upper quadrant was irrigated with normal saline solution.    The patient's left upper quadrant port was removed.  The fascia at the port site was then reapproximated using a figure-of-eight 0 Vicryl suture and the Jaswinder-Antonio fascial closure device.     The remaining robotic equipment was removed from the abdominal cavity and the robot was undocked.  The patient's abdomen was allowed to desufflate fully.  The ports were removed and the port sites inspected and hemostasis was ensured.  The port sites were then reapproximated using 4-0 Monocryl subcuticular stitches.  The incisions were washed and dried and skin glue was applied.  Lap, needle and instrument counts were correct at the end of the procedure.  The patient tolerated the procedure well and was taken to the recovery area in stable condition.     Radha Shepherd PA-C assisted in the above procedure. They provided assistance with pre-operative positioning, prepping, and draping of the patient. The assistant provided vital operative assistance with retraction using instruments thus providing the necessary exposure and visualization for the case, manipulation of tissues to achieve hemostasis, suction for visualization and assisted in closure of the wound. Post-operatively they assisted in transfer of the patient off the operative table and transition to the PACU physicians.       ESTIMATED BLOOD LOSS:  15 mL      FINDINGS:   Gallbladder with wall thickening, consistent with cholecystitis.  Intraoperative cholangiograms with only intermittent passage of contrast into the duodenum, consistent with possible partial common  duct obstruction.     COMPLICATIONS:  None.      SPECIMENS:  Gallbladder and contents.      DRAINS:  None.      CONDITION:  The patient will be discharged home directly from the postanesthesia care unit with instructions for postoperative cares and medications for pain management.  We will recheck the patient's LFTs in the anesthesia recovery unit.  If elevated, patient will require LFT recheck.  His symptoms will be closely followed.  If patient having obstructive symptoms, will plan to refer to gastroenterology for possible ERCP.        HILTON WALKER MD

## 2022-08-16 NOTE — ANESTHESIA CARE TRANSFER NOTE
Patient: Gabe Smith    Procedure: Procedure(s):  ROBOT-ASSISTED, LAPAROSCOPIC CHOLECYSTECTOMY WITH CHOLANGIOGRAM       Diagnosis: Cholecystitis [K81.9]  Diagnosis Additional Information: No value filed.    Anesthesia Type:   General     Note:    Oropharynx: spontaneously breathing and nasal airway in place  Level of Consciousness: drowsy  Oxygen Supplementation: face mask  Level of Supplemental Oxygen (L/min / FiO2): 8  Independent Airway: airway patency not satisfactory and stable (Exchanging air well with nasal airway)  Dentition: dentition unchanged  Vital Signs Stable: post-procedure vital signs reviewed and stable  Report to RN Given: handoff report given  Patient transferred to: PACU  Comments: Patient breathing spontaneously.  Follows commands.  Suctioned and extubated.  Exchanging air well with nasal airway.  Transferred to PACU with 8L O2 via mask.  Monitors on.  VSS.  Patent IV.  Report and transfer of care to RN.    Handoff Report: Identifed the Patient, Identified the Reponsible Provider, Reviewed the pertinent medical history, Discussed the surgical course, Reviewed Intra-OP anesthesia mangement and issues during anesthesia, Set expectations for post-procedure period and Allowed opportunity for questions and acknowledgement of understanding      Vitals:  Vitals Value Taken Time   /91 08/16/22 1530   Temp     Pulse 59 08/16/22 1534   Resp 23 08/16/22 1534   SpO2 94 % 08/16/22 1534   Vitals shown include unvalidated device data.    Electronically Signed By: MONIE Baez CRNA  August 16, 2022  3:35 PM  
[facility]  UROLOGY [address]  Scheduled Appointment: 08/09/2022    Vladimir Whitney  [facility]  UROLOGY [address]  Scheduled Appointment: 08/09/2022

## 2022-08-16 NOTE — OR NURSING
Assumed cares for patient at this time, report received from Yanci. Awaiting admission orders from Dr. Leavitt.

## 2022-08-16 NOTE — OR NURSING
Patient brought a picture of home covid antigen test on phone as directed.  I personally saw this result and it was time stamped 8/16/2022 @ 9:27 AM.  Result was negative.

## 2022-08-16 NOTE — PROGRESS NOTES
Surgery:    67-year-old male status post robotic cholecystectomy with intraoperative cholangiograms.  Patient with increased oxygen requirements in PACU.  Also with elevated LFTs.  Will admit overnight for observation with lab recheck tomorrow morning.    Lab Results   Component Value Date     08/16/2022    AST 23 07/28/2020     Lab Results   Component Value Date     08/16/2022    ALT 20 07/28/2020     No results found for: BILICONJ   Lab Results   Component Value Date    BILITOTAL 1.8 08/16/2022    BILITOTAL 0.7 07/28/2020     Lab Results   Component Value Date    ALBUMIN 3.5 08/16/2022    ALBUMIN 3.6 07/28/2020     Lab Results   Component Value Date    PROTTOTAL 6.9 08/16/2022    PROTTOTAL 7.5 07/28/2020      Lab Results   Component Value Date    ALKPHOS 100 08/16/2022    ALKPHOS 83 07/28/2020       Carlota Leavitt MD

## 2022-08-17 ENCOUNTER — ANESTHESIA EVENT (OUTPATIENT)
Dept: SURGERY | Facility: CLINIC | Age: 67
DRG: 417 | End: 2022-08-17
Payer: COMMERCIAL

## 2022-08-17 ENCOUNTER — ANESTHESIA (OUTPATIENT)
Dept: SURGERY | Facility: CLINIC | Age: 67
DRG: 417 | End: 2022-08-17
Payer: COMMERCIAL

## 2022-08-17 ENCOUNTER — APPOINTMENT (OUTPATIENT)
Dept: GENERAL RADIOLOGY | Facility: CLINIC | Age: 67
DRG: 417 | End: 2022-08-17
Attending: SURGERY
Payer: COMMERCIAL

## 2022-08-17 LAB
ALBUMIN SERPL-MCNC: 3 G/DL (ref 3.4–5)
ALP SERPL-CCNC: 127 U/L (ref 40–150)
ALT SERPL W P-5'-P-CCNC: 321 U/L (ref 0–70)
ANION GAP SERPL CALCULATED.3IONS-SCNC: 4 MMOL/L (ref 3–14)
AST SERPL W P-5'-P-CCNC: 476 U/L (ref 0–45)
BILIRUB SERPL-MCNC: 5.6 MG/DL (ref 0.2–1.3)
BUN SERPL-MCNC: 20 MG/DL (ref 7–30)
CALCIUM SERPL-MCNC: 8.6 MG/DL (ref 8.5–10.1)
CHLORIDE BLD-SCNC: 103 MMOL/L (ref 94–109)
CO2 SERPL-SCNC: 28 MMOL/L (ref 20–32)
CREAT SERPL-MCNC: 0.98 MG/DL (ref 0.66–1.25)
ERCP: NORMAL
ERYTHROCYTE [DISTWIDTH] IN BLOOD BY AUTOMATED COUNT: 13.8 % (ref 10–15)
GFR SERPL CREATININE-BSD FRML MDRD: 85 ML/MIN/1.73M2
GLUCOSE BLD-MCNC: 149 MG/DL (ref 70–99)
GLUCOSE BLDC GLUCOMTR-MCNC: 162 MG/DL (ref 70–99)
HCT VFR BLD AUTO: 49.3 % (ref 40–53)
HGB BLD-MCNC: 16.3 G/DL (ref 13.3–17.7)
MCH RBC QN AUTO: 30.3 PG (ref 26.5–33)
MCHC RBC AUTO-ENTMCNC: 33.1 G/DL (ref 31.5–36.5)
MCV RBC AUTO: 92 FL (ref 78–100)
PATH REPORT.COMMENTS IMP SPEC: NORMAL
PATH REPORT.COMMENTS IMP SPEC: NORMAL
PATH REPORT.FINAL DX SPEC: NORMAL
PATH REPORT.GROSS SPEC: NORMAL
PATH REPORT.MICROSCOPIC SPEC OTHER STN: NORMAL
PATH REPORT.RELEVANT HX SPEC: NORMAL
PHOTO IMAGE: NORMAL
PLATELET # BLD AUTO: 148 10E3/UL (ref 150–450)
POTASSIUM BLD-SCNC: 4.5 MMOL/L (ref 3.4–5.3)
PROT SERPL-MCNC: 6.2 G/DL (ref 6.8–8.8)
RBC # BLD AUTO: 5.38 10E6/UL (ref 4.4–5.9)
SARS-COV-2 RNA RESP QL NAA+PROBE: NEGATIVE
SODIUM SERPL-SCNC: 135 MMOL/L (ref 133–144)
WBC # BLD AUTO: 14 10E3/UL (ref 4–11)

## 2022-08-17 PROCEDURE — 250N000011 HC RX IP 250 OP 636: Performed by: ANESTHESIOLOGY

## 2022-08-17 PROCEDURE — 250N000011 HC RX IP 250 OP 636: Performed by: SURGERY

## 2022-08-17 PROCEDURE — 272N000001 HC OR GENERAL SUPPLY STERILE: Performed by: INTERNAL MEDICINE

## 2022-08-17 PROCEDURE — 999N000141 HC STATISTIC PRE-PROCEDURE NURSING ASSESSMENT: Performed by: INTERNAL MEDICINE

## 2022-08-17 PROCEDURE — 360N000075 HC SURGERY LEVEL 2, PER MIN: Performed by: INTERNAL MEDICINE

## 2022-08-17 PROCEDURE — 120N000001 HC R&B MED SURG/OB

## 2022-08-17 PROCEDURE — 82962 GLUCOSE BLOOD TEST: CPT

## 2022-08-17 PROCEDURE — 0FJB8ZZ INSPECTION OF HEPATOBILIARY DUCT, VIA NATURAL OR ARTIFICIAL OPENING ENDOSCOPIC: ICD-10-PCS | Performed by: INTERNAL MEDICINE

## 2022-08-17 PROCEDURE — U0003 INFECTIOUS AGENT DETECTION BY NUCLEIC ACID (DNA OR RNA); SEVERE ACUTE RESPIRATORY SYNDROME CORONAVIRUS 2 (SARS-COV-2) (CORONAVIRUS DISEASE [COVID-19]), AMPLIFIED PROBE TECHNIQUE, MAKING USE OF HIGH THROUGHPUT TECHNOLOGIES AS DESCRIBED BY CMS-2020-01-R: HCPCS | Performed by: INTERNAL MEDICINE

## 2022-08-17 PROCEDURE — 250N000009 HC RX 250: Performed by: ANESTHESIOLOGY

## 2022-08-17 PROCEDURE — 88304 TISSUE EXAM BY PATHOLOGIST: CPT | Mod: 26 | Performed by: PATHOLOGY

## 2022-08-17 PROCEDURE — 250N000009 HC RX 250: Performed by: INTERNAL MEDICINE

## 2022-08-17 PROCEDURE — 250N000013 HC RX MED GY IP 250 OP 250 PS 637: Performed by: PHYSICIAN ASSISTANT

## 2022-08-17 PROCEDURE — 250N000013 HC RX MED GY IP 250 OP 250 PS 637: Performed by: SURGERY

## 2022-08-17 PROCEDURE — C1726 CATH, BAL DIL, NON-VASCULAR: HCPCS | Performed by: INTERNAL MEDICINE

## 2022-08-17 PROCEDURE — 80053 COMPREHEN METABOLIC PANEL: CPT | Performed by: SURGERY

## 2022-08-17 PROCEDURE — 710N000009 HC RECOVERY PHASE 1, LEVEL 1, PER MIN: Performed by: INTERNAL MEDICINE

## 2022-08-17 PROCEDURE — 370N000017 HC ANESTHESIA TECHNICAL FEE, PER MIN: Performed by: INTERNAL MEDICINE

## 2022-08-17 PROCEDURE — 36415 COLL VENOUS BLD VENIPUNCTURE: CPT | Performed by: SURGERY

## 2022-08-17 PROCEDURE — 999N000179 XR SURGERY CARM FLUORO LESS THAN 5 MIN W STILLS

## 2022-08-17 PROCEDURE — 85027 COMPLETE CBC AUTOMATED: CPT | Performed by: SURGERY

## 2022-08-17 PROCEDURE — 250N000013 HC RX MED GY IP 250 OP 250 PS 637: Performed by: ANESTHESIOLOGY

## 2022-08-17 PROCEDURE — 250N000025 HC SEVOFLURANE, PER MIN: Performed by: INTERNAL MEDICINE

## 2022-08-17 PROCEDURE — 258N000003 HC RX IP 258 OP 636: Performed by: ANESTHESIOLOGY

## 2022-08-17 PROCEDURE — C1769 GUIDE WIRE: HCPCS | Performed by: INTERNAL MEDICINE

## 2022-08-17 PROCEDURE — 258N000003 HC RX IP 258 OP 636: Performed by: PHYSICIAN ASSISTANT

## 2022-08-17 RX ORDER — ALBUTEROL SULFATE 90 UG/1
AEROSOL, METERED RESPIRATORY (INHALATION) PRN
Status: DISCONTINUED | OUTPATIENT
Start: 2022-08-17 | End: 2022-08-17

## 2022-08-17 RX ORDER — ONDANSETRON 2 MG/ML
4 INJECTION INTRAMUSCULAR; INTRAVENOUS EVERY 6 HOURS PRN
Status: DISCONTINUED | OUTPATIENT
Start: 2022-08-17 | End: 2022-08-17

## 2022-08-17 RX ORDER — SODIUM CHLORIDE, SODIUM LACTATE, POTASSIUM CHLORIDE, CALCIUM CHLORIDE 600; 310; 30; 20 MG/100ML; MG/100ML; MG/100ML; MG/100ML
INJECTION, SOLUTION INTRAVENOUS CONTINUOUS
Status: DISCONTINUED | OUTPATIENT
Start: 2022-08-17 | End: 2022-08-17 | Stop reason: HOSPADM

## 2022-08-17 RX ORDER — LIDOCAINE HYDROCHLORIDE 20 MG/ML
INJECTION, SOLUTION INFILTRATION; PERINEURAL PRN
Status: DISCONTINUED | OUTPATIENT
Start: 2022-08-17 | End: 2022-08-17

## 2022-08-17 RX ORDER — SODIUM CHLORIDE, SODIUM LACTATE, POTASSIUM CHLORIDE, CALCIUM CHLORIDE 600; 310; 30; 20 MG/100ML; MG/100ML; MG/100ML; MG/100ML
INJECTION, SOLUTION INTRAVENOUS CONTINUOUS
Status: DISCONTINUED | OUTPATIENT
Start: 2022-08-17 | End: 2022-08-19

## 2022-08-17 RX ORDER — INDOMETHACIN 50 MG/1
SUPPOSITORY RECTAL PRN
Status: DISCONTINUED | OUTPATIENT
Start: 2022-08-17 | End: 2022-08-17 | Stop reason: HOSPADM

## 2022-08-17 RX ORDER — ONDANSETRON 4 MG/1
4 TABLET, ORALLY DISINTEGRATING ORAL EVERY 30 MIN PRN
Status: DISCONTINUED | OUTPATIENT
Start: 2022-08-17 | End: 2022-08-17 | Stop reason: HOSPADM

## 2022-08-17 RX ORDER — AMOXICILLIN 250 MG
1 CAPSULE ORAL AT BEDTIME
Status: DISCONTINUED | OUTPATIENT
Start: 2022-08-17 | End: 2022-08-18

## 2022-08-17 RX ORDER — PROPOFOL 10 MG/ML
INJECTION, EMULSION INTRAVENOUS CONTINUOUS PRN
Status: DISCONTINUED | OUTPATIENT
Start: 2022-08-17 | End: 2022-08-17

## 2022-08-17 RX ORDER — LABETALOL HYDROCHLORIDE 5 MG/ML
10 INJECTION, SOLUTION INTRAVENOUS
Status: DISCONTINUED | OUTPATIENT
Start: 2022-08-17 | End: 2022-08-17 | Stop reason: HOSPADM

## 2022-08-17 RX ORDER — FLUTICASONE PROPIONATE AND SALMETEROL 113; 14 UG/1; UG/1
1 POWDER, METERED RESPIRATORY (INHALATION) 2 TIMES DAILY
Status: DISCONTINUED | OUTPATIENT
Start: 2022-08-17 | End: 2022-08-17 | Stop reason: CLARIF

## 2022-08-17 RX ORDER — PROPOFOL 10 MG/ML
INJECTION, EMULSION INTRAVENOUS PRN
Status: DISCONTINUED | OUTPATIENT
Start: 2022-08-17 | End: 2022-08-17

## 2022-08-17 RX ORDER — ONDANSETRON 2 MG/ML
INJECTION INTRAMUSCULAR; INTRAVENOUS PRN
Status: DISCONTINUED | OUTPATIENT
Start: 2022-08-17 | End: 2022-08-17

## 2022-08-17 RX ORDER — ONDANSETRON 2 MG/ML
4 INJECTION INTRAMUSCULAR; INTRAVENOUS EVERY 30 MIN PRN
Status: DISCONTINUED | OUTPATIENT
Start: 2022-08-17 | End: 2022-08-17 | Stop reason: HOSPADM

## 2022-08-17 RX ORDER — FENTANYL CITRATE 0.05 MG/ML
25 INJECTION, SOLUTION INTRAMUSCULAR; INTRAVENOUS EVERY 5 MIN PRN
Status: DISCONTINUED | OUTPATIENT
Start: 2022-08-17 | End: 2022-08-17 | Stop reason: HOSPADM

## 2022-08-17 RX ORDER — HYDROMORPHONE HCL IN WATER/PF 6 MG/30 ML
0.2 PATIENT CONTROLLED ANALGESIA SYRINGE INTRAVENOUS EVERY 5 MIN PRN
Status: DISCONTINUED | OUTPATIENT
Start: 2022-08-17 | End: 2022-08-17 | Stop reason: HOSPADM

## 2022-08-17 RX ORDER — DEXAMETHASONE SODIUM PHOSPHATE 4 MG/ML
INJECTION, SOLUTION INTRA-ARTICULAR; INTRALESIONAL; INTRAMUSCULAR; INTRAVENOUS; SOFT TISSUE PRN
Status: DISCONTINUED | OUTPATIENT
Start: 2022-08-17 | End: 2022-08-17

## 2022-08-17 RX ORDER — ONDANSETRON 4 MG/1
4 TABLET, ORALLY DISINTEGRATING ORAL EVERY 6 HOURS PRN
Status: DISCONTINUED | OUTPATIENT
Start: 2022-08-17 | End: 2022-08-17

## 2022-08-17 RX ORDER — FENTANYL CITRATE 50 UG/ML
INJECTION, SOLUTION INTRAMUSCULAR; INTRAVENOUS PRN
Status: DISCONTINUED | OUTPATIENT
Start: 2022-08-17 | End: 2022-08-17

## 2022-08-17 RX ORDER — OXYCODONE HYDROCHLORIDE 5 MG/1
5 TABLET ORAL EVERY 4 HOURS PRN
Status: DISCONTINUED | OUTPATIENT
Start: 2022-08-17 | End: 2022-08-17 | Stop reason: HOSPADM

## 2022-08-17 RX ORDER — FLUMAZENIL 0.1 MG/ML
0.2 INJECTION, SOLUTION INTRAVENOUS
Status: ACTIVE | OUTPATIENT
Start: 2022-08-17 | End: 2022-08-18

## 2022-08-17 RX ORDER — ALBUTEROL SULFATE 5 MG/ML
2.5 SOLUTION RESPIRATORY (INHALATION) EVERY 6 HOURS PRN
Status: DISCONTINUED | OUTPATIENT
Start: 2022-08-17 | End: 2022-08-17 | Stop reason: HOSPADM

## 2022-08-17 RX ORDER — LIDOCAINE 40 MG/G
CREAM TOPICAL
Status: DISCONTINUED | OUTPATIENT
Start: 2022-08-17 | End: 2022-08-17 | Stop reason: HOSPADM

## 2022-08-17 RX ADMIN — ALBUTEROL SULFATE 6 PUFF: 90 AEROSOL, METERED RESPIRATORY (INHALATION) at 16:15

## 2022-08-17 RX ADMIN — SODIUM CHLORIDE, POTASSIUM CHLORIDE, SODIUM LACTATE AND CALCIUM CHLORIDE: 600; 310; 30; 20 INJECTION, SOLUTION INTRAVENOUS at 14:58

## 2022-08-17 RX ADMIN — FLUTICASONE FUROATE AND VILANTEROL TRIFENATATE 1 PUFF: 100; 25 POWDER RESPIRATORY (INHALATION) at 18:01

## 2022-08-17 RX ADMIN — FENTANYL CITRATE 50 MCG: 50 INJECTION, SOLUTION INTRAMUSCULAR; INTRAVENOUS at 15:32

## 2022-08-17 RX ADMIN — ACETAMINOPHEN 650 MG: 325 TABLET ORAL at 21:04

## 2022-08-17 RX ADMIN — SODIUM CHLORIDE, POTASSIUM CHLORIDE, SODIUM LACTATE AND CALCIUM CHLORIDE: 600; 310; 30; 20 INJECTION, SOLUTION INTRAVENOUS at 09:26

## 2022-08-17 RX ADMIN — PROPOFOL 30 MCG/KG/MIN: 10 INJECTION, EMULSION INTRAVENOUS at 15:47

## 2022-08-17 RX ADMIN — LISINOPRIL AND HYDROCHLOROTHIAZIDE 1 TABLET: 12.5; 2 TABLET ORAL at 08:24

## 2022-08-17 RX ADMIN — ALBUTEROL SULFATE 2.5 MG: 2.5 SOLUTION RESPIRATORY (INHALATION) at 17:02

## 2022-08-17 RX ADMIN — HYDROMORPHONE HYDROCHLORIDE 0.2 MG: 0.2 INJECTION, SOLUTION INTRAMUSCULAR; INTRAVENOUS; SUBCUTANEOUS at 11:09

## 2022-08-17 RX ADMIN — PROPOFOL 200 MG: 10 INJECTION, EMULSION INTRAVENOUS at 15:32

## 2022-08-17 RX ADMIN — ONDANSETRON 4 MG: 2 INJECTION INTRAMUSCULAR; INTRAVENOUS at 15:53

## 2022-08-17 RX ADMIN — OXYCODONE HYDROCHLORIDE 5 MG: 5 TABLET ORAL at 17:57

## 2022-08-17 RX ADMIN — SENNOSIDES AND DOCUSATE SODIUM 1 TABLET: 50; 8.6 TABLET ORAL at 21:00

## 2022-08-17 RX ADMIN — AMLODIPINE BESYLATE 10 MG: 10 TABLET ORAL at 08:24

## 2022-08-17 RX ADMIN — DEXAMETHASONE SODIUM PHOSPHATE 4 MG: 4 INJECTION, SOLUTION INTRA-ARTICULAR; INTRALESIONAL; INTRAMUSCULAR; INTRAVENOUS; SOFT TISSUE at 15:48

## 2022-08-17 RX ADMIN — ACETAMINOPHEN 650 MG: 325 TABLET ORAL at 08:24

## 2022-08-17 RX ADMIN — ALLOPURINOL 200 MG: 100 TABLET ORAL at 08:24

## 2022-08-17 RX ADMIN — GLUCAGON HYDROCHLORIDE 0.4 MG: KIT at 15:52

## 2022-08-17 RX ADMIN — LIDOCAINE HYDROCHLORIDE 100 MG: 20 INJECTION, SOLUTION INFILTRATION; PERINEURAL at 15:32

## 2022-08-17 RX ADMIN — SUCCINYLCHOLINE CHLORIDE 20 MG: 20 INJECTION, SOLUTION INTRAMUSCULAR; INTRAVENOUS; PARENTERAL at 15:32

## 2022-08-17 RX ADMIN — GLUCAGON HYDROCHLORIDE 0.3 MG: KIT at 16:11

## 2022-08-17 ASSESSMENT — ACTIVITIES OF DAILY LIVING (ADL)
ADLS_ACUITY_SCORE: 35

## 2022-08-17 ASSESSMENT — ENCOUNTER SYMPTOMS
DYSRHYTHMIAS: 0
SEIZURES: 0

## 2022-08-17 ASSESSMENT — LIFESTYLE VARIABLES: TOBACCO_USE: 1

## 2022-08-17 ASSESSMENT — COPD QUESTIONNAIRES
CAT_SEVERITY: MODERATE
COPD: 1

## 2022-08-17 NOTE — UTILIZATION REVIEW
Admission Status; Secondary Review Determination    Under the authority of the Utilization Management Committee, the utilization review process indicated a secondary review on the above patient. The review outcome is based on review of the medical records, discussions with staff, and applying clinical experience noted on the date of the review.    (x) Inpatient Status Appropriate - This patient's medical care is consistent with medical management for inpatient care and reasonable inpatient medical practice.    RATIONALE FOR DETERMINATION: 67-year-old male who was diagnosed with multiple liver abscesses felt to be related to his gallbladder 3 months prior to admission now completed a course of antibiotic treatment.  He was now electively admitted for cholecystectomy completed on 8/16/2022.  Intraoperative cholangiograms revealed no evidence of obvious choledocholithiasis however there was only intermittent passage of contrast into the duodenum possibly related to a partial common bile duct obstruction.  On the following day patient's bilirubin evangelina from 1.8-5.6 along with a doubling of patient's ALT and significant rise of AST.  Therefore patient requires ongoing hospitalization with plans for ERCP evaluation and treatment appropriate for inpatient care.    At the time of admission with the information available to the attending physician more than 2 nights Hospital complex care was anticipated, based on patient risk of adverse outcome if treated as outpatient and complex care required. Inpatient admission is appropriate based on the Medicare guidelines.    This document was produced using voice recognition software    The information on this document is developed by the utilization review team in order for the business office to ensure compliance. This only denotes the appropriateness of proper admission status and does not reflect the quality of care rendered.    The definitions of Inpatient Status and  Observation Status used in making the determination above are those provided in the CMS Coverage Manual, Chapter 1 and Chapter 6, section 70.4.    Sincerely,    Ilan Monge MD  Utilization Review  Physician Advisor  [facility].

## 2022-08-17 NOTE — ANESTHESIA CARE TRANSFER NOTE
Patient: Gabe Smith    Procedure: Procedure(s):  ENDOSCOPIC RETROGRADE CHOLANGIOPANCREATOGRAPHY, WITH SPHINCTEROTOMY       Diagnosis: Cholecystitis [K81.9]  Diagnosis Additional Information: No value filed.    Anesthesia Type:   General     Note:    Oropharynx: oropharynx clear of all foreign objects and spontaneously breathing  Level of Consciousness: drowsy  Oxygen Supplementation: face mask  Level of Supplemental Oxygen (L/min / FiO2): 8  Independent Airway: airway patency satisfactory and stable  Dentition: dentition unchanged  Vital Signs Stable: post-procedure vital signs reviewed and stable  Report to RN Given: handoff report given  Patient transferred to: PACU  Comments: Patient breathing spontaneously.  Follows commands.  Suctioned and extubated.  Exchanging air well.  Transferred to PACU with 8L O2 via mask.  Monitors on.  VSS.  Patent IV.  Report and transfer of care to RN.    Handoff Report: Identifed the Patient, Identified the Reponsible Provider, Reviewed the pertinent medical history, Discussed the surgical course, Reviewed Intra-OP anesthesia mangement and issues during anesthesia, Set expectations for post-procedure period and Allowed opportunity for questions and acknowledgement of understanding      Vitals:  Vitals Value Taken Time   BP     Temp     Pulse 86 08/17/22 1634   Resp 29 08/17/22 1634   SpO2 85 % 08/17/22 1634   Vitals shown include unvalidated device data.    Electronically Signed By: MONIE Baez CRNA  August 17, 2022  4:34 PM

## 2022-08-17 NOTE — PROGRESS NOTES
"General Surgery Progress Note    Admission Date: 8/16/2022  Today's Date: 8/17/2022         Assessment:      Gabe Smith is a 67 year old male S/P Robotic assisted laparoscopic cholecystectomy with intraoperative cholangiogram  POD #1     Diagnosis: Hx of Liver abscesses, Cholecystitis, IOC suggestive of possible CBD stone, with increasing LFTs post op.         Plan:   GI consult this morning.  Likely ERCP soon.  Declined nicotine patch.    Pain: using Tyl only.  Dilaudid or oxycodone available  Bowel: Senokot  Antibiotics: none currently and afebrile.  May get some before ERCP.  Diet: NPO until GI consult.  If unable to do ERCP today, would be ok placing on clears.  IVFs: will start LR as NPO now and may be for another day.  DVT prophylaxis: PCDs  Dispo: pending ERCP        Interval History:   Feels sore to LUQ incision but tolerating pain with Tylenol only.  Feels some distended, but passing flatus and no nausea.  Minimal appetite.    Bilirubin increase from 1.8 --> 5 overnight.  Transaminases also up.  WBC up to 14K.          Physical Exam:   /80 (BP Location: Right arm)   Pulse 75   Temp 98.6  F (37  C) (Oral)   Resp 20   Ht 1.803 m (5' 11\")   Wt 93.6 kg (206 lb 6.4 oz)   SpO2 91%   BMI 28.79 kg/m    I/O last 3 completed shifts:  In: 1900 [P.O.:700; I.V.:1200]  Out: 365 [Urine:350; Blood:15]  General: NAD, pleasant, alert and oriented x3  Abdomen: soft, appropriately tender to LUQ, nontender to other quadrants, + distended, + bowel sounds.  Incision: no complaints      LABS:  Results for orders placed or performed during the hospital encounter of 08/16/22 (from the past 24 hour(s))   Potassium   Result Value Ref Range    Potassium 4.1 3.4 - 5.3 mmol/L   XR Surgery RADHA Fluoro Less Than 5 Min w Stills    Narrative    SURGERY C-ARM FLUORO LESS THAN 5 MINUTES WITH STILLS 8/16/2022 2:45 PM      COMPARISON: None    HISTORY: Cholangiogram  FLUOROSCOPY TIME: 1.2 minute(s)      Impression    IMPRESSION: " Normal caliber common bile duct. No obvious filling  defects. Very little contrast into the duodenum. See operative report  for details.    EDMOND CLAUDIO MD         SYSTEM ID:  [id number]   Hepatic panel   Result Value Ref Range    Bilirubin Total 1.8 (H) 0.2 - 1.3 mg/dL    Bilirubin Direct 0.8 (H) 0.0 - 0.2 mg/dL    Protein Total 6.9 6.8 - 8.8 g/dL    Albumin 3.5 3.4 - 5.0 g/dL    Alkaline Phosphatase 100 40 - 150 U/L     (H) 0 - 45 U/L     (H) 0 - 70 U/L   Extra Tube    Narrative    The following orders were created for panel order Extra Tube.  Procedure                               Abnormality         Status                     ---------                               -----------         ------                     Extra Purple Top Tube[[id number]]                            Final result                 Please view results for these tests on the individual orders.   Extra Purple Top Tube   Result Value Ref Range    Hold Specimen [facility]    Glucose by meter   Result Value Ref Range    GLUCOSE BY METER POCT 162 (H) 70 - 99 mg/dL   Comprehensive metabolic panel   Result Value Ref Range    Sodium 135 133 - 144 mmol/L    Potassium 4.5 3.4 - 5.3 mmol/L    Chloride 103 94 - 109 mmol/L    Carbon Dioxide (CO2) 28 20 - 32 mmol/L    Anion Gap 4 3 - 14 mmol/L    Urea Nitrogen 20 7 - 30 mg/dL    Creatinine 0.98 0.66 - 1.25 mg/dL    Calcium 8.6 8.5 - 10.1 mg/dL    Glucose 149 (H) 70 - 99 mg/dL    Alkaline Phosphatase 127 40 - 150 U/L     (H) 0 - 45 U/L     (H) 0 - 70 U/L    Protein Total 6.2 (L) 6.8 - 8.8 g/dL    Albumin 3.0 (L) 3.4 - 5.0 g/dL    Bilirubin Total 5.6 (H) 0.2 - 1.3 mg/dL    GFR Estimate 85 >60 mL/min/1.73m2   CBC with platelets   Result Value Ref Range    WBC Count 14.0 (H) 4.0 - 11.0 10e3/uL    RBC Count 5.38 4.40 - 5.90 10e6/uL    Hemoglobin 16.3 13.3 - 17.7 g/dL    Hematocrit 49.3 40.0 - 53.0 %    MCV 92 78 - 100 fL    MCH 30.3 26.5 - 33.0 pg    MCHC 33.1 31.5 - 36.5 g/dL    RDW 13.8  10.0 - 15.0 %    Platelet Count 148 (L) 150 - 450 10e3/uL           Ranjan Steele PA-C  Pager: [phone]  [facility]: [phone]

## 2022-08-17 NOTE — ANESTHESIA POSTPROCEDURE EVALUATION
Patient: Gabe Smith    Procedure: Procedure(s):  ENDOSCOPIC RETROGRADE CHOLANGIOPANCREATOGRAPHY, WITH SPHINCTEROTOMY       Anesthesia Type:  General    Note:  Disposition: Inpatient   Postop Pain Control: Uneventful            Sign Out: Well controlled pain   PONV: No   Neuro/Psych: Uneventful            Sign Out: Acceptable/Baseline neuro status   Airway/Respiratory: Uneventful            Sign Out: Acceptable/Baseline resp. status   CV/Hemodynamics: Uneventful            Sign Out: Acceptable CV status; No obvious hypovolemia; No obvious fluid overload   Other NRE: NONE   DID A NON-ROUTINE EVENT OCCUR? No           Last vitals:  Vitals Value Taken Time   /82 08/17/22 1700   Temp     Pulse 77 08/17/22 1706   Resp 27 08/17/22 1706   SpO2 93 % 08/17/22 1706   Vitals shown include unvalidated device data.    Electronically Signed By: Kathleen Delgado MD  August 17, 2022  5:07 PM

## 2022-08-17 NOTE — OR NURSING
Dr Cash aware that pt would like to keep his denture in. He discussed with pt and was ok with pt keeping in his mouth. Anesthesia team aware of pt desire. Ok to keep dentures in mouth.

## 2022-08-17 NOTE — PLAN OF CARE
Goal Outcome Evaluation:    DATE & TIME: 8/17/22 @0610  Summary: POD 0 Tong lap addicted lap elodia w/ cholangiogram  Hx: COPD, HTN, DM 2, Hx 1 pack per day smoker  ORIENTATION/NEURO: A/O x4  ABNL VS/O2: VSS on 5-6L oxymask,   MOBILITY: SBA  PAIN MANAGMENT: Denies pain  DIET:NPO  BOWEL/BLADDER: Voiding using beside urinal, bowel sounds present, pt reports passing gas.  ABNL LAB/BG: Elevated LFT's , , Bili 1.8  IV FLUIDS/DRAIN/TUBES/DEVICES: PIV SL  SKIN: 2 lab sites open to air, well approximated, bruising noted, abdomen distended.   TESTS/PROCEDURES: ERCP sometime today.  OTHER:  D/C DATE: Discharge pending.

## 2022-08-17 NOTE — PLAN OF CARE
Goal Outcome Evaluation:    Plan of Care Reviewed With: patient     Overall Patient Progress: no change    Problem: Post-op day one lap elodia, ERCP today  V/S: VSS  Orientation/Neuro: A&O x 4  Activity level: Standby assist when up  Diet: Clear liquids   Lungs: Expiratory wheezes throughout. YANCEY, O2 low 90's currently on 4L. Loose productive cough. Using IS to 1250  Cardiovascular: Regular   GI: Abdomen, distended and firm, rounded. BS hypoactive on all quadrants. Tolerating diet with no nausea.  : Dark, noni urine. Voiding appropriately.  Incision/skin/dressing: Lap sites CDI with liquid band aid  IV fluids/drains/tubes: Infiltrated IV.Currently no access  Plan: Continue to monitor

## 2022-08-17 NOTE — PLAN OF CARE
Problem: POD 0 brandyn lap addidted lap elodia w cholangiogram  Hx: COPD, HTN, DM II  Vitals: VSS on 5 L oxymask  Orientation/Neuro: A/Ox4  Activity Level: SBA  Diet: clears, NPO at midnight  GI: Abd distended, 2 lap sites JOHN, denies N/V  : Voiding adequately/spontaneously  IV Fluids/Drains/Tubes: PIV SL  Pain Management: Mild abd pain managed with ice packs, denied further intervention  Plan: NPO at midnight for ERCP tomorrow (possible common bile duct blockage)    Discharge meds are in the cabinet

## 2022-08-17 NOTE — ANESTHESIA PREPROCEDURE EVALUATION
Anesthesia Pre-Procedure Evaluation    Patient: Gabe Smith   MRN: [id number] : 1955        Procedure :      ENDOSCOPIC RETROGRADE CHOLANGIOPANCREATOGRAPHY, WITH SPHINCTEROTOMY      Past Medical History:   Diagnosis Date     Abdominal aortic aneurysm (AAA) without rupture (H) 2021    AAA 3.6 x 3.6 cm per ultrasound     COPD (chronic obstructive pulmonary disease) (H)      Dysmetabolic syndrome X      Hyperlipidemia      Hypertension      Osteoarthrosis      Prediabetes 10/09/2014    A1C 6.1     Tobacco use disorder       Past Surgical History:   Procedure Laterality Date     COLONOSCOPY N/A 10/19/2015    Procedure: COMBINED COLONOSCOPY, SINGLE OR MULTIPLE BIOPSY/POLYPECTOMY BY BIOPSY;  Surgeon: Gume Vidal MD;  Location:  GI     SURGICAL HISTORY OF -       Left MOISES     SURGICAL HISTORY OF -       Unspecified knee surgeries as a child     SURGICAL HISTORY OF -   10/2010    Right MOISES, with left knee arthroscopy, meniscectomy      No Known Allergies   Social History     Tobacco Use     Smoking status: Current Every Day Smoker     Packs/day: 1.00     Types: Cigarettes     Smokeless tobacco: Never Used     Tobacco comment: 1.5 PPD-now down to .5 PPD   Substance Use Topics     Alcohol use: Yes     Comment: 2-3 drinks per night (double)      Wt Readings from Last 1 Encounters:   22 93.6 kg (206 lb 6.4 oz)        Prior to Admission medications    Medication Sig Start Date End Date Taking? Authorizing Provider   acetaminophen (TYLENOL) 325 MG tablet Take 2 tablets (650 mg) by mouth every 6 hours as needed for mild pain or other (and adjunct with moderate or severe pain or per patient request) 22   Hossein Landrum MD   albuterol (PROAIR HFA/PROVENTIL HFA/VENTOLIN HFA) 108 (90 Base) MCG/ACT inhaler Inhale 2 puffs into the lungs every 4 hours as needed for shortness of breath / dyspnea or wheezing 22   Donny Payne MD   albuterol (PROVENTIL) (2.5 MG/3ML) 0.083% neb  solution Take 1 vial (2.5 mg) by nebulization every 6 hours as needed for shortness of breath / dyspnea or wheezing 2/11/22   Donny Payne MD   allopurinol (ZYLOPRIM) 100 MG tablet Take 2 tablets (200 mg) by mouth daily 9/3/21   Donny Payne MD   amLODIPine (NORVASC) 10 MG tablet Take 1 tablet (10 mg) by mouth daily 9/3/21   Donny Payne MD   aspirin 81 MG tablet Take 1 tablet (81 mg) by mouth daily 7/7/14   Donny Payne MD   fluticasone-salmeterol (AIRDUO RESPICLICK) 113-14 MCG/ACT inhaler Inhale 1 puff into the lungs 2 times daily 2/11/22   Donny Payne MD   lisinopril-hydrochlorothiazide (ZESTORETIC) 20-12.5 MG tablet Take 1 tablet by mouth every morning 7/21/22   Donny Payne MD   pravastatin (PRAVACHOL) 40 MG tablet Take 1 tablet (40 mg) by mouth At Bedtime 7/21/22   Donny Payne MD   sodium chloride, PF, 0.9% PF flush Irrigate with 10 mLs as directed daily 5/29/22   Helder Landrum PA-C     ECG 7/26/22: Sinus  Rhythm  - occasional ectopic ventricular beat      Rightward P/QRS axis and rotation -possible pulmonary disease.     Anesthesia Evaluation   Pt has had prior anesthetic. Type: General.    No history of anesthetic complications       ROS/MED HX  ENT/Pulmonary:     (+) JUAN risk factors, tobacco use, moderate,  COPD,  (-) asthma   Neurologic:    (-) no seizures, no CVA and migraines   Cardiovascular:     (+) Dyslipidemia hypertension-Peripheral Vascular Disease (AAA - 4.0cm)---- (-) CAD, YANCEY, arrhythmias and valvular problems/murmurs   METS/Exercise Tolerance:     Hematologic:    (-) history of blood clots and anemia   Musculoskeletal: Comment: gout  (+) arthritis,     GI/Hepatic:     (+) cholecystitis/cholelithiasis,  (-) GERD and liver disease   Renal/Genitourinary:    (-) renal disease and nephrolithiasis   Endo:     (+) type II DM,  (-) Type I DM, thyroid disease and obesity   Psychiatric/Substance Use:    (-)  psychiatric history   Infectious Disease:    (-) Recent Fever   Malignancy:       Other:            Physical Exam    Airway        Mallampati: III   TM distance: > 3 FB   Neck ROM: full   Mouth opening: > 3 cm    Respiratory Devices and Support         Dental       (+) chipped      Cardiovascular          Rhythm and rate: regular and normal     Pulmonary       Comment: Bilateral basilar crackles      (+) rhonchi           OUTSIDE LABS:  CBC:   Lab Results   Component Value Date    WBC 14.0 (H) 08/17/2022    WBC 9.5 07/11/2022    HGB 16.3 08/17/2022    HGB 16.0 07/11/2022    HCT 49.3 08/17/2022    HCT 50.0 07/11/2022     (L) 08/17/2022     07/11/2022     BMP:   Lab Results   Component Value Date     08/17/2022     05/30/2022    POTASSIUM 4.5 08/17/2022    POTASSIUM 4.1 08/16/2022    CHLORIDE 103 08/17/2022    CHLORIDE 103 05/30/2022    CO2 28 08/17/2022    CO2 25 05/30/2022    BUN 20 08/17/2022    BUN 17 07/11/2022    CR 0.98 08/17/2022    CR 0.66 07/11/2022     (H) 08/17/2022     (H) 08/17/2022     COAGS:   Lab Results   Component Value Date    PTT 29 10/29/2010    INR 1.33 (H) 05/27/2022     POC: No results found for: BGM, HCG, HCGS  HEPATIC:   Lab Results   Component Value Date    ALBUMIN 3.0 (L) 08/17/2022    PROTTOTAL 6.2 (L) 08/17/2022     (H) 08/17/2022     (H) 08/17/2022    ALKPHOS 127 08/17/2022    BILITOTAL 5.6 (H) 08/17/2022     OTHER:   Lab Results   Component Value Date    LACT 1.2 05/26/2022    A1C 6.3 (H) 05/30/2022    DES 8.6 08/17/2022    LIPASE 33 (L) 05/25/2022    CRP 11.5 (H) 07/11/2022    SED 5 07/11/2022       Anesthesia Plan    ASA Status:  3   NPO Status:  NPO Appropriate    Anesthesia Type: General.     - Airway: ETT   Induction: Propofol.   Maintenance: Balanced.        Consents    Anesthesia Plan(s) and associated risks, benefits, and realistic alternatives discussed. Questions answered and patient/representative(s) expressed  understanding.     - Discussed: Risks, Benefits and Alternatives for BOTH SEDATION and the PROCEDURE were discussed     - Discussed with:  Patient         Postoperative Care    Pain management: Multi-modal analgesia.   PONV prophylaxis: Ondansetron (or other 5HT-3), Dexamethasone or Solumedrol, Background Propofol Infusion     Comments:                    Kathleen Delgado MD

## 2022-08-17 NOTE — CONSULTS
Formerly [facility] GASTROENTEROLOGY CONSULTATION     Gabe Smith  [address] [facility] [address]  67 year old male    Admission Date/Time: 8/16/2022  Primary Care Provider: Donny Payne    We were asked to see the patient in consultation by Dr. Leavitt for evaluation of choledocholithiasis.        HPI:  Gabe Smith is a 67 year old male who was admitted to [facility] for cholecystectomy.  About 3 months ago he had a liver abscess which required drainage. It was thought the abscess may be due to his gallbladder. He was scheduled for cholecystectomy and that was done 8/16/22.  IOC was negative but little contrast passed to the bowel.      This morning there was a marked increase in his bilirubin to 5.6 (was 1.8 on admission).  He states he has mild left sided abdominal discomfort but otherwise denies abdominal pain. Past medical history is also significant for COPD.    ROS: A comprehensive ten point review of systems was negative aside from those in mentioned in the HPI.      MEDICATIONS: No current facility-administered medications on file prior to encounter.  acetaminophen (TYLENOL) 325 MG tablet, Take 2 tablets (650 mg) by mouth every 6 hours as needed for mild pain or other (and adjunct with moderate or severe pain or per patient request)  albuterol (PROAIR HFA/PROVENTIL HFA/VENTOLIN HFA) 108 (90 Base) MCG/ACT inhaler, Inhale 2 puffs into the lungs every 4 hours as needed for shortness of breath / dyspnea or wheezing  albuterol (PROVENTIL) (2.5 MG/3ML) 0.083% neb solution, Take 1 vial (2.5 mg) by nebulization every 6 hours as needed for shortness of breath / dyspnea or wheezing  allopurinol (ZYLOPRIM) 100 MG tablet, Take 2 tablets (200 mg) by mouth daily  amLODIPine (NORVASC) 10 MG tablet, Take 1 tablet (10 mg) by mouth daily  fluticasone-salmeterol (AIRDUO RESPICLICK) 113-14 MCG/ACT inhaler, Inhale 1 puff into the lungs 2 times daily  aspirin 81 MG tablet, Take 1 tablet (81 mg) by mouth  "daily  sodium chloride, PF, 0.9% PF flush, Irrigate with 10 mLs as directed daily        ALLERGIES: No Known Allergies    Past Medical History:   Diagnosis Date     Abdominal aortic aneurysm (AAA) without rupture (H) 02/25/2021    AAA 3.6 x 3.6 cm per ultrasound     COPD (chronic obstructive pulmonary disease) (H)      Dysmetabolic syndrome X      Hyperlipidemia      Hypertension      Osteoarthrosis      Prediabetes 10/09/2014    A1C 6.1     Tobacco use disorder        Past Surgical History:   Procedure Laterality Date     COLONOSCOPY N/A 10/19/2015    Procedure: COMBINED COLONOSCOPY, SINGLE OR MULTIPLE BIOPSY/POLYPECTOMY BY BIOPSY;  Surgeon: Gume Vidal MD;  Location:  GI     SURGICAL HISTORY OF -   2000    Left MOISES     SURGICAL HISTORY OF -       Unspecified knee surgeries as a child     SURGICAL HISTORY OF -   10/2010    Right MOISES, with left knee arthroscopy, meniscectomy         SOCIAL HISTORY:  Social History     Tobacco Use     Smoking status: Current Every Day Smoker     Packs/day: 1.00     Types: Cigarettes     Smokeless tobacco: Never Used     Tobacco comment: 1.5 PPD-now down to .5 PPD   Vaping Use     Vaping Use: Never used   Substance Use Topics     Alcohol use: Yes     Comment: 2-3 drinks per night (double)     Drug use: No       FAMILY HISTORY:  No known family history of GI disease    PHYSICAL EXAM:   /68 (BP Location: Right arm)   Pulse 75   Temp 98.6  F (37  C) (Oral)   Resp 20   Ht 1.803 m (5' 11\")   Wt 93.6 kg (206 lb 6.4 oz)   SpO2 93%   BMI 28.79 kg/m      Constitutional: NAD, comfortable  Cardiovascular: RRR  Respiratory: CTAB  Psychiatric: mentation appears normal and affect normal/bright  Head: Normocephalic. Atraumatic.    Neck:  normal size, trachea midline  Eyes:  Very mild icterus  ENT:  hearing adequate  Abdomen:   Auscultation: normal  Appearance: non distended  Palpation: minimal tenderness to palpation  NEURO: grossly negative  SKIN: no suspicious lesions or " kj            ADDITIONAL COMMENTS:   I reviewed the patient's new clinical lab test results.   Recent Labs   Lab Test 08/17/22  0728 07/11/22  1030 07/04/22  0842 05/28/22  1211 05/27/22  1157   WBC 14.0* 9.5 10.2   < >  --    HGB 16.3 16.0 15.8   < >  --    MCV 92 93 93   < >  --    * 190 208   < >  --    INR  --   --   --   --  1.33*    < > = values in this interval not displayed.     Recent Labs   Lab Test 08/17/22  0728 08/17/22  0629 08/16/22  1057 07/11/22  1030 07/04/22  0842 06/06/22  0930 06/01/22  1700 05/30/22  1640 05/30/22  0833 05/29/22  0729     --   --   --   --   --   --   --  136 137   POTASSIUM 4.5  --  4.1  --   --   --   --   --  3.5 3.9   CHLORIDE 103  --   --   --   --   --   --   --  103 104   CO2 28  --   --   --   --   --   --   --  25 28   BUN 20  --   --  17 15   < >  --   --  11 16   CR 0.98  --   --  0.66 0.79   < >  --   --  0.74 0.77   ANIONGAP 4  --   --   --   --   --   --   --  8 5   DES 8.6  --   --   --   --   --   --   --  8.5 8.1*   * 162*  --   --   --   --  95   < > 244* 108*    < > = values in this interval not displayed.     Recent Labs   Lab Test 08/17/22  0728 08/16/22  1559 07/11/22  1030 06/06/22  0930 05/27/22  0656 05/26/22  0638 05/25/22  1800 05/25/22  1755   ALBUMIN 3.0* 3.5  --   --  2.5*   < >  --  3.2*   BILITOTAL 5.6* 1.8*  --   --  1.5*   < >  --  1.6*   * 146*  --   --  48   < >  --  47   * 297* 19   < > 60*   < >  --  56*   ALKPHOS 127 100  --   --  85   < >  --  73   PROTEIN  --   --   --   --   --   --  30 *  --    LIPASE  --   --   --   --   --   --   --  33*    < > = values in this interval not displayed.             .    CONSULTATION ASSESSMENT AND PLAN:    Active Problems:    Elevated bilirubin    Assessment: Patient with history of liver abscesses in June that required drainage, thought due to cholecystitis as that time.  Underwent elective cholecystectomy 8/16.  Bilirubin up today, IOC with little contrast went  into the duodenum.  Concern for possible CBD stone.  Little pain, bile leak less likely.    Plan:   - ERCP today        Patricia Horton MD  [facility]  Office:  [phone]    Approximately 40 minutes of total time was spent providing patient care, including patient evaluation, reviewing documentation/test results, and .

## 2022-08-18 ENCOUNTER — APPOINTMENT (OUTPATIENT)
Dept: GENERAL RADIOLOGY | Facility: CLINIC | Age: 67
DRG: 417 | End: 2022-08-18
Attending: PHYSICIAN ASSISTANT
Payer: COMMERCIAL

## 2022-08-18 LAB
ALBUMIN SERPL-MCNC: 3 G/DL (ref 3.4–5)
ALP SERPL-CCNC: 141 U/L (ref 40–150)
ALT SERPL W P-5'-P-CCNC: 449 U/L (ref 0–70)
AST SERPL W P-5'-P-CCNC: 495 U/L (ref 0–45)
BASOPHILS # BLD AUTO: 0 10E3/UL (ref 0–0.2)
BASOPHILS NFR BLD AUTO: 0 %
BILIRUB DIRECT SERPL-MCNC: 3.1 MG/DL (ref 0–0.2)
BILIRUB SERPL-MCNC: 4.9 MG/DL (ref 0.2–1.3)
EOSINOPHIL # BLD AUTO: 0 10E3/UL (ref 0–0.7)
EOSINOPHIL NFR BLD AUTO: 0 %
ERYTHROCYTE [DISTWIDTH] IN BLOOD BY AUTOMATED COUNT: 13.7 % (ref 10–15)
GLUCOSE BLDC GLUCOMTR-MCNC: 177 MG/DL (ref 70–99)
HCT VFR BLD AUTO: 49.5 % (ref 40–53)
HGB BLD-MCNC: 16.2 G/DL (ref 13.3–17.7)
IMM GRANULOCYTES # BLD: 0.1 10E3/UL
IMM GRANULOCYTES NFR BLD: 0 %
LYMPHOCYTES # BLD AUTO: 0.4 10E3/UL (ref 0.8–5.3)
LYMPHOCYTES NFR BLD AUTO: 3 %
MCH RBC QN AUTO: 30.7 PG (ref 26.5–33)
MCHC RBC AUTO-ENTMCNC: 32.7 G/DL (ref 31.5–36.5)
MCV RBC AUTO: 94 FL (ref 78–100)
MONOCYTES # BLD AUTO: 0.6 10E3/UL (ref 0–1.3)
MONOCYTES NFR BLD AUTO: 5 %
NEUTROPHILS # BLD AUTO: 10.4 10E3/UL (ref 1.6–8.3)
NEUTROPHILS NFR BLD AUTO: 92 %
NRBC # BLD AUTO: 0 10E3/UL
NRBC BLD AUTO-RTO: 0 /100
PLATELET # BLD AUTO: 129 10E3/UL (ref 150–450)
PROT SERPL-MCNC: 6.3 G/DL (ref 6.8–8.8)
RBC # BLD AUTO: 5.27 10E6/UL (ref 4.4–5.9)
WBC # BLD AUTO: 11.4 10E3/UL (ref 4–11)

## 2022-08-18 PROCEDURE — 36415 COLL VENOUS BLD VENIPUNCTURE: CPT | Performed by: SURGERY

## 2022-08-18 PROCEDURE — 250N000011 HC RX IP 250 OP 636: Performed by: PHYSICIAN ASSISTANT

## 2022-08-18 PROCEDURE — 80076 HEPATIC FUNCTION PANEL: CPT | Performed by: PHYSICIAN ASSISTANT

## 2022-08-18 PROCEDURE — 250N000013 HC RX MED GY IP 250 OP 250 PS 637: Performed by: SURGERY

## 2022-08-18 PROCEDURE — 250N000013 HC RX MED GY IP 250 OP 250 PS 637: Performed by: PHYSICIAN ASSISTANT

## 2022-08-18 PROCEDURE — 120N000001 HC R&B MED SURG/OB

## 2022-08-18 PROCEDURE — 71046 X-RAY EXAM CHEST 2 VIEWS: CPT

## 2022-08-18 PROCEDURE — 85025 COMPLETE CBC W/AUTO DIFF WBC: CPT | Performed by: SURGERY

## 2022-08-18 RX ORDER — ENOXAPARIN SODIUM 100 MG/ML
40 INJECTION SUBCUTANEOUS EVERY 24 HOURS
Status: DISCONTINUED | OUTPATIENT
Start: 2022-08-18 | End: 2022-08-18

## 2022-08-18 RX ORDER — AMOXICILLIN 250 MG
1 CAPSULE ORAL 2 TIMES DAILY
Status: DISCONTINUED | OUTPATIENT
Start: 2022-08-18 | End: 2022-08-19 | Stop reason: HOSPADM

## 2022-08-18 RX ORDER — POLYETHYLENE GLYCOL 3350 17 G/17G
17 POWDER, FOR SOLUTION ORAL DAILY PRN
Status: DISCONTINUED | OUTPATIENT
Start: 2022-08-18 | End: 2022-08-19 | Stop reason: HOSPADM

## 2022-08-18 RX ORDER — FUROSEMIDE 10 MG/ML
20 INJECTION INTRAMUSCULAR; INTRAVENOUS ONCE
Status: COMPLETED | OUTPATIENT
Start: 2022-08-18 | End: 2022-08-18

## 2022-08-18 RX ORDER — ENOXAPARIN SODIUM 100 MG/ML
40 INJECTION SUBCUTANEOUS EVERY 24 HOURS
Status: DISCONTINUED | OUTPATIENT
Start: 2022-08-18 | End: 2022-08-19 | Stop reason: HOSPADM

## 2022-08-18 RX ADMIN — SENNOSIDES AND DOCUSATE SODIUM 1 TABLET: 50; 8.6 TABLET ORAL at 08:40

## 2022-08-18 RX ADMIN — PRAVASTATIN SODIUM 40 MG: 40 TABLET ORAL at 21:22

## 2022-08-18 RX ADMIN — SENNOSIDES AND DOCUSATE SODIUM 1 TABLET: 50; 8.6 TABLET ORAL at 21:23

## 2022-08-18 RX ADMIN — LISINOPRIL AND HYDROCHLOROTHIAZIDE 1 TABLET: 12.5; 2 TABLET ORAL at 08:40

## 2022-08-18 RX ADMIN — ENOXAPARIN SODIUM 40 MG: 40 INJECTION SUBCUTANEOUS at 12:43

## 2022-08-18 RX ADMIN — FLUTICASONE FUROATE AND VILANTEROL TRIFENATATE 1 PUFF: 100; 25 POWDER RESPIRATORY (INHALATION) at 08:40

## 2022-08-18 RX ADMIN — ALLOPURINOL 200 MG: 100 TABLET ORAL at 08:40

## 2022-08-18 RX ADMIN — AMLODIPINE BESYLATE 10 MG: 10 TABLET ORAL at 08:40

## 2022-08-18 RX ADMIN — ACETAMINOPHEN 650 MG: 325 TABLET ORAL at 21:22

## 2022-08-18 RX ADMIN — OXYCODONE HYDROCHLORIDE 10 MG: 5 TABLET ORAL at 21:22

## 2022-08-18 RX ADMIN — FUROSEMIDE 20 MG: 10 INJECTION, SOLUTION INTRAVENOUS at 10:46

## 2022-08-18 ASSESSMENT — ACTIVITIES OF DAILY LIVING (ADL)
ADLS_ACUITY_SCORE: 35

## 2022-08-18 NOTE — PROGRESS NOTES
"[facility] GASTROENTEROLOGY PROGRESS NOTE    SUBJECTIVE:  Feeling much better today. States he felt much better almost immediately after the ERCP.  Still requiring oxygen.    OBJECTIVE:    /62 (BP Location: Right arm)   Pulse 65   Temp 98.3  F (36.8  C) (Oral)   Resp 18   Ht 1.803 m (5' 11\")   Wt 93.6 kg (206 lb 6.4 oz)   SpO2 94%   BMI 28.79 kg/m    Temp (24hrs), Av.4  F (36.9  C), Min:98.3  F (36.8  C), Max:98.8  F (37.1  C)    Patient Vitals for the past 72 hrs:   Weight   22 1024 93.6 kg (206 lb 6.4 oz)       Intake/Output Summary (Last 24 hours) at 2022 1233  Last data filed at 2022 0830  Gross per 24 hour   Intake 2141 ml   Output 1050 ml   Net 1091 ml         PHYSICAL EXAM    Constitutional: NAD, comfortable   Respiratory: CTAB  Abdomen: minimally tender, minimally distended        Additional Comments:  ROS, FH, SH: See initial GI consult for details.    I have reviewed the patient's new clinical lab results:    Recent Labs   Lab Test 22  0718 22  0728 22  1030 22  1211 22  1157   WBC 11.4* 14.0* 9.5   < >  --    HGB 16.2 16.3 16.0   < >  --    MCV 94 92 93   < >  --    * 148* 190   < >  --    INR  --   --   --   --  1.33*    < > = values in this interval not displayed.     Recent Labs   Lab Test 22  0728 22  1057 22  1030 22  0842 22  0930 22  0833 22  0729     --   --   --   --  136 137   POTASSIUM 4.5 4.1  --   --   --  3.5 3.9   CHLORIDE 103  --   --   --   --  103 104   CO2 28  --   --   --   --  25 28   BUN 20  --  17 15   < > 11 16   CR 0.98  --  0.66 0.79   < > 0.74 0.77   ANIONGAP 4  --   --   --   --  8 5   DES 8.6  --   --   --   --  8.5 8.1*    < > = values in this interval not displayed.     Recent Labs   Lab Test 22  0718 22  0728 22  1559 22  0638 22  1800 22  1755   ALBUMIN 3.0* 3.0* 3.5   < >  --  3.2*   BILITOTAL 4.9* 5.6* 1.8*   < >  --  1.6* "   * 321* 146*   < >  --  47   * 476* 297*   < >  --  56*   ALKPHOS 141 127 100   < >  --  73   PROTEIN  --   --   --   --  30 *  --    LIPASE  --   --   --   --   --  33*    < > = values in this interval not displayed.       Elevated bilirubin    Assessment: Patient with history of liver abscesses in June 2022 that required drainage, thought due to cholecystitis as that time.  Underwent elective cholecystectomy 8/16.  After surgery bilirubin increased.  ERCP completed (Dr. Cash) on 8/17/22. No bile leak detected, No stones noted in the CBD but duct swept and sphincterotomy done.  Patient feeling better today and bilirubin slightly better.    Plan:   - diet as tolerated  - if pain worsens again, check lipase and consider HIDA scan if lipase noromal      Patricia Horton  [facility]  Office:  [phone]    Approximately 20 minutes of total time was spent providing patient care, including patient evaluation, reviewing documentation/test results, and .

## 2022-08-18 NOTE — PLAN OF CARE
Goal Outcome Evaluation:  DATE & TIME: 8/17/22 9130-6460  Summary: POD 1 lap addicted lap elodia w/ cholangiogram, and ERCP yesterday  Hx: COPD, HTN, DM 2, Hx 1 pack per day smoker  ORIENTATION/NEURO: A/O x4  ABNL VS/O2: VSS on 4L oxymask,   MOBILITY: SBA/independent  PAIN MANAGMENT: Denies pain  DIET: clear liquids, encouraged to push fluids, drinking well.  BOWEL/BLADDER: Voiding using beside urinal, dak noni urine, bowel sounds present, pt reports passing gas, rounded belly  ABNL LAB/BG: Elevated LFT's , , Bili 5.6  IV FLUIDS/DRAIN/TUBES/DEVICES: no IV access  SKIN: 2 lab sites open to air, well approximated, bruising noted, abdomen distended.   TESTS/PROCEDURES: NA  OTHER: NA  D/C DATE: Discharge pending.

## 2022-08-18 NOTE — PLAN OF CARE
DATE & TIME: 8/18/22 8561-7751 Cognitive Concerns/ Orientation : A&Ox4  ABNL VS/O2: VSS on 2L NC  MOBILITY: Up in room Ind, SBA in halls.  PAIN MANAGMENT: Denies  DIET: Low fat  BOWEL/BLADDER: Urinal at bedside and up to BR.  ABNL LAB/BG: WBC 11.4.    DRAIN/DEVICES:PIV SL  SKIN: Scattered bruising, Lap sites  D/C DATE: Pending  Discharge Barriers: Oxygen requirements  OTHER IMPORTANT INFO: Chest XR this morning. Will continue to monitor.

## 2022-08-18 NOTE — PROGRESS NOTES
[facility]    General Surgery  Daily Progress Note       Assessment and Plan:   Gabe Smith is a 67 year old male with hx of liver abscesses, cholecystitis, and elevated LFTs:    2 days S/P robotic assisted laparoscopic cholecystectomy with intraoperative cholangiogram    1 day S/P ERCP with major papilla on the rim of a diverticulum. No stones, leak, or stricture seen. Sphincterotomy performed    Acute hypoxic respiratory failure. Likely some level of this at baseline given COPD and tobacco use disorder. Single dose lasix ordered and CXR today.    PLAN:  - Improved leukocytosis, no further antibiotics needed.  - Advance diet as tolerated to low fat diet.   - Improved tBili, ALT/AST elevated but stable. Appreciate GI input.   - Oral analgesia. Limit narcotics as able. Senokot BID, miralax daily prn.  - Patient with hx COPD and tobacco use disorder (1 pack per day smoker). Smoking cessation consult placed. Need to work on aggressive pulmonary hygiene. IS and flutter valve. Wean off supplemental oxygen.  - On PTA medications for COPD, hyperlipidemia, and hypertension.  - Lovenox starting today for DVT prophylaxis. Work on ambulate QID.    DISPOSITION:  - Possible discharge later today but more likely tomorrow given need for weaning off supplemental oxygen, pain control, and diet advancement.     **addendum** CXR unremarkable, see below. Patient would benefit from recheck of labs given persistent LFT elevation, anticipate discharge tomorrow. Will order repeat LFTs and CBC for tomorrow morning.    CXR 8/18  IMPRESSION: Mild hyperexpansion of the lungs. No focal infiltrate,  pleural effusion or pneumothorax. The cardiac and mediastinal  silhouettes are normal.        Interval History:   Gabe Smith is seen this morning on surgical rounds. He states severe pain following anesthesia yesterday when coughing. Abdominal pain resolved today. Denies nausea with liquids, passing flatus, no BM  yet. He remains on 4 LPM of supplemental oxygen. He has interest in quitting smoking and expresses that he does not want to leave with supplemental oxygen. Vitals otherwise wnl. AST/ALT remain elevated (321->449, 476->495), tBili 5.6->4.9, WBC 14.0-11.4.         Physical Exam:   Temp: 98.8  F (37.1  C) Temp src: Oral BP: 121/70 Pulse: 76   Resp: 20 SpO2: 92 % O2 Device: Nasal cannula Oxygen Delivery: 4 LPM    I/O last 3 completed shifts:  In: 1661 [P.O.:720; I.V.:941]  Out: 1000 [Urine:1000]    GENERAL: VS reviewed, alert, oriented, no acute distress  LUNGS: On 4 LPM of supplemental oxygen, normal respiratory effort, no wheezing  ABDOMEN:  Soft, distended but soft, nontender  INCISION: Surgical glue in place. Clean, dry, and intact. No surrounding erythema.  EXTREMITIES: Moving all extremities, no gross deformities, 1+ lower extremity edema, no tenderness  NEUROLOGICAL: Grossly non-focal, mood & affect appropriate    Data   Recent Labs   Lab 08/18/22  0603 08/17/22  0728 08/17/22  0629 08/16/22  1559 08/16/22  1057   WBC  --  14.0*  --   --   --    HGB  --  16.3  --   --   --    MCV  --  92  --   --   --    PLT  --  148*  --   --   --    NA  --  135  --   --   --    POTASSIUM  --  4.5  --   --  4.1   CHLORIDE  --  103  --   --   --    CO2  --  28  --   --   --    BUN  --  20  --   --   --    CR  --  0.98  --   --   --    ANIONGAP  --  4  --   --   --    DES  --  8.6  --   --   --    * 149* 162*  --   --    ALBUMIN  --  3.0*  --  3.5  --    PROTTOTAL  --  6.2*  --  6.9  --    BILITOTAL  --  5.6*  --  1.8*  --    ALKPHOS  --  127  --  100  --    ALT  --  321*  --  146*  --    AST  --  476*  --  297*  --        Radha Kalthoff, PA-C

## 2022-08-19 VITALS
RESPIRATION RATE: 16 BRPM | HEIGHT: 71 IN | HEART RATE: 79 BPM | BODY MASS INDEX: 28.9 KG/M2 | DIASTOLIC BLOOD PRESSURE: 64 MMHG | WEIGHT: 206.4 LBS | TEMPERATURE: 98.4 F | OXYGEN SATURATION: 93 % | SYSTOLIC BLOOD PRESSURE: 114 MMHG

## 2022-08-19 LAB
ALBUMIN SERPL-MCNC: 3 G/DL (ref 3.4–5)
ALP SERPL-CCNC: 163 U/L (ref 40–150)
ALT SERPL W P-5'-P-CCNC: 337 U/L (ref 0–70)
AST SERPL W P-5'-P-CCNC: 178 U/L (ref 0–45)
BASOPHILS # BLD AUTO: 0 10E3/UL (ref 0–0.2)
BASOPHILS NFR BLD AUTO: 0 %
BILIRUB DIRECT SERPL-MCNC: 1.6 MG/DL (ref 0–0.2)
BILIRUB SERPL-MCNC: 2.9 MG/DL (ref 0.2–1.3)
EOSINOPHIL # BLD AUTO: 0.1 10E3/UL (ref 0–0.7)
EOSINOPHIL NFR BLD AUTO: 1 %
ERYTHROCYTE [DISTWIDTH] IN BLOOD BY AUTOMATED COUNT: 14.1 % (ref 10–15)
HCT VFR BLD AUTO: 51.2 % (ref 40–53)
HGB BLD-MCNC: 16.9 G/DL (ref 13.3–17.7)
IMM GRANULOCYTES # BLD: 0.1 10E3/UL
IMM GRANULOCYTES NFR BLD: 1 %
LYMPHOCYTES # BLD AUTO: 0.9 10E3/UL (ref 0.8–5.3)
LYMPHOCYTES NFR BLD AUTO: 10 %
MCH RBC QN AUTO: 30.2 PG (ref 26.5–33)
MCHC RBC AUTO-ENTMCNC: 33 G/DL (ref 31.5–36.5)
MCV RBC AUTO: 92 FL (ref 78–100)
MONOCYTES # BLD AUTO: 0.8 10E3/UL (ref 0–1.3)
MONOCYTES NFR BLD AUTO: 8 %
NEUTROPHILS # BLD AUTO: 7.7 10E3/UL (ref 1.6–8.3)
NEUTROPHILS NFR BLD AUTO: 80 %
NRBC # BLD AUTO: 0 10E3/UL
NRBC BLD AUTO-RTO: 0 /100
PLATELET # BLD AUTO: 141 10E3/UL (ref 150–450)
PROT SERPL-MCNC: 6.7 G/DL (ref 6.8–8.8)
RBC # BLD AUTO: 5.59 10E6/UL (ref 4.4–5.9)
WBC # BLD AUTO: 9.7 10E3/UL (ref 4–11)

## 2022-08-19 PROCEDURE — 250N000013 HC RX MED GY IP 250 OP 250 PS 637: Performed by: SURGERY

## 2022-08-19 PROCEDURE — 80076 HEPATIC FUNCTION PANEL: CPT | Performed by: PHYSICIAN ASSISTANT

## 2022-08-19 PROCEDURE — 250N000013 HC RX MED GY IP 250 OP 250 PS 637: Performed by: PHYSICIAN ASSISTANT

## 2022-08-19 PROCEDURE — 36415 COLL VENOUS BLD VENIPUNCTURE: CPT | Performed by: PHYSICIAN ASSISTANT

## 2022-08-19 PROCEDURE — 250N000011 HC RX IP 250 OP 636: Performed by: PHYSICIAN ASSISTANT

## 2022-08-19 PROCEDURE — 85004 AUTOMATED DIFF WBC COUNT: CPT | Performed by: PHYSICIAN ASSISTANT

## 2022-08-19 RX ORDER — GUAIFENESIN 600 MG/1
600 TABLET, EXTENDED RELEASE ORAL 2 TIMES DAILY
Status: DISCONTINUED | OUTPATIENT
Start: 2022-08-19 | End: 2022-08-19 | Stop reason: HOSPADM

## 2022-08-19 RX ADMIN — FLUTICASONE FUROATE AND VILANTEROL TRIFENATATE 1 PUFF: 100; 25 POWDER RESPIRATORY (INHALATION) at 09:56

## 2022-08-19 RX ADMIN — ACETAMINOPHEN 650 MG: 325 TABLET ORAL at 09:55

## 2022-08-19 RX ADMIN — AMLODIPINE BESYLATE 10 MG: 10 TABLET ORAL at 09:55

## 2022-08-19 RX ADMIN — LISINOPRIL AND HYDROCHLOROTHIAZIDE 1 TABLET: 12.5; 2 TABLET ORAL at 09:55

## 2022-08-19 RX ADMIN — ALLOPURINOL 200 MG: 100 TABLET ORAL at 09:55

## 2022-08-19 RX ADMIN — GUAIFENESIN 600 MG: 600 TABLET ORAL at 11:41

## 2022-08-19 RX ADMIN — ENOXAPARIN SODIUM 40 MG: 40 INJECTION SUBCUTANEOUS at 11:41

## 2022-08-19 RX ADMIN — SENNOSIDES AND DOCUSATE SODIUM 1 TABLET: 50; 8.6 TABLET ORAL at 09:55

## 2022-08-19 ASSESSMENT — ACTIVITIES OF DAILY LIVING (ADL)
ADLS_ACUITY_SCORE: 35
DIFFICULTY_EATING/SWALLOWING: NO
ADLS_ACUITY_SCORE: 20
WEAR_GLASSES_OR_BLIND: NO
ADLS_ACUITY_SCORE: 35
DOING_ERRANDS_INDEPENDENTLY_DIFFICULTY: NO
WALKING_OR_CLIMBING_STAIRS_DIFFICULTY: NO
DRESSING/BATHING_DIFFICULTY: NO
ADLS_ACUITY_SCORE: 20
CONCENTRATING,_REMEMBERING_OR_MAKING_DECISIONS_DIFFICULTY: NO
ADLS_ACUITY_SCORE: 35
ADLS_ACUITY_SCORE: 35
FALL_HISTORY_WITHIN_LAST_SIX_MONTHS: NO
TOILETING_ISSUES: NO
CHANGE_IN_FUNCTIONAL_STATUS_SINCE_ONSET_OF_CURRENT_ILLNESS/INJURY: NO
ADLS_ACUITY_SCORE: 35

## 2022-08-19 NOTE — PROGRESS NOTES
[facility]    General Surgery  Daily Progress Note       Assessment and Plan:   Gabe Smith is a 67 year old male with hx of liver abscesses, cholecystitis, and elevated LFTs:    3 days S/P robotic assisted laparoscopic cholecystectomy with intraoperative cholangiogram    2 days S/P ERCP with major papilla on the rim of a diverticulum. No stones, leak, or stricture seen. Sphincterotomy performed    Acute hypoxic respiratory failure. Likely some level of this at baseline given COPD and tobacco use disorder. Single dose lasix ordered and CXR yesterday; minimal change and CXR benign for acute cause.    PLAN:  - Improved leukocytosis, no further antibiotics needed.  - Low fat diet.   - Continued improvement of tBili, ALT/AST. Appreciate GI input; now signed off.   - Oral analgesia. Limit narcotics as able.  Senokot BID PRN, miralax daily prn.  - Patient with hx COPD and tobacco use disorder (1 pack per day smoker). Smoking cessation consult placed. Need to work on aggressive pulmonary hygiene. IS and flutter valve. Wean off supplemental oxygen.  - On PTA medications for COPD, hyperlipidemia, and hypertension.  - Lovenox for DVT prophylaxis. Work on ambulate QID.  - Will add Mucinex in case this can loosen phlegm so he can get rid of easier.  Discussed muscle relaxant, but don't want to decrease his respiratory effort further and his pain is pretty well controlled except for coughing.    DISPOSITION:  - Same, Possible discharge later today if able to wean off supplemental oxygen.        Interval History:   Gabe Smith is seen this morning on surgical rounds. He states he feels worse today than yesterday.  Pain is tolerable with just narcotic at night.  Hurts to cough however.  His cough is productive, white and frothy.  Denies nausea.  Tolerating diet.  +Flatus +BM, loose yesterday.         Physical Exam:   Temp: 98.4  F (36.9  C) Temp src: Oral BP: 114/64 Pulse: 79   Resp: 16 SpO2: 94 %  O2 Device: None (Room air) Oxygen Delivery: 2 LPM    I/O last 3 completed shifts:  In: 720 [P.O.:720]  Out: 2830 [Urine:2830]    GENERAL: VS reviewed, alert, oriented, no acute distress  LUNGS: On 2 LPM of supplemental oxygen, normal respiratory effort, no wheezing  ABDOMEN:  Soft, distended but soft, nontender  INCISION: Surgical glue in place. Clean, dry, and intact. No surrounding erythema.  EXTREMITIES: Moving all extremities, no gross deformities, 1+ lower extremity edema, no tenderness  NEUROLOGICAL: Grossly non-focal, mood & affect appropriate    Data   Recent Labs   Lab 08/19/22  0810 08/18/22  0718 08/18/22  0603 08/17/22  0728 08/17/22  0629 08/16/22  1559 08/16/22  1057   WBC 9.7 11.4*  --  14.0*  --   --   --    HGB 16.9 16.2  --  16.3  --   --   --    MCV 92 94  --  92  --   --   --    * 129*  --  148*  --   --   --    NA  --   --   --  135  --   --   --    POTASSIUM  --   --   --  4.5  --   --  4.1   CHLORIDE  --   --   --  103  --   --   --    CO2  --   --   --  28  --   --   --    BUN  --   --   --  20  --   --   --    CR  --   --   --  0.98  --   --   --    ANIONGAP  --   --   --  4  --   --   --    DES  --   --   --  8.6  --   --   --    GLC  --   --  177* 149* 162*  --   --    ALBUMIN 3.0* 3.0*  --  3.0*  --    < >  --    PROTTOTAL 6.7* 6.3*  --  6.2*  --    < >  --    BILITOTAL 2.9* 4.9*  --  5.6*  --    < >  --    ALKPHOS 163* 141  --  127  --    < >  --    * 449*  --  321*  --    < >  --    * 495*  --  476*  --    < >  --     < > = values in this interval not displayed.     CXR 8/18  IMPRESSION: Mild hyperexpansion of the lungs. No focal infiltrate,  pleural effusion or pneumothorax. The cardiac and mediastinal  silhouettes are normal.    Ranjan Steele PA-C

## 2022-08-19 NOTE — DISCHARGE SUMMARY
"Surgery Discharge Summary    Gabe Smith MRN# [id number]   YOB: 1955 Age: 67 year old     Date of Admission:  8/16/2022  Date of Discharge:  8/19/2022  Admitting Physician:  Carlota Leavitt MD  Discharging Service:  [facility] General Surgery   Primary Provider: Donny Payne    Discharge Diagnosis:   Principle Diagnosis:  Cholecystitis [K81.9]  Abnormal cholangiogram [R93.2]    Secondary Diagnosis:  Acute hypoxic respiratory failure requiring supplemental oxygen    Brief HPI: Gabe Smith is a 67 year old year-old male who presented for elective robotic cholecystectomy with intraoperative cholangiograms.  Cholangiograms demonstrated possible partial obstruction of the common bile duct.  In the postanesthesia recovery unit, the patient was having respiratory issues requiring supplemental oxygen.  The decision was made to admit the patient for observation.     Hospital Course: Gabe Smith underwent the above named procedure without complications. Please see op note for further details.  On postoperative day #1, the patient was found to have significantly increased LFTs.  GI was consulted and the patient underwent ERCP with sphincterotomy.  His LFTs subsequently improved.  He was still requiring some supplemental oxygen and remained hospitalized until he was able to wean off of the oxygen support.  The patient has been tolerating oral intake.  His postoperative pain is well controlled.  He is ambulating and voiding well.  The patient is now appropriate for discharge to home.    Inpatient Consultations: Gastroenterology    Procedures:   Robotic cholecystectomy with intraoperative cholangiograms    Disposition:   Discharged to home     Discharge Condition  Discharge condition: Stable   Discharge vitals: Blood pressure 114/64, pulse 79, temperature 98.4  F (36.9  C), temperature source Oral, resp. rate 16, height 1.803 m (5' 11\"), weight 93.6 kg (206 lb 6.4 oz), SpO2 93 %.     General: Vital " signs reviewed, in no apparent distress  Eyes: Anicteric  HENT: Normocephalic, atraumatic, trachea midline   Respiratory: Breathing nonlabored  Cardiovascular: Regular rate and rhythm  GI: Abdomen soft, slightly distended, mildly tender with palpation, incisions healing well  Musculoskeletal: No gross deformities  Neurologic: Grossly nonfocal exam  Psychiatric: Normal mood, affect and insight  Integumentary: Warm and dry    Discharge Medications:   Current Discharge Medication List      START taking these medications    Details   oxyCODONE (ROXICODONE) 5 MG tablet Take 1 tablet (5 mg) by mouth every 4 hours as needed for moderate to severe pain  Qty: 10 tablet, Refills: 0    Associated Diagnoses: Postoperative pain      senna-docusate (SENOKOT-S/PERICOLACE) 8.6-50 MG tablet Take 1-2 tablets by mouth 2 times daily  Qty: 30 tablet, Refills: 0    Associated Diagnoses: Postoperative pain         CONTINUE these medications which have NOT CHANGED    Details   acetaminophen (TYLENOL) 325 MG tablet Take 2 tablets (650 mg) by mouth every 6 hours as needed for mild pain or other (and adjunct with moderate or severe pain or per patient request)  Qty: 50 tablet, Refills: 0    Comments: Future refills by PCP Dr. Donny Payne with phone number [phone].  Associated Diagnoses: Hepatic abscess      albuterol (PROAIR HFA/PROVENTIL HFA/VENTOLIN HFA) 108 (90 Base) MCG/ACT inhaler Inhale 2 puffs into the lungs every 4 hours as needed for shortness of breath / dyspnea or wheezing  Qty: 18 g, Refills: 11    Comments: Pharmacy may dispense brand covered by insurance (Proair, or proventil or ventolin or generic albuterol inhaler)  Associated Diagnoses: Panlobular emphysema (H)      albuterol (PROVENTIL) (2.5 MG/3ML) 0.083% neb solution Take 1 vial (2.5 mg) by nebulization every 6 hours as needed for shortness of breath / dyspnea or wheezing  Qty: 90 mL, Refills: 11    Associated Diagnoses: Panlobular emphysema (H)       allopurinol (ZYLOPRIM) 100 MG tablet Take 2 tablets (200 mg) by mouth daily  Qty: 180 tablet, Refills: 3    Associated Diagnoses: Acute gout of foot, unspecified cause, unspecified laterality      amLODIPine (NORVASC) 10 MG tablet Take 1 tablet (10 mg) by mouth daily  Qty: 90 tablet, Refills: 3    Associated Diagnoses: Benign essential hypertension      fluticasone-salmeterol (AIRDUO RESPICLICK) 113-14 MCG/ACT inhaler Inhale 1 puff into the lungs 2 times daily  Qty: 1 each, Refills: 11    Comments: **DISPENSE GENERIC AIRDUO  Associated Diagnoses: Panlobular emphysema (H)      lisinopril-hydrochlorothiazide (ZESTORETIC) 20-12.5 MG tablet Take 1 tablet by mouth every morning  Qty: 90 tablet, Refills: 1    Associated Diagnoses: Benign essential hypertension      pravastatin (PRAVACHOL) 40 MG tablet Take 1 tablet (40 mg) by mouth At Bedtime  Qty: 90 tablet, Refills: 1    Comments: RESUME AFTER CHOLECYSTECTOMY  Associated Diagnoses: Hyperlipidemia LDL goal <130      aspirin 81 MG tablet Take 1 tablet (81 mg) by mouth daily      sodium chloride, PF, 0.9% PF flush Irrigate with 10 mLs as directed daily  Qty: 100 mL, Refills: 1    Comments: Dispense 10 syringes  Associated Diagnoses: Hepatic abscess             Discharge Instructions:   Follow-up Appointments     Follow-up and recommended labs and tests      Dr. Leavitt's surgical office will contact you in approximately 2 weeks to   check on your progress and answer any questions you may have. If you are   doing well, you will not need to return for a follow up appointment. If   any concerns are identified over the phone, we will help you make an   appointment to see a provider.  If you have not received a phone call, have any questions or concerns, or   would like to be seen, please call us at [phone] and ask to speak   with our nurse. We are located at [address] [place] [address].           After Care Instructions     Activity      Please  see attached discharge instructions.         Diet      Please see attached discharge instructions.                   Carlota Leavitt MD

## 2022-08-19 NOTE — PROGRESS NOTES
"[facility]  [facility]  Gastroenterology Progress note    Interval History:      Patient reports abdominal pain with coughing.  Liver function tests are much improved.      Vital Signs:      /64 (BP Location: Left arm)   Pulse 79   Temp 98.4  F (36.9  C) (Oral)   Resp 16   Ht 1.803 m (5' 11\")   Wt 93.6 kg (206 lb 6.4 oz)   SpO2 94%   BMI 28.79 kg/m    Temp (24hrs), Av.5  F (36.9  C), Min:98  F (36.7  C), Max:98.9  F (37.2  C)    Patient Vitals for the past 72 hrs:   Weight   22 1024 93.6 kg (206 lb 6.4 oz)       Intake/Output Summary (Last 24 hours) at 2022 0909  Last data filed at 2022 0700  Gross per 24 hour   Intake 240 ml   Output 2780 ml   Net -2540 ml         Constitutional: NAD, comfortable  Cardiovascular: RRR, normal S1, S2   Respiratory: CTAB  Abdomen: soft, + minimal tenderness to palpation, + minimally distended    Additional Comments:  ROS, FH, SH: See initial GI consult for details.    Laboratory Data:  Recent Labs   Lab Test 22  0810 22  0718 22  0728 22  1211 22  1157   WBC 9.7 11.4* 14.0*   < >  --    HGB 16.9 16.2 16.3   < >  --    MCV 92 94 92   < >  --    * 129* 148*   < >  --    INR  --   --   --   --  1.33*    < > = values in this interval not displayed.     Recent Labs   Lab Test 22  0728 22  1057 22  1030 22  0842 22  0930 22  0833 22  0729     --   --   --   --  136 137   POTASSIUM 4.5 4.1  --   --   --  3.5 3.9   CHLORIDE 103  --   --   --   --  103 104   CO2 28  --   --   --   --  25 28   BUN 20  --  17 15   < > 11 16   CR 0.98  --  0.66 0.79   < > 0.74 0.77   ANIONGAP 4  --   --   --   --  8 5   DES 8.6  --   --   --   --  8.5 8.1*    < > = values in this interval not displayed.     Recent Labs   Lab Test 22  0810 22  0718 22  0728 22  1559 22  0638 22  1800 22  1755   ALBUMIN 3.0* 3.0* 3.0* 3.5   < " >  --  3.2*   BILITOTAL 2.9* 4.9* 5.6* 1.8*   < >  --  1.6*   DBIL 1.6* 3.1*  --  0.8*   < >  --  0.4*   * 449* 321* 146*   < >  --  47   * 495* 476* 297*   < >  --  56*   ALKPHOS 163* 141 127 100   < >  --  73   PROTEIN  --   --   --   --   --  30 *  --    LIPASE  --   --   --   --   --   --  33*    < > = values in this interval not displayed.         Assessment:  66 yo male with history of liver abscesses in 6/2022 that required drainage and were thought secondary to cholecystitis.  He underwent an elective cholecystectomy on 8/16.  After surgery, his bilirubin increased.  An ERCP was completed by Dr. Cash on 8/17 and no bile leak was detected, no stones noted in the CBD.  The duct was swept and sphincterotomy was performed.  Liver function tests are significantly improved today.  Plan:  -  Diet as tolerated.  -  Continue to trend liver function tests.  -  If pain worsens, recommend checking lipase and HIDA scan if lipase is normal.  -  GI will sign off; please call with questions/concerns.  Dr. Sam is covering the weekend.    Total Time Spent:  15 minutes    ROWDY Malin  [facility]  Office:  [phone] call if needed after 12PM  Cell:  [phone], not available after 12PM at this number

## 2022-08-19 NOTE — PLAN OF CARE
Goal Outcome Evaluation:    Pt is A&Ox4, SBA, tolerating low fat diet, VSS on 3L NC  overnight, pt back on  2L at 0600 and is tolerating, IS and flutter valve encouraged, reports incisional pain that is managed with PRN oxycodone & tylenol, voiding adequately in bedside urinal, active BS, passing flatus, lap sites CDI, abd is distended, denies N/V, discharge pending.

## 2022-08-19 NOTE — PLAN OF CARE
Goal Outcome Evaluation:  A&O, VSS on RA,Tylenol PRN x 1 for pain, on low fat diet, tolerating, Independent in transfers, discharge instruction done, patient acknowledged understanding.

## 2022-08-20 ENCOUNTER — PATIENT OUTREACH (OUTPATIENT)
Dept: CARE COORDINATION | Facility: CLINIC | Age: 67
End: 2022-08-20

## 2022-08-20 NOTE — PROGRESS NOTES
Clinic Care Coordination Contact  Community Health Worker Initial Outreach      Patient accepts CC: No, Pt declined needs for extra support.     Clinic Care Coordination Contact  [facility]: Post-Discharge Note  SITUATION                                                      Admission:    Admission Date: 08/16/22   Reason for Admission: Cholecystitis    Postoperative pain    Abnormal cholangiogram  Discharge:   Discharge Date: 08/19/22  Discharge Diagnosis: Cholecystitis    Postoperative pain    Abnormal cholangiogram    BACKGROUND                                                      Per hospital discharge summary and inpatient provider notes:    Gabe Smith is a 67 year old year-old male who presented for elective robotic cholecystectomy with intraoperative cholangiograms.  Cholangiograms demonstrated possible partial obstruction of the common bile duct.  In the postanesthesia recovery unit, the patient was having respiratory issues requiring supplemental oxygen.  The decision was made to admit the patient for observation.    ASSESSMENT         Discharge Assessment  How are you doing now that you are home?: doing well  How are your symptoms? (Red Flag symptoms escalate to triage hotline per guidelines): Improved  Do you feel your condition is stable enough to be safe at home until your provider visit?: Yes  Does the patient have their discharge instructions? : Yes  Does the patient have questions regarding their discharge instructions? : No  Were you started on any new medications or were there changes to any of your previous medications? : Yes  Does the patient have all of their medications?: Yes  Do you have questions regarding any of your medications? : No  Do you have all of your needed medical supplies or equipment (DME)?  (i.e. oxygen tank, CPAP, cane, etc.): Yes  Discharge follow-up appointment scheduled within 14 calendar days? : No  Is patient agreeable to assistance with scheduling? : No (will  call clinic)    Post-op ([facility] CTA Only)  If the patient had a surgery or procedure, do they have any questions for a nurse?: No         PLAN                                                      Outpatient Plan:      No future appointments.    Follow-up Appointments     Follow-up and recommended labs and tests      Dr. Leavitt's surgical office will contact you in approximately 2 weeks to   check on your progress and answer any questions you may have    For any urgent concerns, please contact our 24 hour nurse triage line: [phone])       LICHA Obrien  [phone]  [facility]

## 2022-08-29 ENCOUNTER — TELEPHONE (OUTPATIENT)
Dept: SURGERY | Facility: CLINIC | Age: 67
End: 2022-08-29

## 2022-08-29 NOTE — TELEPHONE ENCOUNTER
[facility] Triage Call    Gabe Smith was called regarding wound concerns.  He underwent a Robotic cholecystectomy by Dr. Leavitt on 8/16/22.  He reports tenderness and redness at the left port site (closed at the fascia per op note).  He states the tenderness is resolving and the erythema is not increasing in size.  He denies a fever or drainage.  He reports some skin remaining at the port site. We discussed applying a triple antibiotic ointment once daily alternating with leaving the wound open to air.  He should continue to monitor the wound closely for a couple more days and notify our office of his progress.  He should follow up if his symptoms persist or worsen within 1 week.      The pathology revealed chronic and focal acute cholecystitis with cholelithiasis.  This was discussed with the patient.  The patient states he understands our discussion and all of his questions were answered.      Teresa Orosco PA-C

## 2022-08-29 NOTE — TELEPHONE ENCOUNTER
Patient had a lap elodia 8/16/22 VALENTE, has 4 incisions and one is pink/red, doesn't appear to be infected just has questions about pain and how its healing.    Phone: [phone]  Message ok

## 2022-10-19 ENCOUNTER — LAB (OUTPATIENT)
Dept: LAB | Facility: CLINIC | Age: 67
End: 2022-10-19
Payer: COMMERCIAL

## 2022-10-19 DIAGNOSIS — Z12.5 SCREENING FOR PROSTATE CANCER: ICD-10-CM

## 2022-10-19 DIAGNOSIS — E78.5 HYPERLIPIDEMIA LDL GOAL <130: ICD-10-CM

## 2022-10-19 DIAGNOSIS — R73.03 PREDIABETES: ICD-10-CM

## 2022-10-19 DIAGNOSIS — I10 BENIGN ESSENTIAL HYPERTENSION: ICD-10-CM

## 2022-10-19 DIAGNOSIS — M10.9 ACUTE GOUT OF FOOT, UNSPECIFIED CAUSE, UNSPECIFIED LATERALITY: ICD-10-CM

## 2022-10-19 LAB
ALBUMIN SERPL-MCNC: 3.6 G/DL (ref 3.4–5)
ALP SERPL-CCNC: 104 U/L (ref 40–150)
ALT SERPL W P-5'-P-CCNC: 22 U/L (ref 0–70)
ANION GAP SERPL CALCULATED.3IONS-SCNC: 5 MMOL/L (ref 3–14)
AST SERPL W P-5'-P-CCNC: 19 U/L (ref 0–45)
BILIRUB SERPL-MCNC: 0.4 MG/DL (ref 0.2–1.3)
BUN SERPL-MCNC: 13 MG/DL (ref 7–30)
CALCIUM SERPL-MCNC: 8.4 MG/DL (ref 8.5–10.1)
CHLORIDE BLD-SCNC: 108 MMOL/L (ref 94–109)
CHOLEST SERPL-MCNC: 194 MG/DL
CO2 SERPL-SCNC: 28 MMOL/L (ref 20–32)
CREAT SERPL-MCNC: 0.81 MG/DL (ref 0.66–1.25)
ERYTHROCYTE [DISTWIDTH] IN BLOOD BY AUTOMATED COUNT: 14.1 % (ref 10–15)
FASTING STATUS PATIENT QL REPORTED: YES
GFR SERPL CREATININE-BSD FRML MDRD: >90 ML/MIN/1.73M2
GLUCOSE BLD-MCNC: 129 MG/DL (ref 70–99)
HBA1C MFR BLD: 6.6 % (ref 0–5.6)
HCT VFR BLD AUTO: 50.5 % (ref 40–53)
HDLC SERPL-MCNC: 30 MG/DL
HGB BLD-MCNC: 16.6 G/DL (ref 13.3–17.7)
LDLC SERPL CALC-MCNC: 107 MG/DL
MCH RBC QN AUTO: 30.7 PG (ref 26.5–33)
MCHC RBC AUTO-ENTMCNC: 32.9 G/DL (ref 31.5–36.5)
MCV RBC AUTO: 93 FL (ref 78–100)
NONHDLC SERPL-MCNC: 164 MG/DL
PLATELET # BLD AUTO: 199 10E3/UL (ref 150–450)
POTASSIUM BLD-SCNC: 3.8 MMOL/L (ref 3.4–5.3)
PROT SERPL-MCNC: 7 G/DL (ref 6.8–8.8)
PSA SERPL-MCNC: 0.94 UG/L (ref 0–4)
RBC # BLD AUTO: 5.41 10E6/UL (ref 4.4–5.9)
SODIUM SERPL-SCNC: 141 MMOL/L (ref 133–144)
TRIGL SERPL-MCNC: 287 MG/DL
WBC # BLD AUTO: 10.1 10E3/UL (ref 4–11)

## 2022-10-19 PROCEDURE — 83036 HEMOGLOBIN GLYCOSYLATED A1C: CPT

## 2022-10-19 PROCEDURE — 85027 COMPLETE CBC AUTOMATED: CPT

## 2022-10-19 PROCEDURE — 36415 COLL VENOUS BLD VENIPUNCTURE: CPT

## 2022-10-19 PROCEDURE — 80061 LIPID PANEL: CPT

## 2022-10-19 PROCEDURE — 80053 COMPREHEN METABOLIC PANEL: CPT

## 2022-10-19 PROCEDURE — G0103 PSA SCREENING: HCPCS

## 2022-10-21 ENCOUNTER — OFFICE VISIT (OUTPATIENT)
Dept: INTERNAL MEDICINE | Facility: CLINIC | Age: 67
End: 2022-10-21
Payer: COMMERCIAL

## 2022-10-21 VITALS
BODY MASS INDEX: 29.65 KG/M2 | DIASTOLIC BLOOD PRESSURE: 78 MMHG | HEART RATE: 77 BPM | SYSTOLIC BLOOD PRESSURE: 134 MMHG | OXYGEN SATURATION: 93 % | TEMPERATURE: 98.5 F | WEIGHT: 207.1 LBS | HEIGHT: 70 IN

## 2022-10-21 DIAGNOSIS — I10 BENIGN ESSENTIAL HYPERTENSION: ICD-10-CM

## 2022-10-21 DIAGNOSIS — E78.5 HYPERLIPIDEMIA LDL GOAL <130: ICD-10-CM

## 2022-10-21 DIAGNOSIS — F10.20 ALCOHOL DEPENDENCE, EPISODIC DRINKING BEHAVIOR (H): ICD-10-CM

## 2022-10-21 DIAGNOSIS — R73.03 PREDIABETES: ICD-10-CM

## 2022-10-21 DIAGNOSIS — L40.9 PSORIASIS: ICD-10-CM

## 2022-10-21 DIAGNOSIS — Z23 NEED FOR PROPHYLACTIC VACCINATION AND INOCULATION AGAINST INFLUENZA: ICD-10-CM

## 2022-10-21 DIAGNOSIS — J43.1 PANLOBULAR EMPHYSEMA (H): Primary | ICD-10-CM

## 2022-10-21 DIAGNOSIS — I71.40 ABDOMINAL AORTIC ANEURYSM (AAA) WITHOUT RUPTURE, UNSPECIFIED PART (H): ICD-10-CM

## 2022-10-21 DIAGNOSIS — M10.9 ACUTE GOUT OF FOOT, UNSPECIFIED CAUSE, UNSPECIFIED LATERALITY: ICD-10-CM

## 2022-10-21 DIAGNOSIS — Z23 HIGH PRIORITY FOR 2019-NCOV VACCINE: ICD-10-CM

## 2022-10-21 PROCEDURE — 90662 IIV NO PRSV INCREASED AG IM: CPT | Performed by: INTERNAL MEDICINE

## 2022-10-21 PROCEDURE — 99214 OFFICE O/P EST MOD 30 MIN: CPT | Mod: 25 | Performed by: INTERNAL MEDICINE

## 2022-10-21 PROCEDURE — 0124A COVID-19,PF,PFIZER BOOSTER BIVALENT: CPT | Performed by: INTERNAL MEDICINE

## 2022-10-21 PROCEDURE — 91312 COVID-19,PF,PFIZER BOOSTER BIVALENT: CPT | Performed by: INTERNAL MEDICINE

## 2022-10-21 PROCEDURE — G0008 ADMIN INFLUENZA VIRUS VAC: HCPCS | Performed by: INTERNAL MEDICINE

## 2022-10-21 RX ORDER — BETAMETHASONE DIPROPIONATE 0.5 MG/G
CREAM TOPICAL
Qty: 45 G | Refills: 1 | Status: SHIPPED | OUTPATIENT
Start: 2022-10-21 | End: 2023-11-03

## 2022-10-21 RX ORDER — ALLOPURINOL 100 MG/1
200 TABLET ORAL DAILY
Qty: 180 TABLET | Refills: 3 | Status: SHIPPED | OUTPATIENT
Start: 2022-10-21 | End: 2023-11-03

## 2022-10-21 RX ORDER — PRAVASTATIN SODIUM 40 MG
40 TABLET ORAL AT BEDTIME
Qty: 90 TABLET | Refills: 1 | Status: SHIPPED | OUTPATIENT
Start: 2022-10-21 | End: 2023-04-14

## 2022-10-21 RX ORDER — AMLODIPINE BESYLATE 10 MG/1
10 TABLET ORAL DAILY
Qty: 90 TABLET | Refills: 3 | Status: SHIPPED | OUTPATIENT
Start: 2022-10-21 | End: 2023-10-17

## 2022-10-21 RX ORDER — FLUTICASONE PROPIONATE AND SALMETEROL 113; 14 UG/1; UG/1
1 POWDER, METERED RESPIRATORY (INHALATION) 2 TIMES DAILY
Qty: 3 EACH | Refills: 3 | Status: SHIPPED | OUTPATIENT
Start: 2022-10-21 | End: 2023-05-01 | Stop reason: DRUGHIGH

## 2022-10-21 RX ORDER — LISINOPRIL AND HYDROCHLOROTHIAZIDE 12.5; 2 MG/1; MG/1
1 TABLET ORAL EVERY MORNING
Qty: 90 TABLET | Refills: 1 | Status: SHIPPED | OUTPATIENT
Start: 2022-10-21 | End: 2023-04-14

## 2022-10-21 ASSESSMENT — PAIN SCALES - GENERAL: PAINLEVEL: NO PAIN (0)

## 2022-10-21 NOTE — PATIENT INSTRUCTIONS
Continue all medications at the same doses.  Contact your usual pharmacy if you need refills.      Keep working on your diet, this is the most powerful to control type II diabetes and prediabetes.      Return to see me in approximately 6 months, sooner if needed.  [facility]fast[facility] or at any other [facility] lab 1-2 days before this appointment.  If you get the labs done at another clinic, make arrangements with that clinic directly.  The orders will be in place.  Use [facility] or Call [phone] to schedule the appointment with me and lab appointment.        Work on the smoking cessation.      Return to see me in approximately 6 months, sooner if needed.  Please get nonfasting labs done in the [facility] or at any other [facility] lab 1-2 days before this appointment.  If you get the labs done at another clinic, make arrangements with that clinic directly.  The orders will be in place.  Use [facility] or Call [phone] to schedule the appointment with me and lab appointment.

## 2022-10-21 NOTE — PROGRESS NOTES
Assessment & Plan     (J43.1) COPD (HCC)  (primary encounter diagnosis)  Comment: mcuh better while he was not skoing, encoruaged him to get rid of all smoking   Continue all medications at the same doses.  Contact your usual pharmacy if you need refills.   Discussed general issues of COPD, including pathophysiology, ways it will affect the pt., when to seek help, reviewed the typical medications (how they are taken, how they help)   Plan: REVIEW OF HEALTH MAINTENANCE PROTOCOL ORDERS,         fluticasone-salmeterol (AIRDUO RESPICLICK)         113-14 MCG/ACT inhaler            (F10.20) Alcohol dependence, episodic drinking behavior (H)  Comment: at least he reports drinking less.   Needs to cut down all the way off.     Plan:     (I71.40) Abdominal aortic aneurysm (AAA) without rupture, unspecified part  Comment: This condition is currently controlled on the current medical regimen.  Continue current therapy.   Discussed secondary risk factor modification and recommended continuing aggressive management of these items.   Plan:     (I10) Benign essential hypertension  Comment: This condition is currently controlled on the current medical regimen.  Continue current therapy.   Plan: REVIEW OF HEALTH MAINTENANCE PROTOCOL ORDERS,         amLODIPine (NORVASC) 10 MG tablet,         lisinopril-hydrochlorothiazide (ZESTORETIC)         20-12.5 MG tablet            (M10.9) Acute gout of foot, unspecified cause, unspecified laterality  Comment: This condition is currently controlled on the current medical regimen.  Continue current therapy.   Plan: REVIEW OF HEALTH MAINTENANCE PROTOCOL ORDERS,         allopurinol (ZYLOPRIM) 100 MG tablet            (L40.9) Psoriasis  Comment: steroid cream onto psoriatic patches on elbows   Plan: REVIEW OF HEALTH MAINTENANCE PROTOCOL ORDERS,         betamethasone dipropionate (DIPROSONE) 0.05 %         external cream            (E78.5) Hyperlipidemia LDL goal <130  Comment: This condition is  "currently controlled on the current medical regimen.  Continue current therapy.   Plan: REVIEW OF HEALTH MAINTENANCE PROTOCOL ORDERS,         pravastatin (PRAVACHOL) 40 MG tablet            (Z23) Need for prophylactic vaccination and inoculation against influenza  Comment:   Plan: INFLUENZA, QUAD, HIGH DOSE, PF, 65YR + (FLUZONE        HD), ADMIN INFLUENZA (For MEDICARE Patients         ONLY) []            (Z23) High priority for 2019-nCoV vaccine  Comment:   Plan: COVID-19,PF,PFIZER BOOSTER BIVALENT 12+Yrs            (R73.03) Prediabetes  Comment: Reviewed the labs showing mildly elevated glucose levels consistent with prediabetes.     Discussed \"pre-diabetes\", impaired glucose tolerance, and its part in the dysmetabolic syndrome.   No medications needed at this time.     Discussed the inevitable progression of impaired glucose tolerance toward worsening diabetes mellitus if nothing is changed in the diet, and the need for agressive interventions now to delay and prevent this inevitable progression.    Recommended lower carb diet.  Recommended regular physical activity.   We will continue to monitor this and nuñez additional recommendations and treatments as indicated based on the labs.       Plan:                 BMI:   Estimated body mass index is 29.72 kg/m  as calculated from the following:    Height as of this encounter: 1.778 m (5' 10\").    Weight as of this encounter: 93.9 kg (207 lb 1.6 oz).           Return in about 6 months (around 4/21/2023) for Medicare Annual Wellness Exam, Diabetes, Blood pressure, with pre-visit non-fasting labs.    Donny Payne MD  [facility] DOV Bernal is a 67 year old, presenting for the following health issues:  Hospital F/U, Imm/Inj (Flu Shot), and Imm/Inj (COVID-19 VACCINE)      Hospitals in [place]       Hospital Follow-up Visit:    Hospital/Nursing Home/IP Rehab Facility: [facility]  Date of Admission: " 8/16/22  Date of Discharge: 8/19/22  Reason(s) for Admission: post-elective robotic cholecystectomy with intraoperative cholangiograms.    Post-anesthesia respiratory issues requiring supplemental oxygen  Was your hospitalization related to COVID-19? No   Problems taking medications regularly:  None  Medication changes since discharge: add Oxycodone and senna  Problems adhering to non-medication therapy:  None    Summary of hospitalization:  [facility] discharge summary reviewed  Diagnostic Tests/Treatments reviewed.  Follow up needed: none  Other Healthcare Providers Involved in Patient s Care:         None  Update since discharge: improved. Required ERCP 3 days after surgery, sludge removed from biliary duct, with relief of symptoms and now normalization of LFTs  Plan of care communicated with patient     Since home from hospital, they report that they are feeling better.   Energy level and stamina are slowly improving.    Appetite and intake are improving.   No fevers, no chills  No shortness of breath.   No abdominal pain.   They have returned to prior routine and activities.      LFTs were very elevated while hospitlaized with cholecystitis, have since normalzied  2.  COPD remains stable.  Has not had any recent breathing troubles beyond usual baseline.  Has not any acute respiratory events.  Remains with intermittent cough, mild shortness of breath with overexertion as per usual.  Using medication as directed with reported side effects.     Quit smoking for one motnh after hospital stay and noticed great improvement in his breathing.  Unforttunately, went back to smoking.   Plans to quit again.     3.  Still drinking, but has cut down.       4.  The patient has a history of impaired glucose tolerance with regularly elevated blood sugars.    They have not been diagnosed with type II DM or placed on medications for diabetes before.   They deny polyuria, polydipsia.     The patient is obese with a  "BMI of Body mass index is 29.72 kg/m ..    Review of current labs show:    Lab Results   Component Value Date    A1C 6.6 10/19/2022    A1C 6.3 05/30/2022    A1C 6.6 02/11/2022    A1C 6.2 09/03/2021    A1C 6.0 02/05/2021    A1C 6.2 07/28/2020    A1C 5.7 01/16/2020    A1C 5.8 07/15/2019    A1C 5.9 12/26/2018    A1C 5.7 09/20/2016    A1C 5.5 08/04/2015    A1C 5.9 02/03/2015    A1C 6.1 10/07/2014         5.    The patient has had a history of ongoing obesity.  Reviewed the weigth curves.   Their current BMI is:  Body mass index is 29.72 kg/m .  Reviewed previous attempts at weight loss which have not been successful in producing prolonged weigth loss.   Discussed current eating and exercise habits.     Reviewed weights in chart:  Wt Readings from Last 10 Encounters:   10/21/22 93.9 kg (207 lb 1.6 oz)   08/16/22 93.6 kg (206 lb 6.4 oz)   07/21/22 93.3 kg (205 lb 11.2 oz)   06/29/22 90.3 kg (199 lb)   06/15/22 90.3 kg (199 lb 1.6 oz)   05/25/22 91.6 kg (202 lb)   05/25/22 91.6 kg (202 lb)   02/11/22 94 kg (207 lb 3.2 oz)   09/03/21 94.8 kg (209 lb)   02/05/21 93.7 kg (206 lb 9.6 oz)        **I reviewed the information recorded in the patient's EPIC chart (including but not limited to medical history, surgical history, family history, problem list, medication list, and allergy list) and updated the information as indicated based on the patients reported information.           Review of Systems   Constitutional, HEENT, cardiovascular, pulmonary, gi and gu systems are negative, except as otherwise noted.      Objective    /78   Pulse 77   Temp 98.5  F (36.9  C) (Tympanic)   Ht 1.778 m (5' 10\")   Wt 93.9 kg (207 lb 1.6 oz)   SpO2 93%   BMI 29.72 kg/m    Body mass index is 29.72 kg/m .  Physical Exam   GENERAL alert and no distress  EYES:  Normal sclera,conjunctiva, EOMI  HENT: oral and posterior pharynx without lesions or erythema, facies symmetric  NECK: Neck supple. No LAD, without thyroidmegaly.  RESP: breath " sounds quiet but clear, diminished respiratory excursion, prolonged expiratory phase, no rhonci, few faint scattered end exp wheezes, no rales   CV: RRR normal S1S2 without murmurs, rubs or gallops.  LYMPH: no cervical lymph adenopathy appreciated  MS: extremities- no gross deformities of the visible extremities noted,   EXT:  no lower extremity edema  PSYCH: Alert and oriented times 3; speech- coherent  SKIN:  No obvious significant skin lesions on visible portions of face   ABD:  Soft, nontener, nondistended, obese, normal bowel sounds  Recent lap surgyer scars have healed.

## 2022-10-25 ENCOUNTER — TELEPHONE (OUTPATIENT)
Dept: INTERNAL MEDICINE | Facility: CLINIC | Age: 67
End: 2022-10-25

## 2022-10-25 DIAGNOSIS — J43.1 PANLOBULAR EMPHYSEMA (H): ICD-10-CM

## 2022-10-25 NOTE — TELEPHONE ENCOUNTER
Per pharmacy    Drug not covered by pt plan.  Please call/fax the pharmacy to change [facility].    [facility]   [phone]   [phone] fx

## 2022-10-27 DIAGNOSIS — J43.1 PANLOBULAR EMPHYSEMA (H): ICD-10-CM

## 2022-10-27 RX ORDER — ALBUTEROL SULFATE 90 UG/1
2 AEROSOL, METERED RESPIRATORY (INHALATION) EVERY 4 HOURS PRN
Qty: 54 G | Refills: 1 | Status: SHIPPED | OUTPATIENT
Start: 2022-10-27 | End: 2023-03-09

## 2022-10-27 NOTE — TELEPHONE ENCOUNTER
Per pharmacy, fluticasone-salmeterol (AIRDUO RESPICLICK) 113-14 MCG/ACT inhaler not covered by insurance.     Please send alternative script to pharmacy or route back to Triage if prefer to start a Prior Authorization.

## 2022-10-27 NOTE — TELEPHONE ENCOUNTER
Patient called in and states that the inhaler sent in at visit on 10/21 is not the one he needed he was wanting a 3 month supply of the albuterol     pended for review with 3 month supply patient switched pharmacies and is out of the inhaler    Rey Ye RN

## 2022-10-28 RX ORDER — FLUTICASONE PROPIONATE AND SALMETEROL XINAFOATE 115; 21 UG/1; UG/1
2 AEROSOL, METERED RESPIRATORY (INHALATION) 2 TIMES DAILY
Qty: 36 G | Refills: 3 | Status: SHIPPED | OUTPATIENT
Start: 2022-10-28 | End: 2023-04-14

## 2022-10-28 RX ORDER — FLUTICASONE PROPIONATE AND SALMETEROL 250; 50 UG/1; UG/1
1 POWDER RESPIRATORY (INHALATION) 2 TIMES DAILY
Qty: 3 EACH | Refills: 3 | Status: SHIPPED | OUTPATIENT
Start: 2022-10-28 | End: 2023-04-14

## 2022-10-28 RX ORDER — FLUTICASONE PROPIONATE AND SALMETEROL 113; 14 UG/1; UG/1
1 POWDER, METERED RESPIRATORY (INHALATION) 2 TIMES DAILY
Qty: 3 EACH | Refills: 3 | OUTPATIENT
Start: 2022-10-28

## 2022-10-28 NOTE — TELEPHONE ENCOUNTER
Called and spoke to pharmacy. Pharmacy stated they would fill the most affordable for the patient (Dulera) and inform the patient that it is the most affordable one with his insurance.         Karen Dumont RN

## 2022-10-28 NOTE — TELEPHONE ENCOUNTER
I am not sure what is covered best for him.     I sent prescriptions for 4 possible alternative inhalers.  .   Contact the pharmacy and have them run each prescription and fill the one that is cheapest for the patient.     Close encounter when done.

## 2023-01-12 NOTE — PROGRESS NOTES
This is a recent snapshot of the patient's [facility] medical record.  For current drug dose and complete information and questions, call [phone] or In Basket pool, [facility] ([id number])  CSN Number:  [id number]

## 2023-03-31 ENCOUNTER — MYC MEDICAL ADVICE (OUTPATIENT)
Dept: INTERNAL MEDICINE | Facility: CLINIC | Age: 68
End: 2023-03-31
Payer: COMMERCIAL

## 2023-03-31 DIAGNOSIS — J43.1 PANLOBULAR EMPHYSEMA (H): ICD-10-CM

## 2023-04-01 ENCOUNTER — HEALTH MAINTENANCE LETTER (OUTPATIENT)
Age: 68
End: 2023-04-01

## 2023-04-03 RX ORDER — ALBUTEROL SULFATE 0.83 MG/ML
2.5 SOLUTION RESPIRATORY (INHALATION) EVERY 6 HOURS PRN
Qty: 90 ML | Refills: 2 | Status: SHIPPED | OUTPATIENT
Start: 2023-04-03 | End: 2023-05-30

## 2023-04-07 ASSESSMENT — ENCOUNTER SYMPTOMS
CONSTIPATION: 0
HEARTBURN: 0
ABDOMINAL PAIN: 0
FREQUENCY: 0
ARTHRALGIAS: 1
HEADACHES: 0
PARESTHESIAS: 0
MYALGIAS: 0
NERVOUS/ANXIOUS: 0
DIARRHEA: 0
DIZZINESS: 0
PALPITATIONS: 0
COUGH: 1
HEMATURIA: 0
SHORTNESS OF BREATH: 1
DYSURIA: 0
NAUSEA: 0
CHILLS: 0
WEAKNESS: 0
JOINT SWELLING: 0
SORE THROAT: 0
FEVER: 0
EYE PAIN: 0
HEMATOCHEZIA: 0

## 2023-04-07 ASSESSMENT — ACTIVITIES OF DAILY LIVING (ADL): CURRENT_FUNCTION: NO ASSISTANCE NEEDED

## 2023-04-10 ENCOUNTER — LAB (OUTPATIENT)
Dept: LAB | Facility: CLINIC | Age: 68
End: 2023-04-10
Payer: COMMERCIAL

## 2023-04-10 DIAGNOSIS — M10.9 GOUT ATTACK: Primary | ICD-10-CM

## 2023-04-10 DIAGNOSIS — I10 BENIGN ESSENTIAL HYPERTENSION: ICD-10-CM

## 2023-04-10 DIAGNOSIS — R73.03 PREDIABETES: ICD-10-CM

## 2023-04-10 LAB
ANION GAP SERPL CALCULATED.3IONS-SCNC: 13 MMOL/L (ref 7–15)
BUN SERPL-MCNC: 14.5 MG/DL (ref 8–23)
CALCIUM SERPL-MCNC: 9.5 MG/DL (ref 8.8–10.2)
CHLORIDE SERPL-SCNC: 103 MMOL/L (ref 98–107)
CREAT SERPL-MCNC: 0.82 MG/DL (ref 0.67–1.17)
DEPRECATED HCO3 PLAS-SCNC: 25 MMOL/L (ref 22–29)
GFR SERPL CREATININE-BSD FRML MDRD: >90 ML/MIN/1.73M2
GLUCOSE SERPL-MCNC: 118 MG/DL (ref 70–99)
HBA1C MFR BLD: 6.5 % (ref 0–5.6)
POTASSIUM SERPL-SCNC: 4.7 MMOL/L (ref 3.4–5.3)
SODIUM SERPL-SCNC: 141 MMOL/L (ref 136–145)
URATE SERPL-MCNC: 5.8 MG/DL (ref 3.4–7)

## 2023-04-10 PROCEDURE — 83036 HEMOGLOBIN GLYCOSYLATED A1C: CPT

## 2023-04-10 PROCEDURE — 36415 COLL VENOUS BLD VENIPUNCTURE: CPT

## 2023-04-10 PROCEDURE — 84550 ASSAY OF BLOOD/URIC ACID: CPT

## 2023-04-10 PROCEDURE — 80048 BASIC METABOLIC PNL TOTAL CA: CPT

## 2023-04-14 ENCOUNTER — OFFICE VISIT (OUTPATIENT)
Dept: INTERNAL MEDICINE | Facility: CLINIC | Age: 68
End: 2023-04-14
Payer: COMMERCIAL

## 2023-04-14 VITALS
SYSTOLIC BLOOD PRESSURE: 130 MMHG | TEMPERATURE: 98.2 F | BODY MASS INDEX: 29.35 KG/M2 | WEIGHT: 205 LBS | HEART RATE: 72 BPM | HEIGHT: 70 IN | DIASTOLIC BLOOD PRESSURE: 74 MMHG | OXYGEN SATURATION: 94 %

## 2023-04-14 DIAGNOSIS — M10.9 ACUTE GOUT OF FOOT, UNSPECIFIED CAUSE, UNSPECIFIED LATERALITY: ICD-10-CM

## 2023-04-14 DIAGNOSIS — I71.40 ABDOMINAL AORTIC ANEURYSM (AAA) WITHOUT RUPTURE, UNSPECIFIED PART (H): ICD-10-CM

## 2023-04-14 DIAGNOSIS — J43.1 PANLOBULAR EMPHYSEMA (H): ICD-10-CM

## 2023-04-14 DIAGNOSIS — I10 BENIGN ESSENTIAL HYPERTENSION: ICD-10-CM

## 2023-04-14 DIAGNOSIS — F10.20 ALCOHOL DEPENDENCE, EPISODIC DRINKING BEHAVIOR (H): ICD-10-CM

## 2023-04-14 DIAGNOSIS — Z13.6 CARDIOVASCULAR SCREENING; LDL GOAL LESS THAN 100: ICD-10-CM

## 2023-04-14 DIAGNOSIS — Z12.5 SCREENING FOR PROSTATE CANCER: ICD-10-CM

## 2023-04-14 DIAGNOSIS — R73.03 PREDIABETES: ICD-10-CM

## 2023-04-14 DIAGNOSIS — Z71.6 ENCOUNTER FOR SMOKING CESSATION COUNSELING: ICD-10-CM

## 2023-04-14 DIAGNOSIS — Z72.0 TOBACCO ABUSE: ICD-10-CM

## 2023-04-14 DIAGNOSIS — E78.5 HYPERLIPIDEMIA LDL GOAL <130: ICD-10-CM

## 2023-04-14 DIAGNOSIS — Z00.00 ENCOUNTER FOR MEDICARE ANNUAL WELLNESS EXAM: Primary | ICD-10-CM

## 2023-04-14 PROCEDURE — 99214 OFFICE O/P EST MOD 30 MIN: CPT | Mod: 25 | Performed by: INTERNAL MEDICINE

## 2023-04-14 PROCEDURE — G0439 PPPS, SUBSEQ VISIT: HCPCS | Performed by: INTERNAL MEDICINE

## 2023-04-14 RX ORDER — FLUTICASONE PROPIONATE AND SALMETEROL 232; 14 UG/1; UG/1
1 POWDER, METERED RESPIRATORY (INHALATION) 2 TIMES DAILY
Qty: 3 EACH | Refills: 3 | Status: SHIPPED | OUTPATIENT
Start: 2023-04-14 | End: 2023-11-03

## 2023-04-14 RX ORDER — LISINOPRIL AND HYDROCHLOROTHIAZIDE 12.5; 2 MG/1; MG/1
1 TABLET ORAL EVERY MORNING
Qty: 90 TABLET | Refills: 1 | Status: SHIPPED | OUTPATIENT
Start: 2023-04-14 | End: 2023-10-09

## 2023-04-14 RX ORDER — TIOTROPIUM BROMIDE 18 UG/1
18 CAPSULE ORAL; RESPIRATORY (INHALATION) DAILY
Qty: 90 CAPSULE | Refills: 1 | Status: SHIPPED | OUTPATIENT
Start: 2023-04-14 | End: 2023-04-23

## 2023-04-14 RX ORDER — PRAVASTATIN SODIUM 40 MG
40 TABLET ORAL AT BEDTIME
Qty: 90 TABLET | Refills: 1 | Status: SHIPPED | OUTPATIENT
Start: 2023-04-14 | End: 2023-10-09

## 2023-04-14 ASSESSMENT — ENCOUNTER SYMPTOMS
COUGH: 1
HEMATOCHEZIA: 0
ABDOMINAL PAIN: 0
NERVOUS/ANXIOUS: 0
CHILLS: 0
PALPITATIONS: 0
EYE PAIN: 0
PARESTHESIAS: 0
FREQUENCY: 0
JOINT SWELLING: 0
FEVER: 0
DIZZINESS: 0
NAUSEA: 0
CONSTIPATION: 0
DIARRHEA: 0
DYSURIA: 0
HEARTBURN: 0
MYALGIAS: 0
WEAKNESS: 0
HEADACHES: 0
SORE THROAT: 0
ARTHRALGIAS: 1
HEMATURIA: 0
SHORTNESS OF BREATH: 1

## 2023-04-14 ASSESSMENT — ACTIVITIES OF DAILY LIVING (ADL): CURRENT_FUNCTION: NO ASSISTANCE NEEDED

## 2023-04-14 NOTE — PROGRESS NOTES
"SUBJECTIVE:   aGbe is a 68 year old who presents for Preventive Visit.      4/14/2023     9:46 AM   Additional Questions   Roomed by Candace BURGOS CMA   Patient has been advised of split billing requirements and indicates understanding: Yes  Are you in the first 12 months of your Medicare coverage?  No    Healthy Habits:     In general, how would you rate your overall health?  Good    Frequency of exercise:  None    Do you usually eat at least 4 servings of fruit and vegetables a day, include whole grains    & fiber and avoid regularly eating high fat or \"junk\" foods?  Yes    Taking medications regularly:  Yes    Barriers to taking medications:  None    Medication side effects:  None    Ability to successfully perform activities of daily living:  No assistance needed    Home Safety:  No safety concerns identified    Hearing Impairment:  Difficulty following a conversation in a noisy restaurant or crowded room and difficulty understanding soft or whispered speech    In the past 6 months, have you been bothered by leaking of urine?  No    In general, how would you rate your overall mental or emotional health?  Excellent      PHQ-2 Total Score: 0    Additional concerns today:  Yes    Have you ever done Advance Care Planning? (For example, a Health Directive, POLST, or a discussion with a medical provider or your loved ones about your wishes): No, advance care planning information given to patient to review.  Patient plans to discuss their wishes with loved ones or provider.      Fall risk  Fallen 2 or more times in the past year?: No  Any fall with injury in the past year?: No    Cognitive Screening   1) Repeat 3 items (Leader, Season, Table)    2) Clock draw: NORMAL  3) 3 item recall: Recalls 3 objects  Results: 3 items recalled: COGNITIVE IMPAIRMENT LESS LIKELY    Mini-CogTM Copyright S Madai. Licensed by the author for use in [facility]; reprinted with permission (brian@.[facility]). All rights reserved.  "     Do you have sleep apnea, excessive snoring or daytime drowsiness?: no    Reviewed and updated as needed this visit by clinical staff   Tobacco  Allergies  Meds  Problems  Med Hx            Reviewed and updated as needed this visit by Provider    Allergies  Meds  Problems  Med Hx           Social History     Tobacco Use     Smoking status: Every Day     Packs/day: 1.00     Years: 45.00     Pack years: 45.00     Types: Cigarettes     Smokeless tobacco: Never     Tobacco comments:     1.5 PPD-now down to .5 PPD   Vaping Use     Vaping status: Never Used   Substance Use Topics     Alcohol use: Yes     Comment: 2-3 drinks per night (double)           4/7/2023    10:01 AM   Alcohol Use   Prescreen: >3 drinks/day or >7 drinks/week? Yes         4/7/2023    10:01 AM   AUDIT - Alcohol Use Disorders Identification Test - Reproduced from the World Health Organization Audit 2001 (Second Edition)   1.  How often do you have a drink containing alcohol? 4 or more times a week   2.  How many drinks containing alcohol do you have on a typical day when you are drinking? 1 or 2   3.  How often do you have five or more drinks on one occasion? Never   9.  Have you or someone else been injured because of your drinking? No   10. Has a relative, friend, doctor or other health care worker been concerned about your drinking or suggested you cut down? No     Do you have a current opioid prescription? No  Do you use any other controlled substances or medications that are not prescribed by a provider? Alcohol      1.    Hypertension:  History of hypertension, on medication.  No reported side effects from medications.    Reviewed last 6 BP readings in chart:  BP Readings from Last 6 Encounters:   04/14/23 130/74   10/21/22 134/78   08/19/22 114/64   07/21/22 (!) 149/85   06/29/22 126/82   06/15/22 116/72     No active cardiac complaints or symptoms with exercise.       2.  COPD remains stable.  Has not had any recent breathing  troubles beyond usual baseline.  Has not any acute respiratory events.  Remains with intermittent cough, mild shortness of breath with overexertion as per usual.  Using medication as directed with reported side effects.    3.  The patient has a history of impaired glucose tolerance with regularly elevated blood sugars.    They have not been diagnosed with type II DM or placed on medications for diabetes before.   They deny polyuria, polydipsia.     The patient is obese with a BMI of Body mass index is 29.41 kg/m ..    Review of current labs show:    Lab Results   Component Value Date    A1C 6.5 04/10/2023    A1C 6.6 10/19/2022    A1C 6.3 05/30/2022    A1C 6.6 02/11/2022    A1C 6.2 09/03/2021    A1C 6.0 02/05/2021    A1C 6.2 07/28/2020    A1C 5.7 01/16/2020    A1C 5.8 07/15/2019    A1C 5.9 12/26/2018    A1C 5.7 09/20/2016    A1C 5.5 08/04/2015    A1C 5.9 02/03/2015    A1C 6.1 10/07/2014     4.  Still drinks on a regular basis, around 3-4 drinks per day on most days.  This is an improvement over past prior much heavier intake.       5. Still smoking around 1 ppd.   Wants to discuss smokign cessationagain, he is feeling more motivated to quit than before.     6.  history of abdominal aortic aneurysm.  Stable abdominal aortic aneurysm measuring 4.1 cm per Abd CT scan last summer.         Current providers sharing in care for this patient include:   Patient Care Team:  Donny Payne MD as PCP - General (Internal Medicine)  Donny Payne MD as Assigned PCP  Carlota Leavitt MD as Assigned Surgical Provider    The following health maintenance items are reviewed in Epic and correct as of today:  Health Maintenance   Topic Date Due     Pneumococcal Vaccine: 65+ Years (3 - PPSV23 if available, else PCV20) 01/04/2023     LUNG CANCER SCREENING  05/25/2023     A1C  10/10/2023     BMP  10/10/2023     ALT  10/19/2023     LIPID  10/19/2023     PSA  10/19/2023     URIC ACID  04/10/2024     NICOTINE/TOBACCO  CESSATION COUNSELING Q 1 YR  04/14/2024     MEDICARE ANNUAL WELLNESS VISIT  04/14/2024     ANNUAL REVIEW OF HM ORDERS  04/14/2024     FALL RISK ASSESSMENT  04/14/2024     COLORECTAL CANCER SCREENING  10/19/2025     ADVANCE CARE PLANNING  04/14/2028     DTAP/TDAP/TD IMMUNIZATION (3 - Td or Tdap) 06/15/2032     SPIROMETRY  Completed     HEPATITIS C SCREENING  Completed     COPD ACTION PLAN  Completed     PHQ-2 (once per calendar year)  Completed     HEMOGLOBIN  Completed     INFLUENZA VACCINE  Completed     ZOSTER IMMUNIZATION  Completed     AORTIC ANEURYSM SCREENING (SYSTEM ASSIGNED)  Completed     COVID-19 Vaccine  Completed     IPV IMMUNIZATION  Aged Out     MENINGITIS IMMUNIZATION  Aged Out       **I reviewed the information recorded in the patient's EPIC chart (including but not limited to medical history, surgical history, family history, problem list, medication list, and allergy list) and updated the information as indicated based on the patients reported information.             Review of Systems   Constitutional: Negative for chills and fever.   HENT: Positive for hearing loss. Negative for congestion, ear pain and sore throat.    Eyes: Negative for pain and visual disturbance.   Respiratory: Positive for cough and shortness of breath.    Cardiovascular: Negative for chest pain, palpitations and peripheral edema.   Gastrointestinal: Negative for abdominal pain, constipation, diarrhea, heartburn, hematochezia and nausea.   Genitourinary: Negative for dysuria, frequency, genital sores, hematuria, impotence, penile discharge and urgency.   Musculoskeletal: Positive for arthralgias. Negative for joint swelling and myalgias.   Skin: Negative for rash.   Neurological: Negative for dizziness, weakness, headaches and paresthesias.   Psychiatric/Behavioral: Negative for mood changes. The patient is not nervous/anxious.      Constitutional, HEENT, cardiovascular, pulmonary, gi and gu systems are negative, except as  "otherwise noted.    OBJECTIVE:   /74   Pulse 72   Temp 98.2  F (36.8  C) (Oral)   Ht 1.778 m (5' 10\")   Wt 93 kg (205 lb)   SpO2 94%   BMI 29.41 kg/m   Estimated body mass index is 29.41 kg/m  as calculated from the following:    Height as of this encounter: 1.778 m (5' 10\").    Weight as of this encounter: 93 kg (205 lb).  Physical Exam  GENERAL alert and no distress  EYES:  Normal sclera,conjunctiva, EOMI  HENT: oral and posterior pharynx without lesions or erythema, facies symmetric  NECK: Neck supple. No LAD, without thyroidmegaly.  RESP: Clear to ausculation bilaterally without wheezes or crackles. Normal BS in all fields.  CV: RRR normal S1S2 without murmurs, rubs or gallops.  LYMPH: no cervical lymph adenopathy appreciated  MS: extremities- no gross deformities of the visible extremities noted,   EXT:  no lower extremity edema  PSYCH: Alert and oriented times 3; speech- coherent  SKIN:  No obvious significant skin lesions on visible portions of face     Diagnostic Test Results:  Labs reviewed in Epic    ASSESSMENT / PLAN:     (Z00.00) Encounter for Medicare annual wellness exam  (primary encounter diagnosis)  Comment: Discussed cardiac disease risk factors and cardiac disease risk factor modification, including diabetes screening, blood pressure screening (and management if indicated), and cholesterol screening.   Reviewed immunzation guidelines, including pneumococcal vaccines, annual influenza, and shingles vaccines.   Discussed routine cancer screenings, including skin cancer, colon cancer screening for everyone until age 80, prostate cancer screening in men until age 75, mammogram and PAP/pelvic for women until age 75.   Recommended regular dentist visits to care for remaining teeth.   Recommended regular screening for vision and glaucoma.   Recommended safe driving and accident avoidance.   Plan: PRIMARY CARE FOLLOW-UP SCHEDULING, REVIEW OF         HEALTH MAINTENANCE PROTOCOL ORDERS        "     (J43.1) COPD (HCC)  Comment: overall worsening over past couple of years.   Increase strength of AirDuo.   Absolutely needs to quit all smoking to given any chance ot improvement.   Discussed general issues of COPD, including pathophysiology, ways it will affect the pt., when to seek help, reviewed the typical medications (how they are taken, how they help)   Plan: fluticasone-salmeterol (AIRDUO RESPICLICK)         232-14 MCG/ACT inhaler            (J43.1) Panlobular emphysema (H)  Comment: as above   Plan: tiotropium (SPIRIVA) 18 MCG inhaled capsule,         fluticasone-salmeterol (AIRDUO RESPICLICK)         232-14 MCG/ACT inhaler, PRIMARY CARE FOLLOW-UP         SCHEDULING, REVIEW OF HEALTH MAINTENANCE         PROTOCOL ORDERS            (F10.20) Alcohol dependence, episodic drinking behavior (H)  Comment: continues to drink regularly.   Discussed the many different ways excessive alcohol damages the body.    Discussed the other damaging non-medical side effects of excessive alcohol use as well.    Discussed the observed increases in all-cause mortality and morbidity when drinking above 2 drinks per day (defined as 12 oz beer, or 4 oz wine, or 1.5 oz spirits).  Offered help and support in finding help in quitting alcohol.  He declined CD referral.   Plan: PRIMARY CARE FOLLOW-UP SCHEDULING, REVIEW OF         HEALTH MAINTENANCE PROTOCOL ORDERS            (I71.40) Abdominal aortic aneurysm (AAA) without rupture, unspecified part (H)  Comment: Stable abdominal aortic aneurysm measuring 4.1 cm per Abd CT scan last summer.   Plan: PRIMARY CARE FOLLOW-UP SCHEDULING, REVIEW OF         HEALTH MAINTENANCE PROTOCOL ORDERS            (E78.5) Hyperlipidemia LDL goal <130  Comment: This condition is currently controlled on the current medical regimen.  Continue current therapy.   Discussed guidelines recommending a statin cholesterol lowering medication for any patient with either diabetes and/or vascular disease, aiming for  a LDL goal under 100 for sure, ideally under 70, using whatever dose of statin tolerated.    Plan: pravastatin (PRAVACHOL) 40 MG tablet, PRIMARY         CARE FOLLOW-UP SCHEDULING, REVIEW OF HEALTH         MAINTENANCE PROTOCOL ORDERS            (I10) Benign essential hypertension  Comment: This condition is currently controlled on the current medical regimen.  Continue current therapy.   Plan: lisinopril-hydrochlorothiazide (ZESTORETIC)         20-12.5 MG tablet, PRIMARY CARE FOLLOW-UP         SCHEDULING, REVIEW OF HEALTH MAINTENANCE         PROTOCOL ORDERS            (M10.9) Acute gout of foot, unspecified cause, unspecified laterality  Comment: This condition is currently controlled on the current medical regimen.  Continue current therapy.   Plan: PRIMARY CARE FOLLOW-UP SCHEDULING, REVIEW OF         HEALTH MAINTENANCE PROTOCOL ORDERS            (Z72.0) Tobacco abuse  Comment: Discussed the physical, psychological, and pharmacological aspects of nicotine addiction and smoking cessation.    Discussed 2 possible regimens.  Option #1:  Chantix alone (no nicotine replacement needed), starting month pack for first month, thencontinuing month pack for 3-6 additional months.  Reviewed the main side effects of the medication and directed him to the company web site for further information and gave pt information handouts (if available)  Option #2:  Recommended nicotine replacement with either gum or patches in a descending manner starting the first day of not smoking.  Also discussed the medication Zyban in the use use smoking cessation.  Recommended starting with 150 mg first thing in the morning for 4 days, then adding a second dose late in the afternoon or early evening.  Discussed the potential side effects including but not limited to seizure, GI upset, insomnia, headache, weight loss.      After further discussion of medication aids to help stop smoking, the patient has decided to take Chantix.  We discussed the  "medication in detail, including suspected mechanism of action, duration of treatment (minimum preferred 3 months up to max of 6-9 months).  We also discussed some of the potential side effects of the medication including, but not limited to, GI upset, nausea, headaches, nightmares, strange dreams, and possible effects on the mood, inclduing worsening of depression and/or anxiety.  Also mentioned about isolated incidences of suicide possibly related to Chantix use.  I told the patient to immediately stop the Chantix if they suspect any changes in the mood and to contact us immediately.    I also instructed them not to take Chantix until they had a chance to visit the product official web site to further educate themselves about the medication.   Plan: varenicline (CHANTIX JUSTIN) 0.5 MG X 11 & 1 MG X         42 tablet, PRIMARY CARE FOLLOW-UP SCHEDULING,         REVIEW OF HEALTH MAINTENANCE PROTOCOL ORDERS            (R73.03) Prediabetes  Comment: Reviewed the labs showing mildly elevated glucose levels consistent with prediabetes.     Discussed \"pre-diabetes\", impaired glucose tolerance, and its part in the dysmetabolic syndrome.   No medications needed at this time.     Discussed the inevitable progression of impaired glucose tolerance toward worsening diabetes mellitus if nothing is changed in the diet, and the need for agressive interventions now to delay and prevent this inevitable progression.    Recommended lower carb diet.  Recommended regular physical activity.   We will continue to monitor this and nuñez additional recommendations and treatments as indicated based on the labs.       Plan: PRIMARY CARE FOLLOW-UP SCHEDULING, REVIEW OF         HEALTH MAINTENANCE PROTOCOL ORDERS            (Z71.6) Encounter for smoking cessation counseling  Comment:   Plan: varenicline (CHANTIX JUSTIN) 0.5 MG X 11 & 1 MG X         42 tablet, PRIMARY CARE FOLLOW-UP SCHEDULING,         REVIEW OF HEALTH MAINTENANCE PROTOCOL ORDERS        " "    (Z12.5) Screening for prostate cancer  Comment: Discussed prostate cancer screening and PSA blood test.  Discussed that an elevated PSA blood test can be caused by things other than prostate cancer, including infection/inflammation, BPH, and recent sexual activity; and that elevated PSA blood test may require further investigation with a urologist   Plan: REVIEW OF HEALTH MAINTENANCE PROTOCOL ORDERS            (Z13.6) CARDIOVASCULAR SCREENING; LDL GOAL LESS THAN 100  Comment: Discussed cardiac disease risk factors and cardiac disease risk factor modification.   Plan: REVIEW OF HEALTH MAINTENANCE PROTOCOL ORDERS               Patient has been advised of split billing requirements and indicates understanding: Yes      COUNSELING:  Reviewed preventive health counseling, as reflected in patient instructions       Regular exercise       Healthy diet/nutrition       Vision screening       Hearing screening       Dental care       Bladder control       Fall risk prevention       Immunizations    up to date     Covid vaccine are now recommended annually.  Get the most updated Covid vaccine when it becomes available, consider getting this at the same time as the annual influenza vaccine.              Colon cancer screening       Prostate cancer screening      BMI:   Estimated body mass index is 29.41 kg/m  as calculated from the following:    Height as of this encounter: 1.778 m (5' 10\").    Weight as of this encounter: 93 kg (205 lb).         He reports that he has been smoking cigarettes. He has a 45.00 pack-year smoking history. He has never used smokeless tobacco.  Nicotine/Tobacco Cessation Plan:   Pharmacotherapies : varenicline (Chantix)      Appropriate preventive services were discussed with this patient, including applicable screening as appropriate for cardiovascular disease, diabetes, osteopenia/osteoporosis, and glaucoma.  As appropriate for age/gender, discussed screening for colorectal cancer, prostate " cancer, breast cancer, and cervical cancer. Checklist reviewing preventive services available has been given to the patient.    Reviewed patients plan of care and provided an AVS. The  for Gabe meets the Care Plan requirement. This Care Plan has been established and reviewed with the .          Donny Payne MD  [facility]    Identified Health Risks:      He is at risk for lack of exercise and has been provided with information to increase physical activity for the benefit of his well-being.  The patient was provided with written information regarding signs of hearing loss.

## 2023-04-14 NOTE — PATIENT INSTRUCTIONS
"   Change AirDuo to the next higher strength 232/14, same dose 1 puff twice per day       Additional medication to help your lungs:  Either use the capsule form once daily, or the inhaler respimat form  This medication helps control the inflammation and secretions from COPD (emphysema, chronic bronchitis)         If this medication is not covered or very expensive, then you can almost certainly get this from a [place] pharmacy for much less money.         CHANTIX:     *  Start with the starting pack where you ramp up the dose for the first week, then you continue on the 1 mg twice daily dose for up to 9 months.      *  Stop smoking during the first couple of weeks while taking Chantix.      We hope for at least 3 months, but studies have shown that there is increased success in reminaing smoke free with use out as far as 9 months.  I personally think you probably only need to use it for between 3-6 months, but the choice is yours.     Main side effects of Chantix include (but are not limited to) nausea, constipation, gas, and changes in dreaming.  There is no reported seizure risk or insomnia as with Wellbutrin/Zyban.    *  You do not need to take nicotine replacement (i.e. Nicotine patches, nicotine gum/lozenges, etc.) when taking Chantix due to the way the medication works.     *  Be sure to take advantage of the \"Get Quit\" support plan from the Chantix  which can provide additional support.      *  Visit the Chantix web site for further details. ([email or web address]) about the medication and side effects.      *  If you encounter side effects with Chantix,stop the medication immediately and be sure to let us know via the [facility] Internal Medicine Nurse Line  [phone].    *  Consider visiting [email or web address] for more assistance and counseling for smoking cessation.      *  Remember to have somehting for your hands to play with (rubber band,  strength ball, etc.) and also something for your mouth as " "well (hard candy, gum, lozenges suckers, carrots, etc).    * Contact me via phone message or MyChart after the first month of Chantix regardless of how you are feeling.  I need to know that the medication is both helping and not causing any side effects.  If everything is going as planned without side effects, then I will continue the medication.            5 GOALS TO PREVENT VASCULAR DISEASE:     1.  Aggressive blood pressure control, under 130/80 ideally.  Using medications if needed.    Your blood pressure is under good control    BP Readings from Last 4 Encounters:   04/14/23 130/74   10/21/22 134/78   08/19/22 114/64   07/21/22 (!) 149/85       2.  Aggressive LDL cholesterol (\"bad cholesterol\") lowering as indicated.    Your goal is an LDL under 130 for sure, preferably under 100.  (If you have diabetes or previous vascular disease, the the LDL goals would be under 100 for sure, preferably under 70.)    New guidelines identify four high-risk groups who could benefit from statins:   *people with pre-existing heart disease, such as those who have had a heart attack;   *people ages 40 to 75 who have diabetes of any type  *patients ages 40 to 75 with at least a 7.5% risk of developing cardiovascular disease over the next decade, according to a formula described in the guidelines  *patients with the sort of super-high cholesterol that sometimes runs in families, as evidenced by an LDL of 190 milligrams per deciliter or higher    Your cholesterol levels are well controlled.    Recent Labs   Lab Test 10/19/22  0949 09/03/21  1211 04/04/16  0748 08/04/15  0757 02/03/15  0748   CHOL 194 216*   < > 210* 192   HDL 30* 29*   < > 27* 33*   * 147*   < > Cannot estimate LDL when triglyceride exceeds 400 mg/dL  134* 107   TRIG 287* 198*   < > 422* 262*   CHOLHDLRATIO  --   --   --  7.8* 5.8*    < > = values in this interval not displayed.       3.  Aggressive diabetic prevention, screening and/or management.      You " do not have diabetes as of the most recent blood tests.     4.  No smoking    5.  Consider daily preventative aspirin over age 50 if you have enough cardiac risk factors to place you at higher risk for the presence of vascular disease.    If you have any reason not to take aspirin such easy bruising or bleeding, stomach problems, other anticoagulant medications, or any other side effects, then you should not take Aspirin.     --Based on your current cardiac risk factor profile, you should take regular daily Aspirin 81 mg once per day (as long as you do not have any side effects from taking aspirin).           Preventive Health Recommendations:   Male Ages 65 and over    Yearly exam:             See your health care provider every year in order to  o   Review health changes.   o   Discuss preventive care.    o   Review your medicines if your doctor has prescribed any.    Talk with your health care provider about whether you should have a test to screen for prostate cancer (PSA).    Every 3 years, have a diabetes test (fasting glucose). If you are at risk for diabetes, you should have this test more often.    Every 5 years, have a cholesterol test. Have this test more often if you are at risk for high cholesterol or heart disease.     Every 10 years, have a colonoscopy. Or, have a yearly FIT test (stool test). These exams will check for colon cancer.    Talk to with your health care provider about screening for Abdominal Aortic Aneurysm if you have a family history of AAA or have a history of smoking.    Shots:     Get a flu shot each year.     Get a tetanus shot every 10 years.     Talk to your doctor about your pneumonia vaccines. There are now two you should receive - Pneumovax (PPSV 23) and Prevnar (PCV 13).     Talk to your doctor about a shingles vaccine.     Talk to your doctor about the hepatitis B vaccine.  Nutrition:     Eat at least 5 servings of fruits and vegetables each day.     Eat whole-grain bread,  "whole-wheat pasta and brown rice instead of white grains and rice.     Talk to your provider about Calcium and Vitamin D.      --Good Grains:  Oats, brown rice, Quinoa (these do not raise the blood sugar as much)     --Bad grains:  Anything made from wheat or white rice     (because these raise the blood sugars significantly, and the possible gluten issue from wheat for some people).      --Proteins:  Aim for \"lean proteins\" including chicken, fish, seafood, pork, turkey, and eggs (in moderation); Eat red meat only occasionally    Lifestyle    Exercise for at least 150 minutes a week (30 minutes a day, 5 days a week). This will help you control your weight and prevent disease.     Limit alcohol to one drink per day.     No smoking.     Wear sunscreen to prevent skin cancer.     See your dentist every six months for an exam and cleaning.     See your eye doctor every 1 to 2 years to screen for conditions such as glaucoma, macular degeneration, cataracts, etc         Patient Education   Personalized Prevention Plan  You are due for the preventive services outlined below.  Your care team is available to assist you in scheduling these services.  If you have already completed any of these items, please share that information with your care team to update in your medical record.  Health Maintenance Due   Topic Date Due     Pneumococcal Vaccine (3 - PPSV23 if available, else PCV20) 01/04/2023     LUNG CANCER SCREENING  05/25/2023       Exercise for a Healthier Heart  You may wonder how you can improve the health of your heart. If you re thinking about exercise, you re on the right track. You don t need to become an athlete. But you do need a certain amount of brisk exercise to help strengthen your heart. If you have been diagnosed with a heart condition, your healthcare provider may advise exercise to help your condition. To help make exercise a habit, choose safe, fun activities.      Exercise with a friend. When activity " is fun, you're more likely to stick with it.     Before you start  Check with your healthcare provider before starting an exercise program. This is especially important if you haven't been active for a while. It's also important if you have a long-term (chronic) health problem such as heart disease, diabetes, or obesity. Also check with your provider if you're at high risk for having these problems.   Why exercise?  Exercising regularly offers many healthy rewards. It can help you do all of these:     Improve your blood cholesterol level to help prevent further heart trouble.    Lower your blood pressure to help prevent a stroke or heart attack.    Control diabetes or reduce your risk of getting this disease.    Improve your heart and lung function.    Reach and stay at a healthy weight.    Make your muscles stronger so you can stay active.    Prevent falls and fractures by slowing the loss of bone mass (osteoporosis).    Manage stress better.    Improve your sense of self and your body image.  Exercise tips      Ease into your routine. Set small goals. Then build on them. Talk with your healthcare provider first before starting an exercise routine if you're not sure what your activity level should be.    Exercise on most days. Aim for a total of at least 150 minutes (2 hours and 30 minutes) or more of moderate-intensity aerobic activity each week. You could also do 75 minutes (1 hour and 15 minutes) or more of vigorous-intensity aerobic activity each week. Or try for a combination of both. Moderate activity means that you breathe heavier and your heart rate increases, but you can still talk. Think about doing at least 30 minutes of moderate exercise, 5 times a week. It's OK to work up to the 30-minute period over time. Examples of moderate-intensity activity are brisk walking, gardening, and water aerobics.    Step up your daily activity level.  Along with your exercise program, try being more active the whole day.  Walk instead of drive. Or park further away so that you take more steps each day. Do more household tasks or yard work. You may not be able to meet the advised amount of physical activity. But doing some moderate- or vigorous-intensity aerobic activity can help reduce your risk for heart disease. Your healthcare provider can help you figure out what is best for you.    Choose 1 or more activities you enjoy.  Walking is one of the easiest things you can do. You can also try swimming, riding a bike, dancing, or taking an exercise class.    Call 911  Call 911 right away if any of these occur:     Chest pain that doesn't go away quickly with rest    New burning, tightness, pressure, or heaviness in your chest, neck, shoulders, back, or arms    Abnormal or severe shortness of breath    A very fast or irregular heartbeat (palpitations)    Fainting  When to call your healthcare provider  Call your healthcare provider if you have any of these:     Dizziness or lightheadedness    Mild shortness of breath or chest pain    Increased or new joint or muscle pain    [employer] last reviewed this educational content on 7/1/2022 2000-2022 [employer] All rights reserved. This information is not intended as a substitute for professional medical care. Always follow your healthcare professional's instructions.          Signs of Hearing Loss  Hearing loss is a problem shared by many people. In fact, it's one of the most common health problems, particularly as people age. Most people aged 65 and older have some hearing loss. By age 80, almost everyone does. Hearing loss often occurs slowly over the years. So, you may not realize your hearing has gotten worse.   When sudden hearing loss occurs, it's important to contact your healthcare provider right away. Your provider will do a medical exam and a hearing exam as soon as possible. This is to help find the cause and type of your sudden hearing loss. Based on your  diagnosis, your healthcare provider will discuss possible treatments.      Hearing much better with one ear can be a sign of hearing loss.     Have your hearing checked  Call your healthcare provider if you:     Have to strain to hear normal conversation    Have to watch other people s faces very carefully to follow what they re saying    Need to ask people to repeat what they ve said    Often misunderstand what people are saying    Turn the volume of the television or radio up so high that others complain    Feel that people are mumbling when they re talking to you    Find that the effort to hear leaves you feeling tired and irritated    Notice, when using the phone, that you hear better with one ear than the other  [employer] last reviewed this educational content on 6/1/2022 2000-2022 [employer]. All rights reserved. This information is not intended as a substitute for professional medical care. Always follow your healthcare professional's instructions.

## 2023-04-17 DIAGNOSIS — I10 BENIGN ESSENTIAL HYPERTENSION: ICD-10-CM

## 2023-04-17 DIAGNOSIS — E78.5 HYPERLIPIDEMIA LDL GOAL <130: ICD-10-CM

## 2023-04-18 RX ORDER — LISINOPRIL AND HYDROCHLOROTHIAZIDE 12.5; 2 MG/1; MG/1
1 TABLET ORAL EVERY MORNING
Qty: 90 TABLET | Refills: 1 | OUTPATIENT
Start: 2023-04-18

## 2023-04-18 RX ORDER — PRAVASTATIN SODIUM 40 MG
TABLET ORAL
Qty: 90 TABLET | Refills: 1 | OUTPATIENT
Start: 2023-04-18

## 2023-04-19 ENCOUNTER — MYC MEDICAL ADVICE (OUTPATIENT)
Dept: INTERNAL MEDICINE | Facility: CLINIC | Age: 68
End: 2023-04-19
Payer: COMMERCIAL

## 2023-04-19 DIAGNOSIS — Z72.0 TOBACCO ABUSE: ICD-10-CM

## 2023-04-19 DIAGNOSIS — J43.1 PANLOBULAR EMPHYSEMA (H): Primary | ICD-10-CM

## 2023-04-19 NOTE — TELEPHONE ENCOUNTER
Please see [facility]    Per patient, insurance will not cover Chantix but agrees to cover Bupropion HCL SR 150mg in 90 day supply.

## 2023-04-23 RX ORDER — BUPROPION HYDROCHLORIDE 150 MG/1
TABLET ORAL
Qty: 50 TABLET | Refills: 0 | Status: SHIPPED | OUTPATIENT
Start: 2023-04-23 | End: 2023-05-22

## 2023-04-23 RX ORDER — TIOTROPIUM BROMIDE 18 UG/1
18 CAPSULE ORAL; RESPIRATORY (INHALATION) DAILY
Qty: 90 CAPSULE | Refills: 1 | Status: SHIPPED | OUTPATIENT
Start: 2023-04-23 | End: 2023-05-03

## 2023-05-01 ENCOUNTER — MYC MEDICAL ADVICE (OUTPATIENT)
Dept: INTERNAL MEDICINE | Facility: CLINIC | Age: 68
End: 2023-05-01
Payer: COMMERCIAL

## 2023-05-01 DIAGNOSIS — J43.1 PANLOBULAR EMPHYSEMA (H): ICD-10-CM

## 2023-05-04 RX ORDER — TIOTROPIUM BROMIDE 18 UG/1
18 CAPSULE ORAL; RESPIRATORY (INHALATION) DAILY
Qty: 90 CAPSULE | Refills: 1 | Status: SHIPPED | OUTPATIENT
Start: 2023-05-04 | End: 2023-11-03

## 2023-05-04 NOTE — PROGRESS NOTES
This is a recent snapshot of the patient's [facility] medical record.  For current drug dose and complete information and questions, call [phone] or In Basket pool, [facility] ([id number])  CSN Number:  [id number]

## 2023-05-04 NOTE — TELEPHONE ENCOUNTER
LVM stating we are not able to fax prescription to [facility].  Would he like to  the written prescription or have it mailed to his home address?   Signed prescription at [facility].

## 2023-05-21 DIAGNOSIS — Z72.0 TOBACCO ABUSE: ICD-10-CM

## 2023-05-22 RX ORDER — BUPROPION HYDROCHLORIDE 300 MG/1
300 TABLET ORAL EVERY MORNING
Qty: 90 TABLET | Refills: 1 | Status: SHIPPED | OUTPATIENT
Start: 2023-05-22 | End: 2023-11-03

## 2023-05-22 NOTE — TELEPHONE ENCOUNTER
Patient taking bupropion for help in smoking cessation since chantix not covered.     Continue bupropion 300 mg daily for 6 months, then re-evaluate

## 2023-05-28 DIAGNOSIS — J43.1 PANLOBULAR EMPHYSEMA (H): ICD-10-CM

## 2023-05-30 RX ORDER — ALBUTEROL SULFATE 0.83 MG/ML
SOLUTION RESPIRATORY (INHALATION)
Qty: 90 ML | Refills: 4 | Status: SHIPPED | OUTPATIENT
Start: 2023-05-30 | End: 2023-12-27

## 2023-06-09 ENCOUNTER — MYC MEDICAL ADVICE (OUTPATIENT)
Dept: INTERNAL MEDICINE | Facility: CLINIC | Age: 68
End: 2023-06-09
Payer: COMMERCIAL

## 2023-06-13 NOTE — TELEPHONE ENCOUNTER
Update on smoking cessation.     He has struggled mightily with this for years, and finally is making it happen.

## 2023-09-13 DIAGNOSIS — J43.1 PANLOBULAR EMPHYSEMA (H): ICD-10-CM

## 2023-09-13 RX ORDER — ALBUTEROL SULFATE 90 UG/1
AEROSOL, METERED RESPIRATORY (INHALATION)
Qty: 25.5 G | Refills: 1 | Status: SHIPPED | OUTPATIENT
Start: 2023-09-13 | End: 2023-11-03

## 2023-10-09 DIAGNOSIS — I10 BENIGN ESSENTIAL HYPERTENSION: ICD-10-CM

## 2023-10-09 DIAGNOSIS — E78.5 HYPERLIPIDEMIA LDL GOAL <130: ICD-10-CM

## 2023-10-09 RX ORDER — PRAVASTATIN SODIUM 40 MG
40 TABLET ORAL AT BEDTIME
Qty: 90 TABLET | Refills: 1 | Status: SHIPPED | OUTPATIENT
Start: 2023-10-09 | End: 2024-04-08

## 2023-10-09 RX ORDER — LISINOPRIL AND HYDROCHLOROTHIAZIDE 12.5; 2 MG/1; MG/1
1 TABLET ORAL EVERY MORNING
Qty: 90 TABLET | Refills: 1 | Status: SHIPPED | OUTPATIENT
Start: 2023-10-09 | End: 2024-04-08

## 2023-10-17 DIAGNOSIS — I10 BENIGN ESSENTIAL HYPERTENSION: ICD-10-CM

## 2023-10-17 RX ORDER — AMLODIPINE BESYLATE 10 MG/1
10 TABLET ORAL DAILY
Qty: 90 TABLET | Refills: 1 | Status: SHIPPED | OUTPATIENT
Start: 2023-10-17 | End: 2024-04-17

## 2023-10-17 NOTE — TELEPHONE ENCOUNTER
Prescription approved per [facility] Refill Protocol.  Janeth Mistry, RN  [facility] Triage Nurse

## 2023-10-24 NOTE — ANESTHESIA PROCEDURE NOTES
Airway       Patient location during procedure: OR       Procedure Start/Stop Times: 8/17/2022 3:33 PM  Staff -        CRNA: Lb Tavera APRN CRNA       Performed By: CRNA  Consent for Airway        Urgency: elective  Indications and Patient Condition       Indications for airway management: nayeli-procedural       Induction type:RSI       Mask difficulty assessment: 0 - not attempted    Final Airway Details       Final airway type: endotracheal airway       Successful airway: ETT - single  Endotracheal Airway Details        ETT size (mm): 8.0       Cuffed: yes       Successful intubation technique: video laryngoscopy       VL Blade Size: Glidescope 4       Grade View of Cords: 1       Adjucts: stylet       Position: Right       Measured from: lips       Secured at (cm): 23       Bite block used: None    Post intubation assessment        Placement verified by: capnometry, equal breath sounds and chest rise        Number of attempts at approach: 1       Number of other approaches attempted: 0       Secured with: silk tape       Ease of procedure: easy       Dentition: Intact and Unchanged    Medication(s) Administered   Medication Administration Time: 8/17/2022 3:33 PM       Physical Therapy Daily Treatment Note    []Daily Tx Note    []Progress Note    [] Discharge Summary         Date:  10/24/2023    Patient Name:  Lonny Fallon    :  1966  MRN: [id number]      Medical/Treatment Diagnosis Information:  Diagnosis: chronic lymphedema bilateral I89.0  Treatment Diagnosis: pain and abnormality of gait    Insurance/Certification information:  PT Insurance Information: [facility] no authorization required    Physician Information:  Referring Provider (secondary): Heather Estrada MD      Plan of care sent to provider:      [x]Faxed   []Co-signature    (attempts: 1[x] 23 2 []3[])     Plan of care signed :  [x]  Yes  [] No      Date of Patient follow up with Physician:     Is this a Progress Report:     []  Yes  [x]  No      If Yes:  Date Range for reporting period:  Beginning 2023  Ending 10/19/2023    Progress report will be due (10 Rx or 30 days whichever is less):     Recertification will be due (POC Duration  / 90 days whichever is less): 2023      Visit # Insurance Allowable Auth Required      []  Yes [x]  No        Functional Scale:  LEFI   Date    10/19/2023  Score   61/80    Latex Allergy:  [x]NO      []YES  Preferred Language for Healthcare:   [x]English       []other:    RESTRICTIONS/PRECAUTIONS:      SUBJECTIVE: Pt reports good in morning. Pt reports that she gets up every hour to stretch when working. Pain level:  0-1/10 beginning at bilateral hips     Plan Moving Forward/ For next visit:   MLD to bilateral LE   Wrapping   Exercise      OBJECTIVE: See eval  O2 sat 97%, pulse 78, /84 Beginning      Exercises/Interventions:     Exercises in bold performed in department today. Items not bolded are carried forward from prior visits for continuity of the record.   Exercise/Activity Sets/ Reps/ Resistance    Comments/ Details HEP     Scifit  5 minutes seat 18 arms 7     Therapeutic Ex        toe curls, ankle pumps, knee bends and hip

## 2023-11-01 ENCOUNTER — LAB (OUTPATIENT)
Dept: LAB | Facility: CLINIC | Age: 68
End: 2023-11-01
Payer: COMMERCIAL

## 2023-11-01 DIAGNOSIS — Z12.5 SCREENING FOR PROSTATE CANCER: ICD-10-CM

## 2023-11-01 DIAGNOSIS — E78.5 HYPERLIPIDEMIA LDL GOAL <130: ICD-10-CM

## 2023-11-01 DIAGNOSIS — I10 BENIGN ESSENTIAL HYPERTENSION: ICD-10-CM

## 2023-11-01 DIAGNOSIS — R73.03 PREDIABETES: Primary | ICD-10-CM

## 2023-11-01 LAB
ALT SERPL W P-5'-P-CCNC: 14 U/L (ref 0–70)
ANION GAP SERPL CALCULATED.3IONS-SCNC: 14 MMOL/L (ref 7–15)
BUN SERPL-MCNC: 13.4 MG/DL (ref 8–23)
CALCIUM SERPL-MCNC: 9.2 MG/DL (ref 8.8–10.2)
CHLORIDE SERPL-SCNC: 101 MMOL/L (ref 98–107)
CHOLEST SERPL-MCNC: 163 MG/DL
CREAT SERPL-MCNC: 0.89 MG/DL (ref 0.67–1.17)
DEPRECATED HCO3 PLAS-SCNC: 27 MMOL/L (ref 22–29)
EGFRCR SERPLBLD CKD-EPI 2021: >90 ML/MIN/1.73M2
GLUCOSE SERPL-MCNC: 132 MG/DL (ref 70–99)
HBA1C MFR BLD: 6.2 % (ref 0–5.6)
HDLC SERPL-MCNC: 31 MG/DL
LDLC SERPL CALC-MCNC: 89 MG/DL
NONHDLC SERPL-MCNC: 132 MG/DL
POTASSIUM SERPL-SCNC: 4.1 MMOL/L (ref 3.4–5.3)
PSA SERPL DL<=0.01 NG/ML-MCNC: 0.89 NG/ML (ref 0–4.5)
SODIUM SERPL-SCNC: 142 MMOL/L (ref 135–145)
TRIGL SERPL-MCNC: 217 MG/DL

## 2023-11-01 PROCEDURE — 80061 LIPID PANEL: CPT

## 2023-11-01 PROCEDURE — 80048 BASIC METABOLIC PNL TOTAL CA: CPT

## 2023-11-01 PROCEDURE — G0103 PSA SCREENING: HCPCS

## 2023-11-01 PROCEDURE — 36415 COLL VENOUS BLD VENIPUNCTURE: CPT

## 2023-11-01 PROCEDURE — 83036 HEMOGLOBIN GLYCOSYLATED A1C: CPT

## 2023-11-01 PROCEDURE — 84460 ALANINE AMINO (ALT) (SGPT): CPT

## 2023-11-03 ENCOUNTER — OFFICE VISIT (OUTPATIENT)
Dept: INTERNAL MEDICINE | Facility: CLINIC | Age: 68
End: 2023-11-03
Attending: INTERNAL MEDICINE
Payer: COMMERCIAL

## 2023-11-03 VITALS
WEIGHT: 204.5 LBS | OXYGEN SATURATION: 93 % | TEMPERATURE: 97.6 F | BODY MASS INDEX: 29.28 KG/M2 | HEIGHT: 70 IN | HEART RATE: 68 BPM | SYSTOLIC BLOOD PRESSURE: 120 MMHG | DIASTOLIC BLOOD PRESSURE: 72 MMHG

## 2023-11-03 DIAGNOSIS — J43.1 PANLOBULAR EMPHYSEMA (H): ICD-10-CM

## 2023-11-03 DIAGNOSIS — M10.9 ACUTE GOUT OF FOOT, UNSPECIFIED CAUSE, UNSPECIFIED LATERALITY: ICD-10-CM

## 2023-11-03 DIAGNOSIS — I71.40 ABDOMINAL AORTIC ANEURYSM (AAA) WITHOUT RUPTURE, UNSPECIFIED PART (H): ICD-10-CM

## 2023-11-03 DIAGNOSIS — Z23 NEED FOR COVID-19 VACCINE: ICD-10-CM

## 2023-11-03 DIAGNOSIS — J43.1 PANLOBULAR EMPHYSEMA (H): Primary | ICD-10-CM

## 2023-11-03 DIAGNOSIS — Z72.0 TOBACCO ABUSE: ICD-10-CM

## 2023-11-03 DIAGNOSIS — Z23 NEED FOR PNEUMOCOCCAL 20-VALENT CONJUGATE VACCINATION: ICD-10-CM

## 2023-11-03 DIAGNOSIS — L40.9 PSORIASIS: ICD-10-CM

## 2023-11-03 DIAGNOSIS — Z23 NEED FOR INFLUENZA VACCINATION: ICD-10-CM

## 2023-11-03 DIAGNOSIS — R73.03 PREDIABETES: ICD-10-CM

## 2023-11-03 DIAGNOSIS — Z23 NEED FOR DIPHTHERIA, TETANUS, ACELLULAR PERTUSSIS AND HAEMOPHILUS INFLUENZAE VACCINE: ICD-10-CM

## 2023-11-03 DIAGNOSIS — Z71.6 ENCOUNTER FOR SMOKING CESSATION COUNSELING: ICD-10-CM

## 2023-11-03 DIAGNOSIS — F10.20 ALCOHOL DEPENDENCE, EPISODIC DRINKING BEHAVIOR (H): ICD-10-CM

## 2023-11-03 DIAGNOSIS — E78.5 HYPERLIPIDEMIA LDL GOAL <130: ICD-10-CM

## 2023-11-03 DIAGNOSIS — I10 BENIGN ESSENTIAL HYPERTENSION: ICD-10-CM

## 2023-11-03 PROCEDURE — 90677 PCV20 VACCINE IM: CPT | Performed by: INTERNAL MEDICINE

## 2023-11-03 PROCEDURE — 91320 SARSCV2 VAC 30MCG TRS-SUC IM: CPT | Performed by: INTERNAL MEDICINE

## 2023-11-03 PROCEDURE — G0009 ADMIN PNEUMOCOCCAL VACCINE: HCPCS | Mod: 59 | Performed by: INTERNAL MEDICINE

## 2023-11-03 PROCEDURE — 90480 ADMN SARSCOV2 VAC 1/ONLY CMP: CPT | Performed by: INTERNAL MEDICINE

## 2023-11-03 PROCEDURE — 90662 IIV NO PRSV INCREASED AG IM: CPT | Performed by: INTERNAL MEDICINE

## 2023-11-03 PROCEDURE — G0008 ADMIN INFLUENZA VIRUS VAC: HCPCS | Mod: 59 | Performed by: INTERNAL MEDICINE

## 2023-11-03 PROCEDURE — 99214 OFFICE O/P EST MOD 30 MIN: CPT | Mod: 25 | Performed by: INTERNAL MEDICINE

## 2023-11-03 RX ORDER — BETAMETHASONE DIPROPIONATE 0.5 MG/G
CREAM TOPICAL
Qty: 45 G | Refills: 2 | Status: SHIPPED | OUTPATIENT
Start: 2023-11-03 | End: 2024-02-04

## 2023-11-03 RX ORDER — ALBUTEROL SULFATE 90 UG/1
2 AEROSOL, METERED RESPIRATORY (INHALATION) EVERY 4 HOURS PRN
Qty: 18 G | Refills: 11 | Status: SHIPPED | OUTPATIENT
Start: 2023-11-03 | End: 2023-12-27

## 2023-11-03 RX ORDER — ALLOPURINOL 100 MG/1
200 TABLET ORAL DAILY
Qty: 180 TABLET | Refills: 3 | Status: SHIPPED | OUTPATIENT
Start: 2023-11-03 | End: 2023-12-11

## 2023-11-03 RX ORDER — BUPROPION HYDROCHLORIDE 300 MG/1
300 TABLET ORAL EVERY MORNING
Qty: 90 TABLET | Refills: 1 | Status: SHIPPED | OUTPATIENT
Start: 2023-11-03 | End: 2024-05-01

## 2023-11-03 RX ORDER — FLUTICASONE PROPIONATE AND SALMETEROL 232; 14 UG/1; UG/1
1 POWDER, METERED RESPIRATORY (INHALATION) 2 TIMES DAILY
Qty: 3 EACH | Refills: 3 | Status: SHIPPED | OUTPATIENT
Start: 2023-11-03

## 2023-11-03 ASSESSMENT — PAIN SCALES - GENERAL: PAINLEVEL: NO PAIN (0)

## 2023-11-03 NOTE — PATIENT INSTRUCTIONS
" Resume AirDuo    AIR DUO INHALER:    1 inhalation twice per day, every day to help control and prevent the inflammation in the airways.  AirDuo is not a \"rescue inhaler\", you should not use this inhaler for urgent breathing problem, use the Albuterol inhaler.            Continue all other medications at the same doses.  Contact your usual pharmacy if you need refills.      Return to see me in approximately 6 months, sooner if needed.  Please get nonfasting labs done in the [facility] or at any other [facility] lab 1-2 days before this appointment.  If you get the labs done at another clinic, make arrangements with that clinic directly.  The orders will be in place.  Use [employer] or Call [phone] to schedule the appointment with me and lab appointment.         5 GOALS TO PREVENT VASCULAR DISEASE:     1.  Aggressive blood pressure control, under 130/80 ideally.  Using medications if needed.    Your blood pressure is under good control    BP Readings from Last 4 Encounters:   11/03/23 120/72   04/14/23 130/74   10/21/22 134/78   08/19/22 114/64       2.  Aggressive LDL cholesterol (\"bad cholesterol\") lowering as indicated.    Your goal is an LDL under 130 for sure, preferably under 100.  (If you have diabetes or previous vascular disease, the the LDL goals would be under 100 for sure, preferably under 70.)    New guidelines identify four high-risk groups who could benefit from statins:   *people with pre-existing heart disease, such as those who have had a heart attack;   *people ages 40 to 75 who have diabetes of any type  *patients ages 40 to 75 with at least a 7.5% risk of developing cardiovascular disease over the next decade, according to a formula described in the guidelines  *patients with the sort of super-high cholesterol that sometimes runs in families, as evidenced by an LDL of 190 milligrams per deciliter or higher    Your cholesterol levels are well controlled.    Recent Labs   Lab Test " 11/01/23  1000 10/19/22  0949   CHOL 163 194   HDL 31* 30*   LDL 89 107*   TRIG 217* 287*       3.  Aggressive diabetic prevention, screening and/or management.      You do not have diabetes as of the most recent blood tests.     4.  No smoking    5.  Consider daily preventative aspirin over age 50 if you have enough cardiac risk factors to place you at higher risk for the presence of vascular disease.    If you have any reason not to take aspirin such easy bruising or bleeding, stomach problems, other anticoagulant medications, or any other side effects, then you should not take Aspirin.     --Based on your current cardiac risk factor profile, you should take regular daily Aspirin 81 mg once per day (as long as you do not have any side effects from taking aspirin).

## 2023-11-03 NOTE — PROGRESS NOTES
Assessment & Plan     (J43.1) Panlobular emphysema (H)  (primary encounter diagnosis)  Comment: relatively stable at this time.   Still has moderate to severe disease.   Needs to smoke any and all smoking once and for all.   Discussed general issues of COPD, including pathophysiology, ways it will affect the pt., when to seek help, reviewed the typical medications (how they are taken, how they help)   Plan: fluticasone-salmeterol (AIRDUO RESPICLICK)         232-14 MCG/ACT inhaler, REVIEW OF HEALTH         MAINTENANCE PROTOCOL ORDERS, PRIMARY CARE         FOLLOW-UP SCHEDULING            (F10.20) Alcohol dependence, episodic drinking behavior (H)  Comment: Discussed the many different ways excessive alcohol damages the body.    Discussed the other damaging non-medical side effects of excessive alcohol use as well.    Discussed the observed increases in all-cause mortality and morbidity when drinking above 2 drinks per day (defined as 12 oz beer, or 4 oz wine, or 1.5 oz spirits).  Offered help and support in finding help in quitting alcohol.  Will offer CD assessment at [facility] if pt desires.   Plan: REVIEW OF HEALTH MAINTENANCE PROTOCOL ORDERS,         PRIMARY CARE FOLLOW-UP SCHEDULING            (I71.40) Abdominal aortic aneurysm (AAA) without rupture, unspecified part (H24)  Comment: This condition is currently controlled on the current medical regimen.  Continue current therapy.   Discussed secondary risk factor modification and recommended continuing aggressive management of these items.   Plan: REVIEW OF HEALTH MAINTENANCE PROTOCOL ORDERS,         PRIMARY CARE FOLLOW-UP SCHEDULING            (E78.5) Hyperlipidemia LDL goal <130  Comment: This condition is currently controlled on the current medical regimen.  Continue current therapy.   Discussed guidelines recommending a statin cholesterol lowering medication for any patient with either diabetes and/or vascular disease, aiming for a LDL goal under 100 for  "sure, ideally under 70, using whatever dose of statin tolerated.    Plan: REVIEW OF HEALTH MAINTENANCE PROTOCOL ORDERS,         PRIMARY CARE FOLLOW-UP SCHEDULING            (I10) Benign essential hypertension  Comment: This condition is currently controlled on the current medical regimen.  Continue current therapy.   Plan: REVIEW OF HEALTH MAINTENANCE PROTOCOL ORDERS,         PRIMARY CARE FOLLOW-UP SCHEDULING            (M10.9) Acute gout of foot, unspecified cause, unspecified laterality  Comment: This condition is currently controlled on the current medical regimen.  Continue current therapy.   Plan: allopurinol (ZYLOPRIM) 100 MG tablet, REVIEW OF        HEALTH MAINTENANCE PROTOCOL ORDERS, PRIMARY         CARE FOLLOW-UP SCHEDULING            (R73.03) Prediabetes  Comment: Reviewed the labs showing mildly elevated glucose levels consistent with prediabetes.     Discussed \"pre-diabetes\", impaired glucose tolerance, and its part in the dysmetabolic syndrome.   No medications needed at this time.     Discussed the inevitable progression of impaired glucose tolerance toward worsening diabetes mellitus if nothing is changed in the diet, and the need for agressive interventions now to delay and prevent this inevitable progression.    Recommended lower carb diet.  Recommended regular physical activity.   We will continue to monitor this and nuñez additional recommendations and treatments as indicated based on the labs.     Plan: REVIEW OF HEALTH MAINTENANCE PROTOCOL ORDERS,         PRIMARY CARE FOLLOW-UP SCHEDULING            (J43.1) COPD ([facility])  Comment:   Plan: fluticasone-salmeterol (AIRDUO RESPICLICK)         232-14 MCG/ACT inhaler, albuterol (PROAIR         HFA/PROVENTIL HFA/VENTOLIN HFA) 108 (90 Base)         MCG/ACT inhaler                (Z23) Need for diphtheria, tetanus, acellular pertussis and haemophilus influenzae vaccine  Comment:   Plan: REVIEW OF HEALTH MAINTENANCE PROTOCOL ORDERS,         PRIMARY CARE " "FOLLOW-UP SCHEDULING            (Z23) Need for influenza vaccination  Comment:   Plan: INFLUENZA VACCINE 65+ (FLUZONE HD), REVIEW OF         HEALTH MAINTENANCE PROTOCOL ORDERS, PRIMARY         CARE FOLLOW-UP SCHEDULING            (Z23) Need for COVID-19 vaccine  Comment:   Plan: COVID-19 12+ (2023-24) (PFIZER), REVIEW OF         HEALTH MAINTENANCE PROTOCOL ORDERS, PRIMARY         CARE FOLLOW-UP SCHEDULING            (Z72.0) Tobacco abuse  Comment: reviewed smokign cessation options.   He feels bupropion is helping.   He has not experienced any significant side effects of this medication.   Reviewed side effects from bupropion.   Plan: buPROPion (WELLBUTRIN XL) 300 MG 24 hr tablet,         REVIEW OF HEALTH MAINTENANCE PROTOCOL ORDERS,         PRIMARY CARE FOLLOW-UP SCHEDULING            (Z23) Need for pneumococcal 20-valent conjugate vaccination  Comment:   Plan: Pneumococcal 20 Valent Conjugate (PCV20),         REVIEW OF HEALTH MAINTENANCE PROTOCOL ORDERS,         PRIMARY CARE FOLLOW-UP SCHEDULING            (Z71.6) Encounter for smoking cessation counseling  Comment:   Plan: REVIEW OF HEALTH MAINTENANCE PROTOCOL ORDERS,         PRIMARY CARE FOLLOW-UP SCHEDULING            (L40.9) Psoriasis  Comment:   Plan: betamethasone dipropionate (DIPROSONE) 0.05 %         external cream, PRIMARY CARE FOLLOW-UP         SCHEDULING                        BMI:   Estimated body mass index is 29.34 kg/m  as calculated from the following:    Height as of this encounter: 1.778 m (5' 10\").    Weight as of this encounter: 92.8 kg (204 lb 8 oz).           Donny Payne MD  [facility]    José Miguel Bernal is a 68 year old, presenting for the following health issues:  RECHECK (Follow up COPD, HTN, Lipids, DM)        11/3/2023     9:54 AM   Additional Questions   Roomed by Candace BURGOS CMA       History of Present Illness       COPD:  He presents for follow up of COPD.   Overall, COPD symptoms are " "slightly worse since last visit. He has more than usual fatigue or shortness of breath with exertion and same as usual shortness of breath at rest.  He sometimes coughs and does have change in sputum. No recent fever. He can walk less than 1 block without stopping to rest. He can walk 1 flights of stairs without resting. The patient has had no ED, urgent care, or hospital admissions because of COPD since the last visit.     Diabetes:   He presents for follow up of diabetes.    He is not checking blood glucose.         He has no concerns regarding his diabetes at this time.  He is having numbness in feet.            Hyperlipidemia:  He presents for follow up of hyperlipidemia.   He is taking medication to lower cholesterol. He is not having myalgia or other side effects to statin medications.    Hypertension: He presents for follow up of hypertension.  He does not check blood pressure  regularly outside of the clinic. Outpatient blood pressures have not been over 140/90. He does not follow a low salt diet.     He eats 2-3 servings of fruits and vegetables daily.He consumes 0 sweetened beverage(s) daily.He exercises with enough effort to increase his heart rate 9 or less minutes per day.  He exercises with enough effort to increase his heart rate 3 or less days per week. He is missing 1 dose(s) of medications per week.  He is not taking prescribed medications regularly due to remembering to take.       Continues to drink alcohol regularly, but reports that he has \"cut down\"        **I reviewed the information recorded in the patient's EPIC chart (including but not limited to medical history, surgical history, family history, problem list, medication list, and allergy list) and updated the information as indicated based on the patients reported information.       Review of Systems   Constitutional, HEENT, cardiovascular, pulmonary, gi and gu systems are negative, except as otherwise noted.      Objective    /72  " " Pulse 68   Temp 97.6  F (36.4  C) (Oral)   Ht 1.778 m (5' 10\")   Wt 92.8 kg (204 lb 8 oz)   SpO2 93%   BMI 29.34 kg/m    Body mass index is 29.34 kg/m .  Physical Exam   GENERAL alert and no distress, dami skin complexion, consistent with alcohol and cigarrette use  EYES:  Normal sclera,conjunctiva, EOMI  HENT: oral and posterior pharynx without lesions or erythema, facies symmetric  NECK: Neck supple. No LAD, without thyroidmegaly.  RESP: breath sounds quiet but clear, diminished respiratory excursion, prolonged expiratory phase, few scattered  rhonci, few scattered end exp wheezes, no rales   CV: RRR normal S1S2 without murmurs, rubs or gallops.  LYMPH: no cervical lymph adenopathy appreciated  MS: extremities- no gross deformities of the visible extremities noted,   EXT:  no lower extremity edema  PSYCH: Alert and oriented times 3; speech- coherent  SKIN:  No obvious significant skin lesions on visible portions of face                       "

## 2023-12-09 DIAGNOSIS — M10.9 ACUTE GOUT OF FOOT, UNSPECIFIED CAUSE, UNSPECIFIED LATERALITY: ICD-10-CM

## 2023-12-11 RX ORDER — ALLOPURINOL 100 MG/1
200 TABLET ORAL DAILY
Qty: 180 TABLET | Refills: 0 | Status: SHIPPED | OUTPATIENT
Start: 2023-12-11 | End: 2024-05-10

## 2023-12-27 DIAGNOSIS — J43.1 PANLOBULAR EMPHYSEMA (H): ICD-10-CM

## 2023-12-27 RX ORDER — ALBUTEROL SULFATE 0.83 MG/ML
SOLUTION RESPIRATORY (INHALATION)
Qty: 90 ML | Refills: 1 | Status: SHIPPED | OUTPATIENT
Start: 2023-12-27 | End: 2024-05-10 | Stop reason: ALTCHOICE

## 2023-12-27 RX ORDER — ALBUTEROL SULFATE 90 UG/1
2 AEROSOL, METERED RESPIRATORY (INHALATION) EVERY 4 HOURS PRN
Qty: 18 G | Refills: 1 | Status: SHIPPED | OUTPATIENT
Start: 2023-12-27 | End: 2024-02-12

## 2023-12-27 NOTE — TELEPHONE ENCOUNTER
Prescription approved per [facility] Refill Protocol.  Janeth Mistry, RN  [facility] Triage Nurse

## 2024-01-12 DIAGNOSIS — J43.1 PANLOBULAR EMPHYSEMA (H): ICD-10-CM

## 2024-01-12 RX ORDER — ALBUTEROL SULFATE 0.83 MG/ML
SOLUTION RESPIRATORY (INHALATION)
Qty: 90 ML | Refills: 1 | OUTPATIENT
Start: 2024-01-12

## 2024-02-03 DIAGNOSIS — L40.9 PSORIASIS: ICD-10-CM

## 2024-02-04 RX ORDER — BETAMETHASONE DIPROPIONATE 0.5 MG/G
CREAM TOPICAL
Qty: 45 G | Refills: 2 | Status: SHIPPED | OUTPATIENT
Start: 2024-02-04

## 2024-02-09 ENCOUNTER — APPOINTMENT (OUTPATIENT)
Dept: CT IMAGING | Facility: CLINIC | Age: 69
End: 2024-02-09
Attending: EMERGENCY MEDICINE
Payer: COMMERCIAL

## 2024-02-09 ENCOUNTER — HOSPITAL ENCOUNTER (OUTPATIENT)
Facility: CLINIC | Age: 69
Setting detail: OBSERVATION
Discharge: HOME | End: 2024-02-11
Attending: EMERGENCY MEDICINE | Admitting: INTERNAL MEDICINE
Payer: COMMERCIAL

## 2024-02-09 ENCOUNTER — NURSE TRIAGE (OUTPATIENT)
Dept: INTERNAL MEDICINE | Facility: CLINIC | Age: 69
End: 2024-02-09
Payer: COMMERCIAL

## 2024-02-09 DIAGNOSIS — J10.1 INFLUENZA A H1N1 INFECTION: ICD-10-CM

## 2024-02-09 DIAGNOSIS — J43.1 PANLOBULAR EMPHYSEMA (H): ICD-10-CM

## 2024-02-09 DIAGNOSIS — R06.00 DYSPNEA, UNSPECIFIED TYPE: ICD-10-CM

## 2024-02-09 DIAGNOSIS — J44.1 COPD EXACERBATION (H): ICD-10-CM

## 2024-02-09 DIAGNOSIS — J18.9 PNEUMONIA DUE TO INFECTIOUS ORGANISM, UNSPECIFIED LATERALITY, UNSPECIFIED PART OF LUNG: ICD-10-CM

## 2024-02-09 DIAGNOSIS — Q25.40 ABNORMALITY OF THORACIC AORTA: Primary | ICD-10-CM

## 2024-02-09 PROBLEM — R06.02 SOB (SHORTNESS OF BREATH): Status: ACTIVE | Noted: 2024-02-09

## 2024-02-09 LAB
ALBUMIN SERPL BCG-MCNC: 4.2 G/DL (ref 3.5–5.2)
ALP SERPL-CCNC: 83 U/L (ref 40–150)
ALT SERPL W P-5'-P-CCNC: 15 U/L (ref 0–70)
ANION GAP SERPL CALCULATED.3IONS-SCNC: 9 MMOL/L (ref 7–15)
AST SERPL W P-5'-P-CCNC: 30 U/L (ref 0–45)
BASOPHILS # BLD AUTO: 0.1 10E3/UL (ref 0–0.2)
BASOPHILS NFR BLD AUTO: 1 %
BILIRUB DIRECT SERPL-MCNC: <0.2 MG/DL (ref 0–0.3)
BILIRUB SERPL-MCNC: 0.4 MG/DL
BUN SERPL-MCNC: 20.9 MG/DL (ref 8–23)
CALCIUM SERPL-MCNC: 9 MG/DL (ref 8.8–10.2)
CHLORIDE SERPL-SCNC: 98 MMOL/L (ref 98–107)
CREAT SERPL-MCNC: 0.93 MG/DL (ref 0.67–1.17)
DEPRECATED HCO3 PLAS-SCNC: 30 MMOL/L (ref 22–29)
EGFRCR SERPLBLD CKD-EPI 2021: 89 ML/MIN/1.73M2
EOSINOPHIL # BLD AUTO: 0.1 10E3/UL (ref 0–0.7)
EOSINOPHIL NFR BLD AUTO: 1 %
ERYTHROCYTE [DISTWIDTH] IN BLOOD BY AUTOMATED COUNT: 13.9 % (ref 10–15)
FLUAV RNA SPEC QL NAA+PROBE: POSITIVE
FLUBV RNA RESP QL NAA+PROBE: NEGATIVE
GLUCOSE SERPL-MCNC: 102 MG/DL (ref 70–99)
HCO3 BLDV-SCNC: 29 MMOL/L (ref 21–28)
HCT VFR BLD AUTO: 48.3 % (ref 40–53)
HGB BLD-MCNC: 15.8 G/DL (ref 13.3–17.7)
HOLD SPECIMEN: NORMAL
HOLD SPECIMEN: NORMAL
IMM GRANULOCYTES # BLD: 0.1 10E3/UL
IMM GRANULOCYTES NFR BLD: 1 %
LACTATE BLD-SCNC: 1 MMOL/L
LIPASE SERPL-CCNC: 14 U/L (ref 13–60)
LYMPHOCYTES # BLD AUTO: 1 10E3/UL (ref 0.8–5.3)
LYMPHOCYTES NFR BLD AUTO: 14 %
MCH RBC QN AUTO: 30.3 PG (ref 26.5–33)
MCHC RBC AUTO-ENTMCNC: 32.7 G/DL (ref 31.5–36.5)
MCV RBC AUTO: 93 FL (ref 78–100)
MONOCYTES # BLD AUTO: 1.1 10E3/UL (ref 0–1.3)
MONOCYTES NFR BLD AUTO: 16 %
NEUTROPHILS # BLD AUTO: 4.5 10E3/UL (ref 1.6–8.3)
NEUTROPHILS NFR BLD AUTO: 67 %
NRBC # BLD AUTO: 0 10E3/UL
NRBC BLD AUTO-RTO: 0 /100
NT-PROBNP SERPL-MCNC: 132 PG/ML (ref 0–900)
PCO2 BLDV: 45 MM HG (ref 40–50)
PH BLDV: 7.41 [PH] (ref 7.32–7.43)
PLATELET # BLD AUTO: 172 10E3/UL (ref 150–450)
PO2 BLDV: 34 MM HG (ref 25–47)
POTASSIUM SERPL-SCNC: 4.2 MMOL/L (ref 3.4–5.3)
PROCALCITONIN SERPL IA-MCNC: 0.14 NG/ML
PROT SERPL-MCNC: 7 G/DL (ref 6.4–8.3)
RBC # BLD AUTO: 5.22 10E6/UL (ref 4.4–5.9)
RSV RNA SPEC NAA+PROBE: NEGATIVE
SAO2 % BLDV: 65 % (ref 70–75)
SARS-COV-2 RNA RESP QL NAA+PROBE: NEGATIVE
SODIUM SERPL-SCNC: 137 MMOL/L (ref 135–145)
TROPONIN T SERPL HS-MCNC: 38 NG/L
WBC # BLD AUTO: 6.8 10E3/UL (ref 4–11)

## 2024-02-09 PROCEDURE — 84484 ASSAY OF TROPONIN QUANT: CPT | Performed by: EMERGENCY MEDICINE

## 2024-02-09 PROCEDURE — 82803 BLOOD GASES ANY COMBINATION: CPT

## 2024-02-09 PROCEDURE — 84145 PROCALCITONIN (PCT): CPT | Performed by: HOSPITALIST

## 2024-02-09 PROCEDURE — 250N000011 HC RX IP 250 OP 636: Performed by: EMERGENCY MEDICINE

## 2024-02-09 PROCEDURE — 80048 BASIC METABOLIC PNL TOTAL CA: CPT | Performed by: EMERGENCY MEDICINE

## 2024-02-09 PROCEDURE — 83690 ASSAY OF LIPASE: CPT | Performed by: EMERGENCY MEDICINE

## 2024-02-09 PROCEDURE — 93005 ELECTROCARDIOGRAM TRACING: CPT

## 2024-02-09 PROCEDURE — 250N000009 HC RX 250: Performed by: EMERGENCY MEDICINE

## 2024-02-09 PROCEDURE — 80053 COMPREHEN METABOLIC PANEL: CPT | Performed by: EMERGENCY MEDICINE

## 2024-02-09 PROCEDURE — 85025 COMPLETE CBC W/AUTO DIFF WBC: CPT | Performed by: EMERGENCY MEDICINE

## 2024-02-09 PROCEDURE — 71260 CT THORAX DX C+: CPT | Mod: XU

## 2024-02-09 PROCEDURE — 71275 CT ANGIOGRAPHY CHEST: CPT

## 2024-02-09 PROCEDURE — 120N000001 HC R&B MED SURG/OB

## 2024-02-09 PROCEDURE — 96375 TX/PRO/DX INJ NEW DRUG ADDON: CPT

## 2024-02-09 PROCEDURE — 99223 1ST HOSP IP/OBS HIGH 75: CPT | Performed by: HOSPITALIST

## 2024-02-09 PROCEDURE — 83880 ASSAY OF NATRIURETIC PEPTIDE: CPT | Performed by: EMERGENCY MEDICINE

## 2024-02-09 PROCEDURE — 36415 COLL VENOUS BLD VENIPUNCTURE: CPT | Performed by: EMERGENCY MEDICINE

## 2024-02-09 PROCEDURE — 250N000013 HC RX MED GY IP 250 OP 250 PS 637: Performed by: EMERGENCY MEDICINE

## 2024-02-09 PROCEDURE — 82040 ASSAY OF SERUM ALBUMIN: CPT | Performed by: EMERGENCY MEDICINE

## 2024-02-09 PROCEDURE — 87637 SARSCOV2&INF A&B&RSV AMP PRB: CPT | Performed by: HOSPITALIST

## 2024-02-09 PROCEDURE — 99285 EMERGENCY DEPT VISIT HI MDM: CPT | Mod: 25

## 2024-02-09 PROCEDURE — 96365 THER/PROPH/DIAG IV INF INIT: CPT | Mod: 59

## 2024-02-09 RX ORDER — IOPAMIDOL 755 MG/ML
500 INJECTION, SOLUTION INTRAVASCULAR ONCE
Status: COMPLETED | OUTPATIENT
Start: 2024-02-09 | End: 2024-02-09

## 2024-02-09 RX ORDER — LIDOCAINE 40 MG/G
CREAM TOPICAL
Status: DISCONTINUED | OUTPATIENT
Start: 2024-02-09 | End: 2024-02-11 | Stop reason: HOSPADM

## 2024-02-09 RX ORDER — MAGNESIUM SULFATE HEPTAHYDRATE 40 MG/ML
2 INJECTION, SOLUTION INTRAVENOUS ONCE
Status: COMPLETED | OUTPATIENT
Start: 2024-02-09 | End: 2024-02-09

## 2024-02-09 RX ORDER — LISINOPRIL AND HYDROCHLOROTHIAZIDE 12.5; 2 MG/1; MG/1
1 TABLET ORAL EVERY MORNING
Status: DISCONTINUED | OUTPATIENT
Start: 2024-02-10 | End: 2024-02-11 | Stop reason: HOSPADM

## 2024-02-09 RX ORDER — ALLOPURINOL 100 MG/1
200 TABLET ORAL DAILY
Status: DISCONTINUED | OUTPATIENT
Start: 2024-02-10 | End: 2024-02-11 | Stop reason: HOSPADM

## 2024-02-09 RX ORDER — PREDNISONE 20 MG/1
40 TABLET ORAL DAILY
Status: DISCONTINUED | OUTPATIENT
Start: 2024-02-10 | End: 2024-02-10

## 2024-02-09 RX ORDER — IPRATROPIUM BROMIDE AND ALBUTEROL SULFATE 2.5; .5 MG/3ML; MG/3ML
3 SOLUTION RESPIRATORY (INHALATION) ONCE
Status: COMPLETED | OUTPATIENT
Start: 2024-02-09 | End: 2024-02-09

## 2024-02-09 RX ORDER — ALBUTEROL SULFATE 0.83 MG/ML
2.5 SOLUTION RESPIRATORY (INHALATION)
Status: DISCONTINUED | OUTPATIENT
Start: 2024-02-09 | End: 2024-02-11 | Stop reason: HOSPADM

## 2024-02-09 RX ORDER — AMLODIPINE BESYLATE 10 MG/1
10 TABLET ORAL DAILY
Status: DISCONTINUED | OUTPATIENT
Start: 2024-02-10 | End: 2024-02-11 | Stop reason: HOSPADM

## 2024-02-09 RX ORDER — AZITHROMYCIN 500 MG/5ML
500 INJECTION, POWDER, LYOPHILIZED, FOR SOLUTION INTRAVENOUS EVERY 24 HOURS
Status: CANCELLED | OUTPATIENT
Start: 2024-02-09 | End: 2024-02-12

## 2024-02-09 RX ORDER — AMOXICILLIN 250 MG
1 CAPSULE ORAL 2 TIMES DAILY PRN
Status: DISCONTINUED | OUTPATIENT
Start: 2024-02-09 | End: 2024-02-11 | Stop reason: HOSPADM

## 2024-02-09 RX ORDER — ONDANSETRON 4 MG/1
4 TABLET, ORALLY DISINTEGRATING ORAL EVERY 6 HOURS PRN
Status: DISCONTINUED | OUTPATIENT
Start: 2024-02-09 | End: 2024-02-11 | Stop reason: HOSPADM

## 2024-02-09 RX ORDER — CEFTRIAXONE 2 G/1
2 INJECTION, POWDER, FOR SOLUTION INTRAMUSCULAR; INTRAVENOUS ONCE
Status: COMPLETED | OUTPATIENT
Start: 2024-02-09 | End: 2024-02-09

## 2024-02-09 RX ORDER — IPRATROPIUM BROMIDE AND ALBUTEROL SULFATE 2.5; .5 MG/3ML; MG/3ML
3 SOLUTION RESPIRATORY (INHALATION)
Status: DISCONTINUED | OUTPATIENT
Start: 2024-02-10 | End: 2024-02-11 | Stop reason: HOSPADM

## 2024-02-09 RX ORDER — CALCIUM CARBONATE 500 MG/1
1000 TABLET, CHEWABLE ORAL 4 TIMES DAILY PRN
Status: DISCONTINUED | OUTPATIENT
Start: 2024-02-09 | End: 2024-02-11 | Stop reason: HOSPADM

## 2024-02-09 RX ORDER — METHYLPREDNISOLONE SODIUM SUCCINATE 125 MG/2ML
60 INJECTION, POWDER, LYOPHILIZED, FOR SOLUTION INTRAMUSCULAR; INTRAVENOUS EVERY 12 HOURS
Status: CANCELLED | OUTPATIENT
Start: 2024-02-10

## 2024-02-09 RX ORDER — BUPROPION HYDROCHLORIDE 150 MG/1
300 TABLET ORAL EVERY MORNING
Status: DISCONTINUED | OUTPATIENT
Start: 2024-02-10 | End: 2024-02-11 | Stop reason: HOSPADM

## 2024-02-09 RX ORDER — ONDANSETRON 2 MG/ML
4 INJECTION INTRAMUSCULAR; INTRAVENOUS EVERY 6 HOURS PRN
Status: DISCONTINUED | OUTPATIENT
Start: 2024-02-09 | End: 2024-02-11 | Stop reason: HOSPADM

## 2024-02-09 RX ORDER — ASPIRIN 81 MG/1
81 TABLET ORAL DAILY
Status: DISCONTINUED | OUTPATIENT
Start: 2024-02-10 | End: 2024-02-11 | Stop reason: HOSPADM

## 2024-02-09 RX ORDER — PRAVASTATIN SODIUM 20 MG
40 TABLET ORAL DAILY
Status: DISCONTINUED | OUTPATIENT
Start: 2024-02-10 | End: 2024-02-11 | Stop reason: HOSPADM

## 2024-02-09 RX ORDER — DOXYCYCLINE 100 MG/1
100 CAPSULE ORAL EVERY 12 HOURS SCHEDULED
Status: DISCONTINUED | OUTPATIENT
Start: 2024-02-09 | End: 2024-02-09

## 2024-02-09 RX ORDER — AMOXICILLIN 250 MG
2 CAPSULE ORAL 2 TIMES DAILY PRN
Status: DISCONTINUED | OUTPATIENT
Start: 2024-02-09 | End: 2024-02-11 | Stop reason: HOSPADM

## 2024-02-09 RX ORDER — METHYLPREDNISOLONE SODIUM SUCCINATE 125 MG/2ML
60 INJECTION, POWDER, LYOPHILIZED, FOR SOLUTION INTRAMUSCULAR; INTRAVENOUS ONCE
Status: COMPLETED | OUTPATIENT
Start: 2024-02-09 | End: 2024-02-09

## 2024-02-09 RX ORDER — DOXYCYCLINE 100 MG/1
100 CAPSULE ORAL EVERY 12 HOURS SCHEDULED
Status: DISCONTINUED | OUTPATIENT
Start: 2024-02-10 | End: 2024-02-11 | Stop reason: HOSPADM

## 2024-02-09 RX ADMIN — MAGNESIUM SULFATE HEPTAHYDRATE 2 G: 2 INJECTION, SOLUTION INTRAVENOUS at 20:42

## 2024-02-09 RX ADMIN — SODIUM CHLORIDE 87 ML: 9 INJECTION, SOLUTION INTRAVENOUS at 17:10

## 2024-02-09 RX ADMIN — SODIUM CHLORIDE 60 ML: 9 INJECTION, SOLUTION INTRAVENOUS at 19:25

## 2024-02-09 RX ADMIN — METHYLPREDNISOLONE SODIUM SUCCINATE 62.5 MG: 125 INJECTION, POWDER, FOR SOLUTION INTRAMUSCULAR; INTRAVENOUS at 20:41

## 2024-02-09 RX ADMIN — IOPAMIDOL 68 ML: 755 INJECTION, SOLUTION INTRAVENOUS at 17:10

## 2024-02-09 RX ADMIN — CEFTRIAXONE 2 G: 2 INJECTION, POWDER, FOR SOLUTION INTRAMUSCULAR; INTRAVENOUS at 20:42

## 2024-02-09 RX ADMIN — IPRATROPIUM BROMIDE AND ALBUTEROL SULFATE 3 ML: .5; 3 SOLUTION RESPIRATORY (INHALATION) at 16:47

## 2024-02-09 RX ADMIN — DOXYCYCLINE HYCLATE 100 MG: 100 CAPSULE ORAL at 20:42

## 2024-02-09 RX ADMIN — IPRATROPIUM BROMIDE AND ALBUTEROL SULFATE 3 ML: .5; 3 SOLUTION RESPIRATORY (INHALATION) at 17:52

## 2024-02-09 RX ADMIN — IOPAMIDOL 72 ML: 755 INJECTION, SOLUTION INTRAVENOUS at 19:25

## 2024-02-09 ASSESSMENT — ACTIVITIES OF DAILY LIVING (ADL)
ADLS_ACUITY_SCORE: 38

## 2024-02-09 NOTE — TELEPHONE ENCOUNTER
"Nurse Triage SBAR    Is this a 2nd Level Triage? NO    Situation: Worsening shortness of breath    Background: COPD    Assessment: Worsening shortness over one month. SOB w/ ambulation. O2Sat: 88-90%. Pulse: 70. COVID Negative. Denies chest pain.     Protocol Recommended Disposition:   Go to ED Now    Recommendation: Patient agrees to head to ED NOW. Does have reliable transportation     Routed to provider    Does the patient meet one of the following criteria for ADS visit consideration? No      Reason for Disposition   Oxygen level (e.g., pulse oximetry) 90% or lower    Additional Information   Negative: SEVERE difficulty breathing (e.g., struggling for each breath, speaks in single words, pulse > 120)   Negative: Breathing stopped and hasn't returned   Negative: Choking on something   Negative: Bluish (or gray) lips or face   Negative: Difficult to awaken or acting confused (e.g., disoriented, slurred speech)   Negative: Passed out (i.e., fainted, collapsed and was not responding)   Negative: Wheezing started suddenly after medicine, an allergic food, or bee sting   Negative: Stridor (harsh sound while breathing in)   Negative: Slow, shallow and weak breathing   Negative: Sounds like a life-threatening emergency to the triager   Negative: Chest pain   Negative: Wheezing (high pitched whistling sound) and previous asthma attacks or use of asthma medicines   Negative: Difficulty breathing and only present when coughing   Negative: Difficulty breathing and only from stuffy nose   Negative: Difficulty breathing and only from stuffy nose or runny nose from common cold   Negative: Difficulty breathing and within 14 days of COVID-19 Exposure     Tested negative 3 times in the last 3 days   Negative: MODERATE difficulty breathing (e.g., speaks in phrases, SOB even at rest, pulse 100-120) of new-onset or worse than normal    Answer Assessment - Initial Assessment Questions  1. RESPIRATORY STATUS: \"Describe your " "breathing?\" (e.g., wheezing, shortness of breath, unable to speak, severe coughing)       Shortness of breath, cough, Does need to take breath while w/ long sentences, sob with ambulation,   2. ONSET: \"When did this breathing problem begin?\"       Worsened in the last month  3. PATTERN \"Does the difficult breathing come and go, or has it been constant since it started?\"       constant  4. SEVERITY: \"How bad is your breathing?\" (e.g., mild, moderate, severe)     - MILD: No SOB at rest, mild SOB with walking, speaks normally in sentences, can lie down, no retractions, pulse < 100.     - MODERATE: SOB at rest, SOB with minimal exertion and prefers to sit, cannot lie down flat, speaks in phrases, mild retractions, audible wheezing, pulse 100-120.     - SEVERE: Very SOB at rest, speaks in single words, struggling to breathe, sitting hunched forward, retractions, pulse > 120       Mild-moderate  5. RECURRENT SYMPTOM: \"Have you had difficulty breathing before?\" If Yes, ask: \"When was the last time?\" and \"What happened that time?\"       Yes, just not this severe.   6. CARDIAC HISTORY: \"Do you have any history of heart disease?\" (e.g., heart attack, angina, bypass surgery, angioplasty)       No  7. LUNG HISTORY: \"Do you have any history of lung disease?\"  (e.g., pulmonary embolus, asthma, emphysema)      COPD  8. CAUSE: \"What do you think is causing the breathing problem?\"       Smoking   9. OTHER SYMPTOMS: \"Do you have any other symptoms? (e.g., dizziness, runny nose, cough, chest pain, fever)      Denies other symptoms  10. O2 SATURATION MONITOR:  \"Do you use an oxygen saturation monitor (pulse oximeter) at home?\" If Yes, ask: \"What is your reading (oxygen level) today?\" \"What is your usual oxygen saturation reading?\" (e.g., 95%)        88%-90%  12. TRAVEL: \"Have you traveled out of the country in the last month?\" (e.g., travel history, exposures)        No    Protocols used: Breathing Difficulty-A-OH    "

## 2024-02-09 NOTE — ED PROVIDER NOTES
"  History     Chief Complaint:  Shortness of Breath       The history is provided by the patient.      Gabe Smith is a 68 year old male with history of COPD, tobacco use disorder, and dysmetabolic syndrome X who presents to the ED for evaluation of shortness of breath. Gabe reports worsening shortness of breath and dyspnea on exertion over the last few weeks. He endorses a productive cough and chest congestion. Symptoms are worse lying down. No fever, chills, hemoptysis, vomiting, LE edema, bowel or bladder symptoms. No weight changes. No history of blood clots. Patient notes he is a current smoker.         Independent Historian:   None - Patient Only    Review of External Notes:        Medications:    Albuterol  Allopurinol  Amlodipine  Bupropion  Fluticasone-salmeterol  Lisinopril-hydrochlorothiazide  Pravastatin  Aspirin 81 mg    Past Medical History:    Tobacco use disorder  Alcohol use disorder, episodic drinking behavior  Hypertension  Osteoarthritis  Hyperlipidemia  COPD  AAA without rupture  Dysmetabolic syndrome X  Prediabetes  Gout attack  Hepatic abscess  Cholecystitis    Past Surgical History:    Colonoscopy  Cholecystectomy  ERCP  Bilateral MOISES  Unspecified knee surgeries as a child    Physical Exam   Patient Vitals for the past 24 hrs:   BP Temp Temp src Pulse Resp SpO2 Height Weight   02/09/24 2229 -- -- -- -- -- 95 % -- --   02/09/24 2223 (!) 145/71 98.1  F (36.7  C) Oral 85 20 92 % 1.778 m (5' 10\") 93.5 kg (206 lb 3.2 oz)   02/09/24 2132 (!) 145/86 -- -- 72 -- 92 % -- --   02/09/24 1406 120/82 -- -- -- -- -- -- --   02/09/24 1404 -- 97.4  F (36.3  C) -- 73 20 95 % -- --        Physical Exam    HENT:  mmm, no rhinorrhea mild posterior oropharyngeal erythema no exudates no significant hypertrophy  Eyes: periorbital tissues and sclera normal   Neck: supple, no abnormal swelling  Lungs: Mild tachypnea, slightly prolonged expiratory phase, no significant  muscle use.  CV: rrr, no m/r/g, " ppi  Abd: soft, nontender, nondistended, no rebound/masses/guarding/hsm  Ext: no peripheral edema  Skin: warm, dry, well perfused, no rashes/bruising/lesions on exposed skin  Neuro: alert, MAEE, no gross motor or sensory deficits, gait stable  Psych: Normal mood, normal affect      Emergency Department Course   ECG  ECG taken at 1533, ECG read at 1534  Normal sinus rhythm  Normal ECG  No significant changes as compared to prior, dated 1/20/2020.  Rate 70 bpm. AZ interval 194 ms. QRS duration 86 ms. QT/QTc 366/395 ms. P-R-T axes 70 58 71.     Imaging:  CT Aortic Survey w Contrast   Final Result   IMPRESSION:   1.  A large chronic-appearing ulcerated plaque/penetrating aortic ulcer which results in focal eccentric aneurysmal dilatation of the mid descending aorta up to 4.1 cm. No aortic dissection or acute process. Consider vascular surgery consultation.   2.  Fusiform aneurysmal dilatation of the lower descending thoracic aorta measuring 4.1 cm and of the abdominal aorta measuring 4.5 cm.         CT Chest Pulmonary Embolism w Contrast   Final Result   IMPRESSION:   1.  No acute pulmonary embolism.   2.  Minimal bronchiolitis/bronchial pneumonia of the left upper and lower lobes.   3.  Short segment chronic dissection versus penetrating atherosclerotic ulcer of the mid descending thoracic aorta, resulting in total aneurysmal dilatation of the mid descending thoracic aorta up to 4.1 cm.   4.  Few sub-6 mm pulmonary nodules. Follow-up is recommended according to Fleischner criteria, as detailed below.   5.  Mild hepatomegaly and diffuse hepatic steatosis.                   Laboratory:  Labs Ordered and Resulted from Time of ED Arrival to Time of ED Departure   BASIC METABOLIC PANEL - Abnormal       Result Value    Sodium 137      Potassium 4.2      Chloride 98      Carbon Dioxide (CO2) 30 (*)     Anion Gap 9      Urea Nitrogen 20.9      Creatinine 0.93      GFR Estimate 89      Calcium 9.0      Glucose 102 (*)     TROPONIN T, HIGH SENSITIVITY - Abnormal    Troponin T, High Sensitivity 38 (*)    ISTAT GASES LACTATE VENOUS POCT - Abnormal    Lactic Acid POCT 1.0      Bicarbonate Venous POCT 29 (*)     O2 Sat, Venous POCT 65 (*)     pCO2 Venous POCT 45      pH Venous POCT 7.41      pO2 Venous POCT 34     INFLUENZA A/B, RSV, & SARS-COV2 PCR - Abnormal    Influenza A PCR Positive (*)     Influenza B PCR Negative      RSV PCR Negative      SARS CoV2 PCR Negative     HEPATIC FUNCTION PANEL - Normal    Protein Total 7.0      Albumin 4.2      Bilirubin Total 0.4      Alkaline Phosphatase 83      AST 30      ALT 15      Bilirubin Direct <0.20     LIPASE - Normal    Lipase 14     NT PROBNP INPATIENT - Normal    N terminal Pro BNP Inpatient 132     PROCALCITONIN - Normal    Procalcitonin 0.14     CBC WITH PLATELETS AND DIFFERENTIAL    WBC Count 6.8      RBC Count 5.22      Hemoglobin 15.8      Hematocrit 48.3      MCV 93      MCH 30.3      MCHC 32.7      RDW 13.9      Platelet Count 172      % Neutrophils 67      % Lymphocytes 14      % Monocytes 16      % Eosinophils 1      % Basophils 1      % Immature Granulocytes 1      NRBCs per 100 WBC 0      Absolute Neutrophils 4.5      Absolute Lymphocytes 1.0      Absolute Monocytes 1.1      Absolute Eosinophils 0.1      Absolute Basophils 0.1      Absolute Immature Granulocytes 0.1      Absolute NRBCs 0.0          Procedures   None    Emergency Department Course & Assessments:     Interventions:  Medications   allopurinol (ZYLOPRIM) tablet 200 mg (has no administration in time range)   amLODIPine (NORVASC) tablet 10 mg (has no administration in time range)   aspirin EC tablet 81 mg (has no administration in time range)   buPROPion (WELLBUTRIN XL) 24 hr tablet 300 mg (has no administration in time range)   lisinopril-hydrochlorothiazide (ZESTORETIC) 20-12.5 MG per tablet 1 tablet (has no administration in time range)   pravastatin (PRAVACHOL) tablet 40 mg (has no administration in time range)    lidocaine 1 % 0.1-1 mL (has no administration in time range)   lidocaine (LMX4) cream (has no administration in time range)   sodium chloride (PF) 0.9% PF flush 3 mL (3 mLs Intracatheter $Given 2/9/24 2301)   sodium chloride (PF) 0.9% PF flush 3 mL (has no administration in time range)   senna-docusate (SENOKOT-S/PERICOLACE) 8.6-50 MG per tablet 1 tablet (has no administration in time range)     Or   senna-docusate (SENOKOT-S/PERICOLACE) 8.6-50 MG per tablet 2 tablet (has no administration in time range)   calcium carbonate (TUMS) chewable tablet 1,000 mg (has no administration in time range)   ondansetron (ZOFRAN ODT) ODT tab 4 mg (has no administration in time range)     Or   ondansetron (ZOFRAN) injection 4 mg (has no administration in time range)   ipratropium - albuterol 0.5 mg/2.5 mg/3 mL (DUONEB) neb solution 3 mL (has no administration in time range)   albuterol (PROVENTIL) neb solution 2.5 mg (has no administration in time range)   predniSONE (DELTASONE) tablet 40 mg (has no administration in time range)   doxycycline hyclate (VIBRAMYCIN) capsule 100 mg (has no administration in time range)   ipratropium - albuterol 0.5 mg/2.5 mg/3 mL (DUONEB) neb solution 3 mL (3 mLs Nebulization $Given 2/9/24 1647)   CT Scan Flush (87 mLs Intravenous $Given 2/9/24 1710)   iopamidol (ISOVUE-370) solution 500 mL (68 mLs Intravenous $Given 2/9/24 1710)   ipratropium - albuterol 0.5 mg/2.5 mg/3 mL (DUONEB) neb solution 3 mL (3 mLs Nebulization $Given 2/9/24 1752)   methylPREDNISolone sodium succinate (solu-MEDROL) injection 62.5 mg (62.5 mg Intravenous $Given 2/9/24 2041)   magnesium sulfate 2 g in 50 mL sterile water intermittent infusion (2 g Intravenous $New Bag 2/9/24 2042)   cefTRIAXone (ROCEPHIN) 2 g vial to attach to  ml bag for ADULTS or NS 50 ml bag for PEDS (0 g Intravenous Stopped 2/9/24 2137)   iopamidol (ISOVUE-370) solution 500 mL (72 mLs Intravenous $Given 2/9/24 1925)   CT Scan Flush (60 mLs  Intravenous $Given 24)        Independent Interpretation (X-rays, CTs, rhythm strip):      Consultations/Discussion of Management or Tests:  ED Course as of 24 2351      162 I obtained history and examined the patient as noted above.        I discussed with the admitting hospitalist.    I spoke to vascular surgery about the finding of the aorta on CT imaging today for PE.  Recommending CTA of the aorta.  This is chronic and no emergent procedure is needed and the patient can stay at [facility] and upon discharge they will follow-up with vascular surgery as he will likely need an endograft.    Social Determinants of Health affecting care:   None    Disposition:  Admit     Impression & Plan    CMS Diagnoses: None    Medical Decision Makin-year-old gentleman presenting with 2 weeks of ongoing shortness of breath now with some component of orthopnea as well as associated symptoms concerning for respiratory tract infectious process.  No hypoxia.  Mild bronchospasm on examination.  EKG showing no concerning arrhythmia or acute ischemic changes but has a mild elevation of his troponin.  Would benefit from admission, serial biomarkers, continue treatment for his bronchospasm.  We also found on imaging no PE but do show possibility of pneumonia and so was given community-acquired coverage for the time being.  We see his aortic abnormalities mentioned above.  I spoke with vascular surgery given the chronic nature of this with no acute component vascular surgery is okay with the patient staying here at [facility] taking care of his pulmonary issue and then he will follow-up with vascular surgery upon discharge as he will likely need endograft at some point.      Diagnosis:    ICD-10-CM    1. Abnormality of thoracic aorta  Q25.40 Vascular Surgery Referral      2. Dyspnea, unspecified type  R06.00       3. COPD exacerbation (H)  J44.1       4. Pneumonia due to infectious organism, unspecified  laterality, unspecified part of lung  J18.9            Discharge Medications:  Current Discharge Medication List           Scribe Disclosure:  I, Camelia Jaimes, am serving as a scribe at 4:44 PM on 2/9/2024 to document services personally performed by Ilya Hammond MD based on my observations and the provider's statements to me.   2/9/2024   Ilya Hammond MD Walker, Jerome Richard, MD  02/09/24 [id number]

## 2024-02-09 NOTE — ED TRIAGE NOTES
Patient reports SOB for the last several months but has been getting worse over the last 2 weeks.

## 2024-02-10 PROBLEM — Q25.40 ABNORMALITY OF THORACIC AORTA: Status: ACTIVE | Noted: 2024-02-10

## 2024-02-10 LAB
ANION GAP SERPL CALCULATED.3IONS-SCNC: 10 MMOL/L (ref 7–15)
BUN SERPL-MCNC: 26.8 MG/DL (ref 8–23)
CALCIUM SERPL-MCNC: 9 MG/DL (ref 8.8–10.2)
CHLORIDE SERPL-SCNC: 97 MMOL/L (ref 98–107)
CREAT SERPL-MCNC: 0.99 MG/DL (ref 0.67–1.17)
DEPRECATED HCO3 PLAS-SCNC: 29 MMOL/L (ref 22–29)
EGFRCR SERPLBLD CKD-EPI 2021: 83 ML/MIN/1.73M2
ERYTHROCYTE [DISTWIDTH] IN BLOOD BY AUTOMATED COUNT: 13.6 % (ref 10–15)
GLUCOSE SERPL-MCNC: 207 MG/DL (ref 70–99)
HBA1C MFR BLD: 6.8 %
HCT VFR BLD AUTO: 50.2 % (ref 40–53)
HGB BLD-MCNC: 16.3 G/DL (ref 13.3–17.7)
MAGNESIUM SERPL-MCNC: 2.2 MG/DL (ref 1.7–2.3)
MCH RBC QN AUTO: 29.9 PG (ref 26.5–33)
MCHC RBC AUTO-ENTMCNC: 32.5 G/DL (ref 31.5–36.5)
MCV RBC AUTO: 92 FL (ref 78–100)
PLATELET # BLD AUTO: 178 10E3/UL (ref 150–450)
POTASSIUM SERPL-SCNC: 4.8 MMOL/L (ref 3.4–5.3)
RBC # BLD AUTO: 5.45 10E6/UL (ref 4.4–5.9)
SODIUM SERPL-SCNC: 136 MMOL/L (ref 135–145)
TROPONIN T SERPL HS-MCNC: 24 NG/L
TROPONIN T SERPL HS-MCNC: 30 NG/L
WBC # BLD AUTO: 5.1 10E3/UL (ref 4–11)

## 2024-02-10 PROCEDURE — 80048 BASIC METABOLIC PNL TOTAL CA: CPT | Performed by: HOSPITALIST

## 2024-02-10 PROCEDURE — G0378 HOSPITAL OBSERVATION PER HR: HCPCS

## 2024-02-10 PROCEDURE — 250N000013 HC RX MED GY IP 250 OP 250 PS 637: Performed by: INTERNAL MEDICINE

## 2024-02-10 PROCEDURE — 94640 AIRWAY INHALATION TREATMENT: CPT | Mod: 76

## 2024-02-10 PROCEDURE — 84484 ASSAY OF TROPONIN QUANT: CPT | Performed by: HOSPITALIST

## 2024-02-10 PROCEDURE — 250N000012 HC RX MED GY IP 250 OP 636 PS 637: Performed by: HOSPITALIST

## 2024-02-10 PROCEDURE — 250N000009 HC RX 250: Performed by: HOSPITALIST

## 2024-02-10 PROCEDURE — 83036 HEMOGLOBIN GLYCOSYLATED A1C: CPT | Performed by: INTERNAL MEDICINE

## 2024-02-10 PROCEDURE — 36415 COLL VENOUS BLD VENIPUNCTURE: CPT | Performed by: HOSPITALIST

## 2024-02-10 PROCEDURE — 999N000157 HC STATISTIC RCP TIME EA 10 MIN

## 2024-02-10 PROCEDURE — 94640 AIRWAY INHALATION TREATMENT: CPT

## 2024-02-10 PROCEDURE — 250N000013 HC RX MED GY IP 250 OP 250 PS 637: Performed by: HOSPITALIST

## 2024-02-10 PROCEDURE — 85027 COMPLETE CBC AUTOMATED: CPT | Performed by: HOSPITALIST

## 2024-02-10 PROCEDURE — 83735 ASSAY OF MAGNESIUM: CPT | Performed by: INTERNAL MEDICINE

## 2024-02-10 RX ORDER — OSELTAMIVIR PHOSPHATE 75 MG/1
75 CAPSULE ORAL 2 TIMES DAILY
Status: DISCONTINUED | OUTPATIENT
Start: 2024-02-10 | End: 2024-02-11 | Stop reason: HOSPADM

## 2024-02-10 RX ORDER — PREDNISONE 20 MG/1
60 TABLET ORAL DAILY
Status: DISCONTINUED | OUTPATIENT
Start: 2024-02-11 | End: 2024-02-11 | Stop reason: HOSPADM

## 2024-02-10 RX ADMIN — IPRATROPIUM BROMIDE AND ALBUTEROL SULFATE 3 ML: .5; 3 SOLUTION RESPIRATORY (INHALATION) at 08:54

## 2024-02-10 RX ADMIN — DOXYCYCLINE HYCLATE 100 MG: 100 CAPSULE ORAL at 08:24

## 2024-02-10 RX ADMIN — BUPROPION 300 MG: 150 TABLET, EXTENDED RELEASE ORAL at 08:24

## 2024-02-10 RX ADMIN — OSELTAMIVIR PHOSPHATE 75 MG: 75 CAPSULE ORAL at 09:32

## 2024-02-10 RX ADMIN — AMLODIPINE BESYLATE 10 MG: 10 TABLET ORAL at 08:25

## 2024-02-10 RX ADMIN — IPRATROPIUM BROMIDE AND ALBUTEROL SULFATE 3 ML: .5; 3 SOLUTION RESPIRATORY (INHALATION) at 19:57

## 2024-02-10 RX ADMIN — DOXYCYCLINE HYCLATE 100 MG: 100 CAPSULE ORAL at 20:46

## 2024-02-10 RX ADMIN — PRAVASTATIN SODIUM 40 MG: 20 TABLET ORAL at 08:25

## 2024-02-10 RX ADMIN — IPRATROPIUM BROMIDE AND ALBUTEROL SULFATE 3 ML: .5; 3 SOLUTION RESPIRATORY (INHALATION) at 16:56

## 2024-02-10 RX ADMIN — OSELTAMIVIR PHOSPHATE 75 MG: 75 CAPSULE ORAL at 20:46

## 2024-02-10 RX ADMIN — LISINOPRIL AND HYDROCHLOROTHIAZIDE 1 TABLET: 12.5; 2 TABLET ORAL at 09:32

## 2024-02-10 RX ADMIN — PREDNISONE 40 MG: 20 TABLET ORAL at 08:24

## 2024-02-10 RX ADMIN — IPRATROPIUM BROMIDE AND ALBUTEROL SULFATE 3 ML: .5; 3 SOLUTION RESPIRATORY (INHALATION) at 12:35

## 2024-02-10 RX ADMIN — ALLOPURINOL 200 MG: 100 TABLET ORAL at 08:24

## 2024-02-10 RX ADMIN — ASPIRIN 81 MG: 81 TABLET, COATED ORAL at 08:24

## 2024-02-10 ASSESSMENT — ACTIVITIES OF DAILY LIVING (ADL)
ADLS_ACUITY_SCORE: 25
ADLS_ACUITY_SCORE: 23
ADLS_ACUITY_SCORE: 25
ADLS_ACUITY_SCORE: 23
ADLS_ACUITY_SCORE: 26
ADLS_ACUITY_SCORE: 25
ADLS_ACUITY_SCORE: 25
ADLS_ACUITY_SCORE: 26
ADLS_ACUITY_SCORE: 25
ADLS_ACUITY_SCORE: 25

## 2024-02-10 NOTE — PROGRESS NOTES
"[facility] RCAT     Date: 02/10/24    Admission Dx: Influenza    Pulmonary History: COPD    Home Nebulizer/MDI Use: albuterol prn    Home Oxygen: n/a    Acuity Level (RCAT flow sheet): level 3    Aerosol Therapy initiated: duoneb QID, albuterol prn    Pulmonary Hygiene initiated: Coughing techniques    Volume Expansion initiated: Incentive spirometry    Current Oxygen Requirements: 3L NC    Current SpO2: 95%    Re-evaluation date: 2/13/24    Patient Education: Indications for bronchodilators discussed.      See \"RT Assessments\" flow sheet for patient assessment scoring and Acuity Level Details.    "

## 2024-02-10 NOTE — PLAN OF CARE
PRIMARY DIAGNOSIS: GENERALIZED WEAKNESS    OUTPATIENT/OBSERVATION GOALS TO BE MET BEFORE DISCHARGE  1. Orthostatic performed: N/A    2. Tolerating PO medications: Yes    3. Return to near baseline physical activity: Yes    4. Cleared for discharge by consultants (if involved): Yes    Discharge Planner Nurse   Safe discharge environment identified: Yes  Barriers to discharge: No    Walked in [place] today. No c/o pain. On room air.          Entered by: Linnette Nguyen RN 02/10/2024 2:11 PM     Please review provider order for any additional goals.   Nurse to notify provider when observation goals have been met and patient is ready for discharge.

## 2024-02-10 NOTE — PHARMACY-ADMISSION MEDICATION HISTORY
Pharmacist Admission Medication History    Admission medication history is complete. The information provided in this note is only as accurate as the sources available at the time of the update.    Information Source(s): Patient and CareEverywhere/SureScripts via in-person    Pertinent Information: none    Changes made to PTA medication list:  Added: None  Deleted: None  Changed: diprosone crm, pravastatin to AM    Allergies reviewed with patient and updates made in EHR: yes    Medication History Completed By: Junior Gutierrez RPH 2/9/2024 10:32 PM    Prior to Admission medications    Medication Sig Last Dose Taking? Auth Provider Long Term End Date   albuterol (PROAIR HFA/PROVENTIL HFA/VENTOLIN HFA) 108 (90 Base) MCG/ACT inhaler INHALE 2 PUFFS INTO THE LUNGS EVERY 4 HOURS AS NEEDED FOR SHORTNESS OF BREATH OR DIFFICULT BREATHING OR WHEEZING  Yes Donny Payne MD Yes    albuterol (PROVENTIL) (2.5 MG/3ML) 0.083% neb solution USE 1 VIAL VIA NEBULIZER EVERY 6 HOURS AS NEEDED FOR SHORTNESS OF BREATH OR WHEEZING  Yes Donny Payne MD Yes    allopurinol (ZYLOPRIM) 100 MG tablet TAKE 2 TABLETS(200 MG) BY MOUTH DAILY 2/9/2024 at am Yes Donny Payne MD     amLODIPine (NORVASC) 10 MG tablet TAKE 1 TABLET(10 MG) BY MOUTH DAILY 2/9/2024 at am Yes Donny Payne MD Yes    aspirin 81 MG tablet Take 1 tablet (81 mg) by mouth daily 2/9/2024 at am Yes Donny Payne MD     betamethasone dipropionate (DIPROSONE) 0.05 % external cream APPLY SPARINGLY ONCE OR TWICE DAILY AS NEEDED TO AFFECTED AREA OF PSORIASIS UNTIL THE SKIN IS BETTER, THEN STOP. REPEAT AS NEEDED  Patient taking differently: daily APPLY SPARINGLY ONCE OR TWICE DAILY AS NEEDED TO AFFECTED AREA OF PSORIASIS UNTIL THE SKIN IS BETTER, THEN STOP. REPEAT AS NEEDED 2/9/2024 at am Yes Donny Payne MD     buPROPion (WELLBUTRIN XL) 300 MG 24 hr tablet Take 1 tablet (300 mg) by mouth every morning 2/9/2024 at am Yes  Donny Payne MD Yes    fluticasone-salmeterol (AIRDUO RESPICLICK) 232-14 MCG/ACT inhaler Inhale 1 puff into the lungs 2 times daily 2/9/2024 at x1 Yes Donny Payne MD Yes    lisinopril-hydrochlorothiazide (ZESTORETIC) 20-12.5 MG tablet TAKE 1 TABLET BY MOUTH EVERY MORNING 2/9/2024 at am Yes Donny Payne MD Yes    pravastatin (PRAVACHOL) 40 MG tablet TAKE 1 TABLET(40 MG) BY MOUTH AT BEDTIME  Patient taking differently: Take 40 mg by mouth every morning 2/9/2024 at am Yes Donny Payne MD Yes

## 2024-02-10 NOTE — UTILIZATION REVIEW
"  Admission Status; Secondary Review Determination         Under the authority of the Utilization Management Committee, the utilization review process indicated a secondary review on the above patient.  The review outcome is based on review of the medical records, discussions with staff, and applying clinical experience noted on the date of the review.          (x) Observation Status Appropriate - This patient does not meet hospital inpatient criteria and is placed in observation status. If this patient's primary payer is Medicare and was admitted as an inpatient, Condition Code 44 should be used and patient status changed to \"observation\".     RATIONALE FOR DETERMINATION     Patient is a 68 year old male with hx of HTN and COPD with ongoing tobacco abuse who presented with YANCEY and was found to have acute hypoxic respiratory failure 2/2 influenza and decompensated COPD.  He was needing supplemental oxygen in the emergency department which is unusual for him.  Vital signs were otherwise unremarkable.  Laboratory evaluation was remarkable for elevated troponin of 38, 30, and then 24.  CT of chest showed no pulmonary embolism.  It showed left upper lung and left lower lung infiltrate.  He was admitted to the hospital for treatment of influenza pneumonia and possible secondary pneumonia.  He is being treated with Tamiflu and oral doxycycline.  He is weaned from oxygen.  Observation care is appropriate for ongoing treatment of influenza A, recovered hypoxic respiratory failure, and COPD exacerbation.    The severity of illness, intensity of service provided, expected LOS and risk for adverse outcome make the care appropriate for further observation; however, doesn't meet criteria for hospital inpatient admission. Dr Tomi Patel notified of this determination.    This document was produced using [employer] recognition software.      The information on this document is developed by the utilization review team " in order for the business office to ensure compliance.  This only denotes the appropriateness of proper admission status and does not reflect the quality of care rendered.         The definitions of Inpatient Status and Observation Status used in making the determination above are those provided in the CMS Coverage Manual, Chapter 1 and Chapter 6, section 70.4.      Sincerely,    Eric Guardado MD    Utilization Review  Physician Advisor  [facility].

## 2024-02-10 NOTE — PLAN OF CARE
Goal Outcome Evaluation:      Plan of Care Reviewed With: patient    Overall Patient Progress: no changeOverall Patient Progress: no change    Pt A & O x 4, pt does become SOB with activity and at rest. O2 saturations maintained @ 92-95% on 3 LPM via NC. Pt SBA with ambulation, pt does stand at bedside independently to use urinal. Tele SR w/ BBB. VSS BP is slightly elevated. Pt is to transition to oral steroids today. Pt denies pain. Discharge pending

## 2024-02-10 NOTE — H&P
[facility]    History and Physical  Hospitalist       Date of Admission:  2/9/2024    Assessment & Plan   Gabe Smith is a 68 year old male with a past medical history of emphysema, alcohol use disorder, tobacco use disorder, hyperlipidemia, hypertension presents with shortness of breath.    #YANCEY suspected secondary to COPD exacerbation: Patient called triage today for shortness of breath that has been ongoing for over 1 month.  Worse with ambulation.  He also endorses a cough productive of clear/yellow sputum over that period of time.  He also does endorse some orthopnea.  No clear fevers or chills.  No nausea or vomiting.  He denies any swelling in his legs.  No major changes in weight.  Denies any sick contacts or recent travel.  -ER, patient afebrile and nontachycardic.  Normotensive.  Saturating 95% on room air.  CBC is unremarkable.  His BMP is unremarkable.  Lactic acid within normal limits.  VBG normal 7.41/45.  His BNP is within normal limits.  High-sensitivity troponin was mildly elevated at 38.  His EKG shows sinus rhythm without clear ischemic changes.  He underwent a CT PE that did not show PE but did show a short segment, chronic dissection versus penetrating atherosclerotic ulcer of the mid descending thoracic aorta resulting in aneurysmal dilation up to 4.1 cm.  There was minimal bronchiolitis/bronchial pneumonia noted.  Patient was given IV steroids, IV magnesium and DuoNebs.  CT aortic survey is ordered but has not been done.  -Patient received IV steroids in the ER.  We will transition to oral prednisone tomorrow.  Scheduled DuoNebs.  Albuterol as needed.  Doxycycline.  We will add on a procalcitonin and if positive can add ceftriaxone.  -Check COVID, influenza, RSV    #NSTEMI, type 2: In setting of COPD exacerbation as above. ECG without clear ischemic changes.  No chest pain.  Trend x2. Monitor on telemetry.     #Chronic dissection vs. Penetrating atherosclerotic ulcer  of the mid descending aorta: ER physician discussed with on call vascular surgery.  They recommended CT aortic survey.  They do not think this is cause of his current presentation.  They will need to follow up with him in the outpatient setting.  I did place a consult order within the discharge navigator.  I discussed with the patient that he needs to call to schedule appointment if he does not hear from vascular clinic.  He did voiced understanding.    #HTN: Continue home amlodipine and lisinopril-hydrochlorothiazide  #HL: Continue statin  #Tobacco use d/o: Patient tells me he quit about a week ago.  Advised continued cessation.  Continue home Wellbutrin  #Alcohol use disorder: He tells me he drinks 2-3 cocktails per day.  Advised him to try to cut back and utilize resources if needed.  He has no history of withdrawal.    DVT Prophylaxis: Pneumatic Compression Devices  Code Status: Full Code  Dispo: Admit to inpatient    Gabe Singh MD    Primary Care Physician   Donny Payne    Chief Complaint   SOB    History is obtained from the patient, patient's chart and discussed with ER physician    History of Present Illness   Gabe Smith is a 68 year old male with a past medical history of emphysema, alcohol use disorder, tobacco use disorder, hyperlipidemia, hypertension presents with shortness of breath.    Patient called triage today for shortness of breath that has been ongoing for over 1 month but specifically worse over the last 4 to 5 days.  Worse with ambulation.  He also endorses a cough productive of clear/yellow sputum over that period of time.  He also does endorse some orthopnea.  No clear fevers or chills.  No nausea or vomiting.  He denies any swelling in his legs.  No major changes in weight.  Denies any sick contacts or recent travel.    In the ER, patient afebrile and nontachycardic.  Normotensive.  Saturating 95% on room air.  CBC is unremarkable.  His BMP is unremarkable.  Lactic acid  within normal limits.  VBG normal 7.41/45.  His BNP is within normal limits.  High-sensitivity troponin was mildly elevated at 38.  His EKG shows sinus rhythm without clear ischemic changes.  He underwent a CT PE that did not show PE but did show a short segment, chronic dissection versus penetrating atherosclerotic ulcer of the mid descending thoracic aorta resulting in aneurysmal dilation up to 4.1 cm.  There was minimal bronchiolitis/bronchial pneumonia noted.  Patient was given IV steroids, IV magnesium and DuoNebs.  CT aortic survey is ordered but has not been done.    Past Medical History    I have reviewed this patient's medical history and updated it with pertinent information if needed.   Past Medical History:   Diagnosis Date    Abdominal aortic aneurysm (AAA) without rupture (H24) 02/25/2021    AAA 3.6 x 3.6 cm per ultrasound    COPD (chronic obstructive pulmonary disease) (H)     Dysmetabolic syndrome X     Hyperlipidemia     Hypertension     Osteoarthrosis     Prediabetes 10/09/2014    A1C 6.1    Tobacco use disorder        Past Surgical History   I have reviewed this patient's surgical history and updated it with pertinent information if needed.  Past Surgical History:   Procedure Laterality Date    COLONOSCOPY N/A 10/19/2015    Procedure: COMBINED COLONOSCOPY, SINGLE OR MULTIPLE BIOPSY/POLYPECTOMY BY BIOPSY;  Surgeon: Gume Vidal MD;  Location:  GI    DAVINCI LAPAROSCOPIC CHOLECYSTECTOMY WITH GRAMS N/A 08/16/2022    Procedure: ROBOT-ASSISTED, LAPAROSCOPIC CHOLECYSTECTOMY WITH CHOLANGIOGRAM;  Surgeon: Carlota Leavitt MD;  Location:  OR    ENDOSCOPIC RETROGRADE CHOLANGIOPANCREATOGRAM N/A 08/17/2022    Procedure: ENDOSCOPIC RETROGRADE CHOLANGIOPANCREATOGRAPHY, WITH SPHINCTEROTOMY;  Surgeon: Jani Cash MD;  Location:  OR    ORTHOPEDIC SURGERY      left hip, right hip    SURGICAL HISTORY OF -   01/01/2000    Left MOISES    SURGICAL HISTORY OF -       Unspecified knee surgeries as a  child    SURGICAL HISTORY OF -   10/01/2010    Right MOISES, with left knee arthroscopy, meniscectomy       Prior to Admission Medications   Prior to Admission Medications   Prescriptions Last Dose Informant Patient Reported? Taking?   albuterol (PROAIR HFA/PROVENTIL HFA/VENTOLIN HFA) 108 (90 Base) MCG/ACT inhaler   No No   Sig: INHALE 2 PUFFS INTO THE LUNGS EVERY 4 HOURS AS NEEDED FOR SHORTNESS OF BREATH OR DIFFICULT BREATHING OR WHEEZING   albuterol (PROVENTIL) (2.5 MG/3ML) 0.083% neb solution   No No   Sig: USE 1 VIAL VIA NEBULIZER EVERY 6 HOURS AS NEEDED FOR SHORTNESS OF BREATH OR WHEEZING   allopurinol (ZYLOPRIM) 100 MG tablet   No No   Sig: TAKE 2 TABLETS(200 MG) BY MOUTH DAILY   amLODIPine (NORVASC) 10 MG tablet   No No   Sig: TAKE 1 TABLET(10 MG) BY MOUTH DAILY   aspirin 81 MG tablet   Yes No   Sig: Take 1 tablet (81 mg) by mouth daily   betamethasone dipropionate (DIPROSONE) 0.05 % external cream   No No   Sig: APPLY SPARINGLY ONCE OR TWICE DAILY AS NEEDED TO AFFECTED AREA OF PSORIASIS UNTIL THE SKIN IS BETTER, THEN STOP. REPEAT AS NEEDED   buPROPion (WELLBUTRIN XL) 300 MG 24 hr tablet   No No   Sig: Take 1 tablet (300 mg) by mouth every morning   fluticasone-salmeterol (AIRDUO RESPICLICK) 232-14 MCG/ACT inhaler   No No   Sig: Inhale 1 puff into the lungs 2 times daily   lisinopril-hydrochlorothiazide (ZESTORETIC) 20-12.5 MG tablet   No No   Sig: TAKE 1 TABLET BY MOUTH EVERY MORNING   pravastatin (PRAVACHOL) 40 MG tablet   No No   Sig: TAKE 1 TABLET(40 MG) BY MOUTH AT BEDTIME      Facility-Administered Medications: None     Allergies   No Known Allergies    Social History   I have reviewed this patient's social history and updated it with pertinent information if needed. Gabe Smith  reports that he has been smoking cigarettes. He has a 45 pack-year smoking history. He has never used smokeless tobacco. He reports current alcohol use. He reports that he does not use drugs.    Family History   I have  reviewed this patient's family history and updated it with pertinent information if needed.   Family History   Problem Relation Age of Onset    Family History Negative Mother     Family History Negative Brother     Family History Negative Sister        Review of Systems   The 10 point Review of Systems is negative other than noted in the HPI or here.     Physical Exam   Temp: 97.4  F (36.3  C)   BP: 120/82 Pulse: 73   Resp: 20 SpO2: 95 % O2 Device: None (Room air)    Vital Signs with Ranges  Temp:  [97.4  F (36.3  C)] 97.4  F (36.3  C)  Pulse:  [73] 73  Resp:  [20] 20  BP: (120)/(82) 120/82  SpO2:  [95 %] 95 %  0 lbs 0 oz    Constitutional: NAD, Nontoxic.  Answers appropriately  HEENT: Normocephalic, MMM, no elevation of JVD noted  Respiratory: No tachypnea, he has diminished breath sounds bilaterally with prolonged expiratory phase and bilateral expiratory wheeze.  Cardiovascular: Regular, no murmur  GI: Mild distention which she notes is chronic, bowel sounds present.  Nontender.  Lymph/Hematologic: No bruising. No cervical LAD  Skin: No rash  Musculoskeletal: Nl Tone, No edema noted  Neurologic: A&Ox3, Answers appropriately. CNII-XII intact. Moves all extremities. No tremor  Psychiatric: Calm    Data   Data reviewed today:  I personally reviewed   Recent Labs   Lab 02/09/24  1552   WBC 6.8   HGB 15.8   MCV 93         POTASSIUM 4.2   CHLORIDE 98   CO2 30*   BUN 20.9   CR 0.93   ANIONGAP 9   DES 9.0   *   ALBUMIN 4.2   PROTTOTAL 7.0   BILITOTAL 0.4   ALKPHOS 83   ALT 15   AST 30   LIPASE 14       Recent Results (from the past 24 hour(s))   CT Chest Pulmonary Embolism w Contrast    Narrative    EXAM: CT CHEST PULMONARY EMBOLISM W CONTRAST  LOCATION: [facility]  DATE: 2/9/2024    INDICATION: Dyspnea and orthopnea.  COMPARISON: CT abdomen/pelvis 06/22/2022.  TECHNIQUE: CT chest pulmonary angiogram during arterial phase injection of IV contrast. Multiplanar reformats and MIP  reconstructions were performed. Dose reduction techniques were used.   CONTRAST: 68 mL Isovue-370.    FINDINGS: Study is degraded by motion.    ANGIOGRAM CHEST: No acute pulmonary embolism.    No CT evidence of right heart strain.    Moderate atheromatous disease of the thoracic aorta and proximal great vessels. Short segment chronic dissection versus penetrating atherosclerotic ulcer of the left lateral mid descending thoracic aorta, resulting in saccular aneurysm of the descending   thoracic aorta and a total diameter of 4.1 cm. Additional ulcerative plaque in the more distal descending thoracic aorta, series 6 image 228.    LUNGS AND PLEURA: The trachea and large airways are patent. Mild diffuse bronchial wall thickening. Mild centrilobular emphysema. No acute airspace consolidation. Minimal left upper and lower lobe bronchiolitis/bronchopneumonia.     Few sub-6 mm pulmonary nodules.    No pneumothorax.     No pleural effusion.     MEDIASTINUM/AXILLAE: No mediastinal/hilar adenopathy.     Thoracic esophagus is unremarkable.      No axillary lymphadenopathy.    Chest wall is unremarkable.    Heart is normal in size. No pericardial effusion.    CORONARY ARTERY CALCIFICATION: Mild.    UPPER ABDOMEN: Mild hepatomegaly and diffuse hepatic steatosis.    MUSCULOSKELETAL: No suspicious abnormality.    OTHER: No additionally suspicious abnormality.      Impression    IMPRESSION:  1.  No acute pulmonary embolism.  2.  Minimal bronchiolitis/bronchial pneumonia of the left upper and lower lobes.  3.  Short segment chronic dissection versus penetrating atherosclerotic ulcer of the mid descending thoracic aorta, resulting in total aneurysmal dilatation of the mid descending thoracic aorta up to 4.1 cm.  4.  Few sub-6 mm pulmonary nodules. Follow-up is recommended according to Fleischner criteria, as detailed below.  5.  Mild hepatomegaly and diffuse hepatic steatosis.           Clinically Significant Risk Factors Present on  Admission                # Drug Induced Platelet Defect: home medication list includes an antiplatelet medication   # Hypertension: Noted on problem list

## 2024-02-10 NOTE — PROGRESS NOTES
"    [facility]     Hospitalist Progress Note     Assessment & Plan     ASSESSMENT    68M with hx of HTN and COPD with ongoing tobacco abuse presents with YANCEY and found to have acute hypoxic respiratory failure 2/2 influenza and decompensated COPD.     PLAN    Acute Hypoxic Respiratory Failure   -On adm, needing 2L to keep sats >90%  -Suspect 2/2 flu and COPD below, wean O2 as able    Influenza Infection  -Tamiflu 75mg bid    COPD Exacerbation  -Prednisone 60mg every day  -Scheduled and PRN nebs  -Doxycycline 100mg bid    Other Issues  -Tobacco Abuse:  cessation. Declined nicotine patch  -Essential HTN: Home meds  -Type II MI: Suspect 2/2 pathology above, tx per above  -Chronic Dissection vs. Penetrating atherosclerotic ulcer of the mid descending aorta: Vasc consulted in ED, rec outpatient follow-up  -Alcohol Abuse: Drinks 2-3 drinks per day. Denies withdrawal sx.  -Hyperglycemia: Mild, in setting of steroid use. Hold off on insulin for now but will obtain A1c    DVT Prophy  -SCDs    Disposition  -Likely tomorrow if passes walk test      Fredy Magdaleno MD    Subjective     Seen at bedside. Denies SOB at rest but significant YANCEY. Minimal coughing. Still on 2L O2.        Objective   Blood pressure 124/66, pulse 68, temperature 98  F (36.7  C), temperature source Oral, resp. rate 18, height 1.778 m (5' 10\"), weight 93.5 kg (206 lb 3.2 oz), SpO2 92%.    PHYSICAL EXAM  General: In no acute distress  CV: RRR.  Lungs: Diminished breath sounds throughout. Nl WOB.  Abd: Non-tender.  Ext: No edema.    LABS AND IMAGING  Reviewed and pertinent results discussed in assessment and plan.   "

## 2024-02-10 NOTE — PLAN OF CARE
Goal Outcome Evaluation:      Plan of Care Reviewed With: patient    Overall Patient Progress: no changeOverall Patient Progress: no change    Assumed care at 2230. A&OX4. LS coarse with expiratory wheezing. Intermittent cough. SOB with activity and at rest. Oxygen 95% on 3L NC. Denied pain at the time of assessment. Up  with assist of 1 to bathroom. On droplet precaution for influenza A+. On IV steroid would be transitioning to oral tomorrow. Low fat/NA diet with no caffeine. On tele, Continue POC and monitoring.

## 2024-02-10 NOTE — ED NOTES
ED Nurse Handoff Report    ED Chief complaint: Shortness of Breath  . ED Diagnosis:   Final diagnoses:   Dyspnea, unspecified type   COPD exacerbation (H)   Pneumonia due to infectious organism, unspecified laterality, unspecified part of lung       Allergies: No Known Allergies    Code Status: Full Code    Activity level - Baseline/Home:  independent.  Activity Level - Current:   standby.   Lift room needed: No.   Bariatric: No   Needed: No   Isolation: No.   Infection: Not Applicable.     Respiratory status: Room air    Vital Signs (within 30 minutes):   Vitals:    02/09/24 1404 02/09/24 1406   BP:  120/82   Pulse: 73    Resp: 20    Temp: 97.4  F (36.3  C)    SpO2: 95%        Cardiac Rhythm:  ,      Pain level:    Patient confused: No.   Patient Falls Risk: patient and family education.   Elimination Status: Has voided      Focused Assessment:      Abnormal Results:   Labs Ordered and Resulted from Time of ED Arrival to Time of ED Departure   BASIC METABOLIC PANEL - Abnormal       Result Value    Sodium 137      Potassium 4.2      Chloride 98      Carbon Dioxide (CO2) 30 (*)     Anion Gap 9      Urea Nitrogen 20.9      Creatinine 0.93      GFR Estimate 89      Calcium 9.0      Glucose 102 (*)    TROPONIN T, HIGH SENSITIVITY - Abnormal    Troponin T, High Sensitivity 38 (*)    ISTAT GASES LACTATE VENOUS POCT - Abnormal    Lactic Acid POCT 1.0      Bicarbonate Venous POCT 29 (*)     O2 Sat, Venous POCT 65 (*)     pCO2 Venous POCT 45      pH Venous POCT 7.41      pO2 Venous POCT 34     HEPATIC FUNCTION PANEL - Normal    Protein Total 7.0      Albumin 4.2      Bilirubin Total 0.4      Alkaline Phosphatase 83      AST 30      ALT 15      Bilirubin Direct <0.20     LIPASE - Normal    Lipase 14     NT PROBNP INPATIENT - Normal    N terminal Pro BNP Inpatient 132     PROCALCITONIN - Normal    Procalcitonin 0.14     CBC WITH PLATELETS AND DIFFERENTIAL    WBC Count 6.8      RBC  Count 5.22      Hemoglobin 15.8      Hematocrit 48.3      MCV 93      MCH 30.3      MCHC 32.7      RDW 13.9      Platelet Count 172      % Neutrophils 67      % Lymphocytes 14      % Monocytes 16      % Eosinophils 1      % Basophils 1      % Immature Granulocytes 1      NRBCs per 100 WBC 0      Absolute Neutrophils 4.5      Absolute Lymphocytes 1.0      Absolute Monocytes 1.1      Absolute Eosinophils 0.1      Absolute Basophils 0.1      Absolute Immature Granulocytes 0.1      Absolute NRBCs 0.0     INFLUENZA A/B, RSV, & SARS-COV2 PCR        CT Aortic Survey w Contrast   Final Result   IMPRESSION:   1.  A large chronic-appearing ulcerated plaque/penetrating aortic ulcer which results in focal eccentric aneurysmal dilatation of the mid descending aorta up to 4.1 cm. No aortic dissection or acute process. Consider vascular surgery consultation.   2.  Fusiform aneurysmal dilatation of the lower descending thoracic aorta measuring 4.1 cm and of the abdominal aorta measuring 4.5 cm.         CT Chest Pulmonary Embolism w Contrast   Final Result   IMPRESSION:   1.  No acute pulmonary embolism.   2.  Minimal bronchiolitis/bronchial pneumonia of the left upper and lower lobes.   3.  Short segment chronic dissection versus penetrating atherosclerotic ulcer of the mid descending thoracic aorta, resulting in total aneurysmal dilatation of the mid descending thoracic aorta up to 4.1 cm.   4.  Few sub-6 mm pulmonary nodules. Follow-up is recommended according to Fleischner criteria, as detailed below.   5.  Mild hepatomegaly and diffuse hepatic steatosis.                Treatments provided: PIV, LABS, ABX, MAG., COVID-FLU-RSV,   Family Comments: NONE  OBS brochure/video discussed/provided to patient:  No  ED Medications:   Medications   magnesium sulfate 2 g in 50 mL sterile water intermittent infusion (2 g Intravenous $New Bag 2/9/24 2042)   cefTRIAXone (ROCEPHIN) 2 g vial to attach to  ml bag for ADULTS or NS 50 ml bag  for PEDS (2 g Intravenous $New Bag 2/9/24 2042)   doxycycline hyclate (VIBRAMYCIN) capsule 100 mg (100 mg Oral $Given 2/9/24 2042)   ipratropium - albuterol 0.5 mg/2.5 mg/3 mL (DUONEB) neb solution 3 mL (3 mLs Nebulization $Given 2/9/24 1647)   CT Scan Flush (87 mLs Intravenous $Given 2/9/24 1710)   iopamidol (ISOVUE-370) solution 500 mL (68 mLs Intravenous $Given 2/9/24 1710)   ipratropium - albuterol 0.5 mg/2.5 mg/3 mL (DUONEB) neb solution 3 mL (3 mLs Nebulization $Given 2/9/24 1752)   methylPREDNISolone sodium succinate (solu-MEDROL) injection 62.5 mg (62.5 mg Intravenous $Given 2/9/24 2041)   iopamidol (ISOVUE-370) solution 500 mL (72 mLs Intravenous $Given 2/9/24 1925)   CT Scan Flush (60 mLs Intravenous $Given 2/9/24 1925)       Drips infusing:  Yes  For the majority of the shift this patient was Green.   Interventions performed were NONE.    Sepsis treatment initiated: No    Cares/treatment/interventions/medications to be completed following ED care: PER FLOOR PROTOCOL, VITALS, RESP.    ED Nurse Name: Mitch Garcia RN  8:53 PM  RECEIVING UNIT ED HANDOFF REVIEW    Above ED Nurse Handoff Report was reviewed: Yes  Reviewed by: Brigette Patel RN on February 9, 2024 at 9:38 PM

## 2024-02-11 VITALS
HEIGHT: 70 IN | DIASTOLIC BLOOD PRESSURE: 70 MMHG | RESPIRATION RATE: 20 BRPM | TEMPERATURE: 97.7 F | WEIGHT: 205.4 LBS | OXYGEN SATURATION: 94 % | SYSTOLIC BLOOD PRESSURE: 122 MMHG | BODY MASS INDEX: 29.41 KG/M2 | HEART RATE: 71 BPM

## 2024-02-11 LAB
HOLD SPECIMEN: NORMAL
MAGNESIUM SERPL-MCNC: 2 MG/DL (ref 1.7–2.3)
POTASSIUM SERPL-SCNC: 4.4 MMOL/L (ref 3.4–5.3)

## 2024-02-11 PROCEDURE — 36415 COLL VENOUS BLD VENIPUNCTURE: CPT | Performed by: INTERNAL MEDICINE

## 2024-02-11 PROCEDURE — G0378 HOSPITAL OBSERVATION PER HR: HCPCS

## 2024-02-11 PROCEDURE — 999N000157 HC STATISTIC RCP TIME EA 10 MIN

## 2024-02-11 PROCEDURE — 83735 ASSAY OF MAGNESIUM: CPT | Performed by: INTERNAL MEDICINE

## 2024-02-11 PROCEDURE — 94640 AIRWAY INHALATION TREATMENT: CPT

## 2024-02-11 PROCEDURE — 84132 ASSAY OF SERUM POTASSIUM: CPT | Performed by: INTERNAL MEDICINE

## 2024-02-11 PROCEDURE — 250N000009 HC RX 250: Performed by: HOSPITALIST

## 2024-02-11 PROCEDURE — 250N000012 HC RX MED GY IP 250 OP 636 PS 637: Performed by: INTERNAL MEDICINE

## 2024-02-11 PROCEDURE — 250N000013 HC RX MED GY IP 250 OP 250 PS 637: Performed by: HOSPITALIST

## 2024-02-11 PROCEDURE — 250N000013 HC RX MED GY IP 250 OP 250 PS 637: Performed by: INTERNAL MEDICINE

## 2024-02-11 RX ORDER — PREDNISONE 50 MG/1
50 TABLET ORAL DAILY
Qty: 5 TABLET | Refills: 0 | Status: SHIPPED | OUTPATIENT
Start: 2024-02-11 | End: 2024-02-29

## 2024-02-11 RX ORDER — IPRATROPIUM BROMIDE AND ALBUTEROL SULFATE 2.5; .5 MG/3ML; MG/3ML
1 SOLUTION RESPIRATORY (INHALATION) EVERY 4 HOURS PRN
Qty: 90 ML | Refills: 12 | Status: SHIPPED | OUTPATIENT
Start: 2024-02-11

## 2024-02-11 RX ORDER — OSELTAMIVIR PHOSPHATE 75 MG/1
75 CAPSULE ORAL 2 TIMES DAILY
Qty: 6 CAPSULE | Refills: 0 | Status: SHIPPED | OUTPATIENT
Start: 2024-02-11 | End: 2024-02-14

## 2024-02-11 RX ADMIN — ASPIRIN 81 MG: 81 TABLET, COATED ORAL at 08:46

## 2024-02-11 RX ADMIN — OSELTAMIVIR PHOSPHATE 75 MG: 75 CAPSULE ORAL at 08:46

## 2024-02-11 RX ADMIN — BUPROPION 300 MG: 150 TABLET, EXTENDED RELEASE ORAL at 08:46

## 2024-02-11 RX ADMIN — PREDNISONE 60 MG: 20 TABLET ORAL at 08:46

## 2024-02-11 RX ADMIN — IPRATROPIUM BROMIDE AND ALBUTEROL SULFATE 3 ML: .5; 3 SOLUTION RESPIRATORY (INHALATION) at 12:02

## 2024-02-11 RX ADMIN — DOXYCYCLINE HYCLATE 100 MG: 100 CAPSULE ORAL at 08:46

## 2024-02-11 RX ADMIN — PRAVASTATIN SODIUM 40 MG: 20 TABLET ORAL at 08:46

## 2024-02-11 RX ADMIN — IPRATROPIUM BROMIDE AND ALBUTEROL SULFATE 3 ML: .5; 3 SOLUTION RESPIRATORY (INHALATION) at 09:21

## 2024-02-11 RX ADMIN — ALLOPURINOL 200 MG: 100 TABLET ORAL at 08:46

## 2024-02-11 RX ADMIN — LISINOPRIL AND HYDROCHLOROTHIAZIDE 1 TABLET: 12.5; 2 TABLET ORAL at 08:46

## 2024-02-11 RX ADMIN — AMLODIPINE BESYLATE 10 MG: 10 TABLET ORAL at 08:46

## 2024-02-11 ASSESSMENT — ACTIVITIES OF DAILY LIVING (ADL)
ADLS_ACUITY_SCORE: 25

## 2024-02-11 NOTE — PLAN OF CARE
Patient hospitalized acute hypoxic respiratory failure 2/2 influenza and decompensated COPD  . Cleared for discharge to home with family today per MD. Discharge instructions, medications, and follow-ups reviewed with patient in detail. Discharge medications were sent to pt preferred pharmacy.  Patient verbalized understanding of discharge instructions. Belongings were returned to patient at time of discharge. spouse providing transport home.      Pt received and verbalized understanding of home oxygen and equipment.   Pt A&O. Temp: 97.7  F (36.5  C) Temp src: Oral BP: 122/70 Pulse: 71   Resp: 20 SpO2: 94 % O2 Device: None (Room air)   Denies pain. Tolerating diet. Transfers with SBA. AUO.

## 2024-02-11 NOTE — DISCHARGE SUMMARY
"[facility]  Discharge Summary    Admit date:  2/9/2024    Discharge date and time: 2/11/2024    Discharge Physician: Fredy Magdaleno MD    Primary care provider: Donny Payne    Primary Discharge Diagnosis      COPD Exacerbation     Secondary Diagnoses     Influenza Infection  Acute Hypoxic Respiratory Failure   Tobacco Abuse  Essential HTN  Type II MI  Chronic Dissection vs. Penetrating atherosclerotic ulcer of the mid descending aorta  follow-up  Alcohol Abuse  Hyperglycemia    Summary of Hospital Stay     68M with hx of HTN and COPD with ongoing tobacco abuse presented with YANCEY and found to have acute hypoxic respiratory failure 2/2 influenza and decompensated COPD. Patient improved with nebs, steroids, and tamiflu in the hospital but still needed O2 with ambulation at time of discharge.  Suspect patient will be able to wean off this at home.  You will be discharged with a 5-day course of prednisone and Tamiflu and follow-up with his primary care provider.    Patient Discharge Condition & Exam     Discharge condition: Improved    /70 (BP Location: Right arm)   Pulse 71   Temp 97.7  F (36.5  C) (Oral)   Resp 20   Ht 1.778 m (5' 10\")   Wt 93.2 kg (205 lb 6.4 oz)   SpO2 94%   BMI 29.47 kg/m       General: In NAD.  Cardiac: RRR.  Lungs: Diminished throughout.  Abd: Non-tender.  Ext: No edema.    Discharge Instructions     Patient/family instructions: Written discharge instruction given to patient/family    Discharge Medications:     Review of your medicines        START taking        Dose / Directions   ipratropium - albuterol 0.5 mg/2.5 mg/3 mL 0.5-2.5 (3) MG/3ML neb solution  Commonly known as: DUONEB      Dose: 1 vial  Take 1 vial (3 mLs) by nebulization every 4 hours as needed for shortness of breath, wheezing or cough  Quantity: 90 mL  Refills: 12     oseltamivir 75 MG capsule  Commonly known as: TAMIFLU  Used for: Influenza A H1N1 infection      Dose: 75 mg  Take 1 capsule " (75 mg) by mouth 2 times daily for 3 days  Quantity: 6 capsule  Refills: 0     predniSONE 50 MG tablet  Commonly known as: DELTASONE      Dose: 50 mg  Take 1 tablet (50 mg) by mouth daily  Quantity: 5 tablet  Refills: 0            CONTINUE these medicines which may have CHANGED, or have new prescriptions. If we are uncertain of the size of tablets/capsules you have at home, strength may be listed as something that might have changed.        Dose / Directions   betamethasone dipropionate 0.05 % external cream  Commonly known as: DIPROSONE  This may have changed: when to take this  Used for: Psoriasis      APPLY SPARINGLY ONCE OR TWICE DAILY AS NEEDED TO AFFECTED AREA OF PSORIASIS UNTIL THE SKIN IS BETTER, THEN STOP. REPEAT AS NEEDED  Quantity: 45 g  Refills: 2     pravastatin 40 MG tablet  Commonly known as: PRAVACHOL  This may have changed: when to take this  Used for: Hyperlipidemia LDL goal <130      Dose: 40 mg  TAKE 1 TABLET(40 MG) BY MOUTH AT BEDTIME  Quantity: 90 tablet  Refills: 1            CONTINUE these medicines which have NOT CHANGED        Dose / Directions   * albuterol 108 (90 Base) MCG/ACT inhaler  Commonly known as: PROAIR HFA/PROVENTIL HFA/VENTOLIN HFA  Used for: COPD (HCC)      Dose: 2 puff  INHALE 2 PUFFS INTO THE LUNGS EVERY 4 HOURS AS NEEDED FOR SHORTNESS OF BREATH OR DIFFICULT BREATHING OR WHEEZING  Quantity: 18 g  Refills: 1     * albuterol (2.5 MG/3ML) 0.083% neb solution  Commonly known as: PROVENTIL  Used for: COPD (HCC)      USE 1 VIAL VIA NEBULIZER EVERY 6 HOURS AS NEEDED FOR SHORTNESS OF BREATH OR WHEEZING  Quantity: 90 mL  Refills: 1     allopurinol 100 MG tablet  Commonly known as: ZYLOPRIM  Used for: Acute gout of foot, unspecified cause, unspecified laterality      Dose: 200 mg  TAKE 2 TABLETS(200 MG) BY MOUTH DAILY  Quantity: 180 tablet  Refills: 0     amLODIPine 10 MG tablet  Commonly known as: NORVASC  Used for: Benign essential hypertension      Dose: 10 mg  TAKE 1 TABLET(10 MG)  BY MOUTH DAILY  Quantity: 90 tablet  Refills: 1     aspirin 81 MG tablet  Commonly known as: ASA      Dose: 81 mg  Take 1 tablet (81 mg) by mouth daily  Refills: 0     buPROPion 300 MG 24 hr tablet  Commonly known as: WELLBUTRIN XL  Used for: Tobacco abuse      Dose: 300 mg  Take 1 tablet (300 mg) by mouth every morning  Quantity: 90 tablet  Refills: 1     fluticasone-salmeterol 232-14 MCG/ACT inhaler  Commonly known as: AIRDUO RESPICLICK  Used for: Panlobular emphysema (H), COPD (HCC)      Dose: 1 puff  Inhale 1 puff into the lungs 2 times daily  Quantity: 3 each  Refills: 3     lisinopril-hydrochlorothiazide 20-12.5 MG tablet  Commonly known as: ZESTORETIC  Used for: Benign essential hypertension      Dose: 1 tablet  TAKE 1 TABLET BY MOUTH EVERY MORNING  Quantity: 90 tablet  Refills: 1           * This list has 2 medication(s) that are the same as other medications prescribed for you. Read the directions carefully, and ask your doctor or other care provider to review them with you.                   Where to get your medicines        These medications were sent to [id number] - [place], [place] - [address] AT [facility] [address]  [address], [facility] [address]      Phone: [phone]   ipratropium - albuterol 0.5 mg/2.5 mg/3 mL 0.5-2.5 (3) MG/3ML neb solution  oseltamivir 75 MG capsule  predniSONE 50 MG tablet        Discharge diet: regular diet    Discharge activity: Activity as tolerated    Discharge follow-up:    Follow up with primary care provider within one week or earlier if symptoms return or gets worse.    Follow up with consultant as instructed.    Pending Results     Unresulted Labs Ordered in the Past 30 Days of this Admission       No orders found from 1/10/2024 to 2/10/2024.             Patient Allergies   No Known Allergies    Disposition   Disposition: home     I saw and evaluated the patient on day of discharge and  discharge instructions reviewed  and  all  the patient's questions and concerns addressed. Over 30 minutes spent on discharge and coordination of discharge process for this patient.

## 2024-02-11 NOTE — PLAN OF CARE
"PRIMARY DIAGNOSIS: PNEUMONIA  OUTPATIENT/OBSERVATION GOALS TO BE MET BEFORE DISCHARGE:  Dyspnea improved and O2 sats >88% on RA or back to baseline O2 levels: No   SpO2: 95 %, O2 Device: Nasal cannula    Tolerating oral abx or appropriate plans made outpatient infusion: Yes    Vitals signs normal or return to baseline: Yes    Short term supplemental O2 needed with activity at home: No    Tolerate oral intake to maintain hydration: Yes    Return to near baseline physical activity: Yes    Discharge Planner Nurse   Safe discharge environment identified: Yes  Barriers to discharge: No       Entered by: Alex Raygoza RN 02/11/2024 4:24 AM  Pt is alert and oriented x 4. Pt denies any pain. Dyspnea on exertion and pt on 3L NC overnight,   Pt ambulates SBA to the bathroom. Tele: SR. Plaza precaution maintained. Ongoing monitoring.  Discharge pending.  /53 (BP Location: Left arm)   Pulse 72   Temp 97.9  F (36.6  C) (Oral)   Resp 18   Ht 1.778 m (5' 10\")   Wt 93.5 kg (206 lb 3.2 oz)   SpO2 95%   BMI 29.59 kg/m       Please review provider order for any additional goals.   Nurse to notify provider when observation goals have been met and patient is ready for discharge.Goal Outcome Evaluation:                        "

## 2024-02-11 NOTE — PROGRESS NOTES
Patient has been assessed for Home Oxygen needs. Oxygen readings:    *Pulse oximetry (SpO2) = 93% on room air at rest while awake.    *SpO2 improved to 95% on 2liters/minute at rest.    *SpO2 = 87% on room air during activity/with exercise.    *SpO2 improved to 93% on 2liters/minute during activity/with exercise.

## 2024-02-11 NOTE — PROGRESS NOTES
PRIMARY DIAGNOSIS: PNEUMONIA  OUTPATIENT/OBSERVATION GOALS TO BE MET BEFORE DISCHARGE:  Dyspnea improved and O2 sats >88% on RA or back to baseline O2 levels: No   SpO2: 91 %, O2 Device: None (Room air) dyspnea improved although continued to have SOB and some dyspnea. On room air an sats at 91%.     Tolerating oral abx or appropriate plans made outpatient infusion: Yes    Vitals signs normal or return to baseline: Yes    Short term supplemental O2 needed with activity at home: No    Tolerate oral intake to maintain hydration: Yes    Return to near baseline physical activity: Yes    Discharge Planner Nurse   Safe discharge environment identified: Yes  Barriers to discharge: No       Entered by: Brigette Patel RN 02/10/2024 11:27 PM     Please review provider order for any additional goals.   Nurse to notify provider when observation goals have been met and patient is ready for discharge.

## 2024-02-11 NOTE — PROGRESS NOTES
Oxygen Documentation  I certify that this patient, Gabe Smith has been under my care (or a nurse practitioner or physican's assistant working with me). This is the face-to-face encounter for oxygen medical necessity.      At the time of this encounter, I have reviewed the qualifying testing and have determined that supplemental oxygen is reasonable and necessary and is expected to improve the patient's condition in a home setting.       Patient has continued oxygen desaturation due to COPD J44.9.    If portability is ordered, is the patient mobile within the home? yes

## 2024-02-11 NOTE — PLAN OF CARE
PRIMARY DIAGNOSIS: PNEUMONIA  OUTPATIENT/OBSERVATION GOALS TO BE MET BEFORE DISCHARGE:  Dyspnea improved and O2 sats >88% on RA or back to baseline O2 levels: No   SpO2: 94 %, O2 Device: Nasal cannula    Tolerating oral abx or appropriate plans made outpatient infusion: Yes    Vitals signs normal or return to baseline: Yes    Short term supplemental O2 needed with activity at home: No    Tolerate oral intake to maintain hydration: Yes    Return to near baseline physical activity: Yes    Discharge Planner Nurse   Safe discharge environment identified: Yes  Barriers to discharge: No       Entered by: Alex Raygoza RN 02/11/2024 3:14 AM     Please review provider order for any additional goals.   Nurse to notify provider when observation goals have been met and patient is ready for discharge.Goal Outcome Evaluation:

## 2024-02-11 NOTE — PLAN OF CARE
PRIMARY DIAGNOSIS: PNEUMONIA  OUTPATIENT/OBSERVATION GOALS TO BE MET BEFORE DISCHARGE:  Dyspnea improved and O2 sats >88% on RA or back to baseline O2 levels: No   SpO2: 91 %, O2 Device: None (Room air) continue to be dyspneic at rest and with activities, but uses oxygen only at bedtime.     Tolerating oral abx or appropriate plans made outpatient infusion: Yes    Vitals signs normal or return to baseline: Yes    Short term supplemental O2 needed with activity at home: No    Tolerate oral intake to maintain hydration: Yes    Return to near baseline physical activity: Yes    Discharge Planner Nurse   Safe discharge environment identified: Yes  Barriers to discharge: No       Entered by: Brigette Patel RN 02/10/2024 11:06 PM     Please review provider order for any additional goals.   Nurse to notify provider when observation goals have been met and patient is ready for discharge.Goal Outcome Evaluation:      Plan of Care Reviewed With: patient    Overall Patient Progress: no change    A&OX4. LS diminished. Intermittent moist cough. SOB with activities and at rest.  VSS. Afebrile. Oxygen 91% on room air. Up with SBA in the room. Denied pain. Cardiac diet. Continue to monitor.

## 2024-02-12 ENCOUNTER — TELEPHONE (OUTPATIENT)
Dept: OTHER | Facility: CLINIC | Age: 69
End: 2024-02-12
Payer: COMMERCIAL

## 2024-02-12 DIAGNOSIS — J43.1 PANLOBULAR EMPHYSEMA (H): ICD-10-CM

## 2024-02-12 DIAGNOSIS — I71.40 ABDOMINAL AORTIC ANEURYSM (AAA) WITHOUT RUPTURE, UNSPECIFIED PART (H): Primary | ICD-10-CM

## 2024-02-12 LAB
ATRIAL RATE - MUSE: 70 BPM
DIASTOLIC BLOOD PRESSURE - MUSE: NORMAL MMHG
INTERPRETATION ECG - MUSE: NORMAL
P AXIS - MUSE: 70 DEGREES
PR INTERVAL - MUSE: 194 MS
QRS DURATION - MUSE: 86 MS
QT - MUSE: 366 MS
QTC - MUSE: 395 MS
R AXIS - MUSE: 58 DEGREES
SYSTOLIC BLOOD PRESSURE - MUSE: NORMAL MMHG
T AXIS - MUSE: 71 DEGREES
VENTRICULAR RATE- MUSE: 70 BPM

## 2024-02-12 RX ORDER — ALBUTEROL SULFATE 90 UG/1
2 AEROSOL, METERED RESPIRATORY (INHALATION) EVERY 4 HOURS PRN
Qty: 6.7 G | Refills: 2 | Status: SHIPPED | OUTPATIENT
Start: 2024-02-12 | End: 2024-05-10

## 2024-02-12 NOTE — TELEPHONE ENCOUNTER
----- Message from Alin Roe MD sent at 2/9/2024  6:24 PM CST -----  Regarding: mario  Need to see in clinic on 02/15/2024 for descending thoracic aortic aneurysm   K   [facility] is [facility], no availability at this time and pt lives in [place].     Referral received via staff message and Workqueue on 2/9/24.    Pt referred to [facility] by Tomi Patel MD for descending thoracic aortic aneurysm.      Routing to scheduling to coordinate the following:  No further imaging indicated and verified with Dr. Roe.    NEW VASCULAR PATIENT consult with vascular surgery.   Please schedule this within the next 1-5 business days.        Appt note:  New pt referral for large chronic appearing ulcerated plaque/penetrating aortic ulcer 4.1cm and fusiform aneurysmal diliatation 4.1 and AAA 4.5cm.   CT aorta 2/9/24, CT chest 2/9/24 in EP[facility].      A large chronic-appearing ulcerated plaque/penetrating aortic ulcer which results in focal eccentric aneurysmal dilatation of the mid descending aorta up to 4.1 cm. No aortic dissection or acute process. Consider vascular surgery consultation.  2.  Fusiform aneurysmal dilatation of the lower descending thoracic aorta measuring 4.1 cm and of the abdominal aorta measuring 4.5 cm.    ASHKAN Spears, RN  [facility]  Office:  [phone] Fax: [phone]

## 2024-02-13 ENCOUNTER — PATIENT OUTREACH (OUTPATIENT)
Dept: CARE COORDINATION | Facility: CLINIC | Age: 69
End: 2024-02-13
Payer: COMMERCIAL

## 2024-02-13 NOTE — PROGRESS NOTES
[facility]:   [facility] Contact  [facility]/Voicemail     Clinical Data: Post-Discharge Outreach     Outreach attempted x 2.  Left message on patient's voicemail, providing [facility]'s central phone number of [phone] ([phone]) for questions/concerns and/or to schedule an appt with an [facility] provider, if they do not have a PCP.      Plan:  [facility] will do no further outreaches at this time.       Mirna Segal  Community Health Worker  [facility], [facility]  [phone]      *Connected Care Resource Team does NOT follow patient ongoing. Referrals are identified based on internal discharge reports and the outreach is to ensure patient has an understanding of their discharge instructions.

## 2024-02-16 ENCOUNTER — OFFICE VISIT (OUTPATIENT)
Dept: OTHER | Facility: CLINIC | Age: 69
End: 2024-02-16
Attending: SURGERY
Payer: COMMERCIAL

## 2024-02-16 ENCOUNTER — TELEPHONE (OUTPATIENT)
Dept: OTHER | Facility: CLINIC | Age: 69
End: 2024-02-16

## 2024-02-16 VITALS — SYSTOLIC BLOOD PRESSURE: 137 MMHG | HEART RATE: 68 BPM | DIASTOLIC BLOOD PRESSURE: 86 MMHG | OXYGEN SATURATION: 91 %

## 2024-02-16 DIAGNOSIS — Q25.40 ABNORMALITY OF THORACIC AORTA: ICD-10-CM

## 2024-02-16 DIAGNOSIS — Q25.40 ABNORMALITY OF THORACIC AORTA: Primary | ICD-10-CM

## 2024-02-16 DIAGNOSIS — I71.23 ANEURYSM OF DESCENDING THORACIC AORTA WITHOUT RUPTURE (H): Primary | ICD-10-CM

## 2024-02-16 PROCEDURE — 99203 OFFICE O/P NEW LOW 30 MIN: CPT | Performed by: SURGERY

## 2024-02-16 PROCEDURE — G0463 HOSPITAL OUTPT CLINIC VISIT: HCPCS | Performed by: SURGERY

## 2024-02-16 NOTE — PROGRESS NOTES
Patient is here to discuss consult    /83 (BP Location: Left arm, Patient Position: Chair, Cuff Size: Adult Large)   Pulse 69   SpO2 91%     Questions patient would like addressed today are: N/A.    Refills are needed: No    Has homecare services and agency name:  [facility]

## 2024-02-16 NOTE — PROGRESS NOTES
I had the pleasure of seeing Mr. Gabe Smith in the vascular surgery clinic today.  He is a 68-year-old gentleman who was found to have a penetrating aortic ulcer in his descending thoracic aorta.  This was when he was undergoing CT scanning on 9 February 2024.  He was admitted with exacerbation of his COPD.  This was attributed to acute influenza infection.  Patient was subsequently discharged from the hospital on 11 February 2024.    Past medical history is notable for abdominal aortic aneurysm, chronic obstructive pulmonary disease, metabolic syndrome, hyperlipidemia, hypertension, osteoarthritis and prediabetes.    Past surgical history includes colonoscopy, cholecystectomy and bilateral hip arthroplasties.    Social history: He currently smokes and drinks.    On examination: He appears comfortable and is in no acute distress.  Vital signs reviewed.  HEENT: Head is atraumatic, judgment and insight are good.  Cardiac: Regular rate and rhythm, S1 plus S2 +0.  Chest: Clear to auscultation bilaterally.  Abdomen: Obese, soft and nontender.  Vascular: 3+ bilateral radial and femoral pulses.    Imaging data: CT angiogram was performed on 9 February 2024.  This showed a saccular penetrating aortic ulcer measuring 4.2 x 3.6 cm and the outpouching itself measuring 3 cm x 1.6 cm x 3.2 cm.  There was no surrounding fluid or hematoma to suggest impending rupture.    He also has an infrarenal abdominal aortic aneurysm measuring 4.5 cm x 4.5 cm.    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    Plan: I explained the pathophysiology of disease to him in detail.  I also showed him the imaging.  My recommendation would be to proceed to repair this electively with a thoracic endovascular aneurysm repair.  I discussed the rationale for this with him in detail.  He has verbalized understanding.  Will get him scheduled for surgery.

## 2024-02-16 NOTE — TELEPHONE ENCOUNTER
CASE REQUEST RECEIVED ON 2/16/24 FOR: THORACIC ENDOVASCULAR AORTIC REPAIR, RIGHT FEMORAL CUTDOWN    CASE ID: [id number]    Spoke with patient.  There are no dates to avoid for scheduling his surgery.      Informed him we will work on getting his surgery scheduled and will get back to him.

## 2024-02-20 NOTE — NURSING NOTE
Patient Education    Procedure: THORACIC ENDOVASCULAR AORTIC REPAIR  Diagnosis: ABNORMALITY OF THORACIC AORTA  Anticoagulation Instruction: CONTINUE ASPIRIN.   GLP-1 Agonists Instruction: PT NOT ON ANY.   Pre-Operative Physical Exam: You need to have a pre-op physical exam within 30 days of your procedure. Your procedure may be cancelled if you do not have a current History and Physical. Call your PCP's office to schedule.  Allergies:  Updated in Epic  Bowel Prep: Yes. Magnesium Citrate and Clear Liquids, NPO. Sheet with info provided.   Post Procedure Education: Vascular [facility] patient post-procedure fact sheet reviewed with patient.    Showering instructions reviewed: Yes Chlorhexidine soap provided.     Bring inhalers with you to hospital.    Learner(s):patient  Method: Listening  Barriers to Learning:No Barrier  Outcome: Patient did verbalize understanding of above education.    ASHKAN Spears, RN  [facility]  Office:  [phone] Fax: [phone]

## 2024-02-23 ENCOUNTER — OFFICE VISIT (OUTPATIENT)
Dept: INTERNAL MEDICINE | Facility: CLINIC | Age: 69
End: 2024-02-23
Payer: COMMERCIAL

## 2024-02-23 VITALS
OXYGEN SATURATION: 92 % | DIASTOLIC BLOOD PRESSURE: 62 MMHG | SYSTOLIC BLOOD PRESSURE: 120 MMHG | RESPIRATION RATE: 20 BRPM | HEIGHT: 70 IN | WEIGHT: 203.5 LBS | TEMPERATURE: 98.1 F | HEART RATE: 74 BPM | BODY MASS INDEX: 29.13 KG/M2

## 2024-02-23 DIAGNOSIS — E78.5 HYPERLIPIDEMIA LDL GOAL <100: ICD-10-CM

## 2024-02-23 DIAGNOSIS — F10.20 ALCOHOL DEPENDENCE, EPISODIC DRINKING BEHAVIOR (H): ICD-10-CM

## 2024-02-23 DIAGNOSIS — I71.40 ABDOMINAL AORTIC ANEURYSM (AAA) WITHOUT RUPTURE, UNSPECIFIED PART (H): ICD-10-CM

## 2024-02-23 DIAGNOSIS — J43.1 PANLOBULAR EMPHYSEMA (H): ICD-10-CM

## 2024-02-23 DIAGNOSIS — Z12.5 SCREENING FOR PROSTATE CANCER: ICD-10-CM

## 2024-02-23 DIAGNOSIS — I10 BENIGN ESSENTIAL HYPERTENSION: ICD-10-CM

## 2024-02-23 DIAGNOSIS — E11.59 TYPE 2 DIABETES MELLITUS WITH OTHER CIRCULATORY COMPLICATION, WITHOUT LONG-TERM CURRENT USE OF INSULIN (H): ICD-10-CM

## 2024-02-23 DIAGNOSIS — I71.23 ANEURYSM OF DESCENDING THORACIC AORTA WITHOUT RUPTURE (H): ICD-10-CM

## 2024-02-23 DIAGNOSIS — Z01.818 PREOP GENERAL PHYSICAL EXAM: Primary | ICD-10-CM

## 2024-02-23 PROBLEM — E11.9 DIABETES MELLITUS, TYPE 2 (H): Status: ACTIVE | Noted: 2024-02-23

## 2024-02-23 PROCEDURE — 99214 OFFICE O/P EST MOD 30 MIN: CPT | Performed by: INTERNAL MEDICINE

## 2024-02-23 ASSESSMENT — PAIN SCALES - GENERAL: PAINLEVEL: NO PAIN (0)

## 2024-02-23 NOTE — PROGRESS NOTES
José Miguel Bernal is a 68 year old, presenting for the following health issues:  Hospital F/U, Forms (Work forms), and Pre-Op Exam        2/23/2024     8:47 AM   Additional Questions   Roomed by Candace BURGOS CMA     [facility]     Hospital Follow-up Visit:    Hospital/Nursing Home/IP Rehab Facility: [facility]  Date of Admission: 2-9-24  Date of Discharge: 2-11-24  Reason(s) for Admission: acute hypoxic respiratory failure 2/2 influenza and decompensated COPD.   Chronic Dissection vs. Penetrating atherosclerotic ulcer of the mid descending aorta  YANCEY and pneumonia  Was your hospitalization related to COVID-19? No   Problems taking medications regularly:  None  Medication changes since discharge: see discharge summary  Problems adhering to non-medication therapy:  None    Summary of hospitalization:  [facility] discharge summary reviewed  Diagnostic Tests/Treatments reviewed.  Follow up needed: Vascular surgery  Other Healthcare Providers Involved in Patient s Care:         Vascular surgery, pulmonology.  Update since discharge: improved.         Since home from hospital, they report that they are feeling better.   Energy level and stamina are slowly improving.    Appetite and intake are improving.   No fevers, no chills  Shortness of breath and dyspnea still present, still using oxygen with activity and at rest.  No abdominal pain.   They have not returned to prior routine and activities.       Plan of care communicated with patient         1. No - Have you ever had a heart attack or stroke?  2. No - Have you ever had surgery on your heart or blood vessels, such as a stent, coronary (heart) bypass, or surgery on an artery in the head, neck, heart, or legs?  3. No - Do you have chest pain when you are physically active?  4. No - Do you have a history of heart failure?  5. No - Do you currently have a cold, bronchitis, or symptoms of other respiratory (head and chest)  "infections?  6. Yes - Do you have a cough, shortness of breath, or wheezing? COPD  7. No - Do you or anyone in your family have a history of blood clots?  8. No - Do you or anyone in your family have a serious bleeding problem, such as long-lasting bleeding after surgeries or cuts?  9. No - Have you ever had anemia or been told to take iron pills?  10. No - Have you had any abnormal blood loss such as black, tarry or bloody stools, or abnormal vaginal bleeding?  11. No - Have you ever had a blood transfusion?  12. Yes - Are you willing to have a blood transfusion if it is medically needed before, during, or after your surgery?  13. No - Have you or anyone in your family ever had problems with anesthesia (sedation for surgery)?  14. No - Do you have sleep apnea, excessive snoring, or daytime drowsiness?   15. No - Do you have any artifical heart valves or other implanted medical devices, such as a pacemaker, defibrillator, or continuous glucose monitor?  16. Yes - Do you have any artifical joints? Bi-lat hip replacements  17. No - Are you allergic to latex?  18. No - Is there any chance that you may be pregnant?         Review of Systems  Constitutional, neuro, ENT, endocrine, cardiac, gastrointestinal, genitourinary, musculoskeletal, integument and psychiatric systems are negative, except as otherwise noted.      Objective    /62   Pulse 74   Temp 98.1  F (36.7  C) (Tympanic)   Resp 20   Ht 1.778 m (5' 10\")   Wt 92.3 kg (203 lb 8 oz)   SpO2 92%   BMI 29.20 kg/m    Body mass index is 29.2 kg/m .  Physical Exam   GENERAL alert and no distress  EYES:  Normal sclera,conjunctiva, EOMI  HENT: oral and posterior pharynx without lesions or erythema, facies symmetric  NECK: Neck supple. No LAD, without thyroidmegaly.  RESP: breath sounds quiet but clear, diminished respiratory excursion, prolonged expiratory phase, no rhonci, few faint end expiratory wheezes, no rales   CV: RRR normal S1S2 without murmurs, rubs " or gallops.  LYMPH: no cervical lymph adenopathy appreciated  MS: extremities- no gross deformities of the visible extremities noted,   EXT:  no lower extremity edema  PSYCH: Alert and oriented times 3; speech- coherent  SKIN:  No obvious significant skin lesions on visible portions of face             Signed Electronically by: Donny Payne MD          Preoperative Evaluation  [address] [address]  Phone: [phone]  Primary Provider: Donny Payne  Pre-op Performing Provider: DONNY PAYNE  Feb 23, 2024       Gabe is a 68 year old, presenting for the following:  Hospital F/U, Forms (Work forms), and Pre-Op Exam        2/23/2024     8:47 AM   Additional Questions   Roomed by Candace BURGOS CMA   Failed to redirect to the Timeline version of the [facility] SmartLink.  Surgical Information  Surgery/Procedure: Thoracic endovascular aortic repair, right femoral cutdown  Surgery Location: [facility]   Surgeon: Dr. Roe  Surgery Date: 3/1/24  Time of Surgery: 1120 AM  Where patient plans to recover: At home with family  Fax number for surgical facility: Note does not need to be faxed, will be available electronically in Epic.    Assessment & Plan     The proposed surgical procedure is considered INTERMEDIATE risk.    1. Preop general physical exam    2. Aneurysm of descending thoracic aorta without rupture (H24)    3. Panlobular emphysema (H)    4. Abdominal aortic aneurysm (AAA) without rupture, unspecified part (H24)    5. Alcohol dependence, episodic drinking behavior (H)    6. Type 2 diabetes mellitus with other circulatory complication, without long-term current use of insulin (H)    7. Benign essential hypertension    8. Hyperlipidemia LDL goal <100         MED REC REQUIRED  Post Medication Reconciliation Status: discharge medications reconciled, continue medications without change      Risks and  Recommendations  The patient has the following additional risks and recommendations for perioperative complications:  Diabetes:  - Patient is not on insulin therapy: regular NPO guidelines can be followed.   Pulmonary:   -- Recovering from severe COPD exacerbation.  --Patient instructed to use all inhaled medicines on the morning of procedure.  --Monitor for bronchospasm, provide albuterol nebulizer treatments if needed.    Social and Substance:    - Alcohol abuse and risk of withdrawal   - Active nicotine user, advised smoking cessation    Antiplatelet or Anticoagulation Medication Instructions  --Aspirin maangement per vascular clinic    Additional Medication Instructions   - ACE/ARB: HOLD on day of surgery (minimum 11 hours for general anesthesia).   - SSRIs, SNRIs, TCAs, Antipsychotics: Continue without modification.    - LABA, inhaled corticosteroid, long-acting anticholinergics: Continue without modification.   - rescue Inhaler: Continue PRN. Bring to hospital on the day of surgery.    Recommendation  APPROVAL GIVEN to proceed with proposed procedure, without further diagnostic evaluation.          Subjective       HPI related to upcoming procedure: Descending thoracic aneurysm ulceration      1. No - Have you ever had a heart attack or stroke?  2. No - Have you ever had surgery on your heart or blood vessels, such as a stent, coronary (heart) bypass, or surgery on an artery in the head, neck, heart, or legs?  3. No - Do you have chest pain when you are physically active?  4. No - Do you have a history of heart failure?  5. No - Do you currently have a cold, bronchitis, or symptoms of other respiratory (head and chest) infections?  6. Yes - Do you have a cough, shortness of breath, or wheezing?  Chronic COPD  COPD  7. No - Do you or anyone in your family have a history of blood clots?  8. No - Do you or anyone in your family have a serious bleeding problem, such as long-lasting bleeding after surgeries or  cuts?  9. No - Have you ever had anemia or been told to take iron pills?  10. No - Have you had any abnormal blood loss such as black, tarry or bloody stools, or abnormal vaginal bleeding?  11. No - Have you ever had a blood transfusion?  12. Yes - Are you willing to have a blood transfusion if it is medically needed before, during, or after your surgery?  13. No - Have you or anyone in your family ever had problems with anesthesia (sedation for surgery)?  14. No - Do you have sleep apnea, excessive snoring, or daytime drowsiness?   15. No - Do you have any artifical heart valves or other implanted medical devices, such as a pacemaker, defibrillator, or continuous glucose monitor?  16. Yes - Do you have any artifical joints? bilateral hip replacements  17. No - Are you allergic to latex?  18. No - Is there any chance that you may be pregnant?         Health Care Directive  Patient does not have a Health Care Directive or Living Will:     Preoperative Review of    reviewed - controlled substances reflected in medication list.        Recent severe COPD exacerbation, currently recovering from this.  Patient feels like he is mostly back to his usual baseline    *  No personal or family history of anesthesia complications.    *  No personal or family history of bleeding or clotting disorders.           Patient Active Problem List    Diagnosis Date Noted    Diabetes mellitus, type 2 (H) 02/23/2024     Priority: Medium    Abnormality of thoracic aorta 02/10/2024     Priority: Medium    COPD exacerbation (H) 02/09/2024     Priority: Medium    Dyspnea, unspecified type 02/09/2024     Priority: Medium    Pneumonia due to infectious organism, unspecified laterality, unspecified part of lung 02/09/2024     Priority: Medium    SOB (shortness of breath) 02/09/2024     Priority: Medium    Cholecystitis 08/16/2022     Priority: Medium    Postoperative pain 08/16/2022     Priority: Medium    Abnormal cholangiogram 08/16/2022      Priority: Medium    Hepatic abscess 05/25/2022     Priority: Medium    Abdominal aortic aneurysm (AAA) without rupture (H24) 02/25/2021     Priority: Medium     AAA 3.6 x 3.6 cm per ultrasound      Abnormal CT lung screening 08/24/2020     Priority: Medium     Currently managed with follow up imaging by [facility] Results Team.        Alcohol dependence, episodic drinking behavior (H) 10/02/2016     Priority: Medium    Dysmetabolic syndrome X      Priority: Medium    Prediabetes 10/09/2014     Priority: Medium     A1C 6.1      Advanced directives, counseling/discussion 07/29/2014     Priority: Medium     Advance Care Planning:   ACP Review and Resources Provided:  Reviewed chart for advance care plan.  Gabe Smith has no plan or code status on file. Discussed available resources and provided with information. Confirmed code status reflects current choices pending further ACP discussions.  Confirmed/documented designated decision maker(s). See permanent comments section of demographics in clinical tab.   Added by Tiffany Silver on 7/29/2014            Tobacco abuse 10/10/2011     Priority: Medium    COPD (HCC) 10/10/2011     Priority: Medium    Hyperlipidemia LDL goal <100 10/31/2010     Priority: Medium    Benign essential hypertension      Priority: Medium    Osteoarthrosis      Priority: Medium    Gout attack 01/01/2003     Priority: Medium     1-2 times per year        Past Medical History:   Diagnosis Date    Abdominal aortic aneurysm (AAA) without rupture (H24) 02/25/2021    AAA 3.6 x 3.6 cm per ultrasound    COPD (chronic obstructive pulmonary disease) (H)     Diabetes mellitus, type 2 (H) 2/23/2024    Dysmetabolic syndrome X     Hyperlipidemia     Hypertension     Osteoarthrosis     Prediabetes 10/09/2014    A1C 6.1    Tobacco use disorder      Past Surgical History:   Procedure Laterality Date    COLONOSCOPY N/A 10/19/2015    Procedure: COMBINED COLONOSCOPY, SINGLE OR MULTIPLE  BIOPSY/POLYPECTOMY BY BIOPSY;  Surgeon: Gume Vidal MD;  Location:  GI    DAVINCI LAPAROSCOPIC CHOLECYSTECTOMY WITH GRAMS N/A 08/16/2022    Procedure: ROBOT-ASSISTED, LAPAROSCOPIC CHOLECYSTECTOMY WITH CHOLANGIOGRAM;  Surgeon: Carlota Leavitt MD;  Location:  OR    ENDOSCOPIC RETROGRADE CHOLANGIOPANCREATOGRAM N/A 08/17/2022    Procedure: ENDOSCOPIC RETROGRADE CHOLANGIOPANCREATOGRAPHY, WITH SPHINCTEROTOMY;  Surgeon: Jani Cash MD;  Location:  OR    ORTHOPEDIC SURGERY      left hip, right hip    SURGICAL HISTORY OF -   01/01/2000    Left MOISES    SURGICAL HISTORY OF -       Unspecified knee surgeries as a child    SURGICAL HISTORY OF -   10/01/2010    Right MOISES, with left knee arthroscopy, meniscectomy     Current Outpatient Medications   Medication Sig Dispense Refill    albuterol (PROAIR HFA/PROVENTIL HFA/VENTOLIN HFA) 108 (90 Base) MCG/ACT inhaler INHALE 2 PUFFS INTO THE LUNGS EVERY 4 HOURS AS NEEDED FOR SHORTNESS OF BREATH OR DIFFICULT BREATHING OR WHEEZING 6.7 g 2    albuterol (PROVENTIL) (2.5 MG/3ML) 0.083% neb solution USE 1 VIAL VIA NEBULIZER EVERY 6 HOURS AS NEEDED FOR SHORTNESS OF BREATH OR WHEEZING 90 mL 1    allopurinol (ZYLOPRIM) 100 MG tablet TAKE 2 TABLETS(200 MG) BY MOUTH DAILY 180 tablet 0    amLODIPine (NORVASC) 10 MG tablet TAKE 1 TABLET(10 MG) BY MOUTH DAILY 90 tablet 1    aspirin 81 MG tablet Take 1 tablet (81 mg) by mouth daily      betamethasone dipropionate (DIPROSONE) 0.05 % external cream APPLY SPARINGLY ONCE OR TWICE DAILY AS NEEDED TO AFFECTED AREA OF PSORIASIS UNTIL THE SKIN IS BETTER, THEN STOP. REPEAT AS NEEDED (Patient taking differently: daily APPLY SPARINGLY ONCE OR TWICE DAILY AS NEEDED TO AFFECTED AREA OF PSORIASIS UNTIL THE SKIN IS BETTER, THEN STOP. REPEAT AS NEEDED) 45 g 2    buPROPion (WELLBUTRIN XL) 300 MG 24 hr tablet Take 1 tablet (300 mg) by mouth every morning 90 tablet 1    fluticasone-salmeterol (AIRDUO RESPICLICK) 232-14 MCG/ACT inhaler Inhale 1  "puff into the lungs 2 times daily 3 each 3    ipratropium - albuterol 0.5 mg/2.5 mg/3 mL (DUONEB) 0.5-2.5 (3) MG/3ML neb solution Take 1 vial (3 mLs) by nebulization every 4 hours as needed for shortness of breath, wheezing or cough 90 mL 12    lisinopril-hydrochlorothiazide (ZESTORETIC) 20-12.5 MG tablet TAKE 1 TABLET BY MOUTH EVERY MORNING 90 tablet 1    pravastatin (PRAVACHOL) 40 MG tablet TAKE 1 TABLET(40 MG) BY MOUTH AT BEDTIME (Patient taking differently: Take 40 mg by mouth every morning) 90 tablet 1    predniSONE (DELTASONE) 50 MG tablet Take 1 tablet (50 mg) by mouth daily (Patient not taking: Reported on 2/23/2024) 5 tablet 0       No Known Allergies     Social History     Tobacco Use    Smoking status: Former     Packs/day: 1.00     Years: 45.00     Additional pack years: 0.00     Total pack years: 45.00     Types: Cigarettes     Quit date: 2/20/2024    Smokeless tobacco: Never    Tobacco comments:     Stopped after hospital stay   Substance Use Topics    Alcohol use: Yes     Comment: 2-3 drinks per night (double)     Family History   Problem Relation Age of Onset    Family History Negative Mother     Family History Negative Brother     Family History Negative Sister      History   Drug Use    Types: Marijuana         Review of Systems    Review of Systems  Constitutional, neuro, ENT, endocrine, pulmonary, cardiac, gastrointestinal, genitourinary, musculoskeletal, integument and psychiatric systems are negative, except as otherwise noted.    Objective    /62   Pulse 74   Temp 98.1  F (36.7  C) (Tympanic)   Resp 20   Ht 1.778 m (5' 10\")   Wt 92.3 kg (203 lb 8 oz)   SpO2 92%   BMI 29.20 kg/m     Estimated body mass index is 29.2 kg/m  as calculated from the following:    Height as of this encounter: 1.778 m (5' 10\").    Weight as of this encounter: 92.3 kg (203 lb 8 oz).    GENERAL alert and no distress  EYES:  Normal sclera,conjunctiva, EOMI  HENT: oral and posterior pharynx without lesions or " erythema, facies symmetric  NECK: Neck supple. No LAD, without thyroidmegaly.  RESP: breath sounds quiet but clear, diminished respiratory excursion, prolonged expiratory phase, no rhonci, few scattered end expiratory wheezes, no rales .  CV: RRR normal S1S2 without murmurs, rubs or gallops.  LYMPH: no cervical lymph adenopathy appreciated  MS: extremities- no gross deformities of the visible extremities noted,   EXT:  no lower extremity edema  PSYCH: Alert and oriented times 3; speech- coherent  SKIN:  No obvious significant skin lesions on visible portions of face     Recent Labs   Lab Test 02/11/24  0610 02/10/24  0646 02/09/24  1552 11/01/23  1000 05/28/22  1211 05/27/22  1157   HGB  --  16.3 15.8  --    < >  --    PLT  --  178 172  --    < >  --    INR  --   --   --   --   --  1.33*   NA  --  136 137 142   < >  --    POTASSIUM 4.4 4.8 4.2 4.1   < >  --    CR  --  0.99 0.93 0.89   < >  --    A1C  --  6.8*  --  6.2*   < >  --     < > = values in this interval not displayed.        Diagnostics  No labs were ordered during this visit.     EKG (2/9/24): appears normal, NSR, normal axis, normal intervals, no acute ST/T changes c/w ischemia, no LVH by voltage criteria, unchanged from previous tracings    Revised Cardiac Risk Index (RCRI)  The patient has the following serious cardiovascular risks for perioperative complications:   - No serious cardiac risks = 0 points     RCRI Interpretation: 0 points: Class I (very low risk - 0.4% complication rate)           Assessment & Plan     (Z01.818) Preop general physical exam  (primary encounter diagnosis)  Comment: Cleared for surgery, please see instructions as listed above.  Plan:     (I71.23) Aneurysm of descending thoracic aorta without rupture (H24)  Comment: Heading towards endovascular repair of the thoracic aneurysm as planned next week.  Plan:     (J43.1) Panlobular emphysema (H)  Comment: Most recent COPD exacerbation has mostly resolved.  The patient continues on  oxygen at sleep overnight and with activity.  Recommend absolute smoking cessation is strongly as possible.  Given the extent of his COPD, strongly recommend follow-up with pulmonologist.  Referral ordered.  Plan: Adult Pulmonary Medicine  Referral            (I71.40) Abdominal aortic aneurysm (AAA) without rupture, unspecified part (H24)  Comment: Continue aggressive risk factor modification, continue regular follow-up with vascular clinic.  Plan:     (F10.20) Alcohol dependence, episodic drinking behavior (H)  Comment: Patient has long history of excessive alcohol use, he has been cutting down over the last few weeks especially since the hospitalization.  Strongly encouraged to completely abstain from alcohol or at the very minimal get down below few drinks per week.  At risk for alcohol withdrawal if he were suddenly stop drinking.  Plan:     (E11.59) Type 2 diabetes mellitus with other circulatory complication, without long-term current use of insulin (H)  Comment: Has been going between prediabetes and type 2 diabetes from an A1c standpoint.  No medications required at this time.  Continue low carbohydrate diet.  Recheck diabetes in 6 to 9 months.  Plan:     (I10) Benign essential hypertension  Comment: This condition is currently controlled on the current medical regimen.  Continue current therapy.   Plan:     (E78.5) Hyperlipidemia LDL goal <100  Comment: This condition is currently controlled on the current medical regimen.  Continue current therapy.   Discussed guidelines recommending a statin cholesterol lowering medication for any patient with either diabetes and/or vascular disease, aiming for a LDL goal under 100 for sure, ideally under 70, using whatever dose of statin tolerated.    Plan:        MED REC REQUIRED  Post Medication Reconciliation Status: discharge medications reconciled, continue medications without change  BMI  Estimated body mass index is 29.2 kg/m  as calculated from the  "following:    Height as of this encounter: 1.778 m (5' 10\").    Weight as of this encounter: 92.3 kg (203 lb 8 oz).           Signed Electronically by: Donny Payne MD  Copy of this evaluation report is provided to requesting physician.         "

## 2024-02-23 NOTE — PATIENT INSTRUCTIONS
"     Continue all medications at the same doses.  Contact your usual pharmacy if you need refills.      Referral to Pulmonary Clinic referral regarding your severe COPD.      Continue to use the oxygen especially when sleeping and when walking.       Return to see me in 3 months, sooner if needed.  Please get fasting labs done at the [facility] or any other [facility] Lab lab 1-2 days before this appointment (schedule a \"lab appointment\").  If you get the labs done at another clinic, make arrangements with them directly.  The orders will be in place.  Eat nothing for at least 8 hours prior to having these labs drawn.  Use [employer] or Call [phone] to schedule the appointment with me and lab appointment.          PRE-OPERATIVE INSTRUCTIONS:     *  Contact your surgeon if there is any change in your health. This includes signs of new infection, such as cold or flu (such as a sore throat, runny nose, cough, rash or fever).  Elective surgeries may need to be postponed if there is significant illness present.    *  Pre-procedure Covid testing is no longer required unless specifically requested by the surgeon or surgical facility.      *  Stop aspirin of any kind for 7 days before procedure.   (even low dose daily aspirin).    *  No NSAIDs (Motrin, Advil, ibuprofen, Aleve, etc) within 5-7 days of surgery.    *  Stop any Fish Oil or multi vitamin (and specifically anything containing vitamin E) supplements for 7 days prior to surgery because these can affect platelet function.      *  Tylenol (acetaminophen) is OK to take if needed, this medication has no effect on platelet function.  Follow instructions on the bottle    *  Prepare your body as instructed by the surgery clinic.  If instructed, Take a shower or bath the night before surgery. Use any special soaps or cleaning instructions according to instructions from your surgeon.  If you do not have soap from your surgeon, use your regular soap. Do not " "shave or scrub the surgery site.  Wear clean pajamas and have clean sheets on your bed     *  ON THE MORNING OF SURGERY:     --Do NOT take the Lisinopril HCTZ on e morning of the procedure.       *  Resume all medications at the same doses after surgery (no \"make up\" doses needed), unless instructed otherwise by the medical staff.     *  Attend all follow up appointments with the surgeons (and/or therapists if applicable) as instructed.     *  Contact the surgeon's office for any specific questions about after-surgery cares and follow up instructions.      *  You do not need to necessarily return to see anyone in the primary care clinic after your surgery unless specifically instructed to do so.      *  If your planned surgery gets re-scheduled to a date that falls outside the 30 day window from the date of this pre-op exam, you do NOT need to return to see us for another pre-op exam.  I can still clear you with this exam, but I will have to write and addendum from the pre-op exam from today dated within the 30 days of the surgery date.   Please contact me with any changes in the date, time, and place of surgery.            5 GOALS IN MANAGING DIABETES (i.e. to give the best chance to prevent diabetic complications and vascular disease):     1.  Aggressive diabetic management.  The target for A1C (3 months average blood sugar) is ideally below 6.5, preferably below 7.5    Your diabetes is under good control.      Lab Results   Component Value Date    A1C 6.8 02/10/2024    A1C 6.2 11/01/2023    A1C 6.5 04/10/2023    A1C 6.6 10/19/2022    A1C 6.3 05/30/2022    A1C 6.6 02/11/2022    A1C 6.2 09/03/2021    A1C 6.0 02/05/2021    A1C 6.2 07/28/2020    A1C 5.7 01/16/2020    A1C 5.8 07/15/2019    A1C 5.9 12/26/2018    A1C 5.7 09/20/2016    A1C 5.5 08/04/2015    A1C 5.9 02/03/2015    A1C 6.1 10/07/2014       2.  Aggressive blood pressure control, under 130/80 ideally.  Using medications if needed.    Your blood pressure is " "under good control    BP Readings from Last 4 Encounters:   02/23/24 120/62   02/16/24 137/86   02/11/24 122/70   11/03/23 120/72       3.  Aggressive LDL cholesterol (bad cholesterol) lowering as needed.  Your goal is an LDL under 100 for sure, preferably under 70.     *  All patients with diabetes are recommended to be on a \"statin\" cholesterol lowering medication regardless of the cholesterol levels to give the best chance at avoiding vascular disease.      New guidelines identify four high-risk groups who could benefit from statins:   *people with pre-existing heart disease, such as those who have had a heart attack;   *people ages 40 to 75 who have diabetes of any type  *patients ages 40 to 75 with at least a 7.5% risk of developing cardiovascular disease over the next decade, according to a formula described in the guidelines  *patients with the sort of super-high cholesterol that sometimes runs in families, as evidenced by an LDL of 190 milligrams per deciliter or higher    *  Your cholesterol levels are well controlled.    Recent Labs   Lab Test 11/01/23  1000 10/19/22  0949   CHOL 163 194   HDL 31* 30*   LDL 89 107*   TRIG 217* 287*       4.  No smoking    5.  Consider daily preventative Aspirin once per day, every day over age 50 unless there is a specific reason that you cannot take aspirin.       *You should take Aspirin 81 mg once per day, unless you have a reason NOT to take aspirin (i.e. side effect, excessive bruising or bleeding, taking another \"blood tinning\" medication, etc.)       DIABETES REMINDERS:   1. Check your blood glucose regularly either with standard glucometer or personal continuous glucose monitor.    2) Your blood sugar goals:  before eating and  two hours after eating.  If using personal continuous glucose monitor, the goal is Time in the Target range ( ) of 70-75% with very few (under 2%) Below target.    3) Always be prepared to treat low blood sugar symptoms " should it happen. Keep a sugar-containing beverage or food nearby.  4) When to call your clinic:     Blood sugar over 400     If you have 2 to 3 low blood sugars (under 70) in a row    Low readings the same time of day several days in a row  5) When to call 911: If your low blood glucose symptoms do not get better with treatment, or if you/someone else is unable to give you treatment.  6) Work with a Certified Diabetes Educator to assist you with your diabetes management  7) Contact us if you have ANY questions about your medications or instructions, or have problems with getting prescriptions filled.

## 2024-02-29 ENCOUNTER — ANESTHESIA EVENT (OUTPATIENT)
Dept: SURGERY | Facility: CLINIC | Age: 69
DRG: 220 | End: 2024-02-29
Payer: COMMERCIAL

## 2024-02-29 ASSESSMENT — COPD QUESTIONNAIRES: COPD: 1

## 2024-02-29 NOTE — PROGRESS NOTES
PTA medications updated by Medication Scribe prior to surgery via phone call with patient (last doses completed by Nurse)     Medication history sources: Patient, Surescripts, and H&P  In the past week, patient estimated taking medication this percent of the time: Greater than 90%      Significant changes made to the medication list:  Patient reports no longer taking the following meds (med scribe removed from PTA med list): Prednisone      Additional medication history information:   Patient was advised to bring: Albuterol HFA, Airduo Respiclick    Medication reconciliation completed by provider prior to medication history? No    Time spent in this activity: 30 minutes    The information provided in this note is only as accurate as the sources available at the time of update(s)      Prior to Admission medications    Medication Sig Last Dose Taking? Auth Provider Long Term End Date   albuterol (PROAIR HFA/PROVENTIL HFA/VENTOLIN HFA) 108 (90 Base) MCG/ACT inhaler INHALE 2 PUFFS INTO THE LUNGS EVERY 4 HOURS AS NEEDED FOR SHORTNESS OF BREATH OR DIFFICULT BREATHING OR WHEEZING Unknown at prn Yes Donny Payne MD Yes    albuterol (PROVENTIL) (2.5 MG/3ML) 0.083% neb solution USE 1 VIAL VIA NEBULIZER EVERY 6 HOURS AS NEEDED FOR SHORTNESS OF BREATH OR WHEEZING Unknown at prn Yes Donny Payne MD Yes    allopurinol (ZYLOPRIM) 100 MG tablet TAKE 2 TABLETS(200 MG) BY MOUTH DAILY 2/29/2024 at am Yes Donny Payne MD     amLODIPine (NORVASC) 10 MG tablet TAKE 1 TABLET(10 MG) BY MOUTH DAILY 2/29/2024 at am Yes Donny Payne MD Yes    aspirin 81 MG tablet Take 1 tablet (81 mg) by mouth daily 2/29/2024 at am Yes Donny Payne MD     betamethasone dipropionate (DIPROSONE) 0.05 % external cream APPLY SPARINGLY ONCE OR TWICE DAILY AS NEEDED TO AFFECTED AREA OF PSORIASIS UNTIL THE SKIN IS BETTER, THEN STOP. REPEAT AS NEEDED  Patient taking differently: daily APPLY SPARINGLY ONCE OR  TWICE DAILY AS NEEDED TO AFFECTED AREA OF PSORIASIS UNTIL THE SKIN IS BETTER, THEN STOP. REPEAT AS NEEDED 2/29/2024 at prn Yes Donny Payne MD     buPROPion (WELLBUTRIN XL) 300 MG 24 hr tablet Take 1 tablet (300 mg) by mouth every morning 2/29/2024 at am Yes Donny Payne MD Yes    fluticasone-salmeterol (AIRDUO RESPICLICK) 232-14 MCG/ACT inhaler Inhale 1 puff into the lungs 2 times daily  at am Yes Donny Payne MD Yes    ipratropium - albuterol 0.5 mg/2.5 mg/3 mL (DUONEB) 0.5-2.5 (3) MG/3ML neb solution Take 1 vial (3 mLs) by nebulization every 4 hours as needed for shortness of breath, wheezing or cough 2/29/2024 at PM Yes Tomi Patel MD Yes    lisinopril-hydrochlorothiazide (ZESTORETIC) 20-12.5 MG tablet TAKE 1 TABLET BY MOUTH EVERY MORNING 2/29/2024 at am Yes Donny Payne MD Yes    pravastatin (PRAVACHOL) 40 MG tablet TAKE 1 TABLET(40 MG) BY MOUTH AT BEDTIME  Patient taking differently: Take 40 mg by mouth every morning 2/29/2024 at am Yes Donny Payne MD Yes        Medication history completed by: Gayathri Malhotra LPN

## 2024-02-29 NOTE — ANESTHESIA PREPROCEDURE EVALUATION
Anesthesia Pre-Procedure Evaluation    Patient: Gabe Smith   MRN: [id number] : 1955        Procedure : Procedure(s):  THORACIC ENDOVASCULAR AORTIC REPAIR, RIGHT FEMORAL CUTDOWN          Past Medical History:   Diagnosis Date    Abdominal aortic aneurysm (AAA) without rupture (H24) 2021    AAA 3.6 x 3.6 cm per ultrasound    COPD (chronic obstructive pulmonary disease) (H)     Diabetes mellitus, type 2 (H) 2024    Dysmetabolic syndrome X     Hyperlipidemia     Hypertension     Osteoarthrosis     Prediabetes 10/09/2014    A1C 6.1    Tobacco use disorder       Past Surgical History:   Procedure Laterality Date    COLONOSCOPY N/A 10/19/2015    Procedure: COMBINED COLONOSCOPY, SINGLE OR MULTIPLE BIOPSY/POLYPECTOMY BY BIOPSY;  Surgeon: Gume Vidal MD;  Location:  GI    DAVINCI LAPAROSCOPIC CHOLECYSTECTOMY WITH GRAMS N/A 2022    Procedure: ROBOT-ASSISTED, LAPAROSCOPIC CHOLECYSTECTOMY WITH CHOLANGIOGRAM;  Surgeon: Carlota Leavitt MD;  Location:  OR    ENDOSCOPIC RETROGRADE CHOLANGIOPANCREATOGRAM N/A 2022    Procedure: ENDOSCOPIC RETROGRADE CHOLANGIOPANCREATOGRAPHY, WITH SPHINCTEROTOMY;  Surgeon: Jani Cash MD;  Location:  OR    ORTHOPEDIC SURGERY      left hip, right hip    SURGICAL HISTORY OF -   2000    Left MOISES    SURGICAL HISTORY OF -       Unspecified knee surgeries as a child    SURGICAL HISTORY OF -   10/01/2010    Right MOISES, with left knee arthroscopy, meniscectomy      No Known Allergies   Social History     Tobacco Use    Smoking status: Former     Packs/day: 1.00     Years: 45.00     Additional pack years: 0.00     Total pack years: 45.00     Types: Cigarettes     Quit date: 2024     Years since quittin.0    Smokeless tobacco: Never    Tobacco comments:     Stopped after hospital stay   Substance Use Topics    Alcohol use: Yes     Comment: 2-3 drinks per night (double)      Wt Readings from Last 1 Encounters:   24 92.3 kg (203 lb  8 oz)        Anesthesia Evaluation    Type: General.        ROS/MED HX  ENT/Pulmonary:     (+)                tobacco use (and vapes), Current use,         COPD (recent exacerbation improving, O2 at night), O2 dependent, during Nighttime,  recent URI,  influenza a and uri a few weeks ago, feels breathing is back to baseline:     (-) asthma and sleep apnea   Neurologic:     (+)    no peripheral neuropathy                         (-) no seizures and no CVA   Cardiovascular: Comment: IMPRESSION:  1.  A large chronic-appearing ulcerated plaque/penetrating aortic ulcer which results in focal eccentric aneurysmal dilatation of the mid descending aorta up to 4.1 cm. No aortic dissection or acute process. Consider vascular surgery consultation.  2.  Fusiform aneurysmal dilatation of the lower descending thoracic aorta measuring 4.1 cm and of the abdominal aorta measuring 4.5 cm.    (+) Dyslipidemia hypertension- -   -  - -                                   (-) CAD and arrhythmias   METS/Exercise Tolerance:     Hematologic:       Musculoskeletal:       GI/Hepatic:     (+) GERD, Asymptomatic on medication,                  Renal/Genitourinary:    (-) renal disease   Endo:     (+)  type II DM (pre-dm),   Not using insulin,              (-) thyroid disease   Psychiatric/Substance Use:       Infectious Disease:    (-) Recent Fever   Malignancy:       Other:            Physical Exam    Airway  airway exam normal      Mallampati: II   TM distance: > 3 FB   Neck ROM: full   Mouth opening: > 3 cm    Respiratory Devices and Support         Dental       (+) Edentulous and Removable bridges or other hardware      Cardiovascular   cardiovascular exam normal          Pulmonary   pulmonary exam normal                  Recent Results (from the past 744 hour(s))   CT Chest Pulmonary Embolism w Contrast    Narrative    EXAM: CT CHEST PULMONARY EMBOLISM W CONTRAST  LOCATION: [facility]  DATE: 2/9/2024    INDICATION:  Dyspnea and orthopnea.  COMPARISON: CT abdomen/pelvis 06/22/2022.  TECHNIQUE: CT chest pulmonary angiogram during arterial phase injection of IV contrast. Multiplanar reformats and MIP reconstructions were performed. Dose reduction techniques were used.   CONTRAST: 68 mL Isovue-370.    FINDINGS: Study is degraded by motion.    ANGIOGRAM CHEST: No acute pulmonary embolism.    No CT evidence of right heart strain.    Moderate atheromatous disease of the thoracic aorta and proximal great vessels. Short segment chronic dissection versus penetrating atherosclerotic ulcer of the left lateral mid descending thoracic aorta, resulting in saccular aneurysm of the descending   thoracic aorta and a total diameter of 4.1 cm. Additional ulcerative plaque in the more distal descending thoracic aorta, series 6 image 228.    LUNGS AND PLEURA: The trachea and large airways are patent. Mild diffuse bronchial wall thickening. Mild centrilobular emphysema. No acute airspace consolidation. Minimal left upper and lower lobe bronchiolitis/bronchopneumonia.     Few sub-6 mm pulmonary nodules.    No pneumothorax.     No pleural effusion.     MEDIASTINUM/AXILLAE: No mediastinal/hilar adenopathy.     Thoracic esophagus is unremarkable.      No axillary lymphadenopathy.    Chest wall is unremarkable.    Heart is normal in size. No pericardial effusion.    CORONARY ARTERY CALCIFICATION: Mild.    UPPER ABDOMEN: Mild hepatomegaly and diffuse hepatic steatosis.    MUSCULOSKELETAL: No suspicious abnormality.    OTHER: No additionally suspicious abnormality.      Impression    IMPRESSION:  1.  No acute pulmonary embolism.  2.  Minimal bronchiolitis/bronchial pneumonia of the left upper and lower lobes.  3.  Short segment chronic dissection versus penetrating atherosclerotic ulcer of the mid descending thoracic aorta, resulting in total aneurysmal dilatation of the mid descending thoracic aorta up to 4.1 cm.  4.  Few sub-6 mm pulmonary nodules.  Follow-up is recommended according to Fleischner criteria, as detailed below.  5.  Mild hepatomegaly and diffuse hepatic steatosis.       CT Aortic Survey w Contrast    Narrative    EXAM: CT AORTIC SURVEY W CONTRAST  LOCATION: [facility]  DATE: 2/9/2024    INDICATION: CT PE showed short seg dissection   further define  COMPARISON: Chest CT pulmonary angiogram earlier today  TECHNIQUE: CT angiogram chest abdomen pelvis during arterial phase of injection of IV contrast. 2D and 3D MIP reconstructions were performed by the CT technologist. Dose reduction techniques were used.   CONTRAST: 72mL Isovue 370    FINDINGS:   CT ANGIOGRAM CHEST, ABDOMEN, AND PELVIS: In the mid descending aorta, there is a focal outpouching with the aorta at this level measuring 4.2 x 3.6 cm, and the outpouching measuring 3.0 x 1.6 x 3.2 cm (series 6, image 92 and series 10, image 126). There   is a linear filling defect in the aorta at this level with mild calcifications. Findings are suggestive of a large chronic ulcerated plaque with eccentric bulge resulting in focal aneurysmal dilatation of the mid descending aorta. There is no surrounding   fluid or hematoma. Fusiform aneurysmal distal dilatation of the lower descending thoracic aorta below the focal bulging measuring 4.1 x 3.9 cm. The ascending thoracic aorta measures 3.6 x 3.7 cm, nonaneurysmal. Widely patent lumen of the ascending and   descending thoracic aorta.    Fusiform aneurysmal infrarenal abdominal aorta measuring 4.5 x 4.4 cm with moderate to large amount of calcified and noncalcified atheromatous plaque.    Patent origins of the great vessels from the aortic arch without significant stenosis. The celiac artery, superior mesenteric artery and bilateral renal arteries are patent without significant stenosis. Severe stenosis of the origin of the inferior   mesenteric artery. Ectatic left common iliac artery measuring 1.8 cm. Patent bilateral common,  external and internal iliac arteries with multifocal mild stenosis.    LUNGS AND PLEURA: Centrilobular emphysema. No infiltrate or pleural effusion. Please see chest CT report from earlier today for details in the lungs. Few tiny sub-6 mm nodules and mild linear peripheral bronchial wall thickening, stable.    MEDIASTINUM/AXILLAE: No lymphadenopathy. No pericardial effusion.    CORONARY ARTERY CALCIFICATION: Severe.    HEPATOBILIARY: A 5 mm enhancing focus in the anterior left lobe of the liver, likely flash filling hemangioma or perfusional abnormality (6/154). Otherwise, the liver is normal on this arterial phase. Cholecystectomy.    PANCREAS: Normal.    SPLEEN: Normal.    ADRENAL GLANDS: Normal.    KIDNEYS/BLADDER: No hydronephrosis or suspicious masses.    BOWEL: Diverticulosis of the colon. No acute inflammatory change. No obstruction. Few duodenal diverticuli. Nothing for appendicitis.    LYMPH NODES: No lymphadenopathy in the abdomen and pelvis.    PELVIC ORGANS: Evaluation limited by streak artifact from bilateral total hip arthroplasty. No definite pelvic masses. No free fluid.    MUSCULOSKELETAL: Bilateral total hip arthroplasties. No suspicious lesions in the bones.      Impression    IMPRESSION:  1.  A large chronic-appearing ulcerated plaque/penetrating aortic ulcer which results in focal eccentric aneurysmal dilatation of the mid descending aorta up to 4.1 cm. No aortic dissection or acute process. Consider vascular surgery consultation.  2.  Fusiform aneurysmal dilatation of the lower descending thoracic aorta measuring 4.1 cm and of the abdominal aorta measuring 4.5 cm.         OUTSIDE LABS:  CBC:   Lab Results   Component Value Date    WBC 5.1 02/10/2024    WBC 6.8 02/09/2024    HGB 16.3 02/10/2024    HGB 15.8 02/09/2024    HCT 50.2 02/10/2024    HCT 48.3 02/09/2024     02/10/2024     02/09/2024     BMP:   Lab Results   Component Value Date     02/10/2024     02/09/2024     "POTASSIUM 4.4 02/11/2024    POTASSIUM 4.8 02/10/2024    CHLORIDE 97 (L) 02/10/2024    CHLORIDE 98 02/09/2024    CO2 29 02/10/2024    CO2 30 (H) 02/09/2024    BUN 26.8 (H) 02/10/2024    BUN 20.9 02/09/2024    CR 0.99 02/10/2024    CR 0.93 02/09/2024     (H) 02/10/2024     (H) 02/09/2024     COAGS:   Lab Results   Component Value Date    PTT 29 10/29/2010    INR 1.33 (H) 05/27/2022     POC: No results found for: \"BGM\", \"HCG\", \"HCGS\"  HEPATIC:   Lab Results   Component Value Date    ALBUMIN 4.2 02/09/2024    PROTTOTAL 7.0 02/09/2024    ALT 15 02/09/2024    AST 30 02/09/2024    ALKPHOS 83 02/09/2024    BILITOTAL 0.4 02/09/2024     OTHER:   Lab Results   Component Value Date    LACT 1.0 02/09/2024    A1C 6.8 (H) 02/10/2024    DES 9.0 02/10/2024    MAG 2.0 02/11/2024    LIPASE 14 02/09/2024    CRP 11.5 (H) 07/11/2022    SED 5 07/11/2022       Anesthesia Plan    ASA Status:  3    NPO Status:  NPO Appropriate    Anesthesia Type: General.     - Airway: ETT         Techniques and Equipment:     - Airway: Video-Laryngoscope     - Lines/Monitors: 2nd IV, Arterial Line     Consents    Anesthesia Plan(s) and associated risks, benefits, and realistic alternatives discussed. Questions answered and patient/representative(s) expressed understanding.     - Discussed:     - Discussed with:  Patient       Use of blood products discussed: Yes.     - Discussed with: Patient.     - Consented: consented to blood products     Postoperative Care    Pain management: IV analgesics, Oral pain medications.   PONV prophylaxis: Ondansetron (or other 5HT-3), Dexamethasone or Solumedrol     Comments:    Other Comments: Pre-op nebulizer           Brian Simon MD    I have reviewed the pertinent notes and labs in the chart from the past 30 days and (re)examined the patient.  Any updates or changes from those notes are reflected in this note.              # DMII: A1C = 6.8 % (Ref range: <5.7 %) within past 6 months  # Overweight: " "Estimated body mass index is 29.2 kg/m  as calculated from the following:    Height as of 2/23/24: 1.778 m (5' 10\").    Weight as of 2/23/24: 92.3 kg (203 lb 8 oz).      "

## 2024-03-01 ENCOUNTER — APPOINTMENT (OUTPATIENT)
Dept: INTERVENTIONAL RADIOLOGY/VASCULAR | Facility: CLINIC | Age: 69
DRG: 220 | End: 2024-03-01
Attending: SURGERY
Payer: COMMERCIAL

## 2024-03-01 ENCOUNTER — APPOINTMENT (OUTPATIENT)
Dept: GENERAL RADIOLOGY | Facility: CLINIC | Age: 69
DRG: 220 | End: 2024-03-01
Attending: SURGERY
Payer: COMMERCIAL

## 2024-03-01 ENCOUNTER — ANESTHESIA (OUTPATIENT)
Dept: SURGERY | Facility: CLINIC | Age: 69
DRG: 220 | End: 2024-03-01
Payer: COMMERCIAL

## 2024-03-01 ENCOUNTER — HOSPITAL ENCOUNTER (INPATIENT)
Facility: CLINIC | Age: 69
LOS: 4 days | Discharge: HOME | DRG: 220 | End: 2024-03-05
Attending: SURGERY | Admitting: SURGERY
Payer: COMMERCIAL

## 2024-03-01 ENCOUNTER — APPOINTMENT (OUTPATIENT)
Dept: SURGERY | Facility: PHYSICIAN GROUP | Age: 69
End: 2024-03-01
Payer: COMMERCIAL

## 2024-03-01 DIAGNOSIS — Q25.40 ABNORMALITY OF THORACIC AORTA: ICD-10-CM

## 2024-03-01 LAB
ABO/RH(D): NORMAL
ACT BLD: 151 SECONDS (ref 74–150)
ACT BLD: 294 SECONDS (ref 74–150)
ANION GAP SERPL CALCULATED.3IONS-SCNC: 10 MMOL/L (ref 7–15)
ANTIBODY SCREEN: NEGATIVE
BLD PROD TYP BPU: NORMAL
BLD PROD TYP BPU: NORMAL
BLOOD COMPONENT TYPE: NORMAL
BLOOD COMPONENT TYPE: NORMAL
BUN SERPL-MCNC: 12.9 MG/DL (ref 8–23)
CALCIUM SERPL-MCNC: 8.4 MG/DL (ref 8.8–10.2)
CHLORIDE SERPL-SCNC: 99 MMOL/L (ref 98–107)
CHOLEST SERPL-MCNC: 198 MG/DL
CODING SYSTEM: NORMAL
CODING SYSTEM: NORMAL
CREAT SERPL-MCNC: 0.73 MG/DL (ref 0.67–1.17)
CREAT SERPL-MCNC: 0.84 MG/DL (ref 0.67–1.17)
CROSSMATCH: NORMAL
CROSSMATCH: NORMAL
DEPRECATED HCO3 PLAS-SCNC: 25 MMOL/L (ref 22–29)
EGFRCR SERPLBLD CKD-EPI 2021: >90 ML/MIN/1.73M2
EGFRCR SERPLBLD CKD-EPI 2021: >90 ML/MIN/1.73M2
GLUCOSE BLDC GLUCOMTR-MCNC: 169 MG/DL (ref 70–99)
GLUCOSE BLDC GLUCOMTR-MCNC: 174 MG/DL (ref 70–99)
GLUCOSE SERPL-MCNC: 166 MG/DL (ref 70–99)
GLUCOSE SERPL-MCNC: 181 MG/DL (ref 70–99)
HBA1C MFR BLD: 7 %
HDLC SERPL-MCNC: 30 MG/DL
LACTATE SERPL-SCNC: 1.3 MMOL/L (ref 0.7–2)
LDLC SERPL CALC-MCNC: 99 MG/DL
NONHDLC SERPL-MCNC: 168 MG/DL
POTASSIUM SERPL-SCNC: 4.1 MMOL/L (ref 3.4–5.3)
POTASSIUM SERPL-SCNC: 4.5 MMOL/L (ref 3.4–5.3)
SODIUM SERPL-SCNC: 134 MMOL/L (ref 135–145)
SPECIMEN EXPIRATION DATE: NORMAL
TRIGL SERPL-MCNC: 344 MG/DL
UNIT ABO/RH: NORMAL
UNIT ABO/RH: NORMAL
UNIT NUMBER: NORMAL
UNIT NUMBER: NORMAL
UNIT STATUS: NORMAL
UNIT STATUS: NORMAL
UNIT TYPE ISBT: 5100
UNIT TYPE ISBT: 5100

## 2024-03-01 PROCEDURE — 76937 US GUIDE VASCULAR ACCESS: CPT | Mod: 26 | Performed by: SURGERY

## 2024-03-01 PROCEDURE — C1887 CATHETER, GUIDING: HCPCS | Performed by: SURGERY

## 2024-03-01 PROCEDURE — 82947 ASSAY GLUCOSE BLOOD QUANT: CPT | Performed by: ANESTHESIOLOGY

## 2024-03-01 PROCEDURE — 37252 INTRVASC US NONCORONARY 1ST: CPT | Mod: GC | Performed by: SURGERY

## 2024-03-01 PROCEDURE — 999N000065 XR CHEST PORT 1 VIEW

## 2024-03-01 PROCEDURE — 250N000013 HC RX MED GY IP 250 OP 250 PS 637: Performed by: STUDENT IN AN ORGANIZED HEALTH CARE EDUCATION/TRAINING PROGRAM

## 2024-03-01 PROCEDURE — 250N000011 HC RX IP 250 OP 636: Performed by: ANESTHESIOLOGY

## 2024-03-01 PROCEDURE — 33880 EVASC RPR TA NDGFT COV LSA: CPT

## 2024-03-01 PROCEDURE — 200N000001 HC R&B ICU

## 2024-03-01 PROCEDURE — B340ZZ3 ULTRASONOGRAPHY OF THORACIC AORTA, INTRAVASCULAR: ICD-10-PCS | Performed by: SURGERY

## 2024-03-01 PROCEDURE — 272N000001 HC OR GENERAL SUPPLY STERILE: Performed by: SURGERY

## 2024-03-01 PROCEDURE — 250N000009 HC RX 250: Performed by: SURGERY

## 2024-03-01 PROCEDURE — 250N000011 HC RX IP 250 OP 636: Performed by: NURSE ANESTHETIST, CERTIFIED REGISTERED

## 2024-03-01 PROCEDURE — 93005 ELECTROCARDIOGRAM TRACING: CPT

## 2024-03-01 PROCEDURE — 250N000025 HC SEVOFLURANE, PER MIN: Performed by: SURGERY

## 2024-03-01 PROCEDURE — 71045 X-RAY EXAM CHEST 1 VIEW: CPT

## 2024-03-01 PROCEDURE — 258N000003 HC RX IP 258 OP 636: Performed by: ANESTHESIOLOGY

## 2024-03-01 PROCEDURE — 82565 ASSAY OF CREATININE: CPT | Performed by: STUDENT IN AN ORGANIZED HEALTH CARE EDUCATION/TRAINING PROGRAM

## 2024-03-01 PROCEDURE — 250N000009 HC RX 250: Performed by: ANESTHESIOLOGY

## 2024-03-01 PROCEDURE — 34812 OPN FEM ART EXPOS: CPT | Mod: RT | Performed by: SURGERY

## 2024-03-01 PROCEDURE — C1753 CATH, INTRAVAS ULTRASOUND: HCPCS

## 2024-03-01 PROCEDURE — C1768 GRAFT, VASCULAR: HCPCS | Performed by: SURGERY

## 2024-03-01 PROCEDURE — 710N000010 HC RECOVERY PHASE 1, LEVEL 2, PER MIN: Performed by: SURGERY

## 2024-03-01 PROCEDURE — 82565 ASSAY OF CREATININE: CPT | Performed by: SURGERY

## 2024-03-01 PROCEDURE — 33880 EVASC RPR TA NDGFT COV LSA: CPT | Performed by: ANESTHESIOLOGY

## 2024-03-01 PROCEDURE — 250N000011 HC RX IP 250 OP 636: Performed by: SURGERY

## 2024-03-01 PROCEDURE — 84132 ASSAY OF SERUM POTASSIUM: CPT | Performed by: ANESTHESIOLOGY

## 2024-03-01 PROCEDURE — C1769 GUIDE WIRE: HCPCS | Performed by: SURGERY

## 2024-03-01 PROCEDURE — 999N000141 HC STATISTIC PRE-PROCEDURE NURSING ASSESSMENT: Performed by: SURGERY

## 2024-03-01 PROCEDURE — 250N000013 HC RX MED GY IP 250 OP 250 PS 637: Performed by: ANESTHESIOLOGY

## 2024-03-01 PROCEDURE — 370N000017 HC ANESTHESIA TECHNICAL FEE, PER MIN: Performed by: SURGERY

## 2024-03-01 PROCEDURE — 75957 PR ENDOVASC REPAIR THOR AORTA EXCL SUBCLAVIAN: CPT | Mod: 26 | Performed by: SURGERY

## 2024-03-01 PROCEDURE — 62272 THER SPI PNXR DRG CSF: CPT | Performed by: NEUROLOGICAL SURGERY

## 2024-03-01 PROCEDURE — 83036 HEMOGLOBIN GLYCOSYLATED A1C: CPT | Performed by: SURGERY

## 2024-03-01 PROCEDURE — 85347 COAGULATION TIME ACTIVATED: CPT

## 2024-03-01 PROCEDURE — 360N000079 HC SURGERY LEVEL 6, PER MIN: Performed by: SURGERY

## 2024-03-01 PROCEDURE — 80061 LIPID PANEL: CPT | Performed by: SURGERY

## 2024-03-01 PROCEDURE — 009U30Z DRAINAGE OF SPINAL CANAL WITH DRAINAGE DEVICE, PERCUTANEOUS APPROACH: ICD-10-PCS | Performed by: NEUROLOGICAL SURGERY

## 2024-03-01 PROCEDURE — 258N000003 HC RX IP 258 OP 636: Performed by: STUDENT IN AN ORGANIZED HEALTH CARE EDUCATION/TRAINING PROGRAM

## 2024-03-01 PROCEDURE — 250N000009 HC RX 250

## 2024-03-01 PROCEDURE — C1725 CATH, TRANSLUMIN NON-LASER: HCPCS | Performed by: SURGERY

## 2024-03-01 PROCEDURE — 250N000011 HC RX IP 250 OP 636: Performed by: STUDENT IN AN ORGANIZED HEALTH CARE EDUCATION/TRAINING PROGRAM

## 2024-03-01 PROCEDURE — 36415 COLL VENOUS BLD VENIPUNCTURE: CPT | Performed by: SURGERY

## 2024-03-01 PROCEDURE — 02VW3DZ RESTRICTION OF THORACIC AORTA, DESCENDING WITH INTRALUMINAL DEVICE, PERCUTANEOUS APPROACH: ICD-10-PCS | Performed by: SURGERY

## 2024-03-01 PROCEDURE — 99221 1ST HOSP IP/OBS SF/LOW 40: CPT | Performed by: ANESTHESIOLOGY

## 2024-03-01 PROCEDURE — 258N000003 HC RX IP 258 OP 636

## 2024-03-01 PROCEDURE — 86900 BLOOD TYPING SEROLOGIC ABO: CPT | Performed by: SURGERY

## 2024-03-01 PROCEDURE — 83605 ASSAY OF LACTIC ACID: CPT | Performed by: STUDENT IN AN ORGANIZED HEALTH CARE EDUCATION/TRAINING PROGRAM

## 2024-03-01 PROCEDURE — 33881 EVASC RPR TA NDGFT XCOV LSA: CPT | Mod: GC | Performed by: SURGERY

## 2024-03-01 PROCEDURE — 250N000011 HC RX IP 250 OP 636

## 2024-03-01 PROCEDURE — C1894 INTRO/SHEATH, NON-LASER: HCPCS | Performed by: SURGERY

## 2024-03-01 PROCEDURE — 86923 COMPATIBILITY TEST ELECTRIC: CPT | Performed by: SURGERY

## 2024-03-01 PROCEDURE — 82374 ASSAY BLOOD CARBON DIOXIDE: CPT | Performed by: STUDENT IN AN ORGANIZED HEALTH CARE EDUCATION/TRAINING PROGRAM

## 2024-03-01 RX ORDER — SODIUM CHLORIDE, SODIUM LACTATE, POTASSIUM CHLORIDE, CALCIUM CHLORIDE 600; 310; 30; 20 MG/100ML; MG/100ML; MG/100ML; MG/100ML
INJECTION, SOLUTION INTRAVENOUS CONTINUOUS
Status: DISCONTINUED | OUTPATIENT
Start: 2024-03-01 | End: 2024-03-02

## 2024-03-01 RX ORDER — PRAVASTATIN SODIUM 20 MG
40 TABLET ORAL AT BEDTIME
Status: DISCONTINUED | OUTPATIENT
Start: 2024-03-01 | End: 2024-03-05 | Stop reason: HOSPADM

## 2024-03-01 RX ORDER — PSEUDOEPHEDRINE HCL 30 MG
30 TABLET ORAL 3 TIMES DAILY
Status: DISCONTINUED | OUTPATIENT
Start: 2024-03-01 | End: 2024-03-05 | Stop reason: HOSPADM

## 2024-03-01 RX ORDER — SODIUM CHLORIDE, SODIUM LACTATE, POTASSIUM CHLORIDE, CALCIUM CHLORIDE 600; 310; 30; 20 MG/100ML; MG/100ML; MG/100ML; MG/100ML
INJECTION, SOLUTION INTRAVENOUS CONTINUOUS
Status: DISCONTINUED | OUTPATIENT
Start: 2024-03-01 | End: 2024-03-01 | Stop reason: HOSPADM

## 2024-03-01 RX ORDER — IPRATROPIUM BROMIDE AND ALBUTEROL SULFATE 2.5; .5 MG/3ML; MG/3ML
1 SOLUTION RESPIRATORY (INHALATION) EVERY 4 HOURS PRN
Status: DISCONTINUED | OUTPATIENT
Start: 2024-03-01 | End: 2024-03-05 | Stop reason: HOSPADM

## 2024-03-01 RX ORDER — ALLOPURINOL 100 MG/1
200 TABLET ORAL DAILY
Status: DISCONTINUED | OUTPATIENT
Start: 2024-03-02 | End: 2024-03-05 | Stop reason: HOSPADM

## 2024-03-01 RX ORDER — IPRATROPIUM BROMIDE AND ALBUTEROL SULFATE 2.5; .5 MG/3ML; MG/3ML
3 SOLUTION RESPIRATORY (INHALATION) ONCE
Status: COMPLETED | OUTPATIENT
Start: 2024-03-01 | End: 2024-03-01

## 2024-03-01 RX ORDER — ALBUTEROL SULFATE 90 UG/1
AEROSOL, METERED RESPIRATORY (INHALATION) PRN
Status: DISCONTINUED | OUTPATIENT
Start: 2024-03-01 | End: 2024-03-01

## 2024-03-01 RX ORDER — PHENYLEPHRINE HCL IN 0.9% NACL 50MG/250ML
.5-2 PLASTIC BAG, INJECTION (ML) INTRAVENOUS CONTINUOUS PRN
Status: DISCONTINUED | OUTPATIENT
Start: 2024-03-01 | End: 2024-03-05 | Stop reason: HOSPADM

## 2024-03-01 RX ORDER — PROPOFOL 10 MG/ML
INJECTION, EMULSION INTRAVENOUS PRN
Status: DISCONTINUED | OUTPATIENT
Start: 2024-03-01 | End: 2024-03-01

## 2024-03-01 RX ORDER — PROPOFOL 10 MG/ML
INJECTION, EMULSION INTRAVENOUS CONTINUOUS PRN
Status: DISCONTINUED | OUTPATIENT
Start: 2024-03-01 | End: 2024-03-01

## 2024-03-01 RX ORDER — ESMOLOL HYDROCHLORIDE 10 MG/ML
INJECTION INTRAVENOUS PRN
Status: DISCONTINUED | OUTPATIENT
Start: 2024-03-01 | End: 2024-03-01

## 2024-03-01 RX ORDER — LISINOPRIL AND HYDROCHLOROTHIAZIDE 12.5; 2 MG/1; MG/1
1 TABLET ORAL EVERY MORNING
Status: DISCONTINUED | OUTPATIENT
Start: 2024-03-02 | End: 2024-03-05 | Stop reason: HOSPADM

## 2024-03-01 RX ORDER — LABETALOL HYDROCHLORIDE 5 MG/ML
10 INJECTION, SOLUTION INTRAVENOUS
Status: DISCONTINUED | OUTPATIENT
Start: 2024-03-01 | End: 2024-03-01 | Stop reason: HOSPADM

## 2024-03-01 RX ORDER — AMLODIPINE BESYLATE 10 MG/1
10 TABLET ORAL DAILY
Status: DISCONTINUED | OUTPATIENT
Start: 2024-03-02 | End: 2024-03-05 | Stop reason: HOSPADM

## 2024-03-01 RX ORDER — NALOXONE HYDROCHLORIDE 0.4 MG/ML
0.1 INJECTION, SOLUTION INTRAMUSCULAR; INTRAVENOUS; SUBCUTANEOUS
Status: DISCONTINUED | OUTPATIENT
Start: 2024-03-01 | End: 2024-03-01 | Stop reason: HOSPADM

## 2024-03-01 RX ORDER — ONDANSETRON 4 MG/1
4 TABLET, ORALLY DISINTEGRATING ORAL EVERY 30 MIN PRN
Status: DISCONTINUED | OUTPATIENT
Start: 2024-03-01 | End: 2024-03-01 | Stop reason: HOSPADM

## 2024-03-01 RX ORDER — FENTANYL CITRATE 50 UG/ML
25-50 INJECTION, SOLUTION INTRAMUSCULAR; INTRAVENOUS
Status: DISCONTINUED | OUTPATIENT
Start: 2024-03-01 | End: 2024-03-05 | Stop reason: HOSPADM

## 2024-03-01 RX ORDER — PROTAMINE SULFATE 10 MG/ML
INJECTION, SOLUTION INTRAVENOUS PRN
Status: DISCONTINUED | OUTPATIENT
Start: 2024-03-01 | End: 2024-03-01

## 2024-03-01 RX ORDER — ONDANSETRON 2 MG/ML
4 INJECTION INTRAMUSCULAR; INTRAVENOUS EVERY 30 MIN PRN
Status: DISCONTINUED | OUTPATIENT
Start: 2024-03-01 | End: 2024-03-01 | Stop reason: HOSPADM

## 2024-03-01 RX ORDER — SODIUM CHLORIDE 9 MG/ML
INJECTION, SOLUTION INTRAVENOUS CONTINUOUS PRN
Status: DISCONTINUED | OUTPATIENT
Start: 2024-03-01 | End: 2024-03-01

## 2024-03-01 RX ORDER — CEFAZOLIN SODIUM 2 G/100ML
2 INJECTION, SOLUTION INTRAVENOUS EVERY 8 HOURS
Qty: 200 ML | Refills: 0 | Status: COMPLETED | OUTPATIENT
Start: 2024-03-01 | End: 2024-03-02

## 2024-03-01 RX ORDER — HYDROMORPHONE HCL IN WATER/PF 6 MG/30 ML
0.2 PATIENT CONTROLLED ANALGESIA SYRINGE INTRAVENOUS EVERY 5 MIN PRN
Status: DISCONTINUED | OUTPATIENT
Start: 2024-03-01 | End: 2024-03-01 | Stop reason: HOSPADM

## 2024-03-01 RX ORDER — DEXAMETHASONE SODIUM PHOSPHATE 4 MG/ML
INJECTION, SOLUTION INTRA-ARTICULAR; INTRALESIONAL; INTRAMUSCULAR; INTRAVENOUS; SOFT TISSUE PRN
Status: DISCONTINUED | OUTPATIENT
Start: 2024-03-01 | End: 2024-03-01

## 2024-03-01 RX ORDER — BETAMETHASONE DIPROPIONATE 0.5 MG/G
CREAM TOPICAL DAILY PRN
Status: DISCONTINUED | OUTPATIENT
Start: 2024-03-02 | End: 2024-03-05 | Stop reason: HOSPADM

## 2024-03-01 RX ORDER — BUPROPION HYDROCHLORIDE 150 MG/1
300 TABLET ORAL EVERY MORNING
Status: DISCONTINUED | OUTPATIENT
Start: 2024-03-02 | End: 2024-03-05 | Stop reason: HOSPADM

## 2024-03-01 RX ORDER — FENTANYL CITRATE 50 UG/ML
25 INJECTION, SOLUTION INTRAMUSCULAR; INTRAVENOUS EVERY 5 MIN PRN
Status: DISCONTINUED | OUTPATIENT
Start: 2024-03-01 | End: 2024-03-01 | Stop reason: HOSPADM

## 2024-03-01 RX ORDER — CEFAZOLIN SODIUM/WATER 2 G/20 ML
2 SYRINGE (ML) INTRAVENOUS
Status: COMPLETED | OUTPATIENT
Start: 2024-03-01 | End: 2024-03-01

## 2024-03-01 RX ORDER — HEPARIN SODIUM 1000 [USP'U]/ML
INJECTION, SOLUTION INTRAVENOUS; SUBCUTANEOUS PRN
Status: DISCONTINUED | OUTPATIENT
Start: 2024-03-01 | End: 2024-03-01

## 2024-03-01 RX ORDER — FENTANYL CITRATE 50 UG/ML
INJECTION, SOLUTION INTRAMUSCULAR; INTRAVENOUS PRN
Status: DISCONTINUED | OUTPATIENT
Start: 2024-03-01 | End: 2024-03-01

## 2024-03-01 RX ORDER — LIDOCAINE 40 MG/G
CREAM TOPICAL
Status: DISCONTINUED | OUTPATIENT
Start: 2024-03-01 | End: 2024-03-05 | Stop reason: HOSPADM

## 2024-03-01 RX ORDER — MAGNESIUM HYDROXIDE 1200 MG/15ML
LIQUID ORAL PRN
Status: DISCONTINUED | OUTPATIENT
Start: 2024-03-01 | End: 2024-03-01 | Stop reason: HOSPADM

## 2024-03-01 RX ORDER — HYDRALAZINE HYDROCHLORIDE 20 MG/ML
2.5-5 INJECTION INTRAMUSCULAR; INTRAVENOUS EVERY 10 MIN PRN
Status: DISCONTINUED | OUTPATIENT
Start: 2024-03-01 | End: 2024-03-01 | Stop reason: HOSPADM

## 2024-03-01 RX ORDER — LIDOCAINE HYDROCHLORIDE 20 MG/ML
INJECTION, SOLUTION INFILTRATION; PERINEURAL PRN
Status: DISCONTINUED | OUTPATIENT
Start: 2024-03-01 | End: 2024-03-01

## 2024-03-01 RX ORDER — ACETAMINOPHEN 325 MG/1
650 TABLET ORAL EVERY 6 HOURS PRN
Status: DISCONTINUED | OUTPATIENT
Start: 2024-03-01 | End: 2024-03-02

## 2024-03-01 RX ORDER — CEFAZOLIN SODIUM/WATER 2 G/20 ML
2 SYRINGE (ML) INTRAVENOUS SEE ADMIN INSTRUCTIONS
Status: DISCONTINUED | OUTPATIENT
Start: 2024-03-01 | End: 2024-03-01 | Stop reason: HOSPADM

## 2024-03-01 RX ORDER — ONDANSETRON 2 MG/ML
INJECTION INTRAMUSCULAR; INTRAVENOUS PRN
Status: DISCONTINUED | OUTPATIENT
Start: 2024-03-01 | End: 2024-03-01

## 2024-03-01 RX ORDER — DEXMEDETOMIDINE HYDROCHLORIDE 4 UG/ML
INJECTION, SOLUTION INTRAVENOUS PRN
Status: DISCONTINUED | OUTPATIENT
Start: 2024-03-01 | End: 2024-03-01

## 2024-03-01 RX ADMIN — IPRATROPIUM BROMIDE AND ALBUTEROL SULFATE 3 ML: .5; 3 SOLUTION RESPIRATORY (INHALATION) at 11:38

## 2024-03-01 RX ADMIN — PHENYLEPHRINE HYDROCHLORIDE 50 MCG: 10 INJECTION INTRAVENOUS at 16:11

## 2024-03-01 RX ADMIN — ALBUTEROL SULFATE 6 PUFF: 90 AEROSOL, METERED RESPIRATORY (INHALATION) at 16:29

## 2024-03-01 RX ADMIN — FENTANYL CITRATE 25 MCG: 50 INJECTION, SOLUTION INTRAMUSCULAR; INTRAVENOUS at 20:30

## 2024-03-01 RX ADMIN — SODIUM CHLORIDE, POTASSIUM CHLORIDE, SODIUM LACTATE AND CALCIUM CHLORIDE: 600; 310; 30; 20 INJECTION, SOLUTION INTRAVENOUS at 13:23

## 2024-03-01 RX ADMIN — Medication 2 G: at 13:31

## 2024-03-01 RX ADMIN — HEPARIN SODIUM 3000 UNITS: 1000 INJECTION, SOLUTION INTRAVENOUS; SUBCUTANEOUS at 16:11

## 2024-03-01 RX ADMIN — SODIUM CHLORIDE, POTASSIUM CHLORIDE, SODIUM LACTATE AND CALCIUM CHLORIDE: 600; 310; 30; 20 INJECTION, SOLUTION INTRAVENOUS at 22:48

## 2024-03-01 RX ADMIN — SODIUM CHLORIDE, POTASSIUM CHLORIDE, SODIUM LACTATE AND CALCIUM CHLORIDE: 600; 310; 30; 20 INJECTION, SOLUTION INTRAVENOUS at 16:43

## 2024-03-01 RX ADMIN — MIDAZOLAM 2 MG: 1 INJECTION INTRAMUSCULAR; INTRAVENOUS at 13:23

## 2024-03-01 RX ADMIN — ROCURONIUM BROMIDE 50 MG: 50 INJECTION, SOLUTION INTRAVENOUS at 13:31

## 2024-03-01 RX ADMIN — PRAVASTATIN SODIUM 40 MG: 40 TABLET ORAL at 22:12

## 2024-03-01 RX ADMIN — DEXMEDETOMIDINE HYDROCHLORIDE 8 MCG: 200 INJECTION INTRAVENOUS at 14:50

## 2024-03-01 RX ADMIN — LIDOCAINE HYDROCHLORIDE 80 MG: 20 INJECTION, SOLUTION INFILTRATION; PERINEURAL at 13:31

## 2024-03-01 RX ADMIN — ONDANSETRON 4 MG: 2 INJECTION INTRAMUSCULAR; INTRAVENOUS at 16:11

## 2024-03-01 RX ADMIN — ROCURONIUM BROMIDE 20 MG: 50 INJECTION, SOLUTION INTRAVENOUS at 14:46

## 2024-03-01 RX ADMIN — FENTANYL CITRATE 25 MCG: 50 INJECTION, SOLUTION INTRAMUSCULAR; INTRAVENOUS at 18:44

## 2024-03-01 RX ADMIN — PHENYLEPHRINE HYDROCHLORIDE 100 MCG: 10 INJECTION INTRAVENOUS at 13:31

## 2024-03-01 RX ADMIN — PHENYLEPHRINE HYDROCHLORIDE 0.3 MCG/KG/MIN: 10 INJECTION INTRAVENOUS at 13:59

## 2024-03-01 RX ADMIN — ACETAMINOPHEN 650 MG: 325 TABLET, FILM COATED ORAL at 20:30

## 2024-03-01 RX ADMIN — DEXMEDETOMIDINE HYDROCHLORIDE 4 MCG: 200 INJECTION INTRAVENOUS at 14:55

## 2024-03-01 RX ADMIN — PHENYLEPHRINE HYDROCHLORIDE 50 MCG: 10 INJECTION INTRAVENOUS at 16:54

## 2024-03-01 RX ADMIN — PHENYLEPHRINE HYDROCHLORIDE 100 MCG: 10 INJECTION INTRAVENOUS at 13:58

## 2024-03-01 RX ADMIN — PSEUDOEPHEDRINE HCL 30 MG: 30 TABLET, FILM COATED ORAL at 22:12

## 2024-03-01 RX ADMIN — ROCURONIUM BROMIDE 10 MG: 50 INJECTION, SOLUTION INTRAVENOUS at 16:16

## 2024-03-01 RX ADMIN — PROTAMINE SULFATE 30 MG: 10 INJECTION, SOLUTION INTRAVENOUS at 16:27

## 2024-03-01 RX ADMIN — HEPARIN SODIUM 10000 UNITS: 1000 INJECTION, SOLUTION INTRAVENOUS; SUBCUTANEOUS at 15:12

## 2024-03-01 RX ADMIN — HYDROMORPHONE HYDROCHLORIDE 0.5 MG: 1 INJECTION, SOLUTION INTRAMUSCULAR; INTRAVENOUS; SUBCUTANEOUS at 16:00

## 2024-03-01 RX ADMIN — ESMOLOL HYDROCHLORIDE 50 MG: 10 INJECTION, SOLUTION INTRAVENOUS at 17:02

## 2024-03-01 RX ADMIN — FENTANYL CITRATE 50 MCG: 50 INJECTION INTRAMUSCULAR; INTRAVENOUS at 13:31

## 2024-03-01 RX ADMIN — PROPOFOL 200 MG: 10 INJECTION, EMULSION INTRAVENOUS at 13:31

## 2024-03-01 RX ADMIN — SODIUM CHLORIDE: 9 INJECTION, SOLUTION INTRAVENOUS at 13:23

## 2024-03-01 RX ADMIN — PROPOFOL 20 MCG/KG/MIN: 10 INJECTION, EMULSION INTRAVENOUS at 13:55

## 2024-03-01 RX ADMIN — SUGAMMADEX 200 MG: 100 INJECTION, SOLUTION INTRAVENOUS at 16:57

## 2024-03-01 RX ADMIN — ROCURONIUM BROMIDE 30 MG: 50 INJECTION, SOLUTION INTRAVENOUS at 15:33

## 2024-03-01 RX ADMIN — FENTANYL CITRATE 50 MCG: 50 INJECTION INTRAMUSCULAR; INTRAVENOUS at 13:50

## 2024-03-01 RX ADMIN — DEXAMETHASONE SODIUM PHOSPHATE 4 MG: 4 INJECTION, SOLUTION INTRA-ARTICULAR; INTRALESIONAL; INTRAMUSCULAR; INTRAVENOUS; SOFT TISSUE at 13:31

## 2024-03-01 RX ADMIN — ROCURONIUM BROMIDE 30 MG: 50 INJECTION, SOLUTION INTRAVENOUS at 14:25

## 2024-03-01 RX ADMIN — CEFAZOLIN SODIUM 2 G: 2 INJECTION, SOLUTION INTRAVENOUS at 21:15

## 2024-03-01 RX ADMIN — DEXMEDETOMIDINE HYDROCHLORIDE 8 MCG: 200 INJECTION INTRAVENOUS at 15:53

## 2024-03-01 ASSESSMENT — ACTIVITIES OF DAILY LIVING (ADL)
ADLS_ACUITY_SCORE: 20
ADLS_ACUITY_SCORE: 38
ADLS_ACUITY_SCORE: 20
ADLS_ACUITY_SCORE: 20
ADLS_ACUITY_SCORE: 38
ADLS_ACUITY_SCORE: 20

## 2024-03-01 ASSESSMENT — ENCOUNTER SYMPTOMS
DYSRHYTHMIAS: 0
SEIZURES: 0

## 2024-03-01 ASSESSMENT — LIFESTYLE VARIABLES: TOBACCO_USE: 1

## 2024-03-01 NOTE — BRIEF OP NOTE
[facility]    Brief Operative Note    Pre-operative diagnosis: Aneurysm of descending thoracic aorta without rupture (H24) [I71.23]  Post-operative diagnosis Same as pre-operative diagnosis    Procedure: THORACIC ENDOVASCULAR AORTIC REPAIR, RIGHT FEMORAL CUTDOWN, N/A - Abdomen  Insert drain lumbar, N/A - Spine    Surgeon: Surgeon(s) and Role:  Panel 1:     * Alin Roe MD - Primary     * Herve Ospina MD - Assisting     * Stacy Atkins MD - Resident - Assisting     * Stacy Grant DO - Fellow - Assisting  Panel 2:     * Herve Ospina MD - Primary  Anesthesia: MAC with Local   Estimated Blood Loss: 100 mL from 3/1/2024  1:20 PM to 3/1/2024  5:13 PM      Drains: Spinal drain  Specimens: * No specimens in log *  Findings:     Complications: None.  Implants:   Implant Name Type Inv. Item Serial No.  Lot No. LRB No. Used Action   FreeFlo straight   [id number] [employer]  N/A 1 Implanted   FreeFlo Straight   [id number] [employer]  N/A 1 Implanted   [employer]-SEAL VIP VASCULAR CLOSURE DEVICE     [id number] Left 1 Implanted       -Flat for 6 hours, followed by bedrest  -Site checks  -Neurovascular checks, must be able to lift legs off bed  -MAP goal: 80  -q6 CBC, BMP, Lactate, Fibrinogen, PT/INR  -Hgb > 10, Plt >100, Fibrinogen <200, INR <1.5  -Spinal drain clamped, notify surgeon if any neuro changes and then follow spinal drainage [facility]

## 2024-03-01 NOTE — ANESTHESIA PROCEDURE NOTES
Airway       Patient location during procedure: OR       Procedure Start/Stop Times: 3/1/2024 1:34 PM  Staff -        Anesthesiologist:  Brian Simon MD       CRNA: Lexie Butler APRN CRNA       Performed By: CRNA  Consent for Airway        Urgency: elective  Indications and Patient Condition       Indications for airway management: nayeli-procedural       Induction type:intravenous       Mask difficulty assessment: 2 - vent by mask + OA or adjuvant +/- NMBA    Final Airway Details       Final airway type: endotracheal airway       Successful airway: ETT - single  Endotracheal Airway Details        ETT size (mm): 8.0       Cuffed: yes       Successful intubation technique: video laryngoscopy       VL Blade Size: Glidescope 4       Grade View of Cords: 1       Adjucts: stylet       Position: Right       Measured from: lips       Secured at (cm): 24       Bite block used: None    Post intubation assessment        Placement verified by: capnometry, equal breath sounds and chest rise        Number of attempts at approach: 1       Number of other approaches attempted: 0       Secured with: tape       Ease of procedure: easy       Dentition: Intact and Unchanged    Medication(s) Administered   Medication Administration Time: 3/1/2024 1:34 PM

## 2024-03-01 NOTE — ANESTHESIA CARE TRANSFER NOTE
Patient: Gabe Smith    Procedure: Procedure(s):  THORACIC ENDOVASCULAR AORTIC REPAIR, RIGHT FEMORAL CUTDOWN  Insert drain lumbar       Diagnosis: Aneurysm of descending thoracic aorta without rupture (H24) [I71.23]  Diagnosis Additional Information: No value filed.    Anesthesia Type:   MAC     Note:    Oropharynx: oropharynx clear of all foreign objects and spontaneously breathing  Level of Consciousness: awake and drowsy  Oxygen Supplementation: face mask  Level of Supplemental Oxygen (L/min / FiO2): 6  Independent Airway: airway patency satisfactory and stable  Dentition: dentition unchanged  Vital Signs Stable: post-procedure vital signs reviewed and stable  Report to RN Given: handoff report given  Patient transferred to: PACU    Handoff Report: Identifed the Patient, Identified the Reponsible Provider, Reviewed the pertinent medical history, Discussed the surgical course, Reviewed Intra-OP anesthesia mangement and issues during anesthesia, Set expectations for post-procedure period and Allowed opportunity for questions and acknowledgement of understanding      Vitals:  Vitals Value Taken Time   /75 03/01/24 1714   Temp     Pulse 64 03/01/24 1722   Resp 13 03/01/24 1722   SpO2 100 % 03/01/24 1722   Vitals shown include unfiled device data.    Electronically Signed By: MONIE Summers CRNA  March 1, 2024  5:23 PM

## 2024-03-01 NOTE — OP NOTE
[facility]  Neurosurgery Procedure Note    Name of Procedure: Lumbar Drain Placement    Date of Procedure: March 1, 2024    Indication for the procedure: As per the request by the vascular surgery team for thoracic aortic aneurysm repair.     Description of Procedure  Consent for the procedure was obtained from the family.  The patient was then placed in the left  lateral decubitus position.  Patient was in the general anesthesia in the OR.The L4-5 interspinous space was targeted as the puncture site; it was localized using the patient's iliac crests. The site was then prepped and draped in the standard sterile fashion.    A large-bore Tuohy spinal needle was then advanced through the patient's skin and subcutaneous tissue into what was believed to be the L4-L5 interspinous space until CSF was obtained. The CSF came out in a steady flow. The lumbar drain catheter was then advanced through the spinal needle and into the the patient's intrathecal space. The spinal needle was then gently removed. The lumbar drain catheter was secured to the patient's skin via 3 points of fixation to the skin. The lumbar drain tubing was then covered with sterile Tegaderm and pressure dressing. CSF continued to flow at the end of the procedure. The lumbar drain was left clamped. There were no immediate complications.  I updated patient's friend after the surgery.    Herve Ospina MD

## 2024-03-02 ENCOUNTER — APPOINTMENT (OUTPATIENT)
Dept: GENERAL RADIOLOGY | Facility: CLINIC | Age: 69
DRG: 220 | End: 2024-03-02
Attending: SURGERY
Payer: COMMERCIAL

## 2024-03-02 ENCOUNTER — APPOINTMENT (OUTPATIENT)
Dept: PHYSICAL THERAPY | Facility: CLINIC | Age: 69
DRG: 220 | End: 2024-03-02
Attending: SURGERY
Payer: COMMERCIAL

## 2024-03-02 ENCOUNTER — APPOINTMENT (OUTPATIENT)
Dept: OCCUPATIONAL THERAPY | Facility: CLINIC | Age: 69
DRG: 220 | End: 2024-03-02
Attending: SURGERY
Payer: COMMERCIAL

## 2024-03-02 LAB
ANION GAP SERPL CALCULATED.3IONS-SCNC: 11 MMOL/L (ref 7–15)
ANION GAP SERPL CALCULATED.3IONS-SCNC: 13 MMOL/L (ref 7–15)
ANION GAP SERPL CALCULATED.3IONS-SCNC: 9 MMOL/L (ref 7–15)
APTT PPP: 28 SECONDS (ref 22–38)
APTT PPP: 30 SECONDS (ref 22–38)
APTT PPP: 60 SECONDS (ref 22–38)
BUN SERPL-MCNC: 10.2 MG/DL (ref 8–23)
BUN SERPL-MCNC: 11.9 MG/DL (ref 8–23)
BUN SERPL-MCNC: 14.8 MG/DL (ref 8–23)
CALCIUM SERPL-MCNC: 8.3 MG/DL (ref 8.8–10.2)
CALCIUM SERPL-MCNC: 8.6 MG/DL (ref 8.8–10.2)
CALCIUM SERPL-MCNC: 9 MG/DL (ref 8.8–10.2)
CHLORIDE SERPL-SCNC: 100 MMOL/L (ref 98–107)
CHLORIDE SERPL-SCNC: 101 MMOL/L (ref 98–107)
CHLORIDE SERPL-SCNC: 98 MMOL/L (ref 98–107)
CREAT SERPL-MCNC: 0.73 MG/DL (ref 0.67–1.17)
CREAT SERPL-MCNC: 0.76 MG/DL (ref 0.67–1.17)
CREAT SERPL-MCNC: 0.79 MG/DL (ref 0.67–1.17)
DEPRECATED HCO3 PLAS-SCNC: 24 MMOL/L (ref 22–29)
DEPRECATED HCO3 PLAS-SCNC: 25 MMOL/L (ref 22–29)
DEPRECATED HCO3 PLAS-SCNC: 26 MMOL/L (ref 22–29)
EGFRCR SERPLBLD CKD-EPI 2021: >90 ML/MIN/1.73M2
ERYTHROCYTE [DISTWIDTH] IN BLOOD BY AUTOMATED COUNT: 12.6 % (ref 10–15)
ERYTHROCYTE [DISTWIDTH] IN BLOOD BY AUTOMATED COUNT: 12.7 % (ref 10–15)
ERYTHROCYTE [DISTWIDTH] IN BLOOD BY AUTOMATED COUNT: 12.9 % (ref 10–15)
FIBRINOGEN PPP-MCNC: 385 MG/DL (ref 170–490)
FIBRINOGEN PPP-MCNC: 395 MG/DL (ref 170–490)
FIBRINOGEN PPP-MCNC: 476 MG/DL (ref 170–490)
GLUCOSE BLDC GLUCOMTR-MCNC: 110 MG/DL (ref 70–99)
GLUCOSE BLDC GLUCOMTR-MCNC: 124 MG/DL (ref 70–99)
GLUCOSE BLDC GLUCOMTR-MCNC: 142 MG/DL (ref 70–99)
GLUCOSE BLDC GLUCOMTR-MCNC: 164 MG/DL (ref 70–99)
GLUCOSE SERPL-MCNC: 123 MG/DL (ref 70–99)
GLUCOSE SERPL-MCNC: 146 MG/DL (ref 70–99)
GLUCOSE SERPL-MCNC: 155 MG/DL (ref 70–99)
HCT VFR BLD AUTO: 37.5 % (ref 40–53)
HCT VFR BLD AUTO: 38.5 % (ref 40–53)
HCT VFR BLD AUTO: 40.3 % (ref 40–53)
HGB BLD-MCNC: 12.6 G/DL (ref 13.3–17.7)
HGB BLD-MCNC: 12.9 G/DL (ref 13.3–17.7)
HGB BLD-MCNC: 13.6 G/DL (ref 13.3–17.7)
INR PPP: 1.1 (ref 0.85–1.15)
INR PPP: 1.14 (ref 0.85–1.15)
INR PPP: 1.19 (ref 0.85–1.15)
LACTATE SERPL-SCNC: 0.8 MMOL/L (ref 0.7–2)
LACTATE SERPL-SCNC: 1.1 MMOL/L (ref 0.7–2)
MCH RBC QN AUTO: 29.8 PG (ref 26.5–33)
MCH RBC QN AUTO: 29.9 PG (ref 26.5–33)
MCH RBC QN AUTO: 30.3 PG (ref 26.5–33)
MCHC RBC AUTO-ENTMCNC: 33.5 G/DL (ref 31.5–36.5)
MCHC RBC AUTO-ENTMCNC: 33.6 G/DL (ref 31.5–36.5)
MCHC RBC AUTO-ENTMCNC: 33.7 G/DL (ref 31.5–36.5)
MCV RBC AUTO: 89 FL (ref 78–100)
MCV RBC AUTO: 89 FL (ref 78–100)
MCV RBC AUTO: 90 FL (ref 78–100)
PLATELET # BLD AUTO: 157 10E3/UL (ref 150–450)
PLATELET # BLD AUTO: 161 10E3/UL (ref 150–450)
PLATELET # BLD AUTO: 164 10E3/UL (ref 150–450)
POTASSIUM SERPL-SCNC: 3.7 MMOL/L (ref 3.4–5.3)
POTASSIUM SERPL-SCNC: 4.3 MMOL/L (ref 3.4–5.3)
POTASSIUM SERPL-SCNC: 4.3 MMOL/L (ref 3.4–5.3)
RBC # BLD AUTO: 4.21 10E6/UL (ref 4.4–5.9)
RBC # BLD AUTO: 4.33 10E6/UL (ref 4.4–5.9)
RBC # BLD AUTO: 4.49 10E6/UL (ref 4.4–5.9)
SODIUM SERPL-SCNC: 135 MMOL/L (ref 135–145)
SODIUM SERPL-SCNC: 135 MMOL/L (ref 135–145)
SODIUM SERPL-SCNC: 137 MMOL/L (ref 135–145)
WBC # BLD AUTO: 11.3 10E3/UL (ref 4–11)
WBC # BLD AUTO: 12 10E3/UL (ref 4–11)
WBC # BLD AUTO: 12.2 10E3/UL (ref 4–11)

## 2024-03-02 PROCEDURE — 258N000003 HC RX IP 258 OP 636: Performed by: STUDENT IN AN ORGANIZED HEALTH CARE EDUCATION/TRAINING PROGRAM

## 2024-03-02 PROCEDURE — 250N000011 HC RX IP 250 OP 636: Performed by: STUDENT IN AN ORGANIZED HEALTH CARE EDUCATION/TRAINING PROGRAM

## 2024-03-02 PROCEDURE — 97161 PT EVAL LOW COMPLEX 20 MIN: CPT | Mod: GP | Performed by: PHYSICAL THERAPIST

## 2024-03-02 PROCEDURE — 80048 BASIC METABOLIC PNL TOTAL CA: CPT | Performed by: STUDENT IN AN ORGANIZED HEALTH CARE EDUCATION/TRAINING PROGRAM

## 2024-03-02 PROCEDURE — 83605 ASSAY OF LACTIC ACID: CPT | Performed by: STUDENT IN AN ORGANIZED HEALTH CARE EDUCATION/TRAINING PROGRAM

## 2024-03-02 PROCEDURE — 97530 THERAPEUTIC ACTIVITIES: CPT | Mod: GP | Performed by: PHYSICAL THERAPIST

## 2024-03-02 PROCEDURE — 97530 THERAPEUTIC ACTIVITIES: CPT | Mod: GO

## 2024-03-02 PROCEDURE — 85384 FIBRINOGEN ACTIVITY: CPT | Performed by: STUDENT IN AN ORGANIZED HEALTH CARE EDUCATION/TRAINING PROGRAM

## 2024-03-02 PROCEDURE — 97535 SELF CARE MNGMENT TRAINING: CPT | Mod: GO

## 2024-03-02 PROCEDURE — 85027 COMPLETE CBC AUTOMATED: CPT | Performed by: STUDENT IN AN ORGANIZED HEALTH CARE EDUCATION/TRAINING PROGRAM

## 2024-03-02 PROCEDURE — 85610 PROTHROMBIN TIME: CPT | Performed by: STUDENT IN AN ORGANIZED HEALTH CARE EDUCATION/TRAINING PROGRAM

## 2024-03-02 PROCEDURE — 97165 OT EVAL LOW COMPLEX 30 MIN: CPT | Mod: GO

## 2024-03-02 PROCEDURE — 71045 X-RAY EXAM CHEST 1 VIEW: CPT

## 2024-03-02 PROCEDURE — 85730 THROMBOPLASTIN TIME PARTIAL: CPT | Performed by: STUDENT IN AN ORGANIZED HEALTH CARE EDUCATION/TRAINING PROGRAM

## 2024-03-02 PROCEDURE — 82374 ASSAY BLOOD CARBON DIOXIDE: CPT | Performed by: STUDENT IN AN ORGANIZED HEALTH CARE EDUCATION/TRAINING PROGRAM

## 2024-03-02 PROCEDURE — 250N000013 HC RX MED GY IP 250 OP 250 PS 637: Performed by: STUDENT IN AN ORGANIZED HEALTH CARE EDUCATION/TRAINING PROGRAM

## 2024-03-02 PROCEDURE — 99231 SBSQ HOSP IP/OBS SF/LOW 25: CPT | Performed by: ANESTHESIOLOGY

## 2024-03-02 PROCEDURE — 97116 GAIT TRAINING THERAPY: CPT | Mod: GP | Performed by: PHYSICAL THERAPIST

## 2024-03-02 PROCEDURE — 200N000001 HC R&B ICU

## 2024-03-02 RX ORDER — NALOXONE HYDROCHLORIDE 0.4 MG/ML
0.4 INJECTION, SOLUTION INTRAMUSCULAR; INTRAVENOUS; SUBCUTANEOUS
Status: DISCONTINUED | OUTPATIENT
Start: 2024-03-02 | End: 2024-03-05 | Stop reason: HOSPADM

## 2024-03-02 RX ORDER — NALOXONE HYDROCHLORIDE 0.4 MG/ML
0.2 INJECTION, SOLUTION INTRAMUSCULAR; INTRAVENOUS; SUBCUTANEOUS
Status: DISCONTINUED | OUTPATIENT
Start: 2024-03-02 | End: 2024-03-05 | Stop reason: HOSPADM

## 2024-03-02 RX ORDER — ACETAMINOPHEN 325 MG/1
650 TABLET ORAL EVERY 6 HOURS
Status: DISCONTINUED | OUTPATIENT
Start: 2024-03-02 | End: 2024-03-05 | Stop reason: HOSPADM

## 2024-03-02 RX ORDER — LIDOCAINE 4 G/G
1 PATCH TOPICAL EVERY 24 HOURS
Status: DISCONTINUED | OUTPATIENT
Start: 2024-03-02 | End: 2024-03-05 | Stop reason: HOSPADM

## 2024-03-02 RX ORDER — ALBUTEROL SULFATE 90 UG/1
2 AEROSOL, METERED RESPIRATORY (INHALATION) EVERY 6 HOURS PRN
Status: DISCONTINUED | OUTPATIENT
Start: 2024-03-02 | End: 2024-03-05 | Stop reason: HOSPADM

## 2024-03-02 RX ORDER — OXYCODONE HYDROCHLORIDE 5 MG/1
5 TABLET ORAL EVERY 4 HOURS PRN
Status: DISCONTINUED | OUTPATIENT
Start: 2024-03-02 | End: 2024-03-05 | Stop reason: HOSPADM

## 2024-03-02 RX ADMIN — PRAVASTATIN SODIUM 40 MG: 40 TABLET ORAL at 21:00

## 2024-03-02 RX ADMIN — SODIUM CHLORIDE, POTASSIUM CHLORIDE, SODIUM LACTATE AND CALCIUM CHLORIDE: 600; 310; 30; 20 INJECTION, SOLUTION INTRAVENOUS at 06:39

## 2024-03-02 RX ADMIN — ACETAMINOPHEN 650 MG: 325 TABLET, FILM COATED ORAL at 23:04

## 2024-03-02 RX ADMIN — PSEUDOEPHEDRINE HCL 30 MG: 30 TABLET, FILM COATED ORAL at 16:39

## 2024-03-02 RX ADMIN — BUPROPION HYDROCHLORIDE 300 MG: 150 TABLET, EXTENDED RELEASE ORAL at 08:14

## 2024-03-02 RX ADMIN — PSEUDOEPHEDRINE HCL 30 MG: 30 TABLET, FILM COATED ORAL at 08:14

## 2024-03-02 RX ADMIN — ACETAMINOPHEN 650 MG: 325 TABLET, FILM COATED ORAL at 16:39

## 2024-03-02 RX ADMIN — OXYCODONE HYDROCHLORIDE 5 MG: 5 TABLET ORAL at 14:00

## 2024-03-02 RX ADMIN — ALBUTEROL SULFATE 2 PUFF: 90 INHALANT RESPIRATORY (INHALATION) at 20:56

## 2024-03-02 RX ADMIN — OXYCODONE HYDROCHLORIDE 5 MG: 5 TABLET ORAL at 18:31

## 2024-03-02 RX ADMIN — OXYCODONE HYDROCHLORIDE 5 MG: 5 TABLET ORAL at 23:04

## 2024-03-02 RX ADMIN — ALBUTEROL SULFATE 2 PUFF: 90 INHALANT RESPIRATORY (INHALATION) at 16:40

## 2024-03-02 RX ADMIN — CEFAZOLIN SODIUM 2 G: 2 INJECTION, SOLUTION INTRAVENOUS at 04:37

## 2024-03-02 RX ADMIN — LIDOCAINE 1 PATCH: 4 PATCH TOPICAL at 10:03

## 2024-03-02 RX ADMIN — PSEUDOEPHEDRINE HCL 30 MG: 30 TABLET, FILM COATED ORAL at 21:00

## 2024-03-02 RX ADMIN — ALLOPURINOL 200 MG: 100 TABLET ORAL at 08:14

## 2024-03-02 RX ADMIN — OXYCODONE HYDROCHLORIDE 5 MG: 5 TABLET ORAL at 09:56

## 2024-03-02 RX ADMIN — ACETAMINOPHEN 650 MG: 325 TABLET, FILM COATED ORAL at 09:12

## 2024-03-02 ASSESSMENT — ACTIVITIES OF DAILY LIVING (ADL)
ADLS_ACUITY_SCORE: 20
ADLS_ACUITY_SCORE: 29
ADLS_ACUITY_SCORE: 29
ADLS_ACUITY_SCORE: 30
ADLS_ACUITY_SCORE: 29
ADLS_ACUITY_SCORE: 29
ADLS_ACUITY_SCORE: 20
ADLS_ACUITY_SCORE: 29
ADLS_ACUITY_SCORE: 20
ADLS_ACUITY_SCORE: 29
ADLS_ACUITY_SCORE: 20
ADLS_ACUITY_SCORE: 30
ADLS_ACUITY_SCORE: 29
ADLS_ACUITY_SCORE: 20
ADLS_ACUITY_SCORE: 29
ADLS_ACUITY_SCORE: 30
ADLS_ACUITY_SCORE: 20
ADLS_ACUITY_SCORE: 29
ADLS_ACUITY_SCORE: 20
ADLS_ACUITY_SCORE: 29
ADLS_ACUITY_SCORE: 20
ADLS_ACUITY_SCORE: 29
ADLS_ACUITY_SCORE: 20

## 2024-03-02 ASSESSMENT — VISUAL ACUITY
OU: NORMAL ACUITY

## 2024-03-02 NOTE — OP NOTE
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    Postoperative diagnosis: Same.    Procedure:  1.  Right common femoral artery cutdown and primary closure.  2.  Ultrasound-guided left common femoral artery access placement.  3.  Arteriogram of the descending thoracic aorta and paravesical aorta.  4.  Intravascular ultrasonography of the descending thoracic aorta (IVUS).  5.  Repair of penetrating aortic ulcer of the descending thoracic aorta using 2 straight stent grafts ([employer]tr[employer]).  6.  Completion aortogram.  7.  Completion iliac arteriogram.    Surgeon: Alin Roe M.D.  Assistant: Stacy Grant M.D.    Assistant: Stacy Atkins M.D.     Anesthesia: General.  Complications: None.  Estimated blood loss: About 100 mL.  Heparin: 13,000 units.  Protamine: 30 mg.  Contrast: 55 mL of Isovue was used.  Fluoroscopy time: 13.7 minutes.  Fluoroscopy dose: 401 mGy.     Implanted devices:  1.  Proximal Valiant thoracic stent graft proximal diameter 32 mm, distal diameter 32 mm and length 100 mm (VAMF [id number] 10TUT).  2.  Distal Valiant thoracic stent graft proximal diameter 30 cm, distal diameter 38 mm and length of 100 mm (VAMF [id number] 100UT).     Indication for procedure: This is a 68-year-old gentleman with known chronic obstructive pulmonary disease.  He was recently admitted to the hospital with exacerbation of chronic obstructive pulmonary disease.  On imaging he was found to have a focal penetrating aortic ulcer in the descending thoracic aorta which had increased significantly from his previous imaging and now measures as above.  Elective repair is advised to prevent future complications such as rupture.  We requested  from neurosurgery to place a spinal drain in case he needs decompression due to spinal cord ischemia.    Procedure details: Patient was identified and then taken to the operating room and placed in supine  position.  General anesthesia was induced.  Then the patient was placed in left lateral decubitus position and spinal drain was placed.  He was then repositioned into supine position.  The chest, abdomen and groins were prepped.  Sterile surgical field was created.  Preoperative intravenous antibiotics were administered.    Preprocedure timeout was conducted.  An oblique incision was made in the right groin and the right common femoral artery was dissected out and placed in Vesseloops.  Then attention was turned to the left common femoral artery and using ultrasonographic guidance the left common femoral artery was accessed with a micropuncture needle followed by placement of a microwire which was then converted to a 0.035 inch wire system with a Glidewire and a 5 Thai sheath was placed.  From the left side we advanced the Glidewire into the descending thoracic aorta and placed a marker pigtail catheter.  From the right side the right common femoral artery was accessed with a micropuncture needle and changed over to a 0.035 inch platform and an 8 Thai sheath was placed.  10,000 units of heparin were administered.    Intravascular ultrasonography of the descending thoracic aorta was performed and we measured the diameter of the normal descending thoracic aorta above and below the penetrating aortic ulcer.  At this time an aortogram was also performed to confirm the position of the penetrating aortic ulcer.  From the right side we had a 0.035 inch Lunderquist wire into the ascending thoracic aorta.  Under direct fluoroscopic guidance a 32 mm stent graft was advanced to 3 cm above the penetrating aortic ulcer and completely deployed.  Delivery system was recaptured under direct fluoroscopic guidance and removed.  Then we went in with the second stent graft and deployed it with 6 cm overlap in about 3-1/2 cm distal to the proximal stent graft.  Completion arteriogram showed that there was complete sealing of the  penetrating aortic ulcer.  The delivery system for the 38 mm graft was removed and a 20 Belarusian sheath was placed in the distal right external iliac artery.  We performed an arteriogram to evaluate the integrity of the right common, hypogastric and external iliac arteries.  There were noted to be patent and without any dissection or perforation.  We gave additional 3000 units of heparin.    Left common femoral arteriogram was performed and the access site was noted to be suitable for Angio-Seal device.    The sheath was removed from the right side and the artery was clamped and repaired with running 6-0 Prolene suture.  There was adequate backbleeding and for bleeding.  There was adequate hemostasis.  Left common femoral artery access site was repaired with a 6 Belarusian Angio-Seal device.    Soft tissue in the right groin was closed with layers of 2-0 Vicryl, 3-0 Vicryl and 4-0 Monocryl.    Counts of instruments, sponges and needle was noted to be correct.    Patient was awakened and extubated in the operating room.    He was moving both upper and lower extremities with equal power.    Was taken to the recovery room in stable condition.

## 2024-03-02 NOTE — PROGRESS NOTES
"VASCULAR SURGERY PROGRESS NOTE    Subjective:  Patient postop day #1 TEVAR with prophylactic spinal drain.  No acute overnight, neurologically intact, MAP goals greater than 80 without need for pressors.  Only notes chronic back pain with prolonged immobilization and laying flat in bed.  Complains of right groin pain this morning.    Objective:  Intake/Output Summary (Last 24 hours) at 3/2/2024 0939  Last data filed at 3/2/2024 0922  Gross per 24 hour   Intake 3403.16 ml   Output 2260 ml   Net 1143.16 ml     PHYSICAL EXAM:  BP (!) 165/101   Pulse 71   Temp 98.4  F (36.9  C) (Bladder)   Resp 19   Ht 1.778 m (5' 10\")   Wt 92 kg (202 lb 13.2 oz)   SpO2 93%   BMI 29.10 kg/m    General: The patient is alert and oriented. Appropriate. No acute distress  Psych: pleasant affect, answers questions appropriately  Skin: Color appropriate for race, warm, dry.  Respiratory: The patient does not require supplemental oxygen. Breathing unlabored  GI:  Abdomen soft, nontender to light palpation.  Extremities: Hip flexion and shoulder shrug and knee flexion strength 5 out of 5, intact and at baseline bilaterally.  Right groin surgical glue noted, no ecchymosis, soft, appropriately tender.  Left groin with no evidence of hematoma, pseudoaneurysm, or bruising.  Strong biphasic PT in right lower extremity, weak monophasic DP.  Left lower extremity with monophasic PT and DP, all pulse exam at patient preop baseline.    Labs:  Hgb 12.6  Platelets 157  Fibrinogen 385  Lactic acid 1.1      ASSESSMENT:  68-year-old male with history of hypertension, hyperlipidemia, peripheral vascular disease with penetrating aortic ulcer and thoracic descending aorta, now status post thoracic endovascular repair on 3/1/24 with prophylactic spinal drain placement due to significant lumbar arteries in the area of descending thoracic aorta.  Neurologically intact.      PLAN:  Continue ICU care today  Continued MAP greater than 80, oral pseudoephedrine " ordered with phenylephrine as needed  Regular diet  Okay for out of bed and ambulate with PT  Tylenol ATC and oxycodone as needed with lidocaine patch for back pain  For acute 2-hour neurological exam with specific attention to proximal muscle strength  Keep spinal drain clamped, not notified neurosurgery and vascular surgery for any changes to neurological exam or issues with drain  Home BP meds held at this time  Continue pulse exam to bilateral lower extremities  Aggressive pulmonary toilet, incentive spirometry at bedside.  Albuterol ordered today.  Continue Phillips  Continue to hold antiplatelet medication and DVT prophylaxis at this time with spinal drain in place.  Will need SCDs at all times.  Appreciate ICU team for assistance with care      Discussed pt history, exam, assessment and plan with Dr. Roe of the vascular surgery service, who is in agreement with the above.    Stacy Grant,   Fellow  VASCULAR SURGERY

## 2024-03-02 NOTE — PROGRESS NOTES
"   03/02/24 0950   Appointment Info   Signing Clinician's Name / Credentials (PT) Zeynep Do PT, DPT   Living Environment   People in Home spouse;child(don), adult;grandchild(don)   Current Living Arrangements house   Home Accessibility stairs to enter home   Number of Stairs, Main Entrance 1   Transportation Anticipated family or friend will provide;car, drives self   Living Environment Comments Pt reports his daughter and grandkids live with he and his wife. Reports he also has sons that live close and can help as needed. States \"Even before this I knew I was going to need some help with the pool and yardwork.\"   Self-Care   Usual Activity Tolerance good   Current Activity Tolerance fair   Regular Exercise No   Equipment Currently Used at Home other (see comments)  (Reports he just started to use O2 at night about two weeks ago. \"After my last admit when I just couldn't breathe.\")   Fall history within last six months no   Activity/Exercise/Self-Care Comment [facility]. States he usually gets about 8K steps in, at least according to his phone.   General Information   Onset of Illness/Injury or Date of Surgery 03/01/24   Referring Physician Stacy Grant, DO   Patient/Family Therapy Goals Statement (PT) \"I don't know if I can do this at home yet.\" \"I'm going to need some help for sure.\"   Pertinent History of Current Problem (include personal factors and/or comorbidities that impact the POC) 68-year-old male with history of hypertension, hyperlipidemia, peripheral vascular disease with penetrating aortic ulcer and thoracic descending aorta, now status post thoracic endovascular repair on 3/1/24 with prophylactic spinal drain placement due to significant lumbar arteries in the area of descending thoracic aorta.   Existing Precautions/Restrictions fall  (Monitor neuro status seconary to lumbar drain, clamped at time of eval.)   General Observations Per fellow via RN " "\"Pt okay to ambulate and be up and OOB with PT.\" Note pt is very red in the face, tends to hold breath, much edu provided and cues to avoid.   Cognition   Affect/Mental Status (Cognition)   (Pt admits to feeling a bit fearful with activity, has had some \"jolts\" of pain, and his back is very uncomfortable (chronic back pain).)   Orientation Status (Cognition) oriented x 4   Follows Commands (Cognition) WNL   Pain Assessment   Patient Currently in Pain Yes, see Vital Sign flowsheet  (At rest and with walk 0/10, but when the \"jolts\" happen 7 or 8 /10, transition from sit<>stand is painful as well.)   Integumentary/Edema   Integumentary/Edema Comments R groin incision, R radial art line, lumbar drain-clamped.   Posture    Posture Forward head position;Protracted shoulders;Kyphosis  (Barrel chested.)   Range of Motion (ROM)   ROM Comment B LEs and UEs WFL   Strength (Manual Muscle Testing)   Strength Comments Demonstrates antigravity strength with functional mobility.   Bed Mobility   Comment, (Bed Mobility) Not assessed.   Transfers   Comment, (Transfers) Sit>stand very slow transition to standing secondary to R groin pain/discomfort/buring at incsion site., CGA.   Gait/Stairs (Locomotion)   Comment, (Gait/Stairs) Bedside gait with CGA, use of walker forward/back and L and R. No LOB, short step length, elevated shoulders, downward gaze.   Balance   Balance Comments Sitting balance fair, standing balance with B UE support intact.   Sensory Examination   Sensory Perception Comments Reports n/t in the toes at baseline.   Clinical Impression   Criteria for Skilled Therapeutic Intervention Yes, treatment indicated   PT Diagnosis (PT) Impaired Gait   Influenced by the following impairments Post op weakness, fatigue, impaired activity tolerance, pain.   Functional limitations due to impairments Decreased functional independence.   Clinical Presentation (PT Evaluation Complexity) stable   Clinical Presentation Rationale See MR " "  Clinical Decision Making (Complexity) low complexity   Planned Therapy Interventions (PT) balance training;bed mobility training;gait training;home exercise program;neuromuscular re-education;patient/family education;prosthetic fitting/training;ROM (range of motion);stair training;strengthening;stretching;transfer training;progressive activity/exercise;home program guidelines;cryotherapy   Risk & Benefits of therapy have been explained evaluation/treatment results reviewed;current/potential barriers reviewed;risks/benefits reviewed;care plan/treatment goals reviewed;participants voiced agreement with care plan;participants included;patient   PT Total Evaluation Time   PT Eval, Low Complexity Minutes (24003) 10   Physical Therapy Goals   PT Frequency Daily   PT Predicted Duration/Target Date for Goal Attainment 03/08/24   PT Goals Bed Mobility;Transfers;Gait;Stairs   PT: Bed Mobility Supervision/stand-by assist;Supine to/from sit;Rolling   PT: Transfers Supervision/stand-by assist;Sit to/from stand;Bed to/from chair;Assistive device;Within precautions   PT: Gait Supervision/stand-by assist;Assistive device;Greater than 200 feet   PT: Stairs Supervision/stand-by assist;1 stair;Rail on both sides  (B rails to simulate \"walls\" / \"door frame\" as pt uses in home set up.)   Interventions   Interventions Quick Adds Gait Training;Therapeutic Activity;Therapeutic Procedure   Therapeutic Activity   Therapeutic Activities: dynamic activities to improve functional performance Minutes (51063) 15   Treatment Detail/Skilled Intervention Significant time setting pt up for mobility, multiple lines present. Swapped out highback for recliner as pt with pain sitting up right, needing some recline to avoid pressure at the groin. Edu on sit<>stand mechanics and placement of UEs and R LE (painful side). Visual demo and discussion of mechanics-pt verbalized understanding, improved ability to lower self to chair by end of session-less " pain. CGA with repeat sit<>stand. Completed seated scoot wt cues for weight shift-placed repositioner pillow for ease of scoot.   Gait Training   Gait Training Minutes (00448) 15   Treatment Detail/Skilled Intervention Gait training included verbal cues for walker proximity, posture and gaze. Increased time for motionr set up. Pt ambulated with WW and CGa, short shuffling steps 125' x 2. Single standing rest 1 min. VSS throughout, on 3L via NC. 93% throughout. RN with wc follow, rehab aide with selina, therapist at gait belt.   PT Discharge Planning   PT Plan Transfers; gait   PT Discharge Recommendation (DC Rec) home with assist   PT Rationale for DC Rec Pt is moving well, a bit apprhensive, but anticipate pt will be supervision or less with functional mobility by time of discharge. Would recommend family to be present with stairs and longer distance walks, rec pt use a walker. Pt has a walker at home for use. Wife and daugher live with him and he has sons close by to assist as well.   PT Brief overview of current status CGA/SBA transfers, gait in valdez with walker.   Total Session Time   Timed Code Treatment Minutes 30   Total Session Time (sum of timed and untimed services) 40

## 2024-03-02 NOTE — PROGRESS NOTES
Postop day 1 from TEVAR repair  Progressing as expected  No complaints  Temp: 98.4  F (36.9  C) Temp src: Bladder BP: (!) 165/101 Pulse: 71   Resp: 19 SpO2: 93 % O2 Device: Nasal cannula Oxygen Delivery: 3 LPM   Awake alert male lying in bed in no acute distress  Chest clear   CV regular rate and rhythm  Abdomen soft  Neuro moving extremities with symmetrical strength  Lab Results   Component Value Date    WBC 12.0 (H) 03/02/2024    HGB 12.6 (L) 03/02/2024    HCT 37.5 (L) 03/02/2024    MCV 89 03/02/2024     03/02/2024   Assessment and plan: Stable postop day 1 from TEVAR repair  Progressing as expected, hemodynamically stable will be in ICU for neurochecks at this time no acute critical care issues we will sign off and follow from periphery.    Vipul Cárdenas MD  Critical Care Medicine

## 2024-03-02 NOTE — CONSULTS
Brief consult note    68-year-old gentleman with tobacco use, COPD, O2 dependent at nighttime, morbid obesity postop day 0 from lumbar drain insertion and TEVAR repair of a penetrating aortic ulcer of the descending thoracic aorta.  He was admitted to the ICU for neurologic monitoring and blood pressure control.  Temp: 96.8  F (36  C) Temp src: Temporal BP: 132/80 Pulse: 58   Resp: 14 SpO2: 92 % O2 Device: Nasal cannula Oxygen Delivery: 3 LPM   Obese male lying in bed in no acute distress  Neck supple with no JVD  Chest is clear anteriorly  CV regular rate and rhythm  Abdomen soft nontender nondistended  Extremities warm well-perfused  Neuro moves all 4 extremities symmetrical       Lab Results   Component Value Date     02/10/2024     02/05/2021    Lab Results   Component Value Date    CHLORIDE 97 02/10/2024    CHLORIDE 108 10/19/2022    CHLORIDE 104 02/05/2021    Lab Results   Component Value Date    BUN 26.8 02/10/2024    BUN 13 10/19/2022    BUN 21 02/05/2021      Lab Results   Component Value Date    POTASSIUM 4.1 03/01/2024    POTASSIUM 3.8 10/19/2022    POTASSIUM 4.4 02/05/2021    Lab Results   Component Value Date    CO2 29 02/10/2024    CO2 28 10/19/2022    CO2 31 02/05/2021    Lab Results   Component Value Date    CR 0.84 03/01/2024    CR 0.82 02/05/2021        Lab Results   Component Value Date    WBC 5.1 02/10/2024    HGB 16.3 02/10/2024    HCT 50.2 02/10/2024    MCV 92 02/10/2024     02/10/2024      Assessment and plan 68-year-old gentleman with COPD status post TEVAR repair of aortic ulcer, has lumbar drain.  Overall appears progressing as expected.  Continue with tight blood pressure monitoring, keep MAP goal of 80.  Serial neurovascular checks.  Every 6 hours CBC.  Will keep spinal drain clamped if any changes initiate monitoring protocol after notifying surgeon.  Keep n.p.o. for now  Will continue to follow the patient overnight.    Vipul Cárdenas MD  Critical Care Medicine

## 2024-03-02 NOTE — PROGRESS NOTES
Postoperative day 1 from right femoral artery exposure and thoracic endovascular aneurysm repair.  No complaints.    Blood pressure has been adequate with mean above 80 mmHg.  Presently not on phenylephrine drip.  He is on oral Sudafed.  Moving both upper and lower extremities equal 5 out of 5 power.  Feet are warm and well-perfused.  Groin site is dry.    Excellent progress noted on postoperative day 1 from thoracic endovascular aneurysm repair.  We will discontinue the Phillips.  Will get him up in chair and a regular diet.  We will keep the lumbar drain in place.    I have ordered his as needed albuterol.  We will change his neurochecks to every 2 hourly per nursing request.    We will keep him in the ICU today.  Hopefully will be able to get him out of the ICU tomorrow when he has been off phenylephrine for at least 24 hours.  This was discussed with the patient and the nursing team.  Will keep the arterial line.  This will be discontinued tomorrow.

## 2024-03-02 NOTE — ANESTHESIA POSTPROCEDURE EVALUATION
Patient: Gabe Smith    Procedure: Procedure(s):  THORACIC ENDOVASCULAR AORTIC REPAIR, RIGHT FEMORAL CUTDOWN  Insert drain lumbar       Anesthesia Type:  MAC    Note:     Postop Pain Control: Uneventful            Sign Out: Well controlled pain   PONV:    Neuro/Psych: Uneventful            Sign Out: Acceptable/Baseline neuro status   Airway/Respiratory: Uneventful            Sign Out: Acceptable/Baseline resp. status   CV/Hemodynamics: Uneventful            Sign Out: Acceptable CV status; No obvious hypovolemia; No obvious fluid overload   Other NRE:    DID A NON-ROUTINE EVENT OCCUR?            Last vitals:  Vitals Value Taken Time   /80 03/01/24 1815   Temp 36  C (96.8  F) 03/01/24 1714   Pulse 63 03/01/24 1821   Resp 14 03/01/24 1821   SpO2 92 % 03/01/24 1821   Vitals shown include unfiled device data.    Electronically Signed By: Kiko Kaiser MD  March 1, 2024  7:32 PM

## 2024-03-02 NOTE — PROGRESS NOTES
"   03/02/24 1400   Appointment Info   Signing Clinician's Name / Credentials (OT) Diamond Johnson OTR/L   Living Environment   People in Home spouse;child(don), adult;grandchild(don)   Current Living Arrangements house   Home Accessibility stairs to enter home   Number of Stairs, Main Entrance 1   Transportation Anticipated family or friend will provide;car, drives self   Living Environment Comments Pt reports his daughter and grand kids live with him and his wife. They have sons that live near by. Pt has a supportive family who is willing to step up and help as needed. Pt has a tub shower w/ grab bars (2) in shower, no grab bars by toilet.   Self-Care   Usual Activity Tolerance good   Current Activity Tolerance moderate   Regular Exercise No   Equipment Currently Used at Home   (2L O2 recently started using ~2 weeks ago from COPD and said this has helped his sleep a significant amount. Owns FWW and 4WW at home.)   Fall history within last six months no   Activity/Exercise/Self-Care Comment Pt works part time doing maintence, Tu/Thu; he is normally moving around a lot during the day. Has a sock aide from previous hip surgeries. Has been donning socks in a modified technique.   Instrumental Activities of Daily Living (IADL)   IADL Comments IND at baseline, chronic low back pain that is worse when he isn't moving around. Pt has been working part time. Drives and manages his own meds; wife can help with home mgmt tasks. Pt states he can do modified work.   General Information   Onset of Illness/Injury or Date of Surgery 03/01/24   Referring Physician Stacy Grant, DO   Patient/Family Therapy Goal Statement (OT) return home   Additional Occupational Profile Info/Pertinent History of Current Problem Per chart review: \"68-year-old gentleman with tobacco use, COPD, O2 dependent at nighttime, morbid obesity postop day 0 from lumbar drain insertion and TEVAR repair of a penetrating aortic ulcer of the descending thoracic " "aorta.\"   Existing Precautions/Restrictions   (LOUISA drain out of lumbar spine but clamped)   Cognitive Status Examination   Orientation Status orientation to person, place and time   Cognitive Status Comments no cognitive concerns   Visual Perception   Impact of Vision Impairment on Function (Vision) reading glasses   Sensory   Sensory Comments numbness in toes but this is baseline   Pain Assessment   Patient Currently in Pain Yes, see Vital Sign flowsheet  (mostly at groin incision site)   Range of Motion Comprehensive   Comment, General Range of Motion BUE WFL, limited active hip ROM   Strength Comprehensive (MMT)   Comment, General Manual Muscle Testing (MMT) Assessment BUE WFL   Coordination   Coordination Comments no concerns   Bed Mobility   Comment (Bed Mobility) Min A for LE swinging back up into bed, increased pain   Transfers   Transfer Comments Min A-CGA w/ FWw   Balance   Balance Comments CGA, limited by line length, see PT note for further balance assessment   Activities of Daily Living   BADL Assessment/Intervention lower body dressing;toileting   Lower Body Dressing Assessment/Training   Comment, (Lower Body Dressing) Mod A but has reacher and sock aide at home and has used previously   Toileting   Comment, (Toileting) Min A toilet tr, has sink and widowsil at home to push up on   Clinical Impression   Criteria for Skilled Therapeutic Interventions Met (OT) Yes, treatment indicated   OT Diagnosis impaired ADL/IADL   OT Problem List-Impairments impacting ADL problems related to;activity tolerance impaired;balance;strength;post-surgical precautions   Assessment of Occupational Performance 1-3 Performance Deficits   Planned Therapy Interventions (OT) ADL retraining;IADL retraining;home program guidelines;progressive activity/exercise;risk factor education   Clinical Decision Making Complexity (OT) problem focused assessment/low complexity   OT Total Evaluation Time   OT Cristal Low Complexity Minutes (58348) " 9   OT Goals   Therapy Frequency (OT) 5 times/week   OT Predicted Duration/Target Date for Goal Attainment 03/07/24   OT Goals Hygiene/Grooming;Upper Body Dressing;Lower Body Dressing;Toilet Transfer/Toileting   OT: Hygiene/Grooming supervision/stand-by assist   OT: Upper Body Dressing Modified independent   OT: Lower Body Dressing Modified independent;within precautions;using adaptive equipment  (reacher/sock aide)   OT: Toilet Transfer/Toileting Modified independent;toilet transfer;cleaning and garment management   Interventions   Interventions Quick Adds Self-Care/Home Management;Therapeutic Activity   Self-Care/Home Management   Self-Care/Home Mgmt/ADL, Compensatory, Meal Prep Minutes (18445) 10   Symptoms Noted During/After Treatment (Meal Preparation/Planning Training) fatigue;increased pain   Treatment Detail/Skilled Intervention OT: Pt sitting on bedside chair, set up w/ g/h items, completed task while sitting unsupported in bedside chair, set up; no physical assist required. Did not go to sink d/t line/tube length this date.   Therapeutic Activities   Therapeutic Activity Minutes (07738) 14   Symptoms noted during/after treatment fatigue;increased pain   Treatment Detail/Skilled Intervention OT: Pt sitting on bedside chair, Min A STS initially, pt w/ heavy use of UE on bedside chair to push up; per RN no spinal precautions; pt completes amb in room for increased activity tolerance required for weaning off O2 and for ADL/IADL needs; Pt marched in place and performed LE ex for >8 minutes. Slight bout of increased pain at groin site, RN aware and pt reports just an uncomfortable movement but resolved w/ time. Slight dizziness but also resolved when standing for a few minutes.   OT Discharge Planning   OT Plan LE dsg w/ reacher or sock aide (has these at home) just review use as needed, toilet tr,   OT Discharge Recommendation (DC Rec) home with assist   OT Rationale for DC Rec Pt functioning below baseline, LB  pain; would benefit from continued IP therapy but expect pt will progress to home w/ assist with heavy IADLs. Reports his work can modify duties for period of time.   Total Session Time   Timed Code Treatment Minutes 24   Total Session Time (sum of timed and untimed services) 33

## 2024-03-03 ENCOUNTER — APPOINTMENT (OUTPATIENT)
Dept: PHYSICAL THERAPY | Facility: CLINIC | Age: 69
DRG: 220 | End: 2024-03-03
Attending: SURGERY
Payer: COMMERCIAL

## 2024-03-03 LAB
ANION GAP SERPL CALCULATED.3IONS-SCNC: 12 MMOL/L (ref 7–15)
APTT PPP: 31 SECONDS (ref 22–38)
BUN SERPL-MCNC: 16 MG/DL (ref 8–23)
CALCIUM SERPL-MCNC: 8.8 MG/DL (ref 8.8–10.2)
CHLORIDE SERPL-SCNC: 98 MMOL/L (ref 98–107)
CREAT SERPL-MCNC: 0.79 MG/DL (ref 0.67–1.17)
DEPRECATED HCO3 PLAS-SCNC: 25 MMOL/L (ref 22–29)
EGFRCR SERPLBLD CKD-EPI 2021: >90 ML/MIN/1.73M2
ERYTHROCYTE [DISTWIDTH] IN BLOOD BY AUTOMATED COUNT: 13.1 % (ref 10–15)
FIBRINOGEN PPP-MCNC: 470 MG/DL (ref 170–490)
GLUCOSE BLDC GLUCOMTR-MCNC: 117 MG/DL (ref 70–99)
GLUCOSE BLDC GLUCOMTR-MCNC: 119 MG/DL (ref 70–99)
GLUCOSE BLDC GLUCOMTR-MCNC: 131 MG/DL (ref 70–99)
GLUCOSE BLDC GLUCOMTR-MCNC: 139 MG/DL (ref 70–99)
GLUCOSE SERPL-MCNC: 151 MG/DL (ref 70–99)
HCT VFR BLD AUTO: 39 % (ref 40–53)
HGB BLD-MCNC: 13.2 G/DL (ref 13.3–17.7)
INR PPP: 1.14 (ref 0.85–1.15)
MCH RBC QN AUTO: 30.8 PG (ref 26.5–33)
MCHC RBC AUTO-ENTMCNC: 33.8 G/DL (ref 31.5–36.5)
MCV RBC AUTO: 91 FL (ref 78–100)
PLATELET # BLD AUTO: 154 10E3/UL (ref 150–450)
POTASSIUM SERPL-SCNC: 4 MMOL/L (ref 3.4–5.3)
RBC # BLD AUTO: 4.29 10E6/UL (ref 4.4–5.9)
SODIUM SERPL-SCNC: 135 MMOL/L (ref 135–145)
WBC # BLD AUTO: 11.6 10E3/UL (ref 4–11)

## 2024-03-03 PROCEDURE — 97530 THERAPEUTIC ACTIVITIES: CPT | Mod: GP | Performed by: PHYSICAL THERAPIST

## 2024-03-03 PROCEDURE — 85610 PROTHROMBIN TIME: CPT | Performed by: STUDENT IN AN ORGANIZED HEALTH CARE EDUCATION/TRAINING PROGRAM

## 2024-03-03 PROCEDURE — 120N000001 HC R&B MED SURG/OB

## 2024-03-03 PROCEDURE — 250N000013 HC RX MED GY IP 250 OP 250 PS 637: Performed by: HOSPITALIST

## 2024-03-03 PROCEDURE — 99222 1ST HOSP IP/OBS MODERATE 55: CPT | Performed by: HOSPITALIST

## 2024-03-03 PROCEDURE — 97110 THERAPEUTIC EXERCISES: CPT | Mod: GP | Performed by: PHYSICAL THERAPIST

## 2024-03-03 PROCEDURE — 85730 THROMBOPLASTIN TIME PARTIAL: CPT | Performed by: STUDENT IN AN ORGANIZED HEALTH CARE EDUCATION/TRAINING PROGRAM

## 2024-03-03 PROCEDURE — 85027 COMPLETE CBC AUTOMATED: CPT | Performed by: STUDENT IN AN ORGANIZED HEALTH CARE EDUCATION/TRAINING PROGRAM

## 2024-03-03 PROCEDURE — 80048 BASIC METABOLIC PNL TOTAL CA: CPT | Performed by: STUDENT IN AN ORGANIZED HEALTH CARE EDUCATION/TRAINING PROGRAM

## 2024-03-03 PROCEDURE — 250N000013 HC RX MED GY IP 250 OP 250 PS 637: Performed by: STUDENT IN AN ORGANIZED HEALTH CARE EDUCATION/TRAINING PROGRAM

## 2024-03-03 PROCEDURE — 85384 FIBRINOGEN ACTIVITY: CPT | Performed by: STUDENT IN AN ORGANIZED HEALTH CARE EDUCATION/TRAINING PROGRAM

## 2024-03-03 RX ORDER — DEXTROSE MONOHYDRATE 25 G/50ML
25-50 INJECTION, SOLUTION INTRAVENOUS
Status: DISCONTINUED | OUTPATIENT
Start: 2024-03-03 | End: 2024-03-05 | Stop reason: HOSPADM

## 2024-03-03 RX ORDER — NICOTINE POLACRILEX 4 MG
15-30 LOZENGE BUCCAL
Status: DISCONTINUED | OUTPATIENT
Start: 2024-03-03 | End: 2024-03-05 | Stop reason: HOSPADM

## 2024-03-03 RX ORDER — FLUTICASONE FUROATE AND VILANTEROL 200; 25 UG/1; UG/1
1 POWDER RESPIRATORY (INHALATION) DAILY
Status: DISCONTINUED | OUTPATIENT
Start: 2024-03-03 | End: 2024-03-05 | Stop reason: HOSPADM

## 2024-03-03 RX ADMIN — ACETAMINOPHEN 650 MG: 325 TABLET, FILM COATED ORAL at 05:06

## 2024-03-03 RX ADMIN — PSEUDOEPHEDRINE HCL 30 MG: 30 TABLET, FILM COATED ORAL at 15:49

## 2024-03-03 RX ADMIN — ACETAMINOPHEN 650 MG: 325 TABLET, FILM COATED ORAL at 12:32

## 2024-03-03 RX ADMIN — OXYCODONE HYDROCHLORIDE 5 MG: 5 TABLET ORAL at 03:35

## 2024-03-03 RX ADMIN — PRAVASTATIN SODIUM 40 MG: 40 TABLET ORAL at 22:09

## 2024-03-03 RX ADMIN — PSEUDOEPHEDRINE HCL 30 MG: 30 TABLET, FILM COATED ORAL at 08:09

## 2024-03-03 RX ADMIN — PSEUDOEPHEDRINE HCL 30 MG: 30 TABLET, FILM COATED ORAL at 22:09

## 2024-03-03 RX ADMIN — ALLOPURINOL 200 MG: 100 TABLET ORAL at 08:09

## 2024-03-03 RX ADMIN — FLUTICASONE FUROATE AND VILANTEROL TRIFENATATE 1 PUFF: 200; 25 POWDER RESPIRATORY (INHALATION) at 17:30

## 2024-03-03 RX ADMIN — ALBUTEROL SULFATE 2 PUFF: 90 INHALANT RESPIRATORY (INHALATION) at 12:34

## 2024-03-03 RX ADMIN — ACETAMINOPHEN 650 MG: 325 TABLET, FILM COATED ORAL at 17:30

## 2024-03-03 RX ADMIN — BUPROPION HYDROCHLORIDE 300 MG: 150 TABLET, EXTENDED RELEASE ORAL at 08:09

## 2024-03-03 RX ADMIN — OXYCODONE HYDROCHLORIDE 5 MG: 5 TABLET ORAL at 19:19

## 2024-03-03 RX ADMIN — OXYCODONE HYDROCHLORIDE 5 MG: 5 TABLET ORAL at 13:12

## 2024-03-03 RX ADMIN — LIDOCAINE 1 PATCH: 4 PATCH TOPICAL at 10:44

## 2024-03-03 RX ADMIN — OXYCODONE HYDROCHLORIDE 5 MG: 5 TABLET ORAL at 08:09

## 2024-03-03 RX ADMIN — ACETAMINOPHEN 650 MG: 325 TABLET, FILM COATED ORAL at 23:38

## 2024-03-03 ASSESSMENT — ACTIVITIES OF DAILY LIVING (ADL)
ADLS_ACUITY_SCORE: 25
ADLS_ACUITY_SCORE: 30
ADLS_ACUITY_SCORE: 29
ADLS_ACUITY_SCORE: 25
ADLS_ACUITY_SCORE: 25
ADLS_ACUITY_SCORE: 29
ADLS_ACUITY_SCORE: 30
ADLS_ACUITY_SCORE: 25
ADLS_ACUITY_SCORE: 29

## 2024-03-03 NOTE — PROGRESS NOTES
"VASCULAR SURGERY PROGRESS NOTE    Subjective:  Patient postop day #2 TEVAR with prophylactic spinal drain. Doing very well, ambulating, tolerating diet, pain in right groin improved, strength at baseline, no complaints.     Objective:  Intake/Output Summary (Last 24 hours) at 3/2/2024 0939  Last data filed at 3/2/2024 0922  Gross per 24 hour   Intake 3403.16 ml   Output 2260 ml   Net 1143.16 ml     PHYSICAL EXAM:  /57   Pulse 66   Temp 96.8  F (36  C) (Oral)   Resp 20   Ht 1.778 m (5' 10\")   Wt 92 kg (202 lb 13.2 oz)   SpO2 94%   BMI 29.10 kg/m    General: The patient is alert and oriented. Appropriate. No acute distress  Psych: pleasant affect, answers questions appropriately  Skin: Color appropriate for race, warm, dry.  Respiratory: The patient does not require supplemental oxygen. Breathing unlabored  GI:  Abdomen soft, nontender to light palpation.  Extremities: Hip flexion and shoulder shrug and knee flexion strength 5 out of 5, intact and at baseline bilaterally.  Right groin surgical glue noted, no ecchymosis, soft, appropriately tender.  Left groin with no evidence of hematoma, pseudoaneurysm, or bruising.  Strong biphasic PT in right lower extremity, weak monophasic DP.  Left lower extremity with biphasic PT and DP, all pulse exam at patient preop baseline.    Labs:  Hgb 13.2  Platelets 154  Fibrinogen 470      ASSESSMENT:  68-year-old male with history of hypertension, hyperlipidemia, peripheral vascular disease with penetrating aortic ulcer and thoracic descending aorta, now status post thoracic endovascular repair on 3/1/24 with prophylactic spinal drain placement due to significant lumbar arteries in the area of descending thoracic aorta.  Neurologically intact.      PLAN:  Plan to downgrade to IM today  Continued MAP greater than 80, oral pseudoephedrine ordered   Regular diet  Okay for out of bed and ambulate with PT  Tylenol ATC and oxycodone as needed with lidocaine patch for back " pain  Q4 hour neurological checks with specific proximal muscle exam  Keep spinal drain clamped today- will plan to remove tomorrow  Home BP meds held- can resume if becomes hypertensive  Continue pulse exam to bilateral lower extremities  Aggressive pulmonary toilet, incentive spirometry at bedside.  Albuterol ordered.  Continue to hold antiplatelet medication and DVT prophylaxis at this time with spinal drain in place.  Will need SCDs at all times.  Appreciate ICU team for assistance with care      Discussed pt history, exam, assessment and plan with Dr. Roe of the vascular surgery service, who is in agreement with the above.    Stacy Grant, DO  Fellow  VASCULAR SURGERY

## 2024-03-03 NOTE — CONSULTS
[facility]  Consult Note - Hospitalist Service  Date of Admission:  3/1/2024  Consult Requested by: Dr. Roe  Reason for Consult: Medical management of chronic medical problems including COPD and hypertension    Assessment & Plan       Gabe Smith is a 68 year old male who has medical history which includes asthma/COPD, gout, hypertension, hyperlipidemia, peripheral vascular disease penetrating aortic ulcer and thoracic descending aorta, now status post thoracic endovascular repair on 3/1/24 with prophylactic spinal drain placement due to significant lumbar arteries in the area of descending thoracic aorta and tolerated the procedure well and hospital medicine team has been consulted for management of chronic medical problems including asthma/COPD and hypertension      Peripheral vascular disease penetrating aortic ulcer and thoracic descending aorta, now status post thoracic endovascular repair on 3/1/24 with prophylactic spinal drain placement   -EBL was 100 ml  -Vascular surgery is primary and as per review of that note they want MAP more than 80  and they have ordered oral pseudoephedrine  -Home blood pressure meds have been held  -Every 4 neurochecks per vascular surgery  -Drain management per vascular surgery and plan to remove drain in the morning  -No DVT prophylaxis or antiplatelet agents until the drain is in place  -Pain control with Tylenol and oxycodone and lidocaine patch        Hypertension  -PTA Home medication include Zestoretic  -Continue to hold PTA blood pressure medication for now given higher MAP goal    COPD/asthma  -On exam he does not have any wheezing but patient is requiring 3 to 4 L of oxygen  -I have encouraged patient for aggressive incentive spirometer  -As needed albuterol inhaler along with DuoNeb as needed  -Will recommend starting PTA fluticasone-Salmeterol and substitute per pharmacy if okay by primary    Gout  -Continue with PTA  "allopurinol    Anxiety  -Continue with PTA Wellbutrin    Hyperlipidemia  -Continue with PTA statin    Diabetes mellitus  -Last HbA1c is 7  on 3/1/2024  -Will start sliding scale insulin         Clinically Significant Risk Factors                  # Hypertension: Noted on problem list       # DMII: A1C = 7.0 % (Ref range: <5.7 %) within past 6 months, PRESENT ON ADMISSION  # Overweight: Estimated body mass index is 29.1 kg/m  as calculated from the following:    Height as of this encounter: 1.778 m (5' 10\").    Weight as of this encounter: 92 kg (202 lb 13.2 oz)., PRESENT ON ADMISSION            Gregg Jaeger MD  Hospitalist Service  Securely message with Vocera (more info)  Text page via [employer] Paging/Directory     I am on admitting shift and his care in the morning and hospital medicine team will continue to follow patient in the morning  ______________________________________________________________________    Chief Complaint     Hospital medicine team consulted for management of chronic medical problems including hypertension, hyperlipidemia and COPD    History is obtained from the patient, chart review     History of Present Illness       Gabe Smith is a 68 year old male who has medical history which includes asthma/COPD, gout, hypertension, hyperlipidemia, peripheral vascular disease penetrating aortic ulcer and thoracic descending aorta, now status post thoracic endovascular repair on 3/1/24 with prophylactic spinal drain placement due to significant lumbar arteries in the area of descending thoracic aorta and tolerated the procedure well and hospital medicine team has been consulted for management of chronic medical problems including asthma/COPD and diabetes mellitus    Patient was operated on 3/1 by the vascular surgery team and neurosurgery attending did place drain and EBL was 100 mL and was followed by the ICU attending    Labs on 3/3/2024 including basic metabolic panel is unremarkable, blood sugar " of 4/1/1951, WBC count of 11.6 with hemoglobin of 13.2 and platelet count of 154.    Patient has been needing oxygenation postoperatively and he will be monitored very closely on [facility]    Past Medical History    Past Medical History:   Diagnosis Date    Abdominal aortic aneurysm (AAA) without rupture (H24) 02/25/2021    AAA 3.6 x 3.6 cm per ultrasound    COPD (chronic obstructive pulmonary disease) (H)     Diabetes mellitus, type 2 (H) 2/23/2024    Dysmetabolic syndrome X     Hyperlipidemia     Hypertension     Osteoarthrosis     Prediabetes 10/09/2014    A1C 6.1    Tobacco use disorder        Past Surgical History   Past Surgical History:   Procedure Laterality Date    COLONOSCOPY N/A 10/19/2015    Procedure: COMBINED COLONOSCOPY, SINGLE OR MULTIPLE BIOPSY/POLYPECTOMY BY BIOPSY;  Surgeon: Gume Vidal MD;  Location:  GI    DAVINCI LAPAROSCOPIC CHOLECYSTECTOMY WITH GRAMS N/A 08/16/2022    Procedure: ROBOT-ASSISTED, LAPAROSCOPIC CHOLECYSTECTOMY WITH CHOLANGIOGRAM;  Surgeon: Carlota Leavitt MD;  Location:  OR    ENDOSCOPIC RETROGRADE CHOLANGIOPANCREATOGRAM N/A 08/17/2022    Procedure: ENDOSCOPIC RETROGRADE CHOLANGIOPANCREATOGRAPHY, WITH SPHINCTEROTOMY;  Surgeon: Jani Cash MD;  Location:  OR    ORTHOPEDIC SURGERY      left hip, right hip    SURGICAL HISTORY OF -   01/01/2000    Left MOISES    SURGICAL HISTORY OF -       Unspecified knee surgeries as a child    SURGICAL HISTORY OF -   10/01/2010    Right MOISES, with left knee arthroscopy, meniscectomy       Medications         I did review his home medications    Physical Exam   Vital Signs: Temp: 97.5  F (36.4  C) Temp src: Oral BP: 122/70 Pulse: 69   Resp: 18 SpO2: 95 % O2 Device: Nasal cannula Oxygen Delivery: 4 LPM  Weight: 202 lbs 13.17 oz        General: Patient appears comfortable and in no acute distress.  HEENT: Head is atraumatic, normocephalic.  Pupils are equal, round and reactive to light.  No scleral icterus. Oral mucosa is moist    Neck: Neck is supple  Respiratory: Lungs are clear to auscultation bilaterally with no wheeze or crackles   Cardiovascular: Regular rate , S1 and S2 normal with no murmer or rubs or gallops  Abdomen:   soft , non tender , non distended and bowel sound present and has lumbar drain in place  Skin: No skin rashes or lesions to inspection or palpation.  Neurologic: Higher functions are within normal limits. No obvious defects in speech, language and memory. No facial droop  Musculoskeletal: Normal Range of motion over upper and lower extremities bilaterally   Psychiatric: cooperative      Medical Decision Making       Time spent in care of patient is 45 minutes and I reviewed labs including CBC, basic metabolic panel and discussed plan of care in detail with the patient, nursing staff      Data     I have personally reviewed the following data over the past 24 hrs:    11.6 (H)  \   13.2 (L)   / 154     135 98 16.0 /  139 (H)   4.0 25 0.79 \     INR:  1.14 PTT:  31   D-dimer:  N/A Fibrinogen:  470       Imaging results reviewed over the past 24 hrs:   No results found for this or any previous visit (from the past 24 hour(s)).

## 2024-03-03 NOTE — PLAN OF CARE
"  Problem: Adult Inpatient Plan of Care  Goal: Plan of Care Review  Description: The Plan of Care Review/Shift note should be completed every shift.  The Outcome Evaluation is a brief statement about your assessment that the patient is improving, declining, or no change.  This information will be displayed automatically on your shift  note.  Outcome: Progressing  Flowsheets (Taken 3/2/2024 1838)  Outcome Evaluation: A & Ox4, complains of pain at groin site. walked w/ PT & OT. PRN oxycodone given with good relief. lidocain patch in place. Neuros remain intact. Lumbar drain remains capped. Contact Vascular Fellow if pt continues to have htn >180 for PRN medications.  Plan of Care Reviewed With: patient  Overall Patient Progress: improving  Goal: Patient-Specific Goal (Individualized)  Description: You can add care plan individualizations to a care plan. Examples of Individualization might be:  \"Parent requests to be called daily at 9am for status\", \"I have a hard time hearing out of my right ear\", or \"Do not touch me to wake me up as it startles  me\".  Outcome: Progressing  Goal: Absence of Hospital-Acquired Illness or Injury  Outcome: Progressing  Intervention: Identify and Manage Fall Risk  Recent Flowsheet Documentation  Taken 3/2/2024 1600 by Abigail Montalvo RN  Safety Promotion/Fall Prevention:   clutter free environment maintained   safety round/check completed  Taken 3/2/2024 1200 by Abigail Montalvo RN  Safety Promotion/Fall Prevention:   clutter free environment maintained   safety round/check completed  Taken 3/2/2024 0800 by Abigail Montalvo RN  Safety Promotion/Fall Prevention:   clutter free environment maintained   safety round/check completed  Intervention: Prevent Skin Injury  Recent Flowsheet Documentation  Taken 3/2/2024 0800 by Abigail Montalvo RN  Skin Protection:   silicone foam dressing in place   skin to device areas padded   skin to skin areas padded  Device Skin Pressure Protection:   " positioning supports utilized   pressure points protected   skin-to-device areas padded   skin-to-skin areas padded  Intervention: Prevent Infection  Recent Flowsheet Documentation  Taken 3/2/2024 1600 by Abigail Montalvo RN  Infection Prevention: hand hygiene promoted  Taken 3/2/2024 1200 by Abigail Montalvo RN  Infection Prevention: hand hygiene promoted  Taken 3/2/2024 0800 by Abigail Montalvo RN  Infection Prevention: hand hygiene promoted  Goal: Optimal Comfort and Wellbeing  Outcome: Progressing  Intervention: Monitor Pain and Promote Comfort  Recent Flowsheet Documentation  Taken 3/2/2024 1831 by Abigail Montalvo RN  Pain Management Interventions: medication (see MAR)  Taken 3/2/2024 1800 by Abigail Montalvo RN  Pain Management Interventions: repositioned  Taken 3/2/2024 1639 by Abigail Montalvo RN  Pain Management Interventions: medication (see MAR)  Taken 3/2/2024 1400 by Abigail Montalvo RN  Pain Management Interventions: medication (see MAR)  Taken 3/2/2024 0912 by Abigail Montalvo RN  Pain Management Interventions: medication (see MAR)  Taken 3/2/2024 0800 by Abigail Montalvo RN  Pain Management Interventions: cold applied  Intervention: Provide Person-Centered Care  Recent Flowsheet Documentation  Taken 3/2/2024 1600 by Abigail Montalvo RN  Trust Relationship/Rapport:   choices provided   care explained   emotional support provided   questions answered   questions encouraged   reassurance provided   thoughts/feelings acknowledged  Taken 3/2/2024 1200 by Abigail Montalvo RN  Trust Relationship/Rapport:   choices provided   care explained   emotional support provided   questions answered   questions encouraged   reassurance provided   thoughts/feelings acknowledged  Taken 3/2/2024 0800 by Abigail Montalvo RN  Trust Relationship/Rapport:   choices provided   care explained   emotional support provided   questions answered   questions encouraged   reassurance provided   thoughts/feelings  acknowledged  Goal: Readiness for Transition of Care  Outcome: Progressing     Problem: Comorbidity Management  Goal: Blood Pressure in Desired Range  Outcome: Progressing  Intervention: Maintain Blood Pressure Management  Recent Flowsheet Documentation  Taken 3/2/2024 1600 by Abigail Montalvo RN  Medication Review/Management: medications reviewed  Taken 3/2/2024 1200 by Abigail Montalvo RN  Medication Review/Management: medications reviewed  Taken 3/2/2024 0800 by Abigail Montalvo RN  Medication Review/Management: medications reviewed     Problem: Aortic Aneurysm/Dissection Repair  Goal: Absence of Bleeding  Outcome: Progressing  Intervention: Monitor and Manage Bleeding  Recent Flowsheet Documentation  Taken 3/2/2024 0800 by Abigail Montalvo RN  Bleeding Management: dressing monitored  Goal: Effective Bowel Elimination  Outcome: Progressing  Goal: Vital Signs Remain in Desired Range  Outcome: Progressing  Intervention: Optimize Blood Flow  Recent Flowsheet Documentation  Taken 3/2/2024 0800 by Abigail Montalvo RN  Fluid/Electrolyte Management: fluids provided  Goal: Absence of Infection Signs and Symptoms  Outcome: Progressing  Intervention: Prevent or Manage Infection  Recent Flowsheet Documentation  Taken 3/2/2024 0800 by Abigail Montalvo RN  Infection Management: aseptic technique maintained  Goal: Anesthesia/Sedation Recovery  Outcome: Progressing  Intervention: Optimize Anesthesia Recovery  Recent Flowsheet Documentation  Taken 3/2/2024 1600 by Abigail Montalvo RN  Safety Promotion/Fall Prevention:   clutter free environment maintained   safety round/check completed  Administration (IS): self-administered  Taken 3/2/2024 1200 by Abigail Montalvo RN  Safety Promotion/Fall Prevention:   clutter free environment maintained   safety round/check completed  Administration (IS): self-administered  Taken 3/2/2024 0800 by Abigail Montalvo RN  Safety Promotion/Fall Prevention:   clutter free environment  maintained   safety round/check completed  Administration (IS): self-administered  Reorientation Measures: clock in view  Goal: Acceptable Pain Control  Outcome: Progressing  Intervention: Prevent or Manage Pain  Recent Flowsheet Documentation  Taken 3/2/2024 1831 by Abigail Montalvo RN  Pain Management Interventions: medication (see MAR)  Taken 3/2/2024 1800 by Abigail Montalvo RN  Pain Management Interventions: repositioned  Taken 3/2/2024 1639 by Abigail Montalvo RN  Pain Management Interventions: medication (see MAR)  Taken 3/2/2024 1400 by Abigail Montalvo RN  Pain Management Interventions: medication (see MAR)  Taken 3/2/2024 0912 by Abigail Montalvo RN  Pain Management Interventions: medication (see MAR)  Taken 3/2/2024 0800 by Abigail Montalvo RN  Pain Management Interventions: cold applied  Goal: Nausea and Vomiting Relief  Outcome: Progressing  Goal: Effective Urinary Elimination  Outcome: Progressing  Intervention: Monitor and Manage Urinary Retention  Recent Flowsheet Documentation  Taken 3/2/2024 0800 by Abigail Montalvo RN  Urinary Elimination Promotion: frequent voiding encouraged  Goal: Effective Oxygenation and Ventilation  Outcome: Progressing  Intervention: Optimize Oxygenation and Ventilation  Recent Flowsheet Documentation  Taken 3/2/2024 1600 by Abigail Montalvo RN  Administration (IS): self-administered  Taken 3/2/2024 1200 by Abigail Montalvo RN  Administration (IS): self-administered  Taken 3/2/2024 0800 by Abigail Montalvo RN  Administration (IS): self-administered  Airway/Ventilation Management:   airway patency maintained   pulmonary hygiene promoted   humidification applied  Goal: Effective Peripheral Tissue Perfusion  Outcome: Progressing  Intervention: Optimize Tissue Perfusion  Recent Flowsheet Documentation  Taken 3/2/2024 1800 by Abigail Montalvo RN  Activity Management: up in chair  Taken 3/2/2024 1600 by Abigail Montalvo RN  Activity Management: up in chair  Taken  3/2/2024 1400 by Abigail Monatlvo, RN  Activity Management: activity adjusted per tolerance  Taken 3/2/2024 1200 by Abigail Montalvo RN  Activity Management:   activity adjusted per tolerance   up in chair  Taken 3/2/2024 0800 by Abigail Montalvo RN  Activity Management: activity adjusted per tolerance   Goal Outcome Evaluation:      Plan of Care Reviewed With: patient    Overall Patient Progress: improvingOverall Patient Progress: improving    Outcome Evaluation: A & Ox4, complains of pain at groin site. walked w/ PT & OT. PRN oxycodone given with good relief. lidocain patch in place. Neuros remain intact. Lumbar drain remains capped. Contact Vascular Fellow if pt continues to have htn >180 for PRN medications.

## 2024-03-03 NOTE — PLAN OF CARE
Goal Outcome Evaluation:  Patient uneventful night, continues to have pain associated with right groin.  Patient up out of bed several times for bathroom and to stretch legs, appears stable on feet.  CMS stable, continues to have baseline tingling/numbness in toes, but this remains unchanged from his baseline.  Back pain present with laying in bed, patient was able to get some sleep, but discomfort from bed, frequent neuros, and assessment prevented him from getting long periods of sleep.  Patient up to chair x2.      Problem: Adult Inpatient Plan of Care  Goal: Absence of Hospital-Acquired Illness or Injury  Intervention: Prevent Skin Injury  Recent Flowsheet Documentation  Taken 3/2/2024 2200 by Tomi Kruse RN  Body Position: position changed independently  Taken 3/2/2024 2000 by Tomi Kruse RN  Body Position: position changed independently  Intervention: Prevent and Manage VTE (Venous Thromboembolism) Risk  Recent Flowsheet Documentation  Taken 3/2/2024 2000 by Tomi Kruse RN  VTE Prevention/Management: SCDs (sequential compression devices) on  Intervention: Prevent Infection  Recent Flowsheet Documentation  Taken 3/2/2024 2000 by Tomi Kruse RN  Infection Prevention: hand hygiene promoted  Goal: Optimal Comfort and Wellbeing  Intervention: Provide Person-Centered Care  Recent Flowsheet Documentation  Taken 3/2/2024 2000 by Tomi Kruse RN  Trust Relationship/Rapport:   choices provided   care explained   emotional support provided   questions answered   questions encouraged   reassurance provided   thoughts/feelings acknowledged

## 2024-03-04 ENCOUNTER — APPOINTMENT (OUTPATIENT)
Dept: OCCUPATIONAL THERAPY | Facility: CLINIC | Age: 69
DRG: 220 | End: 2024-03-04
Attending: SURGERY
Payer: COMMERCIAL

## 2024-03-04 ENCOUNTER — APPOINTMENT (OUTPATIENT)
Dept: PHYSICAL THERAPY | Facility: CLINIC | Age: 69
DRG: 220 | End: 2024-03-04
Attending: SURGERY
Payer: COMMERCIAL

## 2024-03-04 LAB
ANION GAP SERPL CALCULATED.3IONS-SCNC: 10 MMOL/L (ref 7–15)
BUN SERPL-MCNC: 16.5 MG/DL (ref 8–23)
CALCIUM SERPL-MCNC: 8.8 MG/DL (ref 8.8–10.2)
CHLORIDE SERPL-SCNC: 99 MMOL/L (ref 98–107)
CREAT SERPL-MCNC: 0.8 MG/DL (ref 0.67–1.17)
DEPRECATED HCO3 PLAS-SCNC: 28 MMOL/L (ref 22–29)
EGFRCR SERPLBLD CKD-EPI 2021: >90 ML/MIN/1.73M2
ERYTHROCYTE [DISTWIDTH] IN BLOOD BY AUTOMATED COUNT: 13.1 % (ref 10–15)
GLUCOSE BLDC GLUCOMTR-MCNC: 115 MG/DL (ref 70–99)
GLUCOSE BLDC GLUCOMTR-MCNC: 132 MG/DL (ref 70–99)
GLUCOSE BLDC GLUCOMTR-MCNC: 145 MG/DL (ref 70–99)
GLUCOSE BLDC GLUCOMTR-MCNC: 161 MG/DL (ref 70–99)
GLUCOSE SERPL-MCNC: 128 MG/DL (ref 70–99)
HCT VFR BLD AUTO: 38 % (ref 40–53)
HGB BLD-MCNC: 12.3 G/DL (ref 13.3–17.7)
MCH RBC QN AUTO: 29.6 PG (ref 26.5–33)
MCHC RBC AUTO-ENTMCNC: 32.4 G/DL (ref 31.5–36.5)
MCV RBC AUTO: 92 FL (ref 78–100)
PLATELET # BLD AUTO: 148 10E3/UL (ref 150–450)
POTASSIUM SERPL-SCNC: 4 MMOL/L (ref 3.4–5.3)
RBC # BLD AUTO: 4.15 10E6/UL (ref 4.4–5.9)
SODIUM SERPL-SCNC: 137 MMOL/L (ref 135–145)
WBC # BLD AUTO: 10.9 10E3/UL (ref 4–11)

## 2024-03-04 PROCEDURE — 99231 SBSQ HOSP IP/OBS SF/LOW 25: CPT | Mod: 24

## 2024-03-04 PROCEDURE — 120N000001 HC R&B MED SURG/OB

## 2024-03-04 PROCEDURE — 250N000013 HC RX MED GY IP 250 OP 250 PS 637: Performed by: STUDENT IN AN ORGANIZED HEALTH CARE EDUCATION/TRAINING PROGRAM

## 2024-03-04 PROCEDURE — 250N000012 HC RX MED GY IP 250 OP 636 PS 637: Performed by: HOSPITALIST

## 2024-03-04 PROCEDURE — 99232 SBSQ HOSP IP/OBS MODERATE 35: CPT | Performed by: INTERNAL MEDICINE

## 2024-03-04 PROCEDURE — 36415 COLL VENOUS BLD VENIPUNCTURE: CPT | Performed by: STUDENT IN AN ORGANIZED HEALTH CARE EDUCATION/TRAINING PROGRAM

## 2024-03-04 PROCEDURE — 85027 COMPLETE CBC AUTOMATED: CPT | Performed by: STUDENT IN AN ORGANIZED HEALTH CARE EDUCATION/TRAINING PROGRAM

## 2024-03-04 PROCEDURE — 97530 THERAPEUTIC ACTIVITIES: CPT | Mod: GP | Performed by: PHYSICAL THERAPIST

## 2024-03-04 PROCEDURE — 80048 BASIC METABOLIC PNL TOTAL CA: CPT | Performed by: STUDENT IN AN ORGANIZED HEALTH CARE EDUCATION/TRAINING PROGRAM

## 2024-03-04 PROCEDURE — 97535 SELF CARE MNGMENT TRAINING: CPT | Mod: GO

## 2024-03-04 PROCEDURE — 97116 GAIT TRAINING THERAPY: CPT | Mod: GP | Performed by: PHYSICAL THERAPIST

## 2024-03-04 RX ADMIN — FLUTICASONE FUROATE AND VILANTEROL TRIFENATATE 1 PUFF: 200; 25 POWDER RESPIRATORY (INHALATION) at 08:47

## 2024-03-04 RX ADMIN — PSEUDOEPHEDRINE HCL 30 MG: 30 TABLET, FILM COATED ORAL at 15:05

## 2024-03-04 RX ADMIN — INSULIN ASPART 1 UNITS: 100 INJECTION, SOLUTION INTRAVENOUS; SUBCUTANEOUS at 11:52

## 2024-03-04 RX ADMIN — OXYCODONE HYDROCHLORIDE 5 MG: 5 TABLET ORAL at 04:12

## 2024-03-04 RX ADMIN — OXYCODONE HYDROCHLORIDE 5 MG: 5 TABLET ORAL at 20:54

## 2024-03-04 RX ADMIN — ALLOPURINOL 200 MG: 100 TABLET ORAL at 08:44

## 2024-03-04 RX ADMIN — ACETAMINOPHEN 650 MG: 325 TABLET, FILM COATED ORAL at 11:53

## 2024-03-04 RX ADMIN — PSEUDOEPHEDRINE HCL 30 MG: 30 TABLET, FILM COATED ORAL at 11:53

## 2024-03-04 RX ADMIN — OXYCODONE HYDROCHLORIDE 5 MG: 5 TABLET ORAL at 15:05

## 2024-03-04 RX ADMIN — PRAVASTATIN SODIUM 40 MG: 40 TABLET ORAL at 21:46

## 2024-03-04 RX ADMIN — ACETAMINOPHEN 650 MG: 325 TABLET, FILM COATED ORAL at 06:37

## 2024-03-04 RX ADMIN — ACETAMINOPHEN 650 MG: 325 TABLET, FILM COATED ORAL at 17:16

## 2024-03-04 RX ADMIN — OXYCODONE HYDROCHLORIDE 5 MG: 5 TABLET ORAL at 00:00

## 2024-03-04 RX ADMIN — BUPROPION HYDROCHLORIDE 300 MG: 150 TABLET, EXTENDED RELEASE ORAL at 08:44

## 2024-03-04 RX ADMIN — OXYCODONE HYDROCHLORIDE 5 MG: 5 TABLET ORAL at 08:44

## 2024-03-04 RX ADMIN — LIDOCAINE 1 PATCH: 4 PATCH TOPICAL at 11:52

## 2024-03-04 RX ADMIN — PSEUDOEPHEDRINE HCL 30 MG: 30 TABLET, FILM COATED ORAL at 21:46

## 2024-03-04 ASSESSMENT — ACTIVITIES OF DAILY LIVING (ADL)
ADLS_ACUITY_SCORE: 25

## 2024-03-04 NOTE — PROGRESS NOTES
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  Vascular surgery is primary.    S/p Repair of penetrating aortic ulcer of descending thoracic aorta using stent grafts, 3/1/2024, Dr. Roe and s/p Lumbar Drain Placement, 3/1/2024  -Lumbar drain placed due to significant lumbar arteries and area of descending thoracic aorta  -Management per vascular surgery.  Drain is expected to be removed on 3/4  -Initial vascular surgery note recommended keeping MAP over 80.  Antihypertensives on hold and he was started on pseudoephedrine  -antiplatelet and DVT prophylaxis per vascular surgery.  Expected to start after drain removal  -Every 4 hours neurochecks  -Continue pain control with Tylenol, lidocaine patch  -Continue PT    Acute blood loss anemia due to surgical blood loss  -Hemoglobin decreased from 16 to 12.3 and has remained stable      Essential hypertension  -PTA-amlodipine 10 mg, lisinopril-HCTZ 20-12.5 mg  -Blood pressures in normal range.  Antihypertensives on hold.  Goal was to keep MAP over 80    Hyperlipidemia  PTA-pravastatin 40 mg, continue    Diabetes mellitus type 2, diet controlled, A1c 7  -Blood sugars are well-controlled  -Continue sliding scale insulin    COPD/asthma  -No acute exacerbation  -Continue Breo Ellipta inhaler, as needed albuterol inhaler and DuoNebs    Chronic gout, no acute exacerbation  -Continue allopurinol    Generalized anxiety disorder  -Continue Wellbutrin            Diet: Regular Diet Adult    DVT Prophylaxis: Pneumatic Compression Devices  Phillips Catheter: Not present  Lines: None     Cardiac Monitoring: None  Code Status: Full Code      Clinically Significant Risk Factors             "      # Hypertension: Noted on problem list       # DMII: A1C = 7.0 % (Ref range: <5.7 %) within past 6 months, PRESENT ON ADMISSION  # Overweight: Estimated body mass index is 29.1 kg/m  as calculated from the following:    Height as of this encounter: 1.778 m (5' 10\").    Weight as of this encounter: 92 kg (202 lb 13.2 oz)., PRESENT ON ADMISSION            Disposition Plan      Expected Discharge Date: 03/05/2024      Destination: home              Haley Linda MD  Hospitalist Service  [facility]  Securely message with [employer] (more info)  Text page via [employer] Paging/Directory   ______________________________________________________________________    Interval History   Patient reports he is doing well.  Complains of back pain.  Hoping to have drain removed today.  Denies shortness of breath but noted to be on 4 L of oxygen    Physical Exam   Vital Signs: Temp: 98.4  F (36.9  C) Temp src: Oral BP: 120/59 Pulse: 67   Resp: 16 SpO2: 97 % O2 Device: None (Room air) Oxygen Delivery: 4 LPM  Weight: 202 lbs 13.17 oz    Constitutional - awake and alert, resting in bed, in no acute distress  Cardiovascular - regular rate and rhythm, no murmurs, no edema  Pulmonary - lungs are clear to auscultation bilaterally, no wheezing or rhonchi  GI - abdomen is soft, nontender, nondistended  Integumentary - skin is warm and dry, no rashes or ulcers  Neurological - awake, alert and oriented x3.  Moving all 4 extremities, normal speech, no focal deficits    Medical Decision Making       40 MINUTES SPENT BY ME on the date of service doing chart review, history, exam, documentation & further activities per the note.      Data     I have personally reviewed the following data over the past 24 hrs:    10.9  \   12.3 (L)   / 148 (L)     137 99 16.5 /  145 (H)   4.0 28 0.80 \       Imaging results reviewed over the past 24 hrs:   No results found for this or any previous visit (from the past 24 hour(s)).  "

## 2024-03-04 NOTE — PROGRESS NOTES
Neuro: A/Ox3, follows commands, moves all extremities equally, lumbar drain capped, remains in place, Q4h neuro checks per order.   CV: SR, A-line pulled today.  Respiratory: 4L NC sating in 90's.   GI/: Adequate UOP, using urinal.  Skin: Right and left groin site, lumbar drain.    Activity: Sitting at edge of bed for most of day, took two walks around unit.  Diet: Regular diet, adequate appetite.   Drips: No drips.   Other: Family visited.   Plan: Awaiting bed placement on Neuroscience unit.

## 2024-03-04 NOTE — PLAN OF CARE
Occupational Therapy Discharge Summary    Reason for therapy discharge:    All goals and outcomes met, no further needs identified.    Progress towards therapy goal(s). See goals on Care Plan in [facility] electronic health record for goal details.  Goals met    Therapy recommendation(s):    Recommend continued ambulation with nursing while inpatient. Recommend assist from family for heavier household tasks.

## 2024-03-04 NOTE — PROGRESS NOTES
"VASCULAR SURGERY PROGRESS NOTE    Subjective:  Postop day #3 TEVAR with prophylactic spinal drain. Continues to do well, regular diet-tolerating well, strength at baseline, denies pain in right groin, no complaints.     Objective:  Intake/Output Summary (Last 24 hours) at 3/4/2024 0841  Last data filed at 3/4/2024 0740  Gross per 24 hour   Intake 30 ml   Output 1425 ml   Net -1395 ml     PHYSICAL EXAM:  /62 (BP Location: Right arm)   Pulse 64   Temp 98.4  F (36.9  C) (Oral)   Resp 16   Ht 1.778 m (5' 10\")   Wt 92 kg (202 lb 13.2 oz)   SpO2 97%   BMI 29.10 kg/m    General: The patient is alert and oriented. Appropriate. No acute distress  Psych: pleasant affect, answers questions appropriately  Skin: Color appropriate for race, warm, dry.  Respiratory: The patient does not require supplemental oxygen. Breathing unlabored  GI:  Abdomen soft, nontender to light palpation.  Extremities: Hip flexion and shoulder shrug and knee flexion strength 5 out of 5, intact and at baseline bilaterally.  Right groin surgical glue noted, no ecchymosis, soft, appropriately tender.  Left groin with no evidence of hematoma, pseudoaneurysm, or bruising.  Strong biphasic PT in right lower extremity, weak monophasic DP.  Left lower extremity with biphasic PT and DP, all pulse exam at patient preop baseline.    ASSESSMENT:  68-year-old male with history of hypertension, hyperlipidemia, peripheral vascular disease with penetrating aortic ulcer and thoracic descending aorta, now status post thoracic endovascular repair on 3/1/24 with prophylactic spinal drain placement due to significant lumbar arteries in the area of descending thoracic aorta. Neurologically intact.    Labs:  HGB: 12.3  Platelets: 148      PLAN:  -plan to remove spinal drain today, resume antiplatelet medication and DVT prophylaxis after removal  -q4hour neurological checs  -continue current pain regimen  -regular diet  -oob, continue to work with physical " therapy  -aggressive pulmonary toilet, albuterol prn  -appreciate all teams involved in Gabe's care    Anastasiya Smith NP  VASCULAR SURGERY

## 2024-03-04 NOTE — PLAN OF CARE
Reason for Admission: POD 3 TEVAR with prophylactic spinal drain    Cognitive/Mentation: A/Ox 4  Neuros/CMS: Intact ex baseline numbness/tingling to feet  VS: stable on 4 L O2 NC.   GI: Last BM 03/02. Continent.  : Continent.  Pulmonary: LS diminished. Dyspnea upon exertion  Pain: R groin/back - oxy prn    Drains/Lines: PIV - SL  Skin: intact ex R/L groin site, lumbar drain (clamped)  Activity: Assist x 1 with GBW.  Diet: reg with thin liquids. Takes pills whole.     Therapies recs: pending  Discharge: pending    Aggression Stoplight Tool: green    End of shift summary: Pt arrived from ICU @ 0000. Pain well controlled with oxy 5mg q4h. Pt up to the bathroom x1 this shift.

## 2024-03-04 NOTE — PLAN OF CARE
Goal Outcome Evaluation:      Plan of Care Reviewed With: patient    Overall Patient Progress: improvingOverall Patient Progress: improving    Outcome Evaluation: Care provided [id number]. A/O x4, all neuros intact x baseline numbness in toes. BLE pedal pulses dopplerable. VSS on 4 L NC, tele SR, normotensive, Lumbar drain remains capped, pt trasnferred to st [facility] after report given to ROBEL Angel

## 2024-03-04 NOTE — PROVIDER NOTIFICATION
Page to Dr. Grant at [phone]    [facility] , [facility], pt has transfer orders placed by you for IMC, does he need IMC level care since he is q4h neuro checks/vitals are stable, thanks Gayathri HUSTON [phone]    Response: Discussed that since patient has q4h neuros/vitals per unit routine, these are not reflective of IMC orders. Dr. Grant stated he can be a regular assignment but would prefer to have him on a floor that could make him IMC if a clinical change occurs such as the drain needed to be unclamped. He will be transferring to st 73 which is appropriate for this.

## 2024-03-05 ENCOUNTER — TELEPHONE (OUTPATIENT)
Dept: CARDIOLOGY | Facility: CLINIC | Age: 69
End: 2024-03-05
Payer: COMMERCIAL

## 2024-03-05 VITALS
WEIGHT: 202.82 LBS | SYSTOLIC BLOOD PRESSURE: 108 MMHG | BODY MASS INDEX: 29.04 KG/M2 | TEMPERATURE: 97.7 F | HEART RATE: 70 BPM | OXYGEN SATURATION: 94 % | RESPIRATION RATE: 18 BRPM | HEIGHT: 70 IN | DIASTOLIC BLOOD PRESSURE: 62 MMHG

## 2024-03-05 LAB
GLUCOSE BLDC GLUCOMTR-MCNC: 117 MG/DL (ref 70–99)
GLUCOSE BLDC GLUCOMTR-MCNC: 129 MG/DL (ref 70–99)
GLUCOSE BLDC GLUCOMTR-MCNC: 154 MG/DL (ref 70–99)

## 2024-03-05 PROCEDURE — 250N000013 HC RX MED GY IP 250 OP 250 PS 637: Performed by: STUDENT IN AN ORGANIZED HEALTH CARE EDUCATION/TRAINING PROGRAM

## 2024-03-05 PROCEDURE — 99232 SBSQ HOSP IP/OBS MODERATE 35: CPT | Performed by: INTERNAL MEDICINE

## 2024-03-05 PROCEDURE — 99231 SBSQ HOSP IP/OBS SF/LOW 25: CPT | Mod: 24 | Performed by: PHYSICIAN ASSISTANT

## 2024-03-05 PROCEDURE — 99238 HOSP IP/OBS DSCHRG MGMT 30/<: CPT | Mod: 24

## 2024-03-05 RX ORDER — OXYCODONE HYDROCHLORIDE 5 MG/1
5 TABLET ORAL EVERY 4 HOURS PRN
Qty: 6 TABLET | Refills: 0 | Status: SHIPPED | OUTPATIENT
Start: 2024-03-05 | End: 2024-05-10

## 2024-03-05 RX ADMIN — PSEUDOEPHEDRINE HCL 30 MG: 30 TABLET, FILM COATED ORAL at 09:03

## 2024-03-05 RX ADMIN — OXYCODONE HYDROCHLORIDE 5 MG: 5 TABLET ORAL at 10:58

## 2024-03-05 RX ADMIN — BUPROPION HYDROCHLORIDE 300 MG: 150 TABLET, EXTENDED RELEASE ORAL at 09:03

## 2024-03-05 RX ADMIN — LIDOCAINE 1 PATCH: 4 PATCH TOPICAL at 10:53

## 2024-03-05 RX ADMIN — ALLOPURINOL 200 MG: 100 TABLET ORAL at 09:03

## 2024-03-05 RX ADMIN — ACETAMINOPHEN 650 MG: 325 TABLET, FILM COATED ORAL at 00:05

## 2024-03-05 RX ADMIN — ACETAMINOPHEN 650 MG: 325 TABLET, FILM COATED ORAL at 12:41

## 2024-03-05 RX ADMIN — FLUTICASONE FUROATE AND VILANTEROL TRIFENATATE 1 PUFF: 200; 25 POWDER RESPIRATORY (INHALATION) at 09:06

## 2024-03-05 RX ADMIN — OXYCODONE HYDROCHLORIDE 5 MG: 5 TABLET ORAL at 03:13

## 2024-03-05 RX ADMIN — ACETAMINOPHEN 650 MG: 325 TABLET, FILM COATED ORAL at 06:52

## 2024-03-05 ASSESSMENT — ACTIVITIES OF DAILY LIVING (ADL)
ADLS_ACUITY_SCORE: 25
ADLS_ACUITY_SCORE: 25
ADLS_ACUITY_SCORE: 22
ADLS_ACUITY_SCORE: 25
ADLS_ACUITY_SCORE: 22
ADLS_ACUITY_SCORE: 25
ADLS_ACUITY_SCORE: 22

## 2024-03-05 NOTE — PROGRESS NOTES
Reason for Admission:  POD 3 TEVAR with prophylactic spinal drain    Cognitive/Mentation: A/Ox 4  Neuros/CMS: Intact ex  baseline numbness/tingling to feet  VS: stable on 3 L O2 NC. .   GI:  Continent.  : Continent.  Pulmonary: LS diminished .  Pain: Lower back pain, scheduled tylenol given and prn oxycodone .     Drains/Lines: piv sl, Lumbar drain clamped   Skin: Intact ex r&&l side groin site   Activity: Assist x 1 with Gb.  Diet: Reg  with thin  liquids. Takes pills whole .     Therapies recs: Home w assist   Discharge: Pending drain removal     Aggression Stoplight Tool: Green

## 2024-03-05 NOTE — PLAN OF CARE
Reason for Admission: POD 4 TEVAR with prophylactic spinal drain    Cognitive/Mentation: A/Ox 4  Neuros/CMS: Intact ex baseline numbness/tingling to feet  VS: stable on 2 L O2 NC.   GI: Last BM 03/02. Continent.  : Continent.  Pulmonary: LS diminished. Dyspnea upon exertion  Pain: R groin/back - oxy prn    Drains/Lines: PIV - SL  Skin: intact ex R/L groin site, lumbar drain (clamped)  Activity: Assist x 1SBA, walker.  Diet: reg with thin liquids. Takes pills whole.     Therapies recs: pending  Discharge: pending lumbar drain removal today    Aggression Stoplight Tool: green

## 2024-03-05 NOTE — PROGRESS NOTES
"VASCULAR SURGERY PROGRESS NOTE    Subjective:  Postop day #4 TEVAR with prophylactic spinal drain. Continues to do well, ongoing low back pain, however this has been an ongoing issue at baseline and pre-op. Strength remains at baseline. Denies pain in right groin. Incisions intact    Objective:  Intake/Output Summary (Last 24 hours) at 3/5/2024 0759  Last data filed at 3/5/2024 0600  Gross per 24 hour   Intake --   Output 1400 ml   Net -1400 ml     PHYSICAL EXAM:  /77 (BP Location: Left arm)   Pulse 65   Temp 97.4  F (36.3  C) (Oral)   Resp 16   Ht 1.778 m (5' 10\")   Wt 92 kg (202 lb 13.2 oz)   SpO2 96%   BMI 29.10 kg/m    General: The patient is alert and oriented. Appropriate. No acute distress  Psych: pleasant affect, answers questions appropriately  Skin: Color appropriate for race, warm, dry.  Respiratory: The patient does not require supplemental oxygen. Breathing unlabored  GI:  Abdomen soft, nontender to light palpation.  Extremities: Hip flexion and shoulder shrug and knee flexion strength 5 out of 5, intact and at baseline bilaterally.  Right groin surgical glue noted, no ecchymosis, soft, appropriately tender.  Left groin with no evidence of hematoma, pseudoaneurysm, or bruising.  Strong biphasic PT in right lower extremity, weak monophasic DP.  Left lower extremity with biphasic PT and DP, all pulse exam at patient preop baseline.       ASSESSMENT:  68-year-old male with history of hypertension, hyperlipidemia, peripheral vascular disease with penetrating aortic ulcer and thoracic descending aorta, now status post thoracic endovascular repair on 3/1/24 with prophylactic spinal drain placement due to significant lumbar arteries in the area of descending thoracic aorta. Neurologically intact.      PLAN:  -neurosurgery to remove drain today  -CTA chest in 1 month and follow-up with Dr. Roe, nothing for patient or inpatient staff to do, this is currently being arranged.   -regular " diet  -discharge after drain removed  -aggressive pulmonary toilet  -restart aspirin after drain removal    Discussed pt history, exam, assessment and plan with Dr. Roe of the vascular surgery service, who is in agreement with the above.    Anastasiya Smith NP  VASCULAR SURGERY

## 2024-03-05 NOTE — DISCHARGE INSTRUCTIONS
Please restart asa 81mg in 1 week on 3/12/24.    Endovascular Abdominal Aortic Aneurysm Repair     Post-Procedure Instructions     Incision Care   You will have an incision in one or both groins.  There will be white strips of tape, called steri-strips, applied over the incisions.  The ends will loosen after 5-7 days, at which time they can be removed.   Cover your incisions with dry gauze and change as needed.   You may shower 2-3 days after your surgery - pat the incision dry to prevent irritation from moisture.     You may develop bruising and firmness near your incision.  This will soften and resolve over the next 1-3 weeks.  Call our office if it enlarges, becomes red, or painful.   On occasion a soft, fluid-filled bulge can develop near a surgical incision - this is called a seroma.  It will likely resolve on its own, but occasionally requires your doctor to drain it in clinic.  You should call our office if you have questions about this.   You may notice numbness around your incision.  This is due to nerve irritation from the surgery and will gradually improve over the next several weeks.     Activity   Walking is important after surgery.  You will begin walking before you leave the hospital, and then you should gradually increase your distance as tolerated.  You should be taking at least 3-4 short walks a day.   You may climb stairs when you feel strong enough.   You should not drive for 1-2 weeks or while taking prescription strength pain medication.   You must feel strong enough to drive safely.   You may return to work once you feel ready - this can be decided at your 2 week post-operative visit.   You should avoid strenuous activity, including running, biking, or weight-lifting until discussed at your post-operative appointment.     Diet   You may resume a regular diet before you leave the hospital.  You may find that it is best to try smaller, more frequent meals until your appetite returns.      Medications   Resume all previous medications as prior to surgery, unless otherwise instructed.   See further information regarding medications containing Metformin    Special Instructions   Always carry your stent graft card with you; there may be restrictions pertaining to MRI testing for one month from the date of your procedure.   If you will be having an invasive procedure, such as a colonoscopy or dental work, within one year of your surgery, you will need to take an antibiotic prior to the procedure.  Please call us so we can assist you with this.     Follow-Up Appointments   You will need to see your surgeon 10-14 days after your procedure.   You will also see either your surgeon or your Interventional Radiologist 1 month after your procedure and annually thereafter.  We will arrange for you to have a CT angiogram prior to these appointments and will notify you by mail when you are due to schedule.     When to Call our Office   Temperature greater than 101.   Concerns regarding your incision.  If you notice new onset of numbness, tingling, coolness or pain in your legs.   Severe pain, especially if it is new and preventing sleep.     Call our main number, [phone], for assistance with any concerns or questions.   For Interventional Radiology Nurse, call [phone].   [facility]  [address]  [place] [address]

## 2024-03-05 NOTE — PROVIDER NOTIFICATION
"Paged Neurosurgery, \"Is the plan still to have pt spinal drain removed today? Pt told possible discharge tomorrow, unsure how long he needs to be monitored post drain removal.\"    Orders received: Plan is to remove drain tomorrow. Pt can possibly discharge tomorrow after removal, although will most likely discharge 03/06.  "

## 2024-03-05 NOTE — PLAN OF CARE
Goal Outcome Evaluation:       Patient discharged, back to home. Alert, oriented times 4. Neuro's intact. VSS. Up with SBA/independent in room. Ambulated in hallway. On a regular diet, good appetite. Discharge instructions discussed with patient.

## 2024-03-05 NOTE — PROGRESS NOTES
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  Vascular surgery primary.  He did well postoperatively.  Drain was removed on 3/5.     He was previously admitted to [facility] from 2/9-2/11 due to influenza with hypoxic respiratory failure.  He was discharged home on oxygen at that time.  He required oxygen during this hospital stay.  He was advised to continue using home oxygen.    S/p Repair of penetrating aortic ulcer of descending thoracic aorta using stent grafts, 3/1/2024, Dr. Roe and s/p Lumbar Drain Placement, 3/1/2024  - Lumbar drain placed due to significant lumbar arteries and area of descending thoracic aorta.  Drain was removed on 3/5  - Aspirin resumed at discharge     Acute blood loss anemia due to surgical blood loss  -Hemoglobin decreased from 16 to 12.3 and subsequently remained stable    Essential hypertension  - PTA-amlodipine 10 mg, lisinopril-HCTZ 20-12.5 mg  - Blood pressures in normal range.  Antihypertensives were held in hospital as Goal MAP was > 80.  These were resumed at discharge    Hyperlipidemia  PTA-pravastatin 40 mg, continue    Diabetes mellitus type 2, diet controlled, A1c 7  - Blood sugars are well-controlled    COPD/asthma  Recent influenza A, 2/9/2024  Subacute respiratory failure, on 2 L of supplemental oxygen since 2/11/2024  - was previously admitted to [facility] from 2/9-2/11 due to influenza A with hypoxic respiratory failure.  He was discharged with home oxygen at that time.    - required oxygen during this hospital stay.    - continue supplemental oxygen   - Continue albuterol inhaler/neb, fluticasone-salmeterol inhaler, DuoNebs    Chronic gout, no acute  "exacerbation  - Continue allopurinol    Generalized anxiety disorder  - Continue Wellbutrin            Diet: Regular Diet Adult  Diet    DVT Prophylaxis: Pneumatic Compression Devices  Phillips Catheter: Not present  Lines: None     Cardiac Monitoring: None  Code Status: Full Code      Clinically Significant Risk Factors                  # Hypertension: Noted on problem list       # DMII: A1C = 7.0 % (Ref range: <5.7 %) within past 6 months   # Overweight: Estimated body mass index is 29.1 kg/m  as calculated from the following:    Height as of this encounter: 1.778 m (5' 10\").    Weight as of this encounter: 92 kg (202 lb 13.2 oz).             Disposition Plan      Expected Discharge Date: 03/05/2024,  3:00 PM    Destination: home              Haley Linda MD  Hospitalist Service  [facility]  Securely message with [employer] (more info)  Text page via [employer] Paging/Directory   ______________________________________________________________________    Interval History   Patient reports he is doing well.  Drain removed this morning.  Looking forward to going home.  Currently on 2 L of oxygen.        Physical Exam   Vital Signs: Temp: 97.7  F (36.5  C) Temp src: Oral BP: 108/62 Pulse: 70   Resp: 18 SpO2: 94 % O2 Device: None (Room air) Oxygen Delivery: 2 LPM  Weight: 202 lbs 13.17 oz    Constitutional - awake and alert, resting in bed, in no acute distress  Cardiovascular - regular rate and rhythm, no murmurs, no edema  Pulmonary - lungs are clear to auscultation bilaterally, no wheezing or rhonchi  GI - abdomen is soft, nontender, nondistended  Integumentary - skin is warm and dry, no rashes or ulcers  Neurological - awake, alert and oriented x3.  Moving all 4 extremities, normal speech, no focal deficits    Medical Decision Making       35 MINUTES SPENT BY ME on the date of service doing chart review, history, exam, documentation & further activities per the note.      Data         Imaging results " reviewed over the past 24 hrs:   No results found for this or any previous visit (from the past 24 hour(s)).

## 2024-03-05 NOTE — CONSULTS
NEUROSURGERY CONSULT    Neurosurgery was asked by Dr. Michelle to consult for lumbar drain removal.      PLAN:    -Lumbar drain was removed under normal sterile fashion and without complications. A single interrupted suture was placed.   -Does not need to be supine.   -Advance activity as tolerated from our perspective.  -Continue supportive and symptomatic treatment.  -Pain control measures.  -Routine wound care.  -We will sign off.     Please page our on-call service for any questions or concerns.     It has been a pleasure meeting Gabe Smith. Thank you for having us be involved in his care.    ______________________________________________________________________    HPI:    Gabe Smith is a pleasant 68 year old male Dr. Ospina placed a lumbar drain on 03/01/2024 at the request by the vascular surgery team for thoracic aortic aneurysm repair. This was prophylactic due to significant lumbar arteries in the area of descending thoracic aorta. He has been doing well per the vascular surgery team. Subsequently, we have been asked to consult for drain removal. No other concerns are voiced.    Past Medical History:   Diagnosis Date    Abdominal aortic aneurysm (AAA) without rupture (H24) 02/25/2021    AAA 3.6 x 3.6 cm per ultrasound    COPD (chronic obstructive pulmonary disease) (H)     Diabetes mellitus, type 2 (H) 2/23/2024    Dysmetabolic syndrome X     Hyperlipidemia     Hypertension     Osteoarthrosis     Prediabetes 10/09/2014    A1C 6.1    Tobacco use disorder        Past Surgical History:   Procedure Laterality Date    COLONOSCOPY N/A 10/19/2015    Procedure: COMBINED COLONOSCOPY, SINGLE OR MULTIPLE BIOPSY/POLYPECTOMY BY BIOPSY;  Surgeon: Gume Vidal MD;  Location:  GI    DAVINCI LAPAROSCOPIC CHOLECYSTECTOMY WITH GRAMS N/A 08/16/2022    Procedure: ROBOT-ASSISTED, LAPAROSCOPIC CHOLECYSTECTOMY WITH CHOLANGIOGRAM;  Surgeon: Carlota Leavitt MD;  Location:  OR    ENDOSCOPIC RETROGRADE  CHOLANGIOPANCREATOGRAM N/A 2022    Procedure: ENDOSCOPIC RETROGRADE CHOLANGIOPANCREATOGRAPHY, WITH SPHINCTEROTOMY;  Surgeon: Jani Cash MD;  Location: [facility] OR    ENDOVASCULAR REPAIR ANEURYSM THORACIC AORTIC N/A 3/1/2024    Procedure: THORACIC ENDOVASCULAR AORTIC REPAIR, RIGHT FEMORAL CUTDOWN;  Surgeon: Alin Roe MD;  Location: [facility] OR    INSERT DRAIN LUMBAR N/A 3/1/2024    Procedure: Insert drain lumbar;  Surgeon: Herve Ospina MD;  Location: [facility] OR    IR THORACIC ENDOVASCULAR STENT GRAFT  3/1/2024    ORTHOPEDIC SURGERY      left hip, right hip    SURGICAL HISTORY OF -   2000    Left MOISES    SURGICAL HISTORY OF -       Unspecified knee surgeries as a child    SURGICAL HISTORY OF -   10/01/2010    Right MOISES, with left knee arthroscopy, meniscectomy       No Known Allergies    Social History     Tobacco Use    Smoking status: Every Day     Packs/day: 1.00     Years: 45.00     Additional pack years: 0.00     Total pack years: 45.00     Types: Cigarettes     Last attempt to quit: 2024     Years since quittin.0    Smokeless tobacco: Never    Tobacco comments:     Stopped after hospital stay   Substance Use Topics    Alcohol use: Yes     Comment: 2-3 drinks per night (double)       Family History   Problem Relation Age of Onset    Family History Negative Mother     Family History Negative Brother     Family History Negative Sister        Scheduled Medications     acetaminophen  650 mg Oral Q6H    allopurinol  200 mg Oral Daily    [Held by provider] amLODIPine  10 mg Oral Daily    buPROPion  300 mg Oral QAM    fluticasone-vilanterol  1 puff Inhalation Daily    insulin aspart  1-7 Units Subcutaneous TID AC    insulin aspart  1-5 Units Subcutaneous At Bedtime    lidocaine  1 patch Transdermal Q24H    [Held by provider] lisinopril-hydrochlorothiazide  1 tablet Oral QAM    pravastatin  40 mg Oral At Bedtime    pseudoePHEDrine  30 mg Oral TID    sodium chloride (PF)  3 mL  "Intracatheter Q8H     Home Medications    Albuterol  Allopurinol  Amlodipine  Bupropion  Fluticasone-salmeterol  Lisinopril-hydrochlorothiazide  Pravastatin  Aspirin 81 mg    PRN Medications    albuterol, betamethasone dipropionate, glucose **OR** dextrose **OR** glucagon, fentaNYL, ipratropium - albuterol 0.5 mg/2.5 mg/3 mL, lactated ringers, lidocaine 4%, lidocaine (buffered or not buffered), naloxone **OR** naloxone **OR** naloxone **OR** naloxone, - MEDICATION INSTRUCTIONS -, - MEDICATION INSTRUCTIONS -, - MEDICATION INSTRUCTIONS -, oxyCODONE, phenylephrine, BETA BLOCKER NOT PRESCRIBED, sodium chloride (PF)    ROS: 10 point ROS neg other than the symptoms noted above in the HPI.    Vitals:    /69 (BP Location: Left arm)   Pulse 65   Temp 97.7  F (36.5  C) (Oral)   Resp 16   Ht 1.778 m (5' 10\")   Wt 92 kg (202 lb 13.2 oz)   SpO2 95%   BMI 29.10 kg/m    Body mass index is 29.1 kg/m .  I/O last 3 completed shifts:  In: -   Out: 1525 [Urine:1525]    Exam:    Patient appears comfortable, conversational, and in no apparent distress.   Head: Normocephalic, without obvious abnormality, atraumatic, no facial asymmetry.   Eyes: conjunctivae/corneas clear. PERRL, EOM's intact.   Throat: lips, mucosa, and tongue normal; teeth and gums normal.   Neck: supple, symmetrical, trachea midline, no adenopathy and thyroid: not enlarged, symmetric, no tenderness/mass/nodules.   Lungs: clear to auscultation bilaterally.   Heart: regular rate and rhythm.   Abdomen: soft, non-tender; bowel sounds normal; no masses, no organomegaly.   Pulses: 2+ and symmetric.   Skin: Skin color, texture, turgor normal. No rashes or lesions.     CN II-XII grossly intact, alert and appropriate with conversation and following commands.   Gait is non-antalgic.   Cervical spine is non tender to palpation. Appropriate range of motion of neck, not concerning for lhermitte's phenomenon.   Bilateral bicep 2/4 and tricep reflexes 1/4. Sensation " "intact throughout upper extremities.     UE muscle strength  Right  Left    Deltoid  5/5  5/5    Biceps  5/5  5/5    Triceps  5/5  5/5    Hand intrinsics  5/5  5/5    Hand grasp  5/5  5/5    Ennis signs  neg  neg      Lumbar spine is non tender to palpation   Intact sensation throughout lower extremities.   Bilateral patellar 2/4 and achilles reflex 1/4.     LE muscle strength  Right  Left    Iliopsoas (hip flexion)  5/5 5/5    Quad (knee extension)  5/5 5/5    Hamstring (knee flexion)  5/5 5/5    Gastrocnemius (PF)  5/5 5/5    Tibialis Ant. (DF)  5/5 5/5    EHL  5/5 5/5      Calves are soft and non-tender bilaterally.     Available labs at time of consult:       Recent Labs   Lab 03/04/24 0725 03/03/24 0347 03/02/24  1826   WBC 10.9 11.6* 12.2*   HGB 12.3* 13.2* 13.6   HCT 38.0* 39.0* 40.3   MCV 92 91 90   * 154 164     Recent Labs   Lab 03/04/24  0725 03/03/24  0347 03/02/24  1826   WBC 10.9 11.6* 12.2*   HGB 12.3* 13.2* 13.6   HCT 38.0* 39.0* 40.3   MCV 92 91 90   * 154 164     No results for input(s): \"SED\", \"CRP\" in the last 168 hours.  Recent Labs   Lab 03/04/24  0725 03/03/24 0347 03/02/24  1826   HGB 12.3* 13.2* 13.6     Recent Labs   Lab 03/03/24 0347 03/02/24  1826 03/02/24  0600   INR 1.14 1.10 1.19*     Recent Labs   Lab 03/04/24  0725 03/03/24  0347 03/02/24  1826   * 154 164     Recent Labs   Lab 03/04/24 0725 03/03/24 0347 03/02/24  1826 03/02/24  0600 03/02/24  0026   WBC 10.9 11.6* 12.2* 12.0* 11.3*         Respectfully,    Bill Hunt, MPAS, PA-C  [facility]  [facility]     Tel: [phone]      All imaging, physical findings, and the above plan have been reviewed with Dr. Ospina.   "

## 2024-03-05 NOTE — TELEPHONE ENCOUNTER
Patient needs CTA chest abdomen with contrast in 1 months time, follow-up with Dr. Roe for post-operative care. Plan for discharge 3/5 or 3/6 from hospital. Order is placed.     Anastasiya Smith CNP

## 2024-03-06 LAB
ATRIAL RATE - MUSE: 60 BPM
ATRIAL RATE - MUSE: 70 BPM
DIASTOLIC BLOOD PRESSURE - MUSE: NORMAL MMHG
DIASTOLIC BLOOD PRESSURE - MUSE: NORMAL MMHG
INTERPRETATION ECG - MUSE: NORMAL
INTERPRETATION ECG - MUSE: NORMAL
P AXIS - MUSE: 58 DEGREES
P AXIS - MUSE: 76 DEGREES
PR INTERVAL - MUSE: 186 MS
PR INTERVAL - MUSE: 200 MS
QRS DURATION - MUSE: 78 MS
QRS DURATION - MUSE: 86 MS
QT - MUSE: 386 MS
QT - MUSE: 428 MS
QTC - MUSE: 416 MS
QTC - MUSE: 428 MS
R AXIS - MUSE: 64 DEGREES
R AXIS - MUSE: 72 DEGREES
SYSTOLIC BLOOD PRESSURE - MUSE: NORMAL MMHG
SYSTOLIC BLOOD PRESSURE - MUSE: NORMAL MMHG
T AXIS - MUSE: 74 DEGREES
T AXIS - MUSE: 83 DEGREES
VENTRICULAR RATE- MUSE: 60 BPM
VENTRICULAR RATE- MUSE: 70 BPM

## 2024-03-07 ENCOUNTER — PATIENT OUTREACH (OUTPATIENT)
Dept: CARE COORDINATION | Facility: CLINIC | Age: 69
End: 2024-03-07
Payer: COMMERCIAL

## 2024-03-07 NOTE — PROGRESS NOTES
Clinic Care Coordination Contact  [facility]/Voicemail       Clinical Data: Care Coordinator Outreach  Outreach attempted x 2.  Left message on patient's voicemail with call back information and requested return call.  Plan:  Care Coordinator will do no further outreaches at this time.    Evelin ZAMORA Community Health Worker  Clinic Care Coordination  [facility]  Phone: [phone]

## 2024-03-13 NOTE — DISCHARGE SUMMARY
Vascular Surgery Discharge Summary    NAME: Gabe Smith   MRN: [id number]   : 1955     DATE OF ADMISSION: 3/1/2024     PRE/POSTOPERATIVE DIAGNOSES:  Penetrating aortic ulcer of the descending thoracic aorta measuring 4.2 x 3.6 cm, asymptomatic.     PROCEDURES PERFORMED, 3/1/2024: 1.  Right common femoral artery cutdown and primary closure.  2.  Ultrasound-guided left common femoral artery access placement.  3.  Arteriogram of the descending thoracic aorta and paravesical aorta.  4.  Intravascular ultrasonography of the descending thoracic aorta (IVUS).  5.  Repair of penetrating aortic ulcer of the descending thoracic aorta using 2 straight stent grafts ([employer]).  6.  Completion aortogram.  7.  Completion iliac arteriogram.    INTRAOPERATIVE FINDINGS: Lumbar drain placed due to significant lumbar arteries-removed without incident by neurosurgery    POSTOPERATIVE COMPLICATIONS: None     DATE OF DISCHARGE: 3/5/2024     HOSPITAL COURSE: Gabe Smith is a 68 year old male who on 3/1/2024  underwent the above-named procedures. He tolerated the procedure well and postoperatively was transferred to the general post-surgical unit.  The remainder of his course was essentiallly uncomplicated.  Prior to discharge, his pain was controlled well with acetaminophen.  he was able to perform ADLs and ambulate independently without difficulty, and had full return of bowel and bladder function.  On 3/5/2024, he was discharged to home in stable condition.    DISCHARGE INSTRUCTIONS:  Copy from AVS    FOLLOW UP APPOINTMENTS:   Copy from AVS    DISCHARGE MEDICATIONS:     Review of your medicines        START taking        Dose / Directions   oxyCODONE 5 MG tablet  Commonly known as: ROXICODONE  Used for: Abnormality of thoracic aorta      Dose: 5 mg  Take 1 tablet (5 mg) by mouth every 4 hours as needed for moderate pain  Quantity: 6 tablet  Refills: 0            CONTINUE these medicines which may have CHANGED, or have  new prescriptions. If we are uncertain of the size of tablets/capsules you have at home, strength may be listed as something that might have changed.        Dose / Directions   betamethasone dipropionate 0.05 % external cream  Commonly known as: DIPROSONE  This may have changed: when to take this  Used for: Psoriasis      APPLY SPARINGLY ONCE OR TWICE DAILY AS NEEDED TO AFFECTED AREA OF PSORIASIS UNTIL THE SKIN IS BETTER, THEN STOP. REPEAT AS NEEDED  Quantity: 45 g  Refills: 2     pravastatin 40 MG tablet  Commonly known as: PRAVACHOL  This may have changed: when to take this  Used for: Hyperlipidemia LDL goal <130      Dose: 40 mg  TAKE 1 TABLET(40 MG) BY MOUTH AT BEDTIME  Quantity: 90 tablet  Refills: 1            CONTINUE these medicines which have NOT CHANGED        Dose / Directions   * albuterol (2.5 MG/3ML) 0.083% neb solution  Commonly known as: PROVENTIL  Used for: COPD (HCC)      USE 1 VIAL VIA NEBULIZER EVERY 6 HOURS AS NEEDED FOR SHORTNESS OF BREATH OR WHEEZING  Quantity: 90 mL  Refills: 1     * albuterol 108 (90 Base) MCG/ACT inhaler  Commonly known as: PROAIR HFA/PROVENTIL HFA/VENTOLIN HFA  Used for: COPD (HCC)      Dose: 2 puff  INHALE 2 PUFFS INTO THE LUNGS EVERY 4 HOURS AS NEEDED FOR SHORTNESS OF BREATH OR DIFFICULT BREATHING OR WHEEZING  Quantity: 6.7 g  Refills: 2     allopurinol 100 MG tablet  Commonly known as: ZYLOPRIM  Used for: Acute gout of foot, unspecified cause, unspecified laterality      Dose: 200 mg  TAKE 2 TABLETS(200 MG) BY MOUTH DAILY  Quantity: 180 tablet  Refills: 0     amLODIPine 10 MG tablet  Commonly known as: NORVASC  Used for: Benign essential hypertension      Dose: 10 mg  TAKE 1 TABLET(10 MG) BY MOUTH DAILY  Quantity: 90 tablet  Refills: 1     aspirin 81 MG tablet  Commonly known as: ASA      Dose: 81 mg  Take 1 tablet (81 mg) by mouth daily  Refills: 0     buPROPion 300 MG 24 hr tablet  Commonly known as: WELLBUTRIN XL  Used for: Tobacco abuse      Dose: 300 mg  Take 1  tablet (300 mg) by mouth every morning  Quantity: 90 tablet  Refills: 1     fluticasone-salmeterol 232-14 MCG/ACT inhaler  Commonly known as: AIRDUO RESPICLICK  Used for: Panlobular emphysema (H), COPD (HCC)      Dose: 1 puff  Inhale 1 puff into the lungs 2 times daily  Quantity: 3 each  Refills: 3     ipratropium - albuterol 0.5 mg/2.5 mg/3 mL 0.5-2.5 (3) MG/3ML neb solution  Commonly known as: DUONEB  Used for: COPD exacerbation (H)      Dose: 1 vial  Take 1 vial (3 mLs) by nebulization every 4 hours as needed for shortness of breath, wheezing or cough  Quantity: 90 mL  Refills: 12     lisinopril-hydrochlorothiazide 20-12.5 MG tablet  Commonly known as: ZESTORETIC  Used for: Benign essential hypertension      Dose: 1 tablet  TAKE 1 TABLET BY MOUTH EVERY MORNING  Quantity: 90 tablet  Refills: 1           * This list has 2 medication(s) that are the same as other medications prescribed for you. Read the directions carefully, and ask your doctor or other care provider to review them with you.                   Where to get your medicines        These medications were sent to [facility] - [address] [place] [address]  [address] [facility] [address]      Phone: [phone]   oxyCODONE 5 MG tablet

## 2024-03-14 NOTE — PROGRESS NOTES
SUBJECTIVE:   Gabe Smith is a 63 year old male who presents to clinic today for the following health issues:      Hypertension Follow-up      Outpatient blood pressures are not being checked.    Low Salt Diet: no added salt      Amount of exercise or physical activity: None    Problems taking medications regularly: No    Medication side effects: none    Diet: regular (no restrictions)      2.  The patient has a history of impaired glucose tolerance with regularly elevated blood sugars.    They have not been diagnosed with type II DM or placed on medications for diabetes before.   They deny polyuria, polydipsia.     The patient is overweight with a BMI of Body mass index is 28.29 kg/m ..    Review of current labs show:    Lab Results   Component Value Date    A1C 5.9 12/26/2018    A1C 5.7 09/20/2016    A1C 5.5 08/04/2015    A1C 5.9 02/03/2015    A1C 6.1 10/07/2014     @bgl@       Problem list and histories reviewed & adjusted, as indicated.  Additional history: as documented        Reviewed and updated as needed this visit by clinical staff  Allergies  Meds       Reviewed and updated as needed this visit by Provider           Past Medical History:  ---------------------------  Past Medical History:   Diagnosis Date     COPD (chronic obstructive pulmonary disease) (H)      Dysmetabolic syndrome X      Hyperlipidemia      Hypertension      Osteoarthrosis      Prediabetes 10/9/14    A1C 6.1     Tobacco use disorder        Past Surgical History:  ---------------------------  Past Surgical History:   Procedure Laterality Date     COLONOSCOPY N/A 10/19/2015    Procedure: COMBINED COLONOSCOPY, SINGLE OR MULTIPLE BIOPSY/POLYPECTOMY BY BIOPSY;  Surgeon: Gume Vidal MD;  Location:  GI     SURGICAL HISTORY OF -   2000    Left MOISES     SURGICAL HISTORY OF -       Unspecified knee surgeries as a child     SURGICAL HISTORY OF -   10/2010    Right MOISES, with left knee arthroscopy, meniscectomy       Current  Medications:  ---------------------------  Current Outpatient Medications   Medication Sig Dispense Refill     albuterol (2.5 MG/3ML) 0.083% neb solution Take 1 vial (2.5 mg) by nebulization every 6 hours as needed for shortness of breath / dyspnea or wheezing 1 Box 5     albuterol (PROAIR HFA) 108 (90 BASE) MCG/ACT Inhaler Inhale 2 puffs into the lungs every 6 hours 1 Inhaler 10     allopurinol (ZYLOPRIM) 100 MG tablet 1 tablet daily for one month, then 2 tablets daily 60 tablet 11     amLODIPine (NORVASC) 10 MG tablet Take 1 tablet (10 mg) by mouth daily 30 tablet 5     aspirin 81 MG tablet Take 1 tablet (81 mg) by mouth daily       CHANTIX 1 MG tablet TAKE ONE TABLET BY MOUTH TWICE A DAY 56 tablet 5     fluticasone-salmeterol (ADVAIR DISKUS) 250-50 MCG/DOSE diskus inhaler Inhale 1 puff into the lungs every 12 hours 1 Inhaler 11     lisinopril-hydrochlorothiazide (PRINZIDE/ZESTORETIC) 20-12.5 MG per tablet Take 2 tablets by mouth every morning 60 tablet 5     pravastatin (PRAVACHOL) 40 MG tablet Take 1 tablet (40 mg) by mouth At Bedtime 30 tablet 5     tiotropium (SPIRIVA RESPIMAT) 2.5 MCG/ACT inhalation aerosol Inhale 2 puffs into the lungs daily 4 g 1       Allergies:  -------------  No Known Allergies    Social History:  -------------------  Social History     Socioeconomic History     Marital status:      Spouse name: Not on file     Number of children: 2     Years of education: Not on file     Highest education level: Not on file   Social Needs     Financial resource strain: Not on file     Food insecurity - worry: Not on file     Food insecurity - inability: Not on file     Transportation needs - medical: Not on file     Transportation needs - non-medical: Not on file   Occupational History     Employer: [employer]   Tobacco Use     Smoking status: Current Every Day Smoker     Packs/day: 0.50     Types: Cigarettes     Smokeless tobacco: Never Used     Tobacco comment: 1 1/2 PPD   Substance and Sexual  Activity     Alcohol use: Yes     Alcohol/week: 0.0 oz     Comment: 8/7/14:  3-4 per day; 9/22/16:  3-6 per day most days     Drug use: No     Sexual activity: Not Currently   Other Topics Concern     Parent/sibling w/ CABG, MI or angioplasty before 65F 55M? Not Asked   Social History Narrative     Not on file       Family Medical History:  ------------------------------  Family History   Problem Relation Age of Onset     Family History Negative Mother      Family History Negative Brother      Family History Negative Sister          ROS:  REVIEW OF SYSTEMS:    RESP: negative for cough, dyspnea, wheezing, hemoptysis  CV: negative for chest pain, palpitations, PND, YANCEY, orthopnea; reports no changes in their ability to perform physical activity (from cardiovascular standpoint)  GI: negative for dysphagia, N/V, pain, melena, diarrhea and constipation  NEURO: negative for numbness/tingling, paralysis, incoordination, or focal weakness     OBJECTIVE:                                                    /80   Pulse 80   Temp 97.7  F (36.5  C) (Oral)   Wt 90.7 kg (200 lb)   SpO2 93%   BMI 28.29 kg/m       GENERAL alert and no distress  EYES:  Normal sclera,conjunctiva, EOMI  HENT: oral and posterior pharynx without lesions or erythema, facies symmetric  NECK: Neck supple. No LAD, without thyroidmegaly or JVD., Carotids without bruits.  RESP: Clear to ausculation bilaterally without wheezes or crackles. Normal BS in all fields.  CV: RRR normal S1S2 without murmurs, rubs or gallops. PMI normal  LYMPH: no cervical lymph adenopathy appreciated  MS: extremities- no gross deformities of the visible extremities noted, no edema  PSYCH: Alert and oriented times 3; speech- coherent  SKIN:  No obvious significant skin lesions on visible portions of face          ASSESSMENT/PLAN:                                                      (J43.1) COPD (HCC)  (primary encounter diagnosis)  Comment: This condition is currently  controlled on the current medical regimen.  Continue current therapy.  Discussed general issues of COPD, including pathophysiology, ways it will affect the pt., when to seek help, reviewed the typical medications (how they are taken, how they help)   Plan: fluticasone-salmeterol (ADVAIR DISKUS) 250-50         MCG/DOSE inhaler, tiotropium (SPIRIVA RESPIMAT)        2.5 MCG/ACT inhaler            (F10.20) Alcohol dependence, episodic drinking behavior (H)  Comment: still drinks a fair amount, but less than he used to according to him.   He acknowledges that heneeds to stop.   Discussed the many different ways excessive alcohol damages the body.    Discussed the other damaging non-medical side effects of excessive alcohol use as well.    Discussed the observed increases in all-cause mortality and morbidity when drinking above 2 drinks per day (defined as 12 oz beer, or 4 oz wine, or 1.5 oz spirits).  Offered help and support in finding help in quitting alcohol.  Will offer CD assessment at [facility] if pt desires.   Plan:     (I10) Benign essential hypertension  Comment: This condition is currently controlled on the current medical regimen.  Continue current therapy.   Discussed current hypertension treatment guidelines, including indications for treatment and treatment options.  Discussed the importance for aggressive management of HTN to prevent vascular complications later.  Recommended lower fat, lower carbohydrate, and lower sodium (<2000 mg)diet.  Discussed required intervals for follow up on HTN, lab studies.  Recommened pt. follow their blood pressures outside the clinic to ensure that BPs are remaining within guidelines, and to contact me if the readings are not within guidelines on a regular basis so we can adjust treatment as needed.     **he will need far less medication once he stops drinking.   Plan: lisinopril-hydrochlorothiazide         (PRINZIDE/ZESTORETIC) 20-12.5 MG tablet,         amLODIPine  "(NORVASC) 10 MG tablet            (M10.9) Acute gout of foot, unspecified cause, unspecified laterality  Comment: This condition is currently controlled on the current medical regimen.  Continue current therapy.   Will not have gout any more once he stops drinking  Plan: allopurinol (ZYLOPRIM) 100 MG tablet, **Uric         acid FUTURE 2mo            (R73.03) Prediabetes  Comment: Reviewed the labs showing elevated glucose levels.    Discussed \"pre-diabetes\", impaired glucose tolerance, and its part in the dysmetabolic syndrome.    Discussed the inevitable progression of impaired glucose tolerance toward worsening diabetes mellitus and the need for agressive interventions now to delay and prevent this inevitable progression.  Discussed the overall risks that dysmetabolic syndromes/impaired glucose tolerance/syndrome X pose toward increased risks of vascular disease as the main reason for agressive intervention now.  Will add medications for glucose control (i.e. metformin, glitazones, etc), lipid (e.g. statins), and for blood pressure (preferably ARBs and ACE) as indicated.  Will start these as early as needed based other proven ability to delay and modify these risk factors.   Discussed the need for aggressive diet control as the cornerstone of pre-diabetes and diabetes management, emphasizing the reduction of \"simple carbohydrates\" (e.g. Any kind of wheat products (e.g. any bread, any pasta), white rice, noodles, potatoes, snack foods, regular soda, juices (except fresh squeezed), cakes, cookies, candy, etc.) along with regular exercise.       Plan: Hemoglobin A1c, Comprehensive metabolic panel            (F17.200) Tobacco use disorder  Comment: Discussed the physical, psychological, and pharmacological aspects of nicotine addiction and smoking cessation.    Discussed 2 possible regimens.  Option #1:  Chantix alone (no nicotine replacement needed), starting month pack for first month, thencontinuing month pack for " 3-6 additional months.  Reviewed the main side effects of the medication and directed him to the company web site for further information and gave pt information handouts (if available)  Option #2:  Recommended nicotine replacement with either gum or patches in a descending manner starting the first day of not smoking.  Also discussed the medication Zyban in the use use smoking cessation.  Recommended starting with 150 mg first thing in the morning for 4 days, then adding a second dose late in the afternoon or early evening.  Discussed the potential side effects including but not limited to seizure, GI upset, insomnia, headache, weight loss.    The patient will decide what they want and will let me know what they desire me to prescribe.    After further discussion of medication aids to help stop smoking, the patient has decided to take Chantix.  We discussed the medication in detail, including suspected mechanism of action, duration of treatment (minimum preferred 3 months up to max of 6-9 months).  We also discussed some of the potential side effects of the medication including, but not limited to, GI upset, nausea, headaches, nightmares, strange dreams, and possible effects on the mood, inclduing worsening of depression and/or anxiety.  Also mentioned about isolated incidences of suicide possibly related to Chantix use.  I told the patient to immediately stop the Chantix if they suspect any changes in the mood and to contact us immediately.    I also instructed them not to take Chantix until they had a chance to visit the product official web site to further educate themselves about the medication.   Plan: varenicline (CHANTIX) 1 MG tablet            (E78.5) Hyperlipidemia LDL goal <130  Comment: This condition is currently controlled on the current medical regimen.  Continue current therapy.  Discussed current lipid results, previous results (if available) current guidelines (NCEP) for treatment and goals for  lipids.  Discussed lifestyle modification, dietary changes (low fat, low simple carb) and regular aerobic exercise.  Discussed the link between dysmetabolic syndrome and impaired glucose tolerance seen in certain patterns of lipids.  Briefly discussed medication used for lipid lowering, including the statins are their possible side effects of myalgias, rhabdomyolysis, and liver toxicity.   Plan: pravastatin (PRAVACHOL) 40 MG tablet, Lipid         panel reflex to direct LDL Fasting,         Comprehensive metabolic panel            (Z11.4) Screening for HIV (human immunodeficiency virus)  Comment:   Plan: HIV Screening            (Z11.59) Need for hepatitis C screening test  Comment:   Plan: Hepatitis C Screen Reflex to HCV RNA Quant and         Genotype            (Z72.0) Tobacco abuse  Comment:   Plan:     (Z12.5) Screening for prostate cancer  Comment:   Plan: Prostate spec antigen screen               See Patient Instructions    HUEY SHAW M.D., MD  [facility]    (Chart documentation may have been completed, in part, with [employer] voice-recognition software. Even though reviewed, some grammatical, spelling, and word errors may remain.)      Universal Safety Interventions

## 2024-03-20 ENCOUNTER — OFFICE VISIT (OUTPATIENT)
Dept: OTHER | Facility: CLINIC | Age: 69
End: 2024-03-20
Payer: COMMERCIAL

## 2024-03-20 VITALS — SYSTOLIC BLOOD PRESSURE: 147 MMHG | HEART RATE: 69 BPM | DIASTOLIC BLOOD PRESSURE: 79 MMHG

## 2024-03-20 DIAGNOSIS — I71.40 ABDOMINAL AORTIC ANEURYSM (AAA) WITHOUT RUPTURE, UNSPECIFIED PART (H): Primary | ICD-10-CM

## 2024-03-20 PROCEDURE — G0463 HOSPITAL OUTPT CLINIC VISIT: HCPCS

## 2024-03-20 PROCEDURE — 99024 POSTOP FOLLOW-UP VISIT: CPT

## 2024-03-20 NOTE — PROGRESS NOTES
[facility]        Patient is here for a  follow up.    Pt is currently taking Aspirin and Statin.    BP (!) 147/79 (BP Location: Left arm, Patient Position: Chair, Cuff Size: Adult Regular)   Pulse 69     The provider has been notified that the patient has no concerns.     Questions patient would like addressed today are: N/A.    Refills are needed: N/A    Has homecare services and agency name:  Ivon Merida MA

## 2024-03-21 NOTE — PROGRESS NOTES
VASCULAR SURGERY PROGRESS NOTE    LOC[facility]    Gabe Smith  Medical Record #: [id number]  YOB: 1955  Age: 68 year old     Date of Service: 3/20/2024    PRIMARY CARE PROVIDER: Donny Payne    Reason for visit:  Post-operative visit    Gabe Smith is a 68 year old male who underwent a thoracic endovascular repair on 3/1/2024 with Dr. 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 Of note he had a spinal drain placement due to significant lumbar arteries in the area of the descending thoracic aorta, taken out without incident on POD #4.    He comes into clinic today for post-operative appointment.     On examination:   Gabe is alert and oriented x4, neurologically intact  Incisions fully healed  Continues to have intermittent low back pain-musculoskeletal in nature  Palpable DP pulses bilaterally   Vitals stable      RECOMMENDATION/RISKS: Would recommend CTA abdomen/pelvis with contrast in 2 weeks and phone appointment with Dr. Roe. Discussed the importance of follow-up. Continue all current medications.     REVIEW OF SYSTEMS:    A 12 point ROS was reviewed and is negative except for what is listed above in HPI.    PHH:    Past Medical History:   Diagnosis Date    Abdominal aortic aneurysm (AAA) without rupture (H24) 02/25/2021    AAA 3.6 x 3.6 cm per ultrasound    COPD (chronic obstructive pulmonary disease) (H)     Diabetes mellitus, type 2 (H) 2/23/2024    Dysmetabolic syndrome X     Hyperlipidemia     Hypertension     Osteoarthrosis     Prediabetes 10/09/2014    A1C 6.1    Tobacco use disorder           Past Surgical History:   Procedure Laterality Date    COLONOSCOPY N/A 10/19/2015    Procedure: COMBINED COLONOSCOPY, SINGLE OR MULTIPLE BIOPSY/POLYPECTOMY BY BIOPSY;  Surgeon: Gume Vidal MD;  Location: [facility] LAPAROSCOPIC CHOLECYSTECTOMY WITH GRAMS N/A 08/16/2022    Procedure: ROBOT-ASSISTED, LAPAROSCOPIC  CHOLECYSTECTOMY WITH CHOLANGIOGRAM;  Surgeon: Carlota Leavitt MD;  Location: [facility] OR    ENDOSCOPIC RETROGRADE CHOLANGIOPANCREATOGRAM N/A 08/17/2022    Procedure: ENDOSCOPIC RETROGRADE CHOLANGIOPANCREATOGRAPHY, WITH SPHINCTEROTOMY;  Surgeon: Jani Cash MD;  Location: [facility] OR    ENDOVASCULAR REPAIR ANEURYSM THORACIC AORTIC N/A 3/1/2024    Procedure: THORACIC ENDOVASCULAR AORTIC REPAIR, RIGHT FEMORAL CUTDOWN;  Surgeon: Alin Roe MD;  Location: [facility] OR    INSERT DRAIN LUMBAR N/A 3/1/2024    Procedure: Insert drain lumbar;  Surgeon: Herve Ospina MD;  Location: [facility] OR    IR THORACIC ENDOVASCULAR STENT GRAFT  3/1/2024    ORTHOPEDIC SURGERY      left hip, right hip    SURGICAL HISTORY OF -   01/01/2000    Left MOISES    SURGICAL HISTORY OF -       Unspecified knee surgeries as a child    SURGICAL HISTORY OF -   10/01/2010    Right MOISES, with left knee arthroscopy, meniscectomy       ALLERGIES:  Patient has no known allergies.    MEDS:    Current Outpatient Medications:     albuterol (PROAIR HFA/PROVENTIL HFA/VENTOLIN HFA) 108 (90 Base) MCG/ACT inhaler, INHALE 2 PUFFS INTO THE LUNGS EVERY 4 HOURS AS NEEDED FOR SHORTNESS OF BREATH OR DIFFICULT BREATHING OR WHEEZING, Disp: 6.7 g, Rfl: 2    albuterol (PROVENTIL) (2.5 MG/3ML) 0.083% neb solution, USE 1 VIAL VIA NEBULIZER EVERY 6 HOURS AS NEEDED FOR SHORTNESS OF BREATH OR WHEEZING, Disp: 90 mL, Rfl: 1    allopurinol (ZYLOPRIM) 100 MG tablet, TAKE 2 TABLETS(200 MG) BY MOUTH DAILY, Disp: 180 tablet, Rfl: 0    amLODIPine (NORVASC) 10 MG tablet, TAKE 1 TABLET(10 MG) BY MOUTH DAILY, Disp: 90 tablet, Rfl: 1    aspirin 81 MG tablet, Take 1 tablet (81 mg) by mouth daily, Disp: , Rfl:     betamethasone dipropionate (DIPROSONE) 0.05 % external cream, APPLY SPARINGLY ONCE OR TWICE DAILY AS NEEDED TO AFFECTED AREA OF PSORIASIS UNTIL THE SKIN IS BETTER, THEN STOP. REPEAT AS NEEDED (Patient taking differently: daily APPLY SPARINGLY ONCE OR TWICE DAILY AS NEEDED TO AFFECTED  AREA OF PSORIASIS UNTIL THE SKIN IS BETTER, THEN STOP. REPEAT AS NEEDED), Disp: 45 g, Rfl: 2    buPROPion (WELLBUTRIN XL) 300 MG 24 hr tablet, Take 1 tablet (300 mg) by mouth every morning, Disp: 90 tablet, Rfl: 1    fluticasone-salmeterol (AIRDUO RESPICLICK) 232-14 MCG/ACT inhaler, Inhale 1 puff into the lungs 2 times daily, Disp: 3 each, Rfl: 3    ipratropium - albuterol 0.5 mg/2.5 mg/3 mL (DUONEB) 0.5-2.5 (3) MG/3ML neb solution, Take 1 vial (3 mLs) by nebulization every 4 hours as needed for shortness of breath, wheezing or cough, Disp: 90 mL, Rfl: 12    lisinopril-hydrochlorothiazide (ZESTORETIC) 20-12.5 MG tablet, TAKE 1 TABLET BY MOUTH EVERY MORNING, Disp: 90 tablet, Rfl: 1    oxyCODONE (ROXICODONE) 5 MG tablet, Take 1 tablet (5 mg) by mouth every 4 hours as needed for moderate pain, Disp: 6 tablet, Rfl: 0    pravastatin (PRAVACHOL) 40 MG tablet, TAKE 1 TABLET(40 MG) BY MOUTH AT BEDTIME (Patient taking differently: Take 40 mg by mouth every morning), Disp: 90 tablet, Rfl: 1    SOCIAL HABITS:    History   Smoking Status    Every Day    Packs/day: 0.50    Years: 45.00    Types: Cigarettes    Last attempt to quit: 2/20/2024   Smokeless Tobacco    Never     Social History    Substance and Sexual Activity      Alcohol use: Yes        Comment: 2-3 drinks per night (double)      History   Drug Use    Types: Marijuana       FAMILY HISTORY:    Family History   Problem Relation Age of Onset    Family History Negative Mother     Family History Negative Brother     Family History Negative Sister        PE:  BP (!) 147/79 (BP Location: Left arm, Patient Position: Chair, Cuff Size: Adult Regular)   Pulse 69   Wt Readings from Last 1 Encounters:   03/02/24 202 lb 13.2 oz (92 kg)     There is no height or weight on file to calculate BMI.    EXAM:  GENERAL: well-developed 68 year old male who appears his stated age  CARDIAC: normal   CHEST/LUNG: normal respiratory effort   MUSCULOSKELETAL: grossly normal and both lower  extremities are intact, no lower extremity edema  NEUROLOGIC: focally intact, alert and oriented x 3  PSYCH: appropriate affect  VASCULAR:     DIAGNOSTIC STUDIES:     Images:  IR Thoracic Endovascular Stent Graft    Result Date: 3/4/2024  This exam was marked as non-reportable because it will not be read by a radiologist or a [facility] non-radiologist provider.     XR Chest Port 1 View    Result Date: 3/2/2024  EXAM: XR CHEST PORTABLE 1 VIEW LOCATION: [facility] DATE: 03/02/2024 INDICATION: Status post TEVAR. COMPARISON: Chest x-ray on 03/01/2024.     IMPRESSION: Single AP view of the chest was obtained. Cardiomediastinal silhouette is within normal limits. No suspicious focal opacities. No significant pleural effusion or pneumothorax.     XR Chest Port 1 View    Result Date: 3/1/2024  EXAM: XR CHEST PORT 1 VIEW LOCATION: [facility] DATE: 3/1/2024 INDICATION: Status post TEVAR. COMPARISON: 3/1/2024.     IMPRESSION: Negative chest.     XR Chest 1 View    Result Date: 3/1/2024  CHEST ONE VIEW  3/1/2024 10:42 AM HISTORY: PREOP AAA SURGERY COMPARISON: CT 2/9/2024.     IMPRESSION: Cardiac silhouette is within normal limits. No focal airspace consolidation. No pleural effusion or pneumothorax. Focal contour abnormality of the mediastinum corresponds to the penetrating atherosclerotic ulcer of the descending aorta with associated aneurysm. REINA SAMUEL MD   SYSTEM ID:  [id number]    LABS:      Sodium   Date Value Ref Range Status   03/04/2024 137 135 - 145 mmol/L Final     Comment:     Reference intervals for this test were updated on 09/26/2023 to more accurately reflect our healthy population. There may be differences in the flagging of prior results with similar values performed with this method. Interpretation of those prior results can be made in the context of the updated reference intervals.    03/03/2024 135 135 - 145 mmol/L Final     Comment:     Reference  intervals for this test were updated on 09/26/2023 to more accurately reflect our healthy population. There may be differences in the flagging of prior results with similar values performed with this method. Interpretation of those prior results can be made in the context of the updated reference intervals.    03/02/2024 135 135 - 145 mmol/L Final     Comment:     Reference intervals for this test were updated on 09/26/2023 to more accurately reflect our healthy population. There may be differences in the flagging of prior results with similar values performed with this method. Interpretation of those prior results can be made in the context of the updated reference intervals.    02/05/2021 137 133 - 144 mmol/L Final   07/28/2020 136 133 - 144 mmol/L Final   01/16/2020 138 133 - 144 mmol/L Final     Urea Nitrogen   Date Value Ref Range Status   03/04/2024 16.5 8.0 - 23.0 mg/dL Final   03/03/2024 16.0 8.0 - 23.0 mg/dL Final   03/02/2024 14.8 8.0 - 23.0 mg/dL Final   10/19/2022 13 7 - 30 mg/dL Final   08/17/2022 20 7 - 30 mg/dL Final   07/11/2022 17 7 - 30 mg/dL Final   02/05/2021 21 7 - 30 mg/dL Final   07/28/2020 15 7 - 30 mg/dL Final   01/16/2020 17 7 - 30 mg/dL Final     Hemoglobin   Date Value Ref Range Status   03/04/2024 12.3 (L) 13.3 - 17.7 g/dL Final   03/03/2024 13.2 (L) 13.3 - 17.7 g/dL Final   03/02/2024 13.6 13.3 - 17.7 g/dL Final   07/28/2020 17.1 13.3 - 17.7 g/dL Final   07/12/2018 16.4 13.3 - 17.7 g/dL Final   01/04/2017 16.1 13.3 - 17.7 g/dL Final     Platelet Count   Date Value Ref Range Status   03/04/2024 148 (L) 150 - 450 10e3/uL Final   03/03/2024 154 150 - 450 10e3/uL Final   03/02/2024 164 150 - 450 10e3/uL Final   07/28/2020 179 150 - 450 10e9/L Final   07/12/2018 197 150 - 450 10e9/L Final   01/04/2017 207 150 - 450 10e9/L Final     INR   Date Value Ref Range Status   03/03/2024 1.14 0.85 - 1.15 Final   03/02/2024 1.10 0.85 - 1.15 Final   03/02/2024 1.19 (H) 0.85 - 1.15 Final   10/31/2010  2.41 (H) 0.86 - 1.14 Final   10/30/2010 1.40 (H) 0.86 - 1.14 Final   10/26/2010 1.03 0.86 - 1.14 Final       30 minutes spent on the day of encounter doing chart review, history and exam, documentation, and further activities as noted.     Anastasiya Smith, NP  VASCULAR SURGERY

## 2024-03-25 ENCOUNTER — TELEPHONE (OUTPATIENT)
Dept: OTHER | Facility: CLINIC | Age: 69
End: 2024-03-25
Payer: COMMERCIAL

## 2024-03-25 NOTE — TELEPHONE ENCOUNTER
Routing to scheduling to coordinate the following:    CTA chest abdomen pelvis   In person follow up with Dr. Roe a few days after CT scan.   Please schedule this in 2 weeks     Appt note: S/p thoracic endovascular repair on 3/1/2024 with Dr. Roe. 2 week follow up to 3/20/24.   CTA chest abdomen pelvis prior.    Judy LUTHER, RN    [facility]  Office: [phone]  Fax: [phone]

## 2024-03-26 NOTE — TELEPHONE ENCOUNTER
Future Appointments   Date Time Provider Department Center   4/5/2024 11:20 AM [address]   4/8/2024 10:30 AM Alin Roe MD [facility]

## 2024-04-05 ENCOUNTER — HOSPITAL ENCOUNTER (OUTPATIENT)
Dept: CT IMAGING | Facility: CLINIC | Age: 69
Discharge: HOME | End: 2024-04-05
Payer: COMMERCIAL

## 2024-04-05 DIAGNOSIS — I71.40 ABDOMINAL AORTIC ANEURYSM (AAA) WITHOUT RUPTURE, UNSPECIFIED PART (H): ICD-10-CM

## 2024-04-05 PROCEDURE — 74174 CTA ABD&PLVS W/CONTRAST: CPT

## 2024-04-05 PROCEDURE — 250N000009 HC RX 250

## 2024-04-05 PROCEDURE — 250N000011 HC RX IP 250 OP 636

## 2024-04-05 RX ORDER — IOPAMIDOL 755 MG/ML
72 INJECTION, SOLUTION INTRAVASCULAR ONCE
Status: COMPLETED | OUTPATIENT
Start: 2024-04-05 | End: 2024-04-05

## 2024-04-05 RX ADMIN — SODIUM CHLORIDE 80 ML: 9 INJECTION, SOLUTION INTRAVENOUS at 11:42

## 2024-04-05 RX ADMIN — IOPAMIDOL 72 ML: 755 INJECTION, SOLUTION INTRAVENOUS at 11:42

## 2024-04-06 DIAGNOSIS — E78.5 HYPERLIPIDEMIA LDL GOAL <130: ICD-10-CM

## 2024-04-06 DIAGNOSIS — I10 BENIGN ESSENTIAL HYPERTENSION: ICD-10-CM

## 2024-04-08 ENCOUNTER — OFFICE VISIT (OUTPATIENT)
Dept: OTHER | Facility: CLINIC | Age: 69
End: 2024-04-08
Payer: COMMERCIAL

## 2024-04-08 VITALS — DIASTOLIC BLOOD PRESSURE: 87 MMHG | HEART RATE: 72 BPM | OXYGEN SATURATION: 93 % | SYSTOLIC BLOOD PRESSURE: 146 MMHG

## 2024-04-08 DIAGNOSIS — I71.23 ANEURYSM OF DESCENDING THORACIC AORTA WITHOUT RUPTURE (H): Primary | ICD-10-CM

## 2024-04-08 DIAGNOSIS — I71.40 ABDOMINAL AORTIC ANEURYSM (AAA) WITHOUT RUPTURE, UNSPECIFIED PART (H): ICD-10-CM

## 2024-04-08 DIAGNOSIS — Q25.40 ABNORMALITY OF THORACIC AORTA: ICD-10-CM

## 2024-04-08 PROCEDURE — 99024 POSTOP FOLLOW-UP VISIT: CPT | Performed by: SURGERY

## 2024-04-08 PROCEDURE — G0463 HOSPITAL OUTPT CLINIC VISIT: HCPCS | Performed by: SURGERY

## 2024-04-08 RX ORDER — LISINOPRIL AND HYDROCHLOROTHIAZIDE 12.5; 2 MG/1; MG/1
1 TABLET ORAL EVERY MORNING
Qty: 90 TABLET | Refills: 1 | Status: SHIPPED | OUTPATIENT
Start: 2024-04-08 | End: 2024-05-10

## 2024-04-08 RX ORDER — PRAVASTATIN SODIUM 40 MG
40 TABLET ORAL AT BEDTIME
Qty: 90 TABLET | Refills: 0 | Status: SHIPPED | OUTPATIENT
Start: 2024-04-08 | End: 2024-05-10

## 2024-04-08 NOTE — PROGRESS NOTES
Mr. Gabe Smith is a 69-year-old gentleman.  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    He has no complaints.    Right groin incision is well-healed.    CT angiogram of the chest, abdomen and pelvis was done on the fifth of this month and reviewed by myself.  The thoracic stent graft was sitting in an excellent position.  There is no endoleak.    The infrarenal abdominal aortic aneurysm has thrombosed and measures 4.4 cm.    I have reassured him of the findings.  We will see him back in 1 year with CT angiogram of the chest, abdomen and pelvis.

## 2024-04-08 NOTE — PROGRESS NOTES
[facility]        Patient is here for a follow up  to discuss Abdominal aortic aneurysm (AAA).    Pt is currently taking Aspirin and Statin.    BP (!) 146/87 (BP Location: Right arm, Patient Position: Sitting, Cuff Size: Adult Regular)   Pulse 72   SpO2 93%     ABBIE AyonN, RN, CV-BC  [facility] [place]

## 2024-04-17 DIAGNOSIS — I10 BENIGN ESSENTIAL HYPERTENSION: ICD-10-CM

## 2024-04-17 RX ORDER — AMLODIPINE BESYLATE 10 MG/1
10 TABLET ORAL DAILY
Qty: 90 TABLET | Refills: 0 | Status: SHIPPED | OUTPATIENT
Start: 2024-04-17 | End: 2024-05-10

## 2024-05-01 DIAGNOSIS — Z72.0 TOBACCO ABUSE: ICD-10-CM

## 2024-05-01 RX ORDER — BUPROPION HYDROCHLORIDE 300 MG/1
300 TABLET ORAL EVERY MORNING
Qty: 90 TABLET | Refills: 0 | Status: SHIPPED | OUTPATIENT
Start: 2024-05-01 | End: 2024-05-10

## 2024-05-03 SDOH — HEALTH STABILITY: PHYSICAL HEALTH: ON AVERAGE, HOW MANY MINUTES DO YOU ENGAGE IN EXERCISE AT THIS LEVEL?: 0 MIN

## 2024-05-03 SDOH — HEALTH STABILITY: PHYSICAL HEALTH: ON AVERAGE, HOW MANY DAYS PER WEEK DO YOU ENGAGE IN MODERATE TO STRENUOUS EXERCISE (LIKE A BRISK WALK)?: 0 DAYS

## 2024-05-03 ASSESSMENT — SOCIAL DETERMINANTS OF HEALTH (SDOH): HOW OFTEN DO YOU GET TOGETHER WITH FRIENDS OR RELATIVES?: ONCE A WEEK

## 2024-05-08 ENCOUNTER — LAB (OUTPATIENT)
Dept: LAB | Facility: CLINIC | Age: 69
End: 2024-05-08
Payer: COMMERCIAL

## 2024-05-08 DIAGNOSIS — M10.9 GOUT ATTACK: ICD-10-CM

## 2024-05-08 DIAGNOSIS — E11.9 DIABETES MELLITUS, TYPE 2 (H): Primary | ICD-10-CM

## 2024-05-08 LAB
CREAT UR-MCNC: 208 MG/DL
MICROALBUMIN UR-MCNC: 146 MG/L
MICROALBUMIN/CREAT UR: 70.19 MG/G CR (ref 0–17)
URATE SERPL-MCNC: 6.5 MG/DL (ref 3.4–7)

## 2024-05-08 PROCEDURE — 84550 ASSAY OF BLOOD/URIC ACID: CPT

## 2024-05-08 PROCEDURE — 36415 COLL VENOUS BLD VENIPUNCTURE: CPT

## 2024-05-08 PROCEDURE — 82043 UR ALBUMIN QUANTITATIVE: CPT

## 2024-05-08 PROCEDURE — 82570 ASSAY OF URINE CREATININE: CPT

## 2024-05-10 ENCOUNTER — OFFICE VISIT (OUTPATIENT)
Dept: INTERNAL MEDICINE | Facility: CLINIC | Age: 69
End: 2024-05-10
Attending: INTERNAL MEDICINE
Payer: COMMERCIAL

## 2024-05-10 VITALS
RESPIRATION RATE: 17 BRPM | DIASTOLIC BLOOD PRESSURE: 64 MMHG | TEMPERATURE: 98 F | OXYGEN SATURATION: 91 % | HEART RATE: 68 BPM | SYSTOLIC BLOOD PRESSURE: 118 MMHG | WEIGHT: 202.2 LBS | BODY MASS INDEX: 28.95 KG/M2 | HEIGHT: 70 IN

## 2024-05-10 DIAGNOSIS — E78.5 HYPERLIPIDEMIA LDL GOAL <130: ICD-10-CM

## 2024-05-10 DIAGNOSIS — Z72.0 TOBACCO ABUSE: ICD-10-CM

## 2024-05-10 DIAGNOSIS — E11.59 TYPE 2 DIABETES MELLITUS WITH OTHER CIRCULATORY COMPLICATION, WITHOUT LONG-TERM CURRENT USE OF INSULIN (H): ICD-10-CM

## 2024-05-10 DIAGNOSIS — M10.9 ACUTE GOUT OF FOOT, UNSPECIFIED CAUSE, UNSPECIFIED LATERALITY: ICD-10-CM

## 2024-05-10 DIAGNOSIS — E78.5 HYPERLIPIDEMIA LDL GOAL <100: ICD-10-CM

## 2024-05-10 DIAGNOSIS — L40.9 PSORIASIS: ICD-10-CM

## 2024-05-10 DIAGNOSIS — F10.20 ALCOHOL DEPENDENCE, EPISODIC DRINKING BEHAVIOR (H): ICD-10-CM

## 2024-05-10 DIAGNOSIS — Z23 NEED FOR DIPHTHERIA, TETANUS, ACELLULAR PERTUSSIS AND HAEMOPHILUS INFLUENZAE VACCINE: ICD-10-CM

## 2024-05-10 DIAGNOSIS — J43.1 PANLOBULAR EMPHYSEMA (H): ICD-10-CM

## 2024-05-10 DIAGNOSIS — Z71.6 ENCOUNTER FOR SMOKING CESSATION COUNSELING: ICD-10-CM

## 2024-05-10 DIAGNOSIS — I10 BENIGN ESSENTIAL HYPERTENSION: ICD-10-CM

## 2024-05-10 DIAGNOSIS — Z00.00 ENCOUNTER FOR MEDICARE ANNUAL WELLNESS EXAM: Primary | ICD-10-CM

## 2024-05-10 DIAGNOSIS — I71.40 ABDOMINAL AORTIC ANEURYSM (AAA) WITHOUT RUPTURE, UNSPECIFIED PART (H): ICD-10-CM

## 2024-05-10 PROBLEM — J44.1 COPD EXACERBATION (H): Status: RESOLVED | Noted: 2024-02-09 | Resolved: 2024-05-10

## 2024-05-10 PROBLEM — J18.9 PNEUMONIA DUE TO INFECTIOUS ORGANISM, UNSPECIFIED LATERALITY, UNSPECIFIED PART OF LUNG: Status: RESOLVED | Noted: 2024-02-09 | Resolved: 2024-05-10

## 2024-05-10 PROCEDURE — G0439 PPPS, SUBSEQ VISIT: HCPCS | Performed by: INTERNAL MEDICINE

## 2024-05-10 PROCEDURE — 99214 OFFICE O/P EST MOD 30 MIN: CPT | Mod: 25 | Performed by: INTERNAL MEDICINE

## 2024-05-10 RX ORDER — BUPROPION HYDROCHLORIDE 300 MG/1
300 TABLET ORAL EVERY MORNING
Qty: 90 TABLET | Refills: 1 | Status: SHIPPED | OUTPATIENT
Start: 2024-05-10

## 2024-05-10 RX ORDER — LISINOPRIL AND HYDROCHLOROTHIAZIDE 12.5; 2 MG/1; MG/1
1 TABLET ORAL EVERY MORNING
Qty: 90 TABLET | Refills: 1 | Status: SHIPPED | OUTPATIENT
Start: 2024-05-10

## 2024-05-10 RX ORDER — ALLOPURINOL 100 MG/1
200 TABLET ORAL DAILY
Qty: 180 TABLET | Refills: 3 | Status: SHIPPED | OUTPATIENT
Start: 2024-05-10

## 2024-05-10 RX ORDER — PRAVASTATIN SODIUM 40 MG
40 TABLET ORAL AT BEDTIME
Qty: 90 TABLET | Refills: 1 | Status: SHIPPED | OUTPATIENT
Start: 2024-05-10

## 2024-05-10 RX ORDER — ALBUTEROL SULFATE 90 UG/1
2 AEROSOL, METERED RESPIRATORY (INHALATION) EVERY 4 HOURS PRN
Qty: 54 G | Refills: 11 | Status: SHIPPED | OUTPATIENT
Start: 2024-05-10

## 2024-05-10 RX ORDER — AMLODIPINE BESYLATE 10 MG/1
10 TABLET ORAL DAILY
Qty: 90 TABLET | Refills: 1 | Status: SHIPPED | OUTPATIENT
Start: 2024-05-10

## 2024-05-10 ASSESSMENT — PAIN SCALES - GENERAL: PAINLEVEL: NO PAIN (0)

## 2024-05-10 NOTE — PROGRESS NOTES
Preventive Care Visit  [facility]  Donny Payne MD, Internal Medicine  May 10, 2024      Assessment & Plan     (Z00.00) Encounter for Medicare annual wellness exam  (primary encounter diagnosis)  Comment: Discussed cardiac disease risk factors and cardiac disease risk factor modification, including diabetes screening, blood pressure screening (and management if indicated), and cholesterol screening.   Reviewed immunzation guidelines, including pneumococcal vaccines, annual influenza, and shingles vaccines.   Discussed routine cancer screenings, including skin cancer, colon cancer screening for everyone until age 80, prostate cancer screening in men until age 75, mammogram and PAP/pelvic for women until age 75.   Recommended regular dentist visits to care for remaining teeth.   Recommended regular screening for vision and glaucoma.   Recommended safe driving and accident avoidance.    Counseling:    Reviewed preventive health counseling, as reflected in patient instructions       Regular exercise       Healthy diet/nutrition       Vision screening       Hearing screening       Immunizations  up to date            Aspirin prophylaxis        Colorectal cancer screening       Prostate cancer screening         Patient has been advised of split billing requirements and indicates understanding: Yes   Plan: REVIEW OF HEALTH MAINTENANCE PROTOCOL ORDERS            (E11.59) Type 2 diabetes mellitus with other circulatory complication, without long-term current use of insulin (H)  Comment: This condition is currently controlled on the current medical regimen.  Continue current therapy.   Referral back to diabetes educatio for more learing in diabetic diet, monitoring, etc.   Plan: Adult Diabetes Education  Referral,         REVIEW OF HEALTH MAINTENANCE PROTOCOL ORDERS            (J43.1) Panlobular emphysema (H)  Comment: Discussed general issues of COPD, including pathophysiology, ways  "it will affect the pt., when to seek help, reviewed the typical medications (how they are taken, how they help)   Plan: albuterol (PROAIR HFA/PROVENTIL HFA/VENTOLIN         HFA) 108 (90 Base) MCG/ACT inhaler, REVIEW OF         HEALTH MAINTENANCE PROTOCOL ORDERS            (F10.20) Alcohol dependence, episodic drinking behavior (H)  Comment: still drinking regularly, but at least says it is \"less\"  Reviewed the many detrimental efeftcs fro alcohol on his health.   Plan: REVIEW OF HEALTH MAINTENANCE PROTOCOL ORDERS            (I71.40) Abdominal aortic aneurysm (AAA) without rupture, unspecified part (H24)  Comment: Discussed secondary risk factor modification and recommended continuing aggressive management of these items.   Plan: REVIEW OF HEALTH MAINTENANCE PROTOCOL ORDERS            (E78.5) Hyperlipidemia LDL goal <130  Comment: This condition is currently controlled on the current medical regimen.  Continue current therapy.   Discussed guidelines recommending a statin cholesterol lowering medication for any patient with either diabetes and/or vascular disease, aiming for a LDL goal under 100 for sure, ideally under 70, using whatever dose of statin tolerated.    Plan: pravastatin (PRAVACHOL) 40 MG tablet, REVIEW OF        HEALTH MAINTENANCE PROTOCOL ORDERS            (I10) Benign essential hypertension  Comment: This condition is currently controlled on the current medical regimen.  Continue current therapy.   Plan: amLODIPine (NORVASC) 10 MG tablet,         lisinopril-hydrochlorothiazide (ZESTORETIC)         20-12.5 MG tablet, REVIEW OF HEALTH MAINTENANCE        PROTOCOL ORDERS            (M10.9) Acute gout of foot, unspecified cause, unspecified laterality  Comment: This condition is currently controlled on the current medical regimen.  Continue current therapy.   Gout episodes and uric acid wqould both greatly decrease without alcohol use.   Plan: allopurinol (ZYLOPRIM) 100 MG tablet, REVIEW OF        HEALTH " "MAINTENANCE PROTOCOL ORDERS            (Z23) Need for diphtheria, tetanus, acellular pertussis and haemophilus influenzae vaccine  Comment:   Plan: REVIEW OF HEALTH MAINTENANCE PROTOCOL ORDERS            (Z71.6) Encounter for smoking cessation counseling  Comment:   Plan: REVIEW OF HEALTH MAINTENANCE PROTOCOL ORDERS            (Z72.0) Tobacco abuse  Comment: wants to try and quit again.   Will try bupropion to help him quit smoking.   Plan: buPROPion (WELLBUTRIN XL) 300 MG 24 hr tablet,         REVIEW OF HEALTH MAINTENANCE PROTOCOL ORDERS            (L40.9) Psoriasis  Comment:   Plan: REVIEW OF HEALTH MAINTENANCE PROTOCOL ORDERS            (E78.5) Hyperlipidemia LDL goal <100  Comment:   Plan:        Patient has been advised of split billing requirements and indicates understanding: Yes        Nicotine/Tobacco Cessation  He reports that he has been smoking cigarettes. He started smoking about 45 years ago. He has a 45 pack-year smoking history. He has never used smokeless tobacco.  Nicotine/Tobacco Cessation Plan  Information offered: Patient not interested at this time      BMI  Estimated body mass index is 29.01 kg/m  as calculated from the following:    Height as of this encounter: 1.778 m (5' 10\").    Weight as of this encounter: 91.7 kg (202 lb 3.2 oz).       Counseling  Appropriate preventive services were discussed with this patient, including applicable screening as appropriate for fall prevention, nutrition, physical activity, Tobacco-use cessation, weight loss and cognition.  Checklist reviewing preventive services available has been given to the patient.          José Miguel Bernal is a 69 year old, presenting for the following:  Medicare Visit        5/10/2024     9:51 AM   Additional Questions   Roomed by Candace BURGOS CMA   Health Care Directive  Patient does not have a Health Care Directive or Living Will: Patient states has Advance Directive and will bring in a copy to clinic.    HPI      1.)  recent " "endovascular repair of descending thoracic aortic aneurysm with ulceration.     3/1/24: Right common femoral artery cutdown and primary closure, Repair of penetrating aortic ulcer of the descending thoracic aorta using 2 straight stent grafts ([employer]).    2.)  Hypertension:  History of hypertension, on medication.  No reported side effects from medications.    Reviewed last 6 BP readings in chart:  BP Readings from Last 6 Encounters:   05/10/24 118/64   04/08/24 (!) 146/87   03/20/24 (!) 147/79   03/05/24 108/62   02/23/24 120/62   02/16/24 137/86     No active cardiac complaints or symptoms with exercise.     3.)  Diabetes:  In regards specifically to the patient's diabetes, they reports no unusual symptoms.  Medication compliance: good  Diabetic diet compliance: good    Patient reports no significant episodes of hypo- or hyperglycemia    Diabetic complications: vascular disease     Most  recent labs:    Lab Results   Component Value Date    A1C 7.0 03/01/2024    A1C 6.8 02/10/2024    A1C 6.2 11/01/2023    A1C 6.5 04/10/2023    A1C 6.6 10/19/2022    A1C 6.3 05/30/2022    A1C 6.6 02/11/2022    A1C 6.2 09/03/2021    A1C 6.0 02/05/2021    A1C 6.2 07/28/2020    A1C 5.7 01/16/2020    A1C 5.8 07/15/2019    A1C 5.9 12/26/2018    A1C 5.7 09/20/2016    A1C 5.5 08/04/2015    A1C 5.9 02/03/2015    A1C 6.1 10/07/2014        4.)   COPD remains stable.  Has not had any recent breathing troubles beyond usual baseline.  Has not any acute respiratory events.  Remains with intermittent cough, mild shortness of breath with overexertion as per usual.  Using medication as directed with reported side effects.   Unfortuantely, still smoking, but reports it is \"less\"    5.)  stil drinking on regular basis but reports it is \"less\"        5/3/2024   General Health   How would you rate your overall physical health? Good   Feel stress (tense, anxious, or unable to sleep) Not at all         5/3/2024   Nutrition   Diet: Low salt    Low " fat/cholesterol         5/3/2024   Exercise   Days per week of moderate/strenous exercise 0 days   Average minutes spent exercising at this level 0 min   (!) EXERCISE CONCERN      5/3/2024   Social Factors   Frequency of gathering with friends or relatives Once a week   Worry food won't last until get money to buy more No   Food not last or not have enough money for food? No   Do you have housing?  Yes   Are you worried about losing your housing? No   Lack of transportation? No   Unable to get utilities (heat,electricity)? No         5/9/2024   Fall Risk   Fallen 2 or more times in the past year? No   Trouble with walking or balance? No          5/3/2024   Activities of Daily Living- Home Safety   Needs help with the following daily activites None of the above   Safety concerns in the home None of the above         5/3/2024   Dental   Dentist two times every year? (!) NO         5/3/2024   Hearing Screening   Hearing concerns? None of the above         5/3/2024   Driving Risk Screening   Patient/family members have concerns about driving No         5/3/2024   General Alertness/Fatigue Screening   Have you been more tired than usual lately? No         5/3/2024   Urinary Incontinence Screening   Bothered by leaking urine in past 6 months No         5/3/2024   TB Screening   Were you born outside of the [place]? No         Today's PHQ-2 Score:       5/9/2024    10:44 AM   PHQ-2 ( 1999 [employer])   Q1: Little interest or pleasure in doing things 0   Q2: Feeling down, depressed or hopeless 0   PHQ-2 Score 0   Q1: Little interest or pleasure in doing things Not at all   Q2: Feeling down, depressed or hopeless Not at all   PHQ-2 Score 0           5/3/2024   Substance Use   Alcohol more than 3/day or more than 7/wk Yes   How often do you have a drink containing alcohol 4 or more times a week   How many alcohol drinks on typical day 1 or 2   How often do you have 5+ drinks at one occasion Never   Audit 2/3 Score 0   How often not  able to stop drinking once started Never   How often failed to do what normally expected Never   How often needed first drink in am after a heavy drinking session Never   How often feeling of guilt or remorse after drinking Never   How often unable to remember what happened the night before Never   Have you or someone else been injured because of your drinking No   Has anyone been concerned or suggested you cut down on drinking No   TOTAL SCORE - AUDIT 4   Do you have a current opioid prescription? No   How severe/bad is pain from 1 to 10? 0/10 (No Pain)   Do you use any other substances recreationally? (!) ALCOHOL    (!) CANNABIS PRODUCTS     Social History     Tobacco Use    Smoking status: Every Day     Current packs/day: 0.00     Average packs/day: 0.7 packs/day for 67.5 years (45.0 ttl pk-yrs)     Types: Cigarettes     Start date: 1979     Last attempt to quit: 2024     Years since quittin.2    Smokeless tobacco: Never   Vaping Use    Vaping status: Former   Substance Use Topics    Alcohol use: Yes     Comment: 2-3 drinks per night (double)    Drug use: Yes     Types: Marijuana       Last PSA:   PSA   Date Value Ref Range Status   2020 1.05 0 - 4 ug/L Final     Comment:     Assay Method:  Chemiluminescence using Siemens Vista analyzer     Prostate Specific Antigen Screen   Date Value Ref Range Status   2023 0.89 0.00 - 4.50 ng/mL Final   10/19/2022 0.94 0.00 - 4.00 ug/L Final     ASCVD Risk   The ASCVD Risk score (Edson DK, et al., 2019) failed to calculate for the following reasons:    The patient has a prior MI or stroke diagnosis            Reviewed and updated as needed this visit by Provider     Meds                **I reviewed the information recorded in the patient's EPIC chart (including but not limited to medical history, surgical history, family history, problem list, medication list, and allergy list) and updated the information as indicated based on the patients  "reported information.       Current providers sharing in care for this patient include:  Patient Care Team:  Donny Payne MD as PCP - General (Internal Medicine)  Donyn Payne MD as Assigned PCP  Alin Roe MD as Assigned Heart and Vascular Provider    The following health maintenance items are reviewed in Epic and correct as of today:  Health Maintenance   Topic Date Due    DIABETIC FOOT EXAM  Never done    EYE EXAM  Never done    RSV VACCINE (Pregnancy & 60+) (1 - 1-dose 60+ series) Never done    COVID-19 Vaccine (7 - 2023-24 season) 12/29/2023    NICOTINE/TOBACCO CESSATION COUNSELING Q 1 YR  04/14/2024    A1C  09/01/2024    BMP  09/04/2024    PSA  11/01/2024    ALT  02/09/2025    LIPID  03/01/2025    LUNG CANCER SCREENING  04/05/2025    MICROALBUMIN  05/08/2025    URIC ACID  05/08/2025    MEDICARE ANNUAL WELLNESS VISIT  05/10/2025    ANNUAL REVIEW OF HM ORDERS  05/10/2025    FALL RISK ASSESSMENT  05/10/2025    COLORECTAL CANCER SCREENING  10/19/2025    ADVANCE CARE PLANNING  05/10/2029    DTAP/TDAP/TD IMMUNIZATION (3 - Td or Tdap) 06/15/2032    SPIROMETRY  Completed    HEPATITIS C SCREENING  Completed    COPD ACTION PLAN  Completed    PHQ-2 (once per calendar year)  Completed    HEMOGLOBIN  Completed    INFLUENZA VACCINE  Completed    Pneumococcal Vaccine: 65+ Years  Completed    ZOSTER IMMUNIZATION  Completed    AORTIC ANEURYSM SCREENING (SYSTEM ASSIGNED)  Completed    IPV IMMUNIZATION  Aged Out    HPV IMMUNIZATION  Aged Out    MENINGITIS IMMUNIZATION  Aged Out    RSV MONOCLONAL ANTIBODY  Aged Out         Review of Systems  Constitutional, neuro, ENT, endocrine, pulmonary, cardiac, gastrointestinal, genitourinary, musculoskeletal, integument and psychiatric systems are negative, except as otherwise noted.     Objective    Exam  /64   Pulse 68   Temp 98  F (36.7  C) (Oral)   Resp 17   Ht 1.778 m (5' 10\")   Wt 91.7 kg (202 lb 3.2 oz)   SpO2 91%   BMI 29.01 kg/m   " "  Estimated body mass index is 29.01 kg/m  as calculated from the following:    Height as of this encounter: 1.778 m (5' 10\").    Weight as of this encounter: 91.7 kg (202 lb 3.2 oz).    Physical Exam  GENERAL alert and no distress  EYES:  Normal sclera,conjunctiva, EOMI  HENT: oral and posterior pharynx without lesions or erythema, facies symmetric  NECK: Neck supple. No LAD, without thyroidmegaly.  RESP: Clear to ausculation bilaterally without wheezes or crackles. Normal BS in all fields.  CV: RRR normal S1S2 without murmurs, rubs or gallops.  LYMPH: no cervical lymph adenopathy appreciated  MS: extremities- no gross deformities of the visible extremities noted,   EXT:  no lower extremity edema  PSYCH: Alert and oriented times 3; speech- coherent  SKIN:  No obvious significant skin lesions on visible portions of face         5/10/2024   Mini Cog   Clock Draw Score 2 Normal   3 Item Recall 3 objects recalled   Mini Cog Total Score 5              Signed Electronically by: Donny Payne MD    "

## 2024-05-10 NOTE — PATIENT INSTRUCTIONS
" Diabetes:    --referral to diabetes education to learn more about diabetes management and control, and to learn monitoring at home with either standard glucometer or continuous glucose monitor.      --Goal for diabetes is to keep the glucose readings between , 75% of the time with very few if any glucose readings below 70.    --Currently your glucose readings are doing fairly well without medications, we will continue to monitor this and determine if you ever need medication.        There are newer medications for diabetes that help diabetes, help lose weight, and reduce future risk of vascular events (e.g. Jardiance, GLP-1 medications like Ozempic, etc.), but these are apparently not covered by your insurance and will be very expensive        RSV vaccines are now available.  The will help provide protection against respiratory syncytial virus (RSV), a common upper respiratory virus that is known to cause severe inflammation in the airways.  This vaccine is recommended for adults over age 60, especially those with high risk conditions and/or chronic respiratory illnesses such as asthma or COPD.  This vaccine is available at most pharmacies and clinics.    If you are on Medicare insurance, get this vaccine from a pharmacist in a pharmacy for the best cost and to confirm coverage, it will be less expensive to get this there rather than from our clinic.           Continue all medications at the same doses.  Contact your usual pharmacy if you need refills. Return to see me in 4-5 months (late September), sooner if needed.  Please get fasting labs done at the [facility] or any other [facility] Lab lab 1-2 days before this appointment (schedule a \"lab appointment\").  If you get the labs done at another clinic, make arrangements with them directly.  The orders will be in place.  Eat nothing for at least 8 hours prior to having these labs drawn.  Use Why[employer] or Call [phone] to schedule the " "appointment with me and lab appointment.           5 GOALS IN MANAGING DIABETES (i.e. to give the best chance to prevent diabetic complications and vascular disease):     1.  Aggressive diabetic management.  The target for A1C (3 months average blood sugar) is ideally below 6.5, preferably below 7.5    Your diabetes is under good control.      Lab Results   Component Value Date    A1C 7.0 03/01/2024    A1C 6.8 02/10/2024    A1C 6.2 11/01/2023    A1C 6.5 04/10/2023    A1C 6.6 10/19/2022    A1C 6.3 05/30/2022    A1C 6.6 02/11/2022    A1C 6.2 09/03/2021    A1C 6.0 02/05/2021    A1C 6.2 07/28/2020    A1C 5.7 01/16/2020    A1C 5.8 07/15/2019    A1C 5.9 12/26/2018    A1C 5.7 09/20/2016    A1C 5.5 08/04/2015    A1C 5.9 02/03/2015    A1C 6.1 10/07/2014       2.  Aggressive blood pressure control, under 130/80 ideally.  Using medications if needed.    Your blood pressure is under good control    BP Readings from Last 4 Encounters:   05/10/24 118/64   04/08/24 (!) 146/87   03/20/24 (!) 147/79   03/05/24 108/62       3.  Aggressive LDL cholesterol (bad cholesterol) lowering as needed.  Your goal is an LDL under 100 for sure, preferably under 70.     *  All patients with diabetes are recommended to be on a \"statin\" cholesterol lowering medication regardless of the cholesterol levels to give the best chance at avoiding vascular disease.      New guidelines identify four high-risk groups who could benefit from statins:   *people with pre-existing heart disease, such as those who have had a heart attack;   *people ages 40 to 75 who have diabetes of any type  *patients ages 40 to 75 with at least a 7.5% risk of developing cardiovascular disease over the next decade, according to a formula described in the guidelines  *patients with the sort of super-high cholesterol that sometimes runs in families, as evidenced by an LDL of 190 milligrams per deciliter or higher    *  Your cholesterol levels are well controlled.    Recent Labs " "  Lab Test 03/01/24  1048 11/01/23  1000   CHOL 198 163   HDL 30* 31*   LDL 99 89   TRIG 344* 217*       4.  No smoking    5.  Consider daily preventative Aspirin once per day, every day over age 50 unless there is a specific reason that you cannot take aspirin.       *You should take Aspirin 81 mg once per day, unless you have a reason NOT to take aspirin (i.e. side effect, excessive bruising or bleeding, taking another \"blood tinning\" medication, etc.)       DIABETES REMINDERS:   1. Check your blood glucose regularly either with standard glucometer or personal continuous glucose monitor.    2) Your blood sugar goals:  before eating and  two hours after eating.  If using personal continuous glucose monitor, the goal is Time in the Target range ( ) of 70-75% with very few (under 2%) Below target.    3) Always be prepared to treat low blood sugar symptoms should it happen. Keep a sugar-containing beverage or food nearby.  4) When to call your clinic:     Blood sugar over 400     If you have 2 to 3 low blood sugars (under 70) in a row    Low readings the same time of day several days in a row  5) When to call 911: If your low blood glucose symptoms do not get better with treatment, or if you/someone else is unable to give you treatment.  6) Work with a Certified Diabetes Educator to assist you with your diabetes management  7) Contact us if you have ANY questions about your medications or instructions, or have problems with getting prescriptions filled.         Preventive Health Recommendations:   Male Ages 65 - 75    Yearly exam:             See your health care provider every year in order to  o   Review health changes.   o   Discuss preventive care.    o   Review your medicines if your doctor has prescribed any.  Talk with your health care provider about whether you should have a test to screen for prostate cancer (PSA).  Every 3 years, have a diabetes test (fasting glucose). If you are at risk " for diabetes, you should have this test more often.  At least Every 5 years, have a cholesterol test. Have this test more often if you have medical conditions that place you at higher risk for high cholesterol or heart disease.   Regular colon cancer screening starting age 45 with either a colonoscopy, or stool test (either Cologuard every 3 years or yearly FIT stool test from ).  The intervals for colon cancer screening will be determined by family history and prior colon cancer screening results.   Routine prostate cancer screening with a PSA blood test every 1-2 years.   While the PSA blood test is not a direct cancer screening blood test, it detects inflammation within in the prostate, which could indicate possible cancer.  If the test is abnormal, you do not necessarily have prostate cancer, but would need further evaluation.    Talk to with your health care provider about screening for Abdominal Aortic Aneurysm if you have a family history of AAA or have a history of smoking.    Shots:   Get a flu shot each year.   Covid vaccines are now recommended annually.  Get the most updated Covid vaccine when it becomes available, consider getting this at the same time as the annual influenza vaccine.   Get a tetanus shot every 10 years.  Everyone over 65 should make sure to receive pneumonia vaccines to prevent the most common type of bacterial pneumonia: Pneumococcal pneumonia. There are now two you should receive - Pneumovax (PPSV 23) and Prevnar (PCV 20)  Strongly consider the Shingrix shingles vaccine to give you the best chance of avoiding future shingles infection (as many as 1 and 3 adults over age 50 may develop this condition in their lifetime).    If you have Medicare insurance, investigate the cost and coverage for Shingrix shingles vaccines with a pharmacist at a pharmacy.  They can tell you the coverage and cost and then give it to you if the price is acceptable.    Medicare sometimes does not cover  "these Shingrix shingles vaccines, and with Medicare insurance it is usually cheaper to receive this shingles vaccine from a pharmacist in a pharmacy rather than in our clinic.    At this time, you only need the 2 Shingrix vaccines and then you are done.     Nutrition:   Eat at least 5 servings of fruits and vegetables each day.   Eat whole-grain bread, whole-wheat pasta and brown rice instead of white grains and rice.   Talk to your provider about Calcium and Vitamin D.      --Good Grains:  Oats, brown rice, Quinoa (these do not raise the blood sugar as much)     --Bad grains:  Anything made from wheat or white rice     (because these raise the blood sugars significantly, and the possible gluten issue from wheat for some people).      --Proteins:  Aim for \"lean proteins\" including chicken, fish, seafood, pork, turkey, and eggs (in moderation); Eat red meat only occasionally    Lifestyle  Exercise for at least 150 minutes a week (30 minutes a day, 5 days a week). This will help you control your weight and prevent disease.   Limit alcohol to one drink per day.   No smoking.   Wear sunscreen to prevent skin cancer.   See your dentist every six months for an exam and cleaning.   See your eye doctor every 1 to 2 years to screen for conditions such as glaucoma, macular degeneration, cataracts, etc     "

## 2024-06-05 ENCOUNTER — VIRTUAL VISIT (OUTPATIENT)
Dept: EDUCATION SERVICES | Facility: CLINIC | Age: 69
End: 2024-06-05
Attending: INTERNAL MEDICINE
Payer: COMMERCIAL

## 2024-06-05 DIAGNOSIS — E11.59 TYPE 2 DIABETES MELLITUS WITH OTHER CIRCULATORY COMPLICATION, WITHOUT LONG-TERM CURRENT USE OF INSULIN (H): Primary | ICD-10-CM

## 2024-06-05 PROCEDURE — G0108 DIAB MANAGE TRN  PER INDIV: HCPCS | Mod: 93 | Performed by: DIETITIAN, REGISTERED

## 2024-06-05 RX ORDER — BLOOD-GLUCOSE SENSOR
1 EACH MISCELLANEOUS
Qty: 6 EACH | Refills: 5 | Status: SHIPPED | OUTPATIENT
Start: 2024-06-05

## 2024-06-05 RX ORDER — KETOROLAC TROMETHAMINE 30 MG/ML
1 INJECTION, SOLUTION INTRAMUSCULAR; INTRAVENOUS ONCE
Qty: 1 EACH | Refills: 0 | Status: SHIPPED | OUTPATIENT
Start: 2024-06-05 | End: 2024-06-05

## 2024-06-05 RX ORDER — LANCETS
EACH MISCELLANEOUS
Qty: 100 EACH | Refills: 6 | Status: SHIPPED | OUTPATIENT
Start: 2024-06-05

## 2024-06-05 NOTE — LETTER
6/5/2024         RE: Gabe Smith  [address] [address]        Dear Colleague,    Thank you for referring your patient, Gabe Smith, to the [facility]. Please see a copy of my visit note below.    Diabetes Self-Management Education & Support    Presents for: Initial Assessment for new diagnosis    Type of Service: Telephone Visit    Originating Location (Patient Location): Home  Distant Location (Provider Location): Offsite  Mode of Communication:  Telephone    Telephone Visit Start Time:  2:31  Telephone Visit End Time (telephone visit stop time): 3:05    How would patient like to obtain AVS? MyChart      ASSESSMENT:  Met with pt for introduction to diabetes. Provided education on pathophysiology. Reviewed how diabetes education works including availability for 1:1 education and zoom group classes (these cover the 7 self-care behaviors).He is interested in taking the 3 zoom classes and then will follow-up with a 1:1 individual meeting with educator. Ordered meter and CGM for pt reviewed BG goals and importance of testing.       Patient's most recent   Lab Results   Component Value Date    A1C 7.0 03/01/2024    A1C 6.0 02/05/2021     is not meeting goal of <7.0    Diabetes knowledge and skills assessment:   Patient is knowledgeable in diabetes management concepts related to:     Continue education with the following diabetes management concepts: Healthy Eating, Being Active, Monitoring, Taking Medication, Problem Solving, Reducing Risks, and Healthy Coping    Based on learning assessment above, most appropriate setting for further diabetes education would be: Individual setting.      PLAN  -Zoom classes and 1:1 education    Topics to cover at upcoming visits: Healthy Eating, Being Active, Monitoring, Taking Medication, Problem Solving, Reducing Risks, and Healthy Coping      See Care Plan for co-developed, patient-state behavior change goals.  AVS provided for  "patient today.    Education Materials Provided:  [employer] Understanding Diabetes Booklet, BG Log Sheet, My Plate Planner, types of diabetes medicines, diabetes distress and diabetes care goals.        SUBJECTIVE/OBJECTIVE:  Presents for: Initial Assessment for new diagnosis  Accompanied by: Self  Diabetes education in the past 24mo: No  Diabetes type: Type 2  Date of diagnosis: 3/1/24  Cultural Influences/Ethnic Background:  Not  or       Diabetes Symptoms & Complications:     Complications assessed today?: No    Patient Problem List and Family Medical History reviewed for relevant medical history, current medical status, and diabetes risk factors.    Vitals:  There were no vitals taken for this visit.  Estimated body mass index is 29.01 kg/m  as calculated from the following:    Height as of 5/10/24: 1.778 m (5' 10\").    Weight as of 5/10/24: 91.7 kg (202 lb 3.2 oz).   Last 3 BP:   BP Readings from Last 3 Encounters:   05/10/24 118/64   04/08/24 (!) 146/87   03/20/24 (!) 147/79       History   Smoking Status     Every Day     Types: Cigarettes   Smokeless Tobacco     Never       Labs:  Lab Results   Component Value Date    A1C 7.0 03/01/2024    A1C 6.0 02/05/2021     Lab Results   Component Value Date     03/05/2024     10/19/2022     02/05/2021     Lab Results   Component Value Date    LDL 99 03/01/2024     07/28/2020     HDL Cholesterol   Date Value Ref Range Status   07/28/2020 35 (L) >39 mg/dL Final     Direct Measure HDL   Date Value Ref Range Status   03/01/2024 30 (L) >=40 mg/dL Final   ]  GFR Estimate   Date Value Ref Range Status   03/04/2024 >90 >60 mL/min/1.73m2 Final   02/05/2021 >90 >60 mL/min/[1.73_m2] Final     Comment:     Non  GFR Calc  Starting 12/18/2018, serum creatinine based estimated GFR (eGFR) will be   calculated using the Chronic Kidney Disease Epidemiology Collaboration   (CKD-EPI) equation.       GFR Estimate If Black " "  Date Value Ref Range Status   02/05/2021 >90 >60 mL/min/[1.73_m2] Final     Comment:      GFR Calc  Starting 12/18/2018, serum creatinine based estimated GFR (eGFR) will be   calculated using the Chronic Kidney Disease Epidemiology Collaboration   (CKD-EPI) equation.       Lab Results   Component Value Date    CR 0.80 03/04/2024    CR 0.82 02/05/2021     No results found for: \"MICROALBUMIN\"    Healthy Eating:  Healthy Eating Assessed Today: No    Being Active:  Being Active Assessed Today: No    Monitoring:  Monitoring Assessed Today: Yes  Did patient bring glucose meter to appointment? : No      Taking Medications:  Taking Medication Assessed Today: No    Problem Solving:  Problem Solving Assessed Today: No    Reducing Risks:  Reducing Risks Assessed Today: No    Healthy Coping:  Healthy Coping Assessed Today: No  Stage of change: PREPARATION (Decided to change - considering how)  Patient Activation Measure Survey Score:      10/26/2010     3:00 PM   ALE Score (Last Two)   ALE Raw Score 51   Activation Score 91.6   ALE Level 4         Care Plan and Education Provided:  See above    Maddison Segundo RD, CHARISSA, ALEXANDRA      Time Spent: 34 minutes  Encounter Type: Individual    Any diabetes medication dose changes were made via the CDE Protocol per the patient's primary care provider. A copy of this encounter was shared with the provider.      "

## 2024-06-05 NOTE — PROGRESS NOTES
Diabetes Self-Management Education & Support    Presents for: Initial Assessment for new diagnosis    Type of Service: Telephone Visit    Originating Location (Patient Location): Home  Distant Location (Provider Location): Offsite  Mode of Communication:  Telephone    Telephone Visit Start Time:  2:31  Telephone Visit End Time (telephone visit stop time): 3:05    How would patient like to obtain AVS? MyChart      ASSESSMENT:  Met with pt for introduction to diabetes. Provided education on pathophysiology. Reviewed how diabetes education works including availability for 1:1 education and zoom group classes (these cover the 7 self-care behaviors).He is interested in taking the 3 zoom classes and then will follow-up with a 1:1 individual meeting with educator. Ordered meter and CGM for pt reviewed BG goals and importance of testing.       Patient's most recent   Lab Results   Component Value Date    A1C 7.0 03/01/2024    A1C 6.0 02/05/2021     is not meeting goal of <7.0    Diabetes knowledge and skills assessment:   Patient is knowledgeable in diabetes management concepts related to:     Continue education with the following diabetes management concepts: Healthy Eating, Being Active, Monitoring, Taking Medication, Problem Solving, Reducing Risks, and Healthy Coping    Based on learning assessment above, most appropriate setting for further diabetes education would be: Individual setting.      PLAN  -Zoom classes and 1:1 education    Topics to cover at upcoming visits: Healthy Eating, Being Active, Monitoring, Taking Medication, Problem Solving, Reducing Risks, and Healthy Coping      See Care Plan for co-developed, patient-state behavior change goals.  AVS provided for patient today.    Education Materials Provided:   [employer] Understanding Diabetes Booklet, BG Log Sheet, My Plate Planner, types of diabetes medicines, diabetes distress and diabetes care goals.        SUBJECTIVE/OBJECTIVE:  Presents for:  "Initial Assessment for new diagnosis  Accompanied by: Self  Diabetes education in the past 24mo: No  Diabetes type: Type 2  Date of diagnosis: 3/1/24  Cultural Influences/Ethnic Background:  Not  or       Diabetes Symptoms & Complications:     Complications assessed today?: No    Patient Problem List and Family Medical History reviewed for relevant medical history, current medical status, and diabetes risk factors.    Vitals:  There were no vitals taken for this visit.  Estimated body mass index is 29.01 kg/m  as calculated from the following:    Height as of 5/10/24: 1.778 m (5' 10\").    Weight as of 5/10/24: 91.7 kg (202 lb 3.2 oz).   Last 3 BP:   BP Readings from Last 3 Encounters:   05/10/24 118/64   04/08/24 (!) 146/87   03/20/24 (!) 147/79       History   Smoking Status    Every Day    Types: Cigarettes   Smokeless Tobacco    Never       Labs:  Lab Results   Component Value Date    A1C 7.0 03/01/2024    A1C 6.0 02/05/2021     Lab Results   Component Value Date     03/05/2024     10/19/2022     02/05/2021     Lab Results   Component Value Date    LDL 99 03/01/2024     07/28/2020     HDL Cholesterol   Date Value Ref Range Status   07/28/2020 35 (L) >39 mg/dL Final     Direct Measure HDL   Date Value Ref Range Status   03/01/2024 30 (L) >=40 mg/dL Final   ]  GFR Estimate   Date Value Ref Range Status   03/04/2024 >90 >60 mL/min/1.73m2 Final   02/05/2021 >90 >60 mL/min/[1.73_m2] Final     Comment:     Non  GFR Calc  Starting 12/18/2018, serum creatinine based estimated GFR (eGFR) will be   calculated using the Chronic Kidney Disease Epidemiology Collaboration   (CKD-EPI) equation.       GFR Estimate If Black   Date Value Ref Range Status   02/05/2021 >90 >60 mL/min/[1.73_m2] Final     Comment:      GFR Calc  Starting 12/18/2018, serum creatinine based estimated GFR (eGFR) will be   calculated using the Chronic Kidney Disease Epidemiology " "Collaboration   (CKD-EPI) equation.       Lab Results   Component Value Date    CR 0.80 03/04/2024    CR 0.82 02/05/2021     No results found for: \"MICROALBUMIN\"    Healthy Eating:  Healthy Eating Assessed Today: No    Being Active:  Being Active Assessed Today: No    Monitoring:  Monitoring Assessed Today: Yes  Did patient bring glucose meter to appointment? : No      Taking Medications:  Taking Medication Assessed Today: No    Problem Solving:  Problem Solving Assessed Today: No    Reducing Risks:  Reducing Risks Assessed Today: No    Healthy Coping:  Healthy Coping Assessed Today: No  Stage of change: PREPARATION (Decided to change - considering how)  Patient Activation Measure Survey Score:      10/26/2010     3:00 PM   ALE Score (Last Two)   ALE Raw Score 51   Activation Score 91.6   ALE Level 4         Care Plan and Education Provided:  See above    Maddison Segundo RD, CHARISSA, [facility]      Time Spent: 34 minutes  Encounter Type: Individual    Any diabetes medication dose changes were made via the CDE Protocol per the patient's primary care provider. A copy of this encounter was shared with the provider.    "

## 2024-06-05 NOTE — PATIENT INSTRUCTIONS
You are signed up for upcoming virtual type 2 classes. Class topics and dates are listed below:     Healthy Eating & Being Active - 7/10 at 12pm  Monitoring & Medication - 7/17 at 12pm  Problem Solving, Reducing Risks & Healthy Coping - 7/3 at 12pm    Your 1:1 visit with Usha is 7/19 @ 11am    You will find the classes listed on your appointment schedule on Farmer[employer]. On the day of your class, you will enter class via Farmer[employer] and will be taken to the [employer] platform.   Please try to log in ~10 minutes before the class start to ensure you have appropriate software or in case you have any issues.      Blood sugars goals:  Check blood sugars at varying times: before meals, after meals and bedtime  -Fasting and before meal target is 80 - 130  -2 hours after a meal target is < 180  -Time in range for continuous glucose monitor (Oxana OR Dexcom): at least 70% BG numbers between .  Always remember to bring meter and/or log book to all appointments.    Oxana 3 Instructions:     1.  The first hour after you place the sensor is the warm up period (black out period).  You will need to carry your blood glucose meter and check blood glucose during this time.     2.  Check blood sugar if feeling symptoms of hypoglycemia but meter is not displaying a low number- may be some variability between sensor and meter at times.     3.  Change sensor every 14 days.     4.   Watch trend arrows- will let you know if blood sugars are rising, falling or stable at the time you check.     5.  Oxana 3 has alarms. You can adjust both the high and low blood glucose alarm (when they will go off) or turn off high blood glucose alarm if alarming too much.     You cannot turn off the critical low blood glucose alarm     6.  You can shower, bathe, and swim with sensor on. Do not get reader wet.     7.  Remove the sensor if you need to have an MRI or CT scan.     8.  Do not cover the sensor with extra adhesive (the small hole in the center of the  sensor must remain uncovered), unless it is made for the Oxana.  If you have trouble with sensor falling off, these are approved products to help with sensor adhesion: Torbot Skin Tac, SKIN-PREP Protective Barrier Wipe and Mastisol Liquid Adhesive     9.  Check the dose if you take a multivitamin or Vitamin C (ascorbic acid). You want to limit daily intake of ascorbic acid to 500 mg/day or less, or it may falsely raise sensor glucose readings. This is more of a concern if you are on insulin or medication that can cause low blood glucose as you may miss a severe low glucose event.     Main [employer] Oxana website:   https://www.Fanplayr.abbott/us-en/home.html    If you go to the support tab you can pick Oxana 2 or 3.  From this page you can access instructions and how to videos.       Support:   If you have any trouble with use or if the sensor falls off early, call the customer service number for Oxana located on the sensor's box. They can often help troubleshoot or replace sensor if needed. Keep your Oxana sensor box with lot and/or serial numbers.    Website for oxana replacement due to failure or falling off:   https://www.OnFarm/us-en/support/sensor-support-form-questions.html    Oxana Customer Service: [phone]  (7 days a week 8am-8pm ET excluding holidays)       If you need assistance setting up your meter or a reminder on how to use it use the link below (choose the brand of your meter):  -OneTouch Verio meter: https://www.qcueube.com/watch?v=SCExtW37ht3  -Accu-check Guide meter: https://www.qcueube.com/results?search_query=accu+chek+guide+how+to+use  -Contour One meter: https://www.youPeople Publishingube.com/watch?v=GkfTqMI1rZN          Thank you!  Maddison Segundo RD, CHARISSA, [facility]  Certified Diabetes Care &   Outpatient [facility] and [facility] Diabetes Education and Nutrition Services for the [facility]:  For Your Diabetes Education or  Nutrition Appointments Call:  [phone]   For Diabetes Education and Nutrition Related Questions:   Phone: [phone]  Send Discera Message   If you need a medication refill please contact your pharmacy. Please allow 3 business days for your refills to be completed.

## 2024-07-10 ENCOUNTER — VIRTUAL VISIT (OUTPATIENT)
Dept: EDUCATION SERVICES | Facility: CLINIC | Age: 69
End: 2024-07-10
Payer: COMMERCIAL

## 2024-07-10 DIAGNOSIS — E11.59 TYPE 2 DIABETES MELLITUS WITH OTHER CIRCULATORY COMPLICATION, WITHOUT LONG-TERM CURRENT USE OF INSULIN (H): Primary | ICD-10-CM

## 2024-07-10 PROCEDURE — G0109 DIAB MANAGE TRN IND/GROUP: HCPCS | Mod: 95 | Performed by: DIETITIAN, REGISTERED

## 2024-07-10 NOTE — PROGRESS NOTES
Diabetes Self-Management Training Virtual  Class - Healthy Eating & Being Active    Gabe Smith presents today for group education related to Type 2 diabetes   He is accompanied by self    Educational Topics Discussed  Pathophysiology of Type 2 Diabetes, A1C Value, Carbohydrate Counting, Label Reading, Plate Planner Approach, Carb Counting Practice, Heart Healthy diet, Exercise - why it is helpful for BG control    Quick Review of other class topics - Monitoring & Diabetes Treatment Options, Hyperglycemia & Hypoglycemia Prevention and Symptoms, Risk Reduction, Sleep, Diabetes Distress    Materials Provided  Understanding Diabetes  Blood sugar log sheet  Goals of Diabetes Care  MyPlate Planner  Medications for Diabetes  ADCES Diabetes Distress    All questions were answered in the group setting. Patient to contact educator with specific questions, should need arise.    Plan and Follow-up  Patient to begin carb counting and follow recommended meal plan.  Patient to attend all Diabetes Self-Management Training Classes.  Patient to follow-up with diabetes educator one-on-one, as needed, after classes completed.     Patient-stated goal written and given to patient.     HERMES Allan [facility]  Triage: [phone]  [phone]    Time Spent: 60 minutes

## 2024-07-10 NOTE — LETTER
7/10/2024         RE: Gabe Smith  [address] [place] [address]        Dear Colleague,    Thank you for referring your patient, Gabe Smith, to the [facility] [place]. Please see a copy of my visit note below.    Diabetes Self-Management Training Virtual  Class - Healthy Eating & Being Active    Gabe Smith presents today for group education related to Type 2 diabetes   He is accompanied by self    Educational Topics Discussed  Pathophysiology of Type 2 Diabetes, A1C Value, Carbohydrate Counting, Label Reading, Plate Planner Approach, Carb Counting Practice, Heart Healthy diet, Exercise - why it is helpful for BG control    Quick Review of other class topics - Monitoring & Diabetes Treatment Options, Hyperglycemia & Hypoglycemia Prevention and Symptoms, Risk Reduction, Sleep, Diabetes Distress    Materials Provided  Understanding Diabetes  Blood sugar log sheet  Goals of Diabetes Care  MyPlate Planner  Medications for Diabetes  ADCES Diabetes Distress    All questions were answered in the group setting. Patient to contact educator with specific questions, should need arise.    Plan and Follow-up  Patient to begin carb counting and follow recommended meal plan.  Patient to attend all Diabetes Self-Management Training Classes.  Patient to follow-up with diabetes educator one-on-one, as needed, after classes completed.     Patient-stated goal written and given to patient.     Gayathri Qiu RD [facility]  Triage: [phone]  [phone]    Time Spent: 60 minutes

## 2024-07-17 ENCOUNTER — VIRTUAL VISIT (OUTPATIENT)
Dept: EDUCATION SERVICES | Facility: CLINIC | Age: 69
End: 2024-07-17
Payer: COMMERCIAL

## 2024-07-17 DIAGNOSIS — E11.59 TYPE 2 DIABETES MELLITUS WITH OTHER CIRCULATORY COMPLICATION, WITHOUT LONG-TERM CURRENT USE OF INSULIN (H): Primary | ICD-10-CM

## 2024-07-17 PROCEDURE — G0109 DIAB MANAGE TRN IND/GROUP: HCPCS | Mod: 95 | Performed by: NUTRITIONIST

## 2024-07-17 NOTE — PROGRESS NOTES
Diabetes Self-Management Training Virtual Class - Monitoring & Taking Medication    Gabe Rodorvilleshalini presents today for group education related to Type 2 diabetes   He is accompanied by self    Educational Topics Discussed  Pathophysiology of Type 2 Diabetes, A1C Value, Monitoring - what affects blood sugars, Glucometer & CGM use, video glucometer training, blood sugar goals, testing plans, Medications - first, second & third line therapies, insulin use, choosing the right medicine    Quick Review of other class topics - Healthy Eating, Being Active, Hyperglycemia & Hypoglycemia Prevention and Symptoms, Risk Reduction, Sleep, Diabetes Distress    Materials Provided  Understanding Diabetes  Blood sugar log sheet  Goals of Diabetes Care  MyPlate Planner  Medications for Diabetes  ADCES Diabetes Distress    All questions were answered in the group setting. Patient to contact educator with specific questions, should need arise.    Plan and Follow-up  Patient to begin testing blood sugars at least once daily, via glucometer or CGM.   Patient to attend all Diabetes Self-Management Training Classes.  Patient to follow-up with diabetes educator one-on-one, as needed, after classes completed.     Patient-stated goal written and given to patient.     Diann Calhoun, RD, LD, CDCES   Time Spent: 60 minutes

## 2024-07-17 NOTE — LETTER
7/17/2024         RE: Gabe Smith  [address] [place] [address]        Dear Colleague,    Thank you for referring your patient, Gabe Smith, to the [facility]. Please see a copy of my visit note below.    Diabetes Self-Management Training Virtual Class - Monitoring & Taking Medication    Gabe Smith presents today for group education related to Type 2 diabetes   He is accompanied by self    Educational Topics Discussed  Pathophysiology of Type 2 Diabetes, A1C Value, Monitoring - what affects blood sugars, Glucometer & CGM use, video glucometer training, blood sugar goals, testing plans, Medications - first, second & third line therapies, insulin use, choosing the right medicine    Quick Review of other class topics - Healthy Eating, Being Active, Hyperglycemia & Hypoglycemia Prevention and Symptoms, Risk Reduction, Sleep, Diabetes Distress    Materials Provided  Understanding Diabetes  Blood sugar log sheet  Goals of Diabetes Care  MyPlate Planner  Medications for Diabetes  ADCES Diabetes Distress    All questions were answered in the group setting. Patient to contact educator with specific questions, should need arise.    Plan and Follow-up  Patient to begin testing blood sugars at least once daily, via glucometer or CGM.   Patient to attend all Diabetes Self-Management Training Classes.  Patient to follow-up with diabetes educator one-on-one, as needed, after classes completed.     Patient-stated goal written and given to patient.     Diann Calhoun RD, LD, CDCES   Time Spent: 60 minutes

## 2024-07-19 ENCOUNTER — VIRTUAL VISIT (OUTPATIENT)
Dept: EDUCATION SERVICES | Facility: CLINIC | Age: 69
End: 2024-07-19
Payer: COMMERCIAL

## 2024-07-19 DIAGNOSIS — E11.59 TYPE 2 DIABETES MELLITUS WITH OTHER CIRCULATORY COMPLICATION, WITHOUT LONG-TERM CURRENT USE OF INSULIN (H): Primary | ICD-10-CM

## 2024-07-19 PROCEDURE — 98968 PH1 ASSMT&MGMT NQHP 21-30: CPT | Mod: 93 | Performed by: DIETITIAN, REGISTERED

## 2024-07-19 NOTE — PROGRESS NOTES
Diabetes Self-Management Education & Support/ 33 minutes    Presents for: Follow-up    Type of Service: Telephone Visit    Originating Location (Patient Location): Home  Distant Location (Provider Location): [facility]  Mode of Communication:  Telephone    Telephone Visit Start Time:  11:00  Telephone Visit End Time (telephone visit stop time): 11:33    How would patient like to obtain AVS? Amira      ASSESSMENT:  Gabe has attended 2/3 classes for Type 2 diabetes, he will attend the third class on 7/24. Gabe has questions today in regards to nutrition. We reviewed the healthy plate, portions for carbohydrate containing foods for meals/snacks, heart healthy eating, portions for fruit and eating out. We discussed interpreting data from his Oxana 3: TIR, trends of elevated BG, alarms and when to look at his BG readings. Gabe works part time and has an active position where he averages 5-8kday, we discussed adding in more activity on his non-work days, setting a step goal that will add in additional steps to increase his overall activity. Gabe is not UTD on a yearly eye exam, he wears dentures which were fitted two years ago. Gabe uses the oxana 3 with reader to monitor his BG readings, he is currently not taking medication for diabetes    Patient's most recent   Lab Results   Component Value Date    A1C 7.0 03/01/2024    A1C 6.0 02/05/2021     is meeting goal of <8.0    Diabetes knowledge and skills assessment:   Patient is knowledgeable in diabetes management concepts related to: topics reviewed    Continue education with the following diabetes management concepts: class 3 will address reducing risks and problem solving    Based on learning assessment above, most appropriate setting for further diabetes education would be: individual or group setting.      PLAN  Check time in target on your oxana 3 : goal is to have a minimum of 70% of your BG between .  Goal for your  "fasting blood sugar is to be under 130 and goal for two hours after a meal is to be under 180.   Set a goal to add in 500 additional steps/day on your non-work days, continue to increase your average to promote more physical activity.  Follow the plate method to limit your carbohydrate containing foods to 1/4 of your plate, lean protein to 1/4 of your plate and non-starchy vegetables to 1/2 of your plate.  Avoid sugary beverages.  Schedule a diabetic eye exam.       Topics to cover at upcoming visits: Reducing Risks    Follow-up: 7/24/24-Diabetes class    See Care Plan for co-developed, patient-state behavior change goals.  AVS provided for patient today.    Education Materials Provided:  No new materials provided today      SUBJECTIVE/OBJECTIVE:  Presents for: Follow-up  Accompanied by: Self  Diabetes education in the past 24mo: Yes  Focus of Visit: Monitoring, Reducing Risks, Healthy Eating, Being Active  Diabetes type: Type 2  Disease course: Stable  How confident are you filling out medical forms by yourself:: Extremely  Transportation concerns: No  Other concerns:: None  Cultural Influences/Ethnic Background:  Not  or       Diabetes Symptoms & Complications:  Diabetes Related Symptoms: Neuropathy  Weight trend: Stable  Symptom course: Stable  Disease course: Stable       Patient Problem List and Family Medical History reviewed for relevant medical history, current medical status, and diabetes risk factors.    Vitals:  There were no vitals taken for this visit.  Estimated body mass index is 29.01 kg/m  as calculated from the following:    Height as of 5/10/24: 1.778 m (5' 10\").    Weight as of 5/10/24: 91.7 kg (202 lb 3.2 oz).   Last 3 BP:   BP Readings from Last 3 Encounters:   05/10/24 118/64   04/08/24 (!) 146/87   03/20/24 (!) 147/79       History   Smoking Status    Every Day    Types: Cigarettes   Smokeless Tobacco    Never       Labs:  Lab Results   Component Value Date    A1C 7.0 03/01/2024 " "   A1C 6.0 02/05/2021     Lab Results   Component Value Date     03/05/2024     10/19/2022     02/05/2021     Lab Results   Component Value Date    LDL 99 03/01/2024     07/28/2020     HDL Cholesterol   Date Value Ref Range Status   07/28/2020 35 (L) >39 mg/dL Final     Direct Measure HDL   Date Value Ref Range Status   03/01/2024 30 (L) >=40 mg/dL Final   ]  GFR Estimate   Date Value Ref Range Status   03/04/2024 >90 >60 mL/min/1.73m2 Final   02/05/2021 >90 >60 mL/min/[1.73_m2] Final     Comment:     Non  GFR Calc  Starting 12/18/2018, serum creatinine based estimated GFR (eGFR) will be   calculated using the Chronic Kidney Disease Epidemiology Collaboration   (CKD-EPI) equation.       GFR Estimate If Black   Date Value Ref Range Status   02/05/2021 >90 >60 mL/min/[1.73_m2] Final     Comment:      GFR Calc  Starting 12/18/2018, serum creatinine based estimated GFR (eGFR) will be   calculated using the Chronic Kidney Disease Epidemiology Collaboration   (CKD-EPI) equation.       Lab Results   Component Value Date    CR 0.80 03/04/2024    CR 0.82 02/05/2021     No results found for: \"MICROALBUMIN\"    Healthy Eating:  Healthy Eating Assessed Today: Yes  Cultural/Shinto diet restrictions?: No  How many times a week on average do you eat food made away from home (restaurant/take-out)?: 1  Meals include: Lunch, Dinner, Evening Snack  Snacks: fruit  Beverages: Water, Coffee, Milk, Diet soda, Sports drinks, Alcohol  Has patient met with a dietitian in the past?: Yes    Being Active:  Being Active Assessed Today: Yes  Exercise:: Currently not exercising  Barrier to exercise: None    Monitoring:  Monitoring Assessed Today: Yes  Blood Glucose Meter: FreeStyle, CGM  Times checking blood sugar at home (number): 5+  Times checking blood sugar at home (per): Day    CGM data for the last 7 days:  TIR=83%  Time above range 17%    Taking Medications:      Current " Treatments: None    Problem Solving:  Problem Solving Assessed Today: Yes  Is the patient at risk for hypoglycemia?: No              Reducing Risks:  Reducing Risks Assessed Today: Yes  Diabetes Risks: Age over 45 years, Sedentary Lifestyle  Has dilated eye exam at least once a year?: No  Sees dentist every 6 months?: No  Feet checked by healthcare provider in the last year?: No    Healthy Coping:  Healthy Coping Assessed Today: Yes  Emotional response to diabetes: Ready to learn  Informal Support system:: Children, Spouse  Stage of change: PREPARATION (Decided to change - considering how)  Patient Activation Measure Survey Score:      10/26/2010     3:00 PM   ALE Score (Last Two)   ALE Raw Score 51   Activation Score 91.6   ALE Level 4         Care Plan and Education Provided:  Healthy Eating: Eating out, Plate planning method, and Portion control, Being Active: setting goals to increase steps/day, Monitoring: TIR, FBS and PP goals, and Reducing Risks: Dental care, Eye care, and Goal for A1c, how it relates to glucose and how often to check        Time Spent: 33 minutes  Encounter Type: Individual    Any diabetes medication dose changes were made via the CDE Protocol per the patient's primary care provider. A copy of this encounter was shared with the provider.

## 2024-07-19 NOTE — PATIENT INSTRUCTIONS
Check time in target on your montez 3 : goal is to have a minimum of 70% of your BG between .  Goal for your fasting blood sugar is to be under 130 and goal for two hours after a meal is to be under 180.   Set a goal to add in 500 additional steps/day on your non-work days, continue to increase your average to promote more physical activity.  Follow the plate method to limit your carbohydrate containing foods to 1/4 of your plate, lean protein to 1/4 of your plate and non-starchy vegetables to 1/2 of your plate.  Avoid sugary beverages.  Schedule a diabetic eye exam.

## 2024-07-19 NOTE — LETTER
7/19/2024         RE: Gabe Smith  [address] [facility] [address]        Dear Colleague,    Thank you for referring your patient, Gabe Smith, to the [facility]. Please see a copy of my visit note below.    Diabetes Self-Management Education & Support/ 33 minutes    Presents for: Follow-up    Type of Service: Telephone Visit    Originating Location (Patient Location): Home  Distant Location (Provider Location): [facility]  Mode of Communication:  Telephone    Telephone Visit Start Time:  11:00  Telephone Visit End Time (telephone visit stop time): 11:33    How would patient like to obtain AVS? MyChart      ASSESSMENT:  Gabe has attended 2/3 classes for Type 2 diabetes, he will attend the third class on 7/24. Gabe has questions today in regards to nutrition. We reviewed the healthy plate, portions for carbohydrate containing foods for meals/snacks, heart healthy eating, portions for fruit and eating out. We discussed interpreting data from his Oxana 3: TIR, trends of elevated BG, alarms and when to look at his BG readings. Gabe works part time and has an active position where he averages 5-8kday, we discussed adding in more activity on his non-work days, setting a step goal that will add in additional steps to increase his overall activity. Gabe is not UTD on a yearly eye exam, he wears dentures which were fitted two years ago. Gabe uses the oxana 3 with reader to monitor his BG readings, he is currently not taking medication for diabetes    Patient's most recent   Lab Results   Component Value Date    A1C 7.0 03/01/2024    A1C 6.0 02/05/2021     is meeting goal of <8.0    Diabetes knowledge and skills assessment:   Patient is knowledgeable in diabetes management concepts related to: topics reviewed    Continue education with the following diabetes management concepts: class 3 will address reducing risks and problem solving    Based on  "learning assessment above, most appropriate setting for further diabetes education would be: individual or group setting.      PLAN  Check time in target on your montez 3 : goal is to have a minimum of 70% of your BG between .  Goal for your fasting blood sugar is to be under 130 and goal for two hours after a meal is to be under 180.   Set a goal to add in 500 additional steps/day on your non-work days, continue to increase your average to promote more physical activity.  Follow the plate method to limit your carbohydrate containing foods to 1/4 of your plate, lean protein to 1/4 of your plate and non-starchy vegetables to 1/2 of your plate.  Avoid sugary beverages.  Schedule a diabetic eye exam.       Topics to cover at upcoming visits: Reducing Risks    Follow-up: 7/24/24-Diabetes class    See Care Plan for co-developed, patient-state behavior change goals.  AVS provided for patient today.    Education Materials Provided:  No new materials provided today      SUBJECTIVE/OBJECTIVE:  Presents for: Follow-up  Accompanied by: Self  Diabetes education in the past 24mo: Yes  Focus of Visit: Monitoring, Reducing Risks, Healthy Eating, Being Active  Diabetes type: Type 2  Disease course: Stable  How confident are you filling out medical forms by yourself:: Extremely  Transportation concerns: No  Other concerns:: None  Cultural Influences/Ethnic Background:  Not  or       Diabetes Symptoms & Complications:  Diabetes Related Symptoms: Neuropathy  Weight trend: Stable  Symptom course: Stable  Disease course: Stable       Patient Problem List and Family Medical History reviewed for relevant medical history, current medical status, and diabetes risk factors.    Vitals:  There were no vitals taken for this visit.  Estimated body mass index is 29.01 kg/m  as calculated from the following:    Height as of 5/10/24: 1.778 m (5' 10\").    Weight as of 5/10/24: 91.7 kg (202 lb 3.2 oz).   Last 3 BP:   BP " "Readings from Last 3 Encounters:   05/10/24 118/64   04/08/24 (!) 146/87   03/20/24 (!) 147/79       History   Smoking Status     Every Day     Types: Cigarettes   Smokeless Tobacco     Never       Labs:  Lab Results   Component Value Date    A1C 7.0 03/01/2024    A1C 6.0 02/05/2021     Lab Results   Component Value Date     03/05/2024     10/19/2022     02/05/2021     Lab Results   Component Value Date    LDL 99 03/01/2024     07/28/2020     HDL Cholesterol   Date Value Ref Range Status   07/28/2020 35 (L) >39 mg/dL Final     Direct Measure HDL   Date Value Ref Range Status   03/01/2024 30 (L) >=40 mg/dL Final   ]  GFR Estimate   Date Value Ref Range Status   03/04/2024 >90 >60 mL/min/1.73m2 Final   02/05/2021 >90 >60 mL/min/[1.73_m2] Final     Comment:     Non  GFR Calc  Starting 12/18/2018, serum creatinine based estimated GFR (eGFR) will be   calculated using the Chronic Kidney Disease Epidemiology Collaboration   (CKD-EPI) equation.       GFR Estimate If Black   Date Value Ref Range Status   02/05/2021 >90 >60 mL/min/[1.73_m2] Final     Comment:      GFR Calc  Starting 12/18/2018, serum creatinine based estimated GFR (eGFR) will be   calculated using the Chronic Kidney Disease Epidemiology Collaboration   (CKD-EPI) equation.       Lab Results   Component Value Date    CR 0.80 03/04/2024    CR 0.82 02/05/2021     No results found for: \"MICROALBUMIN\"    Healthy Eating:  Healthy Eating Assessed Today: Yes  Cultural/Episcopal diet restrictions?: No  How many times a week on average do you eat food made away from home (restaurant/take-out)?: 1  Meals include: Lunch, Dinner, Evening Snack  Snacks: fruit  Beverages: Water, Coffee, Milk, Diet soda, Sports drinks, Alcohol  Has patient met with a dietitian in the past?: Yes    Being Active:  Being Active Assessed Today: Yes  Exercise:: Currently not exercising  Barrier to exercise: " None    Monitoring:  Monitoring Assessed Today: Yes  Blood Glucose Meter: FreeStyle, CGM  Times checking blood sugar at home (number): 5+  Times checking blood sugar at home (per): Day    CGM data for the last 7 days:  TIR=83%  Time above range 17%    Taking Medications:      Current Treatments: None    Problem Solving:  Problem Solving Assessed Today: Yes  Is the patient at risk for hypoglycemia?: No              Reducing Risks:  Reducing Risks Assessed Today: Yes  Diabetes Risks: Age over 45 years, Sedentary Lifestyle  Has dilated eye exam at least once a year?: No  Sees dentist every 6 months?: No  Feet checked by healthcare provider in the last year?: No    Healthy Coping:  Healthy Coping Assessed Today: Yes  Emotional response to diabetes: Ready to learn  Informal Support system:: Children, Spouse  Stage of change: PREPARATION (Decided to change - considering how)  Patient Activation Measure Survey Score:      10/26/2010     3:00 PM   ALE Score (Last Two)   ALE Raw Score 51   Activation Score 91.6   ALE Level 4         Care Plan and Education Provided:  Healthy Eating: Eating out, Plate planning method, and Portion control, Being Active: setting goals to increase steps/day, Monitoring: TIR, FBS and PP goals, and Reducing Risks: Dental care, Eye care, and Goal for A1c, how it relates to glucose and how often to check        Time Spent: 33 minutes  Encounter Type: Individual    Any diabetes medication dose changes were made via the CDE Protocol per the patient's primary care provider. A copy of this encounter was shared with the provider.

## 2024-07-24 ENCOUNTER — VIRTUAL VISIT (OUTPATIENT)
Dept: EDUCATION SERVICES | Facility: CLINIC | Age: 69
End: 2024-07-24
Payer: COMMERCIAL

## 2024-07-24 DIAGNOSIS — E11.59 TYPE 2 DIABETES MELLITUS WITH OTHER CIRCULATORY COMPLICATION, WITHOUT LONG-TERM CURRENT USE OF INSULIN (H): Primary | ICD-10-CM

## 2024-07-24 PROCEDURE — G0109 DIAB MANAGE TRN IND/GROUP: HCPCS | Mod: 95 | Performed by: DIETITIAN, REGISTERED

## 2024-07-24 NOTE — LETTER
7/24/2024         RE: Gabe Smith  [address] [facility] [address]        Dear Colleague,    Thank you for referring your patient, Gabe Smith, to the [facility]. Please see a copy of my visit note below.    Diabetes Self-Management Training Class Virtual - Problem Solving, Reducing Risks, & Healthy Coping    Gabe Smith presents today for group education related to Type 2 diabetes   He is accompanied by self    Educational Topics Discussed  Pathophysiology of Type 2 Diabetes, A1C Value, Prevention and treatment of hypoglycemia & hyperglycemia, Sick Day Management, Complications of Diabetes - retinopathy, nephropathy, neuropathy, and CV risk, Diabetes Care Goals, Healthy Coping, Diabetes Distress, when to seek professional help, Adequate Sleep     Quick Review of other class topics - Healthy Eating, Being Active, Monitoring & Taking Medications    Materials Provided  Understanding Diabetes  Blood sugar log sheet  Goals of Diabetes Care  MyPlate Planner  Medications for Diabetes  ADCES Diabetes Distress    All questions were answered in the group setting. Patient to contact educator with specific questions, should need arise.    Plan and Follow-up  Patient to get A1C tested every 3-6 months, eye exam annually, lipid and kidney check annually   Patient to follow up with diabetes educator if seeing blood sugars <70 mg/dL or >250 mg/dL on a regular basis  Patient to attend all Diabetes Self-Management Training Classes.  Patient to follow-up with diabetes educator one-on-one, as needed, after classes completed.     Yamila Villalta RD, [facility], 1:34 PM, 7/24/2024    Time Spent: 60 minutes

## 2024-07-24 NOTE — PROGRESS NOTES
Diabetes Self-Management Training Class Virtual - Problem Solving, Reducing Risks, & Healthy Coping    Gabe Smith presents today for group education related to Type 2 diabetes   He is accompanied by self    Educational Topics Discussed  Pathophysiology of Type 2 Diabetes, A1C Value, Prevention and treatment of hypoglycemia & hyperglycemia, Sick Day Management, Complications of Diabetes - retinopathy, nephropathy, neuropathy, and CV risk, Diabetes Care Goals, Healthy Coping, Diabetes Distress, when to seek professional help, Adequate Sleep     Quick Review of other class topics - Healthy Eating, Being Active, Monitoring & Taking Medications    Materials Provided  Understanding Diabetes  Blood sugar log sheet  Goals of Diabetes Care  MyPlate Planner  Medications for Diabetes  ADCES Diabetes Distress    All questions were answered in the group setting. Patient to contact educator with specific questions, should need arise.    Plan and Follow-up  Patient to get A1C tested every 3-6 months, eye exam annually, lipid and kidney check annually   Patient to follow up with diabetes educator if seeing blood sugars <70 mg/dL or >250 mg/dL on a regular basis  Patient to attend all Diabetes Self-Management Training Classes.  Patient to follow-up with diabetes educator one-on-one, as needed, after classes completed.     Yamila Villalta RD, [facility], 1:34 PM, 7/24/2024    Time Spent: 60 minutes

## 2024-09-06 ENCOUNTER — MYC REFILL (OUTPATIENT)
Dept: EDUCATION SERVICES | Facility: CLINIC | Age: 69
End: 2024-09-06
Payer: COMMERCIAL

## 2024-09-06 DIAGNOSIS — E11.59 TYPE 2 DIABETES MELLITUS WITH OTHER CIRCULATORY COMPLICATION, WITHOUT LONG-TERM CURRENT USE OF INSULIN (H): ICD-10-CM

## 2024-09-06 RX ORDER — BLOOD-GLUCOSE SENSOR
1 EACH MISCELLANEOUS
Qty: 6 EACH | Refills: 5 | OUTPATIENT
Start: 2024-09-06

## 2024-10-16 ENCOUNTER — LAB (OUTPATIENT)
Dept: LAB | Facility: CLINIC | Age: 69
End: 2024-10-16
Payer: COMMERCIAL

## 2024-10-16 DIAGNOSIS — Z12.5 SCREENING FOR PROSTATE CANCER: ICD-10-CM

## 2024-10-16 DIAGNOSIS — I10 BENIGN ESSENTIAL HYPERTENSION: ICD-10-CM

## 2024-10-16 DIAGNOSIS — E11.59 TYPE 2 DIABETES MELLITUS WITH CIRCULATORY DISORDER, WITHOUT LONG-TERM CURRENT USE OF INSULIN (H): Primary | ICD-10-CM

## 2024-10-16 LAB
ANION GAP SERPL CALCULATED.3IONS-SCNC: 8 MMOL/L (ref 7–15)
BUN SERPL-MCNC: 20.9 MG/DL (ref 8–23)
CALCIUM SERPL-MCNC: 9.6 MG/DL (ref 8.8–10.4)
CHLORIDE SERPL-SCNC: 103 MMOL/L (ref 98–107)
CREAT SERPL-MCNC: 1.01 MG/DL (ref 0.67–1.17)
EGFRCR SERPLBLD CKD-EPI 2021: 81 ML/MIN/1.73M2
EST. AVERAGE GLUCOSE BLD GHB EST-MCNC: 143 MG/DL
GLUCOSE SERPL-MCNC: 138 MG/DL (ref 70–99)
HBA1C MFR BLD: 6.6 % (ref 0–5.6)
HCO3 SERPL-SCNC: 32 MMOL/L (ref 22–29)
POTASSIUM SERPL-SCNC: 4.8 MMOL/L (ref 3.4–5.3)
PSA SERPL DL<=0.01 NG/ML-MCNC: 1.2 NG/ML (ref 0–4.5)
SODIUM SERPL-SCNC: 143 MMOL/L (ref 135–145)

## 2024-10-16 PROCEDURE — 83036 HEMOGLOBIN GLYCOSYLATED A1C: CPT

## 2024-10-16 PROCEDURE — G0103 PSA SCREENING: HCPCS

## 2024-10-16 PROCEDURE — 36415 COLL VENOUS BLD VENIPUNCTURE: CPT

## 2024-10-16 PROCEDURE — 80048 BASIC METABOLIC PNL TOTAL CA: CPT

## 2024-10-17 ASSESSMENT — SOCIAL DETERMINANTS OF HEALTH (SDOH): HOW OFTEN DO YOU GET TOGETHER WITH FRIENDS OR RELATIVES?: ONCE A WEEK

## 2024-10-23 ENCOUNTER — OFFICE VISIT (OUTPATIENT)
Dept: INTERNAL MEDICINE | Facility: CLINIC | Age: 69
End: 2024-10-23
Payer: COMMERCIAL

## 2024-10-23 VITALS
HEART RATE: 68 BPM | HEIGHT: 70 IN | SYSTOLIC BLOOD PRESSURE: 120 MMHG | DIASTOLIC BLOOD PRESSURE: 70 MMHG | BODY MASS INDEX: 28.77 KG/M2 | OXYGEN SATURATION: 94 % | RESPIRATION RATE: 14 BRPM | WEIGHT: 201 LBS | TEMPERATURE: 97.9 F

## 2024-10-23 DIAGNOSIS — J43.1 PANLOBULAR EMPHYSEMA (H): ICD-10-CM

## 2024-10-23 DIAGNOSIS — M10.9 ACUTE GOUT OF FOOT, UNSPECIFIED CAUSE, UNSPECIFIED LATERALITY: ICD-10-CM

## 2024-10-23 DIAGNOSIS — E11.59 TYPE 2 DIABETES MELLITUS WITH OTHER CIRCULATORY COMPLICATION, WITHOUT LONG-TERM CURRENT USE OF INSULIN (H): ICD-10-CM

## 2024-10-23 DIAGNOSIS — Z23 HIGH PRIORITY FOR COVID-19 VACCINATION: ICD-10-CM

## 2024-10-23 DIAGNOSIS — J44.1 COPD EXACERBATION (H): ICD-10-CM

## 2024-10-23 DIAGNOSIS — Z72.0 TOBACCO ABUSE: ICD-10-CM

## 2024-10-23 DIAGNOSIS — E78.5 HYPERLIPIDEMIA LDL GOAL <100: ICD-10-CM

## 2024-10-23 DIAGNOSIS — I10 BENIGN ESSENTIAL HYPERTENSION: ICD-10-CM

## 2024-10-23 DIAGNOSIS — Z23 NEED FOR INFLUENZA VACCINATION: ICD-10-CM

## 2024-10-23 DIAGNOSIS — F10.20 ALCOHOL DEPENDENCE, EPISODIC DRINKING BEHAVIOR (H): ICD-10-CM

## 2024-10-23 DIAGNOSIS — Z00.00 MEDICARE ANNUAL WELLNESS VISIT, SUBSEQUENT: Primary | ICD-10-CM

## 2024-10-23 PROCEDURE — 91320 SARSCV2 VAC 30MCG TRS-SUC IM: CPT | Performed by: INTERNAL MEDICINE

## 2024-10-23 PROCEDURE — 90662 IIV NO PRSV INCREASED AG IM: CPT | Performed by: INTERNAL MEDICINE

## 2024-10-23 PROCEDURE — 99214 OFFICE O/P EST MOD 30 MIN: CPT | Mod: 25 | Performed by: INTERNAL MEDICINE

## 2024-10-23 PROCEDURE — G0008 ADMIN INFLUENZA VIRUS VAC: HCPCS | Performed by: INTERNAL MEDICINE

## 2024-10-23 PROCEDURE — 99397 PER PM REEVAL EST PAT 65+ YR: CPT | Performed by: INTERNAL MEDICINE

## 2024-10-23 PROCEDURE — 90480 ADMN SARSCOV2 VAC 1/ONLY CMP: CPT | Performed by: INTERNAL MEDICINE

## 2024-10-23 RX ORDER — FLUTICASONE PROPIONATE AND SALMETEROL 232; 14 UG/1; UG/1
1 POWDER, METERED RESPIRATORY (INHALATION) 2 TIMES DAILY
Qty: 3 EACH | Refills: 3 | Status: SHIPPED | OUTPATIENT
Start: 2024-10-23

## 2024-10-23 RX ORDER — PRAVASTATIN SODIUM 40 MG
40 TABLET ORAL AT BEDTIME
Qty: 90 TABLET | Refills: 1 | Status: SHIPPED | OUTPATIENT
Start: 2024-10-23

## 2024-10-23 RX ORDER — LISINOPRIL AND HYDROCHLOROTHIAZIDE 12.5; 2 MG/1; MG/1
1 TABLET ORAL EVERY MORNING
Qty: 90 TABLET | Refills: 1 | Status: SHIPPED | OUTPATIENT
Start: 2024-10-23

## 2024-10-23 RX ORDER — IPRATROPIUM BROMIDE AND ALBUTEROL SULFATE 2.5; .5 MG/3ML; MG/3ML
1 SOLUTION RESPIRATORY (INHALATION) EVERY 4 HOURS PRN
Qty: 90 ML | Refills: 12 | Status: SHIPPED | OUTPATIENT
Start: 2024-10-23

## 2024-10-23 RX ORDER — VARENICLINE TARTRATE 1 MG/1
1 TABLET, FILM COATED ORAL 2 TIMES DAILY
Qty: 60 TABLET | Refills: 5 | Status: SHIPPED | OUTPATIENT
Start: 2024-10-23

## 2024-10-23 RX ORDER — AMLODIPINE BESYLATE 10 MG/1
10 TABLET ORAL EVERY EVENING
Qty: 90 TABLET | Refills: 1 | Status: SHIPPED | OUTPATIENT
Start: 2024-10-23

## 2024-10-23 RX ORDER — TIOTROPIUM BROMIDE 18 UG/1
18 CAPSULE ORAL; RESPIRATORY (INHALATION) DAILY
Qty: 90 CAPSULE | Refills: 3 | Status: SHIPPED | OUTPATIENT
Start: 2024-10-23

## 2024-10-23 RX ORDER — BUPROPION HYDROCHLORIDE 300 MG/1
300 TABLET ORAL EVERY MORNING
Qty: 90 TABLET | Refills: 1 | Status: SHIPPED | OUTPATIENT
Start: 2024-10-23

## 2024-10-23 RX ORDER — ALLOPURINOL 100 MG/1
200 TABLET ORAL DAILY
Qty: 180 TABLET | Refills: 3 | Status: SHIPPED | OUTPATIENT
Start: 2024-10-23 | End: 2024-11-08

## 2024-10-23 RX ORDER — ALBUTEROL SULFATE 90 UG/1
2 INHALANT RESPIRATORY (INHALATION) EVERY 4 HOURS PRN
Qty: 54 G | Refills: 11 | Status: SHIPPED | OUTPATIENT
Start: 2024-10-23

## 2024-10-23 ASSESSMENT — PAIN SCALES - GENERAL: PAINLEVEL_OUTOF10: NO PAIN (0)

## 2024-10-23 NOTE — PROGRESS NOTES
Assessment & Plan     (Z00.00) Medicare annual wellness visit, subsequent  (primary encounter diagnosis)  Comment: Discussed cardiac disease risk factors and cardiac disease risk factor modification, including diabetes screening, blood pressure screening (and management if indicated), and cholesterol screening.   Reviewed immunzation guidelines, including pneumococcal vaccines, annual influenza, and shingles vaccines.   Discussed routine cancer screenings, including skin cancer, colon cancer screening for everyone until age 80, prostate cancer screening in men until age 75, mammogram and PAP/pelvic for women until age 75.   Recommended regular dentist visits to care for remaining teeth.   Recommended regular screening for vision and glaucoma.   Recommended safe driving and accident avoidance.    Counseling:    Reviewed preventive health counseling, as reflected in patient instructions       Regular exercise       Healthy diet/nutrition       Vision screening       Hearing screening       Immunizations  Vaccinated for: Covid-19 and Influenza           Colorectal cancer screening       Prostate cancer screening         Patient has been advised of split billing requirements and indicates understanding: Yes   Plan: REVIEW OF HEALTH MAINTENANCE PROTOCOL ORDERS            (E11.59) Type 2 diabetes mellitus with other circulatory complication, without long-term current use of insulin (H)  Comment: This condition is currently controlled on the current medical regimen.  Continue current therapy.   Plan: lisinopril-hydrochlorothiazide (ZESTORETIC)         20-12.5 MG tablet, pravastatin (PRAVACHOL) 40         MG tablet, REVIEW OF HEALTH MAINTENANCE         PROTOCOL ORDERS            (J43.1) Panlobular emphysema (H)  Comment: This condition is currently controlled on the current medical regimen.  Continue current therapy.   Discussed general issues of COPD, including pathophysiology, ways it will affect the pt., when to seek  help, reviewed the typical medications (how they are taken, how they help)   He needs to stop smoking.   Plan: albuterol (PROAIR HFA/PROVENTIL HFA/VENTOLIN         HFA) 108 (90 Base) MCG/ACT inhaler,         fluticasone-salmeterol (AIRDUO RESPICLICK)         232-14 MCG/ACT inhaler, REVIEW OF HEALTH         MAINTENANCE PROTOCOL ORDERS            (J44.1) COPD exacerbation (H)  Comment: as above, he needs to stop msoking  Plan: ipratropium - albuterol 0.5 mg/2.5 mg/3 mL         (DUONEB) 0.5-2.5 (3) MG/3ML neb solution,         REVIEW OF HEALTH MAINTENANCE PROTOCOL ORDERS            (J43.1) COPD (HCC)  Comment:   Plan: ipratropium - albuterol 0.5 mg/2.5 mg/3 mL         (DUONEB) 0.5-2.5 (3) MG/3ML neb solution,         tiotropium (SPIRIVA) 18 MCG inhaled capsule            (I10) Benign essential hypertension  Comment: This condition is currently controlled on the current medical regimen.  Continue current therapy.   Plan: amLODIPine (NORVASC) 10 MG tablet,         lisinopril-hydrochlorothiazide (ZESTORETIC)         20-12.5 MG tablet, REVIEW OF HEALTH MAINTENANCE        PROTOCOL ORDERS            (E78.5) Hyperlipidemia LDL goal <100  Comment: This condition is currently controlled on the current medical regimen.  Continue current therapy.   Discussed guidelines recommending a statin cholesterol lowering medication for any patient with either diabetes and/or vascular disease, aiming for a LDL goal under 100 for sure, ideally under 70, using whatever dose of statin tolerated.    Plan: pravastatin (PRAVACHOL) 40 MG tablet, REVIEW OF        HEALTH MAINTENANCE PROTOCOL ORDERS            (Z72.0) Tobacco abuse  Comment: Discussed the physical, psychological, and pharmacological aspects of nicotine addiction and smoking cessation.    Discussed 2 possible regimens.  Option #1:  Chantix alone (no nicotine replacement needed), starting month pack for first month, thencontinuing month pack for 3-6 additional months.  Reviewed the  "main side effects of the medication and directed him to the company web site for further information and gave pt information handouts (if available)  Option #2:  Recommended nicotine replacement with either gum or patches in a descending manner starting the first day of not smoking.  Also discussed the medication Zyban in the use use smoking cessation.  Recommended starting with 150 mg first thing in the morning for 4 days, then adding a second dose late in the afternoon or early evening.  Discussed the potential side effects including but not limited to seizure, GI upset, insomnia, headache, weight loss.    The patient will decide what they want and will let me know what they desire me to prescribe.   Plan: buPROPion (WELLBUTRIN XL) 300 MG 24 hr tablet,         varenicline (CHANTIX) 1 MG tablet, REVIEW OF         HEALTH MAINTENANCE PROTOCOL ORDERS            (Z23) High priority for COVID-19 vaccination  Comment:   Plan: COVID-19 12+ (PFIZER), REVIEW OF HEALTH         MAINTENANCE PROTOCOL ORDERS            (Z23) Need for influenza vaccination  Comment:   Plan: INFLUENZA HIGH DOSE, TRIVALENT, PF (FLUZONE),         REVIEW OF HEALTH MAINTENANCE PROTOCOL ORDERS            (M10.9) Acute gout of foot, unspecified cause, unspecified laterality  Comment:   Plan: allopurinol (ZYLOPRIM) 100 MG tablet            (F10.20) Alcohol dependence, episodic drinking behavior (H)  Comment: still drinks often, \"but not as much as before\"  Advised workign towards complete abstinence   Plan:        Patient has been advised of split billing requirements and indicates understanding: Yes        BMI  Estimated body mass index is 28.84 kg/m  as calculated from the following:    Height as of this encounter: 1.778 m (5' 10\").    Weight as of this encounter: 91.2 kg (201 lb).       Counseling  Appropriate preventive services were addressed with this patient via screening, questionnaire, or discussion as appropriate for fall prevention, nutrition, " physical activity, Tobacco-use cessation, social engagement, weight loss and cognition.  Checklist reviewing preventive services available has been given to the patient.          José Miguel Bernal is a 69 year old, presenting for the following health issues:  Medicare Visit and RECHECK (6 month follow up DM, HTN, Lipids)        10/23/2024     9:49 AM   Additional Questions   Roomed by Candace BURGOS CMA     HPI     Diabetes Follow-up    How often are you checking your blood sugar? Continuous glucose monitor  What time of day are you checking your blood sugars (select all that apply)?   Various times  Have you had any blood sugars above 200?  Yes   Have you had any blood sugars below 70?  Yes only 2  What symptoms do you notice when your blood sugar is low?  None  What concerns do you have today about your diabetes? Blood sugar is often over 200 and Other: CGM problems   Do you have any of these symptoms? (Select all that apply)  Numbness in feet toes  Have you had a diabetic eye exam in the last 12 months? No            Hyperlipidemia Follow-Up    Are you regularly taking any medication or supplement to lower your cholesterol?   Yes- Pravastatin  Are you having muscle aches or other side effects that you think could be caused by your cholesterol lowering medication?  No    Hypertension Follow-up    Do you check your blood pressure regularly outside of the clinic? No   Are you following a low salt diet? Yes  Are your blood pressures ever more than 140 on the top number (systolic) OR more   than 90 on the bottom number (diastolic), for example 140/90? Not checking    BP Readings from Last 2 Encounters:   10/23/24 120/70   05/10/24 118/64     Hemoglobin A1C (%)   Date Value   10/16/2024 6.6 (H)   03/01/2024 7.0 (H)   02/05/2021 6.0 (H)   07/28/2020 6.2 (H)     LDL Cholesterol Calculated (mg/dL)   Date Value   03/01/2024 99   11/01/2023 89   07/28/2020 120 (H)   07/15/2019 89     Annual Wellness Visit Patient has been  advised of split billing requirements and indicates understanding: Yes     Health Care Directive  Patient does not have a Health Care Directive: Patient states has Advance Directive and will bring in a copy to clinic.      10/17/2024   General Health   How would you rate your overall physical health? Good             10/17/2024   Nutrition   Diet: Low salt    Diabetic       Multiple values from one day are sorted in reverse-chronological order         5/3/2024   Exercise   Days per week of moderate/strenous exercise 0 days   Average minutes spent exercising at this level 0 min              10/17/2024   Social Factors   Frequency of gathering with friends or relatives Once a week   Worry food won't last until get money to buy more No   Food not last or not have enough money for food? No   Do you have housing? (Housing is defined as stable permanent housing and does not include staying ouside in a car, in a tent, in an abandoned building, in an overnight shelter, or couch-surfing.) Yes   Are you worried about losing your housing? No   Lack of transportation? No   Unable to get utilities (heat,electricity)? No              10/17/2024   Fall Risk   Fallen 2 or more times in the past year? No    Trouble with walking or balance? No        Patient-reported          10/17/2024   Activities of Daily Living- Home Safety   Needs help with the following daily activites None of the above   Safety concerns in the home None of the above            10/17/2024   Dental   Dentist two times every year? (!) NO            10/17/2024   Hearing Screening   Hearing concerns? None of the above            10/17/2024   Driving Risk Screening   Patient/family members have concerns about driving No            10/17/2024   General Alertness/Fatigue Screening   Have you been more tired than usual lately? No            10/17/2024   Urinary Incontinence Screening   Bothered by leaking urine in past 6 months No            5/3/2024   TB Screening    Were you born outside of the [place]? No          Social History     Tobacco Use    Smoking status: Every Day     Current packs/day: 0.00     Average packs/day: 0.7 packs/day for 77.5 years (50.5 ttl pk-yrs)     Types: Cigarettes     Start date: 1970     Last attempt to quit: 10/17/2024     Years since quittin.0    Smokeless tobacco: Current   Vaping Use    Vaping status: Former   Substance Use Topics    Alcohol use: Yes     Comment: 2-3 drinks per night (double)    Drug use: Yes     Types: Marijuana         Today's PHQ-2 Score:       10/23/2024     9:47 AM   PHQ-2 (  [employer])   Q1: Little interest or pleasure in doing things 0    Q2: Feeling down, depressed or hopeless 0    PHQ-2 Score 0    Q1: Little interest or pleasure in doing things Not at all   Q2: Feeling down, depressed or hopeless Not at all   PHQ-2 Score 0       Patient-reported       Last PSA:   PSA   Date Value Ref Range Status   2020 1.05 0 - 4 ug/L Final     Comment:     Assay Method:  Chemiluminescence using Siemens Vista analyzer     Prostate Specific Antigen Screen   Date Value Ref Range Status   10/16/2024 1.20 0.00 - 4.50 ng/mL Final   10/19/2022 0.94 0.00 - 4.00 ug/L Final     ASCVD Risk   The ASCVD Risk score (Edson WILLIAMSON, et al., 2019) failed to calculate for the following reasons:    Risk score cannot be calculated because patient has a medical history suggesting prior/existing ASCVD              Reviewed and updated as needed this visit by Provider     Meds                **I reviewed the information recorded in the patient's EPIC chart (including but not limited to medical history, surgical history, family history, problem list, medication list, and allergy list) and updated the information as indicated based on the patients reported information.       Current providers sharing in care for this patient include:  Patient Care Team:  Donny Payne MD as PCP - General (Internal Medicine)  Donny Payne  MD Albino as Assigned PCP  Alin Roe MD as Assigned Heart and Vascular Provider  Gayathri Qiu RD as Diabetes Educator (Dietitian, Registered)    The following health maintenance items are reviewed in Epic and correct as of today:  Health Maintenance   Topic Date Due    DIABETIC FOOT EXAM  Never done    EYE EXAM  Never done    RSV VACCINE (1 - Risk 60-74 years 1-dose series) Never done    ALT  02/09/2025    LIPID  03/01/2025    LUNG CANCER SCREENING  04/05/2025    A1C  04/16/2025    BMP  04/16/2025    MICROALBUMIN  05/08/2025    URIC ACID  05/08/2025    NICOTINE/TOBACCO CESSATION COUNSELING Q 1 YR  05/10/2025    PSA  10/16/2025    COLORECTAL CANCER SCREENING  10/19/2025    MEDICARE ANNUAL WELLNESS VISIT  10/23/2025    ANNUAL REVIEW OF HM ORDERS  10/23/2025    FALL RISK ASSESSMENT  10/23/2025    ADVANCE CARE PLANNING  05/10/2029    DTAP/TDAP/TD IMMUNIZATION (3 - Td or Tdap) 06/15/2032    SPIROMETRY  Completed    HEPATITIS C SCREENING  Completed    COPD ACTION PLAN  Completed    PHQ-2 (once per calendar year)  Completed    HEMOGLOBIN  Completed    INFLUENZA VACCINE  Completed    Pneumococcal Vaccine: 65+ Years  Completed    ZOSTER IMMUNIZATION  Completed    AORTIC ANEURYSM SCREENING (SYSTEM ASSIGNED)  Completed    COVID-19 Vaccine  Completed    HPV IMMUNIZATION  Aged Out    MENINGITIS IMMUNIZATION  Aged Out    RSV MONOCLONAL ANTIBODY  Aged Out       Appropriate preventive services were discussed with this patient, including applicable screening as appropriate for fall prevention, nutrition, physical activity, Tobacco-use cessation, weight loss and cognition.  Checklist reviewing preventive services available has been given to the patient.          10/23/2024   Mini Cog   Clock Draw Score 2 Normal   3 Item Recall 3 objects recalled   Mini Cog Total Score 5                   Review of Systems  Constitutional, neuro, ENT, endocrine, pulmonary, cardiac, gastrointestinal, genitourinary, musculoskeletal,  "integument and psychiatric systems are negative, except as otherwise noted.      Objective    /70   Pulse 68   Temp 97.9  F (36.6  C) (Oral)   Resp 14   Ht 1.778 m (5' 10\")   Wt 91.2 kg (201 lb)   SpO2 94%   BMI 28.84 kg/m    Body mass index is 28.84 kg/m .  Physical Exam   GENERAL alert and no distress  EYES:  Normal sclera,conjunctiva, EOMI  HENT: oral and posterior pharynx without lesions or erythema, facies symmetric  NECK: Neck supple. No LAD, without thyroidmegaly.  RESP: Clear to ausculation bilaterally without wheezes or crackles. Normal BS in all fields.  CV: RRR normal S1S2 without murmurs, rubs or gallops.  LYMPH: no cervical lymph adenopathy appreciated  MS: extremities- no gross deformities of the visible extremities noted,   EXT:  no lower extremity edema  PSYCH: Alert and oriented times 3; speech- coherent  SKIN:  No obvious significant skin lesions on visible portions of face             Signed Electronically by: Donny Payne MD    "

## 2024-10-23 NOTE — PATIENT INSTRUCTIONS
Annual influenza vaccine (flu shot) given today.  Get this every fall.      Most current Covid vaccine given today.  Make sure stay current with the Covid vaccine and get an updated Covid vaccine as each new version becomes available.     RSV vaccines are now available.  The will help provide protection against respiratory syncytial virus (RSV), a common upper respiratory virus that is known to cause severe inflammation in the airways.  This vaccine is recommended for adults over age 60, especially those with high risk conditions and/or chronic respiratory illnesses such as asthma or COPD.  This vaccine is available at most pharmacies and clinics.    If you are on Medicare insurance, get this vaccine from a pharmacist in a pharmacy for the best cost and to confirm coverage, it will be less expensive to get this there rather than from our clinic.          Diabetes under great control with diet alone.      Blood pressure well controlled.        Return to see me in approximately 6 months, schedule a follow up visit sooner if needed for anything else.  Please get nonfasting labs done in the [facility] or at any other [facility] lab 1-2 days before this appointment.  If you get the labs done at another clinic, make arrangements with that clinic directly.  The orders will be in place.  Use [employer] or Call [phone] to schedule the appointment with me and lab appointment.           CHANTIX:     *  Start with the starting pack where you ramp up the dose for the first week, then you continue on the 1 mg twice daily dose for up to 9 months.      *  Stop smoking during the first couple of weeks while taking Chantix.         Start Chantix (varenicline) 1/2 tablet once per day for 3 days, then 1/2 tablet twice daily for 4 days, then 1 tablet twice per day.    --If unable to split the tablet easily, then take 1 tablet daily in the morning for 1 week, then 1 tablet twice daily.       We hope for at least 3 months, but  "studies have shown that there is increased success in reminaing smoke free with use out as far as 9 months.  I personally think you probably only need to use it for between 3-6 months, but the choice is yours.     Main side effects of Chantix include (but are not limited to) nausea, constipation, gas, and changes in dreaming.  There is no reported seizure risk or insomnia as with Wellbutrin/Zyban.     Stop the medication if you develop any concerning side effects.     *  You do not need to take nicotine replacement (i.e. Nicotine patches, nicotine gum/lozenges, etc.) when taking Chantix due to the way the medication works.     *  Be sure to take advantage of the \"Get Quit\" support plan from the Chantix  which can provide additional support.      *  Visit the Chantix web site for further details. (www.[email or web address]) about the medication and side effects.      *  If you encounter side effects with Chantix,stop the medication immediately and be sure to let us know via the [facility] Nurse Line  [phone].    *  Consider visiting [facility] for more assistance and counseling for smoking cessation.      *  Remember to have somehting for your hands to play with (rubber band,  strength ball, etc.) and also something for your mouth as well (hard candy, gum, lozenges suckers, carrots, etc).          5 GOALS IN MANAGING DIABETES (i.e. to give the best chance to prevent diabetic complications and vascular disease):     1.  Aggressive diabetic management.  The target for A1C (3 months average blood sugar) is ideally below 6.5, preferably below 7.5    Your diabetes is under good control.      Lab Results   Component Value Date    A1C 6.6 10/16/2024    A1C 7.0 03/01/2024    A1C 6.8 02/10/2024    A1C 6.2 11/01/2023    A1C 6.5 04/10/2023    A1C 6.6 10/19/2022    A1C 6.3 05/30/2022    A1C 6.6 02/11/2022    A1C 6.2 09/03/2021    A1C 6.0 02/05/2021    A1C 6.2 07/28/2020    A1C 5.7 01/16/2020    A1C 5.8 " "07/15/2019    A1C 5.9 12/26/2018    A1C 5.7 09/20/2016    A1C 5.5 08/04/2015    A1C 5.9 02/03/2015    A1C 6.1 10/07/2014       2.  Aggressive blood pressure control, under 130/80 ideally.  Using medications if needed.    Your blood pressure is under good control    BP Readings from Last 4 Encounters:   10/23/24 120/70   05/10/24 118/64   04/08/24 (!) 146/87   03/20/24 (!) 147/79       3.  Aggressive LDL cholesterol (bad cholesterol) lowering as needed.  Your goal is an LDL under 100 for sure, preferably under 70.     *  All patients with diabetes are recommended to be on a \"statin\" cholesterol lowering medication regardless of the cholesterol levels to give the best chance at avoiding vascular disease.      New guidelines identify four high-risk groups who could benefit from statins:   *people with pre-existing heart disease, such as those who have had a heart attack;   *people ages 40 to 75 who have diabetes of any type  *patients ages 40 to 75 with at least a 7.5% risk of developing cardiovascular disease over the next decade, according to a formula described in the guidelines  *patients with the sort of super-high cholesterol that sometimes runs in families, as evidenced by an LDL of 190 milligrams per deciliter or higher    *  Your cholesterol levels are well controlled.    Recent Labs   Lab Test 03/01/24  1048 11/01/23  1000   CHOL 198 163   HDL 30* 31*   LDL 99 89   TRIG 344* 217*       --LDL (\"bad\" cholesterol): desired under 100 in diabetes.  Always make better food choices ( lower fats, etc.), statins are recommended for almost all diabetics, regardless of LDL levels.  --HDL (\"good\") cholesterol: (normal above 40 for men, above 50 for women).  Best way to improve the HDL level is through regular physical exercise. There are no medications to raise HDL levels.  --Triglycerides (desirable under 150): triglycerides are more about metabolic issues, best way to improve this is through better diet choices, " "emphasizing reducing the intake of \"simple carbohydrates\" (e.g. White bread, white rice, pasta, noodles, potatoes, snack foods, regular soda, juices (except fresh squeezed), cakes, cookies, candy, etc.) as best possible.  Medications may be considered for triglyceride levels routinely above 400.    4.  No smoking    5.  Consider daily preventative Aspirin once per day, every day over age 50 unless there is a specific reason that you cannot take aspirin.       *You should take Aspirin 81 mg once per day, unless you have a reason NOT to take aspirin (i.e. side effect, excessive bruising or bleeding, taking another \"blood tinning\" medication, etc.)       DIABETES REMINDERS:   1. Check your blood glucose regularly either with standard glucometer or personal continuous glucose monitor.    2) Your blood sugar goals:  before eating and  two hours after eating.  If using personal continuous glucose monitor, the goal is Time in the Target range ( ) of 70-75% with very few (under 2%) Below target.    3) Always be prepared to treat low blood sugar symptoms should it happen. Keep a sugar-containing beverage or food nearby.  4) When to call your clinic:     Blood sugar over 400     If you have 2 to 3 low blood sugars (under 70) in a row    Low readings the same time of day several days in a row  5) When to call 911: If your low blood glucose symptoms do not get better with treatment, or if you/someone else is unable to give you treatment.  6) Work with a Certified Diabetes Educator to assist you with your diabetes management  7) Contact us if you have ANY questions about your medications or instructions, or have problems with getting prescriptions filled.         Preventive Health Recommendations:   Male Ages 65 - 75    Yearly exam:             See your health care provider every year in order to  o   Review health changes.   o   Discuss preventive care.    o   Review your medicines if your doctor has " prescribed any.  Talk with your health care provider about whether you should have a test to screen for prostate cancer (PSA).  Every 3 years, have a diabetes test (fasting glucose). If you are at risk for diabetes, you should have this test more often.  At least Every 5 years, have a cholesterol test. Have this test more often if you have medical conditions that place you at higher risk for high cholesterol or heart disease.   Regular colon cancer screening starting age 45 with either a colonoscopy, or stool test (either ColoGuard every 3 years or yearly FIT stool test from ).  The intervals for colon cancer screening will be determined by family history and prior colon cancer screening results.   Routine prostate cancer screening with a PSA blood test every 1-2 years.   While the PSA blood test is not a direct cancer screening blood test, it detects inflammation within in the prostate, which could indicate possible cancer.  If the test is abnormal, you do not necessarily have prostate cancer, but would need further evaluation.    Talk to with your health care provider about screening for Abdominal Aortic Aneurysm if you have a family history of AAA or have a history of smoking.    Vaccine recommendations:   Get a flu shot each fall.  Middle October is the optimal time to receive the flu shot.   Covid vaccines are now recommended annually.  Get the most updated Covid vaccine when it becomes available, consider getting this at the same time as the annual influenza vaccine.   Get a tetanus shot every 10 years. (Get this vaccine from a pharmacist in a pharmacy when you are over 65 on Medicare insurance)  Everyone over 65 should make sure to receive pneumonia vaccines to prevent the most common type of bacterial pneumonia: Pneumococcal pneumonia. There are now two you should receive - Pneumovax (PPSV 23) and Prevnar (PCV 20)  Strongly consider the Shingrix shingles vaccine to give you the best chance of avoiding  "future shingles infection (as many as 1 and 3 adults over age 50 may develop this condition in their lifetime).      --If you have Medicare insurance, investigate the cost and coverage for Shingrix shingles vaccines with a pharmacist at a pharmacy.  They can tell you the coverage and cost and then give it to you if the price is acceptable.    --Medicare sometimes does not cover these Shingrix shingles vaccines, and with Medicare insurance it is usually cheaper to receive this shingles vaccine from a pharmacist in a pharmacy rather than in our clinic.    --At this time, you only need the 2 Shingrix vaccines and then you are done.    Consider vaccination against Respiratory Syncytial Virus (RSV) infections, especially if you have active lung disease, history of smoking, and/or cardiac conditions.  The respiratory syncytial virus (RSV), is a common upper respiratory virus that causes severe inflammation in the airways, more than most upper respiratory viruses.   This vaccine is available at most pharmacies and clinics.  At this time, the RSV vaccine is a one time vaccine.    --If you are on Medicare insurance, you need to specifically get this vaccine from a pharmacist in a pharmacy for the best cost and to confirm coverage, it will be less expensive to get this there rather than from our clinic.     Nutrition:   Eat at least 5 servings of fruits and vegetables each day.   Eat whole-grain bread, whole-wheat pasta and brown rice instead of white grains and rice.   Talk to your provider about Calcium and Vitamin D.      --Good Grains:  Oats, brown rice, Quinoa (these do not raise the blood sugar as much)     --Bad grains:  Anything made from wheat or white rice     (because these raise the blood sugars significantly, and the possible gluten issue from wheat for some people).      --Proteins:  Aim for \"lean proteins\" including chicken, fish, seafood, pork, turkey, and eggs (in moderation); Eat red meat only " occasionally    Lifestyle  Exercise for at least 150 minutes a week (30 minutes a day, 5 days a week). This will help you control your weight and prevent disease.   Limit alcohol to one drink per day.   No smoking.   Wear sunscreen to prevent skin cancer.   See your dentist every six months for an exam and cleaning.   See your eye doctor every 1 to 2 years to screen for conditions such as glaucoma, macular degeneration, cataracts, etc

## 2024-11-08 DIAGNOSIS — M10.9 ACUTE GOUT OF FOOT, UNSPECIFIED CAUSE, UNSPECIFIED LATERALITY: ICD-10-CM

## 2024-11-08 RX ORDER — ALLOPURINOL 100 MG/1
200 TABLET ORAL DAILY
Qty: 180 TABLET | Refills: 2 | Status: SHIPPED | OUTPATIENT
Start: 2024-11-08

## 2024-11-08 NOTE — PATIENT INSTRUCTIONS
"*  Continue all medications at the same doses.  Contact your usual pharmacy if you need refills.     *  Work on your alcohol intake, reduce by 1/2 to start with, then continue down to no more than one drink per day    *  Work on stopping smoking    *  Return to see me in 6 months, sooner if needed.  Please get fasting labs done at the [facility] or any other [facility] lab 1-2 days before this appointment.  If you get the labs done at another clinic, make arrangements with them directly.  The orders will be in place.  Eat nothing for at least 8 hours prior to having these labs drawn.  Call [phone] to schedule appointments at [facility].       5 GOALS TO PREVENT VASCULAR DISEASE:     1.  Aggressive blood pressure control, under 130/80 ideally.  Using medications if needed.    Your blood pressure is under good control    BP Readings from Last 4 Encounters:   12/28/18 116/80   07/13/18 134/76   01/04/18 136/84   07/13/17 120/80       2.  Aggressive LDL cholesterol (\"bad cholesterol\") lowering as indicated.    Your goal is an LDL under 130 for sure, preferably under 100.  (If you have diabetes or previous vascular disease, the the LDL goals would be under 100 for sure, preferably under 70.)    New guidelines identify four high-risk groups who could benefit from statins:   *people with pre-existing heart disease, such as those who have had a heart attack;   *people ages 40 to 75 who have diabetes of any type  *patients ages 40 to 75 with at least a 7.5% risk of developing cardiovascular disease over the next decade, according to a formula described in the guidelines  *patients with the sort of super-high cholesterol that sometimes runs in families, as evidenced by an LDL of 190 milligrams per deciliter or higher    Your cholesterol levels are well controlled.    Recent Labs   Lab Test 07/12/18  0805 07/11/17  1012  08/04/15  0757 02/03/15  0748   CHOL 167 175   < > 210* 192   HDL 27* 33*   " R: MN  Access Inpatient Bed Call Log  11/8/2024 12:10 AM  Intake has called facilities that have not updated their bed status within the last 12 hours.??      ADULTS:     *[facility] Only- Transfer  [place] [facility]: @ Cap per website.     Pt remains on waitlist pending appropriate placement availability.     "< > 27* 33*   LDL 91 105*   < > Cannot estimate LDL when triglyceride exceeds 400 mg/dL  134* 107   TRIG 246* 184*   < > 422* 262*   CHOLHDLRATIO  --   --   --  7.8* 5.8*    < > = values in this interval not displayed.       3.  Aggressive diabetic prevention, screening and/or management.      You do not have diabetes as of the most recent blood tests.     4.  No smoking    5.  Daily aspirin: Have a discussion about the relative benefits and risks of taking daily low dose aspirin (81 mg) tablet once per day over the age of 50, unless there is a specific reason that you cannot take aspirin (such as side effect, allergy, or you are on another \"blood thinner\").        --Based on your current cardiac risk factors, you should take Aspirin 81 mg once per day, unless you have any reasons (side effects, intolerance, etc.) that you cannot take aspirin.         " [FreeTextEntry1] : Pulmonary Function Test performed in my office today: Spirometry: Mild obstructive airway disease; Lung Volume: Within normal limits with air trapping; Diffusion: Within normal limits.\par

## 2024-11-17 DIAGNOSIS — J43.1 PANLOBULAR EMPHYSEMA (H): ICD-10-CM

## 2024-11-18 RX ORDER — FLUTICASONE PROPIONATE AND SALMETEROL 232; 14 UG/1; UG/1
1 POWDER, METERED RESPIRATORY (INHALATION) 2 TIMES DAILY
Qty: 3 EACH | Refills: 3 | Status: SHIPPED | OUTPATIENT
Start: 2024-11-18

## 2024-12-16 DIAGNOSIS — L40.9 PSORIASIS: ICD-10-CM

## 2024-12-16 RX ORDER — BETAMETHASONE DIPROPIONATE 0.5 MG/G
CREAM TOPICAL
Qty: 45 G | Refills: 2 | Status: SHIPPED | OUTPATIENT
Start: 2024-12-16

## 2024-12-21 ENCOUNTER — MYC REFILL (OUTPATIENT)
Dept: INTERNAL MEDICINE | Facility: CLINIC | Age: 69
End: 2024-12-21
Payer: COMMERCIAL

## 2024-12-21 DIAGNOSIS — J43.1 PANLOBULAR EMPHYSEMA (H): ICD-10-CM

## 2024-12-23 RX ORDER — ALBUTEROL SULFATE 90 UG/1
2 INHALANT RESPIRATORY (INHALATION) EVERY 4 HOURS PRN
Qty: 54 G | Refills: 9 | Status: SHIPPED | OUTPATIENT
Start: 2024-12-23

## 2025-02-16 DIAGNOSIS — Z72.0 TOBACCO ABUSE: ICD-10-CM

## 2025-02-17 RX ORDER — VARENICLINE TARTRATE 1 MG/1
TABLET, FILM COATED ORAL
Qty: 180 TABLET | Refills: 0 | Status: SHIPPED | OUTPATIENT
Start: 2025-02-17

## 2025-03-16 DIAGNOSIS — J43.1 PANLOBULAR EMPHYSEMA (H): ICD-10-CM

## 2025-03-16 DIAGNOSIS — J44.1 COPD EXACERBATION (H): ICD-10-CM

## 2025-03-17 RX ORDER — IPRATROPIUM BROMIDE AND ALBUTEROL SULFATE 2.5; .5 MG/3ML; MG/3ML
SOLUTION RESPIRATORY (INHALATION)
Qty: 180 ML | Refills: 0 | Status: SHIPPED | OUTPATIENT
Start: 2025-03-17

## 2025-04-09 ENCOUNTER — HOSPITAL ENCOUNTER (OUTPATIENT)
Dept: CT IMAGING | Facility: CLINIC | Age: 70
Discharge: HOME | End: 2025-04-09
Attending: SURGERY
Payer: COMMERCIAL

## 2025-04-09 DIAGNOSIS — I71.40 ABDOMINAL AORTIC ANEURYSM (AAA) WITHOUT RUPTURE, UNSPECIFIED PART: ICD-10-CM

## 2025-04-09 DIAGNOSIS — I71.23 ANEURYSM OF DESCENDING THORACIC AORTA WITHOUT RUPTURE: ICD-10-CM

## 2025-04-09 LAB
CREAT BLD-MCNC: 1 MG/DL (ref 0.7–1.2)
EGFRCR SERPLBLD CKD-EPI 2021: >60 ML/MIN/1.73M2

## 2025-04-09 PROCEDURE — 250N000011 HC RX IP 250 OP 636: Performed by: SURGERY

## 2025-04-09 PROCEDURE — 82565 ASSAY OF CREATININE: CPT

## 2025-04-09 PROCEDURE — 250N000009 HC RX 250: Performed by: SURGERY

## 2025-04-09 PROCEDURE — 71275 CT ANGIOGRAPHY CHEST: CPT

## 2025-04-09 PROCEDURE — 74174 CTA ABD&PLVS W/CONTRAST: CPT

## 2025-04-09 RX ORDER — IOPAMIDOL 755 MG/ML
72 INJECTION, SOLUTION INTRAVASCULAR ONCE
Status: COMPLETED | OUTPATIENT
Start: 2025-04-09 | End: 2025-04-09

## 2025-04-09 RX ADMIN — IOPAMIDOL 72 ML: 755 INJECTION, SOLUTION INTRAVENOUS at 10:34

## 2025-04-09 RX ADMIN — SODIUM CHLORIDE 80 ML: 9 INJECTION, SOLUTION INTRAVENOUS at 10:34

## 2025-04-14 ENCOUNTER — OFFICE VISIT (OUTPATIENT)
Dept: OTHER | Facility: CLINIC | Age: 70
End: 2025-04-14
Attending: SURGERY
Payer: COMMERCIAL

## 2025-04-14 VITALS — HEART RATE: 73 BPM | SYSTOLIC BLOOD PRESSURE: 138 MMHG | DIASTOLIC BLOOD PRESSURE: 76 MMHG

## 2025-04-14 DIAGNOSIS — I71.23 ANEURYSM OF DESCENDING THORACIC AORTA WITHOUT RUPTURE: Primary | ICD-10-CM

## 2025-04-14 PROCEDURE — 3078F DIAST BP <80 MM HG: CPT | Performed by: SURGERY

## 2025-04-14 PROCEDURE — G0463 HOSPITAL OUTPT CLINIC VISIT: HCPCS | Performed by: SURGERY

## 2025-04-14 PROCEDURE — 99213 OFFICE O/P EST LOW 20 MIN: CPT | Performed by: SURGERY

## 2025-04-14 PROCEDURE — 3075F SYST BP GE 130 - 139MM HG: CPT | Performed by: SURGERY

## 2025-04-14 NOTE — PROGRESS NOTES
Vascular Surgery Clinic Note    Patient Name: Mr. 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 The patient has come in today to review his 1-year follow-up CTA.    Subjectively: Patient is doing well.    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 The endograft is patent without significant stenosis. The maximum diameter of  the excluded thoracic aorta is approximately 3.6 x 4.3 cm versus 3.9 x 4.3 cm on the prior exam. No definite endoleak. No residual flow in the excluded penetrating ulcer is identified. The thoracic aorta is otherwise of normal caliber. There is some irregular soft plaque at the junction between the ascending thoracic aorta and thoracic aortic arch. The great vessels arising from the thoracic aortic arch are patent without significant stenoses.     Abdominal aorta: Again identified is an infrarenal abdominal aortic aneurysm. This has a maximum anterior-posterior and transverse diameter of approximately 4.6 x 4.4 cm. This is not significantly changed when compared to the prior exam. There remains a  moderate amount of mural thrombus within the aneurysm sac. The celiac trunk, superior mesenteric artery, and renal arteries are patent without significant stenoses. 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    Iliac arteries: The iliac arteries and proximal femoral arteries appear to be patent without definite significant stenoses.    Assessment and Plan:  The thoracic stent graft is sitting in an excellent position  and there is no endoleak. The infrarenal abdominal aortic aneurysm is stable and measuring 4.6cm, and as such does not meet the criteria for intervention. The patient has been reassured about the findings, and will follow-up in clinic after 1 year with a repeat CTA. He has been advised to avoid lifting heavy objects and doing activities causing abdominal strain.    KAROL Garcia-in-training  Visiting Sub-Intern/MS5    Vascular surgery attending staff note: I have independently seen the patient.  I have independently interviewed the patient.  I have independently reviewed the imaging.  I agree with the documentation by Sub intern, Chapincito Mccauley.  Patient is a 70-year-old gentleman with a history of descending thoracic saccular aneurysm.  In March 2024 we did thoracic aortic stent grafting.  He also has a known infrarenal abdominal arctic aneurysm.    By my measurement it measures 47.0 mm in the anteroposterior dimension and 45.6 mm in the transverse dimension on series 6 image 78.    I have reassured the patient and we will repeat CT angiogram of the chest, abdomen and pelvis in 1 years time.  He is agreeable with the plan.     ISAURA PLATA M.D.

## 2025-04-14 NOTE — PROGRESS NOTES
[facility]        Patient is here for a  follow up.    Pt is currently taking Aspirin and Statin.    /76 (BP Location: Left arm, Patient Position: Chair, Cuff Size: Adult Regular)   Pulse 73     The provider has been notified that the patient has no concerns.     Questions patient would like addressed today are: N/A.    Refills are needed: N/A    Has homecare services and agency name:  Ivon Merida MA

## 2025-04-14 NOTE — PATIENT INSTRUCTIONS
Thank you for allowing [facility] to provide your medical care.      Please see below for the follow up instructions pertaining to your medical appointment with Dr. Roe at the [facility].    Follow up plan:   1) CTA chest/abd/pelvis in one year.  2) Visit with Dr. Roe after CTA      Our office will send you a letter by mail, closer to your follow up time frame, with a reminder to call to schedule appointment(s).    Please call our office with any concerns or questions [phone].

## 2025-04-17 DIAGNOSIS — I10 BENIGN ESSENTIAL HYPERTENSION: ICD-10-CM

## 2025-04-17 RX ORDER — AMLODIPINE BESYLATE 10 MG/1
10 TABLET ORAL EVERY EVENING
Qty: 90 TABLET | Refills: 1 | Status: SHIPPED | OUTPATIENT
Start: 2025-04-17

## 2025-04-19 DIAGNOSIS — E11.59 TYPE 2 DIABETES MELLITUS WITH OTHER CIRCULATORY COMPLICATION, WITHOUT LONG-TERM CURRENT USE OF INSULIN (H): ICD-10-CM

## 2025-04-19 DIAGNOSIS — I10 BENIGN ESSENTIAL HYPERTENSION: ICD-10-CM

## 2025-04-21 ENCOUNTER — LAB (OUTPATIENT)
Dept: LAB | Facility: CLINIC | Age: 70
End: 2025-04-21
Payer: COMMERCIAL

## 2025-04-21 DIAGNOSIS — K75.0 HEPATIC ABSCESS: ICD-10-CM

## 2025-04-21 DIAGNOSIS — Z13.6 SCREENING FOR CARDIOVASCULAR CONDITION: ICD-10-CM

## 2025-04-21 DIAGNOSIS — E78.5 HYPERLIPIDEMIA LDL GOAL <100: Primary | ICD-10-CM

## 2025-04-21 DIAGNOSIS — E11.59 TYPE 2 DIABETES MELLITUS WITH CIRCULATORY DISORDER, WITHOUT LONG-TERM CURRENT USE OF INSULIN (H): ICD-10-CM

## 2025-04-21 DIAGNOSIS — I10 BENIGN ESSENTIAL HYPERTENSION: ICD-10-CM

## 2025-04-21 DIAGNOSIS — M10.9 GOUT ATTACK: ICD-10-CM

## 2025-04-21 LAB
ALT SERPL W P-5'-P-CCNC: 13 U/L (ref 0–70)
ANION GAP SERPL CALCULATED.3IONS-SCNC: 9 MMOL/L (ref 7–15)
BUN SERPL-MCNC: 26.9 MG/DL (ref 8–23)
CALCIUM SERPL-MCNC: 9.9 MG/DL (ref 8.8–10.4)
CHLORIDE SERPL-SCNC: 103 MMOL/L (ref 98–107)
CHOLEST SERPL-MCNC: 190 MG/DL
CREAT SERPL-MCNC: 1.07 MG/DL (ref 0.67–1.17)
CREAT UR-MCNC: 141 MG/DL
EGFRCR SERPLBLD CKD-EPI 2021: 75 ML/MIN/1.73M2
EST. AVERAGE GLUCOSE BLD GHB EST-MCNC: 140 MG/DL
FASTING STATUS PATIENT QL REPORTED: YES
FASTING STATUS PATIENT QL REPORTED: YES
GLUCOSE SERPL-MCNC: 142 MG/DL (ref 70–99)
HBA1C MFR BLD: 6.5 % (ref 0–5.6)
HCO3 SERPL-SCNC: 27 MMOL/L (ref 22–29)
HDLC SERPL-MCNC: 31 MG/DL
HGB BLD-MCNC: 16.6 G/DL (ref 13.3–17.7)
LDLC SERPL CALC-MCNC: 105 MG/DL
MICROALBUMIN UR-MCNC: 203 MG/L
MICROALBUMIN/CREAT UR: 143.97 MG/G CR (ref 0–17)
NONHDLC SERPL-MCNC: 159 MG/DL
POTASSIUM SERPL-SCNC: 4.7 MMOL/L (ref 3.4–5.3)
SODIUM SERPL-SCNC: 139 MMOL/L (ref 135–145)
TRIGL SERPL-MCNC: 268 MG/DL
URATE SERPL-MCNC: 7.6 MG/DL (ref 3.4–7)

## 2025-04-21 PROCEDURE — 82570 ASSAY OF URINE CREATININE: CPT

## 2025-04-21 PROCEDURE — 36415 COLL VENOUS BLD VENIPUNCTURE: CPT

## 2025-04-21 PROCEDURE — 80061 LIPID PANEL: CPT

## 2025-04-21 PROCEDURE — 84550 ASSAY OF BLOOD/URIC ACID: CPT

## 2025-04-21 PROCEDURE — 84460 ALANINE AMINO (ALT) (SGPT): CPT

## 2025-04-21 PROCEDURE — 82043 UR ALBUMIN QUANTITATIVE: CPT

## 2025-04-21 PROCEDURE — 85018 HEMOGLOBIN: CPT

## 2025-04-21 PROCEDURE — 80048 BASIC METABOLIC PNL TOTAL CA: CPT

## 2025-04-21 PROCEDURE — 83036 HEMOGLOBIN GLYCOSYLATED A1C: CPT

## 2025-04-21 RX ORDER — LISINOPRIL AND HYDROCHLOROTHIAZIDE 12.5; 2 MG/1; MG/1
1 TABLET ORAL EVERY MORNING
Qty: 90 TABLET | Refills: 1 | Status: SHIPPED | OUTPATIENT
Start: 2025-04-21

## 2025-04-23 SDOH — HEALTH STABILITY: PHYSICAL HEALTH: ON AVERAGE, HOW MANY DAYS PER WEEK DO YOU ENGAGE IN MODERATE TO STRENUOUS EXERCISE (LIKE A BRISK WALK)?: 2 DAYS

## 2025-04-23 SDOH — HEALTH STABILITY: PHYSICAL HEALTH: ON AVERAGE, HOW MANY MINUTES DO YOU ENGAGE IN EXERCISE AT THIS LEVEL?: 10 MIN

## 2025-04-23 ASSESSMENT — SOCIAL DETERMINANTS OF HEALTH (SDOH): HOW OFTEN DO YOU GET TOGETHER WITH FRIENDS OR RELATIVES?: THREE TIMES A WEEK

## 2025-04-28 ENCOUNTER — OFFICE VISIT (OUTPATIENT)
Dept: INTERNAL MEDICINE | Facility: CLINIC | Age: 70
End: 2025-04-28
Payer: COMMERCIAL

## 2025-04-28 VITALS
BODY MASS INDEX: 28.22 KG/M2 | HEIGHT: 70 IN | HEART RATE: 75 BPM | SYSTOLIC BLOOD PRESSURE: 126 MMHG | TEMPERATURE: 97.9 F | WEIGHT: 197.1 LBS | OXYGEN SATURATION: 98 % | DIASTOLIC BLOOD PRESSURE: 76 MMHG

## 2025-04-28 DIAGNOSIS — J43.1 PANLOBULAR EMPHYSEMA (H): ICD-10-CM

## 2025-04-28 DIAGNOSIS — I71.40 ABDOMINAL AORTIC ANEURYSM (AAA) WITHOUT RUPTURE, UNSPECIFIED PART: ICD-10-CM

## 2025-04-28 DIAGNOSIS — Z12.5 SCREENING FOR PROSTATE CANCER: ICD-10-CM

## 2025-04-28 DIAGNOSIS — M10.9 ACUTE GOUT OF FOOT, UNSPECIFIED CAUSE, UNSPECIFIED LATERALITY: ICD-10-CM

## 2025-04-28 DIAGNOSIS — E11.59 TYPE 2 DIABETES MELLITUS WITH OTHER CIRCULATORY COMPLICATION, WITHOUT LONG-TERM CURRENT USE OF INSULIN (H): Primary | ICD-10-CM

## 2025-04-28 DIAGNOSIS — I10 BENIGN ESSENTIAL HYPERTENSION: ICD-10-CM

## 2025-04-28 DIAGNOSIS — Z72.0 TOBACCO ABUSE: ICD-10-CM

## 2025-04-28 DIAGNOSIS — E78.5 HYPERLIPIDEMIA LDL GOAL <100: ICD-10-CM

## 2025-04-28 DIAGNOSIS — F10.20 ALCOHOL DEPENDENCE, EPISODIC DRINKING BEHAVIOR (H): ICD-10-CM

## 2025-04-28 PROBLEM — M54.50 LOWER BACK PAIN: Status: ACTIVE | Noted: 2024-03-01

## 2025-04-28 PROCEDURE — 3078F DIAST BP <80 MM HG: CPT | Performed by: INTERNAL MEDICINE

## 2025-04-28 PROCEDURE — 99214 OFFICE O/P EST MOD 30 MIN: CPT | Performed by: INTERNAL MEDICINE

## 2025-04-28 PROCEDURE — G2211 COMPLEX E/M VISIT ADD ON: HCPCS | Performed by: INTERNAL MEDICINE

## 2025-04-28 PROCEDURE — 3074F SYST BP LT 130 MM HG: CPT | Performed by: INTERNAL MEDICINE

## 2025-04-28 PROCEDURE — 1126F AMNT PAIN NOTED NONE PRSNT: CPT | Performed by: INTERNAL MEDICINE

## 2025-04-28 RX ORDER — BUPROPION HYDROCHLORIDE 300 MG/1
300 TABLET ORAL EVERY MORNING
Qty: 90 TABLET | Refills: 1 | Status: SHIPPED | OUTPATIENT
Start: 2025-04-28

## 2025-04-28 RX ORDER — ALLOPURINOL 300 MG/1
300 TABLET ORAL DAILY
Qty: 90 TABLET | Refills: 3 | Status: SHIPPED | OUTPATIENT
Start: 2025-04-28

## 2025-04-28 RX ORDER — ROSUVASTATIN CALCIUM 20 MG/1
20 TABLET, COATED ORAL EVERY EVENING
Qty: 90 TABLET | Refills: 3 | Status: SHIPPED | OUTPATIENT
Start: 2025-04-28

## 2025-04-28 RX ORDER — KETOROLAC TROMETHAMINE 30 MG/ML
1 INJECTION, SOLUTION INTRAMUSCULAR; INTRAVENOUS PRN
COMMUNITY
Start: 2024-06-06

## 2025-04-28 RX ORDER — VARENICLINE TARTRATE 1 MG/1
1 TABLET, FILM COATED ORAL 2 TIMES DAILY
Qty: 180 TABLET | Refills: 0 | Status: SHIPPED | OUTPATIENT
Start: 2025-04-28

## 2025-04-28 ASSESSMENT — PAIN SCALES - GENERAL: PAINLEVEL_OUTOF10: NO PAIN (0)

## 2025-04-28 NOTE — PATIENT INSTRUCTIONS
"     Cholesterol needs better lowering.       --Change Pravastatin to Rosuvastatin 20 mg cone per day in the evening.       Uric acid should be lower for best prevention of future gout attacks.      --Increase Allopurinol to 300 mg once per day.        Keep working on alcohol reduction.      Keep working on smoking cessation.      Continue all other medications at the same doses.  Contact your usual pharmacy if you need refills.      All vaccines are up to date, except consider the RSV vaccine,. You need to get this specific vaccine from a pharmacist in a pharmacy duet o Medicare coverage.      Return to see me in 6 months, schedule a follow up visit sooner if needed for anything else.  Please get fasting labs done at the [facility] or any [facility] lab 1-2 days before this appointment (schedule a \"lab appointment\")   Eat nothing for at least 8 hours prior to having these labs drawn.  Use [employer] or Call [phone] to schedule the appointment with me and lab appointment.         5 GOALS IN MANAGING DIABETES (i.e. to give the best chance to prevent diabetic complications and vascular disease):     1.  Aggressive diabetic management.  The target for A1C (3 months average blood sugar) is ideally below 6.5, preferably below 7.5    Your diabetes is under good control.      Lab Results   Component Value Date    A1C 6.5 04/21/2025    A1C 6.6 10/16/2024    A1C 7.0 03/01/2024    A1C 6.8 02/10/2024    A1C 6.2 11/01/2023    A1C 6.5 04/10/2023    A1C 6.6 10/19/2022    A1C 6.3 05/30/2022    A1C 6.6 02/11/2022    A1C 6.2 09/03/2021    A1C 6.0 02/05/2021    A1C 6.2 07/28/2020    A1C 5.7 01/16/2020    A1C 5.8 07/15/2019    A1C 5.9 12/26/2018    A1C 5.7 09/20/2016    A1C 5.5 08/04/2015    A1C 5.9 02/03/2015    A1C 6.1 10/07/2014       2.  Aggressive blood pressure control, under 130/80 ideally.  Using medications if needed.    Your blood pressure is under good control    BP Readings from Last 4 " "Encounters:   04/28/25 126/76   04/14/25 138/76   10/23/24 120/70   05/10/24 118/64       3.  Aggressive LDL cholesterol (bad cholesterol) lowering as needed.  Your goal is an LDL under 100 for sure, preferably under 70.     *  All patients with diabetes are recommended to be on a \"statin\" cholesterol lowering medication regardless of the cholesterol levels to give the best chance at avoiding vascular disease.      New guidelines identify four high-risk groups who could benefit from statins:   *people with pre-existing heart disease, such as those who have had a heart attack;   *people ages 40 to 75 who have diabetes of any type  *patients ages 40 to 75 with at least a 7.5% risk of developing cardiovascular disease over the next decade, according to a formula described in the guidelines  *patients with the sort of super-high cholesterol that sometimes runs in families, as evidenced by an LDL of 190 milligrams per deciliter or higher    *  Your cholesterol levels are well controlled.    Recent Labs   Lab Test 04/21/25  1027 03/01/24  1048   CHOL 190 198   HDL 31* 30*   * 99   TRIG 268* 344*       --LDL (\"bad\" cholesterol): desired under 100 in diabetes.  Always make better food choices ( lower fats, etc.), statins are recommended for almost all diabetics, regardless of LDL levels.  --HDL (\"good\") cholesterol: (normal above 40 for men, above 50 for women).  Best way to improve the HDL level is through regular physical exercise. There are no medications to raise HDL levels.  --Triglycerides (desirable under 150): triglycerides are more about metabolic issues, best way to improve this is through better diet choices, emphasizing reducing the intake of \"simple carbohydrates\" (e.g. White bread, white rice, pasta, noodles, potatoes, snack foods, regular soda, juices (except fresh squeezed), cakes, cookies, candy, etc.) as best possible.  Medications may be considered for triglyceride levels routinely above 400.    4. " " No smoking    5.  Consider daily preventative Aspirin once per day, every day over age 50 unless there is a specific reason that you cannot take aspirin.       *You should take Aspirin 81 mg once per day, unless you have a reason NOT to take aspirin (i.e. side effect, excessive bruising or bleeding, taking another \"blood tinning\" medication, etc.)       DIABETES REMINDERS:   1. Check your blood glucose regularly either with standard glucometer or personal continuous glucose monitor.    2) Your blood sugar goals:  before eating and  two hours after eating.  If using personal continuous glucose monitor, the goal is Time in the Target range ( ) of 70-75% with very few (under 2%) Below target.    3) Always be prepared to treat low blood sugar symptoms should it happen. Keep a sugar-containing beverage or food nearby.  4) When to call your clinic:     Blood sugar over 400     If you have 2 to 3 low blood sugars (under 70) in a row    Low readings the same time of day several days in a row  5) When to call 911: If your low blood glucose symptoms do not get better with treatment, or if you/someone else is unable to give you treatment.  6) Work with a Certified Diabetes Educator to assist you with your diabetes management  7) Contact us if you have ANY questions about your medications or instructions, or have problems with getting prescriptions filled.           "

## 2025-04-28 NOTE — PROGRESS NOTES
Preventive Care Visit  [facility]  Donny Payne MD, Internal Medicine  Apr 28, 2025      Assessment & Plan     (E11.59) Type 2 diabetes mellitus with other circulatory complication, without long-term current use of insulin (H)  (primary encounter diagnosis)  Comment: This condition is currently controlled on the current medical regimen.  Continue current therapy.   Discussed importance of aggressive management of diabetes, including aggressive low cabr diet (one of the most powerful ways to control type II DM), performing regular glucose monitoring, (either with standard glucometer, or preferably personal continuous glucose monitor), medication compliance, regular laboratory monitoring at least every 6 months (or possibly more often if diabetes not at goal), and attending regular follow up appointments as ordered.    Aggressive diabetes management of diabetes will greatly reduce the future risk of diabetes related complications such as CAD, CVA, PVD, and retinopathy/neuropathy/nephropathy.  Based on level of diabetes control: testing frequency ONE TIME PER DAY   Plan: REVIEW OF HEALTH MAINTENANCE PROTOCOL ORDERS,         rosuvastatin (CRESTOR) 20 MG tablet, Lipid         panel reflex to direct LDL Fasting, Basic         metabolic panel, CBC with platelets, Albumin         Random Urine Quantitative with Creat Ratio,         AST, ALT, REVIEW OF HEALTH MAINTENANCE PROTOCOL        ORDERS            (J43.1) Panlobular emphysema (H)  Comment: This condition is currently controlled on the current medical regimen.  Continue current therapy.   Discussed general issues of COPD, including pathophysiology, ways it will affect the pt., when to seek help, reviewed the typical medications (how they are taken, how they help)   Plan: REVIEW OF HEALTH MAINTENANCE PROTOCOL ORDERS,         REVIEW OF HEALTH MAINTENANCE PROTOCOL ORDERS            (F10.20) Alcohol dependence, episodic drinking behavior  (H)  Comment: keep working on alcohol cessation with complete cessation as the ultimate goal.   He states that he is aware of the harmfull effects of alcohol.   Have offered referral for [facility] in past, but he has declined.   He knows he needs to quit and is just getting his mind set to get there  Plan: REVIEW OF HEALTH MAINTENANCE PROTOCOL ORDERS,         REVIEW OF HEALTH MAINTENANCE PROTOCOL ORDERS            (I10) Benign essential hypertension  Comment: This condition is currently controlled on the current medical regimen.  Continue current therapy.   Plan: REVIEW OF HEALTH MAINTENANCE PROTOCOL ORDERS,         Lipid panel reflex to direct LDL Fasting, Basic        metabolic panel, CBC with platelets, Albumin         Random Urine Quantitative with Creat Ratio,         AST, ALT, REVIEW OF HEALTH MAINTENANCE PROTOCOL        ORDERS            (Z72.0) Tobacco abuse  Comment: Discussed the physical, psychological, and pharmacological aspects of nicotine addiction and smoking cessation.    Discussed 2 possible regimens.  Option #1:  Chantix alone (no nicotine replacement needed), starting month pack for first month, thencontinuing month pack for 3-6 additional months.  Reviewed the main side effects of the medication and directed him to the company web site for further information and gave pt information handouts (if available)  Option #2:  Recommended nicotine replacement with either gum or patches in a descending manner starting the first day of not smoking.  Also discussed the medication Zyban in the use use smoking cessation.  Recommended starting with 150 mg first thing in the morning for 4 days, then adding a second dose late in the afternoon or early evening.  Discussed the potential side effects including but not limited to seizure, GI upset, insomnia, headache, weight loss.    The patient will decide what they want and will let me know what they desire me to prescribe.   Plan: REVIEW OF HEALTH MAINTENANCE  PROTOCOL ORDERS,         varenicline (CHANTIX) 1 MG tablet, buPROPion         (WELLBUTRIN XL) 300 MG 24 hr tablet, REVIEW OF         HEALTH MAINTENANCE PROTOCOL ORDERS            (E78.5) Hyperlipidemia LDL goal <100  Comment: This condition is currently controlled on the current medical regimen.  Continue current therapy.   Discussed guidelines recommending a statin cholesterol lowering medication for any patient with either diabetes and/or vascular disease, aiming for a LDL goal under 100 for sure, ideally under 70, using whatever dose of statin tolerated.    Plan: REVIEW OF HEALTH MAINTENANCE PROTOCOL ORDERS,         rosuvastatin (CRESTOR) 20 MG tablet, Lipid         panel reflex to direct LDL Fasting, Basic         metabolic panel, CBC with platelets, Albumin         Random Urine Quantitative with Creat Ratio,         AST, ALT, REVIEW OF HEALTH MAINTENANCE PROTOCOL        ORDERS            (I71.40) Abdominal aortic aneurysm (AAA) without rupture, unspecified part  Comment: Discussed secondary risk factor modification and recommended continuing aggressive management of these items.   Plan: REVIEW OF HEALTH MAINTENANCE PROTOCOL ORDERS,         rosuvastatin (CRESTOR) 20 MG tablet, Lipid         panel reflex to direct LDL Fasting, Basic         metabolic panel, CBC with platelets, Albumin         Random Urine Quantitative with Creat Ratio,         AST, ALT, REVIEW OF HEALTH MAINTENANCE PROTOCOL        ORDERS            (M10.9) Acute gout of foot, unspecified cause, unspecified laterality  Comment: This condition is currently controlled on the current medical regimen.  Continue current therapy.   Plan: allopurinol (ZYLOPRIM) 300 MG tablet, Lipid         panel reflex to direct LDL Fasting, Basic         metabolic panel, CBC with platelets, Albumin         Random Urine Quantitative with Creat Ratio,         AST, ALT, REVIEW OF HEALTH MAINTENANCE PROTOCOL        ORDERS, Uric acid            (Z12.5) Screening for prostate  "cancer  Comment: Discussed prostate cancer screening and PSA blood test.  Discussed that an elevated PSA blood test can be caused by things other than prostate cancer, including infection/inflammation, BPH, and recent sexual activity; and that elevated PSA blood test may require further investigation with a urologist   Plan: Prostate Specific Antigen Screen, REVIEW OF         HEALTH MAINTENANCE PROTOCOL ORDERS               Patient has been advised of split billing requirements and indicates understanding: Yes        Nicotine/Tobacco Cessation  He reports that he has been smoking cigarettes. He started smoking about 55 years ago. He has a 50.5 pack-year smoking history. He has never used smokeless tobacco.  Nicotine/Tobacco Cessation Plan  Information offered: Patient not interested at this time      BMI  Estimated body mass index is 28.28 kg/m  as calculated from the following:    Height as of this encounter: 1.778 m (5' 10\").    Weight as of this encounter: 89.4 kg (197 lb 1.6 oz).       Counseling  Appropriate preventive services were addressed with this patient via screening, questionnaire, or discussion as appropriate for fall prevention, nutrition, physical activity, Tobacco-use cessation, social engagement, weight loss and cognition.  Checklist reviewing preventive services available has been given to the patient.          José Miguel Bernal is a 70 year old, presenting for the following:  Medicare Visit and Results (Follow up lab results)    Last Medicare annual wellness visit was October 2024.        4/28/2025     9:43 AM   Additional Questions   Roomed by Candace BURGOS CMA     HPI       1.)  Diabetes:  In regards specifically to the patient's diabetes, they reports no unusual symptoms.  Medication compliance: good  Diabetic diet compliance: good    Patient reports no significant episodes of hypo- or hyperglycemia    Diabetic complications:      Most  recent labs:    Lab Results   Component Value Date    A1C " 6.5 04/21/2025    A1C 6.6 10/16/2024    A1C 7.0 03/01/2024    A1C 6.8 02/10/2024    A1C 6.2 11/01/2023    A1C 6.5 04/10/2023    A1C 6.6 10/19/2022    A1C 6.3 05/30/2022    A1C 6.6 02/11/2022    A1C 6.2 09/03/2021    A1C 6.0 02/05/2021    A1C 6.2 07/28/2020    A1C 5.7 01/16/2020    A1C 5.8 07/15/2019    A1C 5.9 12/26/2018    A1C 5.7 09/20/2016    A1C 5.5 08/04/2015    A1C 5.9 02/03/2015    A1C 6.1 10/07/2014        2.)  Hypertension:  History of hypertension, on medication.  No reported side effects from medications.    Reviewed last 6 BP readings in chart:  BP Readings from Last 6 Encounters:   04/28/25 126/76   04/14/25 138/76   10/23/24 120/70   05/10/24 118/64   04/08/24 (!) 146/87   03/20/24 (!) 147/79     No active cardiac complaints or symptoms with exercise.     3.) history of alcohol overuse/dependence.  He has been working on cutting down.     4.)  COPD remains stable.  Has not had any recent breathing troubles beyond usual baseline.  Has not any acute respiratory events.  Remains with intermittent cough, mild shortness of breath with overexertion as per usual.  Using medication as directed with reported side effects.   Unfortuantely still smoking.  Has been trying to cut donw.      5.)  History of gout.    Last gout flare up was before he started on allopurinol. .   Has been doing well on Allopurinol with no reported side effects.  Lab results including uric acid have been reviewed.  The patient has been trying to maintain decent hydration.      6.)  history of AAA.     Advance Care Planning    Document on file is a Health Care Directive or POLST.        4/23/2025   General Health   How would you rate your overall physical health? Good   Feel stress (tense, anxious, or unable to sleep) Only a little   (!) STRESS CONCERN      4/23/2025   Nutrition   Diet: Low salt    Low fat/cholesterol    Carbohydrate counting       Multiple values from one day are sorted in reverse-chronological order         4/23/2025    Exercise   Days per week of moderate/strenous exercise 2 days   Average minutes spent exercising at this level 10 min   (!) EXERCISE CONCERN      4/23/2025   Social Factors   Frequency of gathering with friends or relatives Three times a week   Worry food won't last until get money to buy more No   Food not last or not have enough money for food? No   Do you have housing? (Housing is defined as stable permanent housing and does not include staying outside in a car, in a tent, in an abandoned building, in an overnight shelter, or couch-surfing.) Yes   Are you worried about losing your housing? No   Lack of transportation? No   Unable to get utilities (heat,electricity)? No         4/23/2025   Fall Risk   Fallen 2 or more times in the past year? No   Trouble with walking or balance? No          4/23/2025   Activities of Daily Living- Home Safety   Needs help with the following daily activites None of the above   Safety concerns in the home None of the above         4/23/2025   Dental   Dentist two times every year? (!) NO         4/23/2025   Hearing Screening   Hearing concerns? (!) IT'S HARD TO FOLLOW A CONVERSATION IN A NOISY RESTAURANT OR CROWDED ROOM.    (!) TROUBLE UNDERSTANDING SOFT OR WHISPERED SPEECH.       Multiple values from one day are sorted in reverse-chronological order         4/23/2025   Driving Risk Screening   Patient/family members have concerns about driving No         4/23/2025   General Alertness/Fatigue Screening   Have you been more tired than usual lately? No         4/23/2025   Urinary Incontinence Screening   Bothered by leaking urine in past 6 months No         Today's PHQ-2 Score:       4/27/2025    10:36 AM   PHQ-2 ( 1999 [employer])   Q1: Little interest or pleasure in doing things 0   Q2: Feeling down, depressed or hopeless 0   PHQ-2 Score 0    Q1: Little interest or pleasure in doing things Not at all   Q2: Feeling down, depressed or hopeless Not at all   PHQ-2 Score 0        Patient-reported           2025   Substance Use   If I could quit smoking, I would Completely agree   I want to quit somking, worry about health affects Completely agree   Willing to make a plan to quit smoking Completely agree   Willing to cut down before quitting Completely agree   Alcohol more than 3/day or more than 7/wk Yes   How often do you have a drink containing alcohol 4 or more times a week   How many alcohol drinks on typical day 1 or 2   How often do you have 5+ drinks at one occasion Never   Audit 2/3 Score 0   How often not able to stop drinking once started Never   How often failed to do what normally expected Never   How often needed first drink in am after a heavy drinking session Never   How often feeling of guilt or remorse after drinking Never   How often unable to remember what happened the night before Never   Have you or someone else been injured because of your drinking No   Has anyone been concerned or suggested you cut down on drinking No   TOTAL SCORE - AUDIT 4   Do you have a current opioid prescription? No   How severe/bad is pain from 1 to 10? 0/10 (No Pain)   Do you use any other substances recreationally? (!) CANNABIS PRODUCTS     Social History     Tobacco Use    Smoking status: Every Day     Current packs/day: 0.00     Average packs/day: 0.7 packs/day for 77.5 years (50.5 ttl pk-yrs)     Types: Cigarettes     Start date: 1970     Last attempt to quit: 2025     Years since quittin.0    Smokeless tobacco: Never    Tobacco comments:     Smoking about 4-5 cigs daily   Vaping Use    Vaping status: Former   Substance Use Topics    Alcohol use: Yes     Comment: 2-3 drinks per night (double)    Drug use: Yes     Types: Marijuana       ASCVD Risk   The ASCVD Risk score (Edson WILLIAMSON, et al., 2019) failed to calculate for the following reasons:    Risk score cannot be calculated because patient has a medical history suggesting prior/existing ASCVD            Reviewed  and updated as needed this visit by Provider   Tobacco       Fam Hx            **I reviewed the information recorded in the patient's EPIC chart (including but not limited to medical history, surgical history, family history, problem list, medication list, and allergy list) and updated the information as indicated based on the patients reported information.         Current providers sharing in care for this patient include:  Patient Care Team:  Donny Payne MD as PCP - General (Internal Medicine)  Donny Payne MD as Assigned PCP  Gayathri Qiu RD as Diabetes Educator (Dietitian, Registered)  Anastasiya Smith NP as Assigned Heart and Vascular Provider  Alin Roe MD as Assigned Heart and Vascular Surgical Provider    The following health maintenance items are reviewed in Epic and correct as of today:  Health Maintenance   Topic Date Due    DIABETIC FOOT EXAM  Never done    EYE EXAM  Never done    RSV VACCINE (1 - Risk 60-74 years 1-dose series) Never done    COVID-19 Vaccine (8 - 2024-25 season) 04/23/2025    NICOTINE/TOBACCO CESSATION COUNSELING Q 1 YR  05/10/2025    PSA  10/16/2025    COLORECTAL CANCER SCREENING  10/19/2025    A1C  10/21/2025    BMP  10/21/2025    MEDICARE ANNUAL WELLNESS VISIT  10/23/2025    LUNG CANCER SCREENING  04/09/2026    ALT  04/21/2026    LIPID  04/21/2026    MICROALBUMIN  04/21/2026    URIC ACID  04/21/2026    HEMOGLOBIN  04/21/2026    ANNUAL REVIEW OF HM ORDERS  04/28/2026    FALL RISK ASSESSMENT  04/28/2026    ADVANCE CARE PLANNING  10/23/2029    DTAP/TDAP/TD IMMUNIZATION (3 - Td or Tdap) 06/15/2032    SPIROMETRY  Completed    HEPATITIS C SCREENING  Completed    COPD ACTION PLAN  Completed    PHQ-2 (once per calendar year)  Completed    INFLUENZA VACCINE  Completed    Pneumococcal Vaccine: 50+ Years  Completed    ZOSTER IMMUNIZATION  Completed    AORTIC ANEURYSM SCREENING (SYSTEM ASSIGNED)  Completed    HPV IMMUNIZATION  Aged Out    MENINGITIS  "IMMUNIZATION  Aged Out         Review of Systems  Constitutional, neuro, ENT, endocrine, pulmonary, cardiac, gastrointestinal, genitourinary, musculoskeletal, integument and psychiatric systems are negative, except as otherwise noted.     Objective    Exam  /76   Pulse 75   Temp 97.9  F (36.6  C) (Temporal)   Ht 1.778 m (5' 10\")   Wt 89.4 kg (197 lb 1.6 oz)   SpO2 98%   BMI 28.28 kg/m     Estimated body mass index is 28.28 kg/m  as calculated from the following:    Height as of this encounter: 1.778 m (5' 10\").    Weight as of this encounter: 89.4 kg (197 lb 1.6 oz).    Physical Exam  Physical Exam  Vitals and nursing note reviewed.   Constitutional:       Comments: Moves normally, alert, no apparent distress  HENT:      Head: Normocephalic and atraumatic.      Nose: Nose normal.   Eyes:      Extraocular Movements: Extraocular movements intact.   Cardiovascular:      Rate and Rhythm: Normal rate and regular rhythm.      Heart sounds: Normal heart sounds.   Pulmonary:      Effort: Pulmonary effort is normal.      Breath sounds: Normal breath sounds.   Abdominal:      General: There is no distension.      Palpations: There is no mass.      Tenderness: No abdominal tenderness, no distention.     Bowel sounds: normal  Musculoskeletal:         General: Normal range of motion, moves into the room normal, moves in and out of chair normally.    Skin:     General: Skin is warm and dry.   Neurological:      General: No focal deficit present.      Mental Status: Alert, responds to questions, Speech coherent  Psychiatric:         Mood and Affect: Mood normal.           4/28/2025   Mini Cog   Clock Draw Score 2 Normal   3 Item Recall 3 objects recalled   Mini Cog Total Score 5            The longitudinal plan of care for the diagnosis(es)/condition(s) as documented were addressed during this visit. Due to the added complexity in care, I will continue to support Gabe in the subsequent management and with ongoing " continuity of care.      Signed Electronically by: Donny Payne MD

## 2025-04-30 DIAGNOSIS — J44.1 COPD EXACERBATION (H): ICD-10-CM

## 2025-04-30 DIAGNOSIS — J43.1 PANLOBULAR EMPHYSEMA (H): ICD-10-CM

## 2025-04-30 RX ORDER — IPRATROPIUM BROMIDE AND ALBUTEROL SULFATE 2.5; .5 MG/3ML; MG/3ML
SOLUTION RESPIRATORY (INHALATION)
Qty: 180 ML | Refills: 1 | Status: SHIPPED | OUTPATIENT
Start: 2025-04-30

## 2025-05-04 DIAGNOSIS — J44.1 COPD EXACERBATION (H): ICD-10-CM

## 2025-05-04 DIAGNOSIS — J43.1 PANLOBULAR EMPHYSEMA (H): ICD-10-CM

## 2025-05-05 RX ORDER — IPRATROPIUM BROMIDE AND ALBUTEROL SULFATE 2.5; .5 MG/3ML; MG/3ML
SOLUTION RESPIRATORY (INHALATION)
Qty: 180 ML | Refills: 1 | OUTPATIENT
Start: 2025-05-05

## 2025-06-23 NOTE — CARE PLAN
Patient has been assessed for Home Oxygen needs. Oxygen readings:    *Pulse oximetry (SpO2) = 90% on room air at rest while awake.    *SpO2 improved to 93% on 2liters/minute at rest.    *SpO2 = 92% on room air during activity/with exercise.    *SpO2 improved to 93% on 2liters/minute during activity/with exercise.      N/A Patient is under age 18 and does not have a history of high risk behavior or is not high risk for Hep C

## 2025-07-07 ENCOUNTER — OFFICE VISIT (OUTPATIENT)
Dept: URGENT CARE | Facility: URGENT CARE | Age: 70
End: 2025-07-07
Payer: COMMERCIAL

## 2025-07-07 ENCOUNTER — HOSPITAL ENCOUNTER (INPATIENT)
Facility: CLINIC | Age: 70
DRG: 190 | End: 2025-07-07
Attending: STUDENT IN AN ORGANIZED HEALTH CARE EDUCATION/TRAINING PROGRAM | Admitting: HOSPITALIST
Payer: COMMERCIAL

## 2025-07-07 ENCOUNTER — APPOINTMENT (OUTPATIENT)
Dept: GENERAL RADIOLOGY | Facility: CLINIC | Age: 70
End: 2025-07-07
Attending: STUDENT IN AN ORGANIZED HEALTH CARE EDUCATION/TRAINING PROGRAM
Payer: COMMERCIAL

## 2025-07-07 VITALS
HEART RATE: 73 BPM | SYSTOLIC BLOOD PRESSURE: 134 MMHG | RESPIRATION RATE: 28 BRPM | TEMPERATURE: 97.7 F | OXYGEN SATURATION: 91 % | BODY MASS INDEX: 28.55 KG/M2 | WEIGHT: 199 LBS | DIASTOLIC BLOOD PRESSURE: 83 MMHG

## 2025-07-07 DIAGNOSIS — Z72.0 TOBACCO ABUSE: ICD-10-CM

## 2025-07-07 DIAGNOSIS — J44.1 COPD EXACERBATION (H): ICD-10-CM

## 2025-07-07 DIAGNOSIS — J43.1 PANLOBULAR EMPHYSEMA (H): ICD-10-CM

## 2025-07-07 DIAGNOSIS — R05.8 PRODUCTIVE COUGH: ICD-10-CM

## 2025-07-07 DIAGNOSIS — R79.81 LOW OXYGEN SATURATION: ICD-10-CM

## 2025-07-07 DIAGNOSIS — J44.1 COPD EXACERBATION (H): Primary | ICD-10-CM

## 2025-07-07 DIAGNOSIS — R06.89 RESPIRATORY INSUFFICIENCY: ICD-10-CM

## 2025-07-07 DIAGNOSIS — E11.59 TYPE 2 DIABETES MELLITUS WITH OTHER CIRCULATORY COMPLICATION, WITHOUT LONG-TERM CURRENT USE OF INSULIN (H): Primary | ICD-10-CM

## 2025-07-07 LAB
ANION GAP SERPL CALCULATED.3IONS-SCNC: 11 MMOL/L (ref 7–15)
ATRIAL RATE - MUSE: 75 BPM
BASE EXCESS BLDV CALC-SCNC: 3.4 MMOL/L (ref -3–3)
BASOPHILS # BLD AUTO: 0.1 10E3/UL (ref 0–0.2)
BASOPHILS NFR BLD AUTO: 1 %
BUN SERPL-MCNC: 23.6 MG/DL (ref 8–23)
CALCIUM SERPL-MCNC: 9.2 MG/DL (ref 8.8–10.4)
CHLORIDE SERPL-SCNC: 100 MMOL/L (ref 98–107)
CREAT SERPL-MCNC: 1.09 MG/DL (ref 0.67–1.17)
DIASTOLIC BLOOD PRESSURE - MUSE: NORMAL MMHG
EGFRCR SERPLBLD CKD-EPI 2021: 73 ML/MIN/1.73M2
EOSINOPHIL # BLD AUTO: 0.2 10E3/UL (ref 0–0.7)
EOSINOPHIL NFR BLD AUTO: 2 %
ERYTHROCYTE [DISTWIDTH] IN BLOOD BY AUTOMATED COUNT: 13.7 % (ref 10–15)
FLUAV RNA SPEC QL NAA+PROBE: NEGATIVE
FLUBV RNA RESP QL NAA+PROBE: NEGATIVE
GLUCOSE SERPL-MCNC: 164 MG/DL (ref 70–99)
HCO3 BLDV-SCNC: 28 MMOL/L (ref 21–28)
HCO3 SERPL-SCNC: 26 MMOL/L (ref 22–29)
HCT VFR BLD AUTO: 45.3 % (ref 40–53)
HGB BLD-MCNC: 15.6 G/DL (ref 13.3–17.7)
HOLD SPECIMEN: NORMAL
IMM GRANULOCYTES # BLD: 0.1 10E3/UL
IMM GRANULOCYTES NFR BLD: 1 %
INTERPRETATION ECG - MUSE: NORMAL
LYMPHOCYTES # BLD AUTO: 1.4 10E3/UL (ref 0.8–5.3)
LYMPHOCYTES NFR BLD AUTO: 13 %
MAGNESIUM SERPL-MCNC: 2.5 MG/DL (ref 1.7–2.3)
MCH RBC QN AUTO: 30.2 PG (ref 26.5–33)
MCHC RBC AUTO-ENTMCNC: 34.4 G/DL (ref 31.5–36.5)
MCV RBC AUTO: 88 FL (ref 78–100)
MONOCYTES # BLD AUTO: 0.7 10E3/UL (ref 0–1.3)
MONOCYTES NFR BLD AUTO: 7 %
NEUTROPHILS # BLD AUTO: 8.5 10E3/UL (ref 1.6–8.3)
NEUTROPHILS NFR BLD AUTO: 78 %
NRBC # BLD AUTO: 0 10E3/UL
NRBC BLD AUTO-RTO: 0 /100
NT-PROBNP SERPL-MCNC: 346 PG/ML (ref 0–229)
O2/TOTAL GAS SETTING VFR VENT: 2 %
OXYHGB MFR BLDV: 77 % (ref 70–75)
P AXIS - MUSE: 62 DEGREES
PCO2 BLDV: 43 MM HG (ref 40–50)
PH BLDV: 7.43 [PH] (ref 7.32–7.43)
PLATELET # BLD AUTO: 203 10E3/UL (ref 150–450)
PO2 BLDV: 44 MM HG (ref 25–47)
POTASSIUM SERPL-SCNC: 4.7 MMOL/L (ref 3.4–5.3)
PR INTERVAL - MUSE: 192 MS
PROCALCITONIN SERPL IA-MCNC: 0.11 NG/ML
QRS DURATION - MUSE: 82 MS
QT - MUSE: 380 MS
QTC - MUSE: 424 MS
R AXIS - MUSE: 50 DEGREES
RBC # BLD AUTO: 5.17 10E6/UL (ref 4.4–5.9)
RSV RNA SPEC NAA+PROBE: NEGATIVE
SAO2 % BLDV: 78.3 % (ref 70–75)
SARS-COV-2 RNA RESP QL NAA+PROBE: NEGATIVE
SODIUM SERPL-SCNC: 137 MMOL/L (ref 135–145)
SYSTOLIC BLOOD PRESSURE - MUSE: NORMAL MMHG
T AXIS - MUSE: 75 DEGREES
TROPONIN T SERPL HS-MCNC: 41 NG/L
TROPONIN T SERPL HS-MCNC: 53 NG/L
VENTRICULAR RATE- MUSE: 75 BPM
WBC # BLD AUTO: 11 10E3/UL (ref 4–11)

## 2025-07-07 PROCEDURE — 84484 ASSAY OF TROPONIN QUANT: CPT | Performed by: STUDENT IN AN ORGANIZED HEALTH CARE EDUCATION/TRAINING PROGRAM

## 2025-07-07 PROCEDURE — 96375 TX/PRO/DX INJ NEW DRUG ADDON: CPT

## 2025-07-07 PROCEDURE — 250N000013 HC RX MED GY IP 250 OP 250 PS 637: Performed by: STUDENT IN AN ORGANIZED HEALTH CARE EDUCATION/TRAINING PROGRAM

## 2025-07-07 PROCEDURE — 99215 OFFICE O/P EST HI 40 MIN: CPT | Mod: 25 | Performed by: FAMILY MEDICINE

## 2025-07-07 PROCEDURE — 83735 ASSAY OF MAGNESIUM: CPT | Performed by: STUDENT IN AN ORGANIZED HEALTH CARE EDUCATION/TRAINING PROGRAM

## 2025-07-07 PROCEDURE — 3075F SYST BP GE 130 - 139MM HG: CPT | Performed by: FAMILY MEDICINE

## 2025-07-07 PROCEDURE — 250N000011 HC RX IP 250 OP 636: Mod: JZ | Performed by: STUDENT IN AN ORGANIZED HEALTH CARE EDUCATION/TRAINING PROGRAM

## 2025-07-07 PROCEDURE — 3079F DIAST BP 80-89 MM HG: CPT | Performed by: FAMILY MEDICINE

## 2025-07-07 PROCEDURE — 99223 1ST HOSP IP/OBS HIGH 75: CPT | Performed by: STUDENT IN AN ORGANIZED HEALTH CARE EDUCATION/TRAINING PROGRAM

## 2025-07-07 PROCEDURE — 80048 BASIC METABOLIC PNL TOTAL CA: CPT | Performed by: STUDENT IN AN ORGANIZED HEALTH CARE EDUCATION/TRAINING PROGRAM

## 2025-07-07 PROCEDURE — 250N000009 HC RX 250: Performed by: STUDENT IN AN ORGANIZED HEALTH CARE EDUCATION/TRAINING PROGRAM

## 2025-07-07 PROCEDURE — 96365 THER/PROPH/DIAG IV INF INIT: CPT

## 2025-07-07 PROCEDURE — 120N000001 HC R&B MED SURG/OB

## 2025-07-07 PROCEDURE — 94640 AIRWAY INHALATION TREATMENT: CPT | Performed by: FAMILY MEDICINE

## 2025-07-07 PROCEDURE — 93005 ELECTROCARDIOGRAM TRACING: CPT

## 2025-07-07 PROCEDURE — 83880 ASSAY OF NATRIURETIC PEPTIDE: CPT | Performed by: STUDENT IN AN ORGANIZED HEALTH CARE EDUCATION/TRAINING PROGRAM

## 2025-07-07 PROCEDURE — 99285 EMERGENCY DEPT VISIT HI MDM: CPT | Mod: 25

## 2025-07-07 PROCEDURE — 36415 COLL VENOUS BLD VENIPUNCTURE: CPT | Performed by: STUDENT IN AN ORGANIZED HEALTH CARE EDUCATION/TRAINING PROGRAM

## 2025-07-07 PROCEDURE — 94640 AIRWAY INHALATION TREATMENT: CPT

## 2025-07-07 PROCEDURE — 71046 X-RAY EXAM CHEST 2 VIEWS: CPT

## 2025-07-07 PROCEDURE — 84145 PROCALCITONIN (PCT): CPT | Performed by: STUDENT IN AN ORGANIZED HEALTH CARE EDUCATION/TRAINING PROGRAM

## 2025-07-07 PROCEDURE — 82805 BLOOD GASES W/O2 SATURATION: CPT | Performed by: STUDENT IN AN ORGANIZED HEALTH CARE EDUCATION/TRAINING PROGRAM

## 2025-07-07 PROCEDURE — 85004 AUTOMATED DIFF WBC COUNT: CPT | Performed by: STUDENT IN AN ORGANIZED HEALTH CARE EDUCATION/TRAINING PROGRAM

## 2025-07-07 PROCEDURE — 87637 SARSCOV2&INF A&B&RSV AMP PRB: CPT | Performed by: STUDENT IN AN ORGANIZED HEALTH CARE EDUCATION/TRAINING PROGRAM

## 2025-07-07 RX ORDER — PREDNISONE 20 MG/1
40 TABLET ORAL ONCE
Status: DISCONTINUED | OUTPATIENT
Start: 2025-07-07 | End: 2025-07-07

## 2025-07-07 RX ORDER — DOXYCYCLINE 100 MG/1
100 CAPSULE ORAL ONCE
Status: COMPLETED | OUTPATIENT
Start: 2025-07-07 | End: 2025-07-07

## 2025-07-07 RX ORDER — DOXYCYCLINE 100 MG/10ML
100 INJECTION, POWDER, LYOPHILIZED, FOR SOLUTION INTRAVENOUS EVERY 12 HOURS
Status: DISCONTINUED | OUTPATIENT
Start: 2025-07-07 | End: 2025-07-07

## 2025-07-07 RX ORDER — ONDANSETRON 2 MG/ML
4 INJECTION INTRAMUSCULAR; INTRAVENOUS EVERY 6 HOURS PRN
Status: DISCONTINUED | OUTPATIENT
Start: 2025-07-07 | End: 2025-07-12 | Stop reason: HOSPADM

## 2025-07-07 RX ORDER — IPRATROPIUM BROMIDE AND ALBUTEROL SULFATE 2.5; .5 MG/3ML; MG/3ML
3 SOLUTION RESPIRATORY (INHALATION) ONCE
Status: COMPLETED | OUTPATIENT
Start: 2025-07-07 | End: 2025-07-07

## 2025-07-07 RX ORDER — FLUTICASONE FUROATE AND VILANTEROL 200; 25 UG/1; UG/1
1 POWDER RESPIRATORY (INHALATION) DAILY
Status: DISCONTINUED | OUTPATIENT
Start: 2025-07-08 | End: 2025-07-12 | Stop reason: HOSPADM

## 2025-07-07 RX ORDER — BUPROPION HYDROCHLORIDE 150 MG/1
300 TABLET ORAL EVERY MORNING
Status: DISCONTINUED | OUTPATIENT
Start: 2025-07-08 | End: 2025-07-12 | Stop reason: HOSPADM

## 2025-07-07 RX ORDER — AMOXICILLIN 250 MG
2 CAPSULE ORAL 2 TIMES DAILY PRN
Status: DISCONTINUED | OUTPATIENT
Start: 2025-07-07 | End: 2025-07-12 | Stop reason: HOSPADM

## 2025-07-07 RX ORDER — IPRATROPIUM BROMIDE AND ALBUTEROL SULFATE 2.5; .5 MG/3ML; MG/3ML
3 SOLUTION RESPIRATORY (INHALATION) ONCE
Status: DISCONTINUED | OUTPATIENT
Start: 2025-07-07 | End: 2025-07-07

## 2025-07-07 RX ORDER — ACETAMINOPHEN 325 MG/1
650 TABLET ORAL EVERY 4 HOURS PRN
Status: DISCONTINUED | OUTPATIENT
Start: 2025-07-07 | End: 2025-07-12 | Stop reason: HOSPADM

## 2025-07-07 RX ORDER — LISINOPRIL AND HYDROCHLOROTHIAZIDE 12.5; 2 MG/1; MG/1
1 TABLET ORAL EVERY MORNING
Status: DISCONTINUED | OUTPATIENT
Start: 2025-07-08 | End: 2025-07-12 | Stop reason: HOSPADM

## 2025-07-07 RX ORDER — MAGNESIUM SULFATE HEPTAHYDRATE 40 MG/ML
2 INJECTION, SOLUTION INTRAVENOUS ONCE
Status: COMPLETED | OUTPATIENT
Start: 2025-07-07 | End: 2025-07-07

## 2025-07-07 RX ORDER — METHYLPREDNISOLONE SODIUM SUCCINATE 125 MG/2ML
125 INJECTION INTRAMUSCULAR; INTRAVENOUS ONCE
Status: COMPLETED | OUTPATIENT
Start: 2025-07-07 | End: 2025-07-07

## 2025-07-07 RX ORDER — ROSUVASTATIN CALCIUM 20 MG/1
20 TABLET, COATED ORAL EVERY EVENING
Status: DISCONTINUED | OUTPATIENT
Start: 2025-07-07 | End: 2025-07-12 | Stop reason: HOSPADM

## 2025-07-07 RX ORDER — ACETAMINOPHEN 650 MG/1
650 SUPPOSITORY RECTAL EVERY 4 HOURS PRN
Status: DISCONTINUED | OUTPATIENT
Start: 2025-07-07 | End: 2025-07-12 | Stop reason: HOSPADM

## 2025-07-07 RX ORDER — LIDOCAINE 40 MG/G
CREAM TOPICAL
Status: DISCONTINUED | OUTPATIENT
Start: 2025-07-07 | End: 2025-07-12 | Stop reason: HOSPADM

## 2025-07-07 RX ORDER — AMLODIPINE BESYLATE 5 MG/1
10 TABLET ORAL EVERY EVENING
Status: DISCONTINUED | OUTPATIENT
Start: 2025-07-07 | End: 2025-07-12 | Stop reason: HOSPADM

## 2025-07-07 RX ORDER — PROCHLORPERAZINE MALEATE 5 MG/1
5 TABLET ORAL EVERY 6 HOURS PRN
Status: DISCONTINUED | OUTPATIENT
Start: 2025-07-07 | End: 2025-07-12 | Stop reason: HOSPADM

## 2025-07-07 RX ORDER — IPRATROPIUM BROMIDE AND ALBUTEROL SULFATE 2.5; .5 MG/3ML; MG/3ML
3 SOLUTION RESPIRATORY (INHALATION)
Status: DISCONTINUED | OUTPATIENT
Start: 2025-07-07 | End: 2025-07-12

## 2025-07-07 RX ORDER — CALCIUM CARBONATE 500 MG/1
1000 TABLET, CHEWABLE ORAL 4 TIMES DAILY PRN
Status: DISCONTINUED | OUTPATIENT
Start: 2025-07-07 | End: 2025-07-12 | Stop reason: HOSPADM

## 2025-07-07 RX ORDER — IPRATROPIUM BROMIDE AND ALBUTEROL SULFATE 2.5; .5 MG/3ML; MG/3ML
6 SOLUTION RESPIRATORY (INHALATION) ONCE
Status: COMPLETED | OUTPATIENT
Start: 2025-07-07 | End: 2025-07-07

## 2025-07-07 RX ORDER — ALLOPURINOL 300 MG/1
300 TABLET ORAL DAILY
Status: DISCONTINUED | OUTPATIENT
Start: 2025-07-08 | End: 2025-07-12 | Stop reason: HOSPADM

## 2025-07-07 RX ORDER — PREDNISONE 20 MG/1
40 TABLET ORAL DAILY
Status: DISCONTINUED | OUTPATIENT
Start: 2025-07-08 | End: 2025-07-10

## 2025-07-07 RX ORDER — AMOXICILLIN 250 MG
1 CAPSULE ORAL 2 TIMES DAILY PRN
Status: DISCONTINUED | OUTPATIENT
Start: 2025-07-07 | End: 2025-07-12 | Stop reason: HOSPADM

## 2025-07-07 RX ORDER — ALBUTEROL SULFATE 0.83 MG/ML
2.5 SOLUTION RESPIRATORY (INHALATION)
Status: DISCONTINUED | OUTPATIENT
Start: 2025-07-07 | End: 2025-07-12 | Stop reason: HOSPADM

## 2025-07-07 RX ORDER — ASPIRIN 325 MG
325 TABLET ORAL ONCE
Status: COMPLETED | OUTPATIENT
Start: 2025-07-07 | End: 2025-07-07

## 2025-07-07 RX ORDER — DOXYCYCLINE 100 MG/10ML
100 INJECTION, POWDER, LYOPHILIZED, FOR SOLUTION INTRAVENOUS EVERY 12 HOURS
Status: DISCONTINUED | OUTPATIENT
Start: 2025-07-08 | End: 2025-07-10

## 2025-07-07 RX ORDER — ONDANSETRON 4 MG/1
4 TABLET, ORALLY DISINTEGRATING ORAL EVERY 6 HOURS PRN
Status: DISCONTINUED | OUTPATIENT
Start: 2025-07-07 | End: 2025-07-12 | Stop reason: HOSPADM

## 2025-07-07 RX ADMIN — IPRATROPIUM BROMIDE AND ALBUTEROL SULFATE 3 ML: 2.5; .5 SOLUTION RESPIRATORY (INHALATION) at 11:46

## 2025-07-07 RX ADMIN — AMLODIPINE BESYLATE 10 MG: 5 TABLET ORAL at 19:26

## 2025-07-07 RX ADMIN — METHYLPREDNISOLONE SODIUM SUCCINATE 125 MG: 125 INJECTION, POWDER, FOR SOLUTION INTRAMUSCULAR; INTRAVENOUS at 14:19

## 2025-07-07 RX ADMIN — ASPIRIN 325 MG ORAL TABLET 325 MG: 325 PILL ORAL at 14:14

## 2025-07-07 RX ADMIN — ROSUVASTATIN CALCIUM 20 MG: 20 TABLET, FILM COATED ORAL at 19:26

## 2025-07-07 RX ADMIN — MAGNESIUM SULFATE HEPTAHYDRATE 2 G: 40 INJECTION, SOLUTION INTRAVENOUS at 14:27

## 2025-07-07 RX ADMIN — DOXYCYCLINE HYCLATE 100 MG: 100 CAPSULE ORAL at 16:52

## 2025-07-07 RX ADMIN — IPRATROPIUM BROMIDE AND ALBUTEROL SULFATE 3 ML: .5; 3 SOLUTION RESPIRATORY (INHALATION) at 16:52

## 2025-07-07 RX ADMIN — PREDNISONE 40 MG: 20 TABLET ORAL at 11:39

## 2025-07-07 RX ADMIN — IPRATROPIUM BROMIDE AND ALBUTEROL SULFATE 6 ML: .5; 3 SOLUTION RESPIRATORY (INHALATION) at 15:34

## 2025-07-07 RX ADMIN — IPRATROPIUM BROMIDE AND ALBUTEROL SULFATE 3 ML: .5; 3 SOLUTION RESPIRATORY (INHALATION) at 18:07

## 2025-07-07 ASSESSMENT — ACTIVITIES OF DAILY LIVING (ADL)
ADLS_ACUITY_SCORE: 57
EQUIPMENT_CURRENTLY_USED_AT_HOME: WALKER, ROLLING;OTHER (SEE COMMENTS)
ADLS_ACUITY_SCORE: 57
WALKING_OR_CLIMBING_STAIRS_DIFFICULTY: NO
FALL_HISTORY_WITHIN_LAST_SIX_MONTHS: NO
HEARING_DIFFICULTY_OR_DEAF: NO
WEAR_GLASSES_OR_BLIND: YES
VISION_MANAGEMENT: GLASSES
TOILETING_ISSUES: NO
ADLS_ACUITY_SCORE: 35
CHANGE_IN_FUNCTIONAL_STATUS_SINCE_ONSET_OF_CURRENT_ILLNESS/INJURY: NO
ADLS_ACUITY_SCORE: 35
DIFFICULTY_EATING/SWALLOWING: NO
DIFFICULTY_COMMUNICATING: NO
ADLS_ACUITY_SCORE: 57
CONCENTRATING,_REMEMBERING_OR_MAKING_DECISIONS_DIFFICULTY: NO
ADLS_ACUITY_SCORE: 35
DOING_ERRANDS_INDEPENDENTLY_DIFFICULTY: NO
ADLS_ACUITY_SCORE: 35
DRESSING/BATHING_DIFFICULTY: NO

## 2025-07-07 ASSESSMENT — COLUMBIA-SUICIDE SEVERITY RATING SCALE - C-SSRS
2. HAVE YOU ACTUALLY HAD ANY THOUGHTS OF KILLING YOURSELF IN THE PAST MONTH?: NO
1. IN THE PAST MONTH, HAVE YOU WISHED YOU WERE DEAD OR WISHED YOU COULD GO TO SLEEP AND NOT WAKE UP?: NO
6. HAVE YOU EVER DONE ANYTHING, STARTED TO DO ANYTHING, OR PREPARED TO DO ANYTHING TO END YOUR LIFE?: NO

## 2025-07-07 NOTE — PROGRESS NOTES
SUBJECTIVE: Gabe Smith is a 70 year old male presenting with a chief complaint of cough  and SOB.  Onset of symptoms was 1 week(s) ago.  Course of illness is worsening.    Severity moderately severe  Predisposing factors include tobacco use and HX of COPD.    Past Medical History:   Diagnosis Date    Abdominal aortic aneurysm (AAA) without rupture 2021    AAA 3.6 x 3.6 cm per ultrasound    COPD (chronic obstructive pulmonary disease) (H)     Diabetes mellitus, type 2 (H) 2024    Dysmetabolic syndrome X     Hyperlipidemia     Hypertension     Osteoarthrosis     Prediabetes 10/09/2014    A1C 6.1    Tobacco use disorder      No Known Allergies  Social History     Tobacco Use    Smoking status: Every Day     Current packs/day: 0.00     Average packs/day: 0.7 packs/day for 77.5 years (50.5 ttl pk-yrs)     Types: Cigarettes     Start date: 1970     Last attempt to quit: 2025     Years since quittin.2    Smokeless tobacco: Never    Tobacco comments:     Smoking about 4-5 cigs daily   Substance Use Topics    Alcohol use: Yes     Comment: 2-3 drinks per night (double)       ROS:  SKIN: no rash  GI: no vomiting    OBJECTIVE:  /83   Pulse 73   Temp 97.7  F (36.5  C) (Tympanic)   Resp 28   Wt 90.3 kg (199 lb)   SpO2 (!) 91%   BMI 28.55 kg/m  GENERAL APPEARANCE: healthy, alert and no distress  EYES: EOMI,  PERRL, conjunctiva clear  HENT: ear canals and TM's normal.  Nose and mouth without ulcers, erythema or lesions  RESP: decreased breath sounds   SKIN: no suspicious lesions or rashes    Not improved after neb/prednisone, O2 sat still 91%      ICD-10-CM    1. COPD exacerbation (H)  J44.1 ipratropium - albuterol 0.5 mg/2.5 mg (3mg)/3 mL (DUONEB) neb solution 3 mL     predniSONE (DELTASONE) tablet 40 mg      2. Tobacco abuse  Z72.0 ipratropium - albuterol 0.5 mg/2.5 mg (3mg)/3 mL (DUONEB) neb solution 3 mL     predniSONE (DELTASONE) tablet 40 mg      3. Productive cough  R05.8 ipratropium  - albuterol 0.5 mg/2.5 mg (3mg)/3 mL (DUONEB) neb solution 3 mL     predniSONE (DELTASONE) tablet 40 mg      4. Low oxygen saturation  R79.81         Pt will go [facility] ED for COPD exacerbation, not improved with neb/prednisone

## 2025-07-07 NOTE — ED NOTES
"[facility]  ED Nurse Handoff Report    ED Chief complaint: Shortness of Breath  . ED Diagnosis:   Final diagnoses:   COPD exacerbation (H)       Allergies: No Known Allergies    Code Status: Full Code    Activity level - Baseline/Home:  independent.  Activity Level - Current:   standby.   Lift room needed: No.   Bariatric: No   Needed: No   Isolation: No.   Infection: Not Applicable.     Respiratory status: Nasal cannula 2L    Vital Signs (within 30 minutes):   Vitals:    07/07/25 1254 07/07/25 1535 07/07/25 1540   BP: (!) 129/92 (!) 142/92    Pulse: 81 71    Resp: 20     Temp: 97.3  F (36.3  C)     SpO2: 92% (!) 90% (!) 91%   Weight: 91.2 kg (201 lb 1 oz)     Height: 1.778 m (5' 10\")         Cardiac Rhythm:  ,      Pain level:    Patient confused: No.   Patient Falls Risk: patient and family education.   Elimination Status: Has voided     Patient Report - Initial Complaint: Shortness of breath.   Focused Assessment: Gabe Smith is a 70 year old male with a history of COPD, aortic aneurysm, hyperlipidemia, hypertension, and type 2 diabetes mellitus here for evaluation of shortness of breath. The patient report onset of shortness of breath 1 week ago. He states that he feels out of breath after moving short distances. He reports that his COPD has been getting better after decreasing smoking, but then this started. He also reports congestion. He states that he only coughs to clear his throat. He states that the phlegm he coughs up is darker than normal. He uses 2 inhalers and a nebulizer. He reports using the nebulizer once a day. He denies using his rescue inhaler a lot. He also denies fevers, chest pain, and leg swelling and pain. He was seen at urgent care today and was given a nebulizer.        Abnormal Results:   Labs Ordered and Resulted from Time of ED Arrival to Time of ED Departure   BASIC METABOLIC PANEL - Abnormal       Result Value    Sodium 137      Potassium 4.7      " Chloride 100      Carbon Dioxide (CO2) 26      Anion Gap 11      Urea Nitrogen 23.6 (*)     Creatinine 1.09      GFR Estimate 73      Calcium 9.2      Glucose 164 (*)    TROPONIN T, HIGH SENSITIVITY - Abnormal    Troponin T, High Sensitivity 53 (*)    CBC WITH PLATELETS AND DIFFERENTIAL - Abnormal    WBC Count 11.0      RBC Count 5.17      Hemoglobin 15.6      Hematocrit 45.3      MCV 88      MCH 30.2      MCHC 34.4      RDW 13.7      Platelet Count 203      % Neutrophils 78      % Lymphocytes 13      % Monocytes 7      % Eosinophils 2      % Basophils 1      % Immature Granulocytes 1      NRBCs per 100 WBC 0      Absolute Neutrophils 8.5 (*)     Absolute Lymphocytes 1.4      Absolute Monocytes 0.7      Absolute Eosinophils 0.2      Absolute Basophils 0.1      Absolute Immature Granulocytes 0.1      Absolute NRBCs 0.0     NT-PROBNP - Abnormal    NT-proBNP 346 (*)    TROPONIN T, HIGH SENSITIVITY - Abnormal    Troponin T, High Sensitivity 41 (*)    INFLUENZA A/B, RSV AND SARS-COV2 PCR - Normal    Influenza A PCR Negative      Influenza B PCR Negative      RSV PCR Negative      SARS CoV2 PCR Negative     PROCALCITONIN - Normal    Procalcitonin 0.11     BLOOD GAS VENOUS        Chest XR,  PA & LAT   Final Result   IMPRESSION: Stable size of cardiomediastinal silhouette with thoracic aortic stent graft again noted. No focal airspace consolidation, pleural effusion or pneumothorax. Bones are unchanged.          Treatments provided: 3 edgard, mag, doxy  Family Comments: wife at bedside  OBS brochure/video discussed/provided to patient:  N/A  ED Medications:   Medications   ipratropium - albuterol 0.5 mg/2.5 mg (3mg)/3 mL (DUONEB) neb solution 6 mL (6 mLs Nebulization $Given 7/7/25 1534)   methylPREDNISolone Na Suc (solu-MEDROL) injection 125 mg (125 mg Intravenous $Given 7/7/25 1419)   magnesium sulfate 2 g in 50 mL sterile water intermittent infusion (0 g Intravenous Stopped 7/7/25 1545)   aspirin (ASA) tablet 325 mg (325  mg Oral $Given 7/7/25 3458)   ipratropium - albuterol 0.5 mg/2.5 mg (3mg)/3 mL (DUONEB) neb solution 3 mL (3 mLs Nebulization $Given 7/7/25 8052)   doxycycline hyclate (VIBRAMYCIN) capsule 100 mg (100 mg Oral $Given 7/7/25 1652)       Drips infusing:  No  For the majority of the shift this patient was Green.   Interventions performed were N/a.    Sepsis treatment initiated: No    Cares/treatment/interventions/medications to be completed following ED care:     ED Nurse Name: Breanne Streeter RN  4:55 PM

## 2025-07-07 NOTE — PROGRESS NOTES
Urgent Care Clinic Visit  Rapid rooming was initiated.  Provider was consulted, patient will remain in room and provider will be with patient as soon as possible.  Chief Complaint   Patient presents with    Shortness of Breath     1 week               7/7/2025    11:31 AM   Additional Questions   Roomed by Diane   Accompanied by wife

## 2025-07-07 NOTE — ED PROVIDER NOTES
Emergency Department Note      History of Present Illness     Chief Complaint   Shortness of Breath      HPI   Gabe Smith is a 70 year old male with a history of COPD, aortic aneurysm, hyperlipidemia, hypertension, and type 2 diabetes mellitus here for evaluation of shortness of breath. The patient report onset of shortness of breath 1 week ago. He states that he feels out of breath after moving short distances. He reports that his COPD has been getting better after decreasing smoking, but then this started. He also reports congestion. He states that he only coughs to clear his throat. He states that the phlegm he coughs up is darker than normal. He uses 2 inhalers and a nebulizer. He reports using the nebulizer once a day. He denies using his rescue inhaler a lot. He also denies fevers, chest pain, and leg swelling and pain. He was seen at urgent care today and was given a nebulizer.    Independent Historian   None    Review of External Notes   none    Past Medical History     Medical History and Problem List   Benign essential hypertension  Osteoarthrosis  Hyperlipidemia  Tobacco abuse  Chronic obstructive pulmonary disease  Dysmetabolic syndrome X  Gout  Abdominal aortic aneurysm without rupture  Hepatic abscess  Cholecystitis  Type 2 diabetes mellitus    Medications   Zyloprim  Norvasc  Wellbutrin  Fluticasone-salmeterol  Zestoretic  Crestor  Spiriva  Chantix  Aspirin 81 mg    Surgical History   Cholecystectomy  Aortic aneurysm repair  Insert lumbar drain  Bilateral hip arthroplasties    Physical Exam     Patient Vitals for the past 24 hrs:   BP Temp Temp src Pulse Resp SpO2 Height Weight   07/07/25 2332 126/79 98.3  F (36.8  C) Oral 112 16 92 % -- --   07/07/25 1826 (!) 163/88 97.9  F (36.6  C) Oral 95 20 (!) 90 % -- --   07/07/25 1725 -- -- -- -- -- 93 % -- --   07/07/25 1719 (!) 142/86 97.5  F (36.4  C) Oral 83 22 93 % -- --   07/07/25 1540 -- -- -- -- -- (!) 91 % -- --   07/07/25 1535 (!) 142/92 -- --  "71 -- (!) 90 % -- --   07/07/25 1254 (!) 129/92 97.3  F (36.3  C) -- 81 20 92 % 1.778 m (5' 10\") 91.2 kg (201 lb 1 oz)     Physical Exam  General: Awake, alert, in no acute distress   HEENT: Atraumatic   EOM normal   External ears normal   Trachea midline  Nasal congestion  Neck: Supple, normal ROM  CV: Regular rate, regular rhythm   No lower extremity edema  2+ radial and DP pulses  PULM: Diminished air movement throughout, tachypnea, sitting upright  ABD: Soft, non-tender, non-distended  Normal bowel sounds   No rebound or guarding   MSK: No gross deformities  NEURO: Alert, no focal deficits  Skin: Warm, dry and intact      Diagnostics     Lab Results   Labs Ordered and Resulted from Time of ED Arrival to Time of ED Departure   BASIC METABOLIC PANEL - Abnormal       Result Value    Sodium 137      Potassium 4.7      Chloride 100      Carbon Dioxide (CO2) 26      Anion Gap 11      Urea Nitrogen 23.6 (*)     Creatinine 1.09      GFR Estimate 73      Calcium 9.2      Glucose 164 (*)    TROPONIN T, HIGH SENSITIVITY - Abnormal    Troponin T, High Sensitivity 53 (*)    CBC WITH PLATELETS AND DIFFERENTIAL - Abnormal    WBC Count 11.0      RBC Count 5.17      Hemoglobin 15.6      Hematocrit 45.3      MCV 88      MCH 30.2      MCHC 34.4      RDW 13.7      Platelet Count 203      % Neutrophils 78      % Lymphocytes 13      % Monocytes 7      % Eosinophils 2      % Basophils 1      % Immature Granulocytes 1      NRBCs per 100 WBC 0      Absolute Neutrophils 8.5 (*)     Absolute Lymphocytes 1.4      Absolute Monocytes 0.7      Absolute Eosinophils 0.2      Absolute Basophils 0.1      Absolute Immature Granulocytes 0.1      Absolute NRBCs 0.0     NT-PROBNP - Abnormal    NT-proBNP 346 (*)    TROPONIN T, HIGH SENSITIVITY - Abnormal    Troponin T, High Sensitivity 41 (*)    BLOOD GAS VENOUS - Abnormal    pH Venous 7.43      pCO2 Venous 43      pO2 Venous 44      Bicarbonate Venous 28      Base Excess/Deficit Venous 3.4 (*)     " FIO2 2      Oxyhemoglobin Venous 77 (*)     O2 Sat, Venous 78.3 (*)    MAGNESIUM - Abnormal    Magnesium 2.5 (*)    INFLUENZA A/B, RSV AND SARS-COV2 PCR - Normal    Influenza A PCR Negative      Influenza B PCR Negative      RSV PCR Negative      SARS CoV2 PCR Negative     PROCALCITONIN - Normal    Procalcitonin 0.11         Imaging   Chest XR,  PA & LAT   Final Result   IMPRESSION: Stable size of cardiomediastinal silhouette with thoracic aortic stent graft again noted. No focal airspace consolidation, pleural effusion or pneumothorax. Bones are unchanged.          EKG   ECG results from 07/07/25   EKG 12 lead     Value    Systolic Blood Pressure     Diastolic Blood Pressure     Ventricular Rate 75    Atrial Rate 75    CA Interval 192    QRS Duration 82        QTc 424    P Axis 62    R AXIS 50    T Axis 75    Interpretation ECG      Normal sinus rhythm  Septal infarct (cited on or before 01-Mar-2024)  Abnormal ECG  When compared with ECG of 01-Mar-2024 18:42,  No significant change was found  Ready by me at 1344          Independent Interpretation   None    ED Course      Medications Administered   Medications   lidocaine 1 % 0.1-1 mL (has no administration in time range)   lidocaine (LMX4) cream (has no administration in time range)   sodium chloride (PF) 0.9% PF flush 3 mL ( Intracatheter Canceled Entry 7/7/25 2200)   sodium chloride (PF) 0.9% PF flush 3 mL (has no administration in time range)   senna-docusate (SENOKOT-S/PERICOLACE) 8.6-50 MG per tablet 1 tablet (has no administration in time range)     Or   senna-docusate (SENOKOT-S/PERICOLACE) 8.6-50 MG per tablet 2 tablet (has no administration in time range)   calcium carbonate (TUMS) chewable tablet 1,000 mg (has no administration in time range)   acetaminophen (TYLENOL) tablet 650 mg (has no administration in time range)     Or   acetaminophen (TYLENOL) Suppository 650 mg (has no administration in time range)   melatonin tablet 1 mg (has no  administration in time range)   ondansetron (ZOFRAN ODT) ODT tab 4 mg (has no administration in time range)     Or   ondansetron (ZOFRAN) injection 4 mg (has no administration in time range)   prochlorperazine (COMPAZINE) injection 5 mg (has no administration in time range)     Or   prochlorperazine (COMPAZINE) tablet 5 mg (has no administration in time range)   predniSONE (DELTASONE) tablet 40 mg (has no administration in time range)   doxycycline (VIBRAMYCIN) 100 mg vial to attach to  mL bag (has no administration in time range)   ipratropium - albuterol 0.5 mg/2.5 mg (3mg)/3 mL (DUONEB) neb solution 3 mL ( Nebulization Canceled Entry 7/7/25 1852)   albuterol (PROVENTIL) neb solution 2.5 mg (has no administration in time range)   allopurinol (ZYLOPRIM) tablet 300 mg (has no administration in time range)   amLODIPine (NORVASC) tablet 10 mg (10 mg Oral $Given 7/7/25 1926)   buPROPion (WELLBUTRIN XL) 24 hr tablet 300 mg (has no administration in time range)   fluticasone-vilanterol (BREO ELLIPTA) 200-25 MCG/ACT inhaler 1 puff (has no administration in time range)   lisinopril-hydrochlorothiazide (ZESTORETIC) 20-12.5 MG per tablet 1 tablet (has no administration in time range)   rosuvastatin (CRESTOR) tablet 20 mg (20 mg Oral $Given 7/7/25 1926)   umeclidinium (INCRUSE ELLIPTA) 62.5 MCG/ACT inhaler 1 puff (has no administration in time range)   ipratropium - albuterol 0.5 mg/2.5 mg (3mg)/3 mL (DUONEB) neb solution 6 mL (6 mLs Nebulization $Given 7/7/25 1534)   methylPREDNISolone Na Suc (solu-MEDROL) injection 125 mg (125 mg Intravenous $Given 7/7/25 1419)   magnesium sulfate 2 g in 50 mL sterile water intermittent infusion (0 g Intravenous Stopped 7/7/25 1545)   aspirin (ASA) tablet 325 mg (325 mg Oral $Given 7/7/25 1414)   ipratropium - albuterol 0.5 mg/2.5 mg (3mg)/3 mL (DUONEB) neb solution 3 mL (3 mLs Nebulization $Given 7/7/25 7204)   doxycycline hyclate (VIBRAMYCIN) capsule 100 mg (100 mg Oral $Given  7/7/25 1652)       Procedures   Procedures     Discussion of Management   Admitting Hospitalist, Dr. Rangel    ED Course   ED Course as of 07/07/25 6498   Mon Jul 07, 2025   1355 I obtained history and examined the patient as noted above.    1556 I rechecked and updated the patient. He went from 86 to 88 on his pulse ox.   1638 I spoke with Dr. Rangel, hospitalist, who accepted the patient for admission.        Additional Documentation  None    Medical Decision Making / Diagnosis     CMS Diagnoses: None    MIPS   None    MDM   Gabe Smith is a 70 year old male who comes to the hospital for shortness of breath.  Long history of tobacco abuse.  Sounds like he had a viral URI or allergies.  Received nebulizer prior to arrival though is still quite diminished in breath sounds on my initial evaluation.  Labs show slightly elevated trop and BNP. Considered PE but given wheezing, URI hx, no concurrent tachycardia, less likely at this time. Could consider if symptoms not improving.    Ordered 2 more nebulizers, steroids, magnesium with slight improvement in breath sounds.  Ultimately he is hypoxic between 86 and 90% on room air (90 at rest) so will place on 2 L of supplemental oxygenation.  His white count is normal, Pro-Huan as well, low suspicion for superimposed bacterial infection.  Will give doxycycline for severe COPD exacerbation.  Recommending hospitalization for continued oxygen, nebulizers, steroids.  Patient is amenable to this plan.  Spoke with the above hospitalist who kindly accepts.     Disposition   The patient was admitted to the hospital.     Diagnosis     ICD-10-CM    1. COPD exacerbation (H)  J44.1       2. Respiratory insufficiency  R06.89                Scribe Disclosure:  I, Saravanan Farley, am serving as a scribe at 2:44 PM on 7/7/2025 to document services personally performed by Lesly Lewis DO based on my observations and the provider's statements to me.        Lesly Lewis,  DO  07/07/25 2330

## 2025-07-07 NOTE — H&P
[facility]    History and Physical - Hospitalist Service       Date of Admission:  7/7/2025    Assessment & Plan      Gabe Smith is a 70 year old male with PMH significant for emphysema, thoracic aortic aneurysm s/p repair admitted on 7/7/2025 with shortness of breath.     Acute COPD exacerbation  Acute hypoxemic respiratory failure:  Known history of emphysema in the setting of tobacco use.  Presents with 1 week of shortness of breath.  This is primarily with exertion.  He feels largely well at rest.  He does endorse some sputum change during this time.  No fevers, chills, or symptoms otherwise.  86-88% o2 with ambulation requiring 2L NC.  CXR without infiltrate or edema.  Covid, rsv, influenza negative.   -Given solumedrol, mag, duonebs, doxycycline in the ED  -Continue prednisone 40 mg daily  -Continue duoneb q4h WA  -PTA scheduled inhalers  -Continue doxycycline   -Continuous oximetry, supplemental o2 prn, wean as tolerated    Troponin elevation:  53 ---> 41.  EKG without ischemic change.  Unchanged from prior.  Patient denies chest pain.  Suspect demand ischemia from hypoxemia at home.     Tobacco use disorder:  Previous heavy use.  Has cut back significantly in the recent months.  20 cigarettes in the past 2-3 months.  None for the past 4 days.  Declined NRT.     HTN: PTA meds once med rec completed  HLD: PTA statin  Gout: PTA allopurinol    AAA  Thoracic aortic aneurysm s/p repair: Noted          Diet:  Regular  DVT Prophylaxis: Pneumatic Compression Devices  Phillips Catheter: Not present  Lines: None     Cardiac Monitoring: None  Code Status:  FULL    Clinically Significant Risk Factors Present on Admission                 # Drug Induced Platelet Defect: home medication list includes an antiplatelet medication   # Hypertension: Noted on problem list          # DMII: A1C = 6.5 % (Ref range: 0.0 - 5.6 %) within past 6 months    # Overweight: Estimated body mass index is 28.85 kg/m  as  "calculated from the following:    Height as of this encounter: 1.778 m (5' 10\").    Weight as of this encounter: 91.2 kg (201 lb 1 oz).       # COPD: noted on problem list        Disposition Plan     Medically Ready for Discharge: Anticipated in 2-4 Days           Jatinder Rangel DO  Hospitalist Service  [facility]  Securely message with [email or web address] (more info)  Text page via [email or web address] Paging/Directory     ______________________________________________________________________    Chief Complaint   Shortness of breath    History is obtained from the patient    History of Present Illness   Gabe Smith is a 70 year old male with PMH significant for emphysema, thoracic aortic aneurysm s/p repair admitted on 7/7/2025 with shortness of breath.     Describes 1 week of dyspnea with exertion.  Feels largely well at rest.  Does endorse some sputum changes during this time as well.  Symptoms progressively worsening over the week so he decided to present.  No fevers or chills.  No edema or chest pain.  Known history of emphysema for which he has been compliant with his home inhaler.       Past Medical History    Past Medical History:   Diagnosis Date    Abdominal aortic aneurysm (AAA) without rupture 02/25/2021    AAA 3.6 x 3.6 cm per ultrasound    COPD (chronic obstructive pulmonary disease) (H)     Diabetes mellitus, type 2 (H) 2/23/2024    Dysmetabolic syndrome X     Hyperlipidemia     Hypertension     Osteoarthrosis     Prediabetes 10/09/2014    A1C 6.1    Tobacco use disorder        Past Surgical History   Past Surgical History:   Procedure Laterality Date    CHOLECYSTECTOMY  8/22    COLONOSCOPY N/A 10/19/2015    Procedure: COMBINED COLONOSCOPY, SINGLE OR MULTIPLE BIOPSY/POLYPECTOMY BY BIOPSY;  Surgeon: Gume Vidal MD;  Location:  GI    CV AORTOGRAM N/A 03/01/2024    Procedure: Aortogram Thoracic;  Surgeon: Alin Roe MD;  Location:  OR    [facility] LAPAROSCOPIC CHOLECYSTECTOMY WITH " GRAMS N/A 08/16/2022    Procedure: ROBOT-ASSISTED, LAPAROSCOPIC CHOLECYSTECTOMY WITH CHOLANGIOGRAM;  Surgeon: Carlota Leavitt MD;  Location:  OR    ENDOSCOPIC RETROGRADE CHOLANGIOPANCREATOGRAM N/A 08/17/2022    Procedure: ENDOSCOPIC RETROGRADE CHOLANGIOPANCREATOGRAPHY, WITH SPHINCTEROTOMY;  Surgeon: Jani Cash MD;  Location:  OR    ENDOVASCULAR REPAIR ANEURYSM THORACIC AORTIC N/A 03/01/2024    Procedure: THORACIC ENDOVASCULAR AORTIC REPAIR, RIGHT FEMORAL CUTDOWN;  Surgeon: Alin Roe MD;  Location: [facility] OR    ILIAC ARTERY ANGIOGRAM RIGHT Right 03/01/2024    Procedure: Iliac Artery Angiogram Right;  Surgeon: Alin Roe MD;  Location:  OR    INSERT DRAIN LUMBAR N/A 03/01/2024    Procedure: Insert drain lumbar;  Surgeon: Herve Ospina MD;  Location:  OR    IR THORACIC ENDOVASCULAR STENT GRAFT  03/01/2024    ORTHOPEDIC SURGERY      left hip, right hip    SURGICAL HISTORY OF -   01/01/2000    Left MOISES    SURGICAL HISTORY OF -       Unspecified knee surgeries as a child    SURGICAL HISTORY OF -   10/01/2010    Right MOISES, with left knee arthroscopy, meniscectomy    VASCULAR SURGERY  3/24       Prior to Admission Medications   Prior to Admission Medications   Prescriptions Last Dose Informant Patient Reported? Taking?   Continuous Glucose  (FREESTYLE OXANA 3 READER) JANIS   Yes No   Sig: Apply 1 Units topically as needed (use with Oxana 3 sensors to monitor blood sugar levels).   albuterol (PROAIR HFA/PROVENTIL HFA/VENTOLIN HFA) 108 (90 Base) MCG/ACT inhaler   No No   Sig: Inhale 2 puffs into the lungs every 4 hours as needed for wheezing or shortness of breath.   allopurinol (ZYLOPRIM) 300 MG tablet   No No   Sig: Take 1 tablet (300 mg) by mouth daily.   amLODIPine (NORVASC) 10 MG tablet   No No   Sig: TAKE 1 TABLET(10 MG) BY MOUTH EVERY EVENING   aspirin 81 MG tablet  Self Yes No   Sig: Take 1 tablet (81 mg) by mouth daily   betamethasone dipropionate (DIPROSONE) 0.05  % external cream   No No   Sig: APPLY SPARINGLY ONCE OR TWICE DAILY AS NEEDED TO AFFECTED AREA OF PSORIASIS UNTIL THE SKIN IS BETTER, THEN STOP. REPEAT AS NEEDED   buPROPion (WELLBUTRIN XL) 300 MG 24 hr tablet   No No   Sig: Take 1 tablet (300 mg) by mouth every morning.   fluticasone-salmeterol (AIRDUO RESPICLICK) 232-14 MCG/ACT inhaler   No No   Sig: INHALE 1 PUFF INTO THE LUNGS 2 TIMES DAILY   ipratropium - albuterol 0.5 mg/2.5 mg/3 mL (DUONEB) 0.5-2.5 (3) MG/3ML neb solution   No No   Sig: USE 1 VIAL VIA NEBULIZER EVERY 4 HOURS AS NEEDED FOR SHORTNESS OF BREATH OR WHEEZING OR COUGH   lisinopril-hydrochlorothiazide (ZESTORETIC) 20-12.5 MG tablet   No No   Sig: TAKE 1 TABLET BY MOUTH EVERY MORNING   rosuvastatin (CRESTOR) 20 MG tablet   No No   Sig: Take 1 tablet (20 mg) by mouth every evening.   tiotropium (SPIRIVA) 18 MCG inhaled capsule   No No   Sig: Inhale 1 capsule (18 mcg) into the lungs daily. GENERIC TIOTROPIUM   varenicline (CHANTIX) 1 MG tablet   No No   Sig: Take 1 tablet (1 mg) by mouth 2 times daily.      Facility-Administered Medications Last Administration Doses Remaining   ipratropium - albuterol 0.5 mg/2.5 mg (3mg)/3 mL (DUONEB) neb solution 3 mL 2025 11:46 AM 0   predniSONE (DELTASONE) tablet 40 mg 2025 11:39 AM 0           Review of Systems    The 10 point Review of Systems is negative other than noted in the HPI or here.     Social History   I have reviewed this patient's social history and updated it with pertinent information if needed.  Social History     Tobacco Use    Smoking status: Every Day     Current packs/day: 0.00     Average packs/day: 0.7 packs/day for 77.5 years (50.5 ttl pk-yrs)     Types: Cigarettes     Start date: 1970     Last attempt to quit: 2025     Years since quittin.2    Smokeless tobacco: Never    Tobacco comments:     Smoking about 4-5 cigs daily   Vaping Use    Vaping status: Former   Substance Use Topics    Alcohol use: Yes     Comment: 2-3  drinks per night (double)    Drug use: Yes     Types: Marijuana         Family History   I have reviewed this patient's family history and updated it with pertinent information if needed.  Family History   Problem Relation Age of Onset    Chronic atrial fibrillation (H) Mother     Unknown/Adopted Father          in mid 60's, health history unknown    Family History Negative Sister     Family History Negative Brother          Allergies   No Known Allergies     Physical Exam   Vital Signs: Temp: 97.3  F (36.3  C)   BP: (!) 142/92 Pulse: 71   Resp: 20 SpO2: (!) 91 % O2 Device: None (Room air)    Weight: 201 lbs .95 oz    General Appearance: Patient awake & alert.  No apparent distress.   Respiratory: Lungs with expiratory wheezing.  Work of breathing appears normal on room air.  Cardiovascular: Regular rate and rhythm.  No murmurs rubs or gallops.  There is no edema present.  GI: Benign.  Soft.  Non-tender.  Bowel sounds active.   Skin: No rashes or lesions exposed skin.  Neuro:  The patient is moving all extremities.  No obvious focal asymmetries.   Other: Patient is appropriate and oriented x3.     Medical Decision Making       75 MINUTES SPENT BY ME on the date of service doing chart review, history, exam, documentation & further activities per the note.      Data   ------------------------- PAST 24 HR DATA REVIEWED -----------------------------------------------    I have personally reviewed the following data over the past 24 hrs:    11.0  \   15.6   / 203     137 100 23.6 (H) /  164 (H)   4.7 26 1.09 \     Trop: 41 (H) BNP: 346 (H)     Procal: 0.11 CRP: N/A Lactic Acid: N/A         Imaging results reviewed over the past 24 hrs:   Recent Results (from the past 24 hours)   Chest XR,  PA & LAT    Narrative    EXAM: XR CHEST 2 VIEWS  LOCATION: [facility]  DATE: 2025    INDICATION: Shortness of breath  COMPARISON: CT angiogram 2025      Impression    IMPRESSION: Stable size of  cardiomediastinal silhouette with thoracic aortic stent graft again noted. No focal airspace consolidation, pleural effusion or pneumothorax. Bones are unchanged.

## 2025-07-07 NOTE — ED TRIAGE NOTES
Pt c/o shortness of breath for the last x1 week- getting worse with exertion- pt does have COPD. Denies any recent illness. VSS in triage.      Triage Assessment (Adult)       Row Name 07/07/25 0979          Triage Assessment    Airway WDL WDL        Respiratory WDL    Respiratory WDL X  shortness of breath        Cardiac WDL    Cardiac WDL WDL        Cognitive/Neuro/Behavioral WDL    Cognitive/Neuro/Behavioral WDL WDL

## 2025-07-07 NOTE — ED NOTES
[facility]    ED Boarding Nurse Handoff Addendum Report:    Date/time: 7/7/2025, 5:35 PM      Neuro: Alert and Oriented x4  Activity: are SBA with no assistive devices   Telemetry Monitoring: No  Pain: denying pain.  Labs / Tests: Mag (pending results) and K 4.7 protocol.  : Urinal at bedside.  O2: 2 L NC Wheezing improved with neb, per pt dyspnea on exertion  LDA's: Peripheral  Fluids: is Saline locked.  Diet: Regular. Meal ordered being delivered to room on MS5  Discharge Disposition: TBD    Plan of Care:    Prednisone  Duonebs  Abx: doxycycline      Vital signs (within last 30 minutes):    Vitals:    07/07/25 1535 07/07/25 1540 07/07/25 1719 07/07/25 1725   BP: (!) 142/92  (!) 142/86    BP Location:   Right arm    Pulse: 71  83    Resp:   22    Temp:   97.5  F (36.4  C)    TempSrc:   Oral    SpO2: (!) 90% (!) 91% 93% 93%   Weight:       Height:           ED Boarding Nurse name: Diane Garg RN

## 2025-07-07 NOTE — PHARMACY-ADMISSION MEDICATION HISTORY
Pharmacist Admission Medication History    Admission medication history is complete. The information provided in this note is only as accurate as the sources available at the time of the update.    Information Source(s): Patient and CareEverywhere/SureScripts via in-person    Pertinent Information: none    Changes made to PTA medication list:  Added: None  Deleted: None  Changed: None    Allergies reviewed with patient and updates made in EHR: yes    Medication History Completed By: Junior Gutierrez LORNE 7/7/2025 5:35 PM    PTA Med List   Medication Sig Last Dose/Taking    albuterol (PROAIR HFA/PROVENTIL HFA/VENTOLIN HFA) 108 (90 Base) MCG/ACT inhaler Inhale 2 puffs into the lungs every 4 hours as needed for wheezing or shortness of breath. Taking As Needed    allopurinol (ZYLOPRIM) 300 MG tablet Take 1 tablet (300 mg) by mouth daily. 7/7/2025 Morning    amLODIPine (NORVASC) 10 MG tablet TAKE 1 TABLET(10 MG) BY MOUTH EVERY EVENING 7/6/2025 Evening    aspirin 81 MG tablet Take 1 tablet (81 mg) by mouth daily 7/7/2025 Morning    betamethasone dipropionate (DIPROSONE) 0.05 % external cream APPLY SPARINGLY ONCE OR TWICE DAILY AS NEEDED TO AFFECTED AREA OF PSORIASIS UNTIL THE SKIN IS BETTER, THEN STOP. REPEAT AS NEEDED Taking    buPROPion (WELLBUTRIN XL) 300 MG 24 hr tablet Take 1 tablet (300 mg) by mouth every morning. 7/7/2025 Morning    fluticasone-salmeterol (AIRDUO RESPICLICK) 232-14 MCG/ACT inhaler INHALE 1 PUFF INTO THE LUNGS 2 TIMES DAILY 7/7/2025 Morning    ipratropium - albuterol 0.5 mg/2.5 mg/3 mL (DUONEB) 0.5-2.5 (3) MG/3ML neb solution USE 1 VIAL VIA NEBULIZER EVERY 4 HOURS AS NEEDED FOR SHORTNESS OF BREATH OR WHEEZING OR COUGH Taking    lisinopril-hydrochlorothiazide (ZESTORETIC) 20-12.5 MG tablet TAKE 1 TABLET BY MOUTH EVERY MORNING 7/7/2025 Morning    rosuvastatin (CRESTOR) 20 MG tablet Take 1 tablet (20 mg) by mouth every evening. 7/6/2025 Evening    tiotropium (SPIRIVA) 18 MCG inhaled capsule Inhale 1  capsule (18 mcg) into the lungs daily. GENERIC TIOTROPIUM 7/7/2025 Morning    varenicline (CHANTIX) 1 MG tablet Take 1 tablet (1 mg) by mouth 2 times daily. 7/7/2025 Morning

## 2025-07-08 PROBLEM — R06.89 RESPIRATORY INSUFFICIENCY: Status: ACTIVE | Noted: 2025-07-08

## 2025-07-08 LAB
ANION GAP SERPL CALCULATED.3IONS-SCNC: 13 MMOL/L (ref 7–15)
BUN SERPL-MCNC: 27.8 MG/DL (ref 8–23)
CALCIUM SERPL-MCNC: 9.4 MG/DL (ref 8.8–10.4)
CHLORIDE SERPL-SCNC: 97 MMOL/L (ref 98–107)
CREAT SERPL-MCNC: 0.88 MG/DL (ref 0.67–1.17)
EGFRCR SERPLBLD CKD-EPI 2021: >90 ML/MIN/1.73M2
ERYTHROCYTE [DISTWIDTH] IN BLOOD BY AUTOMATED COUNT: 13.3 % (ref 10–15)
GLUCOSE BLDC GLUCOMTR-MCNC: 268 MG/DL (ref 70–99)
GLUCOSE BLDC GLUCOMTR-MCNC: 330 MG/DL (ref 70–99)
GLUCOSE BLDC GLUCOMTR-MCNC: 394 MG/DL (ref 70–99)
GLUCOSE BLDC GLUCOMTR-MCNC: 397 MG/DL (ref 70–99)
GLUCOSE BLDC GLUCOMTR-MCNC: 400 MG/DL (ref 70–99)
GLUCOSE BLDC GLUCOMTR-MCNC: 419 MG/DL (ref 70–99)
GLUCOSE SERPL-MCNC: 288 MG/DL (ref 70–99)
HCO3 SERPL-SCNC: 24 MMOL/L (ref 22–29)
HCT VFR BLD AUTO: 45 % (ref 40–53)
HGB BLD-MCNC: 15.2 G/DL (ref 13.3–17.7)
MAGNESIUM SERPL-MCNC: 1.8 MG/DL (ref 1.7–2.3)
MCH RBC QN AUTO: 29.4 PG (ref 26.5–33)
MCHC RBC AUTO-ENTMCNC: 33.8 G/DL (ref 31.5–36.5)
MCV RBC AUTO: 87 FL (ref 78–100)
PLATELET # BLD AUTO: 227 10E3/UL (ref 150–450)
POTASSIUM SERPL-SCNC: 4.8 MMOL/L (ref 3.4–5.3)
RBC # BLD AUTO: 5.17 10E6/UL (ref 4.4–5.9)
SODIUM SERPL-SCNC: 134 MMOL/L (ref 135–145)
WBC # BLD AUTO: 11.5 10E3/UL (ref 4–11)

## 2025-07-08 PROCEDURE — 94640 AIRWAY INHALATION TREATMENT: CPT

## 2025-07-08 PROCEDURE — 80048 BASIC METABOLIC PNL TOTAL CA: CPT | Performed by: STUDENT IN AN ORGANIZED HEALTH CARE EDUCATION/TRAINING PROGRAM

## 2025-07-08 PROCEDURE — 250N000012 HC RX MED GY IP 250 OP 636 PS 637: Performed by: STUDENT IN AN ORGANIZED HEALTH CARE EDUCATION/TRAINING PROGRAM

## 2025-07-08 PROCEDURE — 250N000012 HC RX MED GY IP 250 OP 636 PS 637: Performed by: HOSPITALIST

## 2025-07-08 PROCEDURE — 250N000011 HC RX IP 250 OP 636: Performed by: STUDENT IN AN ORGANIZED HEALTH CARE EDUCATION/TRAINING PROGRAM

## 2025-07-08 PROCEDURE — 36415 COLL VENOUS BLD VENIPUNCTURE: CPT | Performed by: STUDENT IN AN ORGANIZED HEALTH CARE EDUCATION/TRAINING PROGRAM

## 2025-07-08 PROCEDURE — G0378 HOSPITAL OBSERVATION PER HR: HCPCS

## 2025-07-08 PROCEDURE — 82962 GLUCOSE BLOOD TEST: CPT

## 2025-07-08 PROCEDURE — 85027 COMPLETE CBC AUTOMATED: CPT | Performed by: STUDENT IN AN ORGANIZED HEALTH CARE EDUCATION/TRAINING PROGRAM

## 2025-07-08 PROCEDURE — 250N000013 HC RX MED GY IP 250 OP 250 PS 637: Performed by: STUDENT IN AN ORGANIZED HEALTH CARE EDUCATION/TRAINING PROGRAM

## 2025-07-08 PROCEDURE — 250N000009 HC RX 250: Performed by: STUDENT IN AN ORGANIZED HEALTH CARE EDUCATION/TRAINING PROGRAM

## 2025-07-08 PROCEDURE — 999N000156 HC STATISTIC RCP CONSULT EA 30 MIN

## 2025-07-08 PROCEDURE — 120N000001 HC R&B MED SURG/OB

## 2025-07-08 PROCEDURE — 99232 SBSQ HOSP IP/OBS MODERATE 35: CPT | Performed by: HOSPITALIST

## 2025-07-08 PROCEDURE — 83735 ASSAY OF MAGNESIUM: CPT | Performed by: STUDENT IN AN ORGANIZED HEALTH CARE EDUCATION/TRAINING PROGRAM

## 2025-07-08 PROCEDURE — 999N000157 HC STATISTIC RCP TIME EA 10 MIN

## 2025-07-08 RX ORDER — NICOTINE POLACRILEX 4 MG
15-30 LOZENGE BUCCAL
Status: DISCONTINUED | OUTPATIENT
Start: 2025-07-08 | End: 2025-07-12 | Stop reason: HOSPADM

## 2025-07-08 RX ORDER — DEXTROSE MONOHYDRATE 25 G/50ML
25-50 INJECTION, SOLUTION INTRAVENOUS
Status: DISCONTINUED | OUTPATIENT
Start: 2025-07-08 | End: 2025-07-12 | Stop reason: HOSPADM

## 2025-07-08 RX ADMIN — INSULIN ASPART 2 UNITS: 100 INJECTION, SOLUTION INTRAVENOUS; SUBCUTANEOUS at 14:09

## 2025-07-08 RX ADMIN — INSULIN GLARGINE 5 UNITS: 100 INJECTION, SOLUTION SUBCUTANEOUS at 10:10

## 2025-07-08 RX ADMIN — IPRATROPIUM BROMIDE AND ALBUTEROL SULFATE 3 ML: .5; 3 SOLUTION RESPIRATORY (INHALATION) at 08:01

## 2025-07-08 RX ADMIN — AMLODIPINE BESYLATE 10 MG: 5 TABLET ORAL at 19:40

## 2025-07-08 RX ADMIN — IPRATROPIUM BROMIDE AND ALBUTEROL SULFATE 3 ML: .5; 3 SOLUTION RESPIRATORY (INHALATION) at 15:45

## 2025-07-08 RX ADMIN — PREDNISONE 40 MG: 20 TABLET ORAL at 09:57

## 2025-07-08 RX ADMIN — FLUTICASONE FUROATE AND VILANTEROL TRIFENATATE 1 PUFF: 200; 25 POWDER RESPIRATORY (INHALATION) at 15:44

## 2025-07-08 RX ADMIN — IPRATROPIUM BROMIDE AND ALBUTEROL SULFATE 3 ML: .5; 3 SOLUTION RESPIRATORY (INHALATION) at 19:50

## 2025-07-08 RX ADMIN — ROSUVASTATIN CALCIUM 20 MG: 20 TABLET, FILM COATED ORAL at 19:40

## 2025-07-08 RX ADMIN — CALCIUM CARBONATE (ANTACID) CHEW TAB 500 MG 1000 MG: 500 CHEW TAB at 00:24

## 2025-07-08 RX ADMIN — LISINOPRIL AND HYDROCHLOROTHIAZIDE TABLETS 1 TABLET: 20; 12.5 TABLET ORAL at 09:57

## 2025-07-08 RX ADMIN — INSULIN ASPART 2 UNITS: 100 INJECTION, SOLUTION INTRAVENOUS; SUBCUTANEOUS at 10:10

## 2025-07-08 RX ADMIN — INSULIN ASPART 3 UNITS: 100 INJECTION, SOLUTION INTRAVENOUS; SUBCUTANEOUS at 17:53

## 2025-07-08 RX ADMIN — CALCIUM CARBONATE (ANTACID) CHEW TAB 500 MG 1000 MG: 500 CHEW TAB at 21:47

## 2025-07-08 RX ADMIN — ALLOPURINOL 300 MG: 300 TABLET ORAL at 09:57

## 2025-07-08 RX ADMIN — DOXYCYCLINE 100 MG: 100 INJECTION, POWDER, LYOPHILIZED, FOR SOLUTION INTRAVENOUS at 17:53

## 2025-07-08 RX ADMIN — IPRATROPIUM BROMIDE AND ALBUTEROL SULFATE 3 ML: .5; 3 SOLUTION RESPIRATORY (INHALATION) at 12:17

## 2025-07-08 RX ADMIN — DOXYCYCLINE 100 MG: 100 INJECTION, POWDER, LYOPHILIZED, FOR SOLUTION INTRAVENOUS at 05:44

## 2025-07-08 RX ADMIN — BUPROPION HYDROCHLORIDE 300 MG: 150 TABLET, EXTENDED RELEASE ORAL at 09:57

## 2025-07-08 ASSESSMENT — ACTIVITIES OF DAILY LIVING (ADL)
ADLS_ACUITY_SCORE: 40
ADLS_ACUITY_SCORE: 35
ADLS_ACUITY_SCORE: 40
ADLS_ACUITY_SCORE: 35
ADLS_ACUITY_SCORE: 40
ADLS_ACUITY_SCORE: 35
ADLS_ACUITY_SCORE: 40
ADLS_ACUITY_SCORE: 40
ADLS_ACUITY_SCORE: 35
ADLS_ACUITY_SCORE: 40
ADLS_ACUITY_SCORE: 35
ADLS_ACUITY_SCORE: 35

## 2025-07-08 NOTE — PLAN OF CARE
"Pertinent assessments: A&Ox4. On 2L NC. Dyspnea on exertion. LS are diminished with expiratory wheezing. Up independently. Tolerating a regular diet. Saline locked PIV. Voiding spontaneously.     Major Shift Events: new admit from ER.    Treatment Plan: Nebs, PO Prednisone, O2 support, IV doxycycline, and discharge pending.    Bedside Nurse: Fernando García RN    Problem: Adult Inpatient Plan of Care  Goal: Plan of Care Review  Description: The Plan of Care Review/Shift note should be completed every shift.  The Outcome Evaluation is a brief statement about your assessment that the patient is improving, declining, or no change.  This information will be displayed automatically on your shift  note.  Outcome: Progressing  Flowsheets (Taken 7/7/2025 2226)  Outcome Evaluation: Continue plan of care.  Plan of Care Reviewed With: patient  Overall Patient Progress: no change  Goal: Patient-Specific Goal (Individualized)  Description: You can add care plan individualizations to a care plan. Examples of Individualization might be:  \"Parent requests to be called daily at 9am for status\", \"I have a hard time hearing out of my right ear\", or \"Do not touch me to wake me up as it startles  me\".  Outcome: Progressing  Goal: Absence of Hospital-Acquired Illness or Injury  Outcome: Progressing  Intervention: Identify and Manage Fall Risk  Recent Flowsheet Documentation  Taken 7/7/2025 1931 by Fernando García RN  Safety Promotion/Fall Prevention:   nonskid shoes/slippers when out of bed   patient and family education   safety round/check completed   treat reversible contributory factors  Intervention: Prevent Skin Injury  Recent Flowsheet Documentation  Taken 7/7/2025 1931 by Fernando García RN  Body Position: position changed independently  Intervention: Prevent Infection  Recent Flowsheet Documentation  Taken 7/7/2025 1931 by Fernando García RN  Infection Prevention:   cohorting utilized   hand hygiene promoted   rest/sleep " promoted   single patient room provided  Goal: Optimal Comfort and Wellbeing  Outcome: Progressing  Goal: Readiness for Transition of Care  Outcome: Progressing  Intervention: Mutually Develop Transition Plan  Recent Flowsheet Documentation  Taken 7/7/2025 1904 by Fernando García, RN  Equipment Currently Used at Home: (oxygen)   walker, rolling   other (see comments)     Goal Outcome Evaluation:      Plan of Care Reviewed With: patient    Overall Patient Progress: no change    Overall Patient Progress: no change    Outcome Evaluation: Continue plan of care.

## 2025-07-08 NOTE — PROVIDER NOTIFICATION
"   07/08/25 0801   RCAT Assessment   Reason for Assessment COPD   Pulmonary Status 2   Surgical Status 0   Chest X-ray 1   Respiratory Pattern 0   Mental Status 0   Breath Sounds 2   Cough Effectiveness 0   Level of Activity 0   O2 Required for SpO2>=92% 1   Acuity Level (points) 6   Acuity Level  4   Re-eval Interval Guideline Every 3 days   Re-evaluation Date 07/11/25   $RT Consult Time Spent RCAT (30 minute increments) 1   Vital Capacity   Vital Capacity (mL) 2000   Patient Position (VC) semi-Patel's   Effort (VC) good   Clinical Indications/Symptoms   Aerosol Therapy History of bronchospasm;Home regimen;RCAT protocol   Broncho-pulmonary Hygiene History of mucous producing disease   Volume Expansion Decreased breath sounds   Aerosol Therapy Plan   RT Treatment Nebulizer   Aerosol Therapy (SVN)   Respiratory Treatment Status (SVN) given   Patient Position HOB elevated   Posttreatment Assessment (SVN) breath sounds improved   Signs of Intolerance (SVN) none   Broncho-Pulmonary Hygiene Plan   Broncho-Pulmonary Hygiene Treatment Coughing techniques   Broncho-Pulm Hygiene Frequency Acuity Level 4: BID-Unable to deep breathe & cough spontaneously   Volume Expansion Plan   Volume Expansion Treatment Incentive Spirometer   Volume Expansion Frequency Acuity Level 5: Incentive Spirometry-Able to perform well on their own   Breath Sounds   Breath Sounds All Fields   All Lung Fields Breath Sounds diminished     Patient scores Q6 PRN, requests QID scheduled as he feels he needs them for this exacerbation. IS 2000 ml, Cough and deep breathing independently.   On 2 lpm NC, does not wear O2 PTA.     /73 (BP Location: Right arm)   Pulse 87   Temp 97.7  F (36.5  C) (Oral)   Resp 18   Ht 1.778 m (5' 10\")   Wt 91.2 kg (201 lb 1 oz)   SpO2 94%   BMI 28.85 kg/m      "

## 2025-07-08 NOTE — PLAN OF CARE
A&Ox4. Up independent in room. Requiring 4L oxygen, unable to wean. BG elevated, coverage given as ordered. Continuing prednisone and doxy. Discharge pending.     1900: Dinner , 3 units given per orders. Recheck BG at 1900 400, Dr. Taylor notified.     Problem: Adult Inpatient Plan of Care  Goal: Plan of Care Review  Description: The Plan of Care Review/Shift note should be completed every shift.  The Outcome Evaluation is a brief statement about your assessment that the patient is improving, declining, or no change.  This information will be displayed automatically on your shift  note.  Outcome: Not Progressing  Flowsheets (Taken 7/8/2025 6526)  Outcome Evaluation: On 4L oxygen. Continuing IV doxy. Insulin added today.  Overall Patient Progress: no change  Goal: Absence of Hospital-Acquired Illness or Injury  Outcome: Not Progressing  Goal: Optimal Comfort and Wellbeing  Outcome: Not Progressing  Goal: Readiness for Transition of Care  Outcome: Not Progressing     Problem: Delirium  Goal: Optimal Coping  Outcome: Not Progressing  Goal: Improved Behavioral Control  Outcome: Not Progressing  Goal: Improved Attention and Thought Clarity  Outcome: Not Progressing  Goal: Improved Sleep  Outcome: Not Progressing     Problem: Comorbidity Management  Goal: Maintenance of COPD Symptom Control  Outcome: Not Progressing  Goal: Blood Pressure in Desired Range  Outcome: Not Progressing     Goal Outcome Evaluation:        Overall Patient Progress: no changeOverall Patient Progress: no change    Outcome Evaluation: On 4L oxygen. Continuing IV doxy. Insulin added today.

## 2025-07-08 NOTE — PLAN OF CARE
"Goal Outcome Evaluation:  For vital signs and complete assessments, please see documentation flowsheets.     [id number]  Pertinent assessments: Pt A&Ox4. Ind in room. On 1LNC. Afebrile. VSS. Denies pain, nausea, & SOB. PIV saline locked.    Major Shift Events: None    Treatment Plan: Monitor respiratory status. O2 support/wean O2. IV Doxy. Nebs. Prednisone. Discharge TBD.    Bedside Nurse: Mihir Duran RN     Problem: Adult Inpatient Plan of Care  Goal: Plan of Care Review  Description: The Plan of Care Review/Shift note should be completed every shift.  The Outcome Evaluation is a brief statement about your assessment that the patient is improving, declining, or no change.  This information will be displayed automatically on your shift  note.  Outcome: Progressing  Flowsheets (Taken 7/8/2025 0524)  Outcome Evaluation: O2 support & abx continued.  Overall Patient Progress: improving  Goal: Patient-Specific Goal (Individualized)  Description: You can add care plan individualizations to a care plan. Examples of Individualization might be:  \"Parent requests to be called daily at 9am for status\", \"I have a hard time hearing out of my right ear\", or \"Do not touch me to wake me up as it startles  me\".  Outcome: Progressing  Goal: Absence of Hospital-Acquired Illness or Injury  Outcome: Progressing  Intervention: Identify and Manage Fall Risk  Recent Flowsheet Documentation  Taken 7/8/2025 0009 by Mihir Duran, RN  Safety Promotion/Fall Prevention:   clutter free environment maintained   lighting adjusted   patient and family education   room near nurse's station   room organization consistent   safety round/check completed   treat reversible contributory factors  Intervention: Prevent Skin Injury  Recent Flowsheet Documentation  Taken 7/8/2025 0009 by Mihir Duran, RN  Body Position: position changed independently  Intervention: Prevent Infection  Recent Flowsheet Documentation  Taken 7/8/2025 0009 by Roger, " Mihir HENRIQUEZ RN  Infection Prevention:   cohorting utilized   hand hygiene promoted   rest/sleep promoted   single patient room provided  Goal: Optimal Comfort and Wellbeing  Outcome: Progressing  Goal: Readiness for Transition of Care  Outcome: Progressing     Problem: Delirium  Goal: Optimal Coping  Outcome: Progressing  Goal: Improved Behavioral Control  Outcome: Progressing  Intervention: Minimize Safety Risk  Recent Flowsheet Documentation  Taken 7/8/2025 0009 by Mihir Duran RN  Enhanced Safety Measures: room near unit station  Goal: Improved Attention and Thought Clarity  Outcome: Progressing  Goal: Improved Sleep  Outcome: Progressing     Problem: Comorbidity Management  Goal: Maintenance of COPD Symptom Control  Outcome: Progressing  Intervention: Maintain COPD Symptom Control  Recent Flowsheet Documentation  Taken 7/8/2025 0009 by Mihir Duran RN  Medication Review/Management: medications reviewed  Goal: Blood Pressure in Desired Range  Outcome: Progressing  Intervention: Maintain Blood Pressure Management  Recent Flowsheet Documentation  Taken 7/8/2025 0009 by Mihir Duran RN  Medication Review/Management: medications reviewed     Overall Patient Progress: improvingOverall Patient Progress: improving    Outcome Evaluation: O2 support & abx continued.

## 2025-07-08 NOTE — PROGRESS NOTES
Hospitalist Progress Note             Date of Admission:  7/7/2025                   Day of hospitalization: 1    Assessment and Plan:   Gabe Smith is a 70 year old male with PMH significant for emphysema, thoracic aortic aneurysm s/p repair admitted on 7/7/2025 with shortness of breath.      Acute COPD exacerbation  Acute hypoxemic respiratory failure:  Known history of emphysema in the setting of tobacco use.  Presents with 1 week of shortness of breath.  This is primarily with exertion.  He feels largely well at rest.  He does endorse some sputum change during this time.  No fevers, chills, or symptoms otherwise.  86-88% o2 with ambulation requiring 2L NC.  CXR without infiltrate or edema.  Covid, rsv, influenza negative.   -Given solumedrol, mag, duonebs, doxycycline in the ED  -Continue prednisone 40 mg daily  -Continue duoneb q4h WA  -PTA scheduled inhalers  -Continue doxycycline   -Continuous oximetry, supplemental o2 prn, wean as tolerated     Diabetes mellitus type 2  -Noted to have hyperglycemia with a hemoglobin A1c of 6.5  -Patient on steroid therapy will start Lantus 5 units along with insulin sliding scale while in house    Troponin elevation:  53 ---> 41.  EKG without ischemic change.  Unchanged from prior.  Patient denies chest pain.  Suspect demand ischemia from hypoxemia at home.      Tobacco use disorder:  Previous heavy use.  Has cut back significantly in the recent months.  20 cigarettes in the past 2-3 months.  None for the past 4 days.  Declined NRT.      HTN: PTA meds once med rec completed  HLD: PTA statin  Gout: PTA allopurinol     AAA  Thoracic aortic aneurysm s/p repair: Noted        # Code status: Full   # DVT: SCD  # IVF: None            Medically Ready for Discharge: Anticipated Tomorrow                    Soo Wylie MD    Subjective   Chief Complaint:  Shortness of breath   patient does not feel he is ready for discharge today states he has  "activity endurance is still not back to baseline.  Otherwise no chest pain positive shortness of breath           Objective   BP (!) 144/77 (BP Location: Right arm)   Pulse 92   Temp 98.5  F (36.9  C) (Oral)   Resp 20   Ht 1.778 m (5' 10\")   Wt 91.2 kg (201 lb 1 oz)   SpO2 92%   BMI 28.85 kg/m       Physical Exam  General: Pt in NAD, normal appearance  HEENT: OP clear MMM, no JVD  Lungs: Clear to Auscultation Bilateral, normal breathing  without accessory muscle usage, no wheezing, rhonchi or crackles  Cardiac: +S1, S2, RRR, no MRG, no edema  Abdominal: normal bowel sounds, NT/ND  Skin: warm, dry, normal turgor, no rash  Psyche: A& O x3, appropriate affect           No intake or output data in the 24 hours ending 07/08/25 1349        Labs and Imaging Results:      Recent Labs   Lab 07/08/25  0741 07/07/25  1302   WBC 11.5* 11.0   HGB 15.2 15.6    203        Recent Labs   Lab 07/08/25  0741 07/07/25  1302   * 137   CO2 24 26   BUN 27.8* 23.6*      No results for input(s): \"INR\", \"PTT\" in the last 168 hours.   No results for input(s): \"CKMB\" in the last 168 hours.    Invalid input(s): \"TROPONINT\"   No results for input(s): \"ALBUMIN\", \"AST\", \"ALT\", \"ALKPHOS\", \"BILITOT\" in the last 168 hours.     Micro:     Radio:  Chest XR,  PA & LAT   Final Result   IMPRESSION: Stable size of cardiomediastinal silhouette with thoracic aortic stent graft again noted. No focal airspace consolidation, pleural effusion or pneumothorax. Bones are unchanged.              Medications:      Scheduled Meds:    Current Facility-Administered Medications   Medication Dose Route Frequency Provider Last Rate Last Admin    allopurinol (ZYLOPRIM) tablet 300 mg  300 mg Oral Daily Jatinder Rangel DO   300 mg at 07/08/25 0957    amLODIPine (NORVASC) tablet 10 mg  10 mg Oral QPM Jatinder Rangel DO   10 mg at 07/07/25 1926    buPROPion (WELLBUTRIN XL) 24 hr tablet 300 mg  300 mg Oral Jatinder Browning DO   300 mg at 07/08/25 0957 "    doxycycline (VIBRAMYCIN) 100 mg vial to attach to  mL bag  100 mg Intravenous Q12H Jatinder Rangel DO   100 mg at 07/08/25 0544    fluticasone-vilanterol (BREO ELLIPTA) 200-25 MCG/ACT inhaler 1 puff  1 puff Inhalation Daily Jatinder Rangel DO        insulin aspart (NovoLOG) injection (RAPID ACTING)  1-3 Units Subcutaneous TID AC Soo Wylie MD   2 Units at 07/08/25 1010    insulin aspart (NovoLOG) injection (RAPID ACTING)  1-3 Units Subcutaneous At Bedtime Soo Wylie MD        insulin glargine (LANTUS PEN) injection 5 Units  5 Units Subcutaneous QAM AC Soo Wylie MD   5 Units at 07/08/25 1010    ipratropium - albuterol 0.5 mg/2.5 mg (3mg)/3 mL (DUONEB) neb solution 3 mL  3 mL Nebulization Q4H WA Jatinder Rangel DO   3 mL at 07/08/25 1217    lisinopril-hydrochlorothiazide (ZESTORETIC) 20-12.5 MG per tablet 1 tablet  1 tablet Oral QAM Jatinder Rangel DO   1 tablet at 07/08/25 0957    predniSONE (DELTASONE) tablet 40 mg  40 mg Oral Daily Jatinder Rangel DO   40 mg at 07/08/25 0957    rosuvastatin (CRESTOR) tablet 20 mg  20 mg Oral QPM Jatinder Rangel DO   20 mg at 07/07/25 1926    sodium chloride (PF) 0.9% PF flush 3 mL  3 mL Intracatheter Q8H [facility] Jatinder Rangel DO   3 mL at 07/08/25 0545    [Held by provider] umeclidinium (INCRUSE ELLIPTA) 62.5 MCG/ACT inhaler 1 puff  1 puff Inhalation Daily Jatinder Rangel DO         Continuous Infusions:    Current Facility-Administered Medications   Medication Dose Route Frequency Provider Last Rate Last Admin     PRN Meds:    Current Facility-Administered Medications   Medication Dose Route Frequency Provider Last Rate Last Admin    acetaminophen (TYLENOL) tablet 650 mg  650 mg Oral Q4H PRN Jatinder Rangel DO        Or    acetaminophen (TYLENOL) Suppository 650 mg  650 mg Rectal Q4H PRN Jatinder Rangel DO        albuterol (PROVENTIL) neb solution 2.5 mg  2.5 mg Nebulization Q2H PRN Jatinder Rangel DO        calcium carbonate (TUMS)  chewable tablet 1,000 mg  1,000 mg Oral 4x Daily PRN Jatinder Rangel DO   1,000 mg at 07/08/25 0024    glucose gel 15-30 g  15-30 g Oral Q15 Min PRN Soo Wylie MD        Or    dextrose 50 % injection 25-50 mL  25-50 mL Intravenous Q15 Min PRN Soo Wylie MD        Or    glucagon injection 1 mg  1 mg Subcutaneous Q15 Min PRN Soo Wylie MD        lidocaine (LMX4) cream   Topical Q1H PRN Jatinder Rangel DO        lidocaine 1 % 0.1-1 mL  0.1-1 mL Other Q1H PRN Jatinder Rangel DO        melatonin tablet 1 mg  1 mg Oral At Bedtime PRN Jatinder Rangel DO        ondansetron (ZOFRAN ODT) ODT tab 4 mg  4 mg Oral Q6H PRN Jatinder Rangel DO        Or    ondansetron (ZOFRAN) injection 4 mg  4 mg Intravenous Q6H PRN Jatinder Rangel DO        prochlorperazine (COMPAZINE) injection 5 mg  5 mg Intravenous Q6H PRN Jatinder Rangel DO        Or    prochlorperazine (COMPAZINE) tablet 5 mg  5 mg Oral Q6H PRN Jatinder Rangel DO        senna-docusate (SENOKOT-S/PERICOLACE) 8.6-50 MG per tablet 1 tablet  1 tablet Oral BID PRN Jatinder Rangel DO        Or    senna-docusate (SENOKOT-S/PERICOLACE) 8.6-50 MG per tablet 2 tablet  2 tablet Oral BID PRN Jatinder Rangel DO        sodium chloride (PF) 0.9% PF flush 3 mL  3 mL Intracatheter q1 min prn Jatinder Rangel DO   3 mL at 07/08/25 0011

## 2025-07-09 LAB
C PNEUM DNA SPEC QL NAA+PROBE: NOT DETECTED
FLUAV H1 2009 PAND RNA SPEC QL NAA+PROBE: NOT DETECTED
FLUAV H1 RNA SPEC QL NAA+PROBE: NOT DETECTED
FLUAV H3 RNA SPEC QL NAA+PROBE: NOT DETECTED
FLUAV RNA SPEC QL NAA+PROBE: NOT DETECTED
FLUBV RNA SPEC QL NAA+PROBE: NOT DETECTED
GLUCOSE BLDC GLUCOMTR-MCNC: 172 MG/DL (ref 70–99)
GLUCOSE BLDC GLUCOMTR-MCNC: 229 MG/DL (ref 70–99)
GLUCOSE BLDC GLUCOMTR-MCNC: 279 MG/DL (ref 70–99)
GLUCOSE BLDC GLUCOMTR-MCNC: 301 MG/DL (ref 70–99)
GLUCOSE BLDC GLUCOMTR-MCNC: 325 MG/DL (ref 70–99)
HADV DNA SPEC QL NAA+PROBE: NOT DETECTED
HCOV PNL SPEC NAA+PROBE: NOT DETECTED
HMPV RNA SPEC QL NAA+PROBE: NOT DETECTED
HOLD SPECIMEN: NORMAL
HPIV1 RNA SPEC QL NAA+PROBE: NOT DETECTED
HPIV2 RNA SPEC QL NAA+PROBE: NOT DETECTED
HPIV3 RNA SPEC QL NAA+PROBE: NOT DETECTED
HPIV4 RNA SPEC QL NAA+PROBE: NOT DETECTED
M PNEUMO DNA SPEC QL NAA+PROBE: NOT DETECTED
MAGNESIUM SERPL-MCNC: 1.9 MG/DL (ref 1.7–2.3)
POTASSIUM SERPL-SCNC: 4.6 MMOL/L (ref 3.4–5.3)
RSV RNA SPEC QL NAA+PROBE: NOT DETECTED
RSV RNA SPEC QL NAA+PROBE: NOT DETECTED
RV+EV RNA SPEC QL NAA+PROBE: DETECTED

## 2025-07-09 PROCEDURE — 250N000011 HC RX IP 250 OP 636: Performed by: STUDENT IN AN ORGANIZED HEALTH CARE EDUCATION/TRAINING PROGRAM

## 2025-07-09 PROCEDURE — 87581 M.PNEUMON DNA AMP PROBE: CPT | Performed by: HOSPITALIST

## 2025-07-09 PROCEDURE — 36415 COLL VENOUS BLD VENIPUNCTURE: CPT | Performed by: STUDENT IN AN ORGANIZED HEALTH CARE EDUCATION/TRAINING PROGRAM

## 2025-07-09 PROCEDURE — G0378 HOSPITAL OBSERVATION PER HR: HCPCS

## 2025-07-09 PROCEDURE — 250N000012 HC RX MED GY IP 250 OP 636 PS 637: Performed by: STUDENT IN AN ORGANIZED HEALTH CARE EDUCATION/TRAINING PROGRAM

## 2025-07-09 PROCEDURE — 250N000009 HC RX 250: Performed by: STUDENT IN AN ORGANIZED HEALTH CARE EDUCATION/TRAINING PROGRAM

## 2025-07-09 PROCEDURE — 250N000013 HC RX MED GY IP 250 OP 250 PS 637: Performed by: HOSPITALIST

## 2025-07-09 PROCEDURE — 82962 GLUCOSE BLOOD TEST: CPT

## 2025-07-09 PROCEDURE — 94640 AIRWAY INHALATION TREATMENT: CPT

## 2025-07-09 PROCEDURE — 120N000001 HC R&B MED SURG/OB

## 2025-07-09 PROCEDURE — 84132 ASSAY OF SERUM POTASSIUM: CPT | Performed by: STUDENT IN AN ORGANIZED HEALTH CARE EDUCATION/TRAINING PROGRAM

## 2025-07-09 PROCEDURE — 250N000013 HC RX MED GY IP 250 OP 250 PS 637: Performed by: STUDENT IN AN ORGANIZED HEALTH CARE EDUCATION/TRAINING PROGRAM

## 2025-07-09 PROCEDURE — 999N000157 HC STATISTIC RCP TIME EA 10 MIN

## 2025-07-09 PROCEDURE — 83735 ASSAY OF MAGNESIUM: CPT | Performed by: STUDENT IN AN ORGANIZED HEALTH CARE EDUCATION/TRAINING PROGRAM

## 2025-07-09 PROCEDURE — 99232 SBSQ HOSP IP/OBS MODERATE 35: CPT | Performed by: HOSPITALIST

## 2025-07-09 PROCEDURE — 94640 AIRWAY INHALATION TREATMENT: CPT | Mod: 76

## 2025-07-09 RX ORDER — DEXTROSE MONOHYDRATE 25 G/50ML
25-50 INJECTION, SOLUTION INTRAVENOUS
Status: DISCONTINUED | OUTPATIENT
Start: 2025-07-09 | End: 2025-07-09

## 2025-07-09 RX ORDER — NICOTINE POLACRILEX 4 MG
15-30 LOZENGE BUCCAL
Status: DISCONTINUED | OUTPATIENT
Start: 2025-07-09 | End: 2025-07-09

## 2025-07-09 RX ADMIN — ALLOPURINOL 300 MG: 300 TABLET ORAL at 08:54

## 2025-07-09 RX ADMIN — AMLODIPINE BESYLATE 10 MG: 5 TABLET ORAL at 20:04

## 2025-07-09 RX ADMIN — ROSUVASTATIN CALCIUM 20 MG: 20 TABLET, FILM COATED ORAL at 20:03

## 2025-07-09 RX ADMIN — IPRATROPIUM BROMIDE AND ALBUTEROL SULFATE 3 ML: .5; 3 SOLUTION RESPIRATORY (INHALATION) at 07:56

## 2025-07-09 RX ADMIN — FLUTICASONE FUROATE AND VILANTEROL TRIFENATATE 1 PUFF: 200; 25 POWDER RESPIRATORY (INHALATION) at 07:56

## 2025-07-09 RX ADMIN — DOXYCYCLINE 100 MG: 100 INJECTION, POWDER, LYOPHILIZED, FOR SOLUTION INTRAVENOUS at 17:57

## 2025-07-09 RX ADMIN — PREDNISONE 40 MG: 20 TABLET ORAL at 08:54

## 2025-07-09 RX ADMIN — DOXYCYCLINE 100 MG: 100 INJECTION, POWDER, LYOPHILIZED, FOR SOLUTION INTRAVENOUS at 05:57

## 2025-07-09 RX ADMIN — IPRATROPIUM BROMIDE AND ALBUTEROL SULFATE 3 ML: .5; 3 SOLUTION RESPIRATORY (INHALATION) at 11:27

## 2025-07-09 RX ADMIN — IPRATROPIUM BROMIDE AND ALBUTEROL SULFATE 3 ML: .5; 3 SOLUTION RESPIRATORY (INHALATION) at 15:28

## 2025-07-09 RX ADMIN — BUPROPION HYDROCHLORIDE 300 MG: 150 TABLET, EXTENDED RELEASE ORAL at 08:54

## 2025-07-09 RX ADMIN — LISINOPRIL AND HYDROCHLOROTHIAZIDE TABLETS 1 TABLET: 20; 12.5 TABLET ORAL at 08:54

## 2025-07-09 RX ADMIN — Medication 1 LOZENGE: at 14:36

## 2025-07-09 RX ADMIN — IPRATROPIUM BROMIDE AND ALBUTEROL SULFATE 3 ML: .5; 3 SOLUTION RESPIRATORY (INHALATION) at 19:36

## 2025-07-09 NOTE — PLAN OF CARE
"Pertinent assessments: Pt A&O, currently on 4L NC, denies any pain. BG elevated, additional NovoLog x 2 given, last BG check 229    Major Shift Events: uneventful     Treatment Plan: Prednisone, IV Doxycycline, wean from O2     Problem: Adult Inpatient Plan of Care  Goal: Plan of Care Review  Description: The Plan of Care Review/Shift note should be completed every shift.  The Outcome Evaluation is a brief statement about your assessment that the patient is improving, declining, or no change.  This information will be displayed automatically on your shift  note.  Outcome: Progressing  Flowsheets (Taken 7/9/2025 0750)  Outcome Evaluation: continues on 4L, deneis pain, IV doxycycline  Plan of Care Reviewed With: patient  Overall Patient Progress: no change  Goal: Patient-Specific Goal (Individualized)  Description: You can add care plan individualizations to a care plan. Examples of Individualization might be:  \"Parent requests to be called daily at 9am for status\", \"I have a hard time hearing out of my right ear\", or \"Do not touch me to wake me up as it startles  me\".  Outcome: Progressing  Goal: Absence of Hospital-Acquired Illness or Injury  Outcome: Progressing  Intervention: Identify and Manage Fall Risk  Recent Flowsheet Documentation  Taken 7/8/2025 1950 by Leigh Ann Dempsey RN  Safety Promotion/Fall Prevention:   clutter free environment maintained   safety round/check completed  Intervention: Prevent and Manage VTE (Venous Thromboembolism) Risk  Recent Flowsheet Documentation  Taken 7/8/2025 1950 by Leigh Ann Dempsey, RN  VTE Prevention/Management:   SCDs off (sequential compression devices)   patient refused intervention  Goal: Optimal Comfort and Wellbeing  Outcome: Progressing  Intervention: Provide Person-Centered Care  Recent Flowsheet Documentation  Taken 7/8/2025 1950 by Leigh Ann Dempsey, RN  Trust Relationship/Rapport:   care explained   choices provided  Goal: Readiness for Transition of Care  Outcome: " Progressing     Problem: Delirium  Goal: Optimal Coping  Outcome: Progressing  Goal: Improved Behavioral Control  Outcome: Progressing  Intervention: Minimize Safety Risk  Recent Flowsheet Documentation  Taken 7/8/2025 1950 by Leigh Ann Dempsey RN  Enhanced Safety Measures: review medications for side effects with activity  Trust Relationship/Rapport:   care explained   choices provided  Goal: Improved Attention and Thought Clarity  Outcome: Progressing  Goal: Improved Sleep  Outcome: Progressing     Problem: Comorbidity Management  Goal: Maintenance of COPD Symptom Control  Outcome: Progressing  Intervention: Maintain COPD Symptom Control  Recent Flowsheet Documentation  Taken 7/8/2025 1950 by Leigh Ann Dempsey RN  Medication Review/Management: medications reviewed  Goal: Blood Pressure in Desired Range  Outcome: Progressing  Intervention: Maintain Blood Pressure Management  Recent Flowsheet Documentation  Taken 7/8/2025 1950 by Leigh Ann Dempsey RN  Medication Review/Management: medications reviewed   Goal Outcome Evaluation:      Plan of Care Reviewed With: patient    Overall Patient Progress: no changeOverall Patient Progress: no change    Outcome Evaluation: continues on 4L, deneis pain, IV doxycycline

## 2025-07-09 NOTE — PROGRESS NOTES
[facility]    Hospitalist Progress Note             Date of Admission:  7/7/2025                   Day of hospitalization: 1    Assessment and Plan:   Gabe Smith is a 70 year old male with PMH significant for emphysema, thoracic aortic aneurysm s/p repair admitted on 7/7/2025 with shortness of breath.      Acute COPD exacerbation  Acute hypoxemic respiratory failure:  Known history of emphysema in the setting of tobacco use.  Presents with 1 week of shortness of breath.  This is primarily with exertion.  He feels largely well at rest.  He does endorse some sputum change during this time.  No fevers, chills, or symptoms otherwise.  86-88% o2 with ambulation requiring 2L NC.  CXR without infiltrate or edema.  Covid, rsv, influenza negative.   -Given solumedrol, mag, duonebs, doxycycline in the ED  -With symptoms of now sore throat, stuffy nose.  Likely symptoms are viral in etiology  -Respiratory viral panel ordered  -Continue prednisone 40 mg daily  -Continue duoneb q4h WA  -PTA scheduled inhalers  -Continue doxycycline   -Continuous oximetry, supplemental o2 prn, wean as tolerated     Diabetes mellitus type 2  -Noted to have hyperglycemia with a hemoglobin A1c of 6.5  - Hyperglycemic in the evening  - Lantus increased to 10 units and changed to medium sliding scale    Troponin elevation:  53 ---> 41.  EKG without ischemic change.  Unchanged from prior.  Patient denies chest pain.  Suspect demand ischemia from hypoxemia at home.      Tobacco use disorder:  Previous heavy use.  Has cut back significantly in the recent months.  20 cigarettes in the past 2-3 months.  None for the past 4 days.  Declined NRT.      HTN: PTA meds once med rec completed  HLD: PTA statin  Gout: PTA allopurinol     AAA  Thoracic aortic aneurysm s/p repair: Noted        # Code status: Full   # DVT: SCD  # IVF: None            Medically Ready for Discharge: Anticipated Tomorrow                    Soo Wylie  "MD    Subjective   Chief Complaint:  Shortness of breath  She feels worse today has sore throat along with stuffy nose.  Still has shortness of breath as well.       Objective   /65 (BP Location: Right arm)   Pulse 77   Temp 97.5  F (36.4  C) (Oral)   Resp 20   Ht 1.778 m (5' 10\")   Wt 91.2 kg (201 lb 1 oz)   SpO2 93%   BMI 28.85 kg/m       Physical Exam  General: Pt in NAD, normal appearance  HEENT: OP clear MMM, no JVD  Lungs: Clear to Auscultation Bilateral, normal breathing  without accessory muscle usage, no wheezing, rhonchi or crackles  Cardiac: +S1, S2, RRR, no MRG, no edema  Abdominal: normal bowel sounds, NT/ND  Skin: warm, dry, normal turgor, no rash  Psyche: A& O x3, appropriate affect           No intake or output data in the 24 hours ending 07/08/25 1349        Labs and Imaging Results:      Recent Labs   Lab 07/08/25  0741 07/07/25  1302   WBC 11.5* 11.0   HGB 15.2 15.6    203        Recent Labs   Lab 07/08/25  0741 07/07/25  1302   * 137   CO2 24 26   BUN 27.8* 23.6*      No results for input(s): \"INR\", \"PTT\" in the last 168 hours.   No results for input(s): \"CKMB\" in the last 168 hours.    Invalid input(s): \"TROPONINT\"   No results for input(s): \"ALBUMIN\", \"AST\", \"ALT\", \"ALKPHOS\", \"BILITOT\" in the last 168 hours.     Micro:     Radio:  Chest XR,  PA & LAT   Final Result   IMPRESSION: Stable size of cardiomediastinal silhouette with thoracic aortic stent graft again noted. No focal airspace consolidation, pleural effusion or pneumothorax. Bones are unchanged.              Medications:      Scheduled Meds:    Current Facility-Administered Medications   Medication Dose Route Frequency Provider Last Rate Last Admin    allopurinol (ZYLOPRIM) tablet 300 mg  300 mg Oral Daily Jatinder Rangel DO   300 mg at 07/09/25 0854    amLODIPine (NORVASC) tablet 10 mg  10 mg Oral QPM Jatinder Rangel DO   10 mg at 07/08/25 1940    buPROPion (WELLBUTRIN XL) 24 hr tablet 300 mg  300 mg Oral " QA Jatinder Rangel DO   300 mg at 07/09/25 0854    doxycycline (VIBRAMYCIN) 100 mg vial to attach to  mL bag  100 mg Intravenous Q12H Jatinder Rangel DO   100 mg at 07/09/25 0557    fluticasone-vilanterol (BREO ELLIPTA) 200-25 MCG/ACT inhaler 1 puff  1 puff Inhalation Daily Jatinder Rangel DO   1 puff at 07/09/25 0756    insulin aspart (NovoLOG) injection (RAPID ACTING)  1-7 Units Subcutaneous TID  Soo Wylie MD   3 Units at 07/09/25 1329    insulin aspart (NovoLOG) injection (RAPID ACTING)  1-5 Units Subcutaneous At Bedtime Soo Wylie MD        insulin glargine (LANTUS PEN) injection 10 Units  10 Units Subcutaneous [facility] Soo Wylie MD   10 Units at 07/09/25 0914    ipratropium - albuterol 0.5 mg/2.5 mg (3mg)/3 mL (DUONEB) neb solution 3 mL  3 mL Nebulization Q4H WA Jatinder Rangel DO   3 mL at 07/09/25 1127    lisinopril-hydrochlorothiazide (ZESTORETIC) 20-12.5 MG per tablet 1 tablet  1 tablet Oral [place] Jatinder Rangel DO   1 tablet at 07/09/25 0854    predniSONE (DELTASONE) tablet 40 mg  40 mg Oral Daily Jatinder Rangel DO   40 mg at 07/09/25 0854    rosuvastatin (CRESTOR) tablet 20 mg  20 mg Oral QPM Jatinder Rangel DO   20 mg at 07/08/25 1940    sodium chloride (PF) 0.9% PF flush 3 mL  3 mL Intracatheter Q8H [facility] Jatinder Rangel DO   3 mL at 07/09/25 0557    [Held by provider] umeclidinium (INCRUSE ELLIPTA) 62.5 MCG/ACT inhaler 1 puff  1 puff Inhalation Daily Jatinder Rangel DO         Continuous Infusions:    Current Facility-Administered Medications   Medication Dose Route Frequency Provider Last Rate Last Admin     PRN Meds:    Current Facility-Administered Medications   Medication Dose Route Frequency Provider Last Rate Last Admin    acetaminophen (TYLENOL) tablet 650 mg  650 mg Oral Q4H PRN Jatinder Rangel DO        Or    acetaminophen (TYLENOL) Suppository 650 mg  650 mg Rectal Q4H PRN Jatinder Rangel DO        albuterol (PROVENTIL) neb solution 2.5 mg  2.5 mg  Nebulization Q2H PRN Jatinder Rangel DO        benzocaine-menthol (CHLORASEPTIC) 6-10 MG lozenge 1 lozenge  1 lozenge Buccal Q1H PRN Soo Wylie MD   1 lozenge at 07/09/25 1436    calcium carbonate (TUMS) chewable tablet 1,000 mg  1,000 mg Oral 4x Daily PRN Jatinder Rangel DO   1,000 mg at 07/08/25 2147    glucose gel 15-30 g  15-30 g Oral Q15 Min PRN Soo Wylie MD        Or    dextrose 50 % injection 25-50 mL  25-50 mL Intravenous Q15 Min PRN Soo Wylie MD        Or    glucagon injection 1 mg  1 mg Subcutaneous Q15 Min PRN Soo Wylie MD        lidocaine (LMX4) cream   Topical Q1H PRN Jatinder Rangel DO        lidocaine 1 % 0.1-1 mL  0.1-1 mL Other Q1H PRN Jatinder Rangel DO        melatonin tablet 1 mg  1 mg Oral At Bedtime PRN Jatinder Rangel DO        ondansetron (ZOFRAN ODT) ODT tab 4 mg  4 mg Oral Q6H PRN Jatinder Rangel DO        Or    ondansetron (ZOFRAN) injection 4 mg  4 mg Intravenous Q6H PRN Jatinder Rangel DO        prochlorperazine (COMPAZINE) injection 5 mg  5 mg Intravenous Q6H PRN Jatinder Rangel DO        Or    prochlorperazine (COMPAZINE) tablet 5 mg  5 mg Oral Q6H PRN Jatinder Rangel DO        senna-docusate (SENOKOT-S/PERICOLACE) 8.6-50 MG per tablet 1 tablet  1 tablet Oral BID PRN Jatinder Rangel DO        Or    senna-docusate (SENOKOT-S/PERICOLACE) 8.6-50 MG per tablet 2 tablet  2 tablet Oral BID PRN Jatinder Rangel DO        sodium chloride (PF) 0.9% PF flush 3 mL  3 mL Intracatheter q1 min prn Jatinder Rangel DO   3 mL at 07/08/25 9031

## 2025-07-09 NOTE — PROGRESS NOTES
Paged regarding hyperglycemia.  Here with COPD exacerbation and receiving prednisone.  Likely steroid-induced hyperglycemia, received Lantus and aspart earlier in the day but sugars continue to rise.  Will give an additional 5 units of aspart and recheck BG.      Kam Taylor MD

## 2025-07-10 VITALS
TEMPERATURE: 98.4 F | OXYGEN SATURATION: 96 % | HEIGHT: 70 IN | HEART RATE: 65 BPM | WEIGHT: 201.06 LBS | BODY MASS INDEX: 28.78 KG/M2 | DIASTOLIC BLOOD PRESSURE: 61 MMHG | RESPIRATION RATE: 18 BRPM | SYSTOLIC BLOOD PRESSURE: 117 MMHG

## 2025-07-10 LAB
GLUCOSE BLDC GLUCOMTR-MCNC: 157 MG/DL (ref 70–99)
GLUCOSE BLDC GLUCOMTR-MCNC: 241 MG/DL (ref 70–99)
GLUCOSE BLDC GLUCOMTR-MCNC: 269 MG/DL (ref 70–99)
GLUCOSE BLDC GLUCOMTR-MCNC: 317 MG/DL (ref 70–99)
GLUCOSE BLDC GLUCOMTR-MCNC: 360 MG/DL (ref 70–99)
MAGNESIUM SERPL-MCNC: 1.8 MG/DL (ref 1.7–2.3)
POTASSIUM SERPL-SCNC: 4.5 MMOL/L (ref 3.4–5.3)

## 2025-07-10 PROCEDURE — 250N000011 HC RX IP 250 OP 636: Performed by: STUDENT IN AN ORGANIZED HEALTH CARE EDUCATION/TRAINING PROGRAM

## 2025-07-10 PROCEDURE — 36415 COLL VENOUS BLD VENIPUNCTURE: CPT | Performed by: HOSPITALIST

## 2025-07-10 PROCEDURE — 250N000013 HC RX MED GY IP 250 OP 250 PS 637: Performed by: HOSPITALIST

## 2025-07-10 PROCEDURE — 999N000157 HC STATISTIC RCP TIME EA 10 MIN

## 2025-07-10 PROCEDURE — 84132 ASSAY OF SERUM POTASSIUM: CPT | Performed by: HOSPITALIST

## 2025-07-10 PROCEDURE — 94640 AIRWAY INHALATION TREATMENT: CPT

## 2025-07-10 PROCEDURE — 250N000012 HC RX MED GY IP 250 OP 636 PS 637: Performed by: STUDENT IN AN ORGANIZED HEALTH CARE EDUCATION/TRAINING PROGRAM

## 2025-07-10 PROCEDURE — 250N000009 HC RX 250: Performed by: STUDENT IN AN ORGANIZED HEALTH CARE EDUCATION/TRAINING PROGRAM

## 2025-07-10 PROCEDURE — 120N000001 HC R&B MED SURG/OB

## 2025-07-10 PROCEDURE — 94664 DEMO&/EVAL PT USE INHALER: CPT

## 2025-07-10 PROCEDURE — 83735 ASSAY OF MAGNESIUM: CPT | Performed by: HOSPITALIST

## 2025-07-10 PROCEDURE — 250N000013 HC RX MED GY IP 250 OP 250 PS 637: Performed by: STUDENT IN AN ORGANIZED HEALTH CARE EDUCATION/TRAINING PROGRAM

## 2025-07-10 PROCEDURE — 99232 SBSQ HOSP IP/OBS MODERATE 35: CPT | Performed by: HOSPITALIST

## 2025-07-10 PROCEDURE — 94640 AIRWAY INHALATION TREATMENT: CPT | Mod: 76

## 2025-07-10 RX ORDER — GLIPIZIDE 2.5 MG/1
2.5 TABLET, EXTENDED RELEASE ORAL DAILY
Qty: 7 TABLET | Refills: 0 | Status: SHIPPED | OUTPATIENT
Start: 2025-07-10 | End: 2025-07-12

## 2025-07-10 RX ORDER — PREDNISONE 10 MG/1
20 TABLET ORAL DAILY
Qty: 8 TABLET | Refills: 0 | Status: SHIPPED | OUTPATIENT
Start: 2025-07-10 | End: 2025-07-14

## 2025-07-10 RX ORDER — PREDNISONE 20 MG/1
20 TABLET ORAL DAILY
Status: DISCONTINUED | OUTPATIENT
Start: 2025-07-11 | End: 2025-07-12 | Stop reason: HOSPADM

## 2025-07-10 RX ORDER — METFORMIN HYDROCHLORIDE 750 MG/1
750 TABLET, EXTENDED RELEASE ORAL
Qty: 7 TABLET | Refills: 0 | Status: SHIPPED | OUTPATIENT
Start: 2025-07-10 | End: 2025-07-24

## 2025-07-10 RX ORDER — METFORMIN HYDROCHLORIDE 750 MG/1
750 TABLET, EXTENDED RELEASE ORAL
Status: DISCONTINUED | OUTPATIENT
Start: 2025-07-10 | End: 2025-07-12 | Stop reason: HOSPADM

## 2025-07-10 RX ORDER — DOXYCYCLINE 100 MG/1
100 CAPSULE ORAL EVERY 12 HOURS SCHEDULED
Status: COMPLETED | OUTPATIENT
Start: 2025-07-10 | End: 2025-07-12

## 2025-07-10 RX ADMIN — IPRATROPIUM BROMIDE AND ALBUTEROL SULFATE 3 ML: .5; 3 SOLUTION RESPIRATORY (INHALATION) at 19:44

## 2025-07-10 RX ADMIN — FLUTICASONE FUROATE AND VILANTEROL TRIFENATATE 1 PUFF: 200; 25 POWDER RESPIRATORY (INHALATION) at 08:16

## 2025-07-10 RX ADMIN — ALLOPURINOL 300 MG: 300 TABLET ORAL at 08:41

## 2025-07-10 RX ADMIN — METFORMIN HYDROCHLORIDE 750 MG: 750 TABLET, EXTENDED RELEASE ORAL at 18:17

## 2025-07-10 RX ADMIN — IPRATROPIUM BROMIDE AND ALBUTEROL SULFATE 3 ML: .5; 3 SOLUTION RESPIRATORY (INHALATION) at 08:16

## 2025-07-10 RX ADMIN — IPRATROPIUM BROMIDE AND ALBUTEROL SULFATE 3 ML: .5; 3 SOLUTION RESPIRATORY (INHALATION) at 12:34

## 2025-07-10 RX ADMIN — ROSUVASTATIN CALCIUM 20 MG: 20 TABLET, FILM COATED ORAL at 20:54

## 2025-07-10 RX ADMIN — DOXYCYCLINE 100 MG: 100 INJECTION, POWDER, LYOPHILIZED, FOR SOLUTION INTRAVENOUS at 06:02

## 2025-07-10 RX ADMIN — DOXYCYCLINE HYCLATE 100 MG: 100 CAPSULE ORAL at 20:54

## 2025-07-10 RX ADMIN — PREDNISONE 40 MG: 20 TABLET ORAL at 08:41

## 2025-07-10 RX ADMIN — AMLODIPINE BESYLATE 10 MG: 5 TABLET ORAL at 20:54

## 2025-07-10 RX ADMIN — BUPROPION HYDROCHLORIDE 300 MG: 150 TABLET, EXTENDED RELEASE ORAL at 08:41

## 2025-07-10 RX ADMIN — LISINOPRIL AND HYDROCHLOROTHIAZIDE TABLETS 1 TABLET: 20; 12.5 TABLET ORAL at 08:41

## 2025-07-10 NOTE — PLAN OF CARE
"Cared for the Pt from 2300 to 0700. Pt A&O x4. On 4L O2 NC. Positive for Rhinovirus, droplet precautions maintained. Up IND. K and Mg protocol. Denies pain,Chest pain. Complains of SOB with activity. Plan to wean off O2 amd discharge.     Goal Outcome Evaluation:      Plan of Care Reviewed With: patient    Overall Patient Progress: improvingOverall Patient Progress: improving    Outcome Evaluation: Plan TBD. Wean off O2.      Problem: Adult Inpatient Plan of Care  Goal: Plan of Care Review  Description: The Plan of Care Review/Shift note should be completed every shift.  The Outcome Evaluation is a brief statement about your assessment that the patient is improving, declining, or no change.  This information will be displayed automatically on your shift  note.  Outcome: Progressing  Flowsheets (Taken 7/10/2025 0338)  Outcome Evaluation: Plan TBD. Wean off O2.  Plan of Care Reviewed With: patient  Overall Patient Progress: improving  Goal: Patient-Specific Goal (Individualized)  Description: You can add care plan individualizations to a care plan. Examples of Individualization might be:  \"Parent requests to be called daily at 9am for status\", \"I have a hard time hearing out of my right ear\", or \"Do not touch me to wake me up as it startles  me\".  Outcome: Progressing  Goal: Absence of Hospital-Acquired Illness or Injury  Outcome: Progressing  Intervention: Identify and Manage Fall Risk  Recent Flowsheet Documentation  Taken 7/10/2025 0332 by Rocio Gallegos RN  Safety Promotion/Fall Prevention:   activity supervised   supervised activity   safety round/check completed   lighting adjusted  Intervention: Prevent Skin Injury  Recent Flowsheet Documentation  Taken 7/10/2025 0332 by Rocio Gallegos RN  Body Position: position changed independently  Intervention: Prevent and Manage VTE (Venous Thromboembolism) Risk  Recent Flowsheet Documentation  Taken 7/10/2025 0332 by Rocio Gallegos RN  VTE " Prevention/Management:   SCDs off (sequential compression devices)   patient refused intervention  Intervention: Prevent Infection  Recent Flowsheet Documentation  Taken 7/10/2025 0332 by Rocio Gallegos RN  Infection Prevention: rest/sleep promoted  Goal: Optimal Comfort and Wellbeing  Outcome: Progressing  Intervention: Provide Person-Centered Care  Recent Flowsheet Documentation  Taken 7/10/2025 0332 by Rocio Gallegos RN  Trust Relationship/Rapport: questions encouraged  Goal: Readiness for Transition of Care  Outcome: Progressing     Problem: Delirium  Goal: Optimal Coping  Outcome: Progressing  Goal: Improved Behavioral Control  Outcome: Progressing  Intervention: Minimize Safety Risk  Recent Flowsheet Documentation  Taken 7/10/2025 0332 by Rocio Gallegos RN  Enhanced Safety Measures: review medications for side effects with activity  Trust Relationship/Rapport: questions encouraged  Goal: Improved Attention and Thought Clarity  Outcome: Progressing  Goal: Improved Sleep  Outcome: Progressing     Problem: Comorbidity Management  Goal: Maintenance of COPD Symptom Control  Outcome: Progressing  Intervention: Maintain COPD Symptom Control  Recent Flowsheet Documentation  Taken 7/10/2025 0332 by Rocio Gallegos RN  Medication Review/Management: medications reviewed  Goal: Blood Pressure in Desired Range  Outcome: Progressing  Intervention: Maintain Blood Pressure Management  Recent Flowsheet Documentation  Taken 7/10/2025 0332 by Rocio Gallegos RN  Medication Review/Management: medications reviewed

## 2025-07-10 NOTE — PROGRESS NOTES
[facility]    Hospitalist Progress Note             Date of Admission:  7/7/2025                   Day of hospitalization: 2    Assessment and Plan:   Gabe Smith is a 70 year old male with PMH significant for emphysema, thoracic aortic aneurysm s/p repair admitted on 7/7/2025 with shortness of breath.      Acute COPD exacerbation  Acute hypoxemic respiratory failure:  Acute Rhin/enterovirus  Known history of emphysema in the setting of tobacco use.  Presents with 1 week of shortness of breath.  This is primarily with exertion.  He feels largely well at rest.  He does endorse some sputum change during this time.  No fevers, chills, or symptoms otherwise.  86-88% o2 with ambulation requiring 2L NC.  CXR without infiltrate or edema.  Covid, rsv, influenza negative.   -Given solumedrol, mag, duonebs, doxycycline in the ED  -With symptoms of now sore throat, stuffy nose.  Likely symptoms are viral in etiology  - Prednisone to 20 mg daily for tomorrow  -Continue duoneb q4h WA  -PTA scheduled inhalers  -Continue doxycycline will do a shorter close-symptoms are likely viral etiology  -Continuous oximetry, supplemental o2 prn, wean as tolerated     Diabetes mellitus type 2  -Noted to have hyperglycemia with a hemoglobin A1c of 6.5  - Hyperglycemic in the evening  - Lantus increased to 10 units and changed to medium sliding scale  - Added metformin today    Troponin elevation:  53 ---> 41.  EKG without ischemic change.  Unchanged from prior.  Patient denies chest pain.  Suspect demand ischemia from hypoxemia at home.      Tobacco use disorder:  Previous heavy use.  Has cut back significantly in the recent months.  20 cigarettes in the past 2-3 months.  None for the past 4 days.  Declined NRT.      HTN: PTA meds once med rec completed  HLD: PTA statin  Gout: PTA allopurinol     AAA  Thoracic aortic aneurysm s/p repair: Noted        # Code status: Full   # DVT: SCD  # IVF: None            Medically  "Ready for Discharge: Anticipated Tomorrow                    Soo Wylie MD    Subjective   Chief Complaint:  Shortness of breath  Requesting to go home today although he does not feel he is baseline and still requiring oxygen therapy  Otherwise still has sore throat stuffy nose and shortness of breath       Objective   /72 (BP Location: Left arm)   Pulse 71   Temp 98.1  F (36.7  C) (Oral)   Resp 18   Ht 1.778 m (5' 10\")   Wt 91.2 kg (201 lb 1 oz)   SpO2 93%   BMI 28.85 kg/m       Physical Exam  General: Pt in NAD, normal appearance  HEENT: OP clear MMM, no JVD  Lungs: Expiratory wheeze normal breathing  without accessory muscle usage, no , rhonchi or crackles  Cardiac: +S1, S2, RRR, no MRG, no edema  Abdominal: normal bowel sounds, NT/ND  Skin: warm, dry, normal turgor, no rash  Psyche: A& O x3, appropriate affect           No intake or output data in the 24 hours ending 07/08/25 1349        Labs and Imaging Results:      Recent Labs   Lab 07/08/25  0741 07/07/25  1302   WBC 11.5* 11.0   HGB 15.2 15.6    203        Recent Labs   Lab 07/08/25  0741 07/07/25  1302   * 137   CO2 24 26   BUN 27.8* 23.6*      No results for input(s): \"INR\", \"PTT\" in the last 168 hours.   No results for input(s): \"CKMB\" in the last 168 hours.    Invalid input(s): \"TROPONINT\"   No results for input(s): \"ALBUMIN\", \"AST\", \"ALT\", \"ALKPHOS\", \"BILITOT\" in the last 168 hours.     Micro:     Radio:  Chest XR,  PA & LAT   Final Result   IMPRESSION: Stable size of cardiomediastinal silhouette with thoracic aortic stent graft again noted. No focal airspace consolidation, pleural effusion or pneumothorax. Bones are unchanged.              Medications:      Scheduled Meds:    Current Facility-Administered Medications   Medication Dose Route Frequency Provider Last Rate Last Admin    allopurinol (ZYLOPRIM) tablet 300 mg  300 mg Oral Daily Jatinder Rangel DO   300 mg at 07/10/25 0841    amLODIPine (NORVASC) tablet 10 mg  10 " mg Oral QPM Jatinder Rangel DO   10 mg at 07/09/25 2004    buPROPion (WELLBUTRIN XL) 24 hr tablet 300 mg  300 mg Oral QAM Jatinder Rangel DO   300 mg at 07/10/25 0841    doxycycline (VIBRAMYCIN) 100 mg vial to attach to  mL bag  100 mg Intravenous Q12H Jatinder Rangel DO   100 mg at 07/10/25 0602    fluticasone-vilanterol (BREO ELLIPTA) 200-25 MCG/ACT inhaler 1 puff  1 puff Inhalation Daily Jatinder Rangel DO   1 puff at 07/10/25 0816    insulin aspart (NovoLOG) injection (RAPID ACTING)  1-7 Units Subcutaneous TID AC Soo Wylie MD   1 Units at 07/10/25 0842    insulin aspart (NovoLOG) injection (RAPID ACTING)  1-5 Units Subcutaneous At Bedtime Soo Wylie MD   3 Units at 07/09/25 2216    insulin glargine (LANTUS PEN) injection 10 Units  [facility]   10 Units at 07/10/25 0842    ipratropium - albuterol 0.5 mg/2.5 mg (3mg)/3 mL (DUONEB) neb solution 3 mL  3 mL Nebulization Q4H WA Jatinder Rangel DO   3 mL at 07/10/25 0816    lisinopril-hydrochlorothiazide (ZESTORETIC) 20-12.5 MG per tablet 1 tablet  1 tablet Oral QAM Jatinder Rangel DO   1 tablet at 07/10/25 0841    metFORMIN (GLUCOPHAGE-XR) 24 hr tablet 750 mg  750 mg Oral Daily with supper Soo Wylie MD        predniSONE (DELTASONE) tablet 40 mg  40 mg Oral Daily Jatinder Rangel DO   40 mg at 07/10/25 0841    rosuvastatin (CRESTOR) tablet 20 mg  20 mg Oral QPM Jatinder Rangel DO   20 mg at 07/09/25 2003    sodium chloride (PF) 0.9% PF flush 3 mL  3 mL Intracatheter Q8H [facility] Jatinder Rangel DO   3 mL at 07/10/25 0605    [Held by provider] umeclidinium (INCRUSE ELLIPTA) 62.5 MCG/ACT inhaler 1 puff  1 puff Inhalation Daily Jatinder Rangel DO         Continuous Infusions:    Current Facility-Administered Medications   Medication Dose Route Frequency Provider Last Rate Last Admin     PRN Meds:    Current Facility-Administered Medications   Medication Dose Route Frequency Provider Last Rate  Last Admin    acetaminophen (TYLENOL) tablet 650 mg  650 mg Oral Q4H PRN Jatinder Rangel DO        Or    acetaminophen (TYLENOL) Suppository 650 mg  650 mg Rectal Q4H PRN Jatinder Rangel DO        albuterol (PROVENTIL) neb solution 2.5 mg  2.5 mg Nebulization Q2H PRN Jatinder Rangel DO        benzocaine-menthol (CHLORASEPTIC) 6-10 MG lozenge 1 lozenge  1 lozenge Buccal Q1H PRN Soo Wylie MD   1 lozenge at 07/09/25 1436    calcium carbonate (TUMS) chewable tablet 1,000 mg  1,000 mg Oral 4x Daily PRN Jatinder Rangel DO   1,000 mg at 07/08/25 2147    glucose gel 15-30 g  15-30 g Oral Q15 Min PRN Soo Wylie MD        Or    dextrose 50 % injection 25-50 mL  25-50 mL Intravenous Q15 Min PRN Soo Wylie MD        Or    glucagon injection 1 mg  1 mg Subcutaneous Q15 Min PRN Soo Wylie MD        lidocaine (LMX4) cream   Topical Q1H PRN Jatinder Rangel DO        lidocaine 1 % 0.1-1 mL  0.1-1 mL Other Q1H PRN Jatinder Rangel DO        melatonin tablet 1 mg  1 mg Oral At Bedtime PRN Jatinder Rangel DO        ondansetron (ZOFRAN ODT) ODT tab 4 mg  4 mg Oral Q6H PRN Jatinder Rangel DO        Or    ondansetron (ZOFRAN) injection 4 mg  4 mg Intravenous Q6H PRN Jatinder Rangel DO        prochlorperazine (COMPAZINE) injection 5 mg  5 mg Intravenous Q6H PRN Jatinder Rangel DO        Or    prochlorperazine (COMPAZINE) tablet 5 mg  5 mg Oral Q6H PRN Jatinder Rangel DO        senna-docusate (SENOKOT-S/PERICOLACE) 8.6-50 MG per tablet 1 tablet  1 tablet Oral BID PRN Jatinder Rangel DO        Or    senna-docusate (SENOKOT-S/PERICOLACE) 8.6-50 MG per tablet 2 tablet  2 tablet Oral BID PRN Baxa, Jatinder, DO        sodium chloride (PF) 0.9% PF flush 3 mL  3 mL Intracatheter q1 min prn Jatinder Rangel DO   3 mL at 07/09/25 2003

## 2025-07-10 NOTE — PROGRESS NOTES
Educated pt on the indications and benefits of using a spacer with his inhaler.    Any and all follow up questions were answered.    Heidi Miner RT on 7/10/2025 at 6:22 PM

## 2025-07-10 NOTE — PLAN OF CARE
"Goal Outcome Evaluation:      Plan of Care Reviewed With: patient    Overall Patient Progress: no changeOverall Patient Progress: no change    Outcome Evaluation: Pt is A&Ox4. VSS on 4L NC. LS diminished. Nonproductive cough. Dyspnea upon exetion. Positive for Rhinovirus. Droplet precautions iniciated. Denies nausea. Bowel sounds hypoactive.Independent w/walker. Ambulating. Voiding to the bathroom. LBM: 7/9. IV abx. PIV R forearm SL. Tolerating PO. Glucose checks. K, Mg, protocols, no replacements w/recheck in the morning. Pain controlled with scheduled regimen. CMS: intact. Discharge plans to [facility]      Problem: Adult Inpatient Plan of Care  Goal: Plan of Care Review  Description: The Plan of Care Review/Shift note should be completed every shift.  The Outcome Evaluation is a brief statement about your assessment that the patient is improving, declining, or no change.  This information will be displayed automatically on your shift  note.  Outcome: Progressing  Flowsheets (Taken 7/9/2025 2210)  Outcome Evaluation: Pt is A&Ox4. VSS on 4L NC. LS diminished. Nonproductive cough. Dyspnea upon exetion. Positive for Rhinovirus. Droplet precautions iniciated. Denies nausea. Bowel sounds hypoactive.Independent w/walker. Ambulating. Voiding to the bathroom. LBM: 7/9. IV abx. PIV R forearm SL. Tolerating PO. Glucose checks. K, Mg, protocols, no replacements w/recheck in the morning. Pain controlled with scheduled regimen. CMS: intact. Discharge plans to [facility]  Plan of Care Reviewed With: patient  Overall Patient Progress: no change  Goal: Patient-Specific Goal (Individualized)  Description: You can add care plan individualizations to a care plan. Examples of Individualization might be:  \"Parent requests to be called daily at 9am for status\", \"I have a hard time hearing out of my right ear\", or \"Do not touch me to wake me up as it startles  me\".  Outcome: Progressing  Goal: Absence of Hospital-Acquired Illness or Injury  Outcome: " Progressing  Intervention: Identify and Manage Fall Risk  Recent Flowsheet Documentation  Taken 7/9/2025 2000 by Cb Ervin RN  Safety Promotion/Fall Prevention:   clutter free environment maintained   safety round/check completed  Intervention: Prevent Skin Injury  Recent Flowsheet Documentation  Taken 7/9/2025 2000 by Cb Ervin RN  Body Position: position changed independently  Intervention: Prevent and Manage VTE (Venous Thromboembolism) Risk  Recent Flowsheet Documentation  Taken 7/9/2025 2000 by Cb Ervin RN  VTE Prevention/Management: (ambulating)   SCDs off (sequential compression devices)   patient refused intervention  Intervention: Prevent Infection  Recent Flowsheet Documentation  Taken 7/9/2025 2000 by Cb Ervin RN  Infection Prevention:   rest/sleep promoted   hand hygiene promoted   single patient room provided  Goal: Optimal Comfort and Wellbeing  Outcome: Progressing  Intervention: Monitor Pain and Promote Comfort  Recent Flowsheet Documentation  Taken 7/9/2025 2000 by Cb Ervin RN  Pain Management Interventions: rest  Intervention: Provide Person-Centered Care  Recent Flowsheet Documentation  Taken 7/9/2025 2000 by Cb Ervin RN  Trust Relationship/Rapport: care explained  Goal: Readiness for Transition of Care  Outcome: Progressing     Problem: Delirium  Goal: Optimal Coping  Outcome: Progressing  Goal: Improved Behavioral Control  Outcome: Progressing  Intervention: Prevent and Manage Agitation  Recent Flowsheet Documentation  Taken 7/9/2025 2000 by Cb Ervin RN  Environment Familiarity/Consistency: daily routine followed  Intervention: Minimize Safety Risk  Recent Flowsheet Documentation  Taken 7/9/2025 2000 by Cb Ervin RN  Enhanced Safety Measures: review medications for side effects with activity  Trust Relationship/Rapport: care explained  Goal: Improved Attention and Thought Clarity  Outcome:  Progressing  Intervention: Maximize Cognitive Function  Recent Flowsheet Documentation  Taken 7/9/2025 2000 by Cb Ervin, RN  Sensory Stimulation Regulation: care clustered  Reorientation Measures:   clock in view   calendar in view  Goal: Improved Sleep  Outcome: Progressing     Problem: Comorbidity Management  Goal: Maintenance of COPD Symptom Control  Outcome: Progressing  Intervention: Maintain COPD Symptom Control  Recent Flowsheet Documentation  Taken 7/9/2025 2000 by Cb Ervin, RN  Breathing Techniques/Airway Clearance: deep/controlled cough encouraged  Medication Review/Management: medications reviewed  Goal: Blood Pressure in Desired Range  Outcome: Progressing  Intervention: Maintain Blood Pressure Management  Recent Flowsheet Documentation  Taken 7/9/2025 2000 by Cb Ervin, RN  Medication Review/Management: medications reviewed

## 2025-07-10 NOTE — PLAN OF CARE
"End of Shift Summary   For vital signs and complete assessments, please see documentation flowsheets.     Pertinent assessments: Pt is a/o denies pain.  Up indep in room with walker.  O2 at 4L NC Nebs by RT.  IV abx. Accuchecks in the 200's given sliding scale insulin as per order.   Major Shift Events: uneventful     Treatment Plan: Prednisone, IV Doxycycline, wean from O2     Bedside Nurse: Michelle Smith RN    Problem: Adult Inpatient Plan of Care  Goal: Plan of Care Review  Description: The Plan of Care Review/Shift note should be completed every shift.  The Outcome Evaluation is a brief statement about your assessment that the patient is improving, declining, or no change.  This information will be displayed automatically on your shift  note.  Outcome: Progressing  Flowsheets (Taken 7/9/2025 2006)  Outcome Evaluation: continues on 4L unable to wean.  IV abx  Goal: Patient-Specific Goal (Individualized)  Description: You can add care plan individualizations to a care plan. Examples of Individualization might be:  \"Parent requests to be called daily at 9am for status\", \"I have a hard time hearing out of my right ear\", or \"Do not touch me to wake me up as it startles  me\".  Outcome: Progressing  Goal: Absence of Hospital-Acquired Illness or Injury  Outcome: Progressing  Intervention: Identify and Manage Fall Risk  Recent Flowsheet Documentation  Taken 7/9/2025 0800 by Michelle Smith RN  Safety Promotion/Fall Prevention:   clutter free environment maintained   safety round/check completed  Intervention: Prevent Skin Injury  Recent Flowsheet Documentation  Taken 7/9/2025 0800 by Michelle Smith RN  Body Position: position changed independently  Intervention: Prevent and Manage VTE (Venous Thromboembolism) Risk  Recent Flowsheet Documentation  Taken 7/9/2025 0800 by Michelle Smith RN  VTE Prevention/Management:   SCDs off (sequential compression devices)   patient refused intervention  Intervention: Prevent " Infection  Recent Flowsheet Documentation  Taken 7/9/2025 0800 by Michelle Smith RN  Infection Prevention:   single patient room provided   hand hygiene promoted  Goal: Optimal Comfort and Wellbeing  Outcome: Progressing  Intervention: Provide Person-Centered Care  Recent Flowsheet Documentation  Taken 7/9/2025 0800 by Michelle Smith RN  Trust Relationship/Rapport:   care explained   choices provided  Goal: Readiness for Transition of Care  Outcome: Progressing     Problem: Delirium  Goal: Optimal Coping  Outcome: Progressing  Goal: Improved Behavioral Control  Outcome: Progressing  Intervention: Minimize Safety Risk  Recent Flowsheet Documentation  Taken 7/9/2025 0800 by Michelle Smith RN  Enhanced Safety Measures: review medications for side effects with activity  Trust Relationship/Rapport:   care explained   choices provided  Goal: Improved Attention and Thought Clarity  Outcome: Progressing  Goal: Improved Sleep  Outcome: Progressing     Problem: Comorbidity Management  Goal: Maintenance of COPD Symptom Control  Outcome: Progressing  Intervention: Maintain COPD Symptom Control  Recent Flowsheet Documentation  Taken 7/9/2025 0800 by Michelle Smith RN  Medication Review/Management: medications reviewed  Goal: Blood Pressure in Desired Range  Outcome: Progressing  Intervention: Maintain Blood Pressure Management  Recent Flowsheet Documentation  Taken 7/9/2025 0800 by Michelle Smith RN  Medication Review/Management: medications reviewed

## 2025-07-10 NOTE — DISCHARGE SUMMARY
"Discharge Summary  Hospitalist Service      Gabe Smith MRN# [id number]   YOB: 1955 Age: 70 year old     Date of Admission:  7/7/2025  Date of Discharge:  7/10/2025  Admitting Physician:  Soo Wylie MD  Discharge Physician: Soo Wylie MD   Discharging Service: Hospitalist Service     Primary Provider: ***Donny Payne  Primary Care Physician Phone Number: [phone]         Discharge Diagnoses/Problem Oriented Hospital Course (Providers):      Discharge Diagnoses   ***    History of Present Illness   Gabe Smith is an 70 year old male who presented with ***    Hospital Course   Gabe Smith was admitted on 7/7/2025.  The following problems were addressed during his hospitalization:      Gabe Smith was admitted on 7/7/2025 by Soo Wylie MD and I would refer you to their history and physical.  The following problems were addressed during his hospitalization:  ***    # Discharge Pain Plan: {*** New regulation from [employer] - click delete to remove this announcement ***}  {Pain Plan:339048}            Code Status:      {CODE STATUS:219883}         Important Results:        ***          Pending Results:        Unresulted Labs Ordered in the Past 30 Days of this Admission       No orders found from 6/7/2025 to 7/8/2025.                 Discharge Instructions and Follow-Up:      Follow-up Appointments     Hospital Follow-up with Existing Primary Care Provider (PCP)          Schedule Primary Care visit within: 7 Days                Discharge Disposition:        {                 :3589760::\"Discharged to home\"}          Discharge Medications:        Current Discharge Medication List        START taking these medications    Details   glipiZIDE (GLUCOTROL XL) 2.5 MG 24 hr tablet Take 1 tablet (2.5 mg) by mouth daily for 7 days.  Qty: 7 tablet, Refills: 0    Associated Diagnoses: COPD exacerbation (H)      metFORMIN (GLUCOPHAGE-XR) 750 MG 24 hr tablet " Take 1 tablet (750 mg) by mouth daily (with dinner) for 7 days.  Qty: 7 tablet, Refills: 0    Associated Diagnoses: COPD exacerbation (H)      predniSONE (DELTASONE) 10 MG tablet Take 2 tablets (20 mg) by mouth daily for 4 days.  Qty: 8 tablet, Refills: 0    Associated Diagnoses: COPD exacerbation (H)           CONTINUE these medications which have NOT CHANGED    Details   albuterol (PROAIR HFA/PROVENTIL HFA/VENTOLIN HFA) 108 (90 Base) MCG/ACT inhaler Inhale 2 puffs into the lungs every 4 hours as needed for wheezing or shortness of breath.  Qty: 54 g, Refills: 9    Comments: Pharmacy may dispense brand covered by insurance (Proair, or proventil or ventolin or generic albuterol inhaler)  Associated Diagnoses: Panlobular emphysema (H)      allopurinol (ZYLOPRIM) 300 MG tablet Take 1 tablet (300 mg) by mouth daily.  Qty: 90 tablet, Refills: 3    Comments: NOTE DOSE CHANGE  Associated Diagnoses: Acute gout of foot, unspecified cause, unspecified laterality      amLODIPine (NORVASC) 10 MG tablet TAKE 1 TABLET(10 MG) BY MOUTH EVERY EVENING  Qty: 90 tablet, Refills: 1    Associated Diagnoses: Benign essential hypertension      aspirin 81 MG tablet Take 1 tablet (81 mg) by mouth daily      betamethasone dipropionate (DIPROSONE) 0.05 % external cream APPLY SPARINGLY ONCE OR TWICE DAILY AS NEEDED TO AFFECTED AREA OF PSORIASIS UNTIL THE SKIN IS BETTER, THEN STOP. REPEAT AS NEEDED  Qty: 45 g, Refills: 2    Associated Diagnoses: Psoriasis      buPROPion (WELLBUTRIN XL) 300 MG 24 hr tablet Take 1 tablet (300 mg) by mouth every morning.  Qty: 90 tablet, Refills: 1    Comments: PROFILE FOR FUTURE REFILLS UNTIL PATIENT CALLS FOR REFILLS  Associated Diagnoses: Tobacco abuse      fluticasone-salmeterol (AIRDUO RESPICLICK) 232-14 MCG/ACT inhaler INHALE 1 PUFF INTO THE LUNGS 2 TIMES DAILY  Qty: 3 each, Refills: 3    Associated Diagnoses: Panlobular emphysema (H); Panlobular emphysema (H)      ipratropium - albuterol 0.5 mg/2.5 mg/3 mL  "(DUONEB) 0.5-2.5 (3) MG/3ML neb solution USE 1 VIAL VIA NEBULIZER EVERY 4 HOURS AS NEEDED FOR SHORTNESS OF BREATH OR WHEEZING OR COUGH  Qty: 180 mL, Refills: 1    Associated Diagnoses: COPD exacerbation (H); Panlobular emphysema (H)      lisinopril-hydrochlorothiazide (ZESTORETIC) 20-12.5 MG tablet TAKE 1 TABLET BY MOUTH EVERY MORNING  Qty: 90 tablet, Refills: 1    Associated Diagnoses: Type 2 diabetes mellitus with other circulatory complication, without long-term current use of insulin (H); Benign essential hypertension      rosuvastatin (CRESTOR) 20 MG tablet Take 1 tablet (20 mg) by mouth every evening.  Qty: 90 tablet, Refills: 3    Comments: NOTE CHANGE FROM PRAVASTATIN  Associated Diagnoses: Type 2 diabetes mellitus with other circulatory complication, without long-term current use of insulin (H); Hyperlipidemia LDL goal <100; Abdominal aortic aneurysm (AAA) without rupture, unspecified part      tiotropium (SPIRIVA) 18 MCG inhaled capsule Inhale 1 capsule (18 mcg) into the lungs daily. GENERIC TIOTROPIUM  Qty: 90 capsule, Refills: 3    Associated Diagnoses: Panlobular emphysema (H)      varenicline (CHANTIX) 1 MG tablet Take 1 tablet (1 mg) by mouth 2 times daily.  Qty: 180 tablet, Refills: 0    Comments: PROFILE FOR FUTURE REFILLS UNTIL PATIENT CALLS FOR REFILLS  Associated Diagnoses: Tobacco abuse      Continuous Glucose  (FREESTYLE MILES 3 READER) JANIS Apply 1 Units topically as needed (use with Miles 3 sensors to monitor blood sugar levels).                  Allergies:       No Known Allergies         Consultations This Hospital Stay:        {                 :[id number]}          Condition and Physical Exam on Discharge:        Discharge condition: Stable   Discharge vitals: Blood pressure 135/61, pulse 69, temperature 98.1  F (36.7  C), temperature source Oral, resp. rate 18, height 1.778 m (5' 10\"), weight 91.2 kg (201 lb 1 oz), SpO2 95%.   General: Pt in NAD, normal appearance  HEENT: OP clear " MMM, no JVD  Lungs: Clear to Auscultation Bilateral, normal breathing  without accessory muscle usage, no wheezing, rhonchi or crackles  Cardiac: +S1, S2, RRR, no MRG, no edema  Abdominal: normal bowel sounds, NT/ND, no hepatosplenomegaly  Skin: warm, dry, normal turgor, no rash  Psyche: A& O x3, appropriate affect            Discharge Orders for Skilled Facility (from Discharge Orders):        After Care Instructions       Activity      Your activity upon discharge: activity as tolerated        Diet      Follow this diet upon discharge: Current Diet:Orders Placed This Encounter      Combination Diet Regular Diet Adult; Moderate Consistent Carb (60 g CHO per Meal) Diet                     Rehab orders for Skilled Facility (from Discharge Orders):               Discharge Time:      ***Greater than 30 minutes.        Image Results From This Hospital Stay (For Non-EPIC Providers):        Results for orders placed or performed during the hospital encounter of 07/07/25   Chest XR,  PA & LAT    Narrative    EXAM: XR CHEST 2 VIEWS  LOCATION: [facility]  DATE: 7/7/2025    INDICATION: Shortness of breath  COMPARISON: CT angiogram 4/9/2025      Impression    IMPRESSION: Stable size of cardiomediastinal silhouette with thoracic aortic stent graft again noted. No focal airspace consolidation, pleural effusion or pneumothorax. Bones are unchanged.           Most Recent Lab Results In EPIC (For Non-EPIC Providers):    Most Recent 3 CBC's:  Recent Labs   Lab Test 07/08/25  0741 07/07/25  1302 04/21/25  1027 03/04/24  0725   WBC 11.5* 11.0  --  10.9   HGB 15.2 15.6 16.6 12.3*   MCV 87 88  --  92    203  --  148*      Most Recent 3 BMP's:  Recent Labs   Lab Test 07/10/25  0832 07/10/25  0728 07/10/25  0221 07/09/25  2149 07/09/25  0818 07/09/25  0730 07/08/25  0957 07/08/25  0741 07/07/25  1302 04/21/25  1027   NA  --   --   --   --   --   --   --  134* 137 139   POTASSIUM  --  4.5  --   --   --  4.6   --  4.8 4.7 4.7   CHLORIDE  --   --   --   --   --   --   --  97* 100 103   CO2  --   --   --   --   --   --   --  24 26 27   BUN  --   --   --   --   --   --   --  27.8* 23.6* 26.9*   CR  --   --   --   --   --   --   --  0.88 1.09 1.07   ANIONGAP  --   --   --   --   --   --   --  13 11 9   DES  --   --   --   --   --   --   --  9.4 9.2 9.9   *  --  241* 325*   < >  --    < > 288* 164* 142*    < > = values in this interval not displayed.     Most Recent 3 Troponin's:No lab results found.  Most Recent 3 INR's:  Recent Labs   Lab Test 03/03/24  0347 03/02/24  1826 03/02/24  0600   INR 1.14 1.10 1.19*     Most Recent 2 LFT's:  Recent Labs   Lab Test 04/21/25  1027 02/09/24  1552 11/01/23  1000 10/19/22  0949   AST  --  30  --  19   ALT 13 15   < > 22   ALKPHOS  --  83  --  104   BILITOTAL  --  0.4  --  0.4    < > = values in this interval not displayed.     Most Recent Cholesterol Panel:  Recent Labs   Lab Test 04/21/25  1027   CHOL 190   *   HDL 31*   TRIG 268*     Most Recent 6 Bacteria Isolates From Any Culture (See EPIC Reports for Culture Details):No lab results found.  Most Recent TSH, T4 and HgbA1c:  Recent Labs   Lab Test 04/21/25  1027   A1C 6.5*

## 2025-07-11 LAB
GLUCOSE BLDC GLUCOMTR-MCNC: 144 MG/DL (ref 70–99)
GLUCOSE BLDC GLUCOMTR-MCNC: 231 MG/DL (ref 70–99)
GLUCOSE BLDC GLUCOMTR-MCNC: 300 MG/DL (ref 70–99)
GLUCOSE BLDC GLUCOMTR-MCNC: 313 MG/DL (ref 70–99)
GLUCOSE BLDC GLUCOMTR-MCNC: 87 MG/DL (ref 70–99)
MAGNESIUM SERPL-MCNC: 1.6 MG/DL (ref 1.7–2.3)
POTASSIUM SERPL-SCNC: 4.3 MMOL/L (ref 3.4–5.3)

## 2025-07-11 PROCEDURE — 999N000157 HC STATISTIC RCP TIME EA 10 MIN

## 2025-07-11 PROCEDURE — 250N000009 HC RX 250: Performed by: STUDENT IN AN ORGANIZED HEALTH CARE EDUCATION/TRAINING PROGRAM

## 2025-07-11 PROCEDURE — 250N000013 HC RX MED GY IP 250 OP 250 PS 637: Performed by: HOSPITALIST

## 2025-07-11 PROCEDURE — 120N000001 HC R&B MED SURG/OB

## 2025-07-11 PROCEDURE — 84132 ASSAY OF SERUM POTASSIUM: CPT | Performed by: HOSPITALIST

## 2025-07-11 PROCEDURE — 99232 SBSQ HOSP IP/OBS MODERATE 35: CPT | Performed by: HOSPITALIST

## 2025-07-11 PROCEDURE — 36415 COLL VENOUS BLD VENIPUNCTURE: CPT | Performed by: HOSPITALIST

## 2025-07-11 PROCEDURE — 250N000013 HC RX MED GY IP 250 OP 250 PS 637: Performed by: STUDENT IN AN ORGANIZED HEALTH CARE EDUCATION/TRAINING PROGRAM

## 2025-07-11 PROCEDURE — 94640 AIRWAY INHALATION TREATMENT: CPT

## 2025-07-11 PROCEDURE — 83735 ASSAY OF MAGNESIUM: CPT | Performed by: HOSPITALIST

## 2025-07-11 PROCEDURE — 250N000012 HC RX MED GY IP 250 OP 636 PS 637: Performed by: HOSPITALIST

## 2025-07-11 PROCEDURE — 94640 AIRWAY INHALATION TREATMENT: CPT | Mod: 76

## 2025-07-11 RX ORDER — GLIPIZIDE 5 MG/1
5 TABLET, FILM COATED, EXTENDED RELEASE ORAL
Status: DISCONTINUED | OUTPATIENT
Start: 2025-07-12 | End: 2025-07-12

## 2025-07-11 RX ORDER — GLIPIZIDE 2.5 MG/1
2.5 TABLET, EXTENDED RELEASE ORAL
Status: DISCONTINUED | OUTPATIENT
Start: 2025-07-11 | End: 2025-07-11

## 2025-07-11 RX ADMIN — IPRATROPIUM BROMIDE AND ALBUTEROL SULFATE 3 ML: .5; 3 SOLUTION RESPIRATORY (INHALATION) at 08:40

## 2025-07-11 RX ADMIN — AMLODIPINE BESYLATE 10 MG: 5 TABLET ORAL at 22:17

## 2025-07-11 RX ADMIN — GLIPIZIDE 2.5 MG: 2.5 TABLET, EXTENDED RELEASE ORAL at 10:48

## 2025-07-11 RX ADMIN — IPRATROPIUM BROMIDE AND ALBUTEROL SULFATE 3 ML: .5; 3 SOLUTION RESPIRATORY (INHALATION) at 12:01

## 2025-07-11 RX ADMIN — CALCIUM CARBONATE (ANTACID) CHEW TAB 500 MG 1000 MG: 500 CHEW TAB at 22:21

## 2025-07-11 RX ADMIN — FLUTICASONE FUROATE AND VILANTEROL TRIFENATATE 1 PUFF: 200; 25 POWDER RESPIRATORY (INHALATION) at 08:40

## 2025-07-11 RX ADMIN — IPRATROPIUM BROMIDE AND ALBUTEROL SULFATE 3 ML: .5; 3 SOLUTION RESPIRATORY (INHALATION) at 19:06

## 2025-07-11 RX ADMIN — ROSUVASTATIN CALCIUM 20 MG: 20 TABLET, FILM COATED ORAL at 22:16

## 2025-07-11 RX ADMIN — METFORMIN HYDROCHLORIDE 750 MG: 750 TABLET, EXTENDED RELEASE ORAL at 18:47

## 2025-07-11 RX ADMIN — BUPROPION HYDROCHLORIDE 300 MG: 150 TABLET, EXTENDED RELEASE ORAL at 09:17

## 2025-07-11 RX ADMIN — PREDNISONE 20 MG: 20 TABLET ORAL at 09:18

## 2025-07-11 RX ADMIN — LISINOPRIL AND HYDROCHLOROTHIAZIDE TABLETS 1 TABLET: 20; 12.5 TABLET ORAL at 09:17

## 2025-07-11 RX ADMIN — ALLOPURINOL 300 MG: 300 TABLET ORAL at 09:17

## 2025-07-11 RX ADMIN — DOXYCYCLINE HYCLATE 100 MG: 100 CAPSULE ORAL at 22:16

## 2025-07-11 RX ADMIN — DOXYCYCLINE HYCLATE 100 MG: 100 CAPSULE ORAL at 09:18

## 2025-07-11 NOTE — PLAN OF CARE
"End of Shift Summary  For vital signs and complete assessments, please see documentation flowsheets.     Pertinent assessments: pt is a/o on 4L of O2 unsuccessful weaning of O2.  Denies pain. Up to BR indep in room.  On airborne prec.    Major Shift Events: uneventful     Treatment Plan: Prednisone,  wean from O2     Bedside Nurse: Michelle Smith RN      Problem: Adult Inpatient Plan of Care  Goal: Plan of Care Review  Description: The Plan of Care Review/Shift note should be completed every shift.  The Outcome Evaluation is a brief statement about your assessment that the patient is improving, declining, or no change.  This information will be displayed automatically on your shift  note.  Outcome: Progressing  Flowsheets (Taken 7/10/2025 1958)  Outcome Evaluation: unable to wean O2  Plan of Care Reviewed With: patient  Overall Patient Progress: no change  Goal: Patient-Specific Goal (Individualized)  Description: You can add care plan individualizations to a care plan. Examples of Individualization might be:  \"Parent requests to be called daily at 9am for status\", \"I have a hard time hearing out of my right ear\", or \"Do not touch me to wake me up as it startles  me\".  Outcome: Progressing  Goal: Absence of Hospital-Acquired Illness or Injury  Outcome: Progressing  Intervention: Identify and Manage Fall Risk  Recent Flowsheet Documentation  Taken 7/10/2025 0730 by Michelle Smith RN  Safety Promotion/Fall Prevention:   clutter free environment maintained   safety round/check completed  Intervention: Prevent Skin Injury  Recent Flowsheet Documentation  Taken 7/10/2025 0730 by Michelle Smith RN  Body Position: position changed independently  Intervention: Prevent and Manage VTE (Venous Thromboembolism) Risk  Recent Flowsheet Documentation  Taken 7/10/2025 0730 by Michelle Smith RN  VTE Prevention/Management:   SCDs off (sequential compression devices)   patient refused intervention  Intervention: Prevent " Infection  Recent Flowsheet Documentation  Taken 7/10/2025 0730 by Michelle Smith RN  Infection Prevention:   single patient room provided   hand hygiene promoted  Goal: Optimal Comfort and Wellbeing  Outcome: Progressing  Intervention: Provide Person-Centered Care  Recent Flowsheet Documentation  Taken 7/10/2025 0730 by Michelle Smith RN  Trust Relationship/Rapport:   care explained   choices provided  Goal: Readiness for Transition of Care  Outcome: Progressing     Problem: Delirium  Goal: Optimal Coping  Outcome: Progressing  Goal: Improved Behavioral Control  Outcome: Progressing  Intervention: Minimize Safety Risk  Recent Flowsheet Documentation  Taken 7/10/2025 0730 by Michelle Smith RN  Enhanced Safety Measures: review medications for side effects with activity  Trust Relationship/Rapport:   care explained   choices provided  Goal: Improved Attention and Thought Clarity  Outcome: Progressing  Goal: Improved Sleep  Outcome: Progressing     Problem: Comorbidity Management  Goal: Maintenance of COPD Symptom Control  Outcome: Progressing  Intervention: Maintain COPD Symptom Control  Recent Flowsheet Documentation  Taken 7/10/2025 0730 by Michelle Smith RN  Medication Review/Management: medications reviewed  Goal: Blood Pressure in Desired Range  Outcome: Progressing  Intervention: Maintain Blood Pressure Management  Recent Flowsheet Documentation  Taken 7/10/2025 0730 by Michelle Smith RN  Medication Review/Management: medications reviewed

## 2025-07-11 NOTE — PROGRESS NOTES
[facility]    Hospitalist Progress Note             Date of Admission:  7/7/2025                   Day of hospitalization: 4    Assessment and Plan:   Gabe Smith is a 70 year old male with PMH significant for emphysema, thoracic aortic aneurysm s/p repair admitted on 7/7/2025 with shortness of breath.      Acute COPD exacerbation  Acute hypoxemic respiratory failure:  Acute Rhin/enterovirus  Known history of emphysema in the setting of tobacco use.  Presents with 1 week of shortness of breath.  This is primarily with exertion.  He feels largely well at rest.  He does endorse some sputum change during this time.  No fevers, chills, or symptoms otherwise.  86-88% o2 with ambulation requiring 2L NC.  CXR without infiltrate or edema.  Covid, rsv, influenza negative.   -Given solumedrol, mag, duonebs, doxycycline in the ED  -With symptoms of now sore throat, stuffy nose.  Likely symptoms are viral in etiology  - Prednisone to 20 mg daily  -Continue duoneb q4h WA  -PTA scheduled inhalers  -Continue doxycycline will do a shorter close-symptoms are likely viral etiology  -Continuous oximetry, supplemental o2 prn, wean as tolerated  - Patient still with high oxygen needs with exertion stay hospitalized    Diabetes mellitus type 2  -Noted to have hyperglycemia with a hemoglobin A1c of 6.5  - Hyperglycemic in the evening  - Continue Lantus 5 units, add glipizide and metformin.  Will hold his sliding scale as trying to figure out a regimen for discharge planning  -noted glucose still quite high added back insulin sliding scale and increased his glipizide for tomorrow    Troponin elevation:  53 ---> 41.  EKG without ischemic change.  Unchanged from prior.  Patient denies chest pain.  Suspect demand ischemia from hypoxemia at home.      Tobacco use disorder:  Previous heavy use.  Has cut back significantly in the recent months.  20 cigarettes in the past 2-3 months.  None for the past 4 days.  Declined  "NRT.      HTN: PTA meds once med rec completed  HLD: PTA statin  Gout: PTA allopurinol     AAA  Thoracic aortic aneurysm s/p repair: Noted        # Code status: Full   # DVT: SCD  # IVF: None            Medically Ready for Discharge: Anticipated Tomorrow                    Soo Wylie MD    Subjective   Chief Complaint:  Shortness of breath  Requesting to go home today although per nursing staff was quite symptomatic with exertion will monitor another day       Objective   /53   Pulse 73   Temp 98  F (36.7  C) (Oral)   Resp 18   Ht 1.778 m (5' 10\")   Wt 91.2 kg (201 lb 1 oz)   SpO2 92%   BMI 28.85 kg/m       Physical Exam  General: Pt in NAD, normal appearance  HEENT: OP clear MMM, no JVD  Lungs: Expiratory wheeze normal breathing  without accessory muscle usage, no , rhonchi or crackles  Cardiac: +S1, S2, RRR, no MRG, no edema  Abdominal: normal bowel sounds, NT/ND  Skin: warm, dry, normal turgor, no rash  Psyche: A& O x3, appropriate affect           No intake or output data in the 24 hours ending 07/08/25 1349        Labs and Imaging Results:      Recent Labs   Lab 07/08/25  0741 07/07/25  1302   WBC 11.5* 11.0   HGB 15.2 15.6    203        Recent Labs   Lab 07/08/25  0741 07/07/25  1302   * 137   CO2 24 26   BUN 27.8* 23.6*      No results for input(s): \"INR\", \"PTT\" in the last 168 hours.   No results for input(s): \"CKMB\" in the last 168 hours.    Invalid input(s): \"TROPONINT\"   No results for input(s): \"ALBUMIN\", \"AST\", \"ALT\", \"ALKPHOS\", \"BILITOT\" in the last 168 hours.     Micro:     Radio:  Chest XR,  PA & LAT   Final Result   IMPRESSION: Stable size of cardiomediastinal silhouette with thoracic aortic stent graft again noted. No focal airspace consolidation, pleural effusion or pneumothorax. Bones are unchanged.              Medications:      Scheduled Meds:    Current Facility-Administered Medications   Medication Dose Route Frequency Provider Last Rate Last Admin    allopurinol " (ZYLOPRIM) tablet 300 mg  300 mg Oral Daily Jatinder Rangel DO   300 mg at 07/11/25 0917    amLODIPine (NORVASC) tablet 10 mg  10 mg Oral QPM Jatinder Rangel DO   10 mg at 07/10/25 2054    buPROPion (WELLBUTRIN XL) 24 hr tablet 300 mg  300 mg Oral QAM Jatinder Rangel DO   300 mg at 07/11/25 0917    doxycycline hyclate (VIBRAMYCIN) capsule 100 mg  100 mg Oral Q12H [facility] (08/20) Soo Wylie MD   100 mg at 07/11/25 0918    fluticasone-vilanterol (BREO ELLIPTA) 200-25 MCG/ACT inhaler 1 puff  1 puff Inhalation Daily Jatinder Rangel DO   1 puff at 07/11/25 0840    glipiZIDE (GLUCOTROL XL) 24 hr tablet 2.5 mg  2.5 mg Oral Daily with breakfast Soo Wylie MD   2.5 mg at 07/11/25 1048    [Held by provider] insulin aspart (NovoLOG) injection (RAPID ACTING)  1-7 Units Subcutaneous TID AC Soo Wylie MD   1 Units at 07/11/25 0924    [Held by provider] insulin aspart (NovoLOG) injection (RAPID ACTING)  1-5 Units Subcutaneous At Bedtime Soo Wylie MD   2 Units at 07/10/25 2238    insulin glargine (LANTUS PEN) injection 5 Units  5 Units Subcutaneous QAM  Soo Wylie MD   5 Units at 07/11/25 0924    ipratropium - albuterol 0.5 mg/2.5 mg (3mg)/3 mL (DUONEB) neb solution 3 mL  3 mL Nebulization Q4H WA Jatinder Rangel DO   3 mL at 07/11/25 1201    lisinopril-hydrochlorothiazide (ZESTORETIC) 20-12.5 MG per tablet 1 tablet  1 tablet Oral QAM Jatinder Rangel DO   1 tablet at 07/11/25 0917    metFORMIN (GLUCOPHAGE-XR) 24 hr tablet 750 mg  750 mg Oral Daily with supper Soo Wylie MD   750 mg at 07/10/25 1817    predniSONE (DELTASONE) tablet 20 mg  20 mg Oral Daily Soo Wylie MD   20 mg at 07/11/25 0918    rosuvastatin (CRESTOR) tablet 20 mg  20 mg Oral QPM Jatinder Rangel DO   20 mg at 07/10/25 2054    sodium chloride (PF) 0.9% PF flush 3 mL  3 mL Intracatheter Q8H [facility] Jatinder Rangel DO   3 mL at 07/10/25 1307    [Held by provider] umeclidinium (INCRUSE ELLIPTA) 62.5  MCG/ACT inhaler 1 puff  1 puff Inhalation Daily Jatinder Rangel DO         Continuous Infusions:    Current Facility-Administered Medications   Medication Dose Route Frequency Provider Last Rate Last Admin     PRN Meds:    Current Facility-Administered Medications   Medication Dose Route Frequency Provider Last Rate Last Admin    acetaminophen (TYLENOL) tablet 650 mg  650 mg Oral Q4H PRN Jatinder Rangle DO        Or    acetaminophen (TYLENOL) Suppository 650 mg  650 mg Rectal Q4H PRN Jatinder Rangel DO        albuterol (PROVENTIL) neb solution 2.5 mg  2.5 mg Nebulization Q2H PRN Jatinder Rangel DO        benzocaine-menthol (CHLORASEPTIC) 6-10 MG lozenge 1 lozenge  1 lozenge Buccal Q1H PRN Soo Wylie MD   1 lozenge at 07/09/25 1436    calcium carbonate (TUMS) chewable tablet 1,000 mg  1,000 mg Oral 4x Daily PRN Jatinder Rangel DO   1,000 mg at 07/08/25 2147    glucose gel 15-30 g  15-30 g Oral Q15 Min PRN Soo Wylie MD        Or    dextrose 50 % injection 25-50 mL  25-50 mL Intravenous Q15 Min PRN Soo Wylie MD        Or    glucagon injection 1 mg  1 mg Subcutaneous Q15 Min PRN Soo Wylie MD        lidocaine (LMX4) cream   Topical Q1H PRN Jatinder Rangel DO        lidocaine 1 % 0.1-1 mL  0.1-1 mL Other Q1H PRN Jatinder Rangel DO        melatonin tablet 1 mg  1 mg Oral At Bedtime PRN Jatinder Rangel DO        ondansetron (ZOFRAN ODT) ODT tab 4 mg  4 mg Oral Q6H PRN Jatinder Rangel DO        Or    ondansetron (ZOFRAN) injection 4 mg  4 mg Intravenous Q6H PRN Jatinder Rangel DO        prochlorperazine (COMPAZINE) injection 5 mg  5 mg Intravenous Q6H PRN Jatinder Rangel DO        Or    prochlorperazine (COMPAZINE) tablet 5 mg  5 mg Oral Q6H PRN Jatinder Rangel DO        senna-docusate (SENOKOT-S/PERICOLACE) 8.6-50 MG per tablet 1 tablet  1 tablet Oral BID PRN Jatinder Rangel DO        Or    senna-docusate (SENOKOT-S/PERICOLACE) 8.6-50 MG per tablet 2 tablet  2 tablet Oral  BID PRN Jatinder Rangel DO        sodium chloride (PF) 0.9% PF flush 3 mL  3 mL Intracatheter q1 min prn Jatinder Rangel DO   3 mL at 07/11/25 0236

## 2025-07-11 NOTE — PLAN OF CARE
"Assumed care [id number]. A&Ox4. Up ad carlos when OOB. Weaned to 3L O2 via NC and is saturating in the mid 90s. On a mod carb/carb count diet. Denies pain/discomfort. Possible discharge today if able to wean O2.    Goal Outcome Evaluation:      Plan of Care Reviewed With: patient    Overall Patient Progress: no changeOverall Patient Progress: no change    Outcome Evaluation: A&Ox4, on 4L O2 via NC, ACHS BG cehcks, denies pain      Problem: Adult Inpatient Plan of Care  Goal: Plan of Care Review  Description: The Plan of Care Review/Shift note should be completed every shift.  The Outcome Evaluation is a brief statement about your assessment that the patient is improving, declining, or no change.  This information will be displayed automatically on your shift  note.  Outcome: Progressing  Flowsheets (Taken 7/11/2025 0156)  Outcome Evaluation: A&Ox4, on 4L O2 via NC, ACHS BG cehcks, denies pain  Plan of Care Reviewed With: patient  Overall Patient Progress: no change  Goal: Patient-Specific Goal (Individualized)  Description: You can add care plan individualizations to a care plan. Examples of Individualization might be:  \"Parent requests to be called daily at 9am for status\", \"I have a hard time hearing out of my right ear\", or \"Do not touch me to wake me up as it startles  me\".  Outcome: Progressing  Goal: Absence of Hospital-Acquired Illness or Injury  Outcome: Progressing  Intervention: Identify and Manage Fall Risk  Recent Flowsheet Documentation  Taken 7/11/2025 0101 by Pam Garduno, RN  Safety Promotion/Fall Prevention: safety round/check completed  Goal: Optimal Comfort and Wellbeing  Outcome: Progressing  Goal: Readiness for Transition of Care  Outcome: Progressing     Problem: Comorbidity Management  Goal: Maintenance of COPD Symptom Control  Outcome: Progressing  Goal: Blood Glucose Levels Within Targeted Range  Outcome: Progressing     Problem: Gas Exchange Impaired  Goal: Optimal Gas Exchange  Outcome: " Progressing     Problem: COPD (Chronic Obstructive Pulmonary Disease)  Goal: Optimal Chronic Illness Coping  Outcome: Progressing  Goal: Optimal Level of Functional [place]  Outcome: Progressing  Goal: Absence of Infection Signs and Symptoms  Outcome: Progressing  Goal: Improved Oral Intake  Outcome: Progressing  Goal: Effective Oxygenation and Ventilation  Outcome: Progressing     Problem: Delirium  Goal: Optimal Coping  Outcome: Met  Goal: Improved Behavioral Control  Outcome: Met  Goal: Improved Attention and Thought Clarity  Outcome: Met  Goal: Improved Sleep  Outcome: Met

## 2025-07-11 NOTE — PROGRESS NOTES
Patient has been assessed for Home Oxygen needs. Oxygen readings:    *Pulse oximetry (SpO2) = 89% on room air at rest while awake.    *SpO2 improved to 92% on 2 liters/minute at rest.    *SpO2 = 86% on room air during activity/with exercise.    *SpO2 improved to 92% on 4 liters/minute during activity/with exercise.

## 2025-07-11 NOTE — PROGRESS NOTES
[id number]  A&Ox4. VSS, sats maintained on 4L NC. Denies SOB at rest. No pain reported. Up independently.

## 2025-07-11 NOTE — PLAN OF CARE
"To Do:  End of Shift Summary  For vital signs and complete assessments, please see documentation flowsheets.     Pertinent assessments: Pt A&Ox4. Denies pain, no N/V. Up independently in room. Weaned to 1.5L NC. Mod CHO diet.     Major Shift Events: none    Treatment Plan: Wean O2. BG checks.   Bedside Nurse: Marilu Rodriguez RN       Goal Outcome Evaluation:      Plan of Care Reviewed With: patient    Overall Patient Progress: no change    Overall Patient Progress: no change             Problem: Adult Inpatient Plan of Care  Goal: Plan of Care Review  Description: The Plan of Care Review/Shift note should be completed every shift.  The Outcome Evaluation is a brief statement about your assessment that the patient is improving, declining, or no change.  This information will be displayed automatically on your shift  note.  Outcome: Progressing  Flowsheets (Taken 7/11/2025 1506)  Plan of Care Reviewed With: patient  Overall Patient Progress: no change  Goal: Patient-Specific Goal (Individualized)  Description: You can add care plan individualizations to a care plan. Examples of Individualization might be:  \"Parent requests to be called daily at 9am for status\", \"I have a hard time hearing out of my right ear\", or \"Do not touch me to wake me up as it startles  me\".  Outcome: Progressing  Goal: Absence of Hospital-Acquired Illness or Injury  Outcome: Progressing  Intervention: Identify and Manage Fall Risk  Recent Flowsheet Documentation  Taken 7/11/2025 0917 by Marilu Rodriguez, RN  Safety Promotion/Fall Prevention:   clutter free environment maintained   increase visualization of patient  Intervention: Prevent and Manage VTE (Venous Thromboembolism) Risk  Recent Flowsheet Documentation  Taken 7/11/2025 0917 by Marilu Rodriguez RN  VTE Prevention/Management: SCDs off (sequential compression devices)  Goal: Optimal Comfort and Wellbeing  Outcome: Progressing  Goal: Readiness for Transition of Care  Outcome: " Progressing     Problem: Comorbidity Management  Goal: Maintenance of COPD Symptom Control  Outcome: Progressing  Intervention: Maintain COPD Symptom Control  Recent Flowsheet Documentation  Taken 7/11/2025 0917 by Marilu Rodriguez RN  Medication Review/Management: medications reviewed  Goal: Blood Glucose Levels Within Targeted Range  Outcome: Progressing  Intervention: Monitor and Manage Glycemia  Recent Flowsheet Documentation  Taken 7/11/2025 0917 by Marilu Rodriguez RN  Medication Review/Management: medications reviewed     Problem: Gas Exchange Impaired  Goal: Optimal Gas Exchange  Outcome: Progressing     Problem: COPD (Chronic Obstructive Pulmonary Disease)  Goal: Optimal Chronic Illness Coping  Outcome: Progressing  Goal: Optimal Level of Functional [place]  Outcome: Progressing  Intervention: Optimize Functional Ability  Recent Flowsheet Documentation  Taken 7/11/2025 0917 by Marilu Rodriguez RN  Activity Management: up ad carlos  Goal: Absence of Infection Signs and Symptoms  Outcome: Progressing  Intervention: Prevent or Manage Infection  Recent Flowsheet Documentation  Taken 7/11/2025 0917 by Marilu Rodriguez RN  Isolation Precautions: droplet precautions maintained  Goal: Improved Oral Intake  Outcome: Progressing  Goal: Effective Oxygenation and Ventilation  Outcome: Progressing  Intervention: Promote Airway Secretion Clearance  Recent Flowsheet Documentation  Taken 7/11/2025 0917 by Marilu Rodriguez RN  Cough And Deep Breathing: done independently per patient  Activity Management: up ad carlos

## 2025-07-12 ENCOUNTER — DOCUMENTATION ONLY (OUTPATIENT)
Dept: HOME HEALTH SERVICES | Facility: CLINIC | Age: 70
End: 2025-07-12
Payer: COMMERCIAL

## 2025-07-12 VITALS
TEMPERATURE: 98.7 F | RESPIRATION RATE: 18 BRPM | HEIGHT: 70 IN | OXYGEN SATURATION: 91 % | SYSTOLIC BLOOD PRESSURE: 144 MMHG | DIASTOLIC BLOOD PRESSURE: 68 MMHG | HEART RATE: 69 BPM | BODY MASS INDEX: 28.78 KG/M2 | WEIGHT: 201.06 LBS

## 2025-07-12 LAB
ERYTHROCYTE [DISTWIDTH] IN BLOOD BY AUTOMATED COUNT: 13.3 % (ref 10–15)
GLUCOSE BLDC GLUCOMTR-MCNC: 129 MG/DL (ref 70–99)
GLUCOSE BLDC GLUCOMTR-MCNC: 135 MG/DL (ref 70–99)
GLUCOSE BLDC GLUCOMTR-MCNC: 240 MG/DL (ref 70–99)
HCT VFR BLD AUTO: 42 % (ref 40–53)
HGB BLD-MCNC: 14.3 G/DL (ref 13.3–17.7)
MAGNESIUM SERPL-MCNC: 1.8 MG/DL (ref 1.7–2.3)
MCH RBC QN AUTO: 30.1 PG (ref 26.5–33)
MCHC RBC AUTO-ENTMCNC: 34 G/DL (ref 31.5–36.5)
MCV RBC AUTO: 88 FL (ref 78–100)
PLATELET # BLD AUTO: 200 10E3/UL (ref 150–450)
POTASSIUM SERPL-SCNC: 4.1 MMOL/L (ref 3.4–5.3)
RBC # BLD AUTO: 4.75 10E6/UL (ref 4.4–5.9)
WBC # BLD AUTO: 11.3 10E3/UL (ref 4–11)

## 2025-07-12 PROCEDURE — 94640 AIRWAY INHALATION TREATMENT: CPT | Mod: 76

## 2025-07-12 PROCEDURE — 250N000009 HC RX 250: Performed by: STUDENT IN AN ORGANIZED HEALTH CARE EDUCATION/TRAINING PROGRAM

## 2025-07-12 PROCEDURE — 250N000012 HC RX MED GY IP 250 OP 636 PS 637: Performed by: HOSPITALIST

## 2025-07-12 PROCEDURE — 83735 ASSAY OF MAGNESIUM: CPT | Performed by: HOSPITALIST

## 2025-07-12 PROCEDURE — 85014 HEMATOCRIT: CPT | Performed by: HOSPITALIST

## 2025-07-12 PROCEDURE — 250N000013 HC RX MED GY IP 250 OP 250 PS 637: Performed by: HOSPITALIST

## 2025-07-12 PROCEDURE — 99238 HOSP IP/OBS DSCHRG MGMT 30/<: CPT | Performed by: HOSPITALIST

## 2025-07-12 PROCEDURE — 999N000156 HC STATISTIC RCP CONSULT EA 30 MIN

## 2025-07-12 PROCEDURE — 999N000157 HC STATISTIC RCP TIME EA 10 MIN

## 2025-07-12 PROCEDURE — 250N000009 HC RX 250: Performed by: HOSPITALIST

## 2025-07-12 PROCEDURE — 250N000013 HC RX MED GY IP 250 OP 250 PS 637: Performed by: STUDENT IN AN ORGANIZED HEALTH CARE EDUCATION/TRAINING PROGRAM

## 2025-07-12 PROCEDURE — 36415 COLL VENOUS BLD VENIPUNCTURE: CPT | Performed by: HOSPITALIST

## 2025-07-12 PROCEDURE — 84132 ASSAY OF SERUM POTASSIUM: CPT | Performed by: HOSPITALIST

## 2025-07-12 PROCEDURE — 94640 AIRWAY INHALATION TREATMENT: CPT

## 2025-07-12 RX ORDER — IPRATROPIUM BROMIDE AND ALBUTEROL SULFATE 2.5; .5 MG/3ML; MG/3ML
3 SOLUTION RESPIRATORY (INHALATION)
Status: DISCONTINUED | OUTPATIENT
Start: 2025-07-12 | End: 2025-07-12 | Stop reason: HOSPADM

## 2025-07-12 RX ORDER — GLIPIZIDE 2.5 MG/1
7.5 TABLET, EXTENDED RELEASE ORAL
Qty: 12 TABLET | Refills: 0 | Status: SHIPPED | OUTPATIENT
Start: 2025-07-13 | End: 2025-07-24

## 2025-07-12 RX ADMIN — PREDNISONE 20 MG: 20 TABLET ORAL at 09:46

## 2025-07-12 RX ADMIN — LISINOPRIL AND HYDROCHLOROTHIAZIDE TABLETS 1 TABLET: 20; 12.5 TABLET ORAL at 09:46

## 2025-07-12 RX ADMIN — IPRATROPIUM BROMIDE AND ALBUTEROL SULFATE 3 ML: .5; 3 SOLUTION RESPIRATORY (INHALATION) at 15:48

## 2025-07-12 RX ADMIN — IPRATROPIUM BROMIDE AND ALBUTEROL SULFATE 3 ML: .5; 3 SOLUTION RESPIRATORY (INHALATION) at 08:07

## 2025-07-12 RX ADMIN — FLUTICASONE FUROATE AND VILANTEROL TRIFENATATE 1 PUFF: 200; 25 POWDER RESPIRATORY (INHALATION) at 08:07

## 2025-07-12 RX ADMIN — IPRATROPIUM BROMIDE AND ALBUTEROL SULFATE 3 ML: .5; 3 SOLUTION RESPIRATORY (INHALATION) at 11:42

## 2025-07-12 RX ADMIN — GLIPIZIDE 7.5 MG: 5 TABLET, FILM COATED, EXTENDED RELEASE ORAL at 09:46

## 2025-07-12 RX ADMIN — ALLOPURINOL 300 MG: 300 TABLET ORAL at 09:46

## 2025-07-12 RX ADMIN — BUPROPION HYDROCHLORIDE 300 MG: 150 TABLET, EXTENDED RELEASE ORAL at 09:46

## 2025-07-12 RX ADMIN — DOXYCYCLINE HYCLATE 100 MG: 100 CAPSULE ORAL at 09:46

## 2025-07-12 ASSESSMENT — ACTIVITIES OF DAILY LIVING (ADL)
ADLS_ACUITY_SCORE: 35

## 2025-07-12 NOTE — PROVIDER NOTIFICATION
"[facility]  Respiratory Care Note         07/12/25 0939   RCAT Assessment   Reason for Assessment COPD   Pulmonary Status 4   Surgical Status 0   Chest X-ray 0   Respiratory Pattern 0   Mental Status 0   Breath Sounds 2   Cough Effectiveness 0   Level of Activity 0   O2 Required for SpO2>=92% 1   Acuity Level (points) 7   Acuity Level  4   Re-eval Interval Guideline Every 3 days   Re-evaluation Date 07/15/25   $RT Consult Time Spent RCAT (30 minute increments) 1     Vital signs:  Temp: 98.3  F (36.8  C) Temp src: Oral BP: 133/62 Pulse: 76   Resp: 16 SpO2: (!) 90 % O2 Device: Nasal cannula with humidification Oxygen Delivery: 2 LPM Height: 177.8 cm (5' 10\") Weight: 91.2 kg (201 lb 1 oz)  Estimated body mass index is 28.85 kg/m  as calculated from the following:    Height as of this encounter: 1.778 m (5' 10\").    Weight as of this encounter: 91.2 kg (201 lb 1 oz).      Past Medical History:   Diagnosis Date    Abdominal aortic aneurysm (AAA) without rupture 02/25/2021    AAA 3.6 x 3.6 cm per ultrasound    COPD (chronic obstructive pulmonary disease) (H)     Diabetes mellitus, type 2 (H) 2/23/2024    Dysmetabolic syndrome X     Hyperlipidemia     Hypertension     Osteoarthrosis     Prediabetes 10/09/2014    A1C 6.1    Tobacco use disorder        Past Surgical History:   Procedure Laterality Date    CHOLECYSTECTOMY  8/22    COLONOSCOPY N/A 10/19/2015    Procedure: COMBINED COLONOSCOPY, SINGLE OR MULTIPLE BIOPSY/POLYPECTOMY BY BIOPSY;  Surgeon: Gume Vidal MD;  Location:  GI    CV AORTOGRAM N/A 03/01/2024    Procedure: Aortogram Thoracic;  Surgeon: Alin Roe MD;  Location:  OR    DAVINCI LAPAROSCOPIC CHOLECYSTECTOMY WITH GRAMS N/A 08/16/2022    Procedure: ROBOT-ASSISTED, LAPAROSCOPIC CHOLECYSTECTOMY WITH CHOLANGIOGRAM;  Surgeon: Carlota Leavitt MD;  Location:  OR    ENDOSCOPIC RETROGRADE CHOLANGIOPANCREATOGRAM N/A 08/17/2022    Procedure: ENDOSCOPIC RETROGRADE " CHOLANGIOPANCREATOGRAPHY, WITH SPHINCTEROTOMY;  Surgeon: Jani Cash MD;  Location:  OR    ENDOVASCULAR REPAIR ANEURYSM THORACIC AORTIC N/A 2024    Procedure: THORACIC ENDOVASCULAR AORTIC REPAIR, RIGHT FEMORAL CUTDOWN;  Surgeon: Alin Roe MD;  Location:  OR    ILIAC ARTERY ANGIOGRAM RIGHT Right 2024    Procedure: Iliac Artery Angiogram Right;  Surgeon: Alin Roe MD;  Location:  OR    INSERT DRAIN LUMBAR N/A 2024    Procedure: Insert drain lumbar;  Surgeon: Herve Ospina MD;  Location:  OR    IR THORACIC ENDOVASCULAR STENT GRAFT  2024    ORTHOPEDIC SURGERY      left hip, right hip    SURGICAL HISTORY OF -   2000    Left MOISES    SURGICAL HISTORY OF -       Unspecified knee surgeries as a child    SURGICAL HISTORY OF -   10/01/2010    Right MOISES, with left knee arthroscopy, meniscectomy    VASCULAR SURGERY  3/24       Family History   Problem Relation Age of Onset    Chronic atrial fibrillation (H) Mother     Unknown/Adopted Father          in mid 60's, health history unknown    Family History Negative Sister     Family History Negative Brother        Social History     Tobacco Use    Smoking status: Every Day     Current packs/day: 0.00     Average packs/day: 0.7 packs/day for 77.5 years (50.5 ttl pk-yrs)     Types: Cigarettes     Start date: 1970     Last attempt to quit: 2025     Years since quittin.2    Smokeless tobacco: Never    Tobacco comments:     Smoking about 4-5 cigs daily   Substance Use Topics    Alcohol use: Yes     Comment: 2-3 drinks per night (double)       Study Result    Narrative & Impression   EXAM: XR CHEST 2 VIEWS  LOCATION: [facility]  DATE: 2025     INDICATION: Shortness of breath  COMPARISON: CT angiogram 2025                                                                      IMPRESSION: Stable size of cardiomediastinal silhouette with thoracic aortic stent graft  again noted. No focal airspace consolidation, pleural effusion or pneumothorax. Bones are unchanged.     PTA Med List   Medication Sig Last Dose/Taking    albuterol (PROAIR HFA/PROVENTIL HFA/VENTOLIN HFA) 108 (90 Base) MCG/ACT inhaler Inhale 2 puffs into the lungs every 4 hours as needed for wheezing or shortness of breath. Taking As Needed    allopurinol (ZYLOPRIM) 300 MG tablet Take 1 tablet (300 mg) by mouth daily. 7/7/2025 Morning    amLODIPine (NORVASC) 10 MG tablet TAKE 1 TABLET(10 MG) BY MOUTH EVERY EVENING 7/6/2025 Evening    aspirin 81 MG tablet Take 1 tablet (81 mg) by mouth daily 7/7/2025 Morning    betamethasone dipropionate (DIPROSONE) 0.05 % external cream APPLY SPARINGLY ONCE OR TWICE DAILY AS NEEDED TO AFFECTED AREA OF PSORIASIS UNTIL THE SKIN IS BETTER, THEN STOP. REPEAT AS NEEDED Taking    buPROPion (WELLBUTRIN XL) 300 MG 24 hr tablet Take 1 tablet (300 mg) by mouth every morning. 7/7/2025 Morning    fluticasone-salmeterol (AIRDUO RESPICLICK) 232-14 MCG/ACT inhaler INHALE 1 PUFF INTO THE LUNGS 2 TIMES DAILY 7/7/2025 Morning    ipratropium - albuterol 0.5 mg/2.5 mg/3 mL (DUONEB) 0.5-2.5 (3) MG/3ML neb solution USE 1 VIAL VIA NEBULIZER EVERY 4 HOURS AS NEEDED FOR SHORTNESS OF BREATH OR WHEEZING OR COUGH Taking    lisinopril-hydrochlorothiazide (ZESTORETIC) 20-12.5 MG tablet TAKE 1 TABLET BY MOUTH EVERY MORNING 7/7/2025 Morning    rosuvastatin (CRESTOR) 20 MG tablet Take 1 tablet (20 mg) by mouth every evening. 7/6/2025 Evening    tiotropium (SPIRIVA) 18 MCG inhaled capsule Inhale 1 capsule (18 mcg) into the lungs daily. GENERIC TIOTROPIUM 7/7/2025 Morning    varenicline (CHANTIX) 1 MG tablet Take 1 tablet (1 mg) by mouth 2 times daily. 7/7/2025 Morning               Tomi Suero [facility]  7/12/2025

## 2025-07-12 NOTE — PLAN OF CARE
"Goal Outcome Evaluation:       Pt A&O, independent for mobility, on 2 l oxygen, will go home tonight with oxygen, CMS intact, denied pain, voided adequately, makes needs known, discharged meds were send with pt. Home.      BP (!) 144/68 (BP Location: Left arm)   Pulse 69   Temp 98.7  F (37.1  C) (Oral)   Resp 18   Ht 1.778 m (5' 10\")   Wt 91.2 kg (201 lb 1 oz)   SpO2 (!) 91%   BMI 28.85 kg/m     Problem: Adult Inpatient Plan of Care  Goal: Plan of Care Review  Description: The Plan of Care Review/Shift note should be completed every shift.  The Outcome Evaluation is a brief statement about your assessment that the patient is improving, declining, or no change.  This information will be displayed automatically on your shift  note.  Outcome: Met  Goal: Patient-Specific Goal (Individualized)  Description: You can add care plan individualizations to a care plan. Examples of Individualization might be:  \"Parent requests to be called daily at 9am for status\", \"I have a hard time hearing out of my right ear\", or \"Do not touch me to wake me up as it startles  me\".  Outcome: Met  Goal: Absence of Hospital-Acquired Illness or Injury  Outcome: Met  Intervention: Identify and Manage Fall Risk  Recent Flowsheet Documentation  Taken 7/12/2025 1700 by Vilma Viveros RN  Safety Promotion/Fall Prevention: safety round/check completed  Intervention: Prevent and Manage VTE (Venous Thromboembolism) Risk  Recent Flowsheet Documentation  Taken 7/12/2025 1700 by iVlma Viveros RN  VTE Prevention/Management: SCDs off (sequential compression devices)  Intervention: Prevent Infection  Recent Flowsheet Documentation  Taken 7/12/2025 1700 by Vilma Viveros RN  Infection Prevention:   cohorting utilized   hand hygiene promoted   equipment surfaces disinfected  Goal: Optimal Comfort and Wellbeing  Outcome: Met  Intervention: Monitor Pain and Promote Comfort  Recent Flowsheet Documentation  Taken 7/12/2025 1700 by Jackeline" Vilma COLLIER RN  Pain Management Interventions: declines  Intervention: Provide Person-Centered Care  Recent Flowsheet Documentation  Taken 7/12/2025 1700 by Vilma Viveros RN  Trust Relationship/Rapport: care explained  Goal: Readiness for Transition of Care  Outcome: Met     Problem: Comorbidity Management  Goal: Maintenance of COPD Symptom Control  Outcome: Met  Intervention: Maintain COPD Symptom Control  Recent Flowsheet Documentation  Taken 7/12/2025 1700 by Vilma Viveros RN  Medication Review/Management: medications reviewed  Goal: Blood Glucose Levels Within Targeted Range  Outcome: Met  Intervention: Monitor and Manage Glycemia  Recent Flowsheet Documentation  Taken 7/12/2025 1700 by Vilma Viveros RN  Medication Review/Management: medications reviewed     Problem: Gas Exchange Impaired  Goal: Optimal Gas Exchange  Outcome: Met     Problem: COPD (Chronic Obstructive Pulmonary Disease)  Goal: Optimal Chronic Illness Coping  Outcome: Met  Intervention: Support and Optimize Psychosocial Response  Recent Flowsheet Documentation  Taken 7/12/2025 1700 by Vilma Viveros RN  Family/Support System Care: self-care encouraged  Goal: Optimal Level of Functional [place]  Outcome: Met  Intervention: Optimize Functional Ability  Recent Flowsheet Documentation  Taken 7/12/2025 1700 by Vilma Viveros RN  Activity Management: up ad carlos  Goal: Absence of Infection Signs and Symptoms  Outcome: Met  Intervention: Prevent or Manage Infection  Recent Flowsheet Documentation  Taken 7/12/2025 1700 by Vilma Viveros RN  Isolation Precautions: droplet precautions maintained  Goal: Improved Oral Intake  Outcome: Met  Goal: Effective Oxygenation and Ventilation  Outcome: Met  Intervention: Promote Airway Secretion Clearance  Recent Flowsheet Documentation  Taken 7/12/2025 1700 by Vilma Viveros RN  Activity Management: up ad carlos

## 2025-07-12 NOTE — PLAN OF CARE
"Assessments:   A&Ox4. Denies pain, no n/v. Independent in room. Some dyspnea on exertion, weaned to 1.L O2/NC sats at low 90s.  and 87    Treatment Plan: vibramycin, nebs, prednisone, O2 support; wean O2 as able, anticipated discharge tomorrow    Bedside Nurse: Jason Sutton RN      Problem: Adult Inpatient Plan of Care  Goal: Plan of Care Review  Description: The Plan of Care Review/Shift note should be completed every shift.  The Outcome Evaluation is a brief statement about your assessment that the patient is improving, declining, or no change.  This information will be displayed automatically on your shift  note.  Outcome: Progressing  Flowsheets (Taken 7/11/2025 2319)  Outcome Evaluation: weaned to 1L O2/nc, sats at low 90s  Plan of Care Reviewed With: patient  Goal: Patient-Specific Goal (Individualized)  Description: You can add care plan individualizations to a care plan. Examples of Individualization might be:  \"Parent requests to be called daily at 9am for status\", \"I have a hard time hearing out of my right ear\", or \"Do not touch me to wake me up as it startles  me\".  Outcome: Progressing  Goal: Absence of Hospital-Acquired Illness or Injury  Outcome: Progressing  Intervention: Identify and Manage Fall Risk  Recent Flowsheet Documentation  Taken 7/11/2025 1902 by Jason Sutton RN  Safety Promotion/Fall Prevention:   safety round/check completed   room organization consistent   room near nurse's station   nonskid shoes/slippers when out of bed  Intervention: Prevent Skin Injury  Recent Flowsheet Documentation  Taken 7/11/2025 1700 by Jason Sutton RN  Body Position: position changed independently  Intervention: Prevent and Manage VTE (Venous Thromboembolism) Risk  Recent Flowsheet Documentation  Taken 7/11/2025 1902 by Jason Sutton RN  VTE Prevention/Management: SCDs off (sequential compression devices)  Intervention: Prevent Infection  Recent Flowsheet " Documentation  Taken 7/11/2025 1902 by Jason Sutton RN  Infection Prevention: rest/sleep promoted  Goal: Optimal Comfort and Wellbeing  Outcome: Progressing  Intervention: Provide Person-Centered Care  Recent Flowsheet Documentation  Taken 7/11/2025 1700 by Jason Sutton RN  Trust Relationship/Rapport: care explained  Goal: Readiness for Transition of Care  Outcome: Progressing     Problem: Comorbidity Management  Goal: Maintenance of COPD Symptom Control  Outcome: Progressing  Intervention: Maintain COPD Symptom Control  Recent Flowsheet Documentation  Taken 7/11/2025 1902 by Jason Sutton RN  Medication Review/Management: medications reviewed  Goal: Blood Glucose Levels Within Targeted Range  Outcome: Progressing  Intervention: Monitor and Manage Glycemia  Recent Flowsheet Documentation  Taken 7/11/2025 1902 by Jason Sutton RN  Medication Review/Management: medications reviewed     Problem: Gas Exchange Impaired  Goal: Optimal Gas Exchange  Outcome: Progressing     Problem: COPD (Chronic Obstructive Pulmonary Disease)  Goal: Optimal Chronic Illness Coping  Outcome: Progressing  Goal: Optimal Level of Functional [place]  Outcome: Progressing  Intervention: Optimize Functional Ability  Recent Flowsheet Documentation  Taken 7/11/2025 1700 by Jason Sutton RN  Activity Management: up ad carlos  Goal: Absence of Infection Signs and Symptoms  Outcome: Progressing  Intervention: Prevent or Manage Infection  Recent Flowsheet Documentation  Taken 7/11/2025 1902 by Jason Sutton RN  Isolation Precautions: droplet precautions maintained  Goal: Improved Oral Intake  Outcome: Progressing  Goal: Effective Oxygenation and Ventilation  Outcome: Progressing  Intervention: Promote Airway Secretion Clearance  Recent Flowsheet Documentation  Taken 7/11/2025 1700 by Jason Sutton RN  Cough And Deep Breathing: done independently per patient  Activity Management:  up ad carlos       Goal Outcome Evaluation:      Plan of Care Reviewed With: patient    Outcome Evaluation: weaned to 1L O2/nc, sats at low 90s

## 2025-07-12 NOTE — PLAN OF CARE
"End of Shift Summary  For vital signs and complete assessments, please see documentation flowsheets.     Pertinent assessments: Care assumed [id number]. A&O x4. VSS on 1 L NC. Denies pain, N/V. YANCEY noted. Up independent in room. Tolerating a mod CHO diet. PIV SL. Bg 129.    Major Shift Events: Uneventful    Treatment Plan: Plan: vibramycin, nebs, prednisone, O2 support; wean O2 as able, anticipated discharge today    Bedside Nurse: Rach Rawls RN                 Goal Outcome Evaluation:      Plan of Care Reviewed With: patient    Overall Patient Progress: improvingOverall Patient Progress: improving    Outcome Evaluation: YANCEY noted, still needed supplemental oxygen                      Problem: Adult Inpatient Plan of Care  Goal: Plan of Care Review  Description: The Plan of Care Review/Shift note should be completed every shift.  The Outcome Evaluation is a brief statement about your assessment that the patient is improving, declining, or no change.  This information will be displayed automatically on your shift  note.  Outcome: Progressing  Flowsheets (Taken 7/12/2025 1793)  Outcome Evaluation: YANCEY noted, still needed supplemental oxygen  Plan of Care Reviewed With: patient  Overall Patient Progress: improving  Goal: Patient-Specific Goal (Individualized)  Description: You can add care plan individualizations to a care plan. Examples of Individualization might be:  \"Parent requests to be called daily at 9am for status\", \"I have a hard time hearing out of my right ear\", or \"Do not touch me to wake me up as it startles  me\".  Outcome: Progressing  Goal: Absence of Hospital-Acquired Illness or Injury  Outcome: Progressing  Goal: Optimal Comfort and Wellbeing  Outcome: Progressing  Goal: Readiness for Transition of Care  Outcome: Progressing     Problem: Comorbidity Management  Goal: Maintenance of COPD Symptom Control  Outcome: Progressing  Goal: Blood Glucose Levels Within Targeted Range  Outcome: " Progressing     Problem: Gas Exchange Impaired  Goal: Optimal Gas Exchange  Outcome: Progressing     Problem: COPD (Chronic Obstructive Pulmonary Disease)  Goal: Optimal Chronic Illness Coping  Outcome: Progressing  Goal: Optimal Level of Functional [place]  Outcome: Progressing  Goal: Absence of Infection Signs and Symptoms  Outcome: Progressing  Goal: Improved Oral Intake  Outcome: Progressing  Goal: Effective Oxygenation and Ventilation  Outcome: Progressing

## 2025-07-12 NOTE — PROGRESS NOTES
[facility]    Hospitalist Progress Note             Date of Admission:  7/7/2025                   Day of hospitalization: 5    Assessment and Plan:   Gabe Smith is a 70 year old male with PMH significant for emphysema, thoracic aortic aneurysm s/p repair admitted on 7/7/2025 with shortness of breath.      Acute COPD exacerbation  Acute hypoxemic respiratory failure:  Acute Rhin/enterovirus  Known history of emphysema in the setting of tobacco use.  Presents with 1 week of shortness of breath.  This is primarily with exertion.  He feels largely well at rest.  He does endorse some sputum change during this time.  No fevers, chills, or symptoms otherwise.  86-88% o2 with ambulation requiring 2L NC.  CXR without infiltrate or edema.  Covid, rsv, influenza negative.   -Given solumedrol, mag, duonebs, doxycycline in the ED  -With symptoms of now sore throat, stuffy nose.  Likely symptoms are viral in etiology  - Prednisone to 20 mg daily  -Continue duoneb q4h WA  -PTA scheduled inhalers  -Finished Doxycycline  -Continuous oximetry, supplemental o2 prn, wean as tolerated  - Home oxygen assessment ordered if does well will discharge    Diabetes mellitus type 2  -Noted to have hyperglycemia with a hemoglobin A1c of 6.5  - Hyperglycemic in the evening  - Lantus 5 units on hold add glipizide and metformin.    - Noted glucose still quite high added back insulin sliding scale and increased his glipizide for tomorrow    Troponin elevation:  53 ---> 41.  EKG without ischemic change.  Unchanged from prior.  Patient denies chest pain.  Suspect demand ischemia from hypoxemia at home.      Tobacco use disorder:  Previous heavy use.  Has cut back significantly in the recent months.  20 cigarettes in the past 2-3 months.  None for the past 4 days.  Declined NRT.      HTN: PTA meds once med rec completed  HLD: PTA statin  Gout: PTA allopurinol     AAA  Thoracic aortic aneurysm s/p repair: Noted        # Code  "status: Full   # DVT: SCD  # IVF: None            Medically Ready for Discharge: Anticipated Today                    Soo Wylie MD    Subjective   Chief Complaint:  Shortness of breath  Doing ok minimal complaints       Objective   BP (!) 146/71 (BP Location: Left arm)   Pulse 69   Temp 98.1  F (36.7  C) (Oral)   Resp 18   Ht 1.778 m (5' 10\")   Wt 91.2 kg (201 lb 1 oz)   SpO2 94%   BMI 28.85 kg/m       Physical Exam  General: Pt in NAD, normal appearance  HEENT: OP clear MMM, no JVD  Lungs: Expiratory wheeze normal breathing  without accessory muscle usage, no , rhonchi or crackles  Cardiac: +S1, S2, RRR, no MRG, no edema  Abdominal: normal bowel sounds, NT/ND  Skin: warm, dry, normal turgor, no rash  Psyche: A& O x3, appropriate affect           No intake or output data in the 24 hours ending 07/08/25 1349        Labs and Imaging Results:      Recent Labs   Lab 07/12/25  0632 07/08/25  0741   WBC 11.3* 11.5*   HGB 14.3 15.2    227        Recent Labs   Lab 07/08/25  0741 07/07/25  1302   * 137   CO2 24 26   BUN 27.8* 23.6*      No results for input(s): \"INR\", \"PTT\" in the last 168 hours.   No results for input(s): \"CKMB\" in the last 168 hours.    Invalid input(s): \"TROPONINT\"   No results for input(s): \"ALBUMIN\", \"AST\", \"ALT\", \"ALKPHOS\", \"BILITOT\" in the last 168 hours.     Micro:     Radio:  Chest XR,  PA & LAT   Final Result   IMPRESSION: Stable size of cardiomediastinal silhouette with thoracic aortic stent graft again noted. No focal airspace consolidation, pleural effusion or pneumothorax. Bones are unchanged.              Medications:      Scheduled Meds:    Current Facility-Administered Medications   Medication Dose Route Frequency Provider Last Rate Last Admin    allopurinol (ZYLOPRIM) tablet 300 mg  300 mg Oral Daily Jatinder Rangel DO   300 mg at 07/12/25 0946    amLODIPine (NORVASC) tablet 10 mg  10 mg Oral QPM Jatinder Rangel DO   10 mg at 07/11/25 2215    buPROPion (WELLBUTRIN " XL) 24 hr tablet 300 mg  300 mg Oral QAM Jatinder Rangel DO   300 mg at 07/12/25 0946    fluticasone-vilanterol (BREO ELLIPTA) 200-25 MCG/ACT inhaler 1 puff  1 puff Inhalation Daily Jatinder Rangel DO   1 puff at 07/12/25 0807    glipiZIDE (GLUCOTROL XL) 24 hr tablet 7.5 mg  7.5 mg Oral Daily with breakfast Soo Wylie MD   7.5 mg at 07/12/25 0946    insulin aspart (NovoLOG) injection (RAPID ACTING)  1-7 Units Subcutaneous TID  Soo Wylie MD   3 Units at 07/12/25 1215    insulin aspart (NovoLOG) injection (RAPID ACTING)  1-5 Units Subcutaneous At Bedtime Soo Wylie MD   2 Units at 07/10/25 2238    [Held by provider] insulin glargine (LANTUS PEN) injection 5 Units  [facility]   5 Units at 07/11/25 0924    ipratropium - albuterol 0.5 mg/2.5 mg (3mg)/3 mL (DUONEB) neb solution 3 mL  3 mL Nebulization 4x daily Soo Wylie MD   3 mL at 07/12/25 1142    lisinopril-hydrochlorothiazide (ZESTORETIC) 20-12.5 MG per tablet 1 tablet  1 tablet Oral QAM Jatinder Rangel DO   1 tablet at 07/12/25 0946    metFORMIN (GLUCOPHAGE-XR) 24 hr tablet 750 mg  750 mg Oral Daily with supper Soo Wylie MD   750 mg at 07/11/25 1847    predniSONE (DELTASONE) tablet 20 mg  20 mg Oral Daily Soo Wylie MD   20 mg at 07/12/25 0946    rosuvastatin (CRESTOR) tablet 20 mg  20 mg Oral QPM Jatinder Rangel DO   20 mg at 07/11/25 2216    sodium chloride (PF) 0.9% PF flush 3 mL  3 mL Intracatheter Q8H ELMER Jatinder Rangel DO   3 mL at 07/11/25 2218    [Held by provider] umeclidinium (INCRUSE ELLIPTA) 62.5 MCG/ACT inhaler 1 puff  1 puff Inhalation Daily Jatinder Rangel DO         Continuous Infusions:    Current Facility-Administered Medications   Medication Dose Route Frequency Provider Last Rate Last Admin     PRN Meds:    Current Facility-Administered Medications   Medication Dose Route Frequency Provider Last Rate Last Admin    acetaminophen (TYLENOL)  tablet 650 mg  650 mg Oral Q4H PRN Jatinder Rangel DO        Or    acetaminophen (TYLENOL) Suppository 650 mg  650 mg Rectal Q4H PRN Jatinder Rangel DO        albuterol (PROVENTIL) neb solution 2.5 mg  2.5 mg Nebulization Q2H PRN Jatinder Rangel DO        benzocaine-menthol (CHLORASEPTIC) 6-10 MG lozenge 1 lozenge  1 lozenge Buccal Q1H PRN Soo Wylie MD   1 lozenge at 07/09/25 1436    calcium carbonate (TUMS) chewable tablet 1,000 mg  1,000 mg Oral 4x Daily PRN Jatinder Rangel DO   1,000 mg at 07/11/25 2221    glucose gel 15-30 g  15-30 g Oral Q15 Min PRN Soo Wylie MD        Or    dextrose 50 % injection 25-50 mL  25-50 mL Intravenous Q15 Min PRN Soo Wylie MD        Or    glucagon injection 1 mg  1 mg Subcutaneous Q15 Min PRN Soo Wylie MD        lidocaine (LMX4) cream   Topical Q1H PRN Jatinder Rangel DO        lidocaine 1 % 0.1-1 mL  0.1-1 mL Other Q1H PRN Jatinder Rangel DO        melatonin tablet 1 mg  1 mg Oral At Bedtime PRN Jatinder Rangel DO        ondansetron (ZOFRAN ODT) ODT tab 4 mg  4 mg Oral Q6H PRN Jatinder Rangel DO        Or    ondansetron (ZOFRAN) injection 4 mg  4 mg Intravenous Q6H PRN Jatinder Rangel DO        prochlorperazine (COMPAZINE) injection 5 mg  5 mg Intravenous Q6H PRN Jatinder Rnagel DO        Or    prochlorperazine (COMPAZINE) tablet 5 mg  5 mg Oral Q6H PRN Jatinder Rangel DO        senna-docusate (SENOKOT-S/PERICOLACE) 8.6-50 MG per tablet 1 tablet  1 tablet Oral BID PRN Jatinder Rangel DO        Or    senna-docusate (SENOKOT-S/PERICOLACE) 8.6-50 MG per tablet 2 tablet  2 tablet Oral BID PRN Jatinder Rangel DO        sodium chloride (PF) 0.9% PF flush 3 mL  3 mL Intracatheter q1 min prn Jatinder Rangel DO   3 mL at 07/12/25 0987

## 2025-07-12 NOTE — DISCHARGE SUMMARY
Discharge Summary  Hospitalist Service      Gabe Smith MRN# [id number]   YOB: 1955 Age: 70 year old     Date of Admission:  7/7/2025  Date of Discharge:  7/12/2025  Admitting Physician:  Soo Wylie MD  Discharge Physician: Soo Wylie MD   Discharging Service: Hospitalist Service     Primary Provider: Donny Payne  Primary Care Physician Phone Number: [phone]         Discharge Diagnoses/Problem Oriented Hospital Course (Providers):      Discharge Diagnoses   Acute COPD exacerbation  Acute hypoxemic respiratory failure:  Acute Rhin/enterovirus  Diabetes mellitus type 2  Hospital Course   Gabe Smith is a 70 year old male with PMH significant for emphysema, thoracic aortic aneurysm s/p repair admitted on 7/7/2025 with shortness of breath.      Acute COPD exacerbation  Acute hypoxemic respiratory failure:  Acute Rhin/enterovirus  Known history of emphysema in the setting of tobacco use.  Presents with 1 week of shortness of breath.  This is primarily with exertion.  He feels largely well at rest.  He does endorse some sputum change during this time.  No fevers, chills, or symptoms otherwise.  86-88% o2 with ambulation requiring 2L NC.  CXR without infiltrate or edema.  Covid, rsv, influenza negative.   -Given solumedrol, mag, duonebs, doxycycline in the ED  -With symptoms of now sore throat, stuffy nose.  Likely symptoms are viral in etiology  - Prednisone to 20 mg daily  -Continue duoneb q4h WA  -PTA scheduled inhalers  -Finished Doxycycline  -Continuous oximetry, supplemental o2 prn, wean as tolerated  - Home oxygen assessment ordered if does well will discharge     Diabetes mellitus type 2  -Noted to have hyperglycemia with a hemoglobin A1c of 6.5  - Lantus 5 units on hold add glipizide and metformin.    - Noted glucose still quite high added back insulin sliding scale and increased his glipizide   -Discharge with glipizide and metformin while on steroids      Troponin elevation:  53 ---> 41.  EKG without ischemic change.  Unchanged from prior.  Patient denies chest pain.  Suspect demand ischemia from hypoxemia at home.      Tobacco use disorder:  Previous heavy use.  Has cut back significantly in the recent months.  20 cigarettes in the past 2-3 months.  None for the past 4 days.  Declined NRT.      HTN: PTA meds once med rec completed  HLD: PTA statin  Gout: PTA allopurinol     AAA  Thoracic aortic aneurysm s/p repair: Noted         Code Status:      Full Code         Important Results:                  Pending Results:        Unresulted Labs Ordered in the Past 30 Days of this Admission       No orders found from 6/7/2025 to 7/8/2025.                 Discharge Instructions and Follow-Up:      Follow-up Appointments     Hospital Follow-up with Existing Primary Care Provider (PCP)          Schedule Primary Care visit within: 7 Days                Discharge Disposition:        Discharged to home          Discharge Medications:        Current Discharge Medication List        START taking these medications    Details   glipiZIDE (GLUCOTROL XL) 2.5 MG 24 hr tablet Take 3 tablets (7.5 mg) by mouth daily (with breakfast) for 4 days.  Qty: 12 tablet, Refills: 0    Associated Diagnoses: Type 2 diabetes mellitus with other circulatory complication, without long-term current use of insulin (H)      metFORMIN (GLUCOPHAGE-XR) 750 MG 24 hr tablet Take 1 tablet (750 mg) by mouth daily (with dinner) for 7 days.  Qty: 7 tablet, Refills: 0    Associated Diagnoses: COPD exacerbation (H)      predniSONE (DELTASONE) 10 MG tablet Take 2 tablets (20 mg) by mouth daily for 4 days.  Qty: 8 tablet, Refills: 0    Associated Diagnoses: COPD exacerbation (H)           CONTINUE these medications which have NOT CHANGED    Details   albuterol (PROAIR HFA/PROVENTIL HFA/VENTOLIN HFA) 108 (90 Base) MCG/ACT inhaler Inhale 2 puffs into the lungs every 4 hours as needed for wheezing or shortness of  breath.  Qty: 54 g, Refills: 9    Comments: Pharmacy may dispense brand covered by insurance (Proair, or proventil or ventolin or generic albuterol inhaler)  Associated Diagnoses: Panlobular emphysema (H)      allopurinol (ZYLOPRIM) 300 MG tablet Take 1 tablet (300 mg) by mouth daily.  Qty: 90 tablet, Refills: 3    Comments: NOTE DOSE CHANGE  Associated Diagnoses: Acute gout of foot, unspecified cause, unspecified laterality      amLODIPine (NORVASC) 10 MG tablet TAKE 1 TABLET(10 MG) BY MOUTH EVERY EVENING  Qty: 90 tablet, Refills: 1    Associated Diagnoses: Benign essential hypertension      aspirin 81 MG tablet Take 1 tablet (81 mg) by mouth daily      betamethasone dipropionate (DIPROSONE) 0.05 % external cream APPLY SPARINGLY ONCE OR TWICE DAILY AS NEEDED TO AFFECTED AREA OF PSORIASIS UNTIL THE SKIN IS BETTER, THEN STOP. REPEAT AS NEEDED  Qty: 45 g, Refills: 2    Associated Diagnoses: Psoriasis      buPROPion (WELLBUTRIN XL) 300 MG 24 hr tablet Take 1 tablet (300 mg) by mouth every morning.  Qty: 90 tablet, Refills: 1    Comments: PROFILE FOR FUTURE REFILLS UNTIL PATIENT CALLS FOR REFILLS  Associated Diagnoses: Tobacco abuse      fluticasone-salmeterol (AIRDUO RESPICLICK) 232-14 MCG/ACT inhaler INHALE 1 PUFF INTO THE LUNGS 2 TIMES DAILY  Qty: 3 each, Refills: 3    Associated Diagnoses: Panlobular emphysema (H); Panlobular emphysema (H)      ipratropium - albuterol 0.5 mg/2.5 mg/3 mL (DUONEB) 0.5-2.5 (3) MG/3ML neb solution USE 1 VIAL VIA NEBULIZER EVERY 4 HOURS AS NEEDED FOR SHORTNESS OF BREATH OR WHEEZING OR COUGH  Qty: 180 mL, Refills: 1    Associated Diagnoses: COPD exacerbation (H); Panlobular emphysema (H)      lisinopril-hydrochlorothiazide (ZESTORETIC) 20-12.5 MG tablet TAKE 1 TABLET BY MOUTH EVERY MORNING  Qty: 90 tablet, Refills: 1    Associated Diagnoses: Type 2 diabetes mellitus with other circulatory complication, without long-term current use of insulin (H); Benign essential hypertension     "  rosuvastatin (CRESTOR) 20 MG tablet Take 1 tablet (20 mg) by mouth every evening.  Qty: 90 tablet, Refills: 3    Comments: NOTE CHANGE FROM PRAVASTATIN  Associated Diagnoses: Type 2 diabetes mellitus with other circulatory complication, without long-term current use of insulin (H); Hyperlipidemia LDL goal <100; Abdominal aortic aneurysm (AAA) without rupture, unspecified part      tiotropium (SPIRIVA) 18 MCG inhaled capsule Inhale 1 capsule (18 mcg) into the lungs daily. GENERIC TIOTROPIUM  Qty: 90 capsule, Refills: 3    Associated Diagnoses: Panlobular emphysema (H)      varenicline (CHANTIX) 1 MG tablet Take 1 tablet (1 mg) by mouth 2 times daily.  Qty: 180 tablet, Refills: 0    Comments: PROFILE FOR FUTURE REFILLS UNTIL PATIENT CALLS FOR REFILLS  Associated Diagnoses: Tobacco abuse      Continuous Glucose  (FREESTYLE MILES 3 READER) JANIS Apply 1 Units topically as needed (use with Miles 3 sensors to monitor blood sugar levels).                  Allergies:       No Known Allergies         Consultations This Hospital Stay:        No consultations were requested during this admission          Condition and Physical Exam on Discharge:        Discharge condition: Stable   Discharge vitals: Blood pressure (!) 146/71, pulse 69, temperature 98.1  F (36.7  C), temperature source Oral, resp. rate 18, height 1.778 m (5' 10\"), weight 91.2 kg (201 lb 1 oz), SpO2 94%.   General: Pt in NAD, normal appearance  HEENT: OP clear MMM, no JVD  Lungs: Clear to Auscultation Bilateral, normal breathing  without accessory muscle usage, no wheezing, rhonchi or crackles  Cardiac: +S1, S2, RRR, no MRG, no edema  Abdominal: normal bowel sounds, NT/ND, no hepatosplenomegaly  Skin: warm, dry, normal turgor, no rash  Psyche: A& O x3, appropriate affect            Discharge Orders for Skilled Facility (from Discharge Orders):        After Care Instructions       Activity      Your activity upon discharge: activity as tolerated        " Diet      Follow this diet upon discharge: Current Diet:Orders Placed This Encounter      Combination Diet Regular Diet Adult; Moderate Consistent Carb (60 g CHO per Meal) Diet                     Rehab orders for Skilled Facility (from Discharge Orders):               Discharge Time:      Greater than 30 minutes.        Image Results From This Hospital Stay (For Non-EPIC Providers):        Results for orders placed or performed during the hospital encounter of 07/07/25   Chest XR,  PA & LAT    Narrative    EXAM: XR CHEST 2 VIEWS  LOCATION: [facility]  DATE: 7/7/2025    INDICATION: Shortness of breath  COMPARISON: CT angiogram 4/9/2025      Impression    IMPRESSION: Stable size of cardiomediastinal silhouette with thoracic aortic stent graft again noted. No focal airspace consolidation, pleural effusion or pneumothorax. Bones are unchanged.           Most Recent Lab Results In EPIC (For Non-EPIC Providers):    Most Recent 3 CBC's:  Recent Labs   Lab Test 07/12/25  0632 07/08/25  0741 07/07/25  1302   WBC 11.3* 11.5* 11.0   HGB 14.3 15.2 15.6   MCV 88 87 88    227 203      Most Recent 3 BMP's:  Recent Labs   Lab Test 07/12/25  1158 07/12/25  0903 07/12/25  0632 07/12/25  0203 07/11/25  0900 07/11/25  0823 07/10/25  0832 07/10/25  0728 07/08/25  0957 07/08/25  0741 07/07/25  1302 04/21/25  1027   NA  --   --   --   --   --   --   --   --   --  134* 137 139   POTASSIUM  --   --  4.1  --   --  4.3  --  4.5   < > 4.8 4.7 4.7   CHLORIDE  --   --   --   --   --   --   --   --   --  97* 100 103   CO2  --   --   --   --   --   --   --   --   --  24 26 27   BUN  --   --   --   --   --   --   --   --   --  27.8* 23.6* 26.9*   CR  --   --   --   --   --   --   --   --   --  0.88 1.09 1.07   ANIONGAP  --   --   --   --   --   --   --   --   --  13 11 9   DES  --   --   --   --   --   --   --   --   --  9.4 9.2 9.9   * 135*  --  129*   < >  --    < >  --    < > 288* 164* 142*    < > = values  in this interval not displayed.     Most Recent 3 Troponin's:No lab results found.  Most Recent 3 INR's:  Recent Labs   Lab Test 03/03/24  0347 03/02/24  1826 03/02/24  0600   INR 1.14 1.10 1.19*     Most Recent 2 LFT's:  Recent Labs   Lab Test 04/21/25  1027 02/09/24  1552 11/01/23  1000 10/19/22  0949   AST  --  30  --  19   ALT 13 15   < > 22   ALKPHOS  --  83  --  104   BILITOTAL  --  0.4  --  0.4    < > = values in this interval not displayed.     Most Recent Cholesterol Panel:  Recent Labs   Lab Test 04/21/25  1027   CHOL 190   *   HDL 31*   TRIG 268*     Most Recent 6 Bacteria Isolates From Any Culture (See EPIC Reports for Culture Details):No lab results found.  Most Recent TSH, T4 and HgbA1c:  Recent Labs   Lab Test 04/21/25  1027   A1C 6.5*

## 2025-07-12 NOTE — PROGRESS NOTES
Patient has been assessed for Home Oxygen needs.     Pulse oximetry (SpO2) and Oxygen flow readings:    SpO2 =  89% on room air at rest while awake.    SpO2 improved to  92% on   2 liters/minute at rest.    SpO2 =  82% on room air during activity/with exercise.    *SpO2 improved to  90% on   2 liters/minute during activity/with exercise.

## 2025-07-12 NOTE — PROGRESS NOTES
Oxygen Documentation  I certify that this patient, Gabe Smith has been under my care (or a nurse practitioner or physican's assistant working with me). This is the face-to-face encounter for oxygen medical necessity.      At the time of this encounter, I have reviewed the qualifying testing and have determined that supplemental oxygen is reasonable and necessary and is expected to improve the patient's condition in a home setting.         Patient has continued oxygen desaturation due to COPD J44.9.    If portability is ordered, is the patient mobile within the home? yes    Was this visit performed as a telehealth visit: No

## 2025-07-12 NOTE — PROGRESS NOTES
Received intake call for home oxygen at 3:02PM. Reviewed patient's chart; Patient qualifies under insurance guidelines and all documentation is in the chart including a good order.     3:06PM- Spoke with care coordinator, Marilu, confirmed we received the order, and provided them with ETA of oxygen. Typically, we have a 4 hour window on weekends but I am nearby and will deliver to bedside.      3:57PM- Called to offer choice and patient is okay with [facility] setting them up. Discussed equipment with patient and informed them that I would be heading over soon to deliver the O2 equipment to bedside.

## 2025-07-12 NOTE — PLAN OF CARE
"To Do:  End of Shift Summary  For vital signs and complete assessments, please see documentation flowsheets.     Pertinent assessments: Pt A&Ox4. VSS, on 2L NC. Denies pain, no N/V. Dyspnea upon exertion. PIV S/L. , 240.    Major Shift Events none    Treatment Plan: Prepare for discharge. Wean O2.    Bedside Nurse: Marilu Rodriguez RN         Goal Outcome Evaluation:      Plan of Care Reviewed With: patient    Overall Patient Progress: improving    Overall Patient Progress: improving         Problem: Adult Inpatient Plan of Care  Goal: Plan of Care Review  Description: The Plan of Care Review/Shift note should be completed every shift.  The Outcome Evaluation is a brief statement about your assessment that the patient is improving, declining, or no change.  This information will be displayed automatically on your shift  note.  Outcome: Progressing  Flowsheets (Taken 7/12/2025 1438)  Plan of Care Reviewed With: patient  Overall Patient Progress: improving  Goal: Patient-Specific Goal (Individualized)  Description: You can add care plan individualizations to a care plan. Examples of Individualization might be:  \"Parent requests to be called daily at 9am for status\", \"I have a hard time hearing out of my right ear\", or \"Do not touch me to wake me up as it startles  me\".  Outcome: Progressing  Goal: Absence of Hospital-Acquired Illness or Injury  Outcome: Progressing  Intervention: Identify and Manage Fall Risk  Recent Flowsheet Documentation  Taken 7/12/2025 1015 by Marilu Rodriguez, RN  Safety Promotion/Fall Prevention:   clutter free environment maintained   increase visualization of patient  Intervention: Prevent Skin Injury  Recent Flowsheet Documentation  Taken 7/12/2025 1015 by Marilu Rodriguez, RN  Body Position: position changed independently  Taken 7/12/2025 0756 by Marilu Rodriguez, RN  Body Position: position changed independently  Intervention: Prevent and Manage VTE (Venous Thromboembolism) " Risk  Recent Flowsheet Documentation  Taken 7/12/2025 1015 by Marilu Rodriguez RN  VTE Prevention/Management: SCDs off (sequential compression devices)  Intervention: Prevent Infection  Recent Flowsheet Documentation  Taken 7/12/2025 1015 by Marilu Rodriguez RN  Infection Prevention:   cohorting utilized   hand hygiene promoted   equipment surfaces disinfected  Goal: Optimal Comfort and Wellbeing  Outcome: Progressing  Goal: Readiness for Transition of Care  Outcome: Progressing     Problem: Comorbidity Management  Goal: Maintenance of COPD Symptom Control  Outcome: Progressing  Intervention: Maintain COPD Symptom Control  Recent Flowsheet Documentation  Taken 7/12/2025 1015 by Marilu Rodriguez RN  Medication Review/Management: medications reviewed  Goal: Blood Glucose Levels Within Targeted Range  Outcome: Progressing  Intervention: Monitor and Manage Glycemia  Recent Flowsheet Documentation  Taken 7/12/2025 1015 by Marilu Rodriguez RN  Medication Review/Management: medications reviewed     Problem: Gas Exchange Impaired  Goal: Optimal Gas Exchange  Outcome: Progressing     Problem: COPD (Chronic Obstructive Pulmonary Disease)  Goal: Optimal Chronic Illness Coping  Outcome: Progressing  Goal: Optimal Level of Functional [place]  Outcome: Progressing  Intervention: Optimize Functional Ability  Recent Flowsheet Documentation  Taken 7/12/2025 1015 by Marilu Rodriguez RN  Activity Management: up ad carlos  Taken 7/12/2025 0756 by Marilu Rodriguez RN  Activity Management: up ad carlos  Goal: Absence of Infection Signs and Symptoms  Outcome: Progressing  Intervention: Prevent or Manage Infection  Recent Flowsheet Documentation  Taken 7/12/2025 1015 by Marilu Rodriguez RN  Isolation Precautions: droplet precautions maintained  Goal: Improved Oral Intake  Outcome: Progressing  Goal: Effective Oxygenation and Ventilation  Outcome: Progressing  Intervention: Promote Airway Secretion Clearance  Recent Flowsheet  Documentation  Taken 7/12/2025 1015 by Marilu Rodriguez, RN  Cough And Deep Breathing: done independently per patient  Activity Management: up ad carlos  Taken 7/12/2025 0756 by Marilu Rodriguez, RN  Activity Management: up ad carlos

## 2025-07-15 ENCOUNTER — PATIENT OUTREACH (OUTPATIENT)
Dept: CARE COORDINATION | Facility: CLINIC | Age: 70
End: 2025-07-15
Payer: COMMERCIAL

## 2025-07-15 DIAGNOSIS — E11.59 TYPE 2 DIABETES MELLITUS WITH OTHER CIRCULATORY COMPLICATION, WITHOUT LONG-TERM CURRENT USE OF INSULIN (H): ICD-10-CM

## 2025-07-15 DIAGNOSIS — I10 BENIGN ESSENTIAL HYPERTENSION: ICD-10-CM

## 2025-07-15 RX ORDER — LISINOPRIL AND HYDROCHLOROTHIAZIDE 12.5; 2 MG/1; MG/1
1 TABLET ORAL EVERY MORNING
Qty: 90 TABLET | Refills: 1 | Status: SHIPPED | OUTPATIENT
Start: 2025-07-15

## 2025-07-15 NOTE — PROGRESS NOTES
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 I have my oxygen and nebulizer. I am resting.  How are your symptoms? (Red Flag symptoms escalate to triage hotline per guidelines): Improved  Do you know how to contact your clinic care team if you have future questions or changes to your health status? : Yes  Does the patient have their discharge instructions? : Yes  Does the patient have questions regarding their discharge instructions? : No  Were you started on any new medications or were there changes to any of your previous medications? : Yes  Does the patient have all of their medications?: Yes  Do you have questions regarding any of your medications? : No  Do you have all of your needed medical supplies or equipment (DME)?  (i.e. oxygen tank, CPAP, cane, etc.): Yes              [facility] Explained and offered Care Coordination support to eligible patients: Yes    Patient accepted? No    Follow up Plan     Discharge Follow-Up  Discharge follow up appointment scheduled in alignment with recommended follow up timeframe or Transitions of Risk Category? (Low = within 30 days; Moderate= within 14 days; High= within 7 days): No (Pt will schedule follow up.)  Patient's follow up appointment not scheduled: Patient declined scheduling support. Education on the importance of  transitions of care follow up. Provided scheduling phone number.    No future appointments.    Outpatient Plan as outlined on AVS reviewed with patient.    For any urgent concerns, please contact our 24 hour nurse triage line: [phone] ([phone])       LICHA Coleman  [phone]  [facility]

## 2025-07-24 ENCOUNTER — OFFICE VISIT (OUTPATIENT)
Dept: INTERNAL MEDICINE | Facility: CLINIC | Age: 70
End: 2025-07-24
Payer: COMMERCIAL

## 2025-07-24 VITALS
OXYGEN SATURATION: 91 % | BODY MASS INDEX: 27.53 KG/M2 | SYSTOLIC BLOOD PRESSURE: 138 MMHG | RESPIRATION RATE: 15 BRPM | WEIGHT: 192.3 LBS | HEIGHT: 70 IN | HEART RATE: 67 BPM | DIASTOLIC BLOOD PRESSURE: 81 MMHG | TEMPERATURE: 98.1 F

## 2025-07-24 DIAGNOSIS — J44.1 COPD EXACERBATION (H): ICD-10-CM

## 2025-07-24 DIAGNOSIS — E11.59 TYPE 2 DIABETES MELLITUS WITH OTHER CIRCULATORY COMPLICATION, WITHOUT LONG-TERM CURRENT USE OF INSULIN (H): Primary | ICD-10-CM

## 2025-07-24 PROBLEM — K75.0 HEPATIC ABSCESS: Status: RESOLVED | Noted: 2022-05-25 | Resolved: 2025-07-24

## 2025-07-24 PROBLEM — G89.18 POSTOPERATIVE PAIN: Status: RESOLVED | Noted: 2022-08-16 | Resolved: 2025-07-24

## 2025-07-24 PROCEDURE — 3075F SYST BP GE 130 - 139MM HG: CPT | Performed by: INTERNAL MEDICINE

## 2025-07-24 PROCEDURE — 1111F DSCHRG MED/CURRENT MED MERGE: CPT | Performed by: INTERNAL MEDICINE

## 2025-07-24 PROCEDURE — 3079F DIAST BP 80-89 MM HG: CPT | Performed by: INTERNAL MEDICINE

## 2025-07-24 PROCEDURE — 99214 OFFICE O/P EST MOD 30 MIN: CPT | Performed by: INTERNAL MEDICINE

## 2025-07-28 ENCOUNTER — PATIENT OUTREACH (OUTPATIENT)
Dept: CARE COORDINATION | Facility: CLINIC | Age: 70
End: 2025-07-28
Payer: COMMERCIAL

## 2025-08-12 DIAGNOSIS — Z72.0 TOBACCO ABUSE: ICD-10-CM

## 2025-08-13 RX ORDER — VARENICLINE TARTRATE 1 MG/1
1 TABLET, FILM COATED ORAL
Qty: 180 TABLET | Refills: 0 | Status: SHIPPED | OUTPATIENT
Start: 2025-08-13 | End: 2025-11-11

## (undated) DEVICE — SURGICEL HEMOSTAT 3X4" NUKNIT 1943

## (undated) DEVICE — DRAPE IOBAN INCISE 36X23" 6651EZ

## (undated) DEVICE — TUBING SUCTION 12"X1/4" N612

## (undated) DEVICE — ENDO TROCAR FIRST ENTRY KII FIOS Z-THRD 05X100MM CTF03

## (undated) DEVICE — SYR 10ML SLIP TIP W/O NDL 303134

## (undated) DEVICE — SOL WATER IRRIG 1000ML BOTTLE 2F7114

## (undated) DEVICE — PACK SPECIAL PROCEDURE CUSTOM

## (undated) DEVICE — Device

## (undated) DEVICE — WIRE GUIDE 0.035"X450CM JAGWIRE HP STR TIP M00556580

## (undated) DEVICE — ESU GROUND PAD UNIVERSAL W/O CORD

## (undated) DEVICE — DECANTER BAG 2002S

## (undated) DEVICE — CLIP APPLIER LIGACLIP 11" MED SHORT 20 CT MSM20

## (undated) DEVICE — TUBING ANGIOGRAPHY 1200PSI 30"

## (undated) DEVICE — BALLOON EXTRACTION 3-LUMEN 15X190MM 2.8MM TL B-V233P-A

## (undated) DEVICE — DAVINCI XI DRAPE COLUMN 470341

## (undated) DEVICE — LUBRICANT INST KIT ENDO-LUBE 220-90

## (undated) DEVICE — STPL SKIN 35W ROTATING HEAD PRW35

## (undated) DEVICE — SOL NACL 0.9% IRRIG 1000ML BOTTLE 2F7124

## (undated) DEVICE — SU MONOCRYL 3-0 PS-2 27" Y427H

## (undated) DEVICE — RAD INTRODUCER KIT MICRO 5FRX10CM .018 NITINOL G/W

## (undated) DEVICE — INTRO SHEATH 5FRX10CM PINNACLE MARKER RSB512

## (undated) DEVICE — WIRE GUIDE LUNDERQUIST 0.035"X260CM DBL CVD

## (undated) DEVICE — PACK UNIVERSAL SPLIT 29131

## (undated) DEVICE — INTRO SHEATH 8FRX10CM PINNACLE MARKER RSB812

## (undated) DEVICE — SOL NACL 0.9% INJ 1000ML BAG 2B1324X

## (undated) DEVICE — LUBRICANT INST ELECTROLUBE EL101

## (undated) DEVICE — SYR 30ML LL W/O NDL 302832

## (undated) DEVICE — DAVINCI HOT SHEARS TIP COVER  400180

## (undated) DEVICE — SU MONOCRYL 4-0 PS-2 18" UND Y496G

## (undated) DEVICE — NDL 19GA 1.5"

## (undated) DEVICE — SPONGE RAY-TEC 4X8" 7318

## (undated) DEVICE — CLIP ENDO HEMO-LOC GREEN MED/LG 544230

## (undated) DEVICE — GUIDEWIRE TERUMO ADVANTAGE .035X260CM ANG GA3502

## (undated) DEVICE — CATH PIGTAILS W/MARKERS 5FR 100CM 7602-20M100SH

## (undated) DEVICE — COVER LIGHT HANDLE  HILL-ROM C LT-C02

## (undated) DEVICE — DAVINCI XI CAUTERY HOOK 470183

## (undated) DEVICE — DAVINCI XI MONOPOLAR SCISSORS HOT SHEARS 8MM 470179

## (undated) DEVICE — SU DERMABOND ADVANCED .7ML DNX12

## (undated) DEVICE — SU PROLENE 6-0 RB-2DA 30" 8711H

## (undated) DEVICE — SYR 10ML SLIP TIP W/O NDL

## (undated) DEVICE — SU PROLENE 5-0 RB-1DA 36"  8556H

## (undated) DEVICE — PACK LAP CHOLE SLC15LCFSD

## (undated) DEVICE — SU SILK 3-0 TIE 24" SA74H

## (undated) DEVICE — DRAPE IOBAN INCISE 23X17" 6650EZ

## (undated) DEVICE — DAVINCI XI OBTURATOR BLADELESS 8MM 470359

## (undated) DEVICE — PREP CHLORAPREP 26ML TINTED HI-LITE ORANGE 930815

## (undated) DEVICE — GLOVE PROTEXIS W/NEU-THERA 6.5  2D73TE65

## (undated) DEVICE — PACK AAA SBA15AAFS3

## (undated) DEVICE — DRSG GAUZE 4X4" 3033

## (undated) DEVICE — DRAPE SHEET REV FOLD 3/4 9349

## (undated) DEVICE — CATH LUMBAR 60CM 46419

## (undated) DEVICE — SYR 10ML FINGER CONTROL W/O NDL 309695

## (undated) DEVICE — DRSG TEGADERM 4X4 3/4" 1626

## (undated) DEVICE — SU PROLENE 5-0 C-1DA 36" 8720H

## (undated) DEVICE — DAVINCI XI SEAL UNIVERSAL 5-8MM 470361

## (undated) DEVICE — SU SILK 2-0 FS-1 18" 685G

## (undated) DEVICE — SPHINCTEROTOME 7FRX25MM TRITOME G22555

## (undated) DEVICE — SU VICRYL 0 UR-6 27" J603H

## (undated) DEVICE — DAVINCI XI DRAPE ARM 470015

## (undated) DEVICE — DRAPE C-ARM 60X42" 1013

## (undated) DEVICE — LINEN TOWEL PACK X5 5464

## (undated) DEVICE — STOPCOCK HIGH PRESSURE 3-WAY

## (undated) DEVICE — SU SILK 2-0 SH 30" K833H

## (undated) DEVICE — DEVICE MULTI TORQUE TD01

## (undated) DEVICE — DAVINCI XI CLIP APPLIER MED HEM-O-LOK GREEN 470327

## (undated) DEVICE — SU VICRYL 2-0 CT-1 27" J339H

## (undated) DEVICE — DRAPE BREAST/CHEST 29420

## (undated) DEVICE — SU SILK 2-0 TIE 24" SA75H

## (undated) DEVICE — BLADE CLIPPER 4406

## (undated) DEVICE — SU VICRYL 3-0 SH 27" J316H

## (undated) DEVICE — SHEATH INTRODUCER DRYSEAL FLEX W/HYDRO CT 20FRX33CM DSF2033

## (undated) DEVICE — SU VICRYL 2-0 CT-1 18' J739D

## (undated) DEVICE — VESSEL LOOPS RED MAXI

## (undated) DEVICE — DAVINCI XI GRASPER FENESTRATED TIP UP 8MM 470347

## (undated) DEVICE — DAVINCI XI FCP BIPOLAR FENESTRATED 470205

## (undated) DEVICE — CATH BALLOON RELIANT STENT GRAFT 8FR REL46

## (undated) DEVICE — DRAPE COVER C-ARM SEAMLESS SNAP-KAP 03-KP26 LATEX FREE

## (undated) DEVICE — LIGHT HANDLE X2

## (undated) DEVICE — GLOVE BIOGEL PI MICRO INDICATOR UNDERGLOVE SZ 8.0 48980

## (undated) DEVICE — GUIDEWIRE AMPLATZ SUPER STIFF 0.035"X260CM M001465260

## (undated) DEVICE — LINEN TOWEL PACK X6 WHITE 5487

## (undated) DEVICE — SYSTEM LAPAROVUE VISIBILITY LAPVUE10

## (undated) RX ORDER — LIDOCAINE HYDROCHLORIDE 20 MG/ML
INJECTION, SOLUTION EPIDURAL; INFILTRATION; INTRACAUDAL; PERINEURAL
Status: DISPENSED
Start: 2022-08-17

## (undated) RX ORDER — FLUMAZENIL 0.1 MG/ML
INJECTION, SOLUTION INTRAVENOUS
Status: DISPENSED
Start: 2022-05-27

## (undated) RX ORDER — PROPOFOL 10 MG/ML
INJECTION, EMULSION INTRAVENOUS
Status: DISPENSED
Start: 2022-08-17

## (undated) RX ORDER — GLYCOPYRROLATE 0.2 MG/ML
INJECTION, SOLUTION INTRAMUSCULAR; INTRAVENOUS
Status: DISPENSED
Start: 2022-08-16

## (undated) RX ORDER — FENTANYL CITRATE 50 UG/ML
INJECTION, SOLUTION INTRAMUSCULAR; INTRAVENOUS
Status: DISPENSED
Start: 2022-06-01

## (undated) RX ORDER — PROTAMINE SULFATE 10 MG/ML
INJECTION, SOLUTION INTRAVENOUS
Status: DISPENSED
Start: 2024-03-01

## (undated) RX ORDER — LABETALOL HYDROCHLORIDE 5 MG/ML
INJECTION, SOLUTION INTRAVENOUS
Status: DISPENSED
Start: 2022-08-16

## (undated) RX ORDER — ALBUTEROL SULFATE 0.83 MG/ML
SOLUTION RESPIRATORY (INHALATION)
Status: DISPENSED
Start: 2022-08-17

## (undated) RX ORDER — NALOXONE HYDROCHLORIDE 0.4 MG/ML
INJECTION, SOLUTION INTRAMUSCULAR; INTRAVENOUS; SUBCUTANEOUS
Status: DISPENSED
Start: 2022-05-27

## (undated) RX ORDER — CEFAZOLIN SODIUM/WATER 2 G/20 ML
SYRINGE (ML) INTRAVENOUS
Status: DISPENSED
Start: 2022-08-16

## (undated) RX ORDER — HYDROMORPHONE HYDROCHLORIDE 1 MG/ML
INJECTION, SOLUTION INTRAMUSCULAR; INTRAVENOUS; SUBCUTANEOUS
Status: DISPENSED
Start: 2024-03-01

## (undated) RX ORDER — IPRATROPIUM BROMIDE AND ALBUTEROL SULFATE 2.5; .5 MG/3ML; MG/3ML
SOLUTION RESPIRATORY (INHALATION)
Status: DISPENSED
Start: 2024-03-01

## (undated) RX ORDER — NEOSTIGMINE METHYLSULFATE 1 MG/ML
VIAL (ML) INJECTION
Status: DISPENSED
Start: 2022-08-16

## (undated) RX ORDER — FLUMAZENIL 0.1 MG/ML
INJECTION, SOLUTION INTRAVENOUS
Status: DISPENSED
Start: 2022-06-01

## (undated) RX ORDER — HEPARIN SODIUM 1000 [USP'U]/ML
INJECTION, SOLUTION INTRAVENOUS; SUBCUTANEOUS
Status: DISPENSED
Start: 2024-03-01

## (undated) RX ORDER — FENTANYL CITRATE 0.05 MG/ML
INJECTION, SOLUTION INTRAMUSCULAR; INTRAVENOUS
Status: DISPENSED
Start: 2022-08-16

## (undated) RX ORDER — PROPOFOL 10 MG/ML
INJECTION, EMULSION INTRAVENOUS
Status: DISPENSED
Start: 2024-03-01

## (undated) RX ORDER — ACETAMINOPHEN 325 MG/1
TABLET ORAL
Status: DISPENSED
Start: 2022-08-16

## (undated) RX ORDER — PROPOFOL 10 MG/ML
INJECTION, EMULSION INTRAVENOUS
Status: DISPENSED
Start: 2022-08-16

## (undated) RX ORDER — ALBUTEROL SULFATE 90 UG/1
AEROSOL, METERED RESPIRATORY (INHALATION)
Status: DISPENSED
Start: 2022-08-17

## (undated) RX ORDER — HYDROXYZINE HYDROCHLORIDE 50 MG/ML
INJECTION, SOLUTION INTRAMUSCULAR
Status: DISPENSED
Start: 2022-08-16

## (undated) RX ORDER — FENTANYL CITRATE 50 UG/ML
INJECTION, SOLUTION INTRAMUSCULAR; INTRAVENOUS
Status: DISPENSED
Start: 2024-03-01

## (undated) RX ORDER — ONDANSETRON 2 MG/ML
INJECTION INTRAMUSCULAR; INTRAVENOUS
Status: DISPENSED
Start: 2024-03-01

## (undated) RX ORDER — FENTANYL CITRATE 50 UG/ML
INJECTION, SOLUTION INTRAMUSCULAR; INTRAVENOUS
Status: DISPENSED
Start: 2022-05-27

## (undated) RX ORDER — IPRATROPIUM BROMIDE AND ALBUTEROL SULFATE 2.5; .5 MG/3ML; MG/3ML
SOLUTION RESPIRATORY (INHALATION)
Status: DISPENSED
Start: 2022-08-16

## (undated) RX ORDER — DEXAMETHASONE SODIUM PHOSPHATE 4 MG/ML
INJECTION, SOLUTION INTRA-ARTICULAR; INTRALESIONAL; INTRAMUSCULAR; INTRAVENOUS; SOFT TISSUE
Status: DISPENSED
Start: 2022-08-17

## (undated) RX ORDER — ALBUTEROL SULFATE 90 UG/1
AEROSOL, METERED RESPIRATORY (INHALATION)
Status: DISPENSED
Start: 2024-03-01

## (undated) RX ORDER — ONDANSETRON 2 MG/ML
INJECTION INTRAMUSCULAR; INTRAVENOUS
Status: DISPENSED
Start: 2022-08-17

## (undated) RX ORDER — FENTANYL CITRATE 50 UG/ML
INJECTION, SOLUTION INTRAMUSCULAR; INTRAVENOUS
Status: DISPENSED
Start: 2022-08-16

## (undated) RX ORDER — CEFAZOLIN SODIUM/WATER 2 G/20 ML
SYRINGE (ML) INTRAVENOUS
Status: DISPENSED
Start: 2024-03-01

## (undated) RX ORDER — KETOROLAC TROMETHAMINE 30 MG/ML
INJECTION, SOLUTION INTRAMUSCULAR; INTRAVENOUS
Status: DISPENSED
Start: 2022-08-16

## (undated) RX ORDER — FENTANYL CITRATE 50 UG/ML
INJECTION, SOLUTION INTRAMUSCULAR; INTRAVENOUS
Status: DISPENSED
Start: 2022-08-17

## (undated) RX ORDER — NALOXONE HYDROCHLORIDE 0.4 MG/ML
INJECTION, SOLUTION INTRAMUSCULAR; INTRAVENOUS; SUBCUTANEOUS
Status: DISPENSED
Start: 2022-06-01